# Patient Record
Sex: MALE | Race: BLACK OR AFRICAN AMERICAN | Employment: OTHER | URBAN - METROPOLITAN AREA
[De-identification: names, ages, dates, MRNs, and addresses within clinical notes are randomized per-mention and may not be internally consistent; named-entity substitution may affect disease eponyms.]

---

## 2017-05-09 ENCOUNTER — TELEPHONE (OUTPATIENT)
Dept: ADMINISTRATIVE | Age: 57
End: 2017-05-09

## 2017-09-05 ENCOUNTER — HOSPITAL ENCOUNTER (INPATIENT)
Age: 57
LOS: 2 days | Discharge: HOME | DRG: 602 | End: 2017-09-07
Attending: EMERGENCY MEDICINE | Admitting: INTERNAL MEDICINE
Payer: MEDICARE

## 2017-09-05 DIAGNOSIS — L03.113 CELLULITIS OF RIGHT UPPER LIMB: Primary | ICD-10-CM

## 2017-09-05 LAB
ABSOLUTE EOS #: 0.2 K/UL (ref 0–0.4)
ABSOLUTE LYMPH #: 1.5 K/UL (ref 1–4.8)
ABSOLUTE MONO #: 1.3 K/UL (ref 0.2–0.8)
ANION GAP SERPL CALCULATED.3IONS-SCNC: 19 MMOL/L (ref 9–17)
BASOPHILS # BLD: 1 %
BASOPHILS ABSOLUTE: 0.1 K/UL (ref 0–0.2)
BUN BLDV-MCNC: 49 MG/DL (ref 6–20)
BUN/CREAT BLD: 4 (ref 9–20)
CALCIUM SERPL-MCNC: 9.3 MG/DL (ref 8.6–10.4)
CHLORIDE BLD-SCNC: 87 MMOL/L (ref 98–107)
CO2: 25 MMOL/L (ref 20–31)
CREAT SERPL-MCNC: 11.16 MG/DL (ref 0.7–1.2)
DIFFERENTIAL TYPE: ABNORMAL
EOSINOPHILS RELATIVE PERCENT: 2 %
GFR AFRICAN AMERICAN: 6 ML/MIN
GFR NON-AFRICAN AMERICAN: 5 ML/MIN
GFR SERPL CREATININE-BSD FRML MDRD: ABNORMAL ML/MIN/{1.73_M2}
GFR SERPL CREATININE-BSD FRML MDRD: ABNORMAL ML/MIN/{1.73_M2}
GLUCOSE BLD-MCNC: 180 MG/DL (ref 70–99)
HCT VFR BLD CALC: 31.8 % (ref 41–53)
HEMOGLOBIN: 10.7 G/DL (ref 13.5–17.5)
LACTIC ACID: 1.1 MMOL/L (ref 0.5–2.2)
LYMPHOCYTES # BLD: 12 %
MCH RBC QN AUTO: 27.4 PG (ref 26–34)
MCHC RBC AUTO-ENTMCNC: 33.6 G/DL (ref 31–37)
MCV RBC AUTO: 81.7 FL (ref 80–100)
MONOCYTES # BLD: 10 %
PDW BLD-RTO: 15.2 % (ref 11.5–14.5)
PLATELET # BLD: 233 K/UL (ref 130–400)
PLATELET ESTIMATE: ABNORMAL
PMV BLD AUTO: ABNORMAL FL (ref 6–12)
POTASSIUM SERPL-SCNC: 4.2 MMOL/L (ref 3.7–5.3)
RBC # BLD: 3.9 M/UL (ref 4.5–5.9)
RBC # BLD: ABNORMAL 10*6/UL
SEG NEUTROPHILS: 75 %
SEGMENTED NEUTROPHILS ABSOLUTE COUNT: 9.1 K/UL (ref 1.8–7.7)
SODIUM BLD-SCNC: 131 MMOL/L (ref 135–144)
WBC # BLD: 12.2 K/UL (ref 3.5–11)
WBC # BLD: ABNORMAL 10*3/UL

## 2017-09-05 PROCEDURE — 96375 TX/PRO/DX INJ NEW DRUG ADDON: CPT

## 2017-09-05 PROCEDURE — 99284 EMERGENCY DEPT VISIT MOD MDM: CPT

## 2017-09-05 PROCEDURE — 2060000000 HC ICU INTERMEDIATE R&B

## 2017-09-05 PROCEDURE — 80048 BASIC METABOLIC PNL TOTAL CA: CPT

## 2017-09-05 PROCEDURE — 96365 THER/PROPH/DIAG IV INF INIT: CPT

## 2017-09-05 PROCEDURE — 6360000002 HC RX W HCPCS: Performed by: EMERGENCY MEDICINE

## 2017-09-05 PROCEDURE — 85025 COMPLETE CBC W/AUTO DIFF WBC: CPT

## 2017-09-05 PROCEDURE — 87040 BLOOD CULTURE FOR BACTERIA: CPT

## 2017-09-05 PROCEDURE — 96366 THER/PROPH/DIAG IV INF ADDON: CPT

## 2017-09-05 PROCEDURE — 6360000002 HC RX W HCPCS: Performed by: NURSE PRACTITIONER

## 2017-09-05 PROCEDURE — 2580000003 HC RX 258: Performed by: EMERGENCY MEDICINE

## 2017-09-05 PROCEDURE — 83605 ASSAY OF LACTIC ACID: CPT

## 2017-09-05 RX ORDER — B-COMPLEX WITH VITAMIN C
TABLET ORAL
COMMUNITY
End: 2017-09-05

## 2017-09-05 RX ORDER — AMLODIPINE BESYLATE AND ATORVASTATIN CALCIUM 10; 20 MG/1; MG/1
1 TABLET, FILM COATED ORAL DAILY
COMMUNITY
Start: 2017-04-06 | End: 2018-09-24 | Stop reason: SDUPTHER

## 2017-09-05 RX ORDER — OXYCODONE HYDROCHLORIDE AND ACETAMINOPHEN 5; 325 MG/1; MG/1
1 TABLET ORAL
COMMUNITY
Start: 2017-07-18 | End: 2021-05-18 | Stop reason: SDUPTHER

## 2017-09-05 RX ORDER — FENTANYL CITRATE 50 UG/ML
50 INJECTION, SOLUTION INTRAMUSCULAR; INTRAVENOUS ONCE
Status: COMPLETED | OUTPATIENT
Start: 2017-09-05 | End: 2017-09-05

## 2017-09-05 RX ADMIN — FENTANYL CITRATE 100 MCG: 50 INJECTION INTRAMUSCULAR; INTRAVENOUS at 20:54

## 2017-09-05 RX ADMIN — VANCOMYCIN HYDROCHLORIDE 1500 MG: 1 INJECTION, POWDER, LYOPHILIZED, FOR SOLUTION INTRAVENOUS at 20:55

## 2017-09-05 ASSESSMENT — PAIN DESCRIPTION - LOCATION: LOCATION: ARM

## 2017-09-05 ASSESSMENT — ENCOUNTER SYMPTOMS: COLOR CHANGE: 1

## 2017-09-05 ASSESSMENT — PAIN SCALES - GENERAL
PAINLEVEL_OUTOF10: 9
PAINLEVEL_OUTOF10: 9

## 2017-09-05 ASSESSMENT — PAIN DESCRIPTION - ORIENTATION: ORIENTATION: RIGHT

## 2017-09-06 LAB
GLUCOSE BLD-MCNC: 149 MG/DL (ref 75–110)
GLUCOSE BLD-MCNC: 224 MG/DL (ref 75–110)
GLUCOSE BLD-MCNC: 314 MG/DL (ref 75–110)
HBV SURFACE AB TITR SER: 39.28 MIU/ML
HEPATITIS B CORE TOTAL ANTIBODY: REACTIVE
HEPATITIS B SURFACE ANTIGEN: NONREACTIVE
LACTIC ACID: 1.6 MMOL/L (ref 0.5–2.2)

## 2017-09-06 PROCEDURE — 87340 HEPATITIS B SURFACE AG IA: CPT

## 2017-09-06 PROCEDURE — 86704 HEP B CORE ANTIBODY TOTAL: CPT

## 2017-09-06 PROCEDURE — 2060000000 HC ICU INTERMEDIATE R&B

## 2017-09-06 PROCEDURE — 6370000000 HC RX 637 (ALT 250 FOR IP): Performed by: INTERNAL MEDICINE

## 2017-09-06 PROCEDURE — 36415 COLL VENOUS BLD VENIPUNCTURE: CPT

## 2017-09-06 PROCEDURE — 2580000003 HC RX 258: Performed by: INTERNAL MEDICINE

## 2017-09-06 PROCEDURE — 82947 ASSAY GLUCOSE BLOOD QUANT: CPT

## 2017-09-06 PROCEDURE — 99223 1ST HOSP IP/OBS HIGH 75: CPT | Performed by: INTERNAL MEDICINE

## 2017-09-06 PROCEDURE — 93971 EXTREMITY STUDY: CPT

## 2017-09-06 PROCEDURE — 86140 C-REACTIVE PROTEIN: CPT

## 2017-09-06 PROCEDURE — 86317 IMMUNOASSAY INFECTIOUS AGENT: CPT

## 2017-09-06 PROCEDURE — 6360000002 HC RX W HCPCS: Performed by: INTERNAL MEDICINE

## 2017-09-06 PROCEDURE — 83605 ASSAY OF LACTIC ACID: CPT

## 2017-09-06 RX ORDER — CILOSTAZOL 100 MG/1
100 TABLET ORAL 2 TIMES DAILY
Status: DISCONTINUED | OUTPATIENT
Start: 2017-09-06 | End: 2017-09-06

## 2017-09-06 RX ORDER — ACETAMINOPHEN 325 MG/1
650 TABLET ORAL EVERY 4 HOURS PRN
Status: DISCONTINUED | OUTPATIENT
Start: 2017-09-06 | End: 2017-09-07 | Stop reason: HOSPADM

## 2017-09-06 RX ORDER — SODIUM CHLORIDE 0.9 % (FLUSH) 0.9 %
10 SYRINGE (ML) INJECTION PRN
Status: DISCONTINUED | OUTPATIENT
Start: 2017-09-06 | End: 2017-09-07 | Stop reason: HOSPADM

## 2017-09-06 RX ORDER — CLONIDINE HYDROCHLORIDE 0.3 MG/1
0.3 TABLET ORAL 2 TIMES DAILY
Status: DISCONTINUED | OUTPATIENT
Start: 2017-09-06 | End: 2017-09-06

## 2017-09-06 RX ORDER — LABETALOL 200 MG/1
200 TABLET, FILM COATED ORAL 2 TIMES DAILY
COMMUNITY
End: 2019-01-11 | Stop reason: ALTCHOICE

## 2017-09-06 RX ORDER — HEPARIN SODIUM 5000 [USP'U]/ML
5000 INJECTION, SOLUTION INTRAVENOUS; SUBCUTANEOUS 2 TIMES DAILY
Status: DISCONTINUED | OUTPATIENT
Start: 2017-09-06 | End: 2017-09-07 | Stop reason: HOSPADM

## 2017-09-06 RX ORDER — OXYCODONE HYDROCHLORIDE AND ACETAMINOPHEN 5; 325 MG/1; MG/1
1 TABLET ORAL EVERY 4 HOURS PRN
Status: DISCONTINUED | OUTPATIENT
Start: 2017-09-06 | End: 2017-09-06

## 2017-09-06 RX ORDER — NICOTINE POLACRILEX 4 MG
15 LOZENGE BUCCAL PRN
Status: DISCONTINUED | OUTPATIENT
Start: 2017-09-06 | End: 2017-09-07 | Stop reason: HOSPADM

## 2017-09-06 RX ORDER — LABETALOL 200 MG/1
200 TABLET, FILM COATED ORAL DAILY
Status: DISCONTINUED | OUTPATIENT
Start: 2017-09-06 | End: 2017-09-06

## 2017-09-06 RX ORDER — AMLODIPINE BESYLATE AND ATORVASTATIN CALCIUM 10; 20 MG/1; MG/1
1 TABLET, FILM COATED ORAL DAILY
Status: DISCONTINUED | OUTPATIENT
Start: 2017-09-06 | End: 2017-09-06 | Stop reason: CLARIF

## 2017-09-06 RX ORDER — LABETALOL 200 MG/1
200 TABLET, FILM COATED ORAL
Status: DISCONTINUED | OUTPATIENT
Start: 2017-09-06 | End: 2017-09-07 | Stop reason: HOSPADM

## 2017-09-06 RX ORDER — HYDRALAZINE HYDROCHLORIDE 25 MG/1
25 TABLET, FILM COATED ORAL 3 TIMES DAILY
Status: DISCONTINUED | OUTPATIENT
Start: 2017-09-06 | End: 2017-09-06

## 2017-09-06 RX ORDER — ATORVASTATIN CALCIUM 20 MG/1
20 TABLET, FILM COATED ORAL DAILY
Status: DISCONTINUED | OUTPATIENT
Start: 2017-09-06 | End: 2017-09-07 | Stop reason: HOSPADM

## 2017-09-06 RX ORDER — DEXTROSE MONOHYDRATE 50 MG/ML
100 INJECTION, SOLUTION INTRAVENOUS PRN
Status: DISCONTINUED | OUTPATIENT
Start: 2017-09-06 | End: 2017-09-07 | Stop reason: HOSPADM

## 2017-09-06 RX ORDER — AMLODIPINE BESYLATE 10 MG/1
10 TABLET ORAL DAILY
Status: DISCONTINUED | OUTPATIENT
Start: 2017-09-06 | End: 2017-09-07 | Stop reason: HOSPADM

## 2017-09-06 RX ORDER — GABAPENTIN 300 MG/1
300 CAPSULE ORAL 3 TIMES DAILY
Status: DISCONTINUED | OUTPATIENT
Start: 2017-09-06 | End: 2017-09-07 | Stop reason: HOSPADM

## 2017-09-06 RX ORDER — TRAMADOL HYDROCHLORIDE 50 MG/1
50 TABLET ORAL EVERY 6 HOURS PRN
Status: DISCONTINUED | OUTPATIENT
Start: 2017-09-06 | End: 2017-09-07 | Stop reason: SDUPTHER

## 2017-09-06 RX ORDER — LABETALOL 200 MG/1
200 TABLET, FILM COATED ORAL 3 TIMES DAILY
Status: ON HOLD | COMMUNITY
End: 2021-01-07 | Stop reason: HOSPADM

## 2017-09-06 RX ORDER — LOSARTAN POTASSIUM 100 MG/1
100 TABLET ORAL DAILY
Status: DISCONTINUED | OUTPATIENT
Start: 2017-09-06 | End: 2017-09-07 | Stop reason: HOSPADM

## 2017-09-06 RX ORDER — DEXTROSE MONOHYDRATE 25 G/50ML
12.5 INJECTION, SOLUTION INTRAVENOUS PRN
Status: DISCONTINUED | OUTPATIENT
Start: 2017-09-06 | End: 2017-09-07 | Stop reason: HOSPADM

## 2017-09-06 RX ORDER — HYDRALAZINE HYDROCHLORIDE 25 MG/1
25 TABLET, FILM COATED ORAL
Status: DISCONTINUED | OUTPATIENT
Start: 2017-09-07 | End: 2017-09-07 | Stop reason: HOSPADM

## 2017-09-06 RX ORDER — VANCOMYCIN HYDROCHLORIDE 1 G/200ML
1000 INJECTION, SOLUTION INTRAVENOUS ONCE
Status: COMPLETED | OUTPATIENT
Start: 2017-09-06 | End: 2017-09-06

## 2017-09-06 RX ORDER — LABETALOL 200 MG/1
200 TABLET, FILM COATED ORAL
Status: DISCONTINUED | OUTPATIENT
Start: 2017-09-07 | End: 2017-09-07 | Stop reason: HOSPADM

## 2017-09-06 RX ORDER — HYDRALAZINE HYDROCHLORIDE 25 MG/1
25 TABLET, FILM COATED ORAL 2 TIMES DAILY
COMMUNITY
End: 2020-03-04 | Stop reason: SDUPTHER

## 2017-09-06 RX ORDER — CLONIDINE HYDROCHLORIDE 0.3 MG/1
0.3 TABLET ORAL
Status: DISCONTINUED | OUTPATIENT
Start: 2017-09-06 | End: 2017-09-07 | Stop reason: HOSPADM

## 2017-09-06 RX ORDER — ZOLPIDEM TARTRATE 5 MG/1
5 TABLET ORAL NIGHTLY
Status: DISCONTINUED | OUTPATIENT
Start: 2017-09-06 | End: 2017-09-07 | Stop reason: HOSPADM

## 2017-09-06 RX ORDER — HYDRALAZINE HYDROCHLORIDE 25 MG/1
25 TABLET, FILM COATED ORAL
Status: DISCONTINUED | OUTPATIENT
Start: 2017-09-06 | End: 2017-09-07 | Stop reason: HOSPADM

## 2017-09-06 RX ORDER — CLONIDINE HYDROCHLORIDE 0.3 MG/1
0.3 TABLET ORAL
Status: DISCONTINUED | OUTPATIENT
Start: 2017-09-07 | End: 2017-09-07 | Stop reason: HOSPADM

## 2017-09-06 RX ORDER — SODIUM CHLORIDE 0.9 % (FLUSH) 0.9 %
10 SYRINGE (ML) INJECTION EVERY 12 HOURS SCHEDULED
Status: DISCONTINUED | OUTPATIENT
Start: 2017-09-06 | End: 2017-09-07 | Stop reason: HOSPADM

## 2017-09-06 RX ORDER — OXYCODONE HYDROCHLORIDE AND ACETAMINOPHEN 5; 325 MG/1; MG/1
2 TABLET ORAL EVERY 4 HOURS PRN
Status: DISCONTINUED | OUTPATIENT
Start: 2017-09-06 | End: 2017-09-07 | Stop reason: HOSPADM

## 2017-09-06 RX ADMIN — INSULIN LISPRO 2 UNITS: 100 INJECTION, SOLUTION INTRAVENOUS; SUBCUTANEOUS at 21:35

## 2017-09-06 RX ADMIN — ATORVASTATIN CALCIUM 20 MG: 20 TABLET, FILM COATED ORAL at 21:18

## 2017-09-06 RX ADMIN — OXYCODONE HYDROCHLORIDE AND ACETAMINOPHEN 2 TABLET: 5; 325 TABLET ORAL at 21:18

## 2017-09-06 RX ADMIN — VANCOMYCIN HYDROCHLORIDE 1000 MG: 1 INJECTION, SOLUTION INTRAVENOUS at 21:21

## 2017-09-06 RX ADMIN — HEPARIN SODIUM 5000 UNITS: 5000 INJECTION, SOLUTION INTRAVENOUS; SUBCUTANEOUS at 21:35

## 2017-09-06 RX ADMIN — LOSARTAN POTASSIUM 100 MG: 100 TABLET, FILM COATED ORAL at 13:38

## 2017-09-06 RX ADMIN — GABAPENTIN 300 MG: 300 CAPSULE ORAL at 21:18

## 2017-09-06 RX ADMIN — Medication 10 ML: at 21:21

## 2017-09-06 RX ADMIN — CLONIDINE HYDROCHLORIDE 0.3 MG: 0.3 TABLET ORAL at 13:37

## 2017-09-06 RX ADMIN — HYDRALAZINE HYDROCHLORIDE 25 MG: 25 TABLET, FILM COATED ORAL at 15:27

## 2017-09-06 RX ADMIN — OXYCODONE HYDROCHLORIDE AND ACETAMINOPHEN 1 TABLET: 5; 325 TABLET ORAL at 17:02

## 2017-09-06 RX ADMIN — CLONIDINE HYDROCHLORIDE 0.3 MG: 0.3 TABLET ORAL at 22:40

## 2017-09-06 RX ADMIN — Medication 10 ML: at 12:20

## 2017-09-06 RX ADMIN — HYDRALAZINE HYDROCHLORIDE 25 MG: 25 TABLET, FILM COATED ORAL at 22:40

## 2017-09-06 RX ADMIN — AMLODIPINE BESYLATE 10 MG: 10 TABLET ORAL at 13:37

## 2017-09-06 RX ADMIN — ACETAMINOPHEN 650 MG: 325 TABLET ORAL at 12:20

## 2017-09-06 RX ADMIN — LABETALOL HYDROCHLORIDE 200 MG: 200 TABLET, FILM COATED ORAL at 22:40

## 2017-09-06 RX ADMIN — LOSARTAN POTASSIUM 100 MG: 100 TABLET, FILM COATED ORAL at 21:17

## 2017-09-06 RX ADMIN — AMLODIPINE BESYLATE 10 MG: 10 TABLET ORAL at 21:17

## 2017-09-06 RX ADMIN — INSULIN LISPRO 6 UNITS: 100 INJECTION, SOLUTION INTRAVENOUS; SUBCUTANEOUS at 18:41

## 2017-09-06 RX ADMIN — ACETAMINOPHEN 650 MG: 325 TABLET ORAL at 08:33

## 2017-09-06 RX ADMIN — ACETAMINOPHEN 650 MG: 325 TABLET ORAL at 03:06

## 2017-09-06 ASSESSMENT — PAIN SCALES - GENERAL
PAINLEVEL_OUTOF10: 10
PAINLEVEL_OUTOF10: 10
PAINLEVEL_OUTOF10: 5
PAINLEVEL_OUTOF10: 7
PAINLEVEL_OUTOF10: 10
PAINLEVEL_OUTOF10: 10
PAINLEVEL_OUTOF10: 9
PAINLEVEL_OUTOF10: 0
PAINLEVEL_OUTOF10: 10
PAINLEVEL_OUTOF10: 0
PAINLEVEL_OUTOF10: 8

## 2017-09-06 ASSESSMENT — PAIN DESCRIPTION - ORIENTATION
ORIENTATION: RIGHT

## 2017-09-06 ASSESSMENT — PAIN DESCRIPTION - LOCATION
LOCATION: ARM

## 2017-09-06 ASSESSMENT — PAIN DESCRIPTION - FREQUENCY
FREQUENCY: INTERMITTENT

## 2017-09-06 ASSESSMENT — PAIN DESCRIPTION - DESCRIPTORS
DESCRIPTORS: ACHING
DESCRIPTORS: ACHING
DESCRIPTORS: SHARP;SHOOTING
DESCRIPTORS: ACHING

## 2017-09-06 ASSESSMENT — PAIN DESCRIPTION - PAIN TYPE
TYPE: ACUTE PAIN

## 2017-09-06 ASSESSMENT — PAIN DESCRIPTION - ONSET
ONSET: ON-GOING

## 2017-09-07 ENCOUNTER — APPOINTMENT (OUTPATIENT)
Dept: ULTRASOUND IMAGING | Age: 57
DRG: 602 | End: 2017-09-07
Payer: MEDICARE

## 2017-09-07 VITALS
DIASTOLIC BLOOD PRESSURE: 77 MMHG | HEIGHT: 71 IN | WEIGHT: 225.75 LBS | BODY MASS INDEX: 31.6 KG/M2 | RESPIRATION RATE: 18 BRPM | TEMPERATURE: 99.9 F | SYSTOLIC BLOOD PRESSURE: 154 MMHG | HEART RATE: 90 BPM | OXYGEN SATURATION: 100 %

## 2017-09-07 LAB
C-REACTIVE PROTEIN: 149.1 MG/L (ref 0–5)
C-REACTIVE PROTEIN: 169.4 MG/L (ref 0–5)
GLUCOSE BLD-MCNC: 110 MG/DL (ref 75–110)
GLUCOSE BLD-MCNC: 157 MG/DL (ref 75–110)
GLUCOSE BLD-MCNC: 163 MG/DL (ref 75–110)
GLUCOSE BLD-MCNC: 203 MG/DL (ref 75–110)

## 2017-09-07 PROCEDURE — 76881 US COMPL JOINT R-T W/IMG: CPT

## 2017-09-07 PROCEDURE — 6370000000 HC RX 637 (ALT 250 FOR IP): Performed by: INTERNAL MEDICINE

## 2017-09-07 PROCEDURE — 86403 PARTICLE AGGLUT ANTBDY SCRN: CPT

## 2017-09-07 PROCEDURE — 87070 CULTURE OTHR SPECIMN AEROBIC: CPT

## 2017-09-07 PROCEDURE — 87205 SMEAR GRAM STAIN: CPT

## 2017-09-07 PROCEDURE — 76882 US LMTD JT/FCL EVL NVASC XTR: CPT

## 2017-09-07 PROCEDURE — 82947 ASSAY GLUCOSE BLOOD QUANT: CPT

## 2017-09-07 PROCEDURE — 87186 SC STD MICRODIL/AGAR DIL: CPT

## 2017-09-07 PROCEDURE — 36415 COLL VENOUS BLD VENIPUNCTURE: CPT

## 2017-09-07 PROCEDURE — 6360000002 HC RX W HCPCS: Performed by: INTERNAL MEDICINE

## 2017-09-07 PROCEDURE — 86140 C-REACTIVE PROTEIN: CPT

## 2017-09-07 PROCEDURE — 2580000003 HC RX 258: Performed by: INTERNAL MEDICINE

## 2017-09-07 PROCEDURE — 99238 HOSP IP/OBS DSCHRG MGMT 30/<: CPT | Performed by: INTERNAL MEDICINE

## 2017-09-07 RX ADMIN — OXYCODONE HYDROCHLORIDE AND ACETAMINOPHEN 2 TABLET: 5; 325 TABLET ORAL at 08:33

## 2017-09-07 RX ADMIN — CLONIDINE HYDROCHLORIDE 0.3 MG: 0.3 TABLET ORAL at 08:20

## 2017-09-07 RX ADMIN — INSULIN LISPRO 3 UNITS: 100 INJECTION, SOLUTION INTRAVENOUS; SUBCUTANEOUS at 08:20

## 2017-09-07 RX ADMIN — HEPARIN SODIUM 5000 UNITS: 5000 INJECTION, SOLUTION INTRAVENOUS; SUBCUTANEOUS at 08:20

## 2017-09-07 RX ADMIN — Medication 10 ML: at 09:00

## 2017-09-07 RX ADMIN — HYDRALAZINE HYDROCHLORIDE 25 MG: 25 TABLET, FILM COATED ORAL at 08:20

## 2017-09-07 RX ADMIN — LABETALOL HYDROCHLORIDE 200 MG: 200 TABLET, FILM COATED ORAL at 06:01

## 2017-09-07 RX ADMIN — INSULIN LISPRO 6 UNITS: 100 INJECTION, SOLUTION INTRAVENOUS; SUBCUTANEOUS at 18:02

## 2017-09-07 RX ADMIN — LINAGLIPTIN 5 MG: 5 TABLET, FILM COATED ORAL at 08:20

## 2017-09-07 RX ADMIN — GABAPENTIN 300 MG: 300 CAPSULE ORAL at 08:20

## 2017-09-07 RX ADMIN — LABETALOL HYDROCHLORIDE 200 MG: 200 TABLET, FILM COATED ORAL at 14:36

## 2017-09-07 RX ADMIN — GABAPENTIN 300 MG: 300 CAPSULE ORAL at 14:36

## 2017-09-07 RX ADMIN — OXYCODONE HYDROCHLORIDE AND ACETAMINOPHEN 2 TABLET: 5; 325 TABLET ORAL at 03:30

## 2017-09-07 ASSESSMENT — PAIN DESCRIPTION - ORIENTATION: ORIENTATION: RIGHT

## 2017-09-07 ASSESSMENT — PAIN SCALES - GENERAL
PAINLEVEL_OUTOF10: 7
PAINLEVEL_OUTOF10: 7

## 2017-09-07 ASSESSMENT — PAIN DESCRIPTION - PAIN TYPE: TYPE: ACUTE PAIN

## 2017-09-07 ASSESSMENT — PAIN DESCRIPTION - LOCATION: LOCATION: ARM

## 2017-09-08 LAB
CULTURE: NORMAL
CULTURE: NORMAL
DIRECT EXAM: NORMAL
Lab: NORMAL
SPECIMEN DESCRIPTION: NORMAL
SPECIMEN DESCRIPTION: NORMAL
STATUS: NORMAL

## 2017-09-09 ENCOUNTER — TELEPHONE (OUTPATIENT)
Dept: PHARMACY | Age: 57
End: 2017-09-09

## 2017-09-09 LAB
CULTURE: ABNORMAL
CULTURE: ABNORMAL
DIRECT EXAM: ABNORMAL
DIRECT EXAM: ABNORMAL
Lab: ABNORMAL
ORGANISM: ABNORMAL
SPECIMEN DESCRIPTION: ABNORMAL
SPECIMEN DESCRIPTION: ABNORMAL
STATUS: ABNORMAL

## 2017-09-12 LAB
CULTURE: NORMAL
Lab: NORMAL
Lab: NORMAL
SPECIMEN DESCRIPTION: NORMAL
STATUS: NORMAL
STATUS: NORMAL

## 2018-02-05 LAB
AVERAGE GLUCOSE: NORMAL
HBA1C MFR BLD: 9 %

## 2018-02-11 ENCOUNTER — HOSPITAL ENCOUNTER (EMERGENCY)
Age: 58
Discharge: HOME | End: 2018-02-11
Attending: EMERGENCY MEDICINE
Payer: MEDICARE

## 2018-02-11 VITALS
DIASTOLIC BLOOD PRESSURE: 86 MMHG | SYSTOLIC BLOOD PRESSURE: 156 MMHG | OXYGEN SATURATION: 97 % | TEMPERATURE: 98.4 F | BODY MASS INDEX: 32.06 KG/M2 | HEIGHT: 71 IN | WEIGHT: 229 LBS | HEART RATE: 80 BPM | RESPIRATION RATE: 16 BRPM

## 2018-02-11 DIAGNOSIS — L03.012 PARONYCHIA OF FINGER OF LEFT HAND: Primary | ICD-10-CM

## 2018-02-11 PROCEDURE — 99282 EMERGENCY DEPT VISIT SF MDM: CPT

## 2018-02-11 RX ORDER — DOXYCYCLINE HYCLATE 100 MG
100 TABLET ORAL 2 TIMES DAILY
Qty: 20 TABLET | Refills: 0 | Status: SHIPPED | OUTPATIENT
Start: 2018-02-11 | End: 2018-02-21

## 2018-02-11 ASSESSMENT — ENCOUNTER SYMPTOMS: COLOR CHANGE: 1

## 2018-02-11 ASSESSMENT — PAIN DESCRIPTION - DESCRIPTORS: DESCRIPTORS: THROBBING;PRESSURE;CONSTANT

## 2018-02-11 ASSESSMENT — PAIN DESCRIPTION - LOCATION: LOCATION: FINGER (COMMENT WHICH ONE)

## 2018-02-11 NOTE — ED NOTES
Assessment states this has been going on since Nov 2017. He has good days and bad. Lately bad with pain pulsation and swelling of the left ring finger. Has dry sloughing of the skin on finger.       Carmen Salazar RN  02/11/18 [id number]

## 2018-03-15 ENCOUNTER — INITIAL CONSULT (OUTPATIENT)
Dept: VASCULAR SURGERY | Age: 58
End: 2018-03-15

## 2018-03-15 VITALS
DIASTOLIC BLOOD PRESSURE: 76 MMHG | SYSTOLIC BLOOD PRESSURE: 122 MMHG | BODY MASS INDEX: 32.07 KG/M2 | HEIGHT: 71 IN | WEIGHT: 229.06 LBS | HEART RATE: 84 BPM | OXYGEN SATURATION: 98 % | RESPIRATION RATE: 18 BRPM

## 2018-03-15 DIAGNOSIS — N18.6 ESRD (END STAGE RENAL DISEASE) ON DIALYSIS (HCC): Primary | ICD-10-CM

## 2018-03-15 DIAGNOSIS — Z99.2 ESRD (END STAGE RENAL DISEASE) ON DIALYSIS (HCC): Primary | ICD-10-CM

## 2018-03-15 DIAGNOSIS — S61.209A OPEN WOUND OF FINGER, INITIAL ENCOUNTER: Primary | ICD-10-CM

## 2018-03-15 PROCEDURE — 99024 POSTOP FOLLOW-UP VISIT: CPT | Performed by: SURGERY

## 2018-03-27 ENCOUNTER — OFFICE VISIT (OUTPATIENT)
Dept: ORTHOPEDIC SURGERY | Age: 58
End: 2018-03-27
Payer: MEDICARE

## 2018-03-27 VITALS — BODY MASS INDEX: 32.07 KG/M2 | HEIGHT: 71 IN | WEIGHT: 229.06 LBS

## 2018-03-27 DIAGNOSIS — G56.02 CARPAL TUNNEL SYNDROME OF LEFT WRIST: Primary | ICD-10-CM

## 2018-03-27 DIAGNOSIS — L03.012 INFECTION OF FINGERNAIL OF LEFT HAND: ICD-10-CM

## 2018-03-27 PROCEDURE — G8427 DOCREV CUR MEDS BY ELIG CLIN: HCPCS | Performed by: ORTHOPAEDIC SURGERY

## 2018-03-27 PROCEDURE — G8417 CALC BMI ABV UP PARAM F/U: HCPCS | Performed by: ORTHOPAEDIC SURGERY

## 2018-03-27 PROCEDURE — 3017F COLORECTAL CA SCREEN DOC REV: CPT | Performed by: ORTHOPAEDIC SURGERY

## 2018-03-27 PROCEDURE — G8484 FLU IMMUNIZE NO ADMIN: HCPCS | Performed by: ORTHOPAEDIC SURGERY

## 2018-03-27 PROCEDURE — 1036F TOBACCO NON-USER: CPT | Performed by: ORTHOPAEDIC SURGERY

## 2018-03-27 PROCEDURE — 99202 OFFICE O/P NEW SF 15 MIN: CPT | Performed by: ORTHOPAEDIC SURGERY

## 2018-03-27 RX ORDER — CILOSTAZOL 100 MG/1
TABLET ORAL
Status: ON HOLD | COMMUNITY
End: 2021-01-07 | Stop reason: HOSPADM

## 2018-05-14 ENCOUNTER — TELEPHONE (OUTPATIENT)
Dept: ORTHOPEDIC SURGERY | Age: 58
End: 2018-05-14

## 2018-06-12 ENCOUNTER — TELEPHONE (OUTPATIENT)
Dept: INTERNAL MEDICINE CLINIC | Age: 58
End: 2018-06-12

## 2018-07-26 ENCOUNTER — OFFICE VISIT (OUTPATIENT)
Dept: INTERNAL MEDICINE CLINIC | Age: 58
End: 2018-07-26
Payer: MEDICARE

## 2018-07-26 VITALS
WEIGHT: 227 LBS | SYSTOLIC BLOOD PRESSURE: 134 MMHG | BODY MASS INDEX: 31.78 KG/M2 | TEMPERATURE: 98.6 F | HEIGHT: 71 IN | DIASTOLIC BLOOD PRESSURE: 78 MMHG | OXYGEN SATURATION: 98 % | HEART RATE: 74 BPM

## 2018-07-26 DIAGNOSIS — E11.8 CONTROLLED DIABETES MELLITUS TYPE 2 WITH COMPLICATIONS, UNSPECIFIED LONG TERM INSULIN USE STATUS: ICD-10-CM

## 2018-07-26 DIAGNOSIS — G89.29 CHRONIC MIDLINE LOW BACK PAIN, WITH SCIATICA PRESENCE UNSPECIFIED: ICD-10-CM

## 2018-07-26 DIAGNOSIS — Z99.2 ESRD ON HEMODIALYSIS (HCC): ICD-10-CM

## 2018-07-26 DIAGNOSIS — L73.9 FOLLICULITIS: ICD-10-CM

## 2018-07-26 DIAGNOSIS — M54.5 CHRONIC MIDLINE LOW BACK PAIN, WITH SCIATICA PRESENCE UNSPECIFIED: ICD-10-CM

## 2018-07-26 DIAGNOSIS — Z76.89 ESTABLISHING CARE WITH NEW DOCTOR, ENCOUNTER FOR: Primary | ICD-10-CM

## 2018-07-26 DIAGNOSIS — E78.49 OTHER HYPERLIPIDEMIA: ICD-10-CM

## 2018-07-26 DIAGNOSIS — N18.6 ESRD ON HEMODIALYSIS (HCC): ICD-10-CM

## 2018-07-26 DIAGNOSIS — I10 ESSENTIAL HYPERTENSION: ICD-10-CM

## 2018-07-26 DIAGNOSIS — T87.9 BKA STUMP COMPLICATION (HCC): ICD-10-CM

## 2018-07-26 PROCEDURE — G8417 CALC BMI ABV UP PARAM F/U: HCPCS | Performed by: FAMILY MEDICINE

## 2018-07-26 PROCEDURE — 3045F PR MOST RECENT HEMOGLOBIN A1C LEVEL 7.0-9.0%: CPT | Performed by: FAMILY MEDICINE

## 2018-07-26 PROCEDURE — 1036F TOBACCO NON-USER: CPT | Performed by: FAMILY MEDICINE

## 2018-07-26 PROCEDURE — 2022F DILAT RTA XM EVC RTNOPTHY: CPT | Performed by: FAMILY MEDICINE

## 2018-07-26 PROCEDURE — G8427 DOCREV CUR MEDS BY ELIG CLIN: HCPCS | Performed by: FAMILY MEDICINE

## 2018-07-26 PROCEDURE — 3017F COLORECTAL CA SCREEN DOC REV: CPT | Performed by: FAMILY MEDICINE

## 2018-07-26 PROCEDURE — 99204 OFFICE O/P NEW MOD 45 MIN: CPT | Performed by: FAMILY MEDICINE

## 2018-07-26 RX ORDER — MUPIROCIN CALCIUM 20 MG/G
CREAM TOPICAL
Qty: 1 TUBE | Refills: 0 | Status: SHIPPED | OUTPATIENT
Start: 2018-07-26 | End: 2018-08-25

## 2018-07-26 ASSESSMENT — PATIENT HEALTH QUESTIONNAIRE - PHQ9
SUM OF ALL RESPONSES TO PHQ9 QUESTIONS 1 & 2: 0
SUM OF ALL RESPONSES TO PHQ QUESTIONS 1-9: 0
2. FEELING DOWN, DEPRESSED OR HOPELESS: 0
1. LITTLE INTEREST OR PLEASURE IN DOING THINGS: 0

## 2018-07-26 ASSESSMENT — ENCOUNTER SYMPTOMS
GASTROINTESTINAL NEGATIVE: 1
EYES NEGATIVE: 1
RESPIRATORY NEGATIVE: 1
ALLERGIC/IMMUNOLOGIC NEGATIVE: 1

## 2018-07-26 NOTE — PROGRESS NOTES
DIABETES and HYPERTENSION visit    BP Readings from Last 3 Encounters:   03/15/18 122/76   02/11/18 (!) 156/86   09/07/17 (!) 154/77        Hemoglobin A1C (%)   Date Value   02/05/2018 9   06/07/2016 7.4   09/22/2015 7.3     BUN (mg/dL)   Date Value   09/05/2017 49 (H)     CREATININE (mg/dL)   Date Value   09/05/2017 11.16 (HH)     Glucose (mg/dL)   Date Value   09/05/2017 180 (H)            Have you changed or started any medications since your last visit including any over-the-counter medicines, vitamins, or herbal medicines? no   Have you stopped taking any of your medications? Is so, why? -  no  Are you having any side effects from any of your medications? - no    Have you seen any other physician or provider since your last visit?  no      Have you had any other diagnostic tests since your last visit? yes - blood in May   Have you been seen in the emergency room and/or had an admission in a hospital since we last saw you?  no   Have you had your routine dental cleaning in the past 6 months?  no     Have you had your annual diabetic retinal (eye) exam? Yes   (ensure copy of exam is in the chart)    Do you have an active MyChart account? If no, what is the barrier?   No: decline     Patient Care Team:  Navi Garcia MD as PCP - General (Family Medicine)  Eulogio Garibay MD as Consulting Physician (Cardiology)  Felipe Delaney DO as Surgeon (Vascular Surgery)  Nasrin Lamas MD as Consulting Physician (Orthopedic Surgery)  Cindy Hancock MD as Consulting Physician (Neurology)    Medical History Review  Past Medical, Family, and Social History reviewed and does contribute to the patient presenting condition    Health Maintenance   Topic Date Due    Hepatitis C screen  1960    Diabetic foot exam  04/28/1969    Diabetic retinal exam  04/28/1969    Lipid screen  04/28/1969    HIV screen  04/28/1974    DTaP/Tdap/Td vaccine (1 - Tdap) 04/28/1978    Pneumococcal highest risk (1 of 3 - PCV13) 04/28/1978   
regular rhythm, normal heart sounds and intact distal pulses. Pulmonary/Chest: Effort normal and breath sounds normal.   Abdominal: Soft. Bowel sounds are normal.   Musculoskeletal: Normal range of motion. Chronic pain syndrome. Opiate dependent. Neurological: He is alert and oriented to person, place, and time. He has normal reflexes. Skin: Skin is warm and dry. Psychiatric:   Anxiety. He denies suicidality       Assessment:       Diagnosis Orders   1. Establishing care with new doctor, encounter for     2. Folliculitis     3. ESRD on hemodialysis ([facility] [address] MWF     4. BKA stump complication ([facility] [address]     5. Controlled diabetes mellitus type 2 with complications, unspecified long term insulin use status ([facility] [address]     6. Essential hypertension     7. Other hyperlipidemia     8. Chronic midline low back pain, with sciatica presence unspecified  Viktoria Navarro MD           Plan:      58-year-old -American female is presented to establish care. He is afebrile him economically stable, clinical examination is stable at baseline  History of hypertension well-controlled. He is on polypharmacy. Denies hypertension. Is advised to continue current regimen of medication as instructed by nephrology. Consume less than 2 g of salt a day  Diabetes mellitus on dual therapy that he is tolerating well. A1c 6.6. Is advised to keep daily blood sugar log for office review, adhere to current regimen of medication, ADA 1800 diet/renal diet, daily moderate exercise as tolerated  Hyperlipidemia and statin that he is tolerating well. End-stage renal disease hemodialysis dependent time 3 per week. He is also on transplant list  History of chronic back pain. Patient stated that 2-3 days a week he needs opiates. He is also on gabapentin pain. He agrees to be scheduled with pain management  Erectile dysfunction on Viagra  Folliculitis right cheek. Patient has been aggressively manipulating/squeezing.

## 2018-08-10 ENCOUNTER — TELEPHONE (OUTPATIENT)
Dept: INTERNAL MEDICINE CLINIC | Age: 58
End: 2018-08-10

## 2018-08-28 ENCOUNTER — TELEPHONE (OUTPATIENT)
Dept: INTERNAL MEDICINE CLINIC | Age: 58
End: 2018-08-28

## 2018-08-28 NOTE — TELEPHONE ENCOUNTER
PATIENT  STATES [employer] NO LONGER FAXES FORMS TO GET MEDICATION APPROVAL. HE STATES HE WAS TOLD THAT THE OFFICE HAS TO GO ON LINE TO COMPLETE APPLICATION AT [facility] FOR HIS CAUDET 10/20 AND VIAGRA 100MG.   ADVISE THE OFFICE STAFF POOL  Health Maintenance   Topic Date Due    Hepatitis C screen  1960    Diabetic foot exam  04/28/1969    Diabetic retinal exam  04/28/1969    Lipid screen  04/28/1969    HIV screen  04/28/1974    DTaP/Tdap/Td vaccine (1 - Tdap) 04/28/1978    Pneumococcal highest risk (1 of 3 - PCV13) 04/28/1978    Shingles Vaccine (1 of 2 - 2 Dose Series) 04/28/2009    Colon cancer screen colonoscopy  04/28/2009    A1C test (Diabetic or Prediabetic)  05/05/2018    Potassium monitoring  09/05/2018    Creatinine monitoring  09/05/2018    Flu vaccine (1) 09/01/2018             (applicable per patient's age: Cancer Screenings, Depression Screening, Fall Risk Screening, Immunizations)    Hemoglobin A1C (%)   Date Value   02/05/2018 9   06/07/2016 7.4   09/22/2015 7.3     AST (U/L)   Date Value   01/08/2013 40 (H)     ALT (U/L)   Date Value   01/08/2013 30     BUN (mg/dL)   Date Value   09/05/2017 49 (H)      (goal A1C is < 7)   (goal LDL is <100) need 30-50% reduction from baseline     BP Readings from Last 3 Encounters:   07/26/18 134/78   03/15/18 122/76   02/11/18 (!) 156/86    (goal /80)      All Future Testing planned in [facility]:      Next Visit Date:  Future Appointments  Date Time Provider Staci Vuong   9/6/2018 3:45 PM Gerhard Olivares MD Adult pc [id number]            Patient Active Problem List:     Hyperlipidemia     Essential hypertension     ESRD on hemodialysis ([facility] [address] MWF     BKA stump complication ([facility] [address]     Insomnia     ED (erectile dysfunction)     Low back pain     Controlled diabetes mellitus type 2 with complications ([facility] [address]

## 2018-09-06 NOTE — TELEPHONE ENCOUNTER
Tried to reach pt again no answer and rings continuously. I attached the patient assistance program form to this encounter in case pt calls back.

## 2018-09-27 ENCOUNTER — OFFICE VISIT (OUTPATIENT)
Dept: INTERNAL MEDICINE CLINIC | Age: 58
End: 2018-09-27
Payer: MEDICARE

## 2018-09-27 VITALS
BODY MASS INDEX: 32.52 KG/M2 | HEART RATE: 67 BPM | OXYGEN SATURATION: 99 % | DIASTOLIC BLOOD PRESSURE: 80 MMHG | WEIGHT: 233.2 LBS | SYSTOLIC BLOOD PRESSURE: 130 MMHG | RESPIRATION RATE: 14 BRPM

## 2018-09-27 DIAGNOSIS — N52.8 OTHER MALE ERECTILE DYSFUNCTION: ICD-10-CM

## 2018-09-27 DIAGNOSIS — Z13.220 SCREENING FOR HYPERLIPIDEMIA: ICD-10-CM

## 2018-09-27 DIAGNOSIS — G89.4 CHRONIC PAIN SYNDROME: ICD-10-CM

## 2018-09-27 DIAGNOSIS — T87.9 BKA STUMP COMPLICATION (HCC): ICD-10-CM

## 2018-09-27 DIAGNOSIS — Z99.2 ESRD (END STAGE RENAL DISEASE) ON DIALYSIS (HCC): ICD-10-CM

## 2018-09-27 DIAGNOSIS — I10 ESSENTIAL HYPERTENSION: ICD-10-CM

## 2018-09-27 DIAGNOSIS — Z12.11 SCREENING FOR COLON CANCER: ICD-10-CM

## 2018-09-27 DIAGNOSIS — N18.6 ESRD (END STAGE RENAL DISEASE) ON DIALYSIS (HCC): ICD-10-CM

## 2018-09-27 DIAGNOSIS — E11.8 CONTROLLED TYPE 2 DIABETES MELLITUS WITH COMPLICATION, WITHOUT LONG-TERM CURRENT USE OF INSULIN (HCC): Primary | ICD-10-CM

## 2018-09-27 DIAGNOSIS — E78.49 OTHER HYPERLIPIDEMIA: ICD-10-CM

## 2018-09-27 PROCEDURE — 3017F COLORECTAL CA SCREEN DOC REV: CPT | Performed by: FAMILY MEDICINE

## 2018-09-27 PROCEDURE — 1036F TOBACCO NON-USER: CPT | Performed by: FAMILY MEDICINE

## 2018-09-27 PROCEDURE — 2022F DILAT RTA XM EVC RTNOPTHY: CPT | Performed by: FAMILY MEDICINE

## 2018-09-27 PROCEDURE — 99214 OFFICE O/P EST MOD 30 MIN: CPT | Performed by: FAMILY MEDICINE

## 2018-09-27 PROCEDURE — 3045F PR MOST RECENT HEMOGLOBIN A1C LEVEL 7.0-9.0%: CPT | Performed by: FAMILY MEDICINE

## 2018-09-27 PROCEDURE — G8417 CALC BMI ABV UP PARAM F/U: HCPCS | Performed by: FAMILY MEDICINE

## 2018-09-27 PROCEDURE — G8427 DOCREV CUR MEDS BY ELIG CLIN: HCPCS | Performed by: FAMILY MEDICINE

## 2018-09-27 ASSESSMENT — ENCOUNTER SYMPTOMS
EYES NEGATIVE: 1
ALLERGIC/IMMUNOLOGIC NEGATIVE: 1
RESPIRATORY NEGATIVE: 1
GASTROINTESTINAL NEGATIVE: 1

## 2018-09-27 NOTE — PROGRESS NOTES
Subjective:      Patient ID: Oscar Gonzales is a 60 y.o. male. Diabetes   He presents for his follow-up diabetic visit. He has type 2 diabetes mellitus. His disease course has been improving. There are no hypoglycemic associated symptoms. There are no diabetic associated symptoms. There are no hypoglycemic complications. Symptoms are stable. Diabetic complications include nephropathy. Risk factors for coronary artery disease include diabetes mellitus, dyslipidemia, male sex and hypertension. Current diabetic treatment includes diet and oral agent (monotherapy). He is compliant with treatment all of the time. His weight is stable. He is following a generally healthy diet. Meal planning includes ADA exchanges, avoidance of concentrated sweets, calorie counting and carbohydrate counting. He has had a previous visit with a dietitian. He participates in exercise three times a week. His home blood glucose trend is decreasing steadily. An ACE inhibitor/angiotensin II receptor blocker is being taken. Eye exam is current. Review of Systems   Constitutional: Negative. HENT: Negative. Eyes: Negative. Respiratory: Negative. Cardiovascular: Negative. Gastrointestinal: Negative. Endocrine: Negative. Musculoskeletal: Positive for arthralgias and myalgias. Skin: Negative. Allergic/Immunologic: Negative. Neurological: Negative. Hematological: Negative. Psychiatric/Behavioral: Negative. Past family and social history unremarkable. Objective:   Physical Exam   Constitutional: He is oriented to person, place, and time. He appears well-developed and well-nourished. HENT:   Head: Normocephalic and atraumatic. Right Ear: External ear normal.   Left Ear: External ear normal.   Nose: Nose normal.   Mouth/Throat: Oropharynx is clear and moist.   Eyes: Pupils are equal, round, and reactive to light. Conjunctivae and EOM are normal.   Neck: Normal range of motion. Neck supple.

## 2018-10-10 DIAGNOSIS — F51.01 PRIMARY INSOMNIA: Primary | ICD-10-CM

## 2018-10-10 RX ORDER — ZOLPIDEM TARTRATE 10 MG/1
TABLET ORAL
Qty: 30 TABLET | Refills: 2 | Status: SHIPPED | OUTPATIENT
Start: 2018-10-10 | End: 2019-01-11 | Stop reason: SDUPTHER

## 2018-10-19 ENCOUNTER — TELEPHONE (OUTPATIENT)
Dept: INTERNAL MEDICINE CLINIC | Age: 58
End: 2018-10-19

## 2018-10-19 DIAGNOSIS — N52.9 ERECTILE DYSFUNCTION, UNSPECIFIED ERECTILE DYSFUNCTION TYPE: ICD-10-CM

## 2018-10-19 DIAGNOSIS — I10 ESSENTIAL HYPERTENSION: Primary | ICD-10-CM

## 2018-10-19 RX ORDER — AMLODIPINE BESYLATE AND ATORVASTATIN CALCIUM 10; 20 MG/1; MG/1
1 TABLET, FILM COATED ORAL DAILY
Qty: 90 TABLET | Refills: 0 | Status: SHIPPED | OUTPATIENT
Start: 2018-10-19 | End: 2019-05-16 | Stop reason: SDUPTHER

## 2018-10-19 RX ORDER — SILDENAFIL 100 MG/1
100 TABLET, FILM COATED ORAL PRN
Qty: 30 TABLET | Refills: 0 | Status: SHIPPED | OUTPATIENT
Start: 2018-10-19 | End: 2019-09-23 | Stop reason: SDUPTHER

## 2018-11-07 ENCOUNTER — TELEPHONE (OUTPATIENT)
Dept: INTERNAL MEDICINE CLINIC | Age: 58
End: 2018-11-07

## 2018-12-27 ENCOUNTER — OFFICE VISIT (OUTPATIENT)
Dept: INTERNAL MEDICINE CLINIC | Age: 58
End: 2018-12-27
Payer: MEDICARE

## 2018-12-27 ENCOUNTER — HOSPITAL ENCOUNTER (OUTPATIENT)
Age: 58
Setting detail: SPECIMEN
Discharge: HOME | End: 2018-12-27
Payer: MEDICARE

## 2018-12-27 VITALS
BODY MASS INDEX: 32.4 KG/M2 | OXYGEN SATURATION: 100 % | HEART RATE: 77 BPM | HEIGHT: 71 IN | DIASTOLIC BLOOD PRESSURE: 70 MMHG | RESPIRATION RATE: 20 BRPM | WEIGHT: 231.4 LBS | SYSTOLIC BLOOD PRESSURE: 140 MMHG

## 2018-12-27 DIAGNOSIS — Z99.2 ESRD ON HEMODIALYSIS (HCC): ICD-10-CM

## 2018-12-27 DIAGNOSIS — I10 ESSENTIAL HYPERTENSION: ICD-10-CM

## 2018-12-27 DIAGNOSIS — Z89.511 S/P BILATERAL BELOW KNEE AMPUTATION (HCC): ICD-10-CM

## 2018-12-27 DIAGNOSIS — N18.6 ESRD ON HEMODIALYSIS (HCC): ICD-10-CM

## 2018-12-27 DIAGNOSIS — N52.01 ERECTILE DYSFUNCTION DUE TO ARTERIAL INSUFFICIENCY: ICD-10-CM

## 2018-12-27 DIAGNOSIS — G89.4 CHRONIC PAIN SYNDROME: ICD-10-CM

## 2018-12-27 DIAGNOSIS — Z89.512 S/P BILATERAL BELOW KNEE AMPUTATION (HCC): ICD-10-CM

## 2018-12-27 DIAGNOSIS — E78.2 MIXED HYPERLIPIDEMIA: ICD-10-CM

## 2018-12-27 DIAGNOSIS — Z13.220 SCREENING FOR HYPERLIPIDEMIA: ICD-10-CM

## 2018-12-27 DIAGNOSIS — E11.8 CONTROLLED TYPE 2 DIABETES MELLITUS WITH COMPLICATION, WITHOUT LONG-TERM CURRENT USE OF INSULIN (HCC): Primary | ICD-10-CM

## 2018-12-27 DIAGNOSIS — E11.8 CONTROLLED TYPE 2 DIABETES MELLITUS WITH COMPLICATION, WITHOUT LONG-TERM CURRENT USE OF INSULIN (HCC): ICD-10-CM

## 2018-12-27 LAB
ALBUMIN SERPL-MCNC: 4.3 G/DL (ref 3.5–5.2)
ALBUMIN/GLOBULIN RATIO: 1.3 (ref 1–2.5)
ALP BLD-CCNC: 102 U/L (ref 40–129)
ALT SERPL-CCNC: 18 U/L (ref 5–41)
ANION GAP SERPL CALCULATED.3IONS-SCNC: 14 MMOL/L (ref 9–17)
AST SERPL-CCNC: 21 U/L
BILIRUB SERPL-MCNC: 0.36 MG/DL (ref 0.3–1.2)
BUN BLDV-MCNC: 33 MG/DL (ref 6–20)
BUN/CREAT BLD: ABNORMAL (ref 9–20)
CALCIUM SERPL-MCNC: 9.9 MG/DL (ref 8.6–10.4)
CHLORIDE BLD-SCNC: 93 MMOL/L (ref 98–107)
CHOLESTEROL, FASTING: 131 MG/DL
CHOLESTEROL/HDL RATIO: 2.8
CO2: 29 MMOL/L (ref 20–31)
CREAT SERPL-MCNC: 9.03 MG/DL (ref 0.7–1.2)
GFR AFRICAN AMERICAN: 7 ML/MIN
GFR NON-AFRICAN AMERICAN: 6 ML/MIN
GFR SERPL CREATININE-BSD FRML MDRD: ABNORMAL ML/MIN/{1.73_M2}
GFR SERPL CREATININE-BSD FRML MDRD: ABNORMAL ML/MIN/{1.73_M2}
GLUCOSE FASTING: 142 MG/DL (ref 70–99)
HCT VFR BLD CALC: 38.9 % (ref 40.7–50.3)
HDLC SERPL-MCNC: 46 MG/DL
HEMOGLOBIN: 12 G/DL (ref 13–17)
LDL CHOLESTEROL: 60 MG/DL (ref 0–130)
MCH RBC QN AUTO: 26.1 PG (ref 25.2–33.5)
MCHC RBC AUTO-ENTMCNC: 30.8 G/DL (ref 28.4–34.8)
MCV RBC AUTO: 84.7 FL (ref 82.6–102.9)
NRBC AUTOMATED: 0 PER 100 WBC
PDW BLD-RTO: 16.9 % (ref 11.8–14.4)
PLATELET # BLD: 191 K/UL (ref 138–453)
PMV BLD AUTO: 11.5 FL (ref 8.1–13.5)
POTASSIUM SERPL-SCNC: 4.9 MMOL/L (ref 3.7–5.3)
RBC # BLD: 4.59 M/UL (ref 4.21–5.77)
SODIUM BLD-SCNC: 136 MMOL/L (ref 135–144)
TOTAL PROTEIN: 7.6 G/DL (ref 6.4–8.3)
TRIGLYCERIDE, FASTING: 123 MG/DL
TSH SERPL DL<=0.05 MIU/L-ACNC: 1.56 MIU/L (ref 0.3–5)
VLDLC SERPL CALC-MCNC: NORMAL MG/DL (ref 1–30)
WBC # BLD: 7 K/UL (ref 3.5–11.3)

## 2018-12-27 PROCEDURE — 2022F DILAT RTA XM EVC RTNOPTHY: CPT | Performed by: FAMILY MEDICINE

## 2018-12-27 PROCEDURE — G8417 CALC BMI ABV UP PARAM F/U: HCPCS | Performed by: FAMILY MEDICINE

## 2018-12-27 PROCEDURE — G0009 ADMIN PNEUMOCOCCAL VACCINE: HCPCS | Performed by: FAMILY MEDICINE

## 2018-12-27 PROCEDURE — 99214 OFFICE O/P EST MOD 30 MIN: CPT | Performed by: FAMILY MEDICINE

## 2018-12-27 PROCEDURE — G8427 DOCREV CUR MEDS BY ELIG CLIN: HCPCS | Performed by: FAMILY MEDICINE

## 2018-12-27 PROCEDURE — 1036F TOBACCO NON-USER: CPT | Performed by: FAMILY MEDICINE

## 2018-12-27 PROCEDURE — 3017F COLORECTAL CA SCREEN DOC REV: CPT | Performed by: FAMILY MEDICINE

## 2018-12-27 PROCEDURE — G8484 FLU IMMUNIZE NO ADMIN: HCPCS | Performed by: FAMILY MEDICINE

## 2018-12-27 PROCEDURE — 3045F PR MOST RECENT HEMOGLOBIN A1C LEVEL 7.0-9.0%: CPT | Performed by: FAMILY MEDICINE

## 2018-12-27 PROCEDURE — 90670 PCV13 VACCINE IM: CPT | Performed by: FAMILY MEDICINE

## 2018-12-27 ASSESSMENT — ENCOUNTER SYMPTOMS
EYES NEGATIVE: 1
GASTROINTESTINAL NEGATIVE: 1
ALLERGIC/IMMUNOLOGIC NEGATIVE: 1
RESPIRATORY NEGATIVE: 1

## 2018-12-27 NOTE — PROGRESS NOTES
he is tolerating well  Hyperlipidemia on statin that he is tolerating well  Erectile dysfunction on Viagra  Med list reviewed advised to continue  Further recommendations to follow labs   be provided with FOBT kit for colon cancer screening  Is updated on influenza and pneumococcal vaccine  This note is created with a voice recognition program and while intend to generate a document that accurately reflects the content of the visit, no guarantee can be provided that every mistake has been identified and corrected by editing.                 Charles Chandler MD

## 2018-12-28 LAB
ESTIMATED AVERAGE GLUCOSE: 163 MG/DL
HBA1C MFR BLD: 7.3 % (ref 4–6)

## 2019-01-15 ENCOUNTER — TELEPHONE (OUTPATIENT)
Dept: INTERNAL MEDICINE CLINIC | Age: 59
End: 2019-01-15

## 2019-03-05 ENCOUNTER — TELEPHONE (OUTPATIENT)
Dept: INTERNAL MEDICINE CLINIC | Age: 59
End: 2019-03-05

## 2019-03-19 ENCOUNTER — TELEPHONE (OUTPATIENT)
Dept: INTERNAL MEDICINE CLINIC | Age: 59
End: 2019-03-19

## 2019-04-04 ENCOUNTER — HOSPITAL ENCOUNTER (OUTPATIENT)
Age: 59
Setting detail: SPECIMEN
Discharge: HOME | End: 2019-04-04
Payer: MEDICARE

## 2019-04-04 ENCOUNTER — OFFICE VISIT (OUTPATIENT)
Dept: INTERNAL MEDICINE CLINIC | Age: 59
End: 2019-04-04
Payer: MEDICARE

## 2019-04-04 VITALS
WEIGHT: 225.2 LBS | BODY MASS INDEX: 31.53 KG/M2 | SYSTOLIC BLOOD PRESSURE: 140 MMHG | DIASTOLIC BLOOD PRESSURE: 80 MMHG | RESPIRATION RATE: 16 BRPM | HEART RATE: 66 BPM | OXYGEN SATURATION: 100 % | HEIGHT: 71 IN

## 2019-04-04 DIAGNOSIS — E11.8 CONTROLLED TYPE 2 DIABETES MELLITUS WITH COMPLICATION, WITH LONG-TERM CURRENT USE OF INSULIN (HCC): ICD-10-CM

## 2019-04-04 DIAGNOSIS — G89.4 CHRONIC PAIN SYNDROME: ICD-10-CM

## 2019-04-04 DIAGNOSIS — M54.40 MIDLINE LOW BACK PAIN WITH SCIATICA, SCIATICA LATERALITY UNSPECIFIED, UNSPECIFIED CHRONICITY: ICD-10-CM

## 2019-04-04 DIAGNOSIS — Z99.2 ESRD ON HEMODIALYSIS (HCC): ICD-10-CM

## 2019-04-04 DIAGNOSIS — Z79.4 CONTROLLED TYPE 2 DIABETES MELLITUS WITH COMPLICATION, WITH LONG-TERM CURRENT USE OF INSULIN (HCC): ICD-10-CM

## 2019-04-04 DIAGNOSIS — Z89.511 S/P BILATERAL BELOW KNEE AMPUTATION (HCC): ICD-10-CM

## 2019-04-04 DIAGNOSIS — Z79.891 CHRONIC USE OF OPIATE DRUGS THERAPEUTIC PURPOSES: ICD-10-CM

## 2019-04-04 DIAGNOSIS — N18.6 ESRD ON HEMODIALYSIS (HCC): ICD-10-CM

## 2019-04-04 DIAGNOSIS — F51.04 PSYCHOPHYSIOLOGICAL INSOMNIA: ICD-10-CM

## 2019-04-04 DIAGNOSIS — E78.2 MIXED HYPERLIPIDEMIA: ICD-10-CM

## 2019-04-04 DIAGNOSIS — Z89.512 S/P BILATERAL BELOW KNEE AMPUTATION (HCC): ICD-10-CM

## 2019-04-04 DIAGNOSIS — I10 ESSENTIAL HYPERTENSION: Primary | ICD-10-CM

## 2019-04-04 DIAGNOSIS — Z12.11 SCREENING FOR COLON CANCER: ICD-10-CM

## 2019-04-04 DIAGNOSIS — N52.01 ERECTILE DYSFUNCTION DUE TO ARTERIAL INSUFFICIENCY: ICD-10-CM

## 2019-04-04 PROCEDURE — 1036F TOBACCO NON-USER: CPT | Performed by: FAMILY MEDICINE

## 2019-04-04 PROCEDURE — 3046F HEMOGLOBIN A1C LEVEL >9.0%: CPT | Performed by: FAMILY MEDICINE

## 2019-04-04 PROCEDURE — 99214 OFFICE O/P EST MOD 30 MIN: CPT | Performed by: FAMILY MEDICINE

## 2019-04-04 PROCEDURE — 3017F COLORECTAL CA SCREEN DOC REV: CPT | Performed by: FAMILY MEDICINE

## 2019-04-04 PROCEDURE — 2022F DILAT RTA XM EVC RTNOPTHY: CPT | Performed by: FAMILY MEDICINE

## 2019-04-04 PROCEDURE — G8417 CALC BMI ABV UP PARAM F/U: HCPCS | Performed by: FAMILY MEDICINE

## 2019-04-04 PROCEDURE — G8427 DOCREV CUR MEDS BY ELIG CLIN: HCPCS | Performed by: FAMILY MEDICINE

## 2019-04-04 RX ORDER — LISINOPRIL 10 MG/1
TABLET ORAL
COMMUNITY
Start: 2019-03-11 | End: 2019-05-16 | Stop reason: SDUPTHER

## 2019-04-04 ASSESSMENT — ENCOUNTER SYMPTOMS
GASTROINTESTINAL NEGATIVE: 1
RESPIRATORY NEGATIVE: 1
EYES NEGATIVE: 1
BACK PAIN: 1
ALLERGIC/IMMUNOLOGIC NEGATIVE: 1

## 2019-04-04 ASSESSMENT — PATIENT HEALTH QUESTIONNAIRE - PHQ9
2. FEELING DOWN, DEPRESSED OR HOPELESS: 0
SUM OF ALL RESPONSES TO PHQ QUESTIONS 1-9: 0
SUM OF ALL RESPONSES TO PHQ9 QUESTIONS 1 & 2: 0
SUM OF ALL RESPONSES TO PHQ QUESTIONS 1-9: 0
1. LITTLE INTEREST OR PLEASURE IN DOING THINGS: 0

## 2019-04-04 NOTE — PROGRESS NOTES
Subjective:      Patient ID: Linda Bender is a 60 y.o. male. Hypertension   This is a chronic problem. The current episode started more than 1 month ago. The problem has been gradually improving since onset. The problem is controlled. Risk factors for coronary artery disease include male gender, diabetes mellitus, dyslipidemia and sedentary lifestyle. Past treatments include ACE inhibitors, calcium channel blockers and central alpha agonists. The current treatment provides moderate improvement. There are no compliance problems. Hypertensive end-organ damage includes kidney disease and CAD/MI. Identifiable causes of hypertension include chronic renal disease. Review of Systems   Constitutional: Negative. HENT: Negative. Eyes: Negative. Respiratory: Negative. Cardiovascular: Negative. Gastrointestinal: Negative. Endocrine: Negative. Musculoskeletal: Positive for arthralgias and back pain. Skin: Negative. Allergic/Immunologic: Negative. Neurological: Negative. Hematological: Negative. Psychiatric/Behavioral: Negative. Past family and social history unremarkable. Objective:   Physical Exam   Constitutional: He is oriented to person, place, and time. He appears well-developed and well-nourished. HENT:   Head: Normocephalic and atraumatic. Right Ear: External ear normal.   Left Ear: External ear normal.   Nose: Nose normal.   Mouth/Throat: Oropharynx is clear and moist.   Eyes: Pupils are equal, round, and reactive to light. Conjunctivae and EOM are normal.   Neck: Normal range of motion. Neck supple. Cardiovascular: Normal rate, regular rhythm, normal heart sounds and intact distal pulses. Pulmonary/Chest: Effort normal and breath sounds normal.   Abdominal: Soft. Bowel sounds are normal.   Musculoskeletal: Normal range of motion. Degenerative polyarthralgia  Chronic pain syndrome.   Opiate dependent as provided by his vascular services  Bilateral amputee Neurological: He is alert and oriented to person, place, and time. He has normal reflexes. Skin: Skin is warm and dry. Psychiatric: He has a normal mood and affect. His behavior is normal. Thought content normal.   Nursing note and vitals reviewed. Assessment:       Diagnosis Orders   1. Essential hypertension     2. Mixed hyperlipidemia     3. ESRD on hemodialysis (HCC) MWF     4. Psychophysiological insomnia     5. Erectile dysfunction due to arterial insufficiency     6. Midline low back pain with sciatica, sciatica laterality unspecified, unspecified chronicity     7. Controlled type 2 diabetes mellitus with complication, with long-term current use of insulin (formerly [facility])  Hemoglobin A1C   8. Screening for colon cancer  POCT FECAL IMMUNOCHEMICAL TEST (FIT)   9. Chronic pain syndrome     10. S/P bilateral below knee amputation ([facility] [address]     11. Chronic use of opiate drugs therapeutic purposes             Plan:      21-year-old -American male returns for follow-up. He is afebrile hemodynamically stable, clinical examination is stable at baseline  Instigated disease hemodialysis dependent that he is tolerating well. He says that was also recently seen and evaluated by nephrology department at [facility]. He is on transplant list.  Hypertension well controlled. He is compliant with hydralazine, ACE inhibitor and calcium channel blocker. Consume less than salt a day, renal diet, ADA 1800 diet  Diabetes mellitus 1 dual therapy. A1c 7.5. Hyperlipidemia on statin that he is tolerating well  Erectile dysfunction  Chronic pain syndrome. 4.  Dependent as provided by his vascular surgeon  Bilateral lower extremity amputee as complication of diabetes. He appears with a single cane  He looks anxious however denies being depressed  He denies tobacco, excessive alcohol or illicit drug use  Available lab reviewed, discussed with patient, question answered.   He is up-to-date on influenza and pneumococcal vaccine  This note is created with a voice recognition program and while intend to generate a document that accurately reflects the content of the visit, no guarantee can be provided that every mistake has been identified and corrected by editing.                 William Gary MD

## 2019-04-04 NOTE — PROGRESS NOTES
Recombivax 3-dose series) 04/28/1978    Colon cancer screen colonoscopy  04/28/2009    Pneumococcal 0-64 years at Risk Vaccine (2 of 3 - PPSV23) 02/21/2019    DTaP/Tdap/Td vaccine (1 - Tdap) 12/20/2019 (Originally 4/28/1978)    Flu vaccine (Season Ended) 12/20/2019 (Originally 9/1/2019)    Shingles Vaccine (1 of 2) 12/27/2019 (Originally 4/28/2009)    Diabetic foot exam  09/27/2019    A1C test (Diabetic or Prediabetic)  12/27/2019    Lipid screen  12/27/2019    Potassium monitoring  12/27/2019    Creatinine monitoring  12/27/2019    HPV vaccine  Aged Out

## 2019-04-05 LAB
ESTIMATED AVERAGE GLUCOSE: 143 MG/DL
HBA1C MFR BLD: 6.6 % (ref 4–6)

## 2019-04-20 DIAGNOSIS — F51.01 PRIMARY INSOMNIA: ICD-10-CM

## 2019-04-22 DIAGNOSIS — F51.01 PRIMARY INSOMNIA: ICD-10-CM

## 2019-04-22 RX ORDER — ZOLPIDEM TARTRATE 10 MG/1
TABLET ORAL
Qty: 30 TABLET | Refills: 1 | OUTPATIENT
Start: 2019-04-22 | End: 2019-06-18 | Stop reason: SDUPTHER

## 2019-04-22 RX ORDER — ZOLPIDEM TARTRATE 10 MG/1
TABLET ORAL
Qty: 30 TABLET | Refills: 1 | Status: SHIPPED | OUTPATIENT
Start: 2019-04-22 | End: 2019-04-22 | Stop reason: SDUPTHER

## 2019-04-22 NOTE — TELEPHONE ENCOUNTER
Filled 1/11/19 #30 with 2 RF  Seen 4/4/19    Next Visit Date:  Future Appointments   Date Time Provider Staci Vuong   7/11/2019  3:00 PM Henrry Fay  [address] Maintenance   Topic Date Due    Colon cancer screen colonoscopy  04/28/2009    DTaP/Tdap/Td vaccine (1 - Tdap) 12/20/2019 (Originally 4/28/1978)    Flu vaccine (Season Ended) 12/20/2019 (Originally 9/1/2019)    Shingles Vaccine (1 of 2) 12/27/2019 (Originally 4/28/2009)    Pneumococcal 0-64 years Vaccine (2 of 3 - PPSV23) 12/27/2019 (Originally 2/21/2019)    Hepatitis B Vaccine (1 of 3 - Risk Recombivax 3-dose series) 04/04/2020 (Originally 4/28/1978)    Hepatitis C screen  04/04/2020 (Originally 1960)    HIV screen  04/04/2020 (Originally 4/28/1974)    Diabetic retinal exam  04/13/2020 (Originally 4/28/1969)    Diabetic foot exam  09/27/2019    Lipid screen  12/27/2019    Potassium monitoring  12/27/2019    Creatinine monitoring  12/27/2019    A1C test (Diabetic or Prediabetic)  04/04/2020    HPV vaccine  Aged Out       Hemoglobin A1C (%)   Date Value   04/04/2019 6.6 (H)   12/27/2018 7.3 (H)   02/05/2018 9             ( goal A1C is < 7)   No results found for: LABMICR  LDL Cholesterol (mg/dL)   Date Value   12/27/2018 60       (goal LDL is <100)   AST (U/L)   Date Value   12/27/2018 21     ALT (U/L)   Date Value   12/27/2018 18     BUN (mg/dL)   Date Value   12/27/2018 33 (H)     BP Readings from Last 3 Encounters:   04/04/19 (!) 140/80   12/27/18 (!) 140/70   09/27/18 130/80          (goal 120/80)    All Future Testing planned in [facility]  Lab Frequency Next Occurrence   POCT FECAL IMMUNOCHEMICAL TEST (FIT) Once 10/27/2018   POCT FECAL IMMUNOCHEMICAL TEST (FIT) Once 05/04/2019               Patient Active Problem List:     Hyperlipidemia     Essential hypertension     ESRD on hemodialysis ([address] MWF     Insomnia     ED (erectile dysfunction)     Low back pain     Controlled diabetes mellitus type 2 with complications (HCC)     Chronic pain syndrome     S/P bilateral below knee amputation ([facility] [address]     Screening for colon cancer     Chronic use of opiate drugs therapeutic purposes

## 2019-04-24 NOTE — TELEPHONE ENCOUNTER
Pt is ready for refill on tradjeta. pt states  was going to increase to tradjenta 10 mg once daily.   Please refill 10 mg if appropriate  Seen    Next Visit Date:  Future Appointments   Date Time Provider Staci Vuong   7/11/2019  3:00 PM José Manuel Cortes  [facility] Maintenance   Topic Date Due    Colon cancer screen colonoscopy  04/28/2009    DTaP/Tdap/Td vaccine (1 - Tdap) 12/20/2019 (Originally 4/28/1978)    Flu vaccine (Season Ended) 12/20/2019 (Originally 9/1/2019)    Shingles Vaccine (1 of 2) 12/27/2019 (Originally 4/28/2009)    Pneumococcal 0-64 years Vaccine (2 of 3 - PPSV23) 12/27/2019 (Originally 2/21/2019)    Hepatitis B Vaccine (1 of 3 - Risk Recombivax 3-dose series) 04/04/2020 (Originally 4/28/1978)    Hepatitis C screen  04/04/2020 (Originally 1960)    HIV screen  04/04/2020 (Originally 4/28/1974)    Diabetic retinal exam  04/13/2020 (Originally 4/28/1969)    Diabetic foot exam  09/27/2019    Lipid screen  12/27/2019    Potassium monitoring  12/27/2019    Creatinine monitoring  12/27/2019    A1C test (Diabetic or Prediabetic)  04/04/2020    HPV vaccine  Aged Out       Hemoglobin A1C (%)   Date Value   04/04/2019 6.6 (H)   12/27/2018 7.3 (H)   02/05/2018 9             ( goal A1C is < 7)   No results found for: LABMICR  LDL Cholesterol (mg/dL)   Date Value   12/27/2018 60       (goal LDL is <100)   AST (U/L)   Date Value   12/27/2018 21     ALT (U/L)   Date Value   12/27/2018 18     BUN (mg/dL)   Date Value   12/27/2018 33 (H)     BP Readings from Last 3 Encounters:   04/04/19 (!) 140/80   12/27/18 (!) 140/70   09/27/18 130/80          (goal 120/80)    All Future Testing planned in [facility]  Lab Frequency Next Occurrence   POCT FECAL IMMUNOCHEMICAL TEST (FIT) Once 10/27/2018   POCT FECAL IMMUNOCHEMICAL TEST (FIT) Once 05/04/2019               Patient Active Problem List:     Hyperlipidemia     Essential hypertension     ESRD on hemodialysis ([facility] [address] MWF     Insomnia     ED (erectile dysfunction)     Low back pain     Controlled diabetes mellitus type 2 with complications (HCC)     Chronic pain syndrome     S/P bilateral below knee amputation ([facility] [address]     Screening for colon cancer     Chronic use of opiate drugs therapeutic purposes

## 2019-04-26 ENCOUNTER — TELEPHONE (OUTPATIENT)
Dept: INTERNAL MEDICINE CLINIC | Age: 59
End: 2019-04-26

## 2019-05-04 PROBLEM — Z12.11 SCREENING FOR COLON CANCER: Status: RESOLVED | Noted: 2019-04-04 | Resolved: 2019-05-04

## 2019-05-16 DIAGNOSIS — I10 ESSENTIAL HYPERTENSION: ICD-10-CM

## 2019-05-16 NOTE — TELEPHONE ENCOUNTER
Pt called to request refills on meds. Anne Oliver was denied for 10 mg, pt taking 5 mg once daily.     Lisinopril was to replace losartan per pt, new script needed  caduet last filled 10/19/18 #90 with 0 RF  Last seen 4/4/19  Next appt 7/11/19  4% no show history    Next Visit Date:  Future Appointments   Date Time Provider Staci Vuong   7/11/2019  3:00 PM Jona Geiger  [address] Maintenance   Topic Date Due    Colon cancer screen colonoscopy  04/28/2009    DTaP/Tdap/Td vaccine (1 - Tdap) 12/20/2019 (Originally 4/28/1978)    Flu vaccine (Season Ended) 12/20/2019 (Originally 9/1/2019)    Shingles Vaccine (1 of 2) 12/27/2019 (Originally 4/28/2009)    Pneumococcal 0-64 years Vaccine (2 of 3 - PPSV23) 12/27/2019 (Originally 2/21/2019)    Hepatitis B Vaccine (1 of 3 - Risk Recombivax 3-dose series) 04/04/2020 (Originally 4/28/1978)    Hepatitis C screen  04/04/2020 (Originally 1960)    HIV screen  04/04/2020 (Originally 4/28/1974)    Diabetic retinal exam  04/13/2020 (Originally 4/28/1969)    Diabetic foot exam  09/27/2019    Lipid screen  12/27/2019    Potassium monitoring  12/27/2019    Creatinine monitoring  12/27/2019    A1C test (Diabetic or Prediabetic)  04/04/2020    HPV vaccine  Aged Out       Hemoglobin A1C (%)   Date Value   04/04/2019 6.6 (H)   12/27/2018 7.3 (H)   02/05/2018 9             ( goal A1C is < 7)   No results found for: LABMICR  LDL Cholesterol (mg/dL)   Date Value   12/27/2018 60       (goal LDL is <100)   AST (U/L)   Date Value   12/27/2018 21     ALT (U/L)   Date Value   12/27/2018 18     BUN (mg/dL)   Date Value   12/27/2018 33 (H)     BP Readings from Last 3 Encounters:   04/04/19 (!) 140/80   12/27/18 (!) 140/70   09/27/18 130/80          (goal 120/80)    All Future Testing planned in [facility]  Lab Frequency Next Occurrence   POCT FECAL IMMUNOCHEMICAL TEST (FIT) Once 10/27/2018   POCT FECAL IMMUNOCHEMICAL TEST (FIT) Once 05/04/2019 Patient Active Problem List:     Hyperlipidemia     Essential hypertension     ESRD on hemodialysis ([facility] [address] MWF     Insomnia     ED (erectile dysfunction)     Low back pain     Controlled diabetes mellitus type 2 with complications (HCC)     Chronic pain syndrome     S/P bilateral below knee amputation (HCC)     Chronic use of opiate drugs therapeutic purposes

## 2019-05-17 RX ORDER — LISINOPRIL 10 MG/1
10 TABLET ORAL 2 TIMES DAILY
Qty: 180 TABLET | Refills: 0 | Status: SHIPPED | OUTPATIENT
Start: 2019-05-17 | End: 2019-10-21 | Stop reason: SDUPTHER

## 2019-05-17 RX ORDER — AMLODIPINE BESYLATE AND ATORVASTATIN CALCIUM 10; 20 MG/1; MG/1
1 TABLET, FILM COATED ORAL DAILY
Qty: 90 TABLET | Refills: 0 | Status: SHIPPED | OUTPATIENT
Start: 2019-05-17 | End: 2019-09-23 | Stop reason: SDUPTHER

## 2019-06-18 ENCOUNTER — TELEPHONE (OUTPATIENT)
Dept: INTERNAL MEDICINE CLINIC | Age: 59
End: 2019-06-18

## 2019-06-18 DIAGNOSIS — F51.01 PRIMARY INSOMNIA: ICD-10-CM

## 2019-06-19 NOTE — TELEPHONE ENCOUNTER
Last visit: 4/4/19  Last Med refill: 4/22/19  Does patient have enough medication for 72 hours: NA    Next Visit Date:  Future Appointments   Date Time Provider Staci Vuong   7/11/2019  3:00 PM Maribel Vasquez  [facility] Maintenance   Topic Date Due    Colon cancer screen colonoscopy  04/28/2009    DTaP/Tdap/Td vaccine (1 - Tdap) 12/20/2019 (Originally 4/28/1978)    Flu vaccine (Season Ended) 12/20/2019 (Originally 9/1/2019)    Shingles Vaccine (1 of 2) 12/27/2019 (Originally 4/28/2009)    Pneumococcal 0-64 years Vaccine (2 of 3 - PPSV23) 12/27/2019 (Originally 2/21/2019)    Hepatitis B Vaccine (1 of 3 - Risk Recombivax 3-dose series) 04/04/2020 (Originally 4/28/1978)    Hepatitis C screen  04/04/2020 (Originally 1960)    HIV screen  04/04/2020 (Originally 4/28/1974)    Diabetic retinal exam  04/13/2020 (Originally 4/28/1969)    Diabetic foot exam  09/27/2019    Lipid screen  12/27/2019    Potassium monitoring  12/27/2019    Creatinine monitoring  12/27/2019    A1C test (Diabetic or Prediabetic)  04/04/2020    HPV vaccine  Aged Out       Hemoglobin A1C (%)   Date Value   04/04/2019 6.6 (H)   12/27/2018 7.3 (H)   02/05/2018 9             ( goal A1C is < 7)   No results found for: LABMICR  LDL Cholesterol (mg/dL)   Date Value   12/27/2018 60       (goal LDL is <100)   AST (U/L)   Date Value   12/27/2018 21     ALT (U/L)   Date Value   12/27/2018 18     BUN (mg/dL)   Date Value   12/27/2018 33 (H)     BP Readings from Last 3 Encounters:   04/04/19 (!) 140/80   12/27/18 (!) 140/70   09/27/18 130/80          (goal 120/80)    All Future Testing planned in [facility]  Lab Frequency Next Occurrence   POCT FECAL IMMUNOCHEMICAL TEST (FIT) Once 10/27/2018   POCT FECAL IMMUNOCHEMICAL TEST (FIT) Once 05/04/2019               Patient Active Problem List:     Hyperlipidemia     Essential hypertension     ESRD on hemodialysis ([address] MWF     Insomnia     ED (erectile dysfunction) Low back pain     Controlled diabetes mellitus type 2 with complications (HCC)     Chronic pain syndrome     S/P bilateral below knee amputation (HCC)     Chronic use of opiate drugs therapeutic purposes

## 2019-06-20 RX ORDER — ZOLPIDEM TARTRATE 10 MG/1
TABLET ORAL
Qty: 30 TABLET | Refills: 0 | Status: SHIPPED | OUTPATIENT
Start: 2019-06-20 | End: 2019-07-22 | Stop reason: SDUPTHER

## 2019-07-09 RX ORDER — LINAGLIPTIN 5 MG/1
TABLET, FILM COATED ORAL
Qty: 90 TABLET | Refills: 0 | Status: SHIPPED | OUTPATIENT
Start: 2019-07-09 | End: 2020-01-17 | Stop reason: ALTCHOICE

## 2019-07-11 ENCOUNTER — OFFICE VISIT (OUTPATIENT)
Dept: INTERNAL MEDICINE CLINIC | Age: 59
End: 2019-07-11
Payer: MEDICARE

## 2019-07-11 VITALS
WEIGHT: 221 LBS | SYSTOLIC BLOOD PRESSURE: 128 MMHG | HEIGHT: 71 IN | RESPIRATION RATE: 18 BRPM | DIASTOLIC BLOOD PRESSURE: 84 MMHG | OXYGEN SATURATION: 100 % | HEART RATE: 72 BPM | BODY MASS INDEX: 30.94 KG/M2

## 2019-07-11 DIAGNOSIS — G89.4 CHRONIC PAIN SYNDROME: ICD-10-CM

## 2019-07-11 DIAGNOSIS — Z89.619 HISTORY OF ABOVE KNEE AMPUTATION, UNSPECIFIED LATERALITY (HCC): ICD-10-CM

## 2019-07-11 DIAGNOSIS — Z12.11 SCREEN FOR COLON CANCER: ICD-10-CM

## 2019-07-11 DIAGNOSIS — F51.04 PSYCHOPHYSIOLOGICAL INSOMNIA: ICD-10-CM

## 2019-07-11 DIAGNOSIS — Z89.512 S/P BILATERAL BELOW KNEE AMPUTATION (HCC): ICD-10-CM

## 2019-07-11 DIAGNOSIS — N52.01 ERECTILE DYSFUNCTION DUE TO ARTERIAL INSUFFICIENCY: ICD-10-CM

## 2019-07-11 DIAGNOSIS — E78.2 MIXED HYPERLIPIDEMIA: ICD-10-CM

## 2019-07-11 DIAGNOSIS — E11.8 CONTROLLED TYPE 2 DIABETES MELLITUS WITH COMPLICATION, WITHOUT LONG-TERM CURRENT USE OF INSULIN (HCC): Primary | ICD-10-CM

## 2019-07-11 DIAGNOSIS — I10 ESSENTIAL HYPERTENSION: ICD-10-CM

## 2019-07-11 DIAGNOSIS — N18.6 ESRD ON HEMODIALYSIS (HCC): ICD-10-CM

## 2019-07-11 DIAGNOSIS — Z79.891 CHRONIC USE OF OPIATE DRUG FOR THERAPEUTIC PURPOSE: ICD-10-CM

## 2019-07-11 DIAGNOSIS — Z99.2 ESRD ON HEMODIALYSIS (HCC): ICD-10-CM

## 2019-07-11 DIAGNOSIS — G89.29 CHRONIC LOW BACK PAIN WITHOUT SCIATICA, UNSPECIFIED BACK PAIN LATERALITY: ICD-10-CM

## 2019-07-11 DIAGNOSIS — M54.50 CHRONIC LOW BACK PAIN WITHOUT SCIATICA, UNSPECIFIED BACK PAIN LATERALITY: ICD-10-CM

## 2019-07-11 DIAGNOSIS — Z89.511 S/P BILATERAL BELOW KNEE AMPUTATION (HCC): ICD-10-CM

## 2019-07-11 PROCEDURE — 1036F TOBACCO NON-USER: CPT | Performed by: FAMILY MEDICINE

## 2019-07-11 PROCEDURE — 2022F DILAT RTA XM EVC RTNOPTHY: CPT | Performed by: FAMILY MEDICINE

## 2019-07-11 PROCEDURE — 99214 OFFICE O/P EST MOD 30 MIN: CPT | Performed by: FAMILY MEDICINE

## 2019-07-11 PROCEDURE — G8427 DOCREV CUR MEDS BY ELIG CLIN: HCPCS | Performed by: FAMILY MEDICINE

## 2019-07-11 PROCEDURE — 3044F HG A1C LEVEL LT 7.0%: CPT | Performed by: FAMILY MEDICINE

## 2019-07-11 PROCEDURE — G8417 CALC BMI ABV UP PARAM F/U: HCPCS | Performed by: FAMILY MEDICINE

## 2019-07-11 PROCEDURE — 3017F COLORECTAL CA SCREEN DOC REV: CPT | Performed by: FAMILY MEDICINE

## 2019-07-11 ASSESSMENT — ENCOUNTER SYMPTOMS
ALLERGIC/IMMUNOLOGIC NEGATIVE: 1
GASTROINTESTINAL NEGATIVE: 1
RESPIRATORY NEGATIVE: 1
BACK PAIN: 1
EYES NEGATIVE: 1

## 2019-07-22 DIAGNOSIS — F51.01 PRIMARY INSOMNIA: ICD-10-CM

## 2019-07-22 RX ORDER — ZOLPIDEM TARTRATE 10 MG/1
TABLET ORAL
Qty: 30 TABLET | Refills: 0 | Status: SHIPPED | OUTPATIENT
Start: 2019-07-22 | End: 2019-08-21 | Stop reason: SDUPTHER

## 2019-08-21 DIAGNOSIS — F51.01 PRIMARY INSOMNIA: ICD-10-CM

## 2019-08-21 RX ORDER — ZOLPIDEM TARTRATE 10 MG/1
TABLET ORAL
Qty: 30 TABLET | Refills: 0 | OUTPATIENT
Start: 2019-08-21 | End: 2019-09-21

## 2019-08-21 RX ORDER — ZOLPIDEM TARTRATE 10 MG/1
TABLET ORAL
Qty: 30 TABLET | Refills: 3 | Status: SHIPPED | OUTPATIENT
Start: 2019-08-21 | End: 2019-08-21 | Stop reason: SDUPTHER

## 2019-08-21 NOTE — TELEPHONE ENCOUNTER
Last filled 7/22/19 #30 with 0 RF  Last seen 7/11/19    Next Visit Date:  Future Appointments   Date Time Provider Staci Vuong   11/14/2019  2:15 PM Sina Sharma  [address] [facility]   Topic Date Due    Colon cancer screen colonoscopy  04/28/2009    DTaP/Tdap/Td vaccine (1 - Tdap) 12/20/2019 (Originally 4/28/1978)    Flu vaccine (1) 12/20/2019 (Originally 9/1/2019)    Shingles Vaccine (1 of 2) 12/27/2019 (Originally 4/28/2009)    Pneumococcal 0-64 years Vaccine (2 of 3 - PPSV23) 12/27/2019 (Originally 2/21/2019)    Hepatitis B Vaccine (1 of 3 - Risk Recombivax 3-dose series) 04/04/2020 (Originally 4/28/1978)    Hepatitis C screen  04/04/2020 (Originally 1960)    HIV screen  04/04/2020 (Originally 4/28/1974)    Diabetic retinal exam  04/13/2020 (Originally 4/28/1969)    Diabetic foot exam  09/27/2019    Lipid screen  12/27/2019    Potassium monitoring  12/27/2019    Creatinine monitoring  12/27/2019    A1C test (Diabetic or Prediabetic)  04/04/2020    HPV vaccine  Aged Out       Hemoglobin A1C (%)   Date Value   04/04/2019 6.6 (H)   12/27/2018 7.3 (H)   02/05/2018 9             ( goal A1C is < 7)   No results found for: LABMICR  LDL Cholesterol (mg/dL)   Date Value   12/27/2018 60       (goal LDL is <100)   AST (U/L)   Date Value   12/27/2018 21     ALT (U/L)   Date Value   12/27/2018 18     BUN (mg/dL)   Date Value   12/27/2018 33 (H)     BP Readings from Last 3 Encounters:   07/11/19 128/84   04/04/19 (!) 140/80   12/27/18 (!) 140/70          (goal 120/80)    All Future Testing planned in [facility]  Lab Frequency Next Occurrence   POCT FECAL IMMUNOCHEMICAL TEST (FIT) Once 10/27/2018   POCT FECAL IMMUNOCHEMICAL TEST (FIT) Once 05/04/2019               Patient Active Problem List:     Hyperlipidemia     Essential hypertension     ESRD on hemodialysis ([address] MWF     Insomnia     ED (erectile dysfunction)     Low back pain     Controlled diabetes mellitus type 2 with complications (HCC)     Chronic pain syndrome     S/P bilateral below knee amputation (HCC)     Chronic use of opiate drugs therapeutic purposes     Amputee, above knee ([email or web address]

## 2019-08-22 RX ORDER — ZOLPIDEM TARTRATE 10 MG/1
TABLET ORAL
Qty: 30 TABLET | Refills: 3 | Status: SHIPPED | OUTPATIENT
Start: 2019-08-22 | End: 2019-12-18 | Stop reason: SDUPTHER

## 2019-09-12 ENCOUNTER — OFFICE VISIT (OUTPATIENT)
Dept: INTERNAL MEDICINE CLINIC | Age: 59
End: 2019-09-12
Payer: MEDICARE

## 2019-09-12 VITALS
WEIGHT: 221 LBS | RESPIRATION RATE: 18 BRPM | OXYGEN SATURATION: 100 % | HEART RATE: 74 BPM | DIASTOLIC BLOOD PRESSURE: 70 MMHG | SYSTOLIC BLOOD PRESSURE: 120 MMHG | HEIGHT: 71 IN | BODY MASS INDEX: 30.94 KG/M2

## 2019-09-12 DIAGNOSIS — E78.2 MIXED HYPERLIPIDEMIA: Primary | ICD-10-CM

## 2019-09-12 DIAGNOSIS — Z79.4 CONTROLLED TYPE 2 DIABETES MELLITUS WITH COMPLICATION, WITH LONG-TERM CURRENT USE OF INSULIN (HCC): ICD-10-CM

## 2019-09-12 DIAGNOSIS — N18.6 ESRD ON HEMODIALYSIS (HCC): ICD-10-CM

## 2019-09-12 DIAGNOSIS — G89.29 CHRONIC BILATERAL LOW BACK PAIN WITH BILATERAL SCIATICA: ICD-10-CM

## 2019-09-12 DIAGNOSIS — E11.8 CONTROLLED TYPE 2 DIABETES MELLITUS WITH COMPLICATION, WITH LONG-TERM CURRENT USE OF INSULIN (HCC): ICD-10-CM

## 2019-09-12 DIAGNOSIS — Z89.511 S/P BILATERAL BELOW KNEE AMPUTATION (HCC): ICD-10-CM

## 2019-09-12 DIAGNOSIS — G89.4 CHRONIC PAIN SYNDROME: ICD-10-CM

## 2019-09-12 DIAGNOSIS — M54.41 CHRONIC BILATERAL LOW BACK PAIN WITH BILATERAL SCIATICA: ICD-10-CM

## 2019-09-12 DIAGNOSIS — Z89.512 S/P BILATERAL BELOW KNEE AMPUTATION (HCC): ICD-10-CM

## 2019-09-12 DIAGNOSIS — N52.01 ERECTILE DYSFUNCTION DUE TO ARTERIAL INSUFFICIENCY: ICD-10-CM

## 2019-09-12 DIAGNOSIS — Z99.2 ESRD ON HEMODIALYSIS (HCC): ICD-10-CM

## 2019-09-12 DIAGNOSIS — I10 ESSENTIAL HYPERTENSION: ICD-10-CM

## 2019-09-12 DIAGNOSIS — M54.42 CHRONIC BILATERAL LOW BACK PAIN WITH BILATERAL SCIATICA: ICD-10-CM

## 2019-09-12 DIAGNOSIS — F51.04 PSYCHOPHYSIOLOGICAL INSOMNIA: ICD-10-CM

## 2019-09-12 PROBLEM — Z89.619 AMPUTEE, ABOVE KNEE (HCC): Status: RESOLVED | Noted: 2019-07-11 | Resolved: 2019-09-12

## 2019-09-12 PROCEDURE — 1036F TOBACCO NON-USER: CPT | Performed by: FAMILY MEDICINE

## 2019-09-12 PROCEDURE — G8417 CALC BMI ABV UP PARAM F/U: HCPCS | Performed by: FAMILY MEDICINE

## 2019-09-12 PROCEDURE — 2022F DILAT RTA XM EVC RTNOPTHY: CPT | Performed by: FAMILY MEDICINE

## 2019-09-12 PROCEDURE — 3044F HG A1C LEVEL LT 7.0%: CPT | Performed by: FAMILY MEDICINE

## 2019-09-12 PROCEDURE — 99214 OFFICE O/P EST MOD 30 MIN: CPT | Performed by: FAMILY MEDICINE

## 2019-09-12 PROCEDURE — G8427 DOCREV CUR MEDS BY ELIG CLIN: HCPCS | Performed by: FAMILY MEDICINE

## 2019-09-12 PROCEDURE — 3017F COLORECTAL CA SCREEN DOC REV: CPT | Performed by: FAMILY MEDICINE

## 2019-09-12 ASSESSMENT — ENCOUNTER SYMPTOMS
GASTROINTESTINAL NEGATIVE: 1
RESPIRATORY NEGATIVE: 1
EYES NEGATIVE: 1
ALLERGIC/IMMUNOLOGIC NEGATIVE: 1

## 2019-09-12 NOTE — PROGRESS NOTES
05/29/2019    DTaP/Tdap/Td vaccine (1 - Tdap) 12/20/2019 (Originally 4/28/1978)    Flu vaccine (1) 12/20/2019 (Originally 9/1/2019)    Shingles Vaccine (1 of 2) 12/27/2019 (Originally 4/28/2009)    Pneumococcal 0-64 years Vaccine (2 of 3 - PPSV23) 12/27/2019 (Originally 2/21/2019)    Hepatitis B Vaccine (1 of 3 - Risk Recombivax 3-dose series) 04/04/2020 (Originally 4/28/1978)    Hepatitis C screen  04/04/2020 (Originally 1960)    HIV screen  04/04/2020 (Originally 4/28/1974)    Diabetic retinal exam  04/13/2020 (Originally 4/28/1969)    Diabetic foot exam  09/27/2019    Lipid screen  12/27/2019    Potassium monitoring  12/27/2019    Creatinine monitoring  12/27/2019    A1C test (Diabetic or Prediabetic)  04/04/2020    HPV vaccine  Aged Out

## 2019-09-17 ENCOUNTER — HOSPITAL ENCOUNTER (OUTPATIENT)
Age: 59
Discharge: HOME | End: 2019-09-17
Payer: MEDICARE

## 2019-09-17 LAB — POTASSIUM SERPL-SCNC: 4.7 MMOL/L (ref 3.7–5.3)

## 2019-09-17 PROCEDURE — 36415 COLL VENOUS BLD VENIPUNCTURE: CPT

## 2019-09-17 PROCEDURE — 84132 ASSAY OF SERUM POTASSIUM: CPT

## 2019-09-18 ENCOUNTER — HOSPITAL ENCOUNTER (OUTPATIENT)
Dept: INTERVENTIONAL RADIOLOGY/VASCULAR | Age: 59
Discharge: HOME | End: 2019-09-20
Payer: MEDICARE

## 2019-09-18 VITALS
OXYGEN SATURATION: 97 % | TEMPERATURE: 98.2 F | RESPIRATION RATE: 18 BRPM | SYSTOLIC BLOOD PRESSURE: 149 MMHG | DIASTOLIC BLOOD PRESSURE: 78 MMHG | HEIGHT: 71 IN | WEIGHT: 221 LBS | BODY MASS INDEX: 30.94 KG/M2 | HEART RATE: 61 BPM

## 2019-09-18 DIAGNOSIS — N18.6 ESRD (END STAGE RENAL DISEASE) (HCC): ICD-10-CM

## 2019-09-18 LAB
INR BLD: 0.9
PARTIAL THROMBOPLASTIN TIME: 26.9 SEC (ref 20.5–30.5)
PLATELET # BLD: 146 K/UL (ref 138–453)
POTASSIUM SERPL-SCNC: 4.5 MMOL/L (ref 3.7–5.3)
PROTHROMBIN TIME: 10.1 SEC (ref 9–12)

## 2019-09-18 PROCEDURE — 2580000003 HC RX 258: Performed by: RADIOLOGY

## 2019-09-18 PROCEDURE — 85610 PROTHROMBIN TIME: CPT

## 2019-09-18 PROCEDURE — 85730 THROMBOPLASTIN TIME PARTIAL: CPT

## 2019-09-18 PROCEDURE — C1894 INTRO/SHEATH, NON-LASER: HCPCS

## 2019-09-18 PROCEDURE — 36905 THRMBC/NFS DIALYSIS CIRCUIT: CPT | Performed by: RADIOLOGY

## 2019-09-18 PROCEDURE — 7100000010 HC PHASE II RECOVERY - FIRST 15 MIN

## 2019-09-18 PROCEDURE — C1725 CATH, TRANSLUMIN NON-LASER: HCPCS

## 2019-09-18 PROCEDURE — 84132 ASSAY OF SERUM POTASSIUM: CPT

## 2019-09-18 PROCEDURE — C1769 GUIDE WIRE: HCPCS

## 2019-09-18 PROCEDURE — 7100000011 HC PHASE II RECOVERY - ADDTL 15 MIN

## 2019-09-18 PROCEDURE — 6360000002 HC RX W HCPCS: Performed by: RADIOLOGY

## 2019-09-18 PROCEDURE — 36907 BALO ANGIOP CTR DIALYSIS SEG: CPT | Performed by: RADIOLOGY

## 2019-09-18 PROCEDURE — 76937 US GUIDE VASCULAR ACCESS: CPT

## 2019-09-18 PROCEDURE — 2709999900 HC NON-CHARGEABLE SUPPLY

## 2019-09-18 PROCEDURE — 85049 AUTOMATED PLATELET COUNT: CPT

## 2019-09-18 PROCEDURE — 6360000004 HC RX CONTRAST MEDICATION: Performed by: INTERNAL MEDICINE

## 2019-09-18 RX ORDER — HEPARIN SODIUM (PORCINE) LOCK FLUSH IV SOLN 100 UNIT/ML 100 UNIT/ML
SOLUTION INTRAVENOUS
Status: COMPLETED | OUTPATIENT
Start: 2019-09-18 | End: 2019-09-18

## 2019-09-18 RX ORDER — CEFAZOLIN SODIUM 1 G/50ML
1 INJECTION, SOLUTION INTRAVENOUS
Status: COMPLETED | OUTPATIENT
Start: 2019-09-18 | End: 2019-09-18

## 2019-09-18 RX ORDER — SODIUM CHLORIDE 9 MG/ML
INJECTION, SOLUTION INTRAVENOUS CONTINUOUS
Status: DISCONTINUED | OUTPATIENT
Start: 2019-09-18 | End: 2019-09-21 | Stop reason: HOSPADM

## 2019-09-18 RX ORDER — FENTANYL CITRATE 50 UG/ML
INJECTION, SOLUTION INTRAMUSCULAR; INTRAVENOUS
Status: COMPLETED | OUTPATIENT
Start: 2019-09-18 | End: 2019-09-18

## 2019-09-18 RX ORDER — ACETAMINOPHEN 325 MG/1
650 TABLET ORAL EVERY 4 HOURS PRN
Status: DISCONTINUED | OUTPATIENT
Start: 2019-09-18 | End: 2019-09-21 | Stop reason: HOSPADM

## 2019-09-18 RX ADMIN — FENTANYL CITRATE 25 MCG: 50 INJECTION INTRAMUSCULAR; INTRAVENOUS at 10:00

## 2019-09-18 RX ADMIN — ALTEPLASE 2 MG: 2.2 INJECTION, POWDER, LYOPHILIZED, FOR SOLUTION INTRAVENOUS at 09:02

## 2019-09-18 RX ADMIN — FENTANYL CITRATE 25 MCG: 50 INJECTION INTRAMUSCULAR; INTRAVENOUS at 09:31

## 2019-09-18 RX ADMIN — SODIUM CHLORIDE: 9 INJECTION, SOLUTION INTRAVENOUS at 07:15

## 2019-09-18 RX ADMIN — IOVERSOL 100 ML: 509 INJECTION INTRA-ARTERIAL; INTRAVENOUS at 10:28

## 2019-09-18 RX ADMIN — CEFAZOLIN SODIUM 1 G: 1 INJECTION, SOLUTION INTRAVENOUS at 07:26

## 2019-09-18 RX ADMIN — SODIUM CHLORIDE, PRESERVATIVE FREE 3 ML: 5 INJECTION INTRAVENOUS at 09:32

## 2019-09-18 RX ADMIN — SODIUM CHLORIDE, PRESERVATIVE FREE 3 ML: 5 INJECTION INTRAVENOUS at 08:59

## 2019-09-18 RX ADMIN — FENTANYL CITRATE 25 MCG: 50 INJECTION INTRAMUSCULAR; INTRAVENOUS at 08:59

## 2019-09-18 ASSESSMENT — PAIN DESCRIPTION - LOCATION
LOCATION: SHOULDER
LOCATION: ARM

## 2019-09-18 ASSESSMENT — PAIN SCALES - GENERAL
PAINLEVEL_OUTOF10: 0
PAINLEVEL_OUTOF10: 3
PAINLEVEL_OUTOF10: 5
PAINLEVEL_OUTOF10: 3
PAINLEVEL_OUTOF10: 5
PAINLEVEL_OUTOF10: 0
PAINLEVEL_OUTOF10: 0
PAINLEVEL_OUTOF10: 4
PAINLEVEL_OUTOF10: 4
PAINLEVEL_OUTOF10: 0

## 2019-09-18 ASSESSMENT — PAIN DESCRIPTION - PAIN TYPE
TYPE: ACUTE PAIN

## 2019-09-18 ASSESSMENT — PAIN - FUNCTIONAL ASSESSMENT: PAIN_FUNCTIONAL_ASSESSMENT: 0-10

## 2019-09-18 NOTE — BRIEF OP NOTE
Brief Postoperative Note    Bia Srinivasan  YOB: 1960  [id number]    Pre-operative Diagnosis: ESRD; clotted fistula    Post-operative Diagnosis: Same    Procedure: Declot of fistula    Anesthesia: Moderate Sedation    Surgeons/Assistants: Jameel    Estimated Blood Loss: less than 50     Complications: None    Specimens: Was Not Obtained    Electronically signed by Nasreen Ivory MD on 9/18/2019 at 10:31 AM

## 2019-09-18 NOTE — H&P
History and Physical    Pt Name: Rachel Pagan  MRN: [id number]  YOB: 1960  Date of evaluation: 9/18/2019    SUBJECTIVE:   History of Chief Complaint:    Patient complains of CRF, on dialysis for \"ten years\" according to the patient. He has been using a RUE AVF for access currently, has a nonfunctioning LUE AVF. Patient says that the fistula has \"clotted up. \"  He has had a declot procedure recently in July. Patient has been scheduled for declot today. Past Medical History    has a past medical history of CRF (chronic renal failure), Diabetes mellitus ([facility] Dialysis patient [facility]), ED (erectile dysfunction), History of echocardiogram, HTN (hypertension), Hyperlipidemia, LVH (left ventricular hypertrophy), PVD (peripheral vascular disease) ([facility] [address] [facility] S/P bilateral BKA (below knee amputation) ([facility] and Wears glasses. Past Surgical History   has a past surgical history that includes Glaucoma surgery; Cataract removal with implant; Leg amputation below knee (Bilateral); Tunneled venous catheter placement; Dialysis fistula creation (Bilateral); and Finger surgery (Left). Medications  Prior to Admission medications    Medication Sig Start Date End Date Taking?  Authorizing Provider   zolpidem (AMBIEN) 10 MG tablet TAKE ONE TABLET BY MOUTH AT BEDTIME 8/22/19 8/22/20 Yes Anabella Harmon MD   TRADJENTA 5 MG tablet TAKE 1 TABLET BY MOUTH  DAILY 7/9/19  Yes Anabella Harmon MD   lisinopril (PRINIVIL;ZESTRIL) 10 MG tablet Take 1 tablet by mouth 2 times daily 5/17/19  Yes Anabella Harmon MD   amLODIPine-atorvastatatin (CADUET) 10-20 MG per tablet Take 1 tablet by mouth daily 5/17/19 9/18/19 Yes Anabella Harmon MD   sildenafil (VIAGRA) 100 MG tablet Take 1 tablet by mouth as needed for Erectile Dysfunction 10/19/18 10/19/19 Yes Anabella Harmon MD   NONFORMULARY    Yes Historical Provider, MD   hydrALAZINE (APRESOLINE) 25 MG tablet Take 25 mg by mouth 2 times daily    Yes Historical Provider, MD   labetalol (NORMODYNE) 200 MG distended. EXTREMITIES: s/p BKA;  RUE AVF with absent thrill/bruit, LUE with nonfunctioning/old AVF    IMPRESSIONS:   1. CRF  2.  has a past medical history of CRF (chronic renal failure), Diabetes mellitus ([facility] [address] Dialysis patient [facility]), ED (erectile dysfunction), History of echocardiogram (2014), HTN (hypertension), Hyperlipidemia, LVH (left ventricular hypertrophy), PVD (peripheral vascular disease) ([facility] [address] S/P bilateral BKA (below knee amputation) ([facility] [address] and Wears glasses.    PLANS:   1. malvin    [address]  Electronically signed 9/18/2019 at 7:26 AM

## 2019-09-18 NOTE — PRE SEDATION
100 MG tablet Take 1 tablet by mouth as needed for Erectile Dysfunction 10/19/18 10/19/19 Yes Billie Tabor MD   NONFORMULARY    Yes Historical Provider, MD   hydrALAZINE (APRESOLINE) 25 MG tablet Take 25 mg by mouth 2 times daily    Yes Historical Provider, MD   labetalol (NORMODYNE) 200 MG tablet Take 200 mg by mouth 3 times daily Indications: On Tues/Thurs/Sat/Sun    Yes Historical Provider, MD   oxyCODONE-acetaminophen (PERCOCET) 5-325 MG per tablet Take 1 tablet by mouth three times a week  Indications: after dialysis . 7/18/17  Yes Historical Provider, MD   gabapentin (NEURONTIN) 300 MG capsule TAKE ONE CAPSULE BY MOUTH THREE TIMES A DAY 2/13/17  Yes Celeste Enriquez,    cloNIDine (CATAPRES) 0.3 MG tablet Take 1 tablet by mouth 2 times daily 6/29/16  Yes Joseph Garrett MD   cilostazol (PLETAL) 100 MG tablet Take 100 mg by mouth 2 times daily. Pt states NOT Taking med    Historical Provider, MD   vancomycin (VANCOCIN) infusion Infuse 750 mg intravenously three times a week With dialysis. Last infusion 9/20/17 9/8/17   Isaura Lowe MD     Coumadin Use Last 7 Days:  no  Antiplatelet drug therapy use last 7 days: no  Other anticoagulant use last 7 days: no  Additional Medication Information:  See [facility] rec      Pre-Sedation Documentation and Exam:   I have reviewed the patient's history and review of systems.     Mallampati Airway Assessment:  Mallampati Class II - (soft palate, fauces & uvula are visible)    Prior History of Anesthesia Complications:   none    ASA Classification:  Class 3 - A patient with severe systemic disease that limits activity but is not incapacitating    Sedation/ Anesthesia Plan:   intravenous sedation    Medications Planned:   midazolam (Versed) intravenously and fentanyl intravenously    Patient is an appropriate candidate for plan of sedation: yes    Electronically signed by Kimberly Llamas MD on 9/18/2019 at 8:22 AM

## 2019-09-18 NOTE — POST SEDATION
Sedation Post Procedure Note    Patient Name: Betty Esparza   YOB: 1960  Room/Bed: Room/bed info not found  Medical Record Number: [id number]  Date: 9/18/2019   Time: 10:31 AM         Physicians/Assistants: Katiana Napier MD    Procedure Performed:  Declot of fistula    Post-Sedation Vital Signs:  Vitals:    09/18/19 1015   BP: (!) 155/83   Pulse: 61   Resp: 13   Temp:    SpO2: 96%      Vital signs were reviewed and were stable after the procedure (see flow sheet for vitals)            Complications: none    Electronically signed by Katiana Napier MD on 9/18/2019 at 10:31 AM

## 2019-09-23 DIAGNOSIS — N52.9 ERECTILE DYSFUNCTION, UNSPECIFIED ERECTILE DYSFUNCTION TYPE: ICD-10-CM

## 2019-09-23 DIAGNOSIS — I10 ESSENTIAL HYPERTENSION: ICD-10-CM

## 2019-09-23 RX ORDER — SILDENAFIL 100 MG/1
100 TABLET, FILM COATED ORAL PRN
Qty: 30 TABLET | Refills: 0 | Status: SHIPPED | OUTPATIENT
Start: 2019-09-23 | End: 2020-05-22 | Stop reason: SDUPTHER

## 2019-09-23 RX ORDER — AMLODIPINE BESYLATE AND ATORVASTATIN CALCIUM 10; 20 MG/1; MG/1
1 TABLET, FILM COATED ORAL DAILY
Qty: 90 TABLET | Refills: 1 | Status: SHIPPED | OUTPATIENT
Start: 2019-09-23 | End: 2020-08-05 | Stop reason: SDUPTHER

## 2019-09-23 RX ORDER — SILDENAFIL 100 MG/1
100 TABLET, FILM COATED ORAL PRN
Qty: 30 TABLET | Refills: 1 | Status: SHIPPED | OUTPATIENT
Start: 2019-09-23 | End: 2019-09-23 | Stop reason: SDUPTHER

## 2019-10-08 ENCOUNTER — TELEPHONE (OUTPATIENT)
Dept: INTERNAL MEDICINE CLINIC | Age: 59
End: 2019-10-08

## 2019-10-21 ENCOUNTER — TELEPHONE (OUTPATIENT)
Dept: INTERNAL MEDICINE CLINIC | Age: 59
End: 2019-10-21

## 2019-10-22 RX ORDER — LISINOPRIL 10 MG/1
TABLET ORAL
Qty: 180 TABLET | Refills: 0 | Status: SHIPPED | OUTPATIENT
Start: 2019-10-22 | End: 2020-03-04 | Stop reason: SDUPTHER

## 2019-10-23 RX ORDER — LINAGLIPTIN 5 MG/1
TABLET, FILM COATED ORAL
Qty: 90 TABLET | Refills: 0 | Status: SHIPPED | OUTPATIENT
Start: 2019-10-23 | End: 2019-12-12 | Stop reason: SDUPTHER

## 2019-11-19 ENCOUNTER — TELEPHONE (OUTPATIENT)
Dept: INTERNAL MEDICINE CLINIC | Age: 59
End: 2019-11-19

## 2019-12-18 DIAGNOSIS — F51.01 PRIMARY INSOMNIA: ICD-10-CM

## 2019-12-18 RX ORDER — ZOLPIDEM TARTRATE 10 MG/1
10 TABLET ORAL NIGHTLY PRN
Qty: 30 TABLET | Refills: 0 | Status: SHIPPED | OUTPATIENT
Start: 2019-12-18 | End: 2020-01-16 | Stop reason: ALTCHOICE

## 2020-01-16 ENCOUNTER — OFFICE VISIT (OUTPATIENT)
Dept: INTERNAL MEDICINE CLINIC | Age: 60
End: 2020-01-16
Payer: MEDICARE

## 2020-01-16 ENCOUNTER — HOSPITAL ENCOUNTER (OUTPATIENT)
Age: 60
Setting detail: SPECIMEN
Discharge: HOME | End: 2020-01-16
Payer: MEDICARE

## 2020-01-16 VITALS
BODY MASS INDEX: 30.07 KG/M2 | TEMPERATURE: 97.1 F | RESPIRATION RATE: 13 BRPM | WEIGHT: 214.8 LBS | SYSTOLIC BLOOD PRESSURE: 130 MMHG | OXYGEN SATURATION: 100 % | HEIGHT: 71 IN | HEART RATE: 45 BPM | DIASTOLIC BLOOD PRESSURE: 80 MMHG

## 2020-01-16 LAB
ALBUMIN SERPL-MCNC: 4.1 G/DL (ref 3.5–5.2)
ALBUMIN/GLOBULIN RATIO: 1.3 (ref 1–2.5)
ALP BLD-CCNC: 105 U/L (ref 40–129)
ALT SERPL-CCNC: 18 U/L (ref 5–41)
ANION GAP SERPL CALCULATED.3IONS-SCNC: 15 MMOL/L (ref 9–17)
AST SERPL-CCNC: 19 U/L
BILIRUB SERPL-MCNC: 0.36 MG/DL (ref 0.3–1.2)
BUN BLDV-MCNC: 21 MG/DL (ref 8–23)
BUN/CREAT BLD: ABNORMAL (ref 9–20)
CALCIUM SERPL-MCNC: 9.5 MG/DL (ref 8.6–10.4)
CHLORIDE BLD-SCNC: 95 MMOL/L (ref 98–107)
CHOLESTEROL/HDL RATIO: 2.1
CHOLESTEROL: 126 MG/DL
CO2: 26 MMOL/L (ref 20–31)
CREAT SERPL-MCNC: 8.42 MG/DL (ref 0.7–1.2)
GFR AFRICAN AMERICAN: 8 ML/MIN
GFR NON-AFRICAN AMERICAN: 7 ML/MIN
GFR SERPL CREATININE-BSD FRML MDRD: ABNORMAL ML/MIN/{1.73_M2}
GFR SERPL CREATININE-BSD FRML MDRD: ABNORMAL ML/MIN/{1.73_M2}
GLUCOSE BLD-MCNC: 84 MG/DL (ref 70–99)
HCT VFR BLD CALC: 40.8 % (ref 40.7–50.3)
HDLC SERPL-MCNC: 61 MG/DL
HEMOGLOBIN: 12.3 G/DL (ref 13–17)
LDL CHOLESTEROL: 51 MG/DL (ref 0–130)
MCH RBC QN AUTO: 25 PG (ref 25.2–33.5)
MCHC RBC AUTO-ENTMCNC: 30.1 G/DL (ref 28.4–34.8)
MCV RBC AUTO: 82.9 FL (ref 82.6–102.9)
NRBC AUTOMATED: 0 PER 100 WBC
PDW BLD-RTO: 17.2 % (ref 11.8–14.4)
PLATELET # BLD: ABNORMAL K/UL (ref 138–453)
PLATELET, FLUORESCENCE: 127 K/UL (ref 138–453)
PLATELET, IMMATURE FRACTION: 4.3 % (ref 1.1–10.3)
PMV BLD AUTO: ABNORMAL FL (ref 8.1–13.5)
POTASSIUM SERPL-SCNC: 4.8 MMOL/L (ref 3.7–5.3)
PROSTATE SPECIFIC ANTIGEN: 0.53 UG/L
RBC # BLD: 4.92 M/UL (ref 4.21–5.77)
SODIUM BLD-SCNC: 136 MMOL/L (ref 135–144)
TOTAL PROTEIN: 7.2 G/DL (ref 6.4–8.3)
TRIGL SERPL-MCNC: 72 MG/DL
TSH SERPL DL<=0.05 MIU/L-ACNC: 1.21 MIU/L (ref 0.3–5)
VLDLC SERPL CALC-MCNC: NORMAL MG/DL (ref 1–30)
WBC # BLD: 5.2 K/UL (ref 3.5–11.3)

## 2020-01-16 PROCEDURE — G8484 FLU IMMUNIZE NO ADMIN: HCPCS | Performed by: FAMILY MEDICINE

## 2020-01-16 PROCEDURE — G8417 CALC BMI ABV UP PARAM F/U: HCPCS | Performed by: FAMILY MEDICINE

## 2020-01-16 PROCEDURE — 99214 OFFICE O/P EST MOD 30 MIN: CPT | Performed by: FAMILY MEDICINE

## 2020-01-16 PROCEDURE — 90715 TDAP VACCINE 7 YRS/> IM: CPT | Performed by: FAMILY MEDICINE

## 2020-01-16 PROCEDURE — 3017F COLORECTAL CA SCREEN DOC REV: CPT | Performed by: FAMILY MEDICINE

## 2020-01-16 PROCEDURE — 90471 IMMUNIZATION ADMIN: CPT | Performed by: FAMILY MEDICINE

## 2020-01-16 PROCEDURE — 1036F TOBACCO NON-USER: CPT | Performed by: FAMILY MEDICINE

## 2020-01-16 PROCEDURE — 3046F HEMOGLOBIN A1C LEVEL >9.0%: CPT | Performed by: FAMILY MEDICINE

## 2020-01-16 PROCEDURE — G8427 DOCREV CUR MEDS BY ELIG CLIN: HCPCS | Performed by: FAMILY MEDICINE

## 2020-01-16 PROCEDURE — 2022F DILAT RTA XM EVC RTNOPTHY: CPT | Performed by: FAMILY MEDICINE

## 2020-01-16 RX ORDER — ZOLPIDEM TARTRATE 5 MG/1
5 TABLET ORAL NIGHTLY PRN
Qty: 30 TABLET | Refills: 2 | Status: SHIPPED | OUTPATIENT
Start: 2020-01-16 | End: 2020-02-15

## 2020-01-16 RX ORDER — ZOLPIDEM TARTRATE 10 MG/1
10 TABLET ORAL NIGHTLY PRN
Qty: 30 TABLET | Refills: 0 | Status: CANCELLED | OUTPATIENT
Start: 2020-01-16 | End: 2020-02-15

## 2020-01-16 ASSESSMENT — ENCOUNTER SYMPTOMS
GASTROINTESTINAL NEGATIVE: 1
EYES NEGATIVE: 1
BACK PAIN: 1
RESPIRATORY NEGATIVE: 1
ALLERGIC/IMMUNOLOGIC NEGATIVE: 1

## 2020-01-16 ASSESSMENT — PATIENT HEALTH QUESTIONNAIRE - PHQ9
SUM OF ALL RESPONSES TO PHQ9 QUESTIONS 1 & 2: 0
SUM OF ALL RESPONSES TO PHQ QUESTIONS 1-9: 0
SUM OF ALL RESPONSES TO PHQ QUESTIONS 1-9: 0
2. FEELING DOWN, DEPRESSED OR HOPELESS: 0
1. LITTLE INTEREST OR PLEASURE IN DOING THINGS: 0

## 2020-01-17 LAB
ESTIMATED AVERAGE GLUCOSE: 160 MG/DL
HBA1C MFR BLD: 7.2 % (ref 4–6)

## 2020-01-17 NOTE — RESULT ENCOUNTER NOTE
Worsening of diabetes. He is advised to adhere to low carbohydrate diet.   Continue Tradjenta 5 mg p.o. daily  Please order Amaryl 2 mg p.o. daily 90 with 1 refill

## 2020-02-04 ENCOUNTER — TELEPHONE (OUTPATIENT)
Dept: INTERNAL MEDICINE CLINIC | Age: 60
End: 2020-02-04

## 2020-02-04 NOTE — TELEPHONE ENCOUNTER
[facility] calling because they state that they have been asking for the corrected document or patient assistance program.    They aren't getting no response. They are asking if you can get the correct information to them so that they can send out his medication?     Please advise

## 2020-02-05 NOTE — TELEPHONE ENCOUNTER
Spoke with Abhijit DU) states pt is approved on assistance program from 1/2020-1/2021. Per Abhijit Zuñiga pts medication was delivered to office on 1/24/2020. Nothing further is needed.

## 2020-02-20 ENCOUNTER — TELEPHONE (OUTPATIENT)
Dept: INTERNAL MEDICINE CLINIC | Age: 60
End: 2020-02-20

## 2020-02-26 ENCOUNTER — TELEPHONE (OUTPATIENT)
Dept: INTERNAL MEDICINE CLINIC | Age: 60
End: 2020-02-26

## 2020-03-04 RX ORDER — LISINOPRIL 10 MG/1
TABLET ORAL
Qty: 180 TABLET | Refills: 0 | Status: SHIPPED | OUTPATIENT
Start: 2020-03-04 | End: 2020-05-06

## 2020-03-04 RX ORDER — HYDRALAZINE HYDROCHLORIDE 25 MG/1
25 TABLET, FILM COATED ORAL 2 TIMES DAILY
Qty: 180 TABLET | Refills: 0 | Status: SHIPPED | OUTPATIENT
Start: 2020-03-04 | End: 2020-05-06

## 2020-04-24 RX ORDER — ZOLPIDEM TARTRATE 5 MG/1
TABLET ORAL
Qty: 30 TABLET | Refills: 1 | OUTPATIENT
Start: 2020-04-24

## 2020-04-27 RX ORDER — ZOLPIDEM TARTRATE 5 MG/1
5 TABLET ORAL NIGHTLY PRN
Qty: 30 TABLET | Refills: 2 | OUTPATIENT
Start: 2020-04-27 | End: 2020-05-27

## 2020-05-05 ENCOUNTER — VIRTUAL VISIT (OUTPATIENT)
Dept: INTERNAL MEDICINE CLINIC | Age: 60
End: 2020-05-05
Payer: MEDICARE

## 2020-05-05 PROCEDURE — 99443 PR PHYS/QHP TELEPHONE EVALUATION 21-30 MIN: CPT | Performed by: FAMILY MEDICINE

## 2020-05-05 NOTE — PROGRESS NOTES
Subjective:    Anyi Bess is a 63 y.o. male evaluated via telephone on 5/5/2020. Consent:  He and/or health care decision maker is aware that that he may receive a bill for this telephone service, depending on his insurance coverage, and has provided verbal consent to proceed: Yes      Documentation:  I communicated with the patient and/or health care decision maker about non-insulin-dependent diabetes mellitus, hypertension, dyslipidemia, end-stage renal disease hemodialysis dependent  Chronic anemia  Chronic pain syndrome opiate dependent  Insomnia, erectile dysfunction. Details of this discussion including any medical advice provided: Recent labs done in January 2020 reviewed, discussed with patient. History of non-insulin-dependent diabetes mellitus on Tradjenta that he is tolerating well. A1c 7.2. He denies hypoglycemia  End-stage renal disease hemodialysis dependent on 3/week that he is tolerating well  Anemia of chronic disease with underlying end-stage renal disease. Hemoglobin is 10+  Hypertension. We aim to keep his blood pressure equal less than 130/80. His lisinopril, labetalol and Caduet is managed by nephrology  Chronic pain syndrome on gabapentin and Percocet that he is tolerating well  Subjective complaint of insomnia. He denies being depressed. He may take over-the-counter Tylenol PM  Erectile dysfunction on Viagra that he is tolerating well  Med list reviewed advised to continue  Peripheral vascular disease. Continue Pletal      I affirm this is a Patient Initiated Episode with a Patient who has not had a related appointment within my department in the past 7 days or scheduled within the next 24 hours.     Patient identification was verified at the start of the visit: Yes    Total Time: minutes: 21-30 minutes    Note: not billable if this call serves to triage the patient into an appointment for the relevant concern  This note is created with a voice recognition program and while

## 2020-05-06 RX ORDER — LISINOPRIL 10 MG/1
TABLET ORAL
Qty: 180 TABLET | Refills: 0 | Status: SHIPPED | OUTPATIENT
Start: 2020-05-06 | End: 2020-07-10

## 2020-05-06 RX ORDER — HYDRALAZINE HYDROCHLORIDE 25 MG/1
TABLET, FILM COATED ORAL
Qty: 180 TABLET | Refills: 0 | Status: ON HOLD | OUTPATIENT
Start: 2020-05-06 | End: 2021-01-07 | Stop reason: HOSPADM

## 2020-05-22 ENCOUNTER — TELEPHONE (OUTPATIENT)
Dept: INTERNAL MEDICINE CLINIC | Age: 60
End: 2020-05-22

## 2020-05-22 RX ORDER — SILDENAFIL 100 MG/1
100 TABLET, FILM COATED ORAL PRN
Qty: 10 TABLET | Refills: 2 | Status: SHIPPED | OUTPATIENT
Start: 2020-05-22 | End: 2020-07-02 | Stop reason: ALTCHOICE

## 2020-05-22 NOTE — TELEPHONE ENCOUNTER
Patient is requesting a refill to be printed to be sent to [facility] [phone]

## 2020-05-26 ENCOUNTER — TELEPHONE (OUTPATIENT)
Dept: INTERNAL MEDICINE CLINIC | Age: 60
End: 2020-05-26

## 2020-05-26 RX ORDER — TADALAFIL 5 MG/1
5 TABLET ORAL DAILY
Qty: 30 TABLET | Refills: 2 | Status: SHIPPED | OUTPATIENT
Start: 2020-05-26 | End: 2020-06-09 | Stop reason: CLARIF

## 2020-06-09 ENCOUNTER — TELEPHONE (OUTPATIENT)
Dept: INTERNAL MEDICINE CLINIC | Age: 60
End: 2020-06-09

## 2020-06-09 RX ORDER — TADALAFIL 5 MG/1
5 TABLET ORAL DAILY
Qty: 30 TABLET | Refills: 2 | OUTPATIENT
Start: 2020-06-09 | End: 2020-07-02 | Stop reason: DRUGHIGH

## 2020-07-02 ENCOUNTER — HOSPITAL ENCOUNTER (OUTPATIENT)
Age: 60
Setting detail: SPECIMEN
Discharge: HOME | End: 2020-07-02
Payer: MEDICARE

## 2020-07-02 ENCOUNTER — OFFICE VISIT (OUTPATIENT)
Dept: INTERNAL MEDICINE CLINIC | Age: 60
End: 2020-07-02
Payer: MEDICARE

## 2020-07-02 VITALS
TEMPERATURE: 98 F | HEIGHT: 71 IN | HEART RATE: 78 BPM | WEIGHT: 203.8 LBS | SYSTOLIC BLOOD PRESSURE: 110 MMHG | RESPIRATION RATE: 20 BRPM | DIASTOLIC BLOOD PRESSURE: 70 MMHG | OXYGEN SATURATION: 98 % | BODY MASS INDEX: 28.53 KG/M2

## 2020-07-02 PROBLEM — G89.4 CHRONIC PAIN SYNDROME: Status: RESOLVED | Noted: 2018-09-27 | Resolved: 2020-07-02

## 2020-07-02 LAB
ESTIMATED AVERAGE GLUCOSE: 166 MG/DL
HBA1C MFR BLD: 7.4 % (ref 4–6)

## 2020-07-02 PROCEDURE — 3017F COLORECTAL CA SCREEN DOC REV: CPT | Performed by: FAMILY MEDICINE

## 2020-07-02 PROCEDURE — G8427 DOCREV CUR MEDS BY ELIG CLIN: HCPCS | Performed by: FAMILY MEDICINE

## 2020-07-02 PROCEDURE — 2022F DILAT RTA XM EVC RTNOPTHY: CPT | Performed by: FAMILY MEDICINE

## 2020-07-02 PROCEDURE — 3051F HG A1C>EQUAL 7.0%<8.0%: CPT | Performed by: FAMILY MEDICINE

## 2020-07-02 PROCEDURE — 99214 OFFICE O/P EST MOD 30 MIN: CPT | Performed by: FAMILY MEDICINE

## 2020-07-02 PROCEDURE — 1036F TOBACCO NON-USER: CPT | Performed by: FAMILY MEDICINE

## 2020-07-02 PROCEDURE — G8417 CALC BMI ABV UP PARAM F/U: HCPCS | Performed by: FAMILY MEDICINE

## 2020-07-02 RX ORDER — TADALAFIL 5 MG/1
5 TABLET ORAL
Qty: 30 TABLET | Refills: 0 | Status: SHIPPED | COMMUNITY
Start: 2020-07-02 | End: 2020-09-17 | Stop reason: SDUPTHER

## 2020-07-02 ASSESSMENT — ENCOUNTER SYMPTOMS
RESPIRATORY NEGATIVE: 1
ALLERGIC/IMMUNOLOGIC NEGATIVE: 1
EYES NEGATIVE: 1
GASTROINTESTINAL NEGATIVE: 1

## 2020-07-02 NOTE — PROGRESS NOTES
Subjective:      Patient ID: Emiliano Dudley is a 63 y.o. male. Diabetes   He presents for his follow-up diabetic visit. He has type 2 diabetes mellitus. His disease course has been stable. There are no hypoglycemic associated symptoms. There are no diabetic associated symptoms. There are no hypoglycemic complications. Symptoms are stable. Diabetic complications include nephropathy. Risk factors for coronary artery disease include diabetes mellitus, dyslipidemia, hypertension and male sex. Current diabetic treatment includes oral agent (monotherapy). He is compliant with treatment most of the time. His weight is stable. He is following a generally healthy diet. Meal planning includes ADA exchanges, avoidance of concentrated sweets, calorie counting and carbohydrate counting. He has had a previous visit with a dietitian. He participates in exercise three times a week. Home blood sugar record trend: He did not bring Accu-Chek log. An ACE inhibitor/angiotensin II receptor blocker is being taken. Review of Systems   Constitutional: Negative. HENT: Negative. Eyes: Negative. Respiratory: Negative. Cardiovascular: Negative. Gastrointestinal: Negative. Endocrine: Negative. Musculoskeletal: Positive for arthralgias. Skin: Negative. Allergic/Immunologic: Negative. Neurological: Negative. Hematological: Negative. Psychiatric/Behavioral: Negative. Past family and social history unremarkable. Diagnosis Orders   1. Mixed hyperlipidemia  Hemoglobin A1C   2. Essential hypertension     3. ESRD on hemodialysis (HCC) MWF     4. Psychophysiological insomnia     5. Erectile dysfunction due to arterial insufficiency     6. Chronic midline low back pain, unspecified whether sciatica present     7. Controlled type 2 diabetes mellitus with complication, without long-term current use of insulin ([facility])  Hemoglobin A1C   8. S/P bilateral below knee amputation ([address]     9.  Chronic use of opiate stable  Non-insulin-dependent diabetes mellitus on Tradjenta that he is tolerating well. A1c 7.2. He is advised to continue current regimen, adhere to ADA 1800 diet, daily moderate exercise. We will recheck his A1c on the way out  Hypertension well controlled. His antihypertensives are controlled by nephrology. He denies hypotension. He is tolerating ACE inhibitor well  Erectile dysfunction on Cialis as needed that he is tolerating well  Peripheral artery disease. Continue Pletal.  Chronic pain syndrome. Continue Percocet as provided by pain management  History of bilateral below-knee amputee with orthotics. Hyperlipidemia on statin that he is tolerating well  He is advised to update his annual dilated eye exam  He appears anxious, however, denies being depressed  End-stage renal disease hemodialysis dependent on 3/week that he is tolerating well. Right arm AV fistula is clean and patent with positive bruit. H&H is stable. Hemoglobin 12.3  Med list and available labs reviewed, discussed with patient, questions answered  He is encouraged to call for any concern  This note is created with a voice recognition program and while intend to generate a document that accurately reflects the content of the visit, no guarantee can be provided that every mistake has been identified and corrected by editing.           Rosie Madrid MD

## 2020-07-10 RX ORDER — LISINOPRIL 10 MG/1
TABLET ORAL
Qty: 180 TABLET | Refills: 0 | Status: ON HOLD | OUTPATIENT
Start: 2020-07-10 | End: 2022-09-19

## 2020-07-10 NOTE — TELEPHONE ENCOUNTER
Last visit: 7/2/2020  Last Med refill: 5/6/2020  Does patient have enough medication for 72 hours: NA    Next Visit Date:  Future Appointments   Date Time Provider Staci Vuong   11/3/2020 11:00 AM Omar Clark  [address] Maintenance   Topic Date Due    Hepatitis C screen  1960    Diabetic retinal exam  04/28/1969    HIV screen  04/28/1974    Colon cancer screen colonoscopy  04/28/2009    Pneumococcal 0-64 years Vaccine (2 of 3 - PPSV23) 02/21/2019    Annual Wellness Visit (AWV)  09/12/2020 (Originally 5/29/2019)    Shingles Vaccine (1 of 2) 01/16/2021 (Originally 4/28/2009)    Flu vaccine (1) 09/01/2020    Lipid screen  01/16/2021    Potassium monitoring  01/16/2021    Creatinine monitoring  01/16/2021    A1C test (Diabetic or Prediabetic)  07/02/2021    DTaP/Tdap/Td vaccine (2 - Td) 01/16/2030    Hepatitis A vaccine  Aged Out    Hib vaccine  Aged Out    Meningococcal (ACWY) vaccine  Aged Out       Hemoglobin A1C (%)   Date Value   07/02/2020 7.4 (H)   01/16/2020 7.2 (H)   04/04/2019 6.6 (H)             ( goal A1C is < 7)   No results found for: LABMICR  LDL Cholesterol (mg/dL)   Date Value   01/16/2020 51   12/27/2018 60       (goal LDL is <100)   AST (U/L)   Date Value   01/16/2020 19     ALT (U/L)   Date Value   01/16/2020 18     BUN (mg/dL)   Date Value   01/16/2020 21     BP Readings from Last 3 Encounters:   07/02/20 110/70   01/16/20 130/80   09/18/19 (!) 149/78          (goal 120/80)    All Future Testing planned in [facility]  Lab Frequency Next Occurrence               Patient Active Problem List:     Hyperlipidemia     Essential hypertension     ESRD on hemodialysis ([facility] [address] MWF     Insomnia     ED (erectile dysfunction)     Low back pain     Controlled diabetes mellitus type 2 with complications (HCC)     S/P bilateral below knee amputation (HCC)     Chronic use of opiate drugs therapeutic purposes

## 2020-07-21 ENCOUNTER — APPOINTMENT (OUTPATIENT)
Dept: CT IMAGING | Age: 60
End: 2020-07-21
Payer: MEDICARE

## 2020-07-21 ENCOUNTER — HOSPITAL ENCOUNTER (EMERGENCY)
Age: 60
Discharge: HOME | End: 2020-07-21
Attending: EMERGENCY MEDICINE
Payer: MEDICARE

## 2020-07-21 VITALS
HEIGHT: 71 IN | DIASTOLIC BLOOD PRESSURE: 88 MMHG | WEIGHT: 214 LBS | RESPIRATION RATE: 15 BRPM | BODY MASS INDEX: 29.96 KG/M2 | TEMPERATURE: 98.5 F | SYSTOLIC BLOOD PRESSURE: 152 MMHG | HEART RATE: 70 BPM | OXYGEN SATURATION: 98 %

## 2020-07-21 LAB
ABSOLUTE EOS #: 0.44 K/UL (ref 0–0.44)
ABSOLUTE IMMATURE GRANULOCYTE: 0.02 K/UL (ref 0–0.3)
ABSOLUTE LYMPH #: 1.8 K/UL (ref 1.1–3.7)
ABSOLUTE MONO #: 0.62 K/UL (ref 0.1–1.2)
ANION GAP SERPL CALCULATED.3IONS-SCNC: 18 MMOL/L (ref 9–17)
BASOPHILS # BLD: 1 % (ref 0–2)
BASOPHILS ABSOLUTE: 0.03 K/UL (ref 0–0.2)
BUN BLDV-MCNC: 59 MG/DL (ref 8–23)
BUN/CREAT BLD: 6 (ref 9–20)
CALCIUM SERPL-MCNC: 9.4 MG/DL (ref 8.6–10.4)
CHLORIDE BLD-SCNC: 93 MMOL/L (ref 98–107)
CO2: 26 MMOL/L (ref 20–31)
CREAT SERPL-MCNC: 10.5 MG/DL (ref 0.7–1.2)
D-DIMER QUANTITATIVE: 1.77 MG/L FEU (ref 0–0.59)
DIFFERENTIAL TYPE: ABNORMAL
EOSINOPHILS RELATIVE PERCENT: 8 % (ref 1–4)
GFR AFRICAN AMERICAN: 6 ML/MIN
GFR NON-AFRICAN AMERICAN: 5 ML/MIN
GFR SERPL CREATININE-BSD FRML MDRD: ABNORMAL ML/MIN/{1.73_M2}
GFR SERPL CREATININE-BSD FRML MDRD: ABNORMAL ML/MIN/{1.73_M2}
GLUCOSE BLD-MCNC: 145 MG/DL (ref 70–99)
HCT VFR BLD CALC: 36.8 % (ref 40.7–50.3)
HEMOGLOBIN: 11.3 G/DL (ref 13–17)
IMMATURE GRANULOCYTES: 0 %
LYMPHOCYTES # BLD: 33 % (ref 24–43)
MCH RBC QN AUTO: 25.5 PG (ref 25.2–33.5)
MCHC RBC AUTO-ENTMCNC: 30.7 G/DL (ref 28.4–34.8)
MCV RBC AUTO: 83.1 FL (ref 82.6–102.9)
MONOCYTES # BLD: 11 % (ref 3–12)
NRBC AUTOMATED: 0 PER 100 WBC
PDW BLD-RTO: 17.9 % (ref 11.8–14.4)
PLATELET # BLD: 139 K/UL (ref 138–453)
PLATELET ESTIMATE: ABNORMAL
PMV BLD AUTO: 10.1 FL (ref 8.1–13.5)
POTASSIUM SERPL-SCNC: 4.9 MMOL/L (ref 3.7–5.3)
RBC # BLD: 4.43 M/UL (ref 4.21–5.77)
RBC # BLD: ABNORMAL 10*6/UL
SEG NEUTROPHILS: 47 % (ref 36–65)
SEGMENTED NEUTROPHILS ABSOLUTE COUNT: 2.54 K/UL (ref 1.5–8.1)
SODIUM BLD-SCNC: 137 MMOL/L (ref 135–144)
TROPONIN INTERP: ABNORMAL
TROPONIN INTERP: ABNORMAL
TROPONIN T: ABNORMAL NG/ML
TROPONIN T: ABNORMAL NG/ML
TROPONIN, HIGH SENSITIVITY: 231 NG/L (ref 0–22)
TROPONIN, HIGH SENSITIVITY: 243 NG/L (ref 0–22)
WBC # BLD: 5.5 K/UL (ref 3.5–11.3)
WBC # BLD: ABNORMAL 10*3/UL

## 2020-07-21 PROCEDURE — 99284 EMERGENCY DEPT VISIT MOD MDM: CPT

## 2020-07-21 PROCEDURE — 6360000004 HC RX CONTRAST MEDICATION: Performed by: EMERGENCY MEDICINE

## 2020-07-21 PROCEDURE — 80048 BASIC METABOLIC PNL TOTAL CA: CPT

## 2020-07-21 PROCEDURE — 85379 FIBRIN DEGRADATION QUANT: CPT

## 2020-07-21 PROCEDURE — 93005 ELECTROCARDIOGRAM TRACING: CPT | Performed by: EMERGENCY MEDICINE

## 2020-07-21 PROCEDURE — 2580000003 HC RX 258: Performed by: EMERGENCY MEDICINE

## 2020-07-21 PROCEDURE — 85025 COMPLETE CBC W/AUTO DIFF WBC: CPT

## 2020-07-21 PROCEDURE — 84484 ASSAY OF TROPONIN QUANT: CPT

## 2020-07-21 PROCEDURE — 71260 CT THORAX DX C+: CPT

## 2020-07-21 RX ORDER — SODIUM CHLORIDE 0.9 % (FLUSH) 0.9 %
10 SYRINGE (ML) INJECTION PRN
Status: DISCONTINUED | OUTPATIENT
Start: 2020-07-21 | End: 2020-07-21 | Stop reason: HOSPADM

## 2020-07-21 RX ADMIN — IOPAMIDOL 100 ML: 755 INJECTION, SOLUTION INTRAVENOUS at 18:39

## 2020-07-21 RX ADMIN — Medication 10 ML: at 18:39

## 2020-07-21 NOTE — ED PROVIDER NOTES
 DIALYSIS FISTULA CREATION Bilateral     As of 2019, RUE AVF functioning, LUE AVF nonfunctioning.  FINGER SURGERY Left     I & D    GLAUCOMA SURGERY      LEG AMPUTATION BELOW KNEE Bilateral     TUNNELED VENOUS CATHETER PLACEMENT      PC placed and removed     CURRENT MEDICATIONS       Current Discharge Medication List      CONTINUE these medications which have NOT CHANGED    Details   lisinopril (PRINIVIL;ZESTRIL) 10 MG tablet TAKE 1 TABLET BY MOUTH  TWICE DAILY  Qty: 180 tablet, Refills: 0      tadalafil (CIALIS) 5 MG tablet Take 1 tablet by mouth once as needed  Qty: 30 tablet, Refills: 0      linagliptin (TRADJENTA) 5 MG tablet TAKE ONE TABLET BY MOUTH DAILY  Qty: 90 tablet, Refills: 0      hydrALAZINE (APRESOLINE) 25 MG tablet TAKE 1 TABLET BY MOUTH TWO  TIMES DAILY  Qty: 180 tablet, Refills: 0      amLODIPine-atorvastatatin (CADUET) 10-20 MG per tablet Take 1 tablet by mouth daily  Qty: 90 tablet, Refills: 1    Associated Diagnoses: Essential hypertension      cilostazol (PLETAL) 100 MG tablet Take 100 mg by mouth 2 times daily. Pt states NOT Taking med      NONFORMULARY       labetalol (NORMODYNE) 200 MG tablet Take 200 mg by mouth 3 times daily Indications: On Tues/Thurs/Sat/Sun       oxyCODONE-acetaminophen (PERCOCET) 5-325 MG per tablet Take 1 tablet by mouth three times a week  Indications: after dialysis . gabapentin (NEURONTIN) 300 MG capsule TAKE ONE CAPSULE BY MOUTH THREE TIMES A DAY  Qty: 90 capsule, Refills: 2      cloNIDine (CATAPRES) 0.3 MG tablet Take 1 tablet by mouth 2 times daily  Qty: 180 tablet, Refills: 3           ALLERGIES     has No Known Allergies. FAMILY HISTORY     He indicated that the status of his mother is unknown. He indicated that the status of his father is unknown. He indicated that the status of his sister is unknown. He indicated that the status of his brother is unknown.      SOCIAL HISTORY       Social History     Tobacco Use    Smoking status: Never Smoker  Smokeless tobacco: Never Used   Substance Use Topics    Alcohol use: Yes     Comment: rare    Drug use: No     PHYSICAL EXAM     INITIAL VITALS: BP (!) 152/88   Pulse 70   Temp 98.5 °F (36.9 °C) (Oral)   Resp 15   Ht 5' 11\" (1.803 m)   Wt 214 lb (97.1 kg)   SpO2 98%   BMI 29.85 kg/m²    Physical Exam  Constitutional:       General: He is not in acute distress. Appearance: He is well-developed. HENT:      Head: Normocephalic. Eyes:      Pupils: Pupils are equal, round, and reactive to light. Cardiovascular:      Rate and Rhythm: Normal rate and regular rhythm. Heart sounds: Normal heart sounds. Pulmonary:      Effort: Pulmonary effort is normal. No respiratory distress. Breath sounds: Normal breath sounds. Abdominal:      General: Bowel sounds are normal.      Palpations: Abdomen is soft. Tenderness: There is no abdominal tenderness. Musculoskeletal: Normal range of motion. Skin:     General: Skin is warm and dry. Neurological:      Mental Status: He is alert and oriented to person, place, and time. MEDICAL DECISION MAKIN-year-old male presents to the emergency room with recurrent episodes of syncope over the last couple of weeks. 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 I discussed with the patient the need for admission and evaluation for these episodes. Patient declined admission stating that his house is unlocked and he does not want to stay in the hospital.  I did discuss with the patient that undiagnosed cardiac disease and arrhythmia can lead to multiple medical conditions including but not limited to respiratory failure cardiac arrest and death. Patient is alert and oriented and capable of making his decisions. He is aware of this information but still would like to leave.        CRITICAL CARE:       PROCEDURES:    Procedures    DIAGNOSTIC RESULTS   EKG:All EKG's are interpreted by the Emergency Department Physician who either signs or Co-signs this chart in the absence of a cardiologist.    EKG-   Re-entrant junctional rhythm with rate 92  Nonspecific T wave changes  QTc 499    RADIOLOGY:All plain film, CT, MRI, and formal ultrasound images (except ED bedside ultrasound) are read by the radiologist, see reports below, unless otherwisenoted in MDM or here. CT CHEST PULMONARY EMBOLISM W CONTRAST   Final Result   1. No evidence of pulmonary embolus   2. Anasarca   3. Cardiomegaly, with extensive coronary arterial calcifications           LABS: All lab results were reviewed by myself, and all abnormals are listed below.   Labs Reviewed   CBC WITH AUTO DIFFERENTIAL - Abnormal; Notable for the following components:       Result Value    Hemoglobin 11.3 (*)     Hematocrit 36.8 (*)     RDW 17.9 (*)     Eosinophils % 8 (*)     All other components within normal limits   BASIC METABOLIC PANEL W/ REFLEX TO MG FOR LOW K - Abnormal; Notable for the following components:    Glucose 145 (*)     BUN 59 (*)     CREATININE 10.50 (*)     Bun/Cre Ratio 6 (*)     Chloride 93 (*)     Anion Gap 18 (*)     GFR Non- 5 (*)     GFR  6 (*)     All other components within normal limits   TROPONIN - Abnormal; Notable for the following components:    Troponin, High Sensitivity 243 (*)     All other components within normal limits   D-DIMER, QUANTITATIVE - Abnormal; Notable for the following components:    D-Dimer, Quant 1.77 (*)     All other components within normal limits   TROPONIN - Abnormal; Notable for the following components:    Troponin, High Sensitivity 231 (*)     All other components within normal limits       EMERGENCY DEPARTMENTCOURSE:         Vitals:    Vitals:    07/21/20 1933 07/21/20 1948 07/21/20 2003 07/21/20 2018   BP: (!) 160/80 130/83 (!) 171/86 (!) 152/88   Pulse: 69 89 70 70   Resp: (!) 0 (!) 0 19 15   Temp:       TempSrc:       SpO2: 96% 95% 98% 98%   Weight:       Height:           The patient was given the following medications while in the emergency department:  Orders Placed This Encounter   Medications    sodium chloride flush 0.9 % injection 10 mL    iopamidol (ISOVUE-370) 76 % injection 100 mL     CONSULTS:  None    FINAL IMPRESSION      1. Syncope and collapse          DISPOSITION/PLAN   DISPOSITION [place] 07/21/2020 08:35:55 PM      PATIENT REFERRED TO:  No follow-up provider specified.   DISCHARGE MEDICATIONS:  Current Discharge Medication List        Kwesi Antonio MD  Attending Emergency Physician            ;     Chacho Pierce MD  07/21/20 6973

## 2020-07-22 ENCOUNTER — HOSPITAL ENCOUNTER (EMERGENCY)
Age: 60
Discharge: LEFT BEFORE DISCHARGE | End: 2020-07-22
Attending: EMERGENCY MEDICINE
Payer: MEDICARE

## 2020-07-22 VITALS
RESPIRATION RATE: 14 BRPM | OXYGEN SATURATION: 98 % | BODY MASS INDEX: 29.96 KG/M2 | DIASTOLIC BLOOD PRESSURE: 68 MMHG | TEMPERATURE: 97.6 F | WEIGHT: 214 LBS | HEART RATE: 70 BPM | HEIGHT: 71 IN | SYSTOLIC BLOOD PRESSURE: 122 MMHG

## 2020-07-22 LAB
ABSOLUTE EOS #: 0.38 K/UL (ref 0–0.44)
ABSOLUTE IMMATURE GRANULOCYTE: 0.03 K/UL (ref 0–0.3)
ABSOLUTE LYMPH #: 1.29 K/UL (ref 1.1–3.7)
ABSOLUTE MONO #: 0.69 K/UL (ref 0.1–1.2)
ANION GAP SERPL CALCULATED.3IONS-SCNC: 16 MMOL/L (ref 9–17)
BASOPHILS # BLD: 1 % (ref 0–2)
BASOPHILS ABSOLUTE: 0.03 K/UL (ref 0–0.2)
BNP INTERPRETATION: ABNORMAL
BUN BLDV-MCNC: 29 MG/DL (ref 8–23)
BUN/CREAT BLD: 4 (ref 9–20)
CALCIUM SERPL-MCNC: 8.8 MG/DL (ref 8.6–10.4)
CHLORIDE BLD-SCNC: 92 MMOL/L (ref 98–107)
CO2: 28 MMOL/L (ref 20–31)
CREAT SERPL-MCNC: 6.91 MG/DL (ref 0.7–1.2)
DIFFERENTIAL TYPE: ABNORMAL
EKG ATRIAL RATE: 220 BPM
EKG Q-T INTERVAL: 402 MS
EKG QRS DURATION: 86 MS
EKG QTC CALCULATION (BAZETT): 499 MS
EKG R AXIS: 28 DEGREES
EKG T AXIS: 32 DEGREES
EKG VENTRICULAR RATE: 93 BPM
EOSINOPHILS RELATIVE PERCENT: 6 % (ref 1–4)
GFR AFRICAN AMERICAN: 10 ML/MIN
GFR NON-AFRICAN AMERICAN: 8 ML/MIN
GFR SERPL CREATININE-BSD FRML MDRD: ABNORMAL ML/MIN/{1.73_M2}
GFR SERPL CREATININE-BSD FRML MDRD: ABNORMAL ML/MIN/{1.73_M2}
GLUCOSE BLD-MCNC: 141 MG/DL (ref 70–99)
HCT VFR BLD CALC: 38.8 % (ref 40.7–50.3)
HEMOGLOBIN: 12.1 G/DL (ref 13–17)
IMMATURE GRANULOCYTES: 1 %
LYMPHOCYTES # BLD: 21 % (ref 24–43)
MAGNESIUM: 2.2 MG/DL (ref 1.6–2.6)
MCH RBC QN AUTO: 25.5 PG (ref 25.2–33.5)
MCHC RBC AUTO-ENTMCNC: 31.2 G/DL (ref 28.4–34.8)
MCV RBC AUTO: 81.9 FL (ref 82.6–102.9)
MONOCYTES # BLD: 11 % (ref 3–12)
MYOGLOBIN: 591 NG/ML (ref 28–72)
NRBC AUTOMATED: 0.3 PER 100 WBC
PDW BLD-RTO: 18.1 % (ref 11.8–14.4)
PLATELET # BLD: 157 K/UL (ref 138–453)
PLATELET ESTIMATE: ABNORMAL
PMV BLD AUTO: 10.4 FL (ref 8.1–13.5)
POTASSIUM SERPL-SCNC: 4.3 MMOL/L (ref 3.7–5.3)
PRO-BNP: ABNORMAL PG/ML
RBC # BLD: 4.74 M/UL (ref 4.21–5.77)
RBC # BLD: ABNORMAL 10*6/UL
SEG NEUTROPHILS: 60 % (ref 36–65)
SEGMENTED NEUTROPHILS ABSOLUTE COUNT: 3.74 K/UL (ref 1.5–8.1)
SODIUM BLD-SCNC: 136 MMOL/L (ref 135–144)
TROPONIN INTERP: ABNORMAL
TROPONIN T: ABNORMAL NG/ML
TROPONIN, HIGH SENSITIVITY: 250 NG/L (ref 0–22)
WBC # BLD: 6.2 K/UL (ref 3.5–11.3)
WBC # BLD: ABNORMAL 10*3/UL

## 2020-07-22 PROCEDURE — 80048 BASIC METABOLIC PNL TOTAL CA: CPT

## 2020-07-22 PROCEDURE — 83874 ASSAY OF MYOGLOBIN: CPT

## 2020-07-22 PROCEDURE — 84484 ASSAY OF TROPONIN QUANT: CPT

## 2020-07-22 PROCEDURE — 99284 EMERGENCY DEPT VISIT MOD MDM: CPT

## 2020-07-22 PROCEDURE — 93005 ELECTROCARDIOGRAM TRACING: CPT | Performed by: NURSE PRACTITIONER

## 2020-07-22 PROCEDURE — 93010 ELECTROCARDIOGRAM REPORT: CPT | Performed by: INTERNAL MEDICINE

## 2020-07-22 PROCEDURE — 83880 ASSAY OF NATRIURETIC PEPTIDE: CPT

## 2020-07-22 PROCEDURE — 83735 ASSAY OF MAGNESIUM: CPT

## 2020-07-22 PROCEDURE — 85025 COMPLETE CBC W/AUTO DIFF WBC: CPT

## 2020-07-22 ASSESSMENT — ENCOUNTER SYMPTOMS
COUGH: 0
SHORTNESS OF BREATH: 0

## 2020-07-22 NOTE — ED NOTES
Pt states he has to go home to lock up his house. Pt said he will come back to ED tomorrow after dialysis.        Kenn Taylor RN  07/21/20 2100

## 2020-07-22 NOTE — ED PROVIDER NOTES
[facility] ED  EMERGENCY DEPARTMENT ENCOUNTER      Pt Name: Keely Mccormack  MRN: [id number]  Angelagfjosephine 1960  Date of evaluation: 7/22/2020  Provider: AURORA Lombardo CNP    CHIEF COMPLAINT       Chief Complaint   Patient presents with    Check-Up         HISTORY OFPRESENT ILLNESS  (Location/Symptom, Timing/Onset, Context/Setting, Quality, Duration, Modifying Factors, Severity.)   Keely Mccormack is a 63 y.o. male who presents to the emergency department for further evaluation of his EKG findings that was done yesterday when he was evaluated in his emergency department. He was evaluated here yesterday for syncopal episodes. He had an EKG which showed a junctional rhythm which was different from her previous EKG years ago which showed a sinus rhythm. Patient was going to be admitted yesterday for further work-up but signed out [address] Patient states he came back today to be further evaluated. The patient has no complaints today. He has history of diabetes, hypertension, chronic renal failure. Patient states he completed a full dialysis treatment today. Nursing Notes were reviewed. PASTMEDICAL HISTORY     Past Medical History:   Diagnosis Date    CRF (chronic renal failure)     [facility]    Diabetes mellitus ([facility] [address]     Dialysis patient [facility])     ED (erectile dysfunction)     [facility] of echocardiogram 2014    [facility]    HTN (hypertension)     Hyperlipidemia     LVH (left ventricular hypertrophy)     PVD [facility]peripheral [facility] disease) [facility] [address]     S/P bilateral BKA (below knee amputation) ([facility] [address]     Wears glasses          SURGICAL HISTORY       Past Surgical History:   Procedure Laterality Date    CATARACT REMOVAL WITH IMPLANT      DIALYSIS FISTULA CREATION Bilateral     As of 2019, RUE AVF functioning, LUE AVF nonfunctioning.     FINGER SURGERY Left     I & D    GLAUCOMA SURGERY      LEG AMPUTATION BELOW KNEE Bilateral     TUNNELED VENOUS CATHETER PLACEMENT      PC placed and removed         CURRENT MEDICATIONS     Current Discharge Medication List      CONTINUE these medications which have NOT CHANGED    Details   lisinopril (PRINIVIL;ZESTRIL) 10 MG tablet TAKE 1 TABLET BY MOUTH  TWICE DAILY  Qty: 180 tablet, Refills: 0      tadalafil (CIALIS) 5 MG tablet Take 1 tablet by mouth once as needed  Qty: 30 tablet, Refills: 0      linagliptin (TRADJENTA) 5 MG tablet TAKE ONE TABLET BY MOUTH DAILY  Qty: 90 tablet, Refills: 0      hydrALAZINE (APRESOLINE) 25 MG tablet TAKE 1 TABLET BY MOUTH TWO  TIMES DAILY  Qty: 180 tablet, Refills: 0      amLODIPine-atorvastatatin (CADUET) 10-20 MG per tablet Take 1 tablet by mouth daily  Qty: 90 tablet, Refills: 1    Associated Diagnoses: Essential hypertension      cilostazol (PLETAL) 100 MG tablet Take 100 mg by mouth 2 times daily. Pt states NOT Taking med      NONFORMULARY       labetalol (NORMODYNE) 200 MG tablet Take 200 mg by mouth 3 times daily Indications: On Tues/Thurs/Sat/Sun       oxyCODONE-acetaminophen (PERCOCET) 5-325 MG per tablet Take 1 tablet by mouth three times a week  Indications: after dialysis . gabapentin (NEURONTIN) 300 MG capsule TAKE ONE CAPSULE BY MOUTH THREE TIMES A DAY  Qty: 90 capsule, Refills: 2      cloNIDine (CATAPRES) 0.3 MG tablet Take 1 tablet by mouth 2 times daily  Qty: 180 tablet, Refills: 3             ALLERGIES     Patient has no known allergies.     FAMILY HISTORY       Family History   Problem Relation Age of Onset    Diabetes Mother     Coronary Art Dis Mother     Hypertension Mother     Diabetes Father     Hypertension Father     Stroke Father     Cancer Sister     Hypertension Brother           SOCIAL HISTORY       Social History     Socioeconomic History    Marital status:      Spouse name: None    Number of children: None    Years of education: None    Highest education level: None   Occupational History    None   Social Needs    Financial resource strain: None    Food insecurity     Worry: None     Inability: None    Transportation needs     Medical: None     Non-medical: None   Tobacco Use    Smoking status: Never Smoker    Smokeless tobacco: Never Used   Substance and Sexual Activity    Alcohol use: Yes     Comment: rare    Drug use: No    Sexual activity: None   Lifestyle    Physical activity     Days per week: None     Minutes per session: None    Stress: None   Relationships    Social connections     Talks on phone: None     Gets together: None     Attends Anabaptism service: None     Active member of club or organization: None     Attends meetings of clubs or organizations: None     Relationship status: None    Intimate partner violence     Fear of current or ex partner: None     Emotionally abused: None     Physically abused: None     Forced sexual activity: None   Other Topics Concern    None   Social History Narrative    None         REVIEW OF SYSTEMS    (2-9 systems for level 4, 10 or more for level 5)     Review of Systems   Respiratory: Negative for cough and shortness of breath. Cardiovascular: Negative for chest pain. Neurological: Negative for dizziness and headaches. All other systems reviewed and are negative. Except as noted above the remainder of the review of systems was reviewed and negative. PHYSICAL EXAM    (up to 7 for level 4, 8 or more for level 5)     ED Triage Vitals   BP Temp Temp src Pulse Resp SpO2 Height Weight   -- -- -- -- -- -- -- --       Physical Exam  Constitutional:       Appearance: Normal appearance. He is well-developed, well-groomed and normal weight. HENT:      Head: Normocephalic. Right Ear: External ear normal.      Left Ear: External ear normal.      Nose: Nose normal.      Mouth/Throat:      Mouth: Mucous membranes are moist.   Eyes:      Extraocular Movements: Extraocular movements intact. Conjunctiva/sclera: Conjunctivae normal.      Pupils: Pupils are equal, round, and reactive to light. the mA/kV was utilized to reduce the radiation dose to as low as reasonably achievable. COMPARISON: None. HISTORY: ORDERING SYSTEM PROVIDED HISTORY: RO PE TECHNOLOGIST PROVIDED HISTORY: RO PE Reason for Exam: HYPOTENSION, DIZZINESS, LIGHTHEADED Acuity: Acute Type of Exam: Initial FINDINGS: Pulmonary Arteries: Pulmonary arteries are adequately opacified for evaluation. No evidence of intraluminal filling defect to suggest pulmonary embolism. Main pulmonary artery is normal in caliber. Mediastinum: No evidence of mediastinal lymphadenopathy. The heart and pericardium demonstrate no acute abnormality. Cardiomegaly is present. There is no acute abnormality of the thoracic aorta. Lungs/pleura: The lungs are without acute process. No focal consolidation or pulmonary edema. No evidence of pleural effusion or pneumothorax. Calcified nodule seen within the left lower lobe, consistent with a granuloma or hamartoma. Upper Abdomen: Limited images of the upper abdomen are unremarkable. Soft Tissues/Bones: Anasarca is present. A vascular stent is seen within the right subclavian vein. 1. No evidence of pulmonary embolus 2. Anasarca 3.  Cardiomegaly, with extensive coronary arterial calcifications     Interpretation per the Radiologist below, if available at the time of this note:    No orders to display         EDBEDSIDE ULTRASOUND:   Performed by [employer] - none    LABS:  Labs Reviewed   CBC WITH AUTO DIFFERENTIAL - Abnormal; Notable for the following components:       Result Value    Hemoglobin 12.1 (*)     Hematocrit 38.8 (*)     MCV 81.9 (*)     RDW 18.1 (*)     NRBC Automated 0.3 (*)     Lymphocytes 21 (*)     Eosinophils % 6 (*)     Immature Granulocytes 1 (*)     All other components within normal limits   BASIC METABOLIC PANEL - Abnormal; Notable for the following components:    Glucose 141 (*)     BUN 29 (*)     CREATININE 6.91 (*)     Bun/Cre Ratio 4 (*)     Chloride 92 (*)     GFR Non-African American 8 (*)     GFR  10 (*)     All other components within normal limits   BRAIN NATRIURETIC PEPTIDE - Abnormal; Notable for the following components:    Pro-BNP 24,808 (*)     All other components within normal limits   TROP/MYOGLOBIN - Abnormal; Notable for the following components:    Troponin, High Sensitivity 250 (*)     Myoglobin 591 (*)     All other components within normal limits   MAGNESIUM       All other labs were within normal range or not returned as of this dictation. EMERGENCY DEPARTMENT COURSE andDIFFERENTIAL DIAGNOSIS/MDM:   The patient was evaluated in conjunction with attending physician. Labs reviewed. Patient is asymptomatic upon arrival.  EKG reviewed by attending physician. EKG does show accelerated junctional rhythm. Initially the patient presented as he would be willing to be admitted to the hospital for further evaluation. However, after I discussed furthermore the patient he needs to be admitted and the reason for this he states he wants to go home. The patient then stated he thought that the cardiologist would just see him in the emergency department and then he could go home. I discussed with the patient it does not work that way and he would need to be admitted and cardiology will see him in the hospital.  Patient states he still wants to go home. Patient is competent to make his own medical decisions. I discussed risks and benefits with the patient. He was provided with and signed out [address] paperwork. Patient was given referral to a cardiologist.  I informed the patient that he should return to emerge department anytime if he changes his mind.       Vitals:    Vitals:    07/22/20 1659 07/22/20 1714 07/22/20 1729 07/22/20 1744   BP: 108/85 126/76 116/75 122/68   Pulse: 92 71 69 70   Resp: 19 10 9 14   Temp:       TempSrc:       SpO2: 99% 99% 97% 98%   Weight:       Height:             CONSULTS:  IP CONSULT TO HOSPITALIST    RES:  Procedures    FINAL IMPRESSION      1. Left against medical advice    2.  History of syncope          DISPOSITION/PLAN   DISPOSITION [place] 07/22/2020 05:58:51 PM      PATIENT REFERRED TO:   Dilia Contreras MD  [address] [facility]  [phone]    Schedule an appointment as soon as possible for a visit       Jama Painter MD  [address] [place] [phone]    Schedule an appointment as soon as possible for a visit       [facility] ED  [address] [facility]  [phone]    If symptoms worsen      DISCHARGE MEDICATIONS:     Current Discharge Medication List        Electronically signed by AURORA Velazquez 7/22/2020 at 6:03 PM            AURORA Velazquez CNP  07/22/20 6300

## 2020-07-23 LAB
EKG ATRIAL RATE: 120 BPM
EKG Q-T INTERVAL: 418 MS
EKG QRS DURATION: 90 MS
EKG QTC CALCULATION (BAZETT): 522 MS
EKG R AXIS: 24 DEGREES
EKG T AXIS: 180 DEGREES
EKG VENTRICULAR RATE: 94 BPM

## 2020-08-05 RX ORDER — AMLODIPINE BESYLATE AND ATORVASTATIN CALCIUM 10; 20 MG/1; MG/1
1 TABLET, FILM COATED ORAL DAILY
Qty: 90 TABLET | Refills: 1 | Status: SHIPPED | OUTPATIENT
Start: 2020-08-05 | End: 2020-11-02 | Stop reason: SDUPTHER

## 2020-08-05 NOTE — TELEPHONE ENCOUNTER
Last seen 7/2/2020  Last filled 9/23/19 90 days with 1 RF  Pt is requesting printed rx to be sent to [facility]    Next Visit Date:  Future Appointments   Date Time Provider Staci Vuong   11/3/2020 11:00 AM Jama Painter  [address] Maintenance   Topic Date Due    Hepatitis C screen  1960    Diabetic retinal exam  04/28/1969    HIV screen  04/28/1974    Colon cancer screen colonoscopy  04/28/2009    Pneumococcal 0-64 years Vaccine (2 of 3 - PPSV23) 02/21/2019    Annual Wellness Visit (AWV)  09/12/2020 (Originally 5/29/2019)    Shingles Vaccine (1 of 2) 01/16/2021 (Originally 4/28/2009)    Flu vaccine (1) 09/01/2020    Lipid screen  01/16/2021    A1C test (Diabetic or Prediabetic)  07/02/2021    Potassium monitoring  07/22/2021    Creatinine monitoring  07/22/2021    DTaP/Tdap/Td vaccine (2 - Td) 01/16/2030    Hepatitis A vaccine  Aged Out    Hib vaccine  Aged Out    Meningococcal (ACWY) vaccine  Aged Out       Hemoglobin A1C (%)   Date Value   07/02/2020 7.4 (H)   01/16/2020 7.2 (H)   04/04/2019 6.6 (H)             ( goal A1C is < 7)   No results found for: LABMICR  LDL Cholesterol (mg/dL)   Date Value   01/16/2020 51   12/27/2018 60       (goal LDL is <100)   AST (U/L)   Date Value   01/16/2020 19     ALT (U/L)   Date Value   01/16/2020 18     BUN (mg/dL)   Date Value   07/22/2020 29 (H)     BP Readings from Last 3 Encounters:   07/22/20 122/68   07/21/20 (!) 152/88   07/02/20 110/70          (goal 120/80)    All Future Testing planned in [facility]  Lab Frequency Next Occurrence               Patient Active Problem List:     Hyperlipidemia     Essential hypertension     ESRD on hemodialysis ([facility] [address] MWF     Insomnia     ED (erectile dysfunction)     Low back pain     Controlled diabetes mellitus type 2 with complications (HCC)     S/P bilateral below knee amputation (HCC)     Chronic use of opiate drugs therapeutic purposes

## 2020-08-17 ENCOUNTER — TELEPHONE (OUTPATIENT)
Dept: INTERNAL MEDICINE CLINIC | Age: 60
End: 2020-08-17

## 2020-08-25 ENCOUNTER — TELEPHONE (OUTPATIENT)
Dept: INTERNAL MEDICINE CLINIC | Age: 60
End: 2020-08-25

## 2020-09-08 RX ORDER — LISINOPRIL 10 MG/1
TABLET ORAL
Qty: 180 TABLET | Refills: 3 | OUTPATIENT
Start: 2020-09-08

## 2020-09-17 ENCOUNTER — TELEPHONE (OUTPATIENT)
Dept: INTERNAL MEDICINE CLINIC | Age: 60
End: 2020-09-17

## 2020-09-17 RX ORDER — TADALAFIL 20 MG/1
20 TABLET ORAL
Qty: 30 TABLET | Refills: 0 | Status: SHIPPED | OUTPATIENT
Start: 2020-09-17 | End: 2021-03-02 | Stop reason: SDUPTHER

## 2020-09-17 NOTE — TELEPHONE ENCOUNTER
Patient stating that he needs his cialis changed to 20mg. So that he only has to take it QD.     Also please fax new RX to [facility] # [phone]

## 2020-09-23 NOTE — TELEPHONE ENCOUNTER
Renée Tang is calling to request a refill on the following medication(s):    Medication Request:    Last filled 6/29/2020 #90 with 0 RF    Requested Prescriptions     Pending Prescriptions Disp Refills    linagliptin (TRADJENTA) 5 MG tablet [Pharmacy Med Name: TRADJENTA 5 MG TABLET] 90 tablet 0     Sig: TAKE ONE TABLET BY MOUTH DAILY       Last Visit Date (If Applicable):  6/2/2227    Next Visit Date:    11/3/2020

## 2020-10-08 ENCOUNTER — APPOINTMENT (OUTPATIENT)
Dept: GENERAL RADIOLOGY | Age: 60
End: 2020-10-08
Payer: MEDICARE

## 2020-10-08 ENCOUNTER — HOSPITAL ENCOUNTER (EMERGENCY)
Age: 60
Discharge: HOME | End: 2020-10-08
Attending: EMERGENCY MEDICINE
Payer: MEDICARE

## 2020-10-08 VITALS
HEIGHT: 71 IN | HEART RATE: 95 BPM | RESPIRATION RATE: 16 BRPM | OXYGEN SATURATION: 99 % | SYSTOLIC BLOOD PRESSURE: 117 MMHG | BODY MASS INDEX: 29.68 KG/M2 | TEMPERATURE: 98.4 F | WEIGHT: 212 LBS | DIASTOLIC BLOOD PRESSURE: 79 MMHG

## 2020-10-08 LAB
ABSOLUTE EOS #: 0.3 K/UL (ref 0–0.44)
ABSOLUTE IMMATURE GRANULOCYTE: 0.03 K/UL (ref 0–0.3)
ABSOLUTE LYMPH #: 1.77 K/UL (ref 1.1–3.7)
ABSOLUTE MONO #: 0.73 K/UL (ref 0.1–1.2)
ANION GAP SERPL CALCULATED.3IONS-SCNC: 12 MMOL/L (ref 9–17)
BASOPHILS # BLD: 0 % (ref 0–2)
BASOPHILS ABSOLUTE: <0.03 K/UL (ref 0–0.2)
BUN BLDV-MCNC: 33 MG/DL (ref 8–23)
BUN/CREAT BLD: 4 (ref 9–20)
CALCIUM SERPL-MCNC: 9.5 MG/DL (ref 8.6–10.4)
CHLORIDE BLD-SCNC: 95 MMOL/L (ref 98–107)
CO2: 32 MMOL/L (ref 20–31)
CREAT SERPL-MCNC: 9.38 MG/DL (ref 0.7–1.2)
DIFFERENTIAL TYPE: ABNORMAL
EOSINOPHILS RELATIVE PERCENT: 4 % (ref 1–4)
GFR AFRICAN AMERICAN: 7 ML/MIN
GFR NON-AFRICAN AMERICAN: 6 ML/MIN
GFR SERPL CREATININE-BSD FRML MDRD: ABNORMAL ML/MIN/{1.73_M2}
GFR SERPL CREATININE-BSD FRML MDRD: ABNORMAL ML/MIN/{1.73_M2}
GLUCOSE BLD-MCNC: 149 MG/DL (ref 70–99)
HCT VFR BLD CALC: 37.4 % (ref 40.7–50.3)
HEMOGLOBIN: 11.2 G/DL (ref 13–17)
IMMATURE GRANULOCYTES: 0 %
LYMPHOCYTES # BLD: 26 % (ref 24–43)
MAGNESIUM: 2.3 MG/DL (ref 1.6–2.6)
MCH RBC QN AUTO: 26.1 PG (ref 25.2–33.5)
MCHC RBC AUTO-ENTMCNC: 29.9 G/DL (ref 28.4–34.8)
MCV RBC AUTO: 87.2 FL (ref 82.6–102.9)
MONOCYTES # BLD: 11 % (ref 3–12)
NRBC AUTOMATED: 0 PER 100 WBC
PDW BLD-RTO: 15.7 % (ref 11.8–14.4)
PLATELET # BLD: 175 K/UL (ref 138–453)
PLATELET ESTIMATE: ABNORMAL
PMV BLD AUTO: 11.1 FL (ref 8.1–13.5)
POTASSIUM SERPL-SCNC: 4.2 MMOL/L (ref 3.7–5.3)
RBC # BLD: 4.29 M/UL (ref 4.21–5.77)
RBC # BLD: ABNORMAL 10*6/UL
SEG NEUTROPHILS: 59 % (ref 36–65)
SEGMENTED NEUTROPHILS ABSOLUTE COUNT: 3.96 K/UL (ref 1.5–8.1)
SODIUM BLD-SCNC: 139 MMOL/L (ref 135–144)
WBC # BLD: 6.8 K/UL (ref 3.5–11.3)
WBC # BLD: ABNORMAL 10*3/UL

## 2020-10-08 PROCEDURE — 85025 COMPLETE CBC W/AUTO DIFF WBC: CPT

## 2020-10-08 PROCEDURE — 83735 ASSAY OF MAGNESIUM: CPT

## 2020-10-08 PROCEDURE — 72100 X-RAY EXAM L-S SPINE 2/3 VWS: CPT

## 2020-10-08 PROCEDURE — 99281 EMR DPT VST MAYX REQ PHY/QHP: CPT

## 2020-10-08 PROCEDURE — 6370000000 HC RX 637 (ALT 250 FOR IP): Performed by: NURSE PRACTITIONER

## 2020-10-08 PROCEDURE — 80048 BASIC METABOLIC PNL TOTAL CA: CPT

## 2020-10-08 RX ORDER — HYDROCODONE BITARTRATE AND ACETAMINOPHEN 5; 325 MG/1; MG/1
1 TABLET ORAL EVERY 6 HOURS PRN
Qty: 8 TABLET | Refills: 0 | Status: SHIPPED | OUTPATIENT
Start: 2020-10-08 | End: 2020-10-10

## 2020-10-08 RX ORDER — METHOCARBAMOL 500 MG/1
500 TABLET, FILM COATED ORAL 4 TIMES DAILY
Qty: 40 TABLET | Refills: 0 | Status: SHIPPED | OUTPATIENT
Start: 2020-10-08 | End: 2020-10-18

## 2020-10-08 RX ORDER — LIDOCAINE 50 MG/G
1 PATCH TOPICAL DAILY
Qty: 10 PATCH | Refills: 0 | Status: SHIPPED | OUTPATIENT
Start: 2020-10-08 | End: 2020-10-18

## 2020-10-08 RX ORDER — LIDOCAINE 4 G/G
1 PATCH TOPICAL DAILY
Status: DISCONTINUED | OUTPATIENT
Start: 2020-10-08 | End: 2020-10-08 | Stop reason: HOSPADM

## 2020-10-08 ASSESSMENT — ENCOUNTER SYMPTOMS
RHINORRHEA: 0
COUGH: 0
SORE THROAT: 0
ABDOMINAL PAIN: 0
COLOR CHANGE: 0
VOMITING: 0
SINUS PRESSURE: 0
DIARRHEA: 0
SHORTNESS OF BREATH: 0
NAUSEA: 0
WHEEZING: 0
CONSTIPATION: 0

## 2020-10-08 NOTE — ED PROVIDER NOTES
[facility] ED  eMERGENCY dEPARTMENT eNCOUnter      Pt Name: Eriberto Haas  MRN: [id number]  [place] 1960  Date of evaluation: 10/8/2020  Provider: Saint Dearth NP, AURORA - [address] [id number]       Chief Complaint   Patient presents with    Back Pain         HISTORY OF PRESENT ILLNESS  (Location/Symptom, Timing/Onset, Context/Setting, Quality, Duration, Modifying Factors, Severity.)   Eriberto Haas is a 63 y.o. male who presents to the emergency department private vehicle for evaluation of right low back pain. Patient states that when he is walking to dialysis which is roughly half a mile he gets he is cramping spasms in his right low back. He states pain is worse with movement. He denies any bowel or bladder incontinence, retention or saddle anesthesia. Denies fevers or chills      Nursing Notes were reviewed. ALLERGIES     Patient has no known allergies. CURRENT MEDICATIONS       Previous Medications    AMLODIPINE-ATORVASTATATIN (CADUET) 10-20 MG PER TABLET    Take 1 tablet by mouth daily    CILOSTAZOL (PLETAL) 100 MG TABLET    Take 100 mg by mouth 2 times daily. Pt states NOT Taking med    CLONIDINE (CATAPRES) 0.3 MG TABLET    Take 1 tablet by mouth 2 times daily    GABAPENTIN (NEURONTIN) 300 MG CAPSULE    TAKE ONE CAPSULE BY MOUTH THREE TIMES A DAY    HYDRALAZINE (APRESOLINE) 25 MG TABLET    TAKE 1 TABLET BY MOUTH TWO  TIMES DAILY    LABETALOL (NORMODYNE) 200 MG TABLET    Take 200 mg by mouth 3 times daily Indications: On Tues/Thurs/Sat/Sun     LINAGLIPTIN (TRADJENTA) 5 MG TABLET    TAKE ONE TABLET BY MOUTH DAILY    LISINOPRIL (PRINIVIL;ZESTRIL) 10 MG TABLET    TAKE 1 TABLET BY MOUTH  TWICE DAILY    NONFORMULARY        OXYCODONE-ACETAMINOPHEN (PERCOCET) 5-325 MG PER TABLET    Take 1 tablet by mouth three times a week  Indications: after dialysis .     TADALAFIL (CIALIS) 20 MG TABLET    Take 1 tablet by mouth once as needed for Erectile Dysfunction       PAST MEDICAL HISTORY and rash. Neurological: Negative for dizziness, weakness and headaches. Hematological: Negative for adenopathy. All other systems reviewed and are negative. Except as noted above the remainder of the review of systems was reviewed and negative. PHYSICAL EXAM    (up to 7 for level 4, 8 or more for level 5)     ED Triage Vitals   BP Temp Temp Source Pulse Resp SpO2 Height Weight   10/08/20 1716 10/08/20 1716 10/08/20 1716 10/08/20 1716 10/08/20 1716 10/08/20 1716 10/08/20 1715 10/08/20 1715   117/79 98.4 °F (36.9 °C) Oral 95 16 99 % 5' 11\" (1.803 m) 212 lb (96.2 kg)       Physical Exam  Vitals signs reviewed. Constitutional:       Appearance: He is well-developed. HENT:      Head: Normocephalic and atraumatic. Eyes:      Conjunctiva/sclera: Conjunctivae normal.      Pupils: Pupils are equal, round, and reactive to light. Neck:      Musculoskeletal: Normal range of motion and neck supple. Cardiovascular:      Rate and Rhythm: Normal rate and regular rhythm. Pulmonary:      Effort: Pulmonary effort is normal. No respiratory distress. Breath sounds: Normal breath sounds. No stridor. Abdominal:      General: Bowel sounds are normal.      Palpations: Abdomen is soft. Musculoskeletal: Normal range of motion. Lymphadenopathy:      Cervical: No cervical adenopathy. Skin:     General: Skin is warm and dry. Findings: No rash. Neurological:      Mental Status: He is alert and oriented to person, place, and time. RADIOLOGY:   Non-plain film images such as CT, Ultrasound and MRI are read by the radiologist. Plain radiographic images are visualized and preliminarily interpreted by the emergency physician with the below findings:    Xr Lumbar Spine (2-3 Views)    Result Date: 10/8/2020  EXAMINATION: THREE XRAY VIEWS OF THE LUMBAR SPINE 10/8/2020 6:37 pm COMPARISON: None.  HISTORY: ORDERING SYSTEM PROVIDED HISTORY: back pain TECHNOLOGIST PROVIDED HISTORY: back pain Reason for Exam: back pain, NKI Acuity: Chronic Type of Exam: Unknown FINDINGS: Lumbar vertebral body height and alignment is maintained. No significant disc space narrowing. Diffuse facet degenerative changes. Large anterior osteophyte at L3 with degenerative changes along the superior endplate. Multilevel lumbar spine degenerative changes. Interpretation per the Radiologist below, if available at the time of this note:    XR LUMBAR SPINE (2-3 VIEWS)   Final Result   Multilevel lumbar spine degenerative changes. LABS:  Labs Reviewed   CBC WITH AUTO DIFFERENTIAL - Abnormal; Notable for the following components:       Result Value    Hemoglobin 11.2 (*)     Hematocrit 37.4 (*)     RDW 15.7 (*)     All other components within normal limits   BASIC METABOLIC PANEL - Abnormal; Notable for the following components:    Glucose 149 (*)     BUN 33 (*)     CREATININE 9.38 (*)     Bun/Cre Ratio 4 (*)     Chloride 95 (*)     CO2 32 (*)     GFR Non- 6 (*)     GFR  7 (*)     All other components within normal limits   MAGNESIUM       All other labs were within normal range or not returned as of this dictation. EMERGENCY DEPARTMENT COURSE and DIFFERENTIAL DIAGNOSIS/MDM:   Vitals:    Vitals:    10/08/20 1715 10/08/20 1716   BP:  117/79   Pulse:  95   Resp:  16   Temp:  98.4 °F (36.9 °C)   TempSrc:  Oral   SpO2:  99%   Weight: 212 lb (96.2 kg)    Height: 5' 11\" (1.803 m)        Medical Decision Making:  Pts labs are unremarkable. Patient has degenerative disc disease in the low back. Patient is able to be discharged home on medication and muscle accidents. Follow-up with primary care physician. Medications   lidocaine 4 % external patch 1 patch (1 patch Transdermal Patch Applied 10/8/20 7726)       FINAL IMPRESSION      1.  Acute right-sided low back pain without sciatica          DISPOSITION/PLAN   DISPOSITION Decision To Discharge 10/08/2020 07:05:00 PM      PATIENT REFERRED

## 2020-10-08 NOTE — ED PROVIDER NOTES
(erectile dysfunction)     History of echocardiogram 2014    [facility]    HTN (hypertension)     Hyperlipidemia     LVH (left ventricular hypertrophy)     PVD [facility] [address]     [facility]     Wears glasses      SURGICAL HISTORY       Past Surgical History:   Procedure Laterality Date    CATARACT REMOVAL WITH IMPLANT      DIALYSIS FISTULA CREATION Bilateral     As of 2019, RUE AVF functioning, LUE AVF nonfunctioning.  FINGER SURGERY Left     I & D    GLAUCOMA SURGERY      LEG AMPUTATION BELOW KNEE Bilateral     TUNNELED VENOUS CATHETER PLACEMENT      PC placed and removed     CURRENT MEDICATIONS       Previous Medications    AMLODIPINE-ATORVASTATATIN (CADUET) 10-20 MG PER TABLET    Take 1 tablet by mouth daily    CILOSTAZOL (PLETAL) 100 MG TABLET    Take 100 mg by mouth 2 times daily. Pt states NOT Taking med    CLONIDINE (CATAPRES) 0.3 MG TABLET    Take 1 tablet by mouth 2 times daily    GABAPENTIN (NEURONTIN) 300 MG CAPSULE    TAKE ONE CAPSULE BY MOUTH THREE TIMES A DAY    HYDRALAZINE (APRESOLINE) 25 MG TABLET    TAKE 1 TABLET BY MOUTH TWO  TIMES DAILY    LABETALOL (NORMODYNE) 200 MG TABLET    Take 200 mg by mouth 3 times daily Indications: On Tues/Thurs/Sat/Sun     LINAGLIPTIN (TRADJENTA) 5 MG TABLET    TAKE ONE TABLET BY MOUTH DAILY    LISINOPRIL (PRINIVIL;ZESTRIL) 10 MG TABLET    TAKE 1 TABLET BY MOUTH  TWICE DAILY    NONFORMULARY        OXYCODONE-ACETAMINOPHEN (PERCOCET) 5-325 MG PER TABLET    Take 1 tablet by mouth three times a week  Indications: after dialysis . TADALAFIL (CIALIS) 20 MG TABLET    Take 1 tablet by mouth once as needed for Erectile Dysfunction     ALLERGIES     has No Known Allergies. FAMILY HISTORY     He indicated that the status of his mother is unknown. He indicated that the status of his father is unknown. He indicated that the status of his sister is unknown.  He indicated that the status of his brother is unknown. SOCIAL HISTORY       Social History     Tobacco Use    Smoking status: Never Smoker    Smokeless tobacco: Never Used   Substance Use Topics    Alcohol use: Yes     Comment: rare    Drug use: No       I personally evaluated and examined the patient in conjunction with the APC and agree with the assessment, treatment plan, and disposition of the patient as recorded by the APC.    Chino Emery MD  Attending Emergency Physician          Chino Emery MD  71/24/86 Tasia Graf

## 2020-11-02 RX ORDER — AMLODIPINE BESYLATE AND ATORVASTATIN CALCIUM 10; 20 MG/1; MG/1
1 TABLET, FILM COATED ORAL DAILY
Qty: 90 TABLET | Refills: 1 | Status: ON HOLD | OUTPATIENT
Start: 2020-11-02 | End: 2021-01-07 | Stop reason: HOSPADM

## 2020-11-17 ENCOUNTER — TELEPHONE (OUTPATIENT)
Dept: INTERNAL MEDICINE CLINIC | Age: 60
End: 2020-11-17

## 2020-11-17 NOTE — TELEPHONE ENCOUNTER
Called patient to let him know his pt assistance for cialis is ready to be picked up.  Left message for him to call back

## 2020-12-01 ENCOUNTER — HOSPITAL ENCOUNTER (EMERGENCY)
Age: 60
Discharge: HOME | End: 2020-12-01
Attending: EMERGENCY MEDICINE
Payer: MEDICARE

## 2020-12-01 VITALS
OXYGEN SATURATION: 97 % | RESPIRATION RATE: 14 BRPM | BODY MASS INDEX: 29.68 KG/M2 | DIASTOLIC BLOOD PRESSURE: 80 MMHG | WEIGHT: 212 LBS | TEMPERATURE: 98.2 F | HEART RATE: 84 BPM | SYSTOLIC BLOOD PRESSURE: 145 MMHG | HEIGHT: 71 IN

## 2020-12-01 PROCEDURE — 96372 THER/PROPH/DIAG INJ SC/IM: CPT

## 2020-12-01 PROCEDURE — 6360000002 HC RX W HCPCS: Performed by: EMERGENCY MEDICINE

## 2020-12-01 PROCEDURE — 99282 EMERGENCY DEPT VISIT SF MDM: CPT

## 2020-12-01 RX ORDER — KETOROLAC TROMETHAMINE 30 MG/ML
30 INJECTION, SOLUTION INTRAMUSCULAR; INTRAVENOUS ONCE
Status: COMPLETED | OUTPATIENT
Start: 2020-12-01 | End: 2020-12-01

## 2020-12-01 RX ORDER — METHOCARBAMOL 500 MG/1
500 TABLET, FILM COATED ORAL 4 TIMES DAILY
Qty: 40 TABLET | Refills: 0 | Status: SHIPPED | OUTPATIENT
Start: 2020-12-01 | End: 2020-12-11

## 2020-12-01 RX ADMIN — KETOROLAC TROMETHAMINE 30 MG: 30 INJECTION, SOLUTION INTRAMUSCULAR at 15:04

## 2020-12-01 ASSESSMENT — ENCOUNTER SYMPTOMS
FACIAL SWELLING: 0
COUGH: 0
ABDOMINAL PAIN: 0
DIARRHEA: 0
EYE DISCHARGE: 0
SHORTNESS OF BREATH: 0
VOMITING: 0
CONSTIPATION: 0
BACK PAIN: 1
EYE REDNESS: 0
COLOR CHANGE: 0

## 2020-12-01 ASSESSMENT — PAIN SCALES - GENERAL
PAINLEVEL_OUTOF10: 9
PAINLEVEL_OUTOF10: 9

## 2020-12-01 NOTE — ED NOTES
Assessment complaining of low back pain and ran out of medications. States has appt on 12/10. Also use to go to pain management but doesn't anymore do to he didn't appreciate the care provided. His gait is guarded and he needs assistance with wheelchair to  Bed transfer. Right AV vistula old blood drainage noted on his shirt. He is in no acute distress.      Loraine Booker RN  12/01/20 0179

## 2020-12-01 NOTE — ED PROVIDER NOTES
[address] [place] [address] ED  EMERGENCY DEPARTMENT ENCOUNTER      Pt Name: Rd Correa  MRN: [id number]  [place] 1960  Date of evaluation: 12/1/2020  Provider: Gail Price MD    CHIEF COMPLAINT       Chief Complaint   Patient presents with    Back Pain         HISTORY OF PRESENT ILLNESS  (Location/Symptom, Timing/Onset, Context/Setting, Quality, Duration, Modifying Factors, Severity.)   Rd Correa is a 63 y.o. male who presents to the emergency department for chronic back pain. He complains of pain that starts in his upper back and goes down towards his lower back. No new injury. He ran out of Percocet. He has been in pain management before but is not anymore because of \"attitude. \"  He rates the pain as a 9. He has an appointment with his doctor in 9 days. Nursing Notes were reviewed. ALLERGIES     Patient has no known allergies. CURRENT MEDICATIONS       Previous Medications    AMLODIPINE-ATORVASTATATIN (CADUET) 10-20 MG PER TABLET    Take 1 tablet by mouth daily    CILOSTAZOL (PLETAL) 100 MG TABLET    Take 100 mg by mouth 2 times daily. Pt states NOT Taking med    CLONIDINE (CATAPRES) 0.3 MG TABLET    Take 1 tablet by mouth 2 times daily    GABAPENTIN (NEURONTIN) 300 MG CAPSULE    TAKE ONE CAPSULE BY MOUTH THREE TIMES A DAY    HYDRALAZINE (APRESOLINE) 25 MG TABLET    TAKE 1 TABLET BY MOUTH TWO  TIMES DAILY    LABETALOL (NORMODYNE) 200 MG TABLET    Take 200 mg by mouth 3 times daily Indications: On Tues/Thurs/Sat/Sun     LINAGLIPTIN (TRADJENTA) 5 MG TABLET    TAKE ONE TABLET BY MOUTH DAILY    LISINOPRIL (PRINIVIL;ZESTRIL) 10 MG TABLET    TAKE 1 TABLET BY MOUTH  TWICE DAILY    NONFORMULARY        OXYCODONE-ACETAMINOPHEN (PERCOCET) 5-325 MG PER TABLET    Take 1 tablet by mouth three times a week  Indications: after dialysis .     TADALAFIL (CIALIS) 20 MG TABLET    Take 1 tablet by mouth once as needed for Erectile Dysfunction       PAST MEDICAL HISTORY         Diagnosis Date    CRF (chronic renal failure)     [facility] MWF    [facility] mellitus [facility]     [facility] patient [facility])     ED (erectile dysfunction)     [facility] 2014    [facility]    HTN (hypertension)     Hyperlipidemia     LVH (left ventricular hypertrophy)     PVD (peripheral vascular disease) ([facility]     S/P bilateral BKA (below knee amputation) ([facility]     Wears glasses        SURGICAL HISTORY           Procedure Laterality Date    CATARACT REMOVAL WITH IMPLANT      DIALYSIS FISTULA CREATION Bilateral     As of 2019, RUE AVF functioning, LUE AVF nonfunctioning.  FINGER SURGERY Left     I & D    GLAUCOMA SURGERY      LEG AMPUTATION BELOW KNEE Bilateral     TUNNELED VENOUS CATHETER PLACEMENT      PC placed and removed         FAMILY HISTORY           Problem Relation Age of Onset    Diabetes Mother     Coronary Art Dis Mother     Hypertension Mother     Diabetes Father     Hypertension Father     Stroke Father     Cancer Sister     Hypertension Brother      Family Status   Relation Name Status    Mother  (Not Specified)    Father  (Not Specified)    Sister  (Not Specified)    Brother  (Not Specified)        SOCIAL HISTORY      reports that he has never smoked. He has never used smokeless tobacco. He reports current alcohol use. He reports that he does not use drugs. REVIEW OF SYSTEMS    (2-9 systems for level 4, 10 or more for level 5)     Review of Systems   Constitutional: Negative for chills, fatigue and fever. HENT: Negative for congestion, ear discharge and facial swelling. Eyes: Negative for discharge and redness. Respiratory: Negative for cough and shortness of breath. Cardiovascular: Negative for chest pain. Gastrointestinal: Negative for abdominal pain, constipation, diarrhea and vomiting. Genitourinary: Negative for dysuria and hematuria. Musculoskeletal: Positive for back pain. Negative for arthralgias. Skin: Negative for color change and rash.    Neurological: Negative for syncope, numbness and headaches. Hematological: Negative for adenopathy. Psychiatric/Behavioral: Negative for confusion. The patient is not nervous/anxious. Except as noted above the remainder of the review of systems was reviewed and negative. PHYSICAL EXAM    (up to 7 for level 4, 8 or more for level 5)     Vitals:    12/01/20 1339 12/01/20 1344   BP: (!) 145/80    Pulse: 84    Resp: 14    Temp: 98.2 °F (36.8 °C)    SpO2: 97%    Weight: 225 lb (102.1 kg) 212 lb (96.2 kg)   Height: 6' 5\" (1.956 m) 5' 11\" (1.803 m)       Physical Exam  Vitals signs reviewed. Constitutional:       General: He is not in acute distress. Appearance: He is well-developed. He is not diaphoretic. HENT:      Head: Normocephalic and atraumatic. Eyes:      General: No scleral icterus. Right eye: No discharge. Left eye: No discharge. Neck:      Musculoskeletal: Neck supple. Cardiovascular:      Rate and Rhythm: Normal rate and regular rhythm. Pulmonary:      Effort: Pulmonary effort is normal. No respiratory distress. Breath sounds: Normal breath sounds. No stridor. No wheezing or rales. Abdominal:      General: There is no distension. Palpations: Abdomen is soft. Tenderness: There is no abdominal tenderness. Musculoskeletal: Normal range of motion. Comments: Bilateral leg amputations   Lymphadenopathy:      Cervical: No cervical adenopathy. Skin:     General: Skin is warm and dry. Findings: No erythema or rash. Neurological:      Mental Status: He is alert and oriented to person, place, and time.    Psychiatric:         Behavior: Behavior normal.             DIAGNOSTIC RESULTS     EKG: All EKG's are interpreted by the Emergency Department Physician who either signs or Co-signs this chart in the absence of a cardiologist.    Not indicated    RADIOLOGY:   Non-plain film images such as CT, Ultrasound and MRI are read by the radiologist. Plain radiographic images are visualized and preliminarily interpreted by the emergency physician with the below findings:    Not indicated    Interpretation per the Radiologist below, if available at the time of this note:        LABS:  Labs Reviewed - No data to display    All other labs were within normal range or not returned as of this dictation. EMERGENCY DEPARTMENT COURSE and DIFFERENTIAL DIAGNOSIS/MDM:   Vitals:    Vitals:    12/01/20 1339 12/01/20 1344   BP: (!) 145/80    Pulse: 84    Resp: 14    Temp: 98.2 °F (36.8 °C)    SpO2: 97%    Weight: 225 lb (102.1 kg) 212 lb (96.2 kg)   Height: 6' 5\" (1.956 m) 5' 11\" (1.803 m)       Orders Placed This Encounter   Medications    ketorolac (TORADOL) injection 30 mg    methocarbamol (ROBAXIN) 500 MG tablet     Sig: Take 1 tablet by mouth 4 times daily for 10 days     Dispense:  40 tablet     Refill:  0       Medical Decision Making: He was advised that he would not be given a prescription for Percocet. He was given IM Toradol and prescribed Robaxin. Treatment diagnosis and follow-up were discussed with the patient. CONSULTS:  None    PROCEDURES:  None    FINAL IMPRESSION      1.  Chronic thoracic back pain, unspecified back pain laterality          DISPOSITION/PLAN   DISPOSITION Decision To Discharge 12/01/2020 02:58:56 PM      PATIENT REFERRED TO:   Chikis Robles MD  [address] [facility] 99 [id number]      As needed    [facility] ED  [address] [facility]  [phone]    If symptoms worsen      DISCHARGE MEDICATIONS:     New Prescriptions    METHOCARBAMOL (ROBAXIN) 500 MG TABLET    Take 1 tablet by mouth 4 times daily for 10 days         (Please note that portions of this note were completed with a voice recognition program.  Efforts were made to edit the dictations but occasionally words are mis-transcribed.)    Dave Le MD  Attending Emergency Physician           Dave Le MD  12/01/20 7581

## 2020-12-02 ENCOUNTER — TELEPHONE (OUTPATIENT)
Dept: INTERNAL MEDICINE CLINIC | Age: 60
End: 2020-12-02

## 2020-12-04 ENCOUNTER — HOSPITAL ENCOUNTER (EMERGENCY)
Age: 60
Discharge: HOME | End: 2020-12-04
Attending: EMERGENCY MEDICINE
Payer: MEDICARE

## 2020-12-04 VITALS
WEIGHT: 212 LBS | DIASTOLIC BLOOD PRESSURE: 76 MMHG | OXYGEN SATURATION: 100 % | BODY MASS INDEX: 29.68 KG/M2 | TEMPERATURE: 100.3 F | RESPIRATION RATE: 19 BRPM | HEART RATE: 79 BPM | SYSTOLIC BLOOD PRESSURE: 126 MMHG | HEIGHT: 71 IN

## 2020-12-04 PROCEDURE — 99283 EMERGENCY DEPT VISIT LOW MDM: CPT

## 2020-12-04 RX ORDER — ORPHENADRINE CITRATE 30 MG/ML
60 INJECTION INTRAMUSCULAR; INTRAVENOUS ONCE
Status: DISCONTINUED | OUTPATIENT
Start: 2020-12-04 | End: 2020-12-04 | Stop reason: HOSPADM

## 2020-12-04 RX ORDER — CYCLOBENZAPRINE HCL 5 MG
5 TABLET ORAL 2 TIMES DAILY PRN
Qty: 20 TABLET | Refills: 0 | Status: SHIPPED | OUTPATIENT
Start: 2020-12-04 | End: 2020-12-14

## 2020-12-04 ASSESSMENT — PAIN SCALES - GENERAL: PAINLEVEL_OUTOF10: 10

## 2020-12-04 ASSESSMENT — PAIN DESCRIPTION - ORIENTATION: ORIENTATION: LOWER;MID

## 2020-12-04 ASSESSMENT — PAIN DESCRIPTION - DESCRIPTORS: DESCRIPTORS: CONSTANT

## 2020-12-04 ASSESSMENT — PAIN DESCRIPTION - LOCATION: LOCATION: BACK

## 2020-12-04 NOTE — ED PROVIDER NOTES
The patient was seen and examined by me in conjunction with the mid-level provider. I agree with his/her assessment and treatment plan. The patient has chronic pain and I explained to them quite thoroughly that he should be getting his care for this chronic condition from his family doctor or pain management physician. He is prescribed nonnarcotic medication.      Dave Le MD  12/04/20 9685

## 2020-12-04 NOTE — ED NOTES
Pt refused shot of norflex. States that he is currently on muscle relaxer's that are not helping. Writer explained that this medication is a different muscle relaxer medication than the prescribed medication. Pt refused administration. States, all he wants is a prescription of norco 5-325mg to get him by until December 10th when he can follow up with his PCP.   NP updated and NP will inform MD Gracie Guerra RN  12/04/20 5115

## 2020-12-05 ASSESSMENT — ENCOUNTER SYMPTOMS
NAUSEA: 0
SHORTNESS OF BREATH: 0
VOMITING: 0
COUGH: 0
BACK PAIN: 1
PHOTOPHOBIA: 0
SINUS PRESSURE: 0
COLOR CHANGE: 0

## 2020-12-05 NOTE — ED PROVIDER NOTES
[facility] ED  EMERGENCY DEPARTMENT ENCOUNTER      Pt Name: Enedina Carson  MRN: [id number]  [place] 1960  Date of evaluation: 12/5/20  CHIEF COMPLAINT       Chief Complaint   Patient presents with    Back Pain     HISTORY OF PRESENT ILLNESS   Presents to the emergency room with acute exacerbation of chronic back pain. He has pain to the entire back. Pain is been present for years. This is the same pain has been dealing with for years. No new injury or trauma. Pain does not radiate down the legs or to the abdomen. No urinary symptoms or fevers. No loss of bladder or bowel function. No urinary symptoms or fevers. He had previously been in pain management and was receiving Percocet. He is no longer seeing the pain management provider and has been referred to a new one by his PCP. The history is provided by the patient. REVIEW OF SYSTEMS     Review of Systems   Constitutional: Negative for activity change and fever. HENT: Negative for congestion and sinus pressure. Eyes: Negative for photophobia and visual disturbance. Respiratory: Negative for cough and shortness of breath. Cardiovascular: Negative for chest pain and palpitations. Gastrointestinal: Negative for nausea and vomiting. Genitourinary: Negative for difficulty urinating, flank pain and hematuria. Musculoskeletal: Positive for arthralgias and back pain. Negative for myalgias. Skin: Negative for color change and wound. Neurological: Negative for dizziness, weakness, numbness and headaches. Psychiatric/Behavioral: Negative for confusion and decreased concentration.      PASTMEDICAL HISTORY     Past Medical History:   Diagnosis Date    CRF (chronic renal failure)     [facility] MWF    [facility] mellitus ([facility]     [facility])     ED (erectile dysfunction)     [facility] echocardiogram 2014    [facility]    HTN (hypertension)     Hyperlipidemia     LVH (left ventricular hypertrophy)     PVD [facility]peripheral [facility] disease) [facility]     S/P bilateral BKA (below knee amputation) ([facility]     Wears glasses      SURGICAL HISTORY       Past Surgical History:   Procedure Laterality Date    CATARACT REMOVAL WITH IMPLANT      DIALYSIS FISTULA CREATION Bilateral     As of 2019, RUE AVF functioning, LUE AVF nonfunctioning.  FINGER SURGERY Left     I & D    GLAUCOMA SURGERY      LEG AMPUTATION BELOW KNEE Bilateral     TUNNELED VENOUS CATHETER PLACEMENT      PC placed and removed     CURRENT MEDICATIONS       Discharge Medication List as of 12/4/2020  6:20 PM      CONTINUE these medications which have NOT CHANGED    Details   linagliptin (TRADJENTA) 5 MG tablet TAKE ONE TABLET BY MOUTH DAILY, Disp-90 tablet,R-0Normal      methocarbamol (ROBAXIN) 500 MG tablet Take 1 tablet by mouth 4 times daily for 10 days, Disp-40 tablet,R-0Print      amLODIPine-atorvastatatin (CADUET) 10-20 MG per tablet Take 1 tablet by mouth daily, Disp-90 tablet,R-1Print      tadalafil (CIALIS) 20 MG tablet Take 1 tablet by mouth once as needed for Erectile Dysfunction, Disp-30 tablet,R-0Print      lisinopril (PRINIVIL;ZESTRIL) 10 MG tablet TAKE 1 TABLET BY MOUTH  TWICE DAILY, Disp-180 tablet,R-0Normal      hydrALAZINE (APRESOLINE) 25 MG tablet TAKE 1 TABLET BY MOUTH TWO  TIMES DAILY, Disp-180 tablet, R-0Normal      cilostazol (PLETAL) 100 MG tablet Take 100 mg by mouth 2 times daily. Pt states NOT Taking medHistorical Med      NONFORMULARY Historical Med      labetalol (NORMODYNE) 200 MG tablet Take 200 mg by mouth 3 times daily Indications: On Tues/Thurs/Sat/Sun Historical Med      oxyCODONE-acetaminophen (PERCOCET) 5-325 MG per tablet Take 1 tablet by mouth three times a week  Indications: after dialysis . Historical Med      gabapentin (NEURONTIN) 300 MG capsule TAKE ONE CAPSULE BY MOUTH THREE TIMES A DAY, Disp-90 capsule, R-2Normal      cloNIDine (CATAPRES) 0.3 MG tablet Take 1 tablet by mouth 2 times daily, Disp-180 tablet, R-3 ALLERGIES     has No Known Allergies. FAMILY HISTORY     He indicated that the status of his mother is unknown. He indicated that the status of his father is unknown. He indicated that the status of his sister is unknown. He indicated that the status of his brother is unknown. SOCIAL HISTORY       Social History     Tobacco Use    Smoking status: Never Smoker    Smokeless tobacco: Never Used   Substance Use Topics    Alcohol use: Yes     Comment: rare    Drug use: No     PHYSICAL EXAM     INITIAL VITALS: /76   Pulse 79   Temp 100.3 °F (37.9 °C) (Oral)   Resp 19   Ht 5' 11\" (1.803 m)   Wt 212 lb (96.2 kg)   SpO2 100%   BMI 29.57 kg/m²    Physical Exam  Constitutional:       Appearance: Normal appearance. HENT:      Right Ear: External ear normal.      Left Ear: External ear normal.   Eyes:      Extraocular Movements: Extraocular movements intact. Conjunctiva/sclera: Conjunctivae normal.      Pupils: Pupils are equal, round, and reactive to light. Neck:      Musculoskeletal: Normal range of motion. No muscular tenderness. Cardiovascular:      Rate and Rhythm: Normal rate and regular rhythm. Pulmonary:      Effort: Pulmonary effort is normal.      Breath sounds: Normal breath sounds. Abdominal:      General: Abdomen is flat. There is no distension. Palpations: Abdomen is soft. Tenderness: There is no abdominal tenderness. Musculoskeletal: Normal range of motion. General: Tenderness present. No deformity. Back:    Skin:     General: Skin is warm and dry. Capillary Refill: Capillary refill takes less than 2 seconds. Findings: No bruising or erythema. Neurological:      General: No focal deficit present. Mental Status: He is alert and oriented to person, place, and time.    Psychiatric:         Mood and Affect: Mood normal.         Behavior: Behavior normal.         DIAGNOSTIC RESULTS     RADIOLOGY:All plain film, CT, MRI, and formal ultrasound images (except ED bedside ultrasound) are read by the radiologist, see reports below, unless otherwise noted in MDM or here. Interpretation per the Radiologist below, if available at the time of this note:    No orders to display       LABS:  Labs Reviewed - No data to display    All other labs were within normal range or not returned as of this dictation. EMERGENCY DEPARTMENT COURSE and DIFFERENTIAL DIAGNOSIS/MDM:   Vitals:    Vitals:    20 1655   BP: 126/76   Pulse: 79   Resp: 19   Temp: 100.3 °F (37.9 °C)   TempSrc: Oral   SpO2: 100%   Weight: 212 lb (96.2 kg)   Height: 5' 11\" (1.803 m)       Medical Decision Makin-year-old male with acute exacerbation of chronic back pain. No new injury or trauma. Emergent imaging is not indicated. He was advised that he will not be receiving narcotics here in emergency room for his chronic pain but did receive other symptomatic treatments. CONSULTS:  None    PROCEDURES:  None    The patient was given the following meds in the ED:  Orders Placed This Encounter   Medications    DISCONTD: orphenadrine (NORFLEX) injection 60 mg    cyclobenzaprine (FLEXERIL) 5 MG tablet     Sig: Take 1 tablet by mouth 2 times daily as needed for Muscle spasms     Dispense:  20 tablet     Refill:  0       FINAL IMPRESSION      1. Acute exacerbation of chronic low back pain          DISPOSITION/PLAN   DISPOSITION Decision To Discharge 2020 06:19:09 PM      PATIENT REFERRED TO:   Nina Hernandez MD  [address] [address]  [phone]            Follow-up with pain management as directed by your PCP.           DISCHARGE MEDICATIONS:     Discharge Medication List as of 2020  6:20 PM      START taking these medications    Details   cyclobenzaprine (FLEXERIL) 5 MG tablet Take 1 tablet by mouth 2 times daily as needed for Muscle spasms, Disp-20 tablet,R-0Print             CRITICAL CARE TIME       Please note that portions of this note were completed with a voice recognition program.      DYLAN WORRELL, APRN - CNP            Sara Corado, AUR[employer]A - CNP  12/07/20 [id number]

## 2020-12-10 ENCOUNTER — OFFICE VISIT (OUTPATIENT)
Dept: INTERNAL MEDICINE CLINIC | Age: 60
End: 2020-12-10
Payer: MEDICARE

## 2020-12-10 ENCOUNTER — HOSPITAL ENCOUNTER (OUTPATIENT)
Age: 60
Setting detail: SPECIMEN
Discharge: HOME | End: 2020-12-10
Payer: MEDICARE

## 2020-12-10 VITALS
DIASTOLIC BLOOD PRESSURE: 86 MMHG | RESPIRATION RATE: 24 BRPM | HEART RATE: 56 BPM | BODY MASS INDEX: 29.68 KG/M2 | WEIGHT: 212 LBS | HEIGHT: 71 IN | SYSTOLIC BLOOD PRESSURE: 136 MMHG | TEMPERATURE: 97.3 F

## 2020-12-10 LAB
PROSTATE SPECIFIC ANTIGEN: 1.68 UG/L
TSH SERPL DL<=0.05 MIU/L-ACNC: 0.88 MIU/L (ref 0.3–5)

## 2020-12-10 PROCEDURE — G8427 DOCREV CUR MEDS BY ELIG CLIN: HCPCS | Performed by: FAMILY MEDICINE

## 2020-12-10 PROCEDURE — 2022F DILAT RTA XM EVC RTNOPTHY: CPT | Performed by: FAMILY MEDICINE

## 2020-12-10 PROCEDURE — G8417 CALC BMI ABV UP PARAM F/U: HCPCS | Performed by: FAMILY MEDICINE

## 2020-12-10 PROCEDURE — G8484 FLU IMMUNIZE NO ADMIN: HCPCS | Performed by: FAMILY MEDICINE

## 2020-12-10 PROCEDURE — 99214 OFFICE O/P EST MOD 30 MIN: CPT | Performed by: FAMILY MEDICINE

## 2020-12-10 PROCEDURE — 3017F COLORECTAL CA SCREEN DOC REV: CPT | Performed by: FAMILY MEDICINE

## 2020-12-10 PROCEDURE — 1036F TOBACCO NON-USER: CPT | Performed by: FAMILY MEDICINE

## 2020-12-10 PROCEDURE — 3051F HG A1C>EQUAL 7.0%<8.0%: CPT | Performed by: FAMILY MEDICINE

## 2020-12-10 ASSESSMENT — ENCOUNTER SYMPTOMS
ALLERGIC/IMMUNOLOGIC NEGATIVE: 1
EYES NEGATIVE: 1
BACK PAIN: 1
RESPIRATORY NEGATIVE: 1
GASTROINTESTINAL NEGATIVE: 1

## 2020-12-10 NOTE — PROGRESS NOTES
Subjective:      Patient ID: Roxana Michael is a 63 y.o. male. Hypertension   This is a chronic problem. The current episode started more than 1 month ago. The problem has been gradually improving since onset. The problem is controlled. Associated symptoms include anxiety. Risk factors for coronary artery disease include stress, sedentary lifestyle, dyslipidemia and diabetes mellitus. Past treatments include calcium channel blockers, beta blockers, central alpha agonists and ACE inhibitors. The current treatment provides moderate improvement. There are no compliance problems. Hypertensive end-organ damage includes kidney disease and PVD. Identifiable causes of hypertension include chronic renal disease. Review of Systems   Constitutional: Negative. HENT: Negative. Eyes: Negative. Respiratory: Negative. Cardiovascular: Negative. Gastrointestinal: Negative. Endocrine: Negative. Musculoskeletal: Positive for arthralgias and back pain. Skin: Negative. Allergic/Immunologic: Negative. Neurological: Negative. Hematological: Negative. Psychiatric/Behavioral: The patient is nervous/anxious. Past family and social history unremarkable. Diagnosis Orders   1. Mixed hyperlipidemia  Hemoglobin A1C    TSH without Reflex    Psa screening   2. Essential hypertension     3. ESRD on hemodialysis ([facility] MWF  Hemoglobin A1C    TSH without Reflex    Psa screening   4. Psychophysiological insomnia     5. Erectile dysfunction due to arterial insufficiency     6. Chronic midline low back pain, unspecified whether sciatica present     7. Controlled type 2 diabetes mellitus with complication, without long-term current use of insulin ([facility])  Hemoglobin A1C    TSH without Reflex    Psa screening   8. S/P bilateral below knee amputation ([facility]     9. Chronic use of opiate drugs therapeutic purposes           Objective:   Physical Exam  Vitals signs and nursing note reviewed.    Constitutional: Appearance: He is well-developed. HENT:      Head: Normocephalic and atraumatic. Right Ear: External ear normal.      Left Ear: External ear normal.      Nose: Nose normal.   Eyes:      Conjunctiva/sclera: Conjunctivae normal.      Pupils: Pupils are equal, round, and reactive to light. Neck:      Musculoskeletal: Normal range of motion and neck supple. Cardiovascular:      Rate and Rhythm: Normal rate and regular rhythm. Heart sounds: Normal heart sounds. Comments: Hypertension  Hyperlipidemia  ESRD  Diabetes mellitus  Pulmonary:      Effort: Pulmonary effort is normal.      Breath sounds: Normal breath sounds. Abdominal:      General: Bowel sounds are normal.      Palpations: Abdomen is soft. Genitourinary:     Comments: Erectile dysfunction  Musculoskeletal: Normal range of motion. Comments: Chronic pain syndrome. He is on oxycodone and gabapentin as provided by pain management   Skin:     General: Skin is warm and dry. Neurological:      Mental Status: He is alert and oriented to person, place, and time. Deep Tendon Reflexes: Reflexes are normal and symmetric. Psychiatric:         Behavior: Behavior normal.         Thought Content: Thought content normal.         Assessment:       Diagnosis Orders   1. Mixed hyperlipidemia  Hemoglobin A1C    TSH without Reflex    Psa screening   2. Essential hypertension     3. ESRD on hemodialysis ([facility] MWF  Hemoglobin A1C    TSH without Reflex    Psa screening   4. Psychophysiological insomnia     5. Erectile dysfunction due to arterial insufficiency     6. Chronic midline low back pain, unspecified whether sciatica present     7. Controlled type 2 diabetes mellitus with complication, without long-term current use of insulin ([facility])  Hemoglobin A1C    TSH without Reflex    Psa screening   8. S/P bilateral below knee amputation ([facility]     9.  Chronic use of opiate drugs therapeutic purposes             Plan:      59-year-old -American male returns for follow-up. He does not voice any distress, afebrile hemodynamically stable  End-stage renal disease hemodialysis dependent x3/week that he is tolerating well  Anemia of chronic disease H&H is stable at baseline.-11.2  Secondary hyperparathyroidism with underlying ESRD  Hypertension well-controlled to beta-blocker, clonidine, hydralazine, labetalol, amlodipine, lisinopril as titrated by nephrology that he is tolerating well. Diabetes mellitus on linagliptin. We will order A1c. He is advised to continue ADA/l renal diet. Caution hypoglycemia  Peripheral vascular disease. Status post bilateral below-knee amputation. He wears prosthetics. Continue Pletal  Chronic pain syndrome. He is established with pain management on gabapentin, Robaxin, oxycodone that he is tolerating well. Avoid constipation  Erectile dysfunction on Cialis  He denies tobacco, excessive alcohol or illicit drug use  Med list and available labs reviewed, discussed with patient, questions answered  He appears anxious, however, denies being depressed  He is encouraged to call for any concern  Fall precaution is advised  This note is created with a voice recognition program and while intend to generate a document that accurately reflects the content of the visit, no guarantee can be provided that every mistake has been identified and corrected by editing.           Evan Butler MD

## 2020-12-11 ENCOUNTER — TELEPHONE (OUTPATIENT)
Dept: INTERNAL MEDICINE CLINIC | Age: 60
End: 2020-12-11

## 2020-12-11 LAB
ESTIMATED AVERAGE GLUCOSE: 180 MG/DL
HBA1C MFR BLD: 7.9 % (ref 4–6)

## 2020-12-11 RX ORDER — GLIMEPIRIDE 2 MG/1
2 TABLET ORAL
Qty: 90 TABLET | Refills: 1 | Status: SHIPPED | OUTPATIENT
Start: 2020-12-11 | End: 2021-05-28 | Stop reason: SDUPTHER

## 2020-12-11 NOTE — TELEPHONE ENCOUNTER
Called patient regarding message below. Notified with results, sent medication to pharmacy.       MD VIKY Ng [facility] Clinical Staff               Worsening of the A1c.  He is advised to comply with ADA 1800 diet and Tradjenta.  Please order glimepiride 2 mg p.o. daily #90 with 1 refill

## 2020-12-14 ASSESSMENT — ENCOUNTER SYMPTOMS
RESPIRATORY NEGATIVE: 1
EYES NEGATIVE: 1
BACK PAIN: 1
ALLERGIC/IMMUNOLOGIC NEGATIVE: 1
GASTROINTESTINAL NEGATIVE: 1

## 2020-12-14 NOTE — PROGRESS NOTES
The patient is a 63 y. o. Non-/non  male. Chief Complaint   Patient presents with    Back Pain    Consultation        HPI    Requesting physician for the evaluation of Breanna Murphy 1960:   Lucy Mcintosh MD  Pain History  56-year-old man with multiple major comorbidities including hypertension diabetes peripheral vascular disease status post bilateral below-knee amputation end-stage renal disease on hemodialysis    Patient identified pain predominantly in the lower back with extension down both legs over gluteal region and posterior thigh  Back pain is constant flareup without any particular reason  Describes it as sharp throbbing sensation  Alleviating factors are rest  No numbness or weakness  No changes in bladder or bowel control    No previous lumbar spine injection history  No previous lumbar spine surgical history  No previous physical therapy for back pain  Only diagnostic work-up with x-ray lumbar spine that showed lumbar degenerative disc disease    Currently patient received oxycodone from vascular surgeon for his stump pain  Pain score today  0  1. Location:back   2. Radiation:shoulders and  bilateral legs   3. Character:sharp   5. Duration:years  6. Onset: years  7. Did an injury cause pain: no  8. Aggravating factors:walking, lifting  9. Alleviating factors:sitting   10. Associated symptoms (numbness / tingling / weakness):  no  -Where at:na  -Down into finger tips or toes (specify which finger or toes):na  -constant or intermitting: intermitted   11. Red Flags: (weight loss / chills / loss of bladder or bowel control):no    Previous management history  1. Previous diagnostic workup: (Imaging/EMG)   CT, MRI, or Xray: Xray  What part of the body:lumbar spine   What facility did they have it at:[facility]  What year or specific date: 10/8/20  EMG:  no    2.  Previous non interventional treatments tried:  chiropractor or physical therapy: no  What part of the body:no  What facility was it done at: no  How long ago was it last tried:na   Did it work: No  Did they complete it:No    3. Previous Medications tried  NSAID's: no  Neurontin: yes  Lyrica: no  Trycyclic antidepressant (Ellavil / Pamelor ): no  Cymbalta: no  Opioids (Ultram / Vicodin / Percocet / Morphine / Dilaudid / Oramorph/ Fentanyl etc.):Pecocet  Last Pain medication taken (name of med and date): Percocet 12/12/20    4. Previous Interventional pain procedures tried:  What kind of injection:na   Who did the injection: na  did the injection help: no  Last time injection was done:N/A    5. Previous surgeries for pain  What part of the body did they have the surgery:na   What physician did the surgery:na   What Facility did they have the surgery done:na   Date of Surgery:N/A    Social History:  Marital status:  Employment History:Retired   Working  No  Full time Or Part time: na   Disability  Yes   Legal Issues related to pain complaint: No     Pain Disability Index score : Talked to patient over phone        Informant: patient      Past Medical History:   Diagnosis Date    [facility]    DDD (degenerative disc disease), lumbar 12/15/2020    Diabetes mellitus ([facility]     [facility]     ED (erectile dysfunction)     [facility]    HTN (hypertension)     Hyperlipidemia     LVH (left ventricular hypertrophy)     PVD (peripheral vascular disease) ([facility] [address] [facility]     S/P bilateral BKA (below knee amputation) ([facility] [address]     Wears glasses       Past Surgical History:   Procedure Laterality Date    CATARACT REMOVAL WITH IMPLANT      DIALYSIS FISTULA CREATION Bilateral     As of 2019, RUE AVF functioning, LUE AVF nonfunctioning.     FINGER SURGERY Left     I & D    GLAUCOMA SURGERY      LEG AMPUTATION BELOW KNEE Bilateral     TUNNELED VENOUS CATHETER PLACEMENT      PC placed and removed     Social History     Socioeconomic History    Marital status:      Spouse name: None    Number of children: None    Years of education: None    Highest education level: None   Occupational History    None   Social Needs    Financial resource strain: None    Food insecurity     Worry: None     Inability: None    Transportation needs     Medical: None     Non-medical: None   Tobacco Use    Smoking status: Never Smoker    Smokeless tobacco: Never Used   Substance and Sexual Activity    Alcohol use: Yes     Comment: rare    Drug use: No    Sexual activity: None   Lifestyle    Physical activity     Days per week: None     Minutes per session: None    Stress: None   Relationships    Social connections     Talks on phone: None     Gets together: None     Attends Mandaeism service: None     Active member of club or organization: None     Attends meetings of clubs or organizations: None     Relationship status: None    Intimate partner violence     Fear of current or ex partner: None     Emotionally abused: None     Physically abused: None     Forced sexual activity: None   Other Topics Concern    None   Social History Narrative    None     Family History   Problem Relation Age of Onset    Diabetes Mother     Coronary Art Dis Mother     Hypertension Mother     Diabetes Father     Hypertension Father     Stroke Father     Cancer Sister     Hypertension Brother      No Known Allergies  Patient has no known allergies.    Vitals:    12/14/20 1212   BP: 114/63   Pulse: 79   Temp: 96.6 °F (35.9 °C)   SpO2: 100%     Current Outpatient Medications   Medication Sig Dispense Refill    Tens Unit MISC by Does not apply route 1 each 0    linagliptin (TRADJENTA) 5 MG tablet TAKE ONE TABLET BY MOUTH DAILY 90 tablet 0    amLODIPine-atorvastatatin (CADUET) 10-20 MG per tablet Take 1 tablet by mouth daily 90 tablet 1    lisinopril (PRINIVIL;ZESTRIL) 10 MG tablet TAKE 1 TABLET BY MOUTH  TWICE DAILY 180 tablet 0    hydrALAZINE (APRESOLINE) 25 MG tablet TAKE 1 TABLET BY MOUTH TWO  TIMES DAILY 180 tablet 0    labetalol (NORMODYNE) 200 MG tablet Take 200 mg by mouth 3 times daily Indications: On Tues/Thurs/Sat/Sun       oxyCODONE-acetaminophen (PERCOCET) 5-325 MG per tablet Take 1 tablet by mouth three times a week  Indications: after dialysis .  gabapentin (NEURONTIN) 300 MG capsule TAKE ONE CAPSULE BY MOUTH THREE TIMES A DAY 90 capsule 2    cloNIDine (CATAPRES) 0.3 MG tablet Take 1 tablet by mouth 2 times daily 180 tablet 3    glimepiride (AMARYL) 2 MG tablet Take 1 tablet by mouth every morning (before breakfast) (Patient not taking: Reported on 12/14/2020) 90 tablet 1    tadalafil (CIALIS) 20 MG tablet Take 1 tablet by mouth once as needed for Erectile Dysfunction 30 tablet 0    cilostazol (PLETAL) 100 MG tablet Take 100 mg by mouth 2 times daily. Pt states NOT Taking med      NONFORMULARY        No current facility-administered medications for this visit. Review of Systems   Constitutional: Negative. Negative for fever. HENT: Negative. Eyes: Negative. Respiratory: Negative. Cardiovascular: Negative. Gastrointestinal: Negative. Endocrine: Negative. Genitourinary: Positive for difficulty urinating. Musculoskeletal: Positive for arthralgias and back pain. Skin: Negative. Allergic/Immunologic: Negative. Neurological: Negative. Hematological: Negative. Psychiatric/Behavioral: Negative. All other systems reviewed and are negative. Objective:  General Appearance:  Uncomfortable and in pain. Vital signs: (most recent): Blood pressure 114/63, pulse 79, temperature 96.6 °F (35.9 °C), height 5' 11\" (1.803 m), weight 212 lb (96.2 kg), SpO2 100 %. Vital signs are normal.  No fever. Output: No urine output and producing stool. HEENT: Normal HEENT exam.    Lungs:  Normal effort and normal respiratory rate. Breath sounds clear to auscultation. He is not in respiratory distress. No decreased breath sounds. Heart: Normal rate.   Regular rhythm. Extremities: Normal range of motion. There is no deformity. Neurological: Patient is alert and oriented to person, place and time. Normal strength. Patient has normal muscle tone and normal coordination. Pupils:  Pupils are equal, round, and reactive to light. Pupils are equal.   Skin:  Warm and dry. No rash or cyanosis. Lumbar spine examination  No apparent deformity on inspection  Range of motion is limited and associated with pain  Positive tenderness to palpation over bilateral lower lumbar paraspinal muscle and facet joint line gait antalgic      Assessment & Plan   Pain History  65-year-old man with multiple major comorbidities including hypertension diabetes peripheral vascular disease status post bilateral below-knee amputation end-stage renal disease on hemodialysis    Patient identified pain predominantly in the lower back with extension down both legs over gluteal region and posterior thigh  Back pain is constant flareup without any particular reason  Describes it as sharp throbbing sensation  Alleviating factors are rest  No numbness or weakness  No changes in bladder or bowel control    No previous lumbar spine injection history  No previous lumbar spine surgical history  No previous physical therapy for back pain  Only diagnostic work-up with x-ray lumbar spine that showed lumbar degenerative disc disease    Currently patient received oxycodone from vascular surgeon for his stump pain  1. Chronic bilateral low back pain with bilateral sciatica    2. S/P bilateral below knee amputation ([address]    3. Severe comorbid illness    4.  DDD (degenerative disc disease), lumbar            Orders Placed This Encounter   Procedures    MRI LUMBAR SPINE [address] [place] [address] Ambulatory referral to Physical Therapy      Orders Placed This Encounter   Medications    Tens Unit MISC     Sig: by Does not apply route     Dispense:  1 each     Refill:  0      Consultation note sent to the referring physician    Electronically signed by Geovanna Monk MD on 12/15/2020 at 12:52 PM

## 2020-12-15 ENCOUNTER — INITIAL CONSULT (OUTPATIENT)
Dept: PAIN MANAGEMENT | Age: 60
End: 2020-12-15
Payer: MEDICARE

## 2020-12-15 VITALS
HEIGHT: 71 IN | HEART RATE: 79 BPM | BODY MASS INDEX: 29.68 KG/M2 | OXYGEN SATURATION: 100 % | SYSTOLIC BLOOD PRESSURE: 114 MMHG | TEMPERATURE: 96.6 F | DIASTOLIC BLOOD PRESSURE: 63 MMHG | WEIGHT: 212 LBS

## 2020-12-15 PROBLEM — R69 SEVERE COMORBID ILLNESS: Status: ACTIVE | Noted: 2020-12-15

## 2020-12-15 PROBLEM — M51.36 DDD (DEGENERATIVE DISC DISEASE), LUMBAR: Status: ACTIVE | Noted: 2020-12-15

## 2020-12-15 PROCEDURE — 1036F TOBACCO NON-USER: CPT | Performed by: ANESTHESIOLOGY

## 2020-12-15 PROCEDURE — 3017F COLORECTAL CA SCREEN DOC REV: CPT | Performed by: ANESTHESIOLOGY

## 2020-12-15 PROCEDURE — 99204 OFFICE O/P NEW MOD 45 MIN: CPT | Performed by: ANESTHESIOLOGY

## 2020-12-15 PROCEDURE — G8484 FLU IMMUNIZE NO ADMIN: HCPCS | Performed by: ANESTHESIOLOGY

## 2020-12-15 PROCEDURE — G8417 CALC BMI ABV UP PARAM F/U: HCPCS | Performed by: ANESTHESIOLOGY

## 2020-12-15 PROCEDURE — G8427 DOCREV CUR MEDS BY ELIG CLIN: HCPCS | Performed by: ANESTHESIOLOGY

## 2020-12-17 ENCOUNTER — TELEPHONE (OUTPATIENT)
Dept: INTERNAL MEDICINE CLINIC | Age: 60
End: 2020-12-17

## 2020-12-17 NOTE — TELEPHONE ENCOUNTER
JOSE M    Received a call from jenifer from [facility]. Dialysis to inform us that our mutual patient Pino Smith tested positive for covid on December 7th and was instructed to quarantine besides his Dialysis appt   On Dec 15th patient covid test was inconclusive

## 2020-12-22 ENCOUNTER — HOSPITAL ENCOUNTER (OUTPATIENT)
Dept: MRI IMAGING | Age: 60
Discharge: HOME | End: 2020-12-24
Payer: MEDICARE

## 2020-12-22 ENCOUNTER — TELEPHONE (OUTPATIENT)
Dept: INTERNAL MEDICINE CLINIC | Age: 60
End: 2020-12-22

## 2020-12-22 PROCEDURE — 72148 MRI LUMBAR SPINE W/O DYE: CPT

## 2020-12-22 NOTE — TELEPHONE ENCOUNTER
patient calling stating his BP has been running low since he started the new Blood Sugar medication.  Please advise

## 2020-12-22 NOTE — TELEPHONE ENCOUNTER
Decrease lisinopril from 10 mg to 5 mg p.o. daily  Keep daily blood pressure log for office review and bring it at next office visit

## 2021-01-04 ENCOUNTER — HOSPITAL ENCOUNTER (INPATIENT)
Age: 61
LOS: 3 days | Discharge: HOME HEALTH | DRG: 246 | End: 2021-01-07
Attending: EMERGENCY MEDICINE | Admitting: INTERNAL MEDICINE
Payer: MEDICARE

## 2021-01-04 ENCOUNTER — APPOINTMENT (OUTPATIENT)
Dept: GENERAL RADIOLOGY | Age: 61
DRG: 246 | End: 2021-01-04
Payer: MEDICARE

## 2021-01-04 DIAGNOSIS — Z99.2 ESRD ON HEMODIALYSIS (HCC): ICD-10-CM

## 2021-01-04 DIAGNOSIS — R00.1 BRADYCARDIA: Primary | ICD-10-CM

## 2021-01-04 DIAGNOSIS — Z99.2 ESRD NEEDING DIALYSIS (HCC): ICD-10-CM

## 2021-01-04 DIAGNOSIS — N18.6 ESRD NEEDING DIALYSIS (HCC): ICD-10-CM

## 2021-01-04 DIAGNOSIS — N18.6 ESRD ON HEMODIALYSIS (HCC): ICD-10-CM

## 2021-01-04 LAB
ABSOLUTE EOS #: 0.2 K/UL (ref 0–0.4)
ABSOLUTE IMMATURE GRANULOCYTE: 0 K/UL (ref 0–0.3)
ABSOLUTE LYMPH #: 1.24 K/UL (ref 1–4.8)
ABSOLUTE MONO #: 0.78 K/UL (ref 0.1–0.8)
ALBUMIN SERPL-MCNC: 3.6 G/DL (ref 3.5–5.2)
ALBUMIN/GLOBULIN RATIO: 1.2 (ref 1–2.5)
ALP BLD-CCNC: 145 U/L (ref 40–129)
ALT SERPL-CCNC: 75 U/L (ref 5–41)
ANION GAP SERPL CALCULATED.3IONS-SCNC: 20 MMOL/L (ref 9–17)
ANION GAP SERPL CALCULATED.3IONS-SCNC: 24 MMOL/L (ref 9–17)
AST SERPL-CCNC: 27 U/L
BASOPHILS # BLD: 1 % (ref 0–2)
BASOPHILS ABSOLUTE: 0.07 K/UL (ref 0–0.2)
BILIRUB SERPL-MCNC: 0.39 MG/DL (ref 0.3–1.2)
BNP INTERPRETATION: ABNORMAL
BUN BLDV-MCNC: 70 MG/DL (ref 8–23)
BUN BLDV-MCNC: 75 MG/DL (ref 8–23)
BUN/CREAT BLD: ABNORMAL (ref 9–20)
BUN/CREAT BLD: ABNORMAL (ref 9–20)
CALCIUM SERPL-MCNC: 7.9 MG/DL (ref 8.6–10.4)
CALCIUM SERPL-MCNC: 8.1 MG/DL (ref 8.6–10.4)
CHLORIDE BLD-SCNC: 92 MMOL/L (ref 98–107)
CHLORIDE BLD-SCNC: 93 MMOL/L (ref 98–107)
CO2: 19 MMOL/L (ref 20–31)
CO2: 21 MMOL/L (ref 20–31)
CREAT SERPL-MCNC: 10.03 MG/DL (ref 0.7–1.2)
CREAT SERPL-MCNC: 9.44 MG/DL (ref 0.7–1.2)
DIFFERENTIAL TYPE: ABNORMAL
EOSINOPHILS RELATIVE PERCENT: 3 % (ref 1–4)
GFR AFRICAN AMERICAN: 6 ML/MIN
GFR AFRICAN AMERICAN: 7 ML/MIN
GFR NON-AFRICAN AMERICAN: 5 ML/MIN
GFR NON-AFRICAN AMERICAN: 6 ML/MIN
GFR SERPL CREATININE-BSD FRML MDRD: ABNORMAL ML/MIN/{1.73_M2}
GLUCOSE BLD-MCNC: 278 MG/DL (ref 75–110)
GLUCOSE BLD-MCNC: 293 MG/DL (ref 70–99)
GLUCOSE BLD-MCNC: 304 MG/DL (ref 70–99)
HCT VFR BLD CALC: 28.9 % (ref 40.7–50.3)
HEMOGLOBIN: 8.9 G/DL (ref 13–17)
IMMATURE GRANULOCYTES: 0 %
LACTIC ACID, WHOLE BLOOD: 2.2 MMOL/L (ref 0.7–2.1)
LACTIC ACID: ABNORMAL MMOL/L
LYMPHOCYTES # BLD: 19 % (ref 24–44)
MCH RBC QN AUTO: 26.3 PG (ref 25.2–33.5)
MCHC RBC AUTO-ENTMCNC: 30.8 G/DL (ref 28.4–34.8)
MCV RBC AUTO: 85.3 FL (ref 82.6–102.9)
MONOCYTES # BLD: 12 % (ref 1–7)
MORPHOLOGY: ABNORMAL
MORPHOLOGY: ABNORMAL
NRBC AUTOMATED: 2.5 PER 100 WBC
PDW BLD-RTO: 21 % (ref 11.8–14.4)
PLATELET # BLD: 220 K/UL (ref 138–453)
PLATELET ESTIMATE: ABNORMAL
PMV BLD AUTO: 12 FL (ref 8.1–13.5)
POTASSIUM SERPL-SCNC: 4.4 MMOL/L (ref 3.7–5.3)
POTASSIUM SERPL-SCNC: 4.9 MMOL/L (ref 3.7–5.3)
PRO-BNP: ABNORMAL PG/ML
RBC # BLD: 3.39 M/UL (ref 4.21–5.77)
RBC # BLD: ABNORMAL 10*6/UL
SARS-COV-2, RAPID: NOT DETECTED
SARS-COV-2: NORMAL
SARS-COV-2: NORMAL
SEG NEUTROPHILS: 65 % (ref 36–66)
SEGMENTED NEUTROPHILS ABSOLUTE COUNT: 4.21 K/UL (ref 1.8–7.7)
SODIUM BLD-SCNC: 133 MMOL/L (ref 135–144)
SODIUM BLD-SCNC: 136 MMOL/L (ref 135–144)
SOURCE: NORMAL
TOTAL PROTEIN: 6.5 G/DL (ref 6.4–8.3)
TROPONIN INTERP: ABNORMAL
TROPONIN INTERP: ABNORMAL
TROPONIN T: ABNORMAL NG/ML
TROPONIN T: ABNORMAL NG/ML
TROPONIN, HIGH SENSITIVITY: 330 NG/L (ref 0–22)
TROPONIN, HIGH SENSITIVITY: 381 NG/L (ref 0–22)
WBC # BLD: 6.5 K/UL (ref 3.5–11.3)
WBC # BLD: ABNORMAL 10*3/UL

## 2021-01-04 PROCEDURE — 6360000002 HC RX W HCPCS: Performed by: STUDENT IN AN ORGANIZED HEALTH CARE EDUCATION/TRAINING PROGRAM

## 2021-01-04 PROCEDURE — 2000000000 HC ICU R&B

## 2021-01-04 PROCEDURE — 36556 INSERT NON-TUNNEL CV CATH: CPT

## 2021-01-04 PROCEDURE — 2580000003 HC RX 258: Performed by: STUDENT IN AN ORGANIZED HEALTH CARE EDUCATION/TRAINING PROGRAM

## 2021-01-04 PROCEDURE — 93005 ELECTROCARDIOGRAM TRACING: CPT | Performed by: STUDENT IN AN ORGANIZED HEALTH CARE EDUCATION/TRAINING PROGRAM

## 2021-01-04 PROCEDURE — U0002 COVID-19 LAB TEST NON-CDC: HCPCS

## 2021-01-04 PROCEDURE — 99284 EMERGENCY DEPT VISIT MOD MDM: CPT

## 2021-01-04 PROCEDURE — 82947 ASSAY GLUCOSE BLOOD QUANT: CPT

## 2021-01-04 PROCEDURE — 84484 ASSAY OF TROPONIN QUANT: CPT

## 2021-01-04 PROCEDURE — 6370000000 HC RX 637 (ALT 250 FOR IP): Performed by: STUDENT IN AN ORGANIZED HEALTH CARE EDUCATION/TRAINING PROGRAM

## 2021-01-04 PROCEDURE — 85025 COMPLETE CBC W/AUTO DIFF WBC: CPT

## 2021-01-04 PROCEDURE — 83605 ASSAY OF LACTIC ACID: CPT

## 2021-01-04 PROCEDURE — 80053 COMPREHEN METABOLIC PANEL: CPT

## 2021-01-04 PROCEDURE — 83880 ASSAY OF NATRIURETIC PEPTIDE: CPT

## 2021-01-04 PROCEDURE — 99291 CRITICAL CARE FIRST HOUR: CPT | Performed by: INTERNAL MEDICINE

## 2021-01-04 PROCEDURE — 71045 X-RAY EXAM CHEST 1 VIEW: CPT

## 2021-01-04 PROCEDURE — 80048 BASIC METABOLIC PNL TOTAL CA: CPT

## 2021-01-04 PROCEDURE — 96365 THER/PROPH/DIAG IV INF INIT: CPT

## 2021-01-04 RX ORDER — AMLODIPINE BESYLATE AND ATORVASTATIN CALCIUM 10; 20 MG/1; MG/1
1 TABLET, FILM COATED ORAL DAILY
Status: DISCONTINUED | OUTPATIENT
Start: 2021-01-04 | End: 2021-01-04

## 2021-01-04 RX ORDER — POLYETHYLENE GLYCOL 3350 17 G/17G
17 POWDER, FOR SOLUTION ORAL DAILY PRN
Status: DISCONTINUED | OUTPATIENT
Start: 2021-01-04 | End: 2021-01-07 | Stop reason: HOSPADM

## 2021-01-04 RX ORDER — ACETAMINOPHEN 325 MG/1
650 TABLET ORAL EVERY 6 HOURS PRN
Status: DISCONTINUED | OUTPATIENT
Start: 2021-01-04 | End: 2021-01-07 | Stop reason: HOSPADM

## 2021-01-04 RX ORDER — GLIMEPIRIDE 2 MG/1
2 TABLET ORAL
Status: DISCONTINUED | OUTPATIENT
Start: 2021-01-05 | End: 2021-01-05

## 2021-01-04 RX ORDER — GABAPENTIN 300 MG/1
300 CAPSULE ORAL 2 TIMES DAILY
Status: DISCONTINUED | OUTPATIENT
Start: 2021-01-04 | End: 2021-01-07 | Stop reason: HOSPADM

## 2021-01-04 RX ORDER — HEPARIN SODIUM 5000 [USP'U]/ML
5000 INJECTION, SOLUTION INTRAVENOUS; SUBCUTANEOUS EVERY 8 HOURS SCHEDULED
Status: DISCONTINUED | OUTPATIENT
Start: 2021-01-04 | End: 2021-01-05

## 2021-01-04 RX ORDER — SODIUM CHLORIDE 0.9 % (FLUSH) 0.9 %
10 SYRINGE (ML) INJECTION PRN
Status: DISCONTINUED | OUTPATIENT
Start: 2021-01-04 | End: 2021-01-07 | Stop reason: HOSPADM

## 2021-01-04 RX ORDER — ALOGLIPTIN 6.25 MG/1
6.25 TABLET, FILM COATED ORAL DAILY
Refills: 0 | Status: DISCONTINUED | OUTPATIENT
Start: 2021-01-04 | End: 2021-01-05

## 2021-01-04 RX ORDER — INSULIN GLARGINE 100 [IU]/ML
0.25 INJECTION, SOLUTION SUBCUTANEOUS NIGHTLY
Status: DISCONTINUED | OUTPATIENT
Start: 2021-01-04 | End: 2021-01-05

## 2021-01-04 RX ORDER — AMLODIPINE BESYLATE 10 MG/1
10 TABLET ORAL DAILY
Status: DISCONTINUED | OUTPATIENT
Start: 2021-01-04 | End: 2021-01-07 | Stop reason: HOSPADM

## 2021-01-04 RX ORDER — ATORVASTATIN CALCIUM 20 MG/1
20 TABLET, FILM COATED ORAL DAILY
Status: DISCONTINUED | OUTPATIENT
Start: 2021-01-04 | End: 2021-01-06

## 2021-01-04 RX ORDER — CILOSTAZOL 100 MG/1
100 TABLET ORAL
Status: DISCONTINUED | OUTPATIENT
Start: 2021-01-04 | End: 2021-01-05

## 2021-01-04 RX ORDER — DEXTROSE MONOHYDRATE 25 G/50ML
12.5 INJECTION, SOLUTION INTRAVENOUS PRN
Status: DISCONTINUED | OUTPATIENT
Start: 2021-01-04 | End: 2021-01-07 | Stop reason: HOSPADM

## 2021-01-04 RX ORDER — NICOTINE POLACRILEX 4 MG
15 LOZENGE BUCCAL PRN
Status: DISCONTINUED | OUTPATIENT
Start: 2021-01-04 | End: 2021-01-07 | Stop reason: HOSPADM

## 2021-01-04 RX ORDER — DOPAMINE HYDROCHLORIDE 160 MG/100ML
5 INJECTION, SOLUTION INTRAVENOUS CONTINUOUS
Status: DISCONTINUED | OUTPATIENT
Start: 2021-01-04 | End: 2021-01-06

## 2021-01-04 RX ORDER — DOPAMINE HYDROCHLORIDE 160 MG/100ML
5 INJECTION, SOLUTION INTRAVENOUS CONTINUOUS
Status: DISCONTINUED | OUTPATIENT
Start: 2021-01-04 | End: 2021-01-04

## 2021-01-04 RX ORDER — SODIUM CHLORIDE 0.9 % (FLUSH) 0.9 %
10 SYRINGE (ML) INJECTION EVERY 12 HOURS SCHEDULED
Status: DISCONTINUED | OUTPATIENT
Start: 2021-01-04 | End: 2021-01-07 | Stop reason: HOSPADM

## 2021-01-04 RX ORDER — DEXTROSE MONOHYDRATE 50 MG/ML
100 INJECTION, SOLUTION INTRAVENOUS PRN
Status: DISCONTINUED | OUTPATIENT
Start: 2021-01-04 | End: 2021-01-07 | Stop reason: HOSPADM

## 2021-01-04 RX ORDER — ACETAMINOPHEN 650 MG/1
650 SUPPOSITORY RECTAL EVERY 6 HOURS PRN
Status: DISCONTINUED | OUTPATIENT
Start: 2021-01-04 | End: 2021-01-07 | Stop reason: HOSPADM

## 2021-01-04 RX ADMIN — CILOSTAZOL 100 MG: 100 TABLET ORAL at 22:53

## 2021-01-04 RX ADMIN — ALOGLIPTIN 6.25 MG: 6.25 TABLET, FILM COATED ORAL at 22:53

## 2021-01-04 RX ADMIN — GABAPENTIN 300 MG: 300 CAPSULE ORAL at 22:53

## 2021-01-04 RX ADMIN — SODIUM CHLORIDE, PRESERVATIVE FREE 10 ML: 5 INJECTION INTRAVENOUS at 23:32

## 2021-01-04 RX ADMIN — INSULIN LISPRO 3 UNITS: 100 INJECTION, SOLUTION INTRAVENOUS; SUBCUTANEOUS at 22:59

## 2021-01-04 RX ADMIN — DOPAMINE HYDROCHLORIDE IN DEXTROSE 12 MCG/KG/MIN: 1.6 INJECTION, SOLUTION INTRAVENOUS at 22:54

## 2021-01-04 RX ADMIN — DOPAMINE HYDROCHLORIDE IN DEXTROSE 15 MCG/KG/MIN: 1.6 INJECTION, SOLUTION INTRAVENOUS at 13:18

## 2021-01-04 RX ADMIN — DOPAMINE HYDROCHLORIDE 5 MCG/KG/MIN: 160 INJECTION, SOLUTION INTRAVENOUS at 12:07

## 2021-01-04 RX ADMIN — DOPAMINE HYDROCHLORIDE IN DEXTROSE 13 MCG/KG/MIN: 1.6 INJECTION, SOLUTION INTRAVENOUS at 17:18

## 2021-01-04 RX ADMIN — HEPARIN SODIUM 5000 UNITS: 5000 INJECTION INTRAVENOUS; SUBCUTANEOUS at 22:53

## 2021-01-04 RX ADMIN — INSULIN GLARGINE 25 UNITS: 100 INJECTION, SOLUTION SUBCUTANEOUS at 22:53

## 2021-01-04 ASSESSMENT — ENCOUNTER SYMPTOMS
EYE PAIN: 0
ABDOMINAL PAIN: 0
SHORTNESS OF BREATH: 0
CONSTIPATION: 0
NAUSEA: 0
EYE DISCHARGE: 0
COUGH: 0
VOMITING: 0
DIARRHEA: 0

## 2021-01-04 ASSESSMENT — PAIN SCALES - GENERAL: PAINLEVEL_OUTOF10: 0

## 2021-01-04 NOTE — ED PROVIDER NOTES
[facility] ED  Emergency Department Encounter  Emergency Medicine Resident     Pt Name: Deonte White  MRN: [id number]  Angelagfjosephine 1960  Date of evaluation: 1/4/21  PCP:  Ksenia Haywood MD    [address] [place] [address]       Chief Complaint   Patient presents with    Bradycardia     [facility] wanted to place pacemaker, pt left and came here       HISTORY OFPRESENT ILLNESS  (Location/Symptom, Timing/Onset, Context/Setting, Quality, Duration, Modifying Factors,Severity.)      Deonte Client is a 63 y. o.yo male with past medical history significant for end-stage renal disease on dialysis, diabetes mellitus, hypertension who presents with generalized weakness for the past few days. Patient states that he had dialysis on Saturday and has been feeling unwell since. Patient states that he \"has a blockage in his heart\" that he was told he had at [facility].  Patient denying chest pain and shortness of breath at this time. Patient stating that he just generally feels weak, fatigued. Denying pain at this time, no radiation of pain. Has not tried anything at home management and feel better. States that he was at [facility] recently and was \"released\"    Of note patient was seen at [facility] in the ICU, on a dopamine drip, patient did leave AMA, upon chart review. [facility] cardiology team did want to place a permanent pacemaker. PAST MEDICAL / SURGICAL / SOCIAL / FAMILY HISTORY      has a past medical history of CRF (chronic renal failure), DDD (degenerative disc disease), lumbar, Diabetes mellitus ([address] Dialysis patient [facility]), ED (erectile dysfunction), History of echocardiogram, HTN (hypertension), Hyperlipidemia, LVH (left ventricular hypertrophy), PVD (peripheral vascular disease) ([address] S/P bilateral BKA (below knee amputation) ([address] and Wears glasses. has a past surgical history that includes Glaucoma surgery;  Cataract removal with implant; Leg amputation below knee (Bilateral); Tunneled venous catheter placement; Dialysis fistula creation (Bilateral); and Finger surgery (Left). Social:  reports that he has never smoked. He has never used smokeless tobacco. He reports current alcohol use. He reports that he does not use drugs. Family Hx:   Family History   Problem Relation Age of Onset    Diabetes Mother     Coronary Art Dis Mother     Hypertension Mother     Diabetes Father     Hypertension Father     Stroke Father     Cancer Sister     Hypertension Brother         Allergies:  Patient has no known allergies. Home Medications:  Prior to Admission medications    Medication Sig Start Date End Date Taking? Authorizing Provider   Tens Unit MISC by Does not apply route 12/15/20   Antoine Fleming MD   glimepiride (AMARYL) 2 MG tablet Take 1 tablet by mouth every morning (before breakfast)  Patient not taking: Reported on 12/14/2020 12/11/20   Radha Sotomayor MD   linagliptin (TRADJENTA) 5 MG tablet TAKE ONE TABLET BY MOUTH DAILY 12/3/20   Radha Sotomayor MD   amLODIPine-atorvastatatin (CADUET) 10-20 MG per tablet Take 1 tablet by mouth daily 11/2/20 5/1/21  Radha Sotomayor MD   tadalafil (CIALIS) 20 MG tablet Take 1 tablet by mouth once as needed for Erectile Dysfunction 9/17/20 9/17/20  Radha Sotomayor MD   lisinopril (PRINIVIL;ZESTRIL) 10 MG tablet TAKE 1 TABLET BY MOUTH  TWICE DAILY  Patient taking differently: Take 5 mg by mouth daily TAKE 1 TABLET BY MOUTH  TWICE DAILY 7/10/20   Radha Sotomayor MD   hydrALAZINE (APRESOLINE) 25 MG tablet TAKE 1 TABLET BY MOUTH TWO  TIMES DAILY 5/6/20   Radha Sotomayor MD   cilostazol (PLETAL) 100 MG tablet Take 100 mg by mouth 2 times daily.   Pt states NOT Taking med    Historical Provider, MD   NONFORMULARY     Historical Provider, MD   labetalol (NORMODYNE) 200 MG tablet Take 200 mg by mouth 3 times daily Indications: On Tues/Thurs/Sat/Sun     Historical Provider, MD   oxyCODONE-acetaminophen (PERCOCET) 5-325 MG per tablet Take 1 tablet by mouth three times a week  Indications: after dialysis . 7/18/17   Historical Provider, MD   gabapentin (NEURONTIN) 300 MG capsule TAKE ONE CAPSULE BY MOUTH THREE TIMES A DAY 2/13/17   Yony Escobar DO   cloNIDine (CATAPRES) 0.3 MG tablet Take 1 tablet by mouth 2 times daily 6/29/16   Joseph Cruz MD       REVIEW OFSYSTEMS    (2-9 systems for level 4, 10 or more for level 5)      Review of Systems   Constitutional: Positive for fatigue. Negative for chills and fever. HENT: Negative for congestion. Eyes: Negative for pain and discharge. Respiratory: Negative for cough and shortness of breath. Cardiovascular: Negative for chest pain and palpitations. Gastrointestinal: Negative for abdominal pain, constipation, diarrhea, nausea and vomiting. Genitourinary: Negative for difficulty urinating and hematuria. Musculoskeletal: Negative for arthralgias and myalgias. Skin: Negative for rash and wound. Neurological: Positive for weakness. Negative for syncope, light-headedness and headaches. PHYSICAL EXAM   (up to 7 for level 4, 8 or more forlevel 5)      INITIAL VITALS:   Vitals:    01/04/21 1336   BP: 105/64   Pulse: 63   Resp: 16   Temp:    SpO2:         Physical Exam  Vitals signs and nursing note reviewed. Constitutional:       General: He is not in acute distress. Appearance: He is not ill-appearing. HENT:      Head: Normocephalic and atraumatic. Nose: Nose normal.      Mouth/Throat:      Mouth: Mucous membranes are moist.      Pharynx: Oropharynx is clear. Eyes:      General:         Right eye: No discharge. Left eye: No discharge. Extraocular Movements: Extraocular movements intact. Neck:      Musculoskeletal: Normal range of motion. Cardiovascular:      Rate and Rhythm: Normal rate and regular rhythm. Heart sounds: No murmur. No friction rub. No gallop. Pulmonary:      Effort: Pulmonary effort is normal. No respiratory distress. Breath sounds: Normal breath sounds. No wheezing. Abdominal:      General: Abdomen is flat. There is no distension. Palpations: Abdomen is soft. Tenderness: There is no abdominal tenderness. Musculoskeletal:      Comments: Bilateral BKA with prosthetics in place. Skin:     General: Skin is warm and dry. Neurological:      General: No focal deficit present. Mental Status: He is alert and oriented to person, place, and time. Psychiatric:         Mood and Affect: Mood normal.         Behavior: Behavior normal.         DIFFERENTIAL  DIAGNOSIS       DDX: bradycardia vs hyperkalemia vs heart block vs digoxin toxicity     Initial MDM/Plan: 63 y.o. male who presents with medical history significant for stage renal disease on dialysis, hypertension, diabetes mellitus. Presents with complains of generalized weakness for the past few days. Patient did state that he had dialysis on 1/2/2021. Patient is normally a Monday Wednesday Friday dialysis patient. Had full run of dialysis on Saturday. Patient stents with bradycardia, stable blood pressure, mentating well, answering all questions appropriately alert and oriented to person place and time. Vital signs stable upon arrival, although patient is symptomatic with bradycardia between 40s and 60s on the monitor. Will get EKG, place pacer pads. Cardiac work-up including CBC, CMP, BNP, troponin, lactic acid. Early consult to cardiology. Pending labs and cardiology recommendations patient will be admitted to the hospital for further work-up and management of his bradycardia.         DIAGNOSTIC RESULTS / EMERGENCYDEPARTMENT COURSE / MDM     LABS:  Labs Reviewed   CBC WITH AUTO DIFFERENTIAL - Abnormal; Notable for the following components:       Result Value    RBC 3.39 (*)     Hemoglobin 8.9 (*)     Hematocrit 28.9 (*)     RDW 21.0 (*)     NRBC Automated 2.5 (*)     Lymphocytes 19 (*)     Monocytes 12 (*)     All other components within normal limits   COMPREHENSIVE METABOLIC PANEL W/ REFLEX TO MG FOR LOW K - Abnormal; Notable for the following components:    Glucose 304 (*)     BUN 70 (*)     CREATININE 9.44 (*)     Calcium 7.9 (*)     Sodium 133 (*)     Chloride 92 (*)     Anion Gap 20 (*)     Alkaline Phosphatase 145 (*)     ALT 75 (*)     GFR Non- 6 (*)     GFR  7 (*)     All other components within normal limits   TROPONIN - Abnormal; Notable for the following components:    Troponin, High Sensitivity 381 (*)     All other components within normal limits   BRAIN NATRIURETIC PEPTIDE - Abnormal; Notable for the following components:    Pro-BNP 34,960 (*)     All other components within normal limits   LACTIC ACID, PLASMA - Abnormal; Notable for the following components:    Lactic Acid, Whole Blood 2.2 (*)     All other components within normal limits   COVID-19         RADIOLOGY:  Xr Chest Portable    Result Date: 1/4/2021  EXAMINATION: ONE XRAY VIEW OF THE CHEST 1/4/2021 11:24 am COMPARISON: January 18, 2013, chest examination HISTORY: ORDERING SYSTEM PROVIDED HISTORY: weakness, dialysis patient TECHNOLOGIST PROVIDED HISTORY: weakness, dialysis patient Reason for Exam: weakness FINDINGS: Borderline cardiomegaly Limited exam performed using apical technique Possible small left pleural effusion and/or pleural thickening. No other definite pleural or parenchymal findings Degenerative changes of the acromioclavicular joints     Limited exam Blunted left costophrenic angle is suspected which may be related to pleural thickening and/or effusion         EKG  Atrial fibrillation noted, no obvious P waves. Bradycardic at 52  No axis deviation noted. AK interval unable to record due to Atrial fibrillation   QRS within normal limits   QT/QTc within normal limits   No st elevation, no st depression, no t wave inversion noted.    Abnormal EKG including Bradycardia and Atrail fibrillation    When compare to previous EKG done on 7/22/2020, similar findings noted. All EKG's are interpreted by the Emergency Department Physicianwho either signs or Co-signs this chart in the absence of a cardiologist.    EMERGENCY DEPARTMENT COURSE:  ED Course as of Jan 04 1438   Mon Jan 04, 2021   1147 Spoke with Cardiology, recommending atropine and if needed, dopamine. [MA]   6676 Patient bradying into the 30's will start Dopamine and plan for Central Line placement. Discussed with Pharmacist, Joselyn Leyden    [MA]   1798 Elevation noted. Brain Natriuretic Peptide(!):    Pro-BNP 34,960(!)   BNP Interpretation Pro-BNP Reference Range: [MA]   1207 Elevation noted. Troponin(!!):    Troponin, High Sensitivity 381(!!)   Troponin T NOT REPORTED   Troponin Interp NOT REPORTED [MA]   6075 CXR showing blunted left costophrenic angle, limites exam. No significant pleural or parenchymal findings. XR CHEST PORTABLE [MA]   1319 Discussed with ICU team they are accepting patient, they will assume care at this time. Full sign out given and all questions answered    [MA]   1321 Central line placed in left femoral vein. [MA]   1322 Creatinine elevated. Patient likely needs dialysis. Discussed with ICU team.    Creatinine(!!): 9.44 [MA]   1403 Hemodialysis called RN, Lisa Art suggesting dialysis. Discussed with attending Dr. Gregory Angel and Lisa Art, RN that patient can be dialyzed in the ICU as patient is on Dopamine drip and is not emergent dialysis in the ED is not required at this time. [MA]   1404 Hgb stable. No significant elevated WBC.     CBC Auto Differential(!):    WBC 6.5   RBC 3.39(!)   Hemoglobin Quant 8.9(!)   Hematocrit 28.9(!)   MCV 85.3   MCH 26.3   MCHC 30.8   RDW 21.0(!)   Platelet Count 757   MPV 12.0   NRBC Automated 2.5(!)   Differential Type NOT REPORTED   WBC Morphology NOT REPORTED   RBC Morphology NOT REPORTED   Platelet Estimate NOT REPORTED   Immature Granulocytes 0   Seg Neutrophils 65   Lymphocytes 19(!)   Monocytes 12(!) Eosinophils % 3   Basophils 1   Absolute Immature Granulocyte 0.00   Segs Absolu. .. [MA]   5296 Not significantly elevated likely secondary to bradycardia. Lactic Acid, Plasma(!):    Lactic Acid NOT REPORTED   Lactic Acid, Whole Blood 2.2(!) [MA]      ED Course User Index  [MA] Leila Najjar, DO        Patient admitted to ICU for further workup and management of somatic bradycardia on dopamine drip. Patient did have central line placed in right femoral vein for central access for pressors including dopamine. Patient does have left fistula in place that is used for dialysis. Patient is evaluated by the dialysis patient. Although creatinine is elevated troponins are elevated these are likely secondary to his end-stage renal disease. We do not believe at this time the patient needs emergent dialysis. Electrolytes are within normal limits including potassium. Potassium within normal limits in the ED no need for calcium or hyperkalemia treatment while in the ED. Patient was hemodynamically stable besides bradycardia, blood pressure was stable, oxygen saturation 90% on non-rebreather, no oxygen at home. Patient admitted to the ICU with cardiology consulted spoke with cardiology team who recommended atropine and dopamine drip, discussed with ICU team they have accepted patient. All questions answered. PROCEDURES:  PROCEDURE NOTE - CENTRAL VENOUS LINE PLACEMENT    PATIENT NAME: [address] [place] [address] RECORD NO. [id number]  DATE: 1/4/2021  ATTENDING PHYSICIAN: Dr. Adan Max. PREOPERATIVE DIAGNOSIS:  Need for IV access  POSTOPERATIVE DIAGNOSIS:  Same  PROCEDURE PERFORMED:  Right Femoral Vein Central Line Insertion  PERFORMING PHYSICIAN: Leila Najjar, DO  ANESTHESIA:  Local utilizing 1% lidocaine  ESTIMATED BLOOD LOSS:  Less than 25 ml  COMPLICATIONS:  None immediately appreciated. DISCUSSION:  Rayne Waters is a 63y.o.-year-old male who requires central IV access.  The history and physical examination were reviewed and confirmed. The diagnoses, proposed procedure, risks, possible complications, benefits and alternatives were discussed with the patient or family. He was given the opportunity to ask questions, and once answered, informed consent was obtained. The patient was then prepared for the procedure. PROCEDURE:  A timeout was initiated by the bedside nurse and was confirmed by those present. The patient was placed in a supine position. The skin overlying the Right Femoral Vein was prepped with chlorhexadine and draped in sterile fashion. The skin was infiltrated with local anesthetic. The vessel and surrounding anatomy was visualized using ultrasound. Through the anesthetized region, the introducer needle was inserted into the femoral vein returning dark red non pulsatile blood. A guidewire was placed through the center of the needle with no resistance. Ultrasound confirmed presence of wire in the vein. A small incision made in the skin with a #11 scalpel blade. The dilator was inserted into the skin and vein over guidewire using Seldinger technique. The dilator was then removed and the 7F 20cm catheter was placed in the vein over the guidewire using Seldinger technique. The guidewire was then removed and all ports aspirated and flushed appropriately. The catheter then secured using silk suture and a temporary sterile dressing was applied. No immediate complication was evident. All sponge, instrument and needle counts were correct at the completion of the procedure. The patient tolerated the procedure well with no immediate complication evident. Maile Mohs, DO  2:38 PM, 1/4/21         CONSULTS:  IP CONSULT TO CARDIOLOGY  IP CONSULT TO CRITICAL CARE      FINAL IMPRESSION      1. Bradycardia    2. ESRD needing dialysis ([facility]          DISPOSITION / PLAN     DISPOSITION Admitted 01/04/2021 01:40:02 PM      PATIENT REFERRED TO:  No follow-up provider specified.     DISCHARGE MEDICATIONS:  New Prescriptions    No medications on file       Florinda Love DO  Emergency Medicine Resident    (Please note that portions of this note were completed with a voice recognition program.Efforts were made to edit the dictations but occasionally words are mis-transcribed.)       Florinda Love DO  Resident  01/04/21 8448

## 2021-01-04 NOTE — ED NOTES
Dopamine started at 5mcg/kg/min; verbal order per Dr. Susan Lim, verified by Toribio Foss from pharmacy  Awaiting order     Esdras Mitchell RN  01/04/21 [place] [address]

## 2021-01-04 NOTE — ED NOTES
Dr. Joey Beach and Dr. Jesus Robles at bedside to place central line     Alyssia Plaza RN  01/04/21 9977

## 2021-01-04 NOTE — PLAN OF CARE
Will show no infection signs and symptoms  Description: Will show no infection signs and symptoms  Outcome: Ongoing  Goal: Absence of new skin breakdown  Description: Absence of new skin breakdown  Outcome: Ongoing     Problem: Tissue Perfusion - Cardiopulmonary, Altered:  Goal: Absence of angina  Description: Absence of angina  Outcome: Ongoing

## 2021-01-04 NOTE — ED NOTES
Pt. Placed on lifepack with fast patches. Dr. Elvira De La Cruz and Dr. Merlin Mainland at bedside  Pt.  HR 40bpm     Ludin Malhotra RN  01/04/21 7335

## 2021-01-04 NOTE — PROGRESS NOTES
[facility]  Documentation Note                Admission Dx: Bradycardia [R00.1]    Past Medical History:   has a past medical history of CRF (chronic renal failure), DDD (degenerative disc disease), lumbar, Diabetes mellitus ([facility] Dialysis patient [facility]), ED (erectile dysfunction), History of echocardiogram, HTN (hypertension), Hyperlipidemia, LVH (left ventricular hypertrophy), PVD (peripheral vascular disease) ([facility] S/P bilateral B[facility] (below [facility] amputation) [facility] and Wears glasses. Previous Testing:     ECHO 12/17/2020: EF 50%, mild AI, mild-moderate TR, PAP 39 mmHg, dilated RV with GIOVANY.     DSE 4/2014: EF 60%, no segmental WMA. Previous office/hospital visit:   Dr. Andreia Bullock 10/20/2020:   1. History of hypertension. 2. History of chronic kidney disease, on dialysis. 3. History of type 2 diabetes mellitus. 4. History of below-knee amputation bilaterally. 5. History of Dobutamine stress echocardiogram done at [facility] in April 2014 showing ejection fraction to be preserved with no segmental wall motion abnormalities seen. 6. Echocardiogram 7/ 2015 Concentric left ventricular, Estimated ejection fraction is 45%, grade 1 diastolic dysfunction and mild tricuspid regurgitation    Plan --   1. Labile hypertension with episodes of low blood pressure post dialysis, with dizziness and lightheadedness in addition to difficult to control hypertension on multiple antihypertensive medications. Being followed and treated by Nephrology. Will continue current medical regimen for now and obtain 2D echocardiogram to evaluate systolic and diastolic function and rule out any underlying valvular heart disease.   2. Follow up post echocardiogram.    Caryn Small [facility]

## 2021-01-04 NOTE — ED PROVIDER NOTES
[facility]     Emergency Department     Faculty Attestation    I performed a history and physical examination of the patient and discussed management with the resident. I have reviewed and agree with the residents findings including all diagnostic interpretations, and treatment plans as written. Any areas of disagreement are noted on the chart. I was personally present for the key portions of any procedures. I have documented in the chart those procedures where I was not present during the key portions. I have reviewed the emergency nurses triage note. I agree with the chief complaint, past medical history, past surgical history, allergies, medications, social and family history as documented unless otherwise noted below. Documentation of the HPI, Physical Exam and Medical Decision Making performed by scribkatelynn is based on my personal performance of the HPI, PE and MDM. For Physician Assistant/ Nurse Practitioner cases/documentation I have personally evaluated this patient and have completed at least one if not all key elements of the E/M (history, physical exam, and MDM). Additional findings are as noted. Patient with generalized fatigue. Presents after recent admission at [facility] where he was bradycardic on dopamine in the ICU and told he needed a pacemaker to be placed the note says he signed out [place] but patient denies this. He says that the cardiologist told him nothing was wrong with his heart. Patient continues to feel fatigued. He is end-stage renal dialysis with dialysis typically on Monday Wednesday and Friday he was dialyzed 2 days ago on Saturday when he was in the hospital.  He is a bilateral lower extremity amputee but does ambulate with prosthetics. Patient denies any cough fevers no congestion no runny nose. He is tolerating oral intake no nausea or vomiting. On exam patient is awake alert oriented to person place and time. He is bradycardic in the low 40s but does go down into the 37 at the lowest at this time. No murmurs gallops or rubs, his lungs are clear without any rales wheezes or rhonchi. He does have a fistula with a palpable thrill noted to the right upper extremity. Abdomen is soft nondistended nontender palpation all quadrants no rebound or guarding noted    At this time patient is bradycardic. Will check electrolytes. And place patient on Lifepak his blood pressure has consistently been in the 110s. His heart rate does go down into the 30s he is mentating appropriately we will continue to monitor. If he continues to become more bradycardic in the 30s will consider dopamine. Consult cardiology. ICU admission. EKG Interpretation    Interpreted by me    EKG shows low voltage bradycardic atrial fibrillation. Ventricular rate of 52 no ST elevations or depressions noted. Patient does show previous EKGs that show an accelerated junctional rhythm. No history of atrial fibrillation.         Yareli Braden D.O, M.P.H  Attending Emergency Medicine Physician         Yareli Braden,   01/04/21 1203      Central line placed to right femoral under my direct supervision     Yareli Braden,   01/04/21 1300

## 2021-01-04 NOTE — ED NOTES
Pt. To ER room 27 via wheelchair from triage  Pt. Presents with weakness and states he was recently at [facility] and was told he needed a pacemaker for bradycardia. Pt. Did not wish to stay at [facility], states, \"I don't do business with that hospital,\" left AMA and came to our facility. Pt. Arrives A/Ox4, RR even and unlabored. Pt. Placed on full cardiac monitor and found to be in sinus bradycardia at 40bpm. Dr. Jey Heart and Dr. Ren Fly aware. Pt. Denies CP and SOB but reports feeling weak. EKG obtained, IV access established, labs drawn. Pt. On full monitor. Awaiting orders.  Will continue to monitor and reassess     Javier Jaquez RN  01/04/21 4875

## 2021-01-04 NOTE — PROGRESS NOTES
Best: 0  PEF 0  % Predicted 0  Peak Flow : not applicable = 0    SKW9/EUF    FEV1 Predicted 0      FEV1 0    FEV1 % Predicted 0  FVC 0  IS volume 0  IBW 0    RR 17  Breath Sounds: Clear with faint fine crackles in bases bilaterally      Bronchodilator assessment at level  0  Hyperinflation assessment at level   Secretion Management assessment at level      [x]    Bronchodilator Assessment  BRONCHODILATOR ASSESSMENT SCORE  Score 0 1 2 3 4 5   Breath Sounds   [x]  Patient Baseline []  No Wheeze good aeration []  Faint, scattered wheezing, good aeration []  Expiratory Wheezing and or moderately diminished []  Insp/Exp wheeze and/or very diminished []  Insp/Exp and/ or marked distress   Respiratory Rate   [x]  Patient Baseline []  Less than 20 []  Less than 20 []  20-25 []  Greater than 25 []  Greater than 25   Peak flow % of Pred or PB [x]  NA   []  Greater than 90%  []  81-90% []  71-80% []  Less than or equal to 70%  or unable to perform []  Unable due to Respiratory Distress   Dyspnea re [x]  Patient Baseline []  No SOB []  No SOB []  SOB on exertion []  SOB min activity []  At rest/acute   e FEV% Predicted       [x]  NA []  Above 69%  []  Unable []  Above 60-69%  []  Unable []  Above 50-59%  []  Unable []  Above 35-49%  []  Unable []  Less than 35%  []  Unable                 []  Hyperinflation Assessment  Score 1 2 3   CXR and Breath Sounds   []  Clear []  No atelectasis  Basilar aeration []  Atelectasis or absent basilar breath sounds   Incentive Spirometry Volume  (Per IBW)   []  Greater than or equal to 15ml/Kg []  less than 15ml/Kg []  less than 15ml/Kg   Surgery within last 2 weeks []  None or general   []  Abdominal or thoracic surgery  []  Abdominal or thoracic   Chronic Pulmonary Historyre []  No []  Yes []  Yes     []  Secretion Management Assessment  Score 1 2 3   Bilateral Breath Sounds   []  Occasional Rhonchi []  Scattered Rhonchi []  Course Rhonchi and/or poor aeration   Sputum    []  Small amount of thin secretions []  Moderate amount of viscous secretions []  Copius, Viscious Yellow/ Secretions   CXR as reported by physician []  clear  []  Unavailable []  Infiltrates and/or consolidation  []  Unavailable []  Mucus Plugging and or lobar consolidation  []  Unavailable   Cough []  Strong, productive cough []  Weak productive cough []  No cough or weak non-productive cough   ASAEL MACK  3:23 PM                            FEMALE                                  MALE                            FEV1 Predicted Normal Values                        FEV1 Predicted Normal Values          Age                                     Height in Feet and Inches       Age                                     Height in Feet and Inches       4' 11\" 5' 1\" 5' 3\" 5' 5\" 5' 7\" 5' 9\" 5' 11\" 6' 1\"  4' 11\" 5' 1\" 5' 3\" 5' 5\" 5' 7\" 5' 9\" 5' 11\" 6' 1\"   42 - 45 2.49 2.66 2.84 3.03 3.22 3.42 3.62 3.83 42 - 45 2.82 3.03 3.26 3.49 3.72 3.96 4.22 4.47   46 - 49 2.40 2.57 2.76 2.94 3.14 3.33 3.54 3.75 46 - 49 2.70 2.92 3.14 3.37 3.61 3.85 4.10 4.36   50 - 53 2.31 2.48 2.66 2.85 3.04 3.24 3.45 3.66 50 - 53 2.58 2.80 3.02 3.25 3.49 3.73 3.98 4.24   54 - 57 2.21 2.38 2.57 2.75 2.95 3.14 3.35 3.56 54 - 57 2.46 2.67 2.89 3.12 3.36 3.60 3.85 4.11   58 - 61 2.10 2.28 2.46 2.65 2.84 3.04 3.24 3.45 58 - 61 2.32 2.54 2.76 2.99 3.23 3.47 3.72 3.98   62 - 65 1.99 2.17 2.35 2.54 2.73 2.93 3.13 3.34 62 - 65 2.19 2.40 2.62 2.85 3.09 3.33 3.58 3.84   66 - 69 1.88 2.05 2.23 2.42 2.61 2.81 3.02 3.23 66 - 69 2.04 2.26 2.48 2.71 2.95 3.19 3.44 3.70   70+ 1.82 1.99 2.17 2.36 2.55 2.75 2.95 3.16 70+ 1.97 2.19 2.41 2.64 2.87 3.12 3.37 3.62             Predicted Peak Expiratory Flow Rate                                       Height (in)  Female       Height (in) Male           Age 64 62 64 63 57 71 78 74 Age            51 719 [phone] 948  68 39 04 77 44 70 72 74 76   25 337 352 366 381 396 411 426 441 25 447 476 505 533 562 591 [phone]   30 [phone] 277 292 306 321 336 351 366 381 65 363 392 421 449 478 507 535 564 594   70 269 284 299 314 329 344 359 374 70 353 382 410 439 468 [phone] 583   75 261 274 289 305 319 334 348 364 75 344 372 400 478 042 310 190 320 038   45 688 154 381 402 756 431 139 888 05 362 241 627 032 747 [phone] 534   Patient has no history of smoking or lung disease. Takes no respiratory medications at home. No treatments indicated at this time.

## 2021-01-04 NOTE — H&P
Critical Care - History and Physical Examination    Patient's name:  [facility] Record Number: [id number]  Patient's account/billing number: [de-identified]  Patient's YOB: 1960  Age: 63 y.o. Date of Admission: 1/4/2021 11:03 AM  Reason of ICU admission:   Date of History and Physical Examination: 1/4/2021      Primary Care Physician: Willie Rubio MD  Attending Physician:    Code Status: Full Code    Chief complaint: weakness    Reason for ICU admission: bradycardia, on dopamine      History Of Present Illness:   History was obtained from chart review and the patient. Alla Simpson is a 63 y.o. who presents with fatigue and weakness, ongoing since around Christmas. Patient was recently [address], told he was supposed to get a pacemaker, but states he left because no one cared about his leg pain. Upon arrival to the ED, patient bradycardic with intermittent episodes down to 35. This is sinus bradycardia. Cardiology was consulted in the ED, awaiting recommendations. Patient has a history of ESRD on dialysis, type 2 diabetes, bilateral LE amputations. Patient typically goes dialysis Monday Wednesday Friday, went today because of the holiday. While in the ED, patient's troponins were elevated in addition to his BNP. Is unclear if this is secondary to his ESRD and needing dialysis. Patient denies any chest pain, does feel little bit short of breath and weak overall.           Past Medical History:        Diagnosis Date    CRF (chronic renal failure)     [facility] MWF    DDD (degenerative disc disease), lumbar 12/15/2020    [facility]     Dialysis patient [facility])     ED (erectile dysfunction)     [facility] of [facility] 2014    [facility]    HTN (hypertension)     Hyperlipidemia     LVH (left ventricular hypertrophy)     PVD [facility]peripheral vascular disease) [facility] [facility]     S/P bilateral BKA (below knee amputation) ([facility] [facility]     Wears glasses        Past Surgical History: Procedure Laterality Date    CATARACT REMOVAL WITH IMPLANT      DIALYSIS FISTULA CREATION Bilateral     As of 2019, RUE AVF functioning, LUE AVF nonfunctioning.  FINGER SURGERY Left     I & D    GLAUCOMA SURGERY      LEG AMPUTATION BELOW KNEE Bilateral     TUNNELED VENOUS CATHETER PLACEMENT      PC placed and removed       Allergies:    No Known Allergies      Home Medications:   Prior to Admission medications    Medication Sig Start Date End Date Taking? Authorizing Provider   Tens Unit MISC by Does not apply route 12/15/20   Yon Swartz MD   glimepiride (AMARYL) 2 MG tablet Take 1 tablet by mouth every morning (before breakfast)  Patient not taking: Reported on 12/14/2020 12/11/20   Valerie Wang MD   linagliptin (TRADJENTA) 5 MG tablet TAKE ONE TABLET BY MOUTH DAILY 12/3/20   Valerie Wang MD   amLODIPine-atorvastatatin (CADUET) 10-20 MG per tablet Take 1 tablet by mouth daily 11/2/20 5/1/21  Valerie Wang MD   tadalafil (CIALIS) 20 MG tablet Take 1 tablet by mouth once as needed for Erectile Dysfunction 9/17/20 9/17/20  Valerie Wang MD   lisinopril (PRINIVIL;ZESTRIL) 10 MG tablet TAKE 1 TABLET BY MOUTH  TWICE DAILY  Patient taking differently: Take 5 mg by mouth daily TAKE 1 TABLET BY MOUTH  TWICE DAILY 7/10/20   Valerie Wang MD   hydrALAZINE (APRESOLINE) 25 MG tablet TAKE 1 TABLET BY MOUTH TWO  TIMES DAILY 5/6/20   Valerie Wang MD   cilostazol (PLETAL) 100 MG tablet Take 100 mg by mouth 2 times daily. Pt states NOT Taking med    Historical Provider, MD   NONFORMULARY     Historical Provider, MD   labetalol (NORMODYNE) 200 MG tablet Take 200 mg by mouth 3 times daily Indications: On Tues/Thurs/Sat/Sun     Historical Provider, MD   oxyCODONE-acetaminophen (PERCOCET) 5-325 MG per tablet Take 1 tablet by mouth three times a week  Indications: after dialysis .  7/18/17   Historical Provider, MD   gabapentin (NEURONTIN) 300 MG capsule TAKE ONE CAPSULE BY MOUTH THREE TIMES A DAY 2/13/17   Paul Dowd DO [place]   cloNIDine (CATAPRES) 0.3 MG tablet Take 1 tablet by mouth 2 times daily 6/29/16   Joseph Chang MD       Social History:   TOBACCO:   reports that he has never smoked. He has never used smokeless tobacco.  ETOH:   reports current alcohol use. DRUGS:  reports no history of drug use. OCCUPATION:      Family History:       Problem Relation Age of Onset    Diabetes Mother     Coronary Art Dis Mother     Hypertension Mother     Diabetes Father     Hypertension Father     Stroke Father     Cancer Sister     Hypertension Brother            REVIEW OF SYSTEMS (ROS):  General ROS: positive for fatigue, negative for fevers and chills  ENT: negative  Hematological and Lymphatic ROS: negative  Endocrine ROS: negative    Respiratory ROS: no cough, or wheezing, positive for shortness of breath  Cardiovascular ROS: no chest pain or dyspnea on exertion  Gastrointestinal ROS:negative for abd pain, n/v  Musculoskeletal ROS: negative for back back or joint pain  Neurological ROS: negative for sensory changes or focal weakness  Dermatological ROS: negative for rash      Physical Exam:    Vitals: /66   Pulse 64   Temp 96.5 °F (35.8 °C) (Temporal)   Resp 17   Wt 215 lb (97.5 kg)   SpO2 96%   BMI 29.99 kg/m²     Body weight:   Wt Readings from Last 3 Encounters:   01/04/21 215 lb (97.5 kg)   12/14/20 212 lb (96.2 kg)   12/10/20 212 lb (96.2 kg)       Body Mass Index : Body mass index is 29.99 kg/m². PHYSICAL EXAMINATION :  Constitutional: Appears well, in no distress  EENT: PERRLA, EOMI, sclera clear, anicteric, oropharynx clear, no lesions, neck supple with midline trachea. Neck: Supple, symmetrical, trachea midline, no adenopathy, thyroid symmetric, no jvd skin normal  Respiratory: clear to auscultation, no wheezes or rales and unlabored breathing.  No intercostal tenderness  Cardiovascular: bradycardi but regular, pulses equal BUE  Abdomen: soft, nontender, nondistended, no masses or Endotracheal aspirate:     [x] None drawn       [] Negative             []  Positive (Details:  )         -----------------------------------------------------------------  Radiological reports:    CXR: Left costophrenic angle suspicious for effusion    Electrocardiogram: Sinus bradycardia    Last Echocardiogram findings: 12/30/2020 shows EF of 55%, moderate tricuspid regurg, mildly elevated pulmonary pressures, increased right atrial pressure         Assessment and Plan     Patient Active Problem List   Diagnosis    Hyperlipidemia    Essential hypertension    ESRD on hemodialysis ([facility] MWF    Insomnia    ED (erectile dysfunction)    Chronic bilateral low back pain with bilateral sciatica    Controlled diabetes mellitus type 2 with complications ([facility]    S/P bilateral below knee amputation ([facility]    Chronic use of opiate drugs therapeutic purposes    Severe comorbid illness    DDD (degenerative disc disease), lumbar    Bradycardia         Plan:  Acute bradycardia, unclear etiology:   Started on dopamine by emergency department per cardiology recommendations   Sinus bradycardia EKG   Elevated troponins, will repeat every 6 hours   Patient takes labetalol for hypertension, which may be contributing to his bradycardia  [facility] following    ESRD on dialysis:   Typically dialyzed Monday Wednesday Friday, was last dialyzed on Saturday given the holiday schedule   Potassium 4.9   Daily BMP   Nephrology consulted    History of hypertension:   Patient borderline hypotensive on dopamine, will hold home medications   Plan to restart as needed    Acute hypoxemia:   VBG  ordered   X-ray shows pleural effusion   High flow nasal cannula ordered      Ada Pires DO   Emergency Medicine - PGY-2  Intensive Care Unit  1/4/2021, 3:18 PM   Attending Physician Statement  I have discussed the care of Renetta Nina, including pertinent history and exam findings,  with the resident.  I have seen and examined the patient and the key elements of all parts of the encounter have been performed by me. I agree with the assessment, plan and orders as documented by the resident with additions . Sinus bradycardia   Acute hypoxic respiratory failure due to acute chf and pulmonary edema   Volume over load   esrd - hd   On BB and clonidine   dm2   Plan   Hold bb and clonidine  Dopamine gtt   Nephrology eval for HD   High flow nc   Cardiology evaluation for ? PPM    Total critical care time caring for this patient with life threatening, unstable organ failure, including direct patient contact, management of life support systems, review of data including imaging and labs, discussions with other team members and physicians at least 28   Min so far today, excluding procedures. Treatment plan Discussed with nursing staff in detail , all questions answered . Electronically signed by Austin Gaitan MD on   1/4/21 at 3:38 PM EST    Please note that this chart was generated using voice recognition Dragon dictation software. Although every effort was made to ensure the accuracy of this automated transcription, some errors in transcription may have occurred.

## 2021-01-05 ENCOUNTER — APPOINTMENT (OUTPATIENT)
Dept: DIALYSIS | Age: 61
DRG: 246 | End: 2021-01-05
Payer: MEDICARE

## 2021-01-05 LAB
ABSOLUTE EOS #: 0.35 K/UL (ref 0–0.4)
ABSOLUTE IMMATURE GRANULOCYTE: 0 K/UL (ref 0–0.3)
ABSOLUTE LYMPH #: 1.28 K/UL (ref 1–4.8)
ABSOLUTE MONO #: 1.04 K/UL (ref 0.1–0.8)
ACTIVATED CLOTTING TIME: 168 SEC (ref 79–149)
ACTIVATED CLOTTING TIME: 188 SEC (ref 79–149)
ACTIVATED CLOTTING TIME: 201 SEC (ref 79–149)
ANION GAP SERPL CALCULATED.3IONS-SCNC: 20 MMOL/L (ref 9–17)
BASOPHILS # BLD: 0 % (ref 0–2)
BASOPHILS ABSOLUTE: 0 K/UL (ref 0–0.2)
BUN BLDV-MCNC: 76 MG/DL (ref 8–23)
BUN/CREAT BLD: ABNORMAL (ref 9–20)
CALCIUM IONIZED: 1 MMOL/L (ref 1.13–1.33)
CALCIUM SERPL-MCNC: 8.1 MG/DL (ref 8.6–10.4)
CHLORIDE BLD-SCNC: 93 MMOL/L (ref 98–107)
CO2: 22 MMOL/L (ref 20–31)
CREAT SERPL-MCNC: 11.08 MG/DL (ref 0.7–1.2)
DIFFERENTIAL TYPE: ABNORMAL
EKG ATRIAL RATE: 375 BPM
EKG Q-T INTERVAL: 478 MS
EKG QRS DURATION: 96 MS
EKG QTC CALCULATION (BAZETT): 444 MS
EKG R AXIS: 70 DEGREES
EKG T AXIS: -59 DEGREES
EKG VENTRICULAR RATE: 52 BPM
EOSINOPHILS RELATIVE PERCENT: 3 % (ref 1–4)
GFR AFRICAN AMERICAN: 6 ML/MIN
GFR NON-AFRICAN AMERICAN: 5 ML/MIN
GFR SERPL CREATININE-BSD FRML MDRD: ABNORMAL ML/MIN/{1.73_M2}
GFR SERPL CREATININE-BSD FRML MDRD: ABNORMAL ML/MIN/{1.73_M2}
GLUCOSE BLD-MCNC: 127 MG/DL (ref 75–110)
GLUCOSE BLD-MCNC: 166 MG/DL (ref 75–110)
GLUCOSE BLD-MCNC: 227 MG/DL (ref 75–110)
GLUCOSE BLD-MCNC: 229 MG/DL (ref 70–99)
GLUCOSE BLD-MCNC: 240 MG/DL (ref 75–110)
GLUCOSE BLD-MCNC: 56 MG/DL (ref 75–110)
GLUCOSE BLD-MCNC: 81 MG/DL (ref 75–110)
GLUCOSE BLD-MCNC: 95 MG/DL (ref 75–110)
HCT VFR BLD CALC: 31 % (ref 40.7–50.3)
HEMOGLOBIN: 9.8 G/DL (ref 13–17)
IMMATURE GRANULOCYTES: 0 %
LYMPHOCYTES # BLD: 11 % (ref 24–44)
MAGNESIUM: 2.3 MG/DL (ref 1.6–2.6)
MCH RBC QN AUTO: 27 PG (ref 25.2–33.5)
MCHC RBC AUTO-ENTMCNC: 31.6 G/DL (ref 28.4–34.8)
MCV RBC AUTO: 85.4 FL (ref 82.6–102.9)
MONOCYTES # BLD: 9 % (ref 1–7)
MORPHOLOGY: ABNORMAL
MORPHOLOGY: ABNORMAL
NRBC AUTOMATED: 0.7 PER 100 WBC
PARTIAL THROMBOPLASTIN TIME: 115.4 SEC (ref 20.5–30.5)
PDW BLD-RTO: 21.2 % (ref 11.8–14.4)
PHOSPHORUS: 6 MG/DL (ref 2.5–4.5)
PLATELET # BLD: 241 K/UL (ref 138–453)
PLATELET ESTIMATE: ABNORMAL
PMV BLD AUTO: 10.7 FL (ref 8.1–13.5)
POTASSIUM SERPL-SCNC: 4.5 MMOL/L (ref 3.7–5.3)
RBC # BLD: 3.63 M/UL (ref 4.21–5.77)
RBC # BLD: ABNORMAL 10*6/UL
SEG NEUTROPHILS: 77 % (ref 36–66)
SEGMENTED NEUTROPHILS ABSOLUTE COUNT: 8.93 K/UL (ref 1.8–7.7)
SODIUM BLD-SCNC: 135 MMOL/L (ref 135–144)
WBC # BLD: 11.6 K/UL (ref 3.5–11.3)
WBC # BLD: ABNORMAL 10*3/UL

## 2021-01-05 PROCEDURE — C1769 GUIDE WIRE: HCPCS

## 2021-01-05 PROCEDURE — 6360000002 HC RX W HCPCS

## 2021-01-05 PROCEDURE — 82947 ASSAY GLUCOSE BLOOD QUANT: CPT

## 2021-01-05 PROCEDURE — 93005 ELECTROCARDIOGRAM TRACING: CPT | Performed by: STUDENT IN AN ORGANIZED HEALTH CARE EDUCATION/TRAINING PROGRAM

## 2021-01-05 PROCEDURE — 6370000000 HC RX 637 (ALT 250 FOR IP): Performed by: INTERNAL MEDICINE

## 2021-01-05 PROCEDURE — 027034Z DILATION OF CORONARY ARTERY, ONE ARTERY WITH DRUG-ELUTING INTRALUMINAL DEVICE, PERCUTANEOUS APPROACH: ICD-10-PCS | Performed by: INTERNAL MEDICINE

## 2021-01-05 PROCEDURE — 6360000002 HC RX W HCPCS: Performed by: INTERNAL MEDICINE

## 2021-01-05 PROCEDURE — 6370000000 HC RX 637 (ALT 250 FOR IP): Performed by: STUDENT IN AN ORGANIZED HEALTH CARE EDUCATION/TRAINING PROGRAM

## 2021-01-05 PROCEDURE — 2580000003 HC RX 258: Performed by: STUDENT IN AN ORGANIZED HEALTH CARE EDUCATION/TRAINING PROGRAM

## 2021-01-05 PROCEDURE — 2000000000 HC ICU R&B

## 2021-01-05 PROCEDURE — 6360000004 HC RX CONTRAST MEDICATION

## 2021-01-05 PROCEDURE — 7100000001 HC PACU RECOVERY - ADDTL 15 MIN

## 2021-01-05 PROCEDURE — C1894 INTRO/SHEATH, NON-LASER: HCPCS

## 2021-01-05 PROCEDURE — 36415 COLL VENOUS BLD VENIPUNCTURE: CPT

## 2021-01-05 PROCEDURE — C9600 PERC DRUG-EL COR STENT SING: HCPCS | Performed by: INTERNAL MEDICINE

## 2021-01-05 PROCEDURE — 84100 ASSAY OF PHOSPHORUS: CPT

## 2021-01-05 PROCEDURE — 93458 L HRT ARTERY/VENTRICLE ANGIO: CPT | Performed by: INTERNAL MEDICINE

## 2021-01-05 PROCEDURE — 5A1D70Z PERFORMANCE OF URINARY FILTRATION, INTERMITTENT, LESS THAN 6 HOURS PER DAY: ICD-10-PCS | Performed by: INTERNAL MEDICINE

## 2021-01-05 PROCEDURE — 80048 BASIC METABOLIC PNL TOTAL CA: CPT

## 2021-01-05 PROCEDURE — 99291 CRITICAL CARE FIRST HOUR: CPT | Performed by: INTERNAL MEDICINE

## 2021-01-05 PROCEDURE — 6360000002 HC RX W HCPCS: Performed by: STUDENT IN AN ORGANIZED HEALTH CARE EDUCATION/TRAINING PROGRAM

## 2021-01-05 PROCEDURE — 82330 ASSAY OF CALCIUM: CPT

## 2021-01-05 PROCEDURE — C1887 CATHETER, GUIDING: HCPCS

## 2021-01-05 PROCEDURE — 7100000000 HC PACU RECOVERY - FIRST 15 MIN

## 2021-01-05 PROCEDURE — 85347 COAGULATION TIME ACTIVATED: CPT

## 2021-01-05 PROCEDURE — 2709999900 HC NON-CHARGEABLE SUPPLY

## 2021-01-05 PROCEDURE — 83735 ASSAY OF MAGNESIUM: CPT

## 2021-01-05 PROCEDURE — 85025 COMPLETE CBC W/AUTO DIFF WBC: CPT

## 2021-01-05 PROCEDURE — C1874 STENT, COATED/COV W/DEL SYS: HCPCS

## 2021-01-05 PROCEDURE — B2111ZZ FLUOROSCOPY OF MULTIPLE CORONARY ARTERIES USING LOW OSMOLAR CONTRAST: ICD-10-PCS | Performed by: INTERNAL MEDICINE

## 2021-01-05 PROCEDURE — 85730 THROMBOPLASTIN TIME PARTIAL: CPT

## 2021-01-05 PROCEDURE — 99233 SBSQ HOSP IP/OBS HIGH 50: CPT | Performed by: INTERNAL MEDICINE

## 2021-01-05 PROCEDURE — B2151ZZ FLUOROSCOPY OF LEFT HEART USING LOW OSMOLAR CONTRAST: ICD-10-PCS | Performed by: INTERNAL MEDICINE

## 2021-01-05 PROCEDURE — 90935 HEMODIALYSIS ONE EVALUATION: CPT

## 2021-01-05 PROCEDURE — C1725 CATH, TRANSLUMIN NON-LASER: HCPCS

## 2021-01-05 PROCEDURE — 4A023N7 MEASUREMENT OF CARDIAC SAMPLING AND PRESSURE, LEFT HEART, PERCUTANEOUS APPROACH: ICD-10-PCS | Performed by: INTERNAL MEDICINE

## 2021-01-05 PROCEDURE — 6370000000 HC RX 637 (ALT 250 FOR IP)

## 2021-01-05 PROCEDURE — 93005 ELECTROCARDIOGRAM TRACING: CPT | Performed by: INTERNAL MEDICINE

## 2021-01-05 PROCEDURE — 2500000003 HC RX 250 WO HCPCS

## 2021-01-05 RX ORDER — INSULIN GLARGINE 100 [IU]/ML
0.3 INJECTION, SOLUTION SUBCUTANEOUS NIGHTLY
Status: DISCONTINUED | OUTPATIENT
Start: 2021-01-05 | End: 2021-01-07

## 2021-01-05 RX ORDER — HEPARIN SODIUM 1000 [USP'U]/ML
4000 INJECTION, SOLUTION INTRAVENOUS; SUBCUTANEOUS PRN
Status: DISCONTINUED | OUTPATIENT
Start: 2021-01-05 | End: 2021-01-05 | Stop reason: SDUPTHER

## 2021-01-05 RX ORDER — SUCROFERRIC OXYHYDROXIDE 500 MG/1
2 TABLET, CHEWABLE ORAL
Status: ON HOLD | COMMUNITY
End: 2022-09-19

## 2021-01-05 RX ORDER — HEPARIN SODIUM 10000 [USP'U]/100ML
12 INJECTION, SOLUTION INTRAVENOUS CONTINUOUS
Status: DISCONTINUED | OUTPATIENT
Start: 2021-01-05 | End: 2021-01-05

## 2021-01-05 RX ORDER — INSULIN GLARGINE 100 [IU]/ML
10 INJECTION, SOLUTION SUBCUTANEOUS ONCE
Status: COMPLETED | OUTPATIENT
Start: 2021-01-05 | End: 2021-01-05

## 2021-01-05 RX ORDER — HEPARIN SODIUM 1000 [USP'U]/ML
2500 INJECTION, SOLUTION INTRAVENOUS; SUBCUTANEOUS ONCE
Status: COMPLETED | OUTPATIENT
Start: 2021-01-05 | End: 2021-01-05

## 2021-01-05 RX ORDER — HEPARIN SODIUM 1000 [USP'U]/ML
60 INJECTION, SOLUTION INTRAVENOUS; SUBCUTANEOUS ONCE
Status: DISCONTINUED | OUTPATIENT
Start: 2021-01-05 | End: 2021-01-05

## 2021-01-05 RX ORDER — HEPARIN SODIUM 1000 [USP'U]/ML
2000 INJECTION, SOLUTION INTRAVENOUS; SUBCUTANEOUS PRN
Status: DISCONTINUED | OUTPATIENT
Start: 2021-01-05 | End: 2021-01-05 | Stop reason: SDUPTHER

## 2021-01-05 RX ORDER — HEPARIN SODIUM 1000 [USP'U]/ML
5000 INJECTION, SOLUTION INTRAVENOUS; SUBCUTANEOUS ONCE
Status: COMPLETED | OUTPATIENT
Start: 2021-01-05 | End: 2021-01-05

## 2021-01-05 RX ORDER — OXYCODONE HYDROCHLORIDE AND ACETAMINOPHEN 5; 325 MG/1; MG/1
1 TABLET ORAL DAILY PRN
Status: DISCONTINUED | OUTPATIENT
Start: 2021-01-05 | End: 2021-01-07 | Stop reason: HOSPADM

## 2021-01-05 RX ADMIN — Medication 10 ML: at 08:11

## 2021-01-05 RX ADMIN — DOPAMINE HYDROCHLORIDE IN DEXTROSE 14.5 MCG/KG/MIN: 1.6 INJECTION, SOLUTION INTRAVENOUS at 10:25

## 2021-01-05 RX ADMIN — GABAPENTIN 300 MG: 300 CAPSULE ORAL at 08:06

## 2021-01-05 RX ADMIN — HEPARIN SODIUM 5000 UNITS: 1000 INJECTION INTRAVENOUS; SUBCUTANEOUS at 09:08

## 2021-01-05 RX ADMIN — CILOSTAZOL 100 MG: 100 TABLET ORAL at 17:00

## 2021-01-05 RX ADMIN — HEPARIN SODIUM 5000 UNITS: 5000 INJECTION INTRAVENOUS; SUBCUTANEOUS at 07:56

## 2021-01-05 RX ADMIN — INSULIN GLARGINE 10 UNITS: 100 INJECTION, SOLUTION SUBCUTANEOUS at 10:27

## 2021-01-05 RX ADMIN — OXYCODONE HYDROCHLORIDE AND ACETAMINOPHEN 1 TABLET: 5; 325 TABLET ORAL at 13:02

## 2021-01-05 RX ADMIN — DEXTROSE MONOHYDRATE 12.5 G: 25 INJECTION, SOLUTION INTRAVENOUS at 17:06

## 2021-01-05 RX ADMIN — DOPAMINE HYDROCHLORIDE IN DEXTROSE 12 MCG/KG/MIN: 1.6 INJECTION, SOLUTION INTRAVENOUS at 04:58

## 2021-01-05 RX ADMIN — GABAPENTIN 300 MG: 300 CAPSULE ORAL at 22:17

## 2021-01-05 RX ADMIN — ALOGLIPTIN 6.25 MG: 6.25 TABLET, FILM COATED ORAL at 08:06

## 2021-01-05 RX ADMIN — INSULIN LISPRO 2 UNITS: 100 INJECTION, SOLUTION INTRAVENOUS; SUBCUTANEOUS at 12:10

## 2021-01-05 RX ADMIN — GLIMEPIRIDE 2 MG: 2 TABLET ORAL at 08:06

## 2021-01-05 RX ADMIN — ATORVASTATIN CALCIUM 20 MG: 20 TABLET, FILM COATED ORAL at 08:06

## 2021-01-05 RX ADMIN — DOPAMINE HYDROCHLORIDE IN DEXTROSE 14.5 MCG/KG/MIN: 1.6 INJECTION, SOLUTION INTRAVENOUS at 15:43

## 2021-01-05 RX ADMIN — SODIUM CHLORIDE, PRESERVATIVE FREE 10 ML: 5 INJECTION INTRAVENOUS at 22:17

## 2021-01-05 RX ADMIN — SODIUM CHLORIDE, PRESERVATIVE FREE 10 ML: 5 INJECTION INTRAVENOUS at 08:11

## 2021-01-05 RX ADMIN — CILOSTAZOL 100 MG: 100 TABLET ORAL at 08:06

## 2021-01-05 RX ADMIN — INSULIN LISPRO 4 UNITS: 100 INJECTION, SOLUTION INTRAVENOUS; SUBCUTANEOUS at 08:16

## 2021-01-05 RX ADMIN — HEPARIN SODIUM 2500 UNITS: 1000 INJECTION INTRAVENOUS; SUBCUTANEOUS at 09:08

## 2021-01-05 ASSESSMENT — PAIN SCALES - GENERAL
PAINLEVEL_OUTOF10: 10
PAINLEVEL_OUTOF10: 0
PAINLEVEL_OUTOF10: 0

## 2021-01-05 ASSESSMENT — ENCOUNTER SYMPTOMS
BACK PAIN: 0
NAUSEA: 0
WHEEZING: 0
ABDOMINAL PAIN: 0
SHORTNESS OF BREATH: 0

## 2021-01-05 ASSESSMENT — PAIN DESCRIPTION - DESCRIPTORS: DESCRIPTORS: CONSTANT;THROBBING

## 2021-01-05 ASSESSMENT — PAIN DESCRIPTION - LOCATION: LOCATION: OTHER (COMMENT)

## 2021-01-05 NOTE — PLAN OF CARE
Problem: Skin Integrity:  Goal: Will show no infection signs and symptoms  Description: Will show no infection signs and symptoms  1/4/2021 1944 by Zackery Carlson RN  Outcome: Ongoing  1/4/2021 1729 by Junior Centeno RN  Outcome: Ongoing  Goal: Absence of new skin breakdown  Description: Absence of new skin breakdown  1/4/2021 1944 by Zackery Carlson RN  Outcome: Ongoing  1/4/2021 1729 by Junior Centeno RN  Outcome: Ongoing     Problem: Falls - Risk of:  Goal: Will remain free from falls  Description: Will remain free from falls  1/4/2021 1944 by Zackery Carlson RN  Outcome: Ongoing  1/4/2021 1729 by Junior Centeno RN  Outcome: Ongoing  Goal: Absence of physical injury  Description: Absence of physical injury  1/4/2021 1944 by Zackery Carlson RN  Outcome: Ongoing  1/4/2021 1729 by Junior Centeno RN  Outcome: Ongoing     Problem: Aspiration:  Goal: Absence of aspiration  Description: Absence of aspiration  1/4/2021 1944 by Zackery Carlson RN  Outcome: Ongoing  1/4/2021 1729 by Junior Centeno RN  Outcome: Ongoing     Problem:  Bowel Function - Altered:  Goal: Bowel elimination is within specified parameters  Description: Bowel elimination is within specified parameters  1/4/2021 1944 by Zackery Carlson RN  Outcome: Ongoing  1/4/2021 1729 by Junior Centeno RN  Outcome: Ongoing     Problem: Cardiac Output - Decreased:  Goal: Hemodynamic stability will improve  Description: Hemodynamic stability will improve  1/4/2021 1944 by Zackery Carlson RN  Outcome: Ongoing  1/4/2021 1729 by Junior Centeno RN  Outcome: Ongoing     Problem: Fluid Volume - Imbalance:  Goal: Absence of imbalanced fluid volume signs and symptoms  Description: Absence of imbalanced fluid volume signs and symptoms  1/4/2021 1944 by Zackery Carlson RN  Outcome: Ongoing  1/4/2021 1729 by Junior Centeno RN  Outcome: Ongoing     Problem: Gas Exchange - Impaired:  Goal: Levels of oxygenation will improve  Description: Levels of oxygenation will improve  1/4/2021 1944 by Misael Mcgowan RN  Outcome: Ongoing  1/4/2021 1729 by Steve Roberson RN  Outcome: Ongoing

## 2021-01-05 NOTE — PLAN OF CARE
Problem: Skin Integrity:  Goal: Will show no infection signs and symptoms  Description: Will show no infection signs and symptoms  Outcome: Ongoing  Goal: Absence of new skin breakdown  Description: Absence of new skin breakdown  Outcome: Ongoing     Problem: Falls - Risk of:  Goal: Will remain free from falls  Description: Will remain free from falls  Outcome: Met This Shift  Goal: Absence of physical injury  Description: Absence of physical injury  Outcome: Met This Shift     Problem: Aspiration:  Goal: Absence of aspiration  Description: Absence of aspiration  Outcome: Met This Shift     Problem:  Bowel Function - Altered:  Goal: Bowel elimination is within specified parameters  Description: Bowel elimination is within specified parameters  Outcome: Ongoing     Problem: Cardiac Output - Decreased:  Goal: Hemodynamic stability will improve  Description: Hemodynamic stability will improve  Outcome: Ongoing     Problem: Fluid Volume - Imbalance:  Goal: Absence of imbalanced fluid volume signs and symptoms  Description: Absence of imbalanced fluid volume signs and symptoms  Outcome: Ongoing     Problem: Gas Exchange - Impaired:  Goal: Levels of oxygenation will improve  Description: Levels of oxygenation will improve  Outcome: Ongoing     Problem: Pain:  Goal: Recognizes and communicates pain  Description: Recognizes and communicates pain  Outcome: Ongoing     Problem: Serum Glucose Level - Abnormal:  Goal: Ability to maintain appropriate glucose levels will improve to within specified parameters  Description: Ability to maintain appropriate glucose levels will improve to within specified parameters  Outcome: Ongoing     Problem: Skin Integrity - Impaired:  Goal: Will show no infection signs and symptoms  Description: Will show no infection signs and symptoms  Outcome: Ongoing  Goal: Absence of new skin breakdown  Description: Absence of new skin breakdown  Outcome: Ongoing     Problem: Tissue Perfusion - Cardiopulmonary, Altered:  Goal: Absence of angina  Description: Absence of angina  Outcome: Met This Shift

## 2021-01-05 NOTE — CARE COORDINATION
Case Management Initial Discharge Plan  Breanna Murphy,             Met with:patient to discuss discharge plans. Information verified: address, contacts, phone number, , insurance Yes    Emergency Contact/Next of Kin name & number: Maurizio Vides and ex wife José Miguel Mas as per face sheet    PCP: Linda Hatch MD  Date of last visit: 3 weeks     Insurance Provider: medicare and medicaid    Discharge Planning    Living Arrangements:    lives by self  Support Systems:   son and ex wife    Home has 1 stories  4 stairs to climb to get into front door, no stairs to climb to reach second floor  Location of bedroom/bathroom in home main    Patient able to perform ADL's:Independent    Current Services (outpatient & in home) DME   DME equipment: cane and glucometer  DME provider: na    Receiving oral anticoagulation therapy? Yes    If indicated: pletal  Physician managing anticoagulation treatment: pcp  Where does patient obtain lab work for ATC treatment? dialysis      Potential Assistance Needed:       Patient agreeable to home care: Yes  Freedom of choice provided:  yes    Prior SNF/Rehab Placement and Facility: has been in SNF long ago and does not remember  Agreeable to SNF/Rehab: No  [place] of choice provided: n/a     Evaluation: n/a    Expected Discharge date:       Patient expects to be discharged to: Follow Up Appointment: Best Day/ Time:      Transportation provider: [facility]  Transportation arrangements needed for discharge: Yes    Readmission Risk              Risk of Unplanned Readmission:        26             Does patient have a readmission risk score greater than 14?: Yes  If yes, follow-up appointment must be made within 7 days of discharge.      Goals of Care: self care      Discharge Plan: goal is home with [facility] referral to [facility], has dialysis M/W/F at [facility] [address] signed by Sujey Bird RN on 21 at 11:37 AM EST

## 2021-01-05 NOTE — FLOWSHEET NOTE
Patient's blood sugar 56 - 1/2 amp D50 given per MAR. Cath Lab Team at bedside & informed of low blood sugar, 1/2 amp D50 given.   Writer requested to recheck blood sugar in Cath Lab upon arrival.

## 2021-01-05 NOTE — PROGRESS NOTES
Jeannine Frost, PPatient Assessment complete. Bradycardia [R00.1] . Vitals:    01/04/21 2000   BP:    Pulse:    Resp:    Temp: 97.7 °F (36.5 °C)   SpO2:    . Patients home meds are   Prior to Admission medications    Medication Sig Start Date End Date Taking? Authorizing Provider   Tens Unit MISC by Does not apply route 12/15/20   Britta Bull MD   glimepiride (AMARYL) 2 MG tablet Take 1 tablet by mouth every morning (before breakfast)  Patient not taking: Reported on 12/14/2020 12/11/20   Clemente Hayden MD   linagliptin (TRADJENTA) 5 MG tablet TAKE ONE TABLET BY MOUTH DAILY 12/3/20   Clemente Hayden MD   amLODIPine-atorvastatatin (CADUET) 10-20 MG per tablet Take 1 tablet by mouth daily 11/2/20 5/1/21  Clemente Hayden MD   tadalafil (CIALIS) 20 MG tablet Take 1 tablet by mouth once as needed for Erectile Dysfunction 9/17/20 9/17/20  Clemente Hayden MD   lisinopril (PRINIVIL;ZESTRIL) 10 MG tablet TAKE 1 TABLET BY MOUTH  TWICE DAILY  Patient taking differently: Take 5 mg by mouth daily TAKE 1 TABLET BY MOUTH  TWICE DAILY 7/10/20   Clemente Hayden MD   hydrALAZINE (APRESOLINE) 25 MG tablet TAKE 1 TABLET BY MOUTH TWO  TIMES DAILY 5/6/20   Clemente Hayden MD   cilostazol (PLETAL) 100 MG tablet Take 100 mg by mouth 2 times daily. Pt states NOT Taking med    Historical Provider, MD   NONFORMULARY     Historical Provider, MD   labetalol (NORMODYNE) 200 MG tablet Take 200 mg by mouth 3 times daily Indications: On Tues/Thurs/Sat/Sun     Historical Provider, MD   oxyCODONE-acetaminophen (PERCOCET) 5-325 MG per tablet Take 1 tablet by mouth three times a week  Indications: after dialysis . 7/18/17   Historical Provider, MD   gabapentin (NEURONTIN) 300 MG capsule TAKE ONE CAPSULE BY MOUTH THREE TIMES A DAY 2/13/17   Fransisca Vasquez DO   cloNIDine (CATAPRES) 0.3 MG tablet Take 1 tablet by mouth 2 times daily 6/29/16   Joseph Castro MD   .    Assessment   Pt is resting comfortably and is in no distress at this time.  Pt has no RESP HX.    RR 16  Breath Sounds: Cl/Dim      Bronchodilator assessment at level  0  Hyperinflation assessment at level   Secretion Management assessment at level      [x]    Bronchodilator Assessment  BRONCHODILATOR ASSESSMENT SCORE  Score 0 1 2 3 4 5   Breath Sounds   [x]  Patient Baseline [x]  No Wheeze good aeration []  Faint, scattered wheezing, good aeration []  Expiratory Wheezing and or moderately diminished []  Insp/Exp wheeze and/or very diminished []  Insp/Exp and/ or marked distress   Respiratory Rate   [x]  Patient Baseline [x]  Less than 20 []  Less than 20 []  20-25 []  Greater than 25 []  Greater than 25   Peak flow % of Pred or PB []  NA   []  Greater than 90%  []  81-90% []  71-80% []  Less than or equal to 70%  or unable to perform []  Unable due to Respiratory Distress   Dyspnea re [x]  Patient Baseline [x]  No SOB [x]  No SOB []  SOB on exertion []  SOB min activity []  At rest/acute   e FEV% Predicted       [x]  NA []  Above 69%  []  Unable []  Above 60-69%  []  Unable []  Above 50-59%  []  Unable []  Above 35-49%  []  Unable []  Less than 35%  []  Unable                 []  Hyperinflation Assessment  Score 1 2 3   CXR and Breath Sounds   []  Clear []  No atelectasis  Basilar aeration []  Atelectasis or absent basilar breath sounds   Incentive Spirometry Volume  (Per IBW)   []  Greater than or equal to 15ml/Kg []  less than 15ml/Kg []  less than 15ml/Kg   Surgery within last 2 weeks []  None or general   []  Abdominal or thoracic surgery  []  Abdominal or thoracic   Chronic Pulmonary Historyre []  No []  Yes []  Yes     []  Secretion Management Assessment  Score 1 2 3   Bilateral Breath Sounds   []  Occasional Rhonchi []  Scattered Rhonchi []  Course Rhonchi and/or poor aeration   Sputum    []  Small amount of thin secretions []  Moderate amount of viscous secretions []  Copius, Viscious Yellow/ Secretions   CXR as reported by physician []  clear  []  Unavailable []  Infiltrates and/or consolidation  []  Unavailable []  Mucus Plugging and or lobar consolidation  []  Unavailable   Cough []  Strong, productive cough []  Weak productive cough []  No cough or weak non-productive cough   Jeannine RAMOS Santosh  8:08 PM                            FEMALE                                  MALE                            FEV1 Predicted Normal Values                        FEV1 Predicted Normal Values          Age                                     Height in Feet and Inches       Age                                     Height in Feet and Inches       4' 11\" 5' 1\" 5' 3\" 5' 5\" 5' 7\" 5' 9\" 5' 11\" 6' 1\"  4' 11\" 5' 1\" 5' 3\" 5' 5\" 5' 7\" 5' 9\" 5' 11\" 6' 1\"   42 - 45 2.49 2.66 2.84 3.03 3.22 3.42 3.62 3.83 42 - 45 2.82 3.03 3.26 3.49 3.72 3.96 4.22 4.47   46 - 49 2.40 2.57 2.76 2.94 3.14 3.33 3.54 3.75 46 - 49 2.70 2.92 3.14 3.37 3.61 3.85 4.10 4.36   50 - 53 2.31 2.48 2.66 2.85 3.04 3.24 3.45 3.66 50 - 53 2.58 2.80 3.02 3.25 3.49 3.73 3.98 4.24   54 - 57 2.21 2.38 2.57 2.75 2.95 3.14 3.35 3.56 54 - 57 2.46 2.67 2.89 3.12 3.36 3.60 3.85 4.11   58 - 61 2.10 2.28 2.46 2.65 2.84 3.04 3.24 3.45 58 - 61 2.32 2.54 2.76 2.99 3.23 3.47 3.72 3.98   62 - 65 1.99 2.17 2.35 2.54 2.73 2.93 3.13 3.34 62 - 65 2.19 2.40 2.62 2.85 3.09 3.33 3.58 3.84   66 - 69 1.88 2.05 2.23 2.42 2.61 2.81 3.02 3.23 66 - 69 2.04 2.26 2.48 2.71 2.95 3.19 3.44 3.70   70+ 1.82 1.99 2.17 2.36 2.55 2.75 2.95 3.16 70+ 1.97 2.19 2.41 2.64 2.87 3.12 3.37 3.62             Predicted Peak Expiratory Flow Rate                                       Height (in)  Female       Height (in) Male           Age 58 63 61 63 56 77 78 74 Age            76 538 937 634 107 859 958 351 271  09 05 95 36 70 70 72 74 76   25 337 352 366 381 396 411 426 441 25 447 476 505 533 562 591 [phone]   30 329 344 359 374 389 404 419 434 30 437 466 494 523 [phone] 667   35 322 337 351 366 381 396 411 426 35 426 455 484 512 541 570 [phone]   40 314 329 344 359 374 389 404 [phone]   65 277 292 306 321 336 351 366 381 65 363 392 421 449 478 507 535 564 594   70 269 284 299 314 329 344 359 374 70 353 382 410 439 468 496 525 554 583   75 261 274 289 305 319 334 752 634 81 779 382 350 310 [phone] 004   61 174 727 634 347 071 019 827 878 77 041 394 121 099 522 [phone]

## 2021-01-05 NOTE — PROGRESS NOTES
Received post procedure to [facility] to room 9. Assessment obtained. Restrictions reviewed with patient. Post procedure pathway initiated. Right femoral site soft , dressing dry and intact. No hematoma noted. .  Patient without complaints. Head of bed flat with right leg straight. Arterial line transduced to cobe.

## 2021-01-05 NOTE — PROGRESS NOTES
Critical Care Team - Daily Progress Note      Date and time: 1/5/2021 8:38 AM  Patient's name:  Jarett [address] Record Number: [id number]  Patient's account/billing number: [de-identified]  Patient's YOB: 1960  Age: 63 y.o. Date of Admission: 1/4/2021 11:03 AM  Length of stay during current admission: 1      Primary Care Physician: Sury Lobo MD  ICU Attending Physician: Dr. María Gill Status: Full Code    Reason for ICU admission:   Chief Complaint   Patient presents with    Bradycardia     [facility] wanted to place pacemaker, pt left and came here       Subjective:     OVERNIGHT EVENTS:   Admitted yesterday for symptomatic bradycardia, with HR in 40s. Reports that he was having b/l leg weakness for few months now and was admitted to [facility] but left AMA bcz he was not satisfied. He was bradycardic there as well and they were thinking about pacemaker placement. Heart rate stable today around 60. Denies CP, SOB, abdominal pain or dizziness. Maintaining sats on RA, blood pressure stable with systolic in 371D. On 3L NC. On dopamine drip due to bradycardia    Review of Systems   Constitutional: Positive for fatigue. Negative for fever. HENT: Negative for congestion. Respiratory: Negative for shortness of breath and wheezing. Cardiovascular: Negative for chest pain and palpitations. Gastrointestinal: Negative for abdominal pain and nausea. Genitourinary: Positive for decreased urine volume (anuria due to ESRD). Musculoskeletal: Negative for back pain. Neurological: Positive for weakness (legs weakness). Negative for dizziness, speech difficulty and numbness. Psychiatric/Behavioral: Negative for confusion. All other systems reviewed and are negative.     OBJECTIVE:     VITAL SIGNS:  /65   Pulse 65   Temp 98.4 °F (36.9 °C) (Oral)   Resp 25   Ht 5' 11\" (1.803 m)   Wt 231 lb 7.7 oz (105 kg)   SpO2 98%   BMI 32.29 kg/m²   Tmax over 24 hours:  Temp (24hrs), Av.6 °F (36.4 °C), Min:96.5 °F (35.8 °C), Max:98.4 °F (36.9 °C)      Patient Vitals for the past 8 hrs:   BP Temp Temp src Pulse Resp SpO2 Weight   21 0815 122/65 -- -- 65 25 98 % --   21 0800 114/65 98.4 °F (36.9 °C) Oral 64 15 90 % --   21 0745 118/60 -- -- 63 19 93 % --   21 0730 (!) 122/56 -- -- 66 20 94 % --   21 0715 (!) 133/57 -- -- 63 17 91 % --   21 0700 (!) 120/59 -- -- 62 16 91 % --   21 0600 121/68 -- -- 65 15 93 % 231 lb 7.7 oz (105 kg)   21 0500 123/65 -- -- 64 20 91 % --   21 0400 133/67 98.1 °F (36.7 °C) Oral 67 20 (!) 89 % --   21 0300 133/64 -- -- 67 (!) 2 92 % --   21 0200 135/74 -- -- 64 11 96 % --   21 0100 121/75 -- -- 60 17 97 % --         Intake/Output Summary (Last 24 hours) at 2021 0838  Last data filed at 2021 0811  Gross per 24 hour   Intake 921 ml   Output --   Net 921 ml     Date 21 0000 - 21 2359   Shift 4130-0675 0690-4872 5205-5614 24 Hour Total   INTAKE   I.V.(mL/kg) 337(3.2) 10(0.1)  347(3.3)   Shift Total(mL/kg) 337(3.2) 10(0.1)  347(3.3)   OUTPUT   Shift Total(mL/kg)       Weight (kg) 105 105 105 105     Wt Readings from Last 3 Encounters:   21 231 lb 7.7 oz (105 kg)   12/14/20 212 lb (96.2 kg)   12/10/20 212 lb (96.2 kg)     Body mass index is 32.29 kg/m². PHYSICAL EXAM:  Constitutional: alert and oriented x 3  HEENT: PERRLA, EOMI, sclera clear, anicteric  Respiratory: NVB, decreased breath sounds, left lung base crackles  Cardiovascular: regular rate and rhythm, normal S1, S2, no murmur noted and 2+ pulses throughout  Abdomen: soft, nontender, nondistended, no masses or organomegaly  NEUROLOGIC: Awake, alert, oriented to name, place and time. Cranial nerves II-XII are grossly intact. Motor is 5 out of 5 bilaterally. Sensory is intact. Extremities:  S/p b/l BKA. peripheral pulses normal, no pedal edema.     MEDICATIONS:  Scheduled Meds:   cilostazol  100 mg Oral BID AC  gabapentin  300 mg Oral BID    glimepiride  2 mg Oral QAM AC    alogliptin  6.25 mg Oral Daily    [Held by provider] amLODIPine  10 mg Oral Daily    And    atorvastatin  20 mg Oral Daily    sodium chloride flush  10 mL Intravenous 2 times per day    heparin (porcine)  5,000 Units Subcutaneous 3 times per day    insulin lispro  0-6 Units Subcutaneous Nightly    insulin glargine  0.25 Units/kg Subcutaneous Nightly    insulin lispro  0-12 Units Subcutaneous TID WC     Continuous Infusions:   DOPamine 14.5 mcg/kg/min (01/05/21 0744)    dextrose       PRN Meds:       sodium chloride flush, 10 mL, PRN      polyethylene glycol, 17 g, Daily PRN      acetaminophen, 650 mg, Q6H PRN    Or      acetaminophen, 650 mg, Q6H PRN      glucose, 15 g, PRN      dextrose, 12.5 g, PRN      glucagon (rDNA), 1 mg, PRN      dextrose, 100 mL/hr, PRN        SUPPORT DEVICES: [] Ventilator [] BIPAP  [] Nasal Cannula [] Room Air    VENT SETTINGS (Comprehensive) (if applicable):  Vent Information  Skin Assessment: Clean, dry, & intact  SpO2: 98 %  Additional Respiratory  Assessments  Pulse: 65  Resp: 25  SpO2: 98 %    ABGs:     No results found for: PHART, PH, CZI5CVX, PCO2, PO2ART, PO2, MML7BAN, HCO3, BEART, BE, THGBART, THB, VGB3WZH, J0GXYVNI, O2SAT, FIO2  Lactic Acid:   Lab Results   Component Value Date    LACTA NOT REPORTED 01/04/2021    LACTA 1.6 09/06/2017    LACTA 1.1 09/05/2017         DATA:  Complete Blood Count:   Recent Labs     01/04/21  1131 01/05/21  0728   WBC 6.5 11.6*   HGB 8.9* 9.8*   MCV 85.3 85.4    241   RBC 3.39* 3.63*   HCT 28.9* 31.0*   MCH 26.3 27.0   MCHC 30.8 31.6   RDW 21.0* 21.2*   MPV 12.0 10.7        PT/INR:    Lab Results   Component Value Date    PROTIME 10.1 09/18/2019    PROTIME 10.3 05/31/2012    INR 0.9 09/18/2019     PTT:    Lab Results   Component Value Date    APTT 26.9 09/18/2019       Basal Metabolic Profile:   Recent Labs     01/04/21  1131 01/04/21  2058 01/05/21  7649 * 136 135   K 4.9 4.4 4.5   BUN 70* 75* 76*   CREATININE 9.44* 10.03* 11.08*   CL 92* 93* 93*   CO2 21 19* 22      Magnesium:   Lab Results   Component Value Date    MG 2.3 01/05/2021    MG 2.3 10/08/2020    MG 2.2 07/22/2020     Phosphorus:   Lab Results   Component Value Date    PHOS 6.0 01/05/2021    PHOS 5.4 01/25/2013    PHOS 4.6 01/22/2013     S. Calcium:  Recent Labs     01/05/21  0728   CALCIUM 8.1*     S. Ionized Calcium:No results for input(s): IONCA in the last 72 hours. Urinalysis:   Lab Results   Component Value Date    NITRU NEGATIVE 06/13/2013    COLORU YELLOW 06/13/2013    PHUR 7.5 06/13/2013    WBCUA 0 TO 2 06/13/2013    RBCUA 10 TO 20 06/13/2013    MUCUS NOT REPORTED 06/13/2013    TRICHOMONAS NOT REPORTED 06/13/2013    YEAST NOT REPORTED 06/13/2013    BACTERIA FEW 06/13/2013    SPECGRAV 1.015 06/13/2013    LEUKOCYTESUR NEGATIVE 06/13/2013    UROBILINOGEN Normal 06/13/2013    BILIRUBINUR NEGATIVE 06/13/2013    GLUCOSEU 3+ 06/13/2013    KETUA NEGATIVE 06/13/2013    AMORPHOUS NOT REPORTED 06/13/2013       CARDIAC ENZYMES: No results for input(s): CKMB, CKMBINDEX, TROPONINI in the last 72 hours. Invalid input(s): CKTOTAL;3  BNP: No results for input(s): BNP in the last 72 hours. LFTS  Recent Labs     01/04/21  1131   ALKPHOS 145*   ALT 75*   AST 27   BILITOT 0.39   LABALBU 3.6       AMYLASE/LIPASE/AMMONIA  No results for input(s): AMYLASE, LIPASE, AMMONIA in the last 72 hours. Last 3 Blood Glucose:   Recent Labs     01/04/21  1131 01/04/21 2058 01/05/21  0728   GLUCOSE 304* 293* 229*      HgBA1c:    Lab Results   Component Value Date    LABA1C 7.9 12/10/2020         TSH:    Lab Results   Component Value Date    TSH 0.88 12/10/2020     ANEMIA STUDIES  No results for input(s): LABIRON, TIBC, FERRITIN, [id number], FOLATE, OCCULTBLD in the last 72 hours.       Cultures during this admission:     Blood cultures:                 [] None drawn      [] Negative             []  Positive (Details:  )  Urine Culture:                   [] None drawn      [] Negative             []  Positive (Details:  )  Sputum Culture:               [] None drawn       [] Negative             []  Positive (Details:  )   Endotracheal aspirate:     [] None drawn       [] Negative             []  Positive (Details:  )        ASSESSMENT:     Active Problems:    Hyperlipidemia    Essential hypertension    ESRD on hemodialysis ([facility] [address] MWF    Controlled diabetes mellitus type 2 with complications ([facility] [address]    S/P bilateral below knee amputation (HCC)    Bradycardia  Resolved Problems:    * No resolved hospital problems. *    PLAN:     Acute symptomatic bradycardia, unclear etiology:  -Started on dopamine by emergency department per cardiology recommendations  -Repeat EKG with bradycardia, HR looks regularly irregular but doesn't look like afib, prolonged DE interval  -Holding negative chronotropic meds. Patient takes labetalol and clonidine for hypertension, which may be contributing to his bradycardia - holding for now  -Recent CTA chest showed severe MV coronary artery calcification - likely suggestive of underlying CAD considering multiple risk factors  -Cardiology planning for left heart cath and possible PPM placement today    ?   New onset atrial fibrillation  -Bradycardia with irregular heart rate  -Bradycardia and A. fib could be secondary to underlying coronary artery disease  -Needs to be heparin drip - hold heparin drip for now due to left heart cath today  -Follow with cardiology recommendations    Acute Hypoxemia  -secondary to fluid overload from ESRD  -bilateral pleural effusion  -maintaining sats on 3L NC  -expecting improvement with HD  -follow up with repeat CXR tomorrow morning    ESRD on dialysis:  -Typically dialyzed Monday Wednesday Friday, was last dialyzed on Saturday given the holiday schedule  -Dialysis today  -Nephrology on board  -Daily BMP     History of hypertension:  -stable  -currently on dopamine  -Hold home antihypertensives for now  -restart home meds as needed    Type-II Diabetes Mellitus   -HbA1c 7.9 as per 12/10  -blood glucose uncontrolled  -Hold oral hypoglycemics  -increase lantus to 0.3 units/kg  -give lantus 10 units stat  -continue to cover with ss insulin  -hypoglycemia protocol.  -check blood glucose before meals and at bedtime    History of PVD with peripheral neuropathy   -s/p b/l BKA in the past  -stable  -continue gabapentin and cilostazol       DVT prophylaxis: heparin drip  GI prophylaxis: none  Diet: carb control    Eva Mistry M.D. Department of Internal Medicine/ Critical care  [facility])             1/5/2021, 8:38 AM  Attending Physician Statement  I have discussed the care of Renetta Nina, including pertinent history and exam findings,  with the resident. I have seen and examined the patient and the key elements of all parts of the encounter have been performed by me. I agree with the assessment, plan and orders as documented by the resident with additions . PRESENTLY GETTING HD   HYPOXIA IMPROVED   ON DOPAMINE FOR CONCERN FOR AV  BLOCK   CATH TODAY          Total critical care time caring for this patient with life threatening, unstable organ failure, including direct patient contact, management of life support systems, review of data including imaging and labs, discussions with other team members and physicians at least 27   Min so far today, excluding procedures. Treatment plan Discussed with nursing staff in detail , all questions answered . Electronically signed by Toy Carnes MD on   1/5/21 at 12:05 PM EST    Please note that this chart was generated using voice recognition Dragon dictation software. Although every effort was made to ensure the accuracy of this automated transcription, some errors in transcription may have occurred.

## 2021-01-05 NOTE — CONSULTS
Attestation signed by      Attending Physician Statement:    I have discussed the care of  Renetta Nina , including pertinent history and exam findings, with the Cardiology fellow/resident. I have seen and examined the patient and the key elements of all parts of the encounter have been performed by me. I agree with the assessment, plan and orders as documented by the fellow/resident, after I modified exam findings and plan of treatments, and the final version is my approved version of the assessment. Additional Comments: The patient was seen and examined, agree with below, presented with bradycardia and ECG concerning for complete AV block. Currently HR is 70-80 on Dopamine. Will plan for coronary angiography and EP consult. [facility] Cardiology Cardiology    Consult / H&P               Today's Date: 1/5/2021  Patient Name: Renetta Nina  Date of admission: 1/4/2021 11:03 AM  Patient's age: 63 y.o., 1960  Admission Dx: Bradycardia [R00.1]    Reason for Consult:  Cardiac evaluation    Requesting Physician: Shruthi Machado MD    CHIEF COMPLAINT: Bradycardia    History Obtained From:  patient, electronic medical record    HISTORY OF PRESENT ILLNESS:      The patient is a 70-year-old male who comes to the hospital with complaints of fatigue and weakness. On arrival, the patient was bradycardic with heart rate in 30s and 40s. EKG showed sinus bradycardia as per the documentation. Cardiology was consulted from the ED. The patient is currently on dopamine drip. Considering bradycardia, the patient takes labetalol and clonidine at home. His TSH was checked last month which was normal.  The patient has a history of end-stage renal disease. His troponins have been in the range of 250-330. The patient had an echo done recently in [facility] which showed ejection fraction of 55%. Moderate tricuspid regurgitation.     The patient has past medical history of Covid infection in the start of the December 2020. His other past medical history includes history of hypertension, hyperlipidemia, end-stage renal disease with dialysis on Monday, Wednesday and Friday, diabetes mellitus status post bilateral below-knee amputations. The patient presented to [facility] at around 23 December with generalized weakness for the last 3 days. The patient also missed his hemodialysis appointment at that time. The patient was going for hemodialysis and he felt dizziness and lightheadedness. His blood pressure was low. So he came to the emergency department. In the emergency department, his heart rate was found to be in 40s. He was also hyperkalemic with a potassium of 5.4, sodium of 136. EKG showed sinus bradycardia. Prolonged QT. Chest x-ray showed no acute process. CTA was done because of elevated D-dimer. No pulmonary embolism was seen. Severe multivessel coronary calcification involving the RCA, LCA, LAD and circumflex were seen on the CTA. Past Medical History:   has a past medical history of CRF (chronic renal failure), DDD (degenerative disc disease), lumbar, Diabetes mellitus ([facility] [address] Dialysis patient [facility]), ED (erectile dysfunction), History of echocardiogram, HTN (hypertension), Hyperlipidemia, LVH (left ventricular hypertrophy), PVD (peripheral vascular disease) ([facility] [address] S[facility] and Wears glasses. Past Surgical History:   has a past surgical history that includes Glaucoma surgery; Cataract removal with implant; Leg amputation below knee (Bilateral); Tunneled venous catheter placement; Dialysis fistula creation (Bilateral); and Finger surgery (Left). Home Medications:    Prior to Admission medications    Medication Sig Start Date End Date Taking?  Authorizing Provider   Tens Unit MISC by Does not apply route 12/15/20   Negrita Wolf MD   glimepiride (AMARYL) 2 MG tablet Take 1 tablet by mouth every morning (before breakfast)  Patient not taking: Reported on 12/14/2020 12/11/20   Izabella Melchor MD   linagliptin (TRADJENTA) 5 MG tablet TAKE ONE TABLET BY MOUTH DAILY 12/3/20   Izabella Melchor MD   amLODIPine-atorvastatatin (CADUET) 10-20 MG per tablet Take 1 tablet by mouth daily 11/2/20 5/1/21  Izabella Melchor MD   tadalafil (CIALIS) 20 MG tablet Take 1 tablet by mouth once as needed for Erectile Dysfunction 9/17/20 9/17/20  Izabella Melchor MD   lisinopril (PRINIVIL;ZESTRIL) 10 MG tablet TAKE 1 TABLET BY MOUTH  TWICE DAILY  Patient taking differently: Take 5 mg by mouth daily TAKE 1 TABLET BY MOUTH  TWICE DAILY 7/10/20   Izabella Melchor MD   hydrALAZINE (APRESOLINE) 25 MG tablet TAKE 1 TABLET BY MOUTH TWO  TIMES DAILY 5/6/20   Izabella Melchor MD   cilostazol (PLETAL) 100 MG tablet Take 100 mg by mouth 2 times daily. Pt states NOT Taking med    Historical Provider, MD   NONFORMULARY     Historical Provider, MD   labetalol (NORMODYNE) 200 MG tablet Take 200 mg by mouth 3 times daily Indications: On Tues/Thurs/Sat/Sun     Historical Provider, MD   oxyCODONE-acetaminophen (PERCOCET) 5-325 MG per tablet Take 1 tablet by mouth three times a week  Indications: after dialysis .  7/18/17   Historical Provider, MD   gabapentin (NEURONTIN) 300 MG capsule TAKE ONE CAPSULE BY MOUTH THREE TIMES A DAY 2/13/17   Morgan Lopez DO   cloNIDine (CATAPRES) 0.3 MG tablet Take 1 tablet by mouth 2 times daily 6/29/16   Joseph Navarro MD      Current Facility-Administered Medications: DOPamine (INTROPIN) 400 mg in dextrose 5 % 250 mL infusion, 5 mcg/kg/min, Intravenous, Continuous  cilostazol (PLETAL) tablet 100 mg, 100 mg, Oral, BID AC  gabapentin (NEURONTIN) capsule 300 mg, 300 mg, Oral, BID  glimepiride (AMARYL) tablet 2 mg, 2 mg, Oral, QAM AC  alogliptin (NESINA) tablet 6.25 mg, 6.25 mg, Oral, Daily  [Held by provider] amLODIPine (NORVASC) tablet 10 mg, 10 mg, Oral, Daily **AND** atorvastatin (LIPITOR) tablet 20 mg, 20 mg, Oral, Daily  sodium chloride flush 0.9 % injection 10 mL, 10 mL, Intravenous, 2 times per day  sodium chloride flush 0.9 % injection 10 mL, 10 mL, Intravenous, PRN  polyethylene glycol (GLYCOLAX) packet 17 g, 17 g, Oral, Daily PRN  acetaminophen (TYLENOL) tablet 650 mg, 650 mg, Oral, Q6H PRN **OR** acetaminophen (TYLENOL) suppository 650 mg, 650 mg, Rectal, Q6H PRN  heparin (porcine) injection 5,000 Units, 5,000 Units, Subcutaneous, 3 times per day  insulin lispro (HUMALOG) injection vial 0-6 Units, 0-6 Units, Subcutaneous, Nightly  glucose (GLUTOSE) 40 % oral gel 15 g, 15 g, Oral, PRN  dextrose 50 % IV solution, 12.5 g, Intravenous, PRN  glucagon (rDNA) injection 1 mg, 1 mg, Intramuscular, PRN  dextrose 5 % solution, 100 mL/hr, Intravenous, PRN  insulin glargine (LANTUS) injection vial 25 Units, 0.25 Units/kg, Subcutaneous, Nightly  insulin lispro (HUMALOG) injection vial 0-12 Units, 0-12 Units, Subcutaneous, TID WC    Allergies:  Patient has no known allergies. Social History:   reports that he has never smoked. He has never used smokeless tobacco. He reports current alcohol use. He reports that he does not use drugs. Family History: family history includes Cancer in his sister; Coronary Art Dis in his mother; Diabetes in his father and mother; Hypertension in his brother, father, and mother; Stroke in his father. REVIEW OF SYSTEMS:    · Constitutional: Positive for lightheadedness and dizziness. · Eyes: No visual changes or diplopia. No scleral icterus. · ENT: No Headaches  · Cardiovascular: No cardiac history  · Respiratory: No previous pulmonary problems, No cough  · Gastrointestinal: No abdominal pain. No change in bowel or bladder habits. · Genitourinary: No dysuria, trouble voiding, or hematuria. · Musculoskeletal:  No gait disturbance, No weakness or joint complaints. · Integumentary: No rash or pruritis. · Neurological: No headache, diplopia, change in muscle strength, numbness or tingling.  No change in gait, balance, coordination, mood, affect, memory, mentation, behavior. · Psychiatric: No anxiety, or depression. · Endocrine: No temperature intolerance. No excessive thirst, fluid intake, or urination. No tremor. · Hematologic/Lymphatic: No abnormal bruising or bleeding, blood clots or swollen lymph nodes. · Allergic/Immunologic: No nasal congestion or hives. PHYSICAL EXAM:      /65   Pulse 65   Temp 98.4 °F (36.9 °C) (Oral)   Resp 25   Ht 5' 11\" (1.803 m)   Wt 231 lb 7.7 oz (105 kg)   SpO2 98%   BMI 32.29 kg/m²    Constitutional and General Appearance: alert, cooperative, no distress and appears stated age  HEENT: PERRL, no cervical lymphadenopathy. No masses palpable. Normal oral mucosa  Respiratory:  · Normal excursion and expansion without use of accessory muscles  · Resp Auscultation: Good respiratory effort. No for increased work of breathing. On auscultation: clear to auscultation bilaterally  Cardiovascular:  · The apical impulse is not displaced  · Heart tones are crisp and normal. regular S1 and S2.  · Jugular venous pulsation Normal  · The carotid upstroke is normal in amplitude and contour without delay or bruit  · Peripheral pulses are symmetrical and full   Abdomen:   · No masses or tenderness  · Bowel sounds present  Extremities:  · Bilateral below-knee amputation. Neurological:  · Alert and oriented. · Moves all extremities well  · No abnormalities of mood, affect, memory, mentation, or behavior are noted    DATA:    Diagnostics:    EKG: Sinus bradycardia. ECHO: The patient had an echo done recently in [facility] which showed ejection fraction of 55%. Moderate tricuspid regurgitation.     Labs:     CBC:   Recent Labs     01/04/21  1131 01/05/21  0728   WBC 6.5 11.6*   HGB 8.9* 9.8*   HCT 28.9* 31.0*    241     BMP:   Recent Labs     01/04/21  2058 01/05/21  0728    135   K 4.4 4.5   CO2 19* 22   BUN 75* 76*   CREATININE 10.03* 11.08*   LABGLOM 5* 5*   GLUCOSE 293* 229*     BNP: No results for input(s): BNP in the last 72 hours. PT/INR: No results for input(s): PROTIME, INR in the last 72 hours. APTT:No results for input(s): APTT in the last 72 hours. CARDIAC ENZYMES:No results for input(s): CKTOTAL, CKMB, CKMBINDEX, TROPONINI in the last 72 hours. FASTING LIPID PANEL:  Lab Results   Component Value Date    HDL 61 01/16/2020    TRIG 72 01/16/2020     LIVER PROFILE:  Recent Labs     01/04/21  1131   AST 27   ALT 75*   LABALBU 3.6       IMPRESSION:      1. Atrial fibrillation with slow ventricular response. 2.  History of hypertension on clonidine and labetalol at home  3. Hyperlipidemia  4. End-stage renal disease with dialysis on Monday, Wednesday and Friday  5. Diabetes mellitus type 2  6. Poor vasculopath with history of nonfunctioning fistula in the past, erectile dysfunction and below-knee amputations bilaterally  7. Recent COVID-19 infection. 8.  Secondary hyper parathyroidism  9. Anemia of end-stage renal disease    RECOMMENDATIONS:  1. Echo reviewed, CTA results reviewed as well. 2. Although nonspecific but probably patient has underlying CAD with his significant comorbid conditions. 3. The patient will need ischemic work-up before pacemaker placement. 4. The patient will benefit from pacemaker placement. 5. Continue to hold medications that can cause bradycardia like clonidine and labetalol. 6. Cardiology will continue to follow. 7. Medical management as per primary. Discussed with patient and Nurse.     Annette Allen MD  PGY-3, Department of Internal Medicine  [place], [place]  1/5/2021 8:38 [address] [place] [address] [facility]      [phone]

## 2021-01-05 NOTE — OP NOTE
[facility]    CARDIAC CATHETERIZATION    Date:   1/5/2021  Patient name: Karlene Sparks  Date of admission:  1/4/2021 11:03 AM  MRN:   [id number]  YOB: 1960    Operators:  Primary:     CV Fellow:    Pre Procedure Conscious Sedation Data:    ASA Class:    [] I [x] II [] III [] IV    Mallampati Class:  [] I [x] II [] III [] IV     Indication:  [] STEMI      [] + Stress test  [] ACS      [x] + EKG Changes  [] Non Q MI       [x] Significant Risk Factors  [x] Recurrent Angina             [x] Diabetes Mellitus    [] New LBBB      [] Uncontrolled HTN. [] CHF / Low EF changes     [x] Abnormal CTA / Ca Score    Procedure:  Access:  [x] Femoral  [] Radial  artery       [x] Right  [] Left    Procedure: After informed consent was obtained with explanation of the risks and benefits, patient was brought to the cath lab. The access area was prepped and draped in sterile fashion. 1% lidocaine was used for local block. The artery was cannulated with 6  Fr sheath with brisk arterial blood return. The side port was frequently flushed and aspirated with normal saline. Findings:  Left main: NL  LAD: mid 20%, distal at apical area 80% 9Small territory)  LCX: NL  RCA: Proximal / ostial calcified 90% stenosis. Required PTCA -KAMILAH [id number] stenosis to 20%  The LV gram was performed in the BUI 30 position. LVEF: 40%. LV Wall Motion: global mild hypokinesia    Estimated Blood Loss: < 20 cc. Conclusions:  1. Two vessel CAD  2. Successful PTCA -KAMILAH RCA  3. Mild LV dysfunction    Recommendation:  1. Post stent protocol    Estimated Blood Loss: [] <10   [] 10-25 [] 25-50   []  [] >100      History and Risk Factors    [x] Hypertension     [] Family history of CAD  [x] Hyperlipidemia     [] Cerebrovascular Disease   [] Prior MI       [] Peripheral Vascular disease   [] Prior PCI              [x] Diabetes Mellitus    [] Left Main PCI. [x] Currently on Dialysis. [] Prior CABG.       [x] Currently smoker. [] Cardiac Arrest outside of healthcare facility. [] Yes    [x] No        Witnessed     [] Yes   [] No     Arrest after arrival of EMS  [] Yes   [] No     [] Cardiac Arrest at other Facility. [] Yes   [x] No    Pre-Procedure Information. Heart Failure       [x] Yes    [] No        Class  [] I      [x] II  [] III    [] IV. New Diagnosis    [] Yes  [] No    HF Type      [] Systolic   [] Diastolic          [] Unknown. Diagnostic Test:   EKG       [] Normal   [x] Abnormal    New antiarrhythmia medications:    [] Yes   [] No   New onset atrial fibrillation / Flutter     [] Yes   [] No   ECG Abnormalities:      [] V. Fib   [] Mitzi V. Tach           [] NS V. T   [] New LBBB           [] T. Inv  []  ST dev > 0.5 mm         [] PVC's freq  [] PVC's infrequent    Stress Test Performed:   [] Yes    [x] No     Type:     [] Stress Echo   [] Exercise Stress Test (no imaging)      [] Stress Nuclear  [] Stress Imaging     Results   [] Negative   [] Positive        [] Indeterminate  [] Unavailable     If Positive/ Risk / Extent of Ischemia:       [] Low  [] Intermediate         [] High  [] Unavailable      Cardiac CTA Performed:  [x] Yes    [] No      Results   [] CAD   [] Non obstructive CAD      [] No CAD   [x] Uncertain      [] Unknown   [] Structural Disease.            Electronically signed by Ricky Walsh MD on 1/5/2021 at 5:25 PM      [facility]  [phone]

## 2021-01-05 NOTE — FLOWSHEET NOTE
Rn notified by phlebotomist that she was unable to obtain morning labs from pt, RN contacted [facility]

## 2021-01-05 NOTE — PROGRESS NOTES
Dialysis Post Treatment Note  Vitals:    01/05/21 1215   BP: (!) 142/86   Pulse: 76   Resp: 19   Temp: 98.2 °F (36.8 °C)   SpO2: 97%     Pre-Weight = 100.7kg  Post-weight = Weight: 214 lb 11.7 oz (97.4 kg)  Total Liters Processed = Total Liters Processed (l/min): 80.4 l/min  Rinseback Volume (mL) = Rinseback Volume (ml): 370 ml  Net Removal (mL) = NET Removed (ml): 3500 ml  Patient's dry weight = 96.5kg  Type of access used = Right Upper Arm AVF  Length of treatment = 180 minutes      Patient tolerated treatment well without any complications. Vitals stable at the conclusion of the treatment. Patient states that he usually gets a Percocet at the conclusion of each dialysis treatment for pain in his stumps (bilateral BKA). Percocet has not been ordered. Dr. Dave Ramirez was paged regarding the Percocet and writer is awaiting a call back. Floor nurse Yulia Pastor RN updated about the above at the conclusion of the treatment.       Electronically signed by Benton Schultz RN on 1/5/2021 at 12:33 PM

## 2021-01-05 NOTE — CONSULTS
REASON FOR  NEPHROLOGY CONSULT     Fluid and BP management in ESRD     ACCESS   AV fistula    DRY WEIGHT   96.4    NEPHROLOGIST   Dr Karon John               This is a 63 y.o. male with end stage renal disease on hemodialysis has been hospitalized for evaluation of easy fatigability/asthenia attributed to bradycardia with heart rate of 30 and 40s. Patient had Covid infection in the start of December 2020. There were no respiratory sequelae and he recovered well. Around Jefe time presented to the hospital with generalized weakness. He also had dizziness and lightheadedness. He was found to have heart rate of 40. Was hospitalized. EKG showed sinus bradycardia with prolonged QT. CTA was done. Severe multivessel coronary calcification was noted. Plan was to do permanent pacemaker but he was discharged. He continued to be symptomatic and hence presented here. Cardiology evaluation underway. Plans to do likely heart cath and pacemaker on this admission. We have been consulted for dialysis management. Images dialysis yesterday. Dialysis compliance is good. PAST MEDICAL HISTORY         Diagnosis Date    CRF (chronic renal failure)     [facility] MWF    DDD (degenerative disc disease), lumbar 12/15/2020    Diabetes mellitus ([facility]     [facility]ialysis patient [facility])     ED (erectile dysfunction)     History of echocardiogram 2014    [facility]    HTN (hypertension)     Hyperlipidemia     LVH (left ventricular hypertrophy)     PVD (peripheral vascular disease) ([facility]     S/P bilateral BKA (below knee amputation) ([facility]     Wears glasses          PAST SURGICAL HISTORY         Procedure Laterality Date    CATARACT REMOVAL WITH IMPLANT      DIALYSIS FISTULA CREATION Bilateral     As of 2019, RUE AVF functioning, LUE AVF nonfunctioning.     FINGER SURGERY Left     I & D    GLAUCOMA SURGERY      LEG AMPUTATION BELOW KNEE Bilateral     TUNNELED VENOUS CATHETER PLACEMENT      PC placed and removed       MEDICATIONS     Home Meds:                Medications Prior to Admission: Tens Unit MISC, by Does not apply route  glimepiride (AMARYL) 2 MG tablet, Take 1 tablet by mouth every morning (before breakfast) (Patient not taking: Reported on 12/14/2020)  linagliptin (TRADJENTA) 5 MG tablet, TAKE ONE TABLET BY MOUTH DAILY  amLODIPine-atorvastatatin (CADUET) 10-20 MG per tablet, Take 1 tablet by mouth daily  tadalafil (CIALIS) 20 MG tablet, Take 1 tablet by mouth once as needed for Erectile Dysfunction  lisinopril (PRINIVIL;ZESTRIL) 10 MG tablet, TAKE 1 TABLET BY MOUTH  TWICE DAILY (Patient taking differently: Take 5 mg by mouth daily TAKE 1 TABLET BY MOUTH  TWICE DAILY)  hydrALAZINE (APRESOLINE) 25 MG tablet, TAKE 1 TABLET BY MOUTH TWO  TIMES DAILY  cilostazol (PLETAL) 100 MG tablet, Take 100 mg by mouth 2 times daily. Pt states NOT Taking med  NONFORMULARY,   labetalol (NORMODYNE) 200 MG tablet, Take 200 mg by mouth 3 times daily Indications: On Tues/Thurs/Sat/Sun   oxyCODONE-acetaminophen (PERCOCET) 5-325 MG per tablet, Take 1 tablet by mouth three times a week  Indications: after dialysis .   gabapentin (NEURONTIN) 300 MG capsule, TAKE ONE CAPSULE BY MOUTH THREE TIMES A DAY  cloNIDine (CATAPRES) 0.3 MG tablet, Take 1 tablet by mouth 2 times daily  Scheduled Meds:    insulin glargine  0.3 Units/kg Subcutaneous Nightly    heparin (porcine)  60 Units/kg Intravenous Once    cilostazol  100 mg Oral BID AC    gabapentin  300 mg Oral BID    [Held by provider] amLODIPine  10 mg Oral Daily    And    atorvastatin  20 mg Oral Daily    sodium chloride flush  10 mL Intravenous 2 times per day    heparin (porcine)  5,000 Units Subcutaneous 3 times per day    insulin lispro  0-6 Units Subcutaneous Nightly    insulin lispro  0-12 Units Subcutaneous TID WC     Continuous Infusions:    heparin (PORCINE) Infusion      DOPamine 14.5 mcg/kg/min (01/05/21 1025)  dextrose       PRN Meds:  heparin (porcine), heparin (porcine), sodium chloride flush, polyethylene glycol, acetaminophen **OR** acetaminophen, glucose, dextrose, glucagon (rDNA), dextrose    ALLERGY     Patient has no known allergies.     SOCIAL HISTORY     Social History     Socioeconomic History    Marital status:      Spouse name: Not on file    Number of children: Not on file    Years of education: Not on file    Highest education level: Not on file   Occupational History    Not on file   Social Needs    Financial resource strain: Not on file    Food insecurity     Worry: Not on file     Inability: Not on file    Transportation needs     Medical: Not on file     Non-medical: Not on file   Tobacco Use    Smoking status: Never Smoker    Smokeless tobacco: Never Used   Substance and Sexual Activity    Alcohol use: Yes     Comment: rare    Drug use: No    Sexual activity: Not on file   Lifestyle    Physical activity     Days per week: Not on file     Minutes per session: Not on file    Stress: Not on file   Relationships    Social connections     Talks on phone: Not on file     Gets together: Not on file     Attends [employer] service: Not on file     Active member of club or organization: Not on file     Attends meetings of clubs or organizations: Not on file     Relationship status: Not on file    Intimate partner violence     Fear of current or ex partner: Not on file     Emotionally abused: Not on file     Physically abused: Not on file     Forced sexual activity: Not on file   Other Topics Concern    Not on file   Social History Narrative    Not on file       FAMILY HISTORY      Family History   Problem Relation Age of Onset    Diabetes Mother     Coronary Art Dis Mother     Hypertension Mother     Diabetes Father     Hypertension Father     Stroke Father     Cancer Sister     Hypertension Brother           REVIEW OF SYSTEM      Constitutional: Present asthenia/no weight loss/anorexia    HEENT : No epistaxis/visual blurriness/rhinorrhoea/sorethroat/trauma  Cardiovascular:No chest pain/palpitation/SOB  Respiratory: No cough/fever/SOB/Wheezing    Gastrointestinal: No abdominal pain/nausea/vomiting/diarrhoea/constipation  Genitourinary: No dysuria/pyuria/hematuria/incomplete emptying of bladder  Musculoskeletal:  No gait disturbance/weakness or joint complaints  Integumentary: No rash or pruritis. Neurological: No headache/diplopia/change in muscle strength/numbness or tingling. No change in gait, balance, coordination, mood, affect, memory, mentation, behavior. Psychiatric: No anxiety/depression. Endocrine: No temperature intolerance. No excessive thirst, fluid intake, or urination. No tremor. Hematologic/Lymphatic: No abnormal bruising or bleeding, blood clots or swolle lymph nodes. Allergic/Immunologic: No nasal congestion or hives    EXAMINATION       Vitals:    01/05/21 0945 01/05/21 1000 01/05/21 1015 01/05/21 1030   BP: 122/73 113/85 127/86 (!) 140/93   Pulse: 74 71 72 74   Resp: 20 22 22 21   Temp:       TempSrc:       SpO2: 97% 92% 92% 93%   Weight:       Height:         24HR INTAKE/OUTPUT:      Intake/Output Summary (Last 24 hours) at 1/5/2021 1045  Last data filed at 1/5/2021 6640  Gross per 24 hour   Intake 1101.76 ml   Output --   Net 1101.76 ml       General appearance:Awake, alert, in no acute distress  Skin: warm and dry, no rash or erythema  Eyes: conjunctivae normal and sclera anicteric  ENT: no thrush no pharyngeal congestion  orodental hygiene   Neck: No JVD, Lymphadenopathty or thyromegaly  Respiratory: vesicular breath sounds,no wheeze/crackles  Cardiovascular: S1 S2 normal,no gallop or organic murmur. No carotid bruit  Abdomen:Non tender/non distended. Bowel sounds present  Extremities: No Cyanosis or Clubbing,Lower extremity edema  Neurological:Alert and oriented. No abnormalities of mood, affect, memory, mentation, or behavior are noted    INVESTIGATIONS PTH:  No results found for: PTH  abs:   CBC:   Recent Labs     01/04/21  1131 01/05/21  0728   WBC 6.5 11.6*   RBC 3.39* 3.63*   HGB 8.9* 9.8*   HCT 28.9* 31.0*   MCV 85.3 85.4   MCH 26.3 27.0   MCHC 30.8 31.6   RDW 21.0* 21.2*    241   MPV 12.0 10.7      BMP:   Recent Labs     01/04/21  1131 01/04/21 2058 01/05/21  0728   * 136 135   K 4.9 4.4 4.5   CL 92* 93* 93*   CO2 21 19* 22   BUN 70* 75* 76*   CREATININE 9.44* 10.03* 11.08*   GLUCOSE 304* 293* 229*   CALCIUM 7.9* 8.1* 8.1*        Phosphorus:    Recent Labs     01/05/21  0728   PHOS 6.0*     Magnesium:   Recent Labs     01/05/21  0728   MG 2.3     Albumin:   Recent Labs     01/04/21  1131   LABALBU 3.6       ASSESSMENT     1. ESRD Monday Wednesday Friday AV fistula. At [facility]. Dr Dane Serna. DW 96.4  2. Symptomatic bradycardia awaiting permanent pacemaker placement and ischemic work-up  3. Type 2 diabetes  4. Essential hypertension  5. Bilateral below-knee amputation  6. Secondary hyperparathyroidism  7. Anemia in ESRD    PLAN     1. Seen and examined on hemodialysis. Orders reviewed with the nursing staff. 2.  Resume all home medications  3. Fluid restriction 1500 cc  4. We will follow      Thank you for the consultation. Please do not hesitate to call with questions.     This note is created with the assistance of a speech-recognition program. While intending to generate a document that actually reflects the content of the visit, no guarantees can be provided that every mistake has been identified and corrected by editing      Guera Davila MD, [facility]), [address]   1/5/2021 10:45 AM  [facility]

## 2021-01-06 ENCOUNTER — APPOINTMENT (OUTPATIENT)
Dept: GENERAL RADIOLOGY | Age: 61
DRG: 246 | End: 2021-01-06
Payer: MEDICARE

## 2021-01-06 LAB
ABSOLUTE EOS #: 0.23 K/UL (ref 0–0.4)
ABSOLUTE IMMATURE GRANULOCYTE: 0.08 K/UL (ref 0–0.3)
ABSOLUTE LYMPH #: 1.4 K/UL (ref 1–4.8)
ABSOLUTE MONO #: 1.25 K/UL (ref 0.1–0.8)
ANION GAP SERPL CALCULATED.3IONS-SCNC: 14 MMOL/L (ref 9–17)
BASOPHILS # BLD: 0 % (ref 0–2)
BASOPHILS ABSOLUTE: 0 K/UL (ref 0–0.2)
BUN BLDV-MCNC: 48 MG/DL (ref 8–23)
BUN/CREAT BLD: ABNORMAL (ref 9–20)
CALCIUM IONIZED: 1.04 MMOL/L (ref 1.13–1.33)
CALCIUM SERPL-MCNC: 8.1 MG/DL (ref 8.6–10.4)
CHLORIDE BLD-SCNC: 93 MMOL/L (ref 98–107)
CO2: 24 MMOL/L (ref 20–31)
CREAT SERPL-MCNC: 8.79 MG/DL (ref 0.7–1.2)
DIFFERENTIAL TYPE: ABNORMAL
EKG ATRIAL RATE: 89 BPM
EKG ATRIAL RATE: 92 BPM
EKG Q-T INTERVAL: 386 MS
EKG Q-T INTERVAL: 450 MS
EKG QRS DURATION: 106 MS
EKG QRS DURATION: 110 MS
EKG QTC CALCULATION (BAZETT): 442 MS
EKG QTC CALCULATION (BAZETT): 450 MS
EKG R AXIS: 34 DEGREES
EKG R AXIS: 98 DEGREES
EKG T AXIS: -102 DEGREES
EKG T AXIS: -76 DEGREES
EKG VENTRICULAR RATE: 60 BPM
EKG VENTRICULAR RATE: 79 BPM
EOSINOPHILS RELATIVE PERCENT: 3 % (ref 1–4)
GFR AFRICAN AMERICAN: 7 ML/MIN
GFR NON-AFRICAN AMERICAN: 6 ML/MIN
GFR SERPL CREATININE-BSD FRML MDRD: ABNORMAL ML/MIN/{1.73_M2}
GFR SERPL CREATININE-BSD FRML MDRD: ABNORMAL ML/MIN/{1.73_M2}
GLUCOSE BLD-MCNC: 104 MG/DL (ref 75–110)
GLUCOSE BLD-MCNC: 155 MG/DL (ref 75–110)
GLUCOSE BLD-MCNC: 157 MG/DL (ref 75–110)
GLUCOSE BLD-MCNC: 179 MG/DL (ref 75–110)
GLUCOSE BLD-MCNC: 235 MG/DL (ref 70–99)
GLUCOSE BLD-MCNC: 65 MG/DL (ref 75–110)
HCT VFR BLD CALC: 28.2 % (ref 40.7–50.3)
HEMOGLOBIN: 8.6 G/DL (ref 13–17)
IMMATURE GRANULOCYTES: 1 %
LYMPHOCYTES # BLD: 18 % (ref 24–44)
MAGNESIUM: 2.1 MG/DL (ref 1.6–2.6)
MCH RBC QN AUTO: 26 PG (ref 25.2–33.5)
MCHC RBC AUTO-ENTMCNC: 30.5 G/DL (ref 28.4–34.8)
MCV RBC AUTO: 85.2 FL (ref 82.6–102.9)
MONOCYTES # BLD: 16 % (ref 1–7)
MORPHOLOGY: ABNORMAL
NRBC AUTOMATED: 0.4 PER 100 WBC
PARTIAL THROMBOPLASTIN TIME: 28.7 SEC (ref 20.5–30.5)
PDW BLD-RTO: 20.9 % (ref 11.8–14.4)
PHOSPHORUS: 5.2 MG/DL (ref 2.5–4.5)
PLATELET # BLD: 198 K/UL (ref 138–453)
PLATELET ESTIMATE: ABNORMAL
PMV BLD AUTO: 10.2 FL (ref 8.1–13.5)
POTASSIUM SERPL-SCNC: 4 MMOL/L (ref 3.7–5.3)
RBC # BLD: 3.31 M/UL (ref 4.21–5.77)
RBC # BLD: ABNORMAL 10*6/UL
SEG NEUTROPHILS: 62 % (ref 36–66)
SEGMENTED NEUTROPHILS ABSOLUTE COUNT: 4.84 K/UL (ref 1.8–7.7)
SODIUM BLD-SCNC: 131 MMOL/L (ref 135–144)
TROPONIN INTERP: ABNORMAL
TROPONIN INTERP: ABNORMAL
TROPONIN T: ABNORMAL NG/ML
TROPONIN T: ABNORMAL NG/ML
TROPONIN, HIGH SENSITIVITY: 396 NG/L (ref 0–22)
TROPONIN, HIGH SENSITIVITY: 489 NG/L (ref 0–22)
WBC # BLD: 7.8 K/UL (ref 3.5–11.3)
WBC # BLD: ABNORMAL 10*3/UL

## 2021-01-06 PROCEDURE — 84484 ASSAY OF TROPONIN QUANT: CPT

## 2021-01-06 PROCEDURE — 2060000000 HC ICU INTERMEDIATE R&B

## 2021-01-06 PROCEDURE — 6370000000 HC RX 637 (ALT 250 FOR IP): Performed by: STUDENT IN AN ORGANIZED HEALTH CARE EDUCATION/TRAINING PROGRAM

## 2021-01-06 PROCEDURE — 2580000003 HC RX 258: Performed by: INTERNAL MEDICINE

## 2021-01-06 PROCEDURE — 85730 THROMBOPLASTIN TIME PARTIAL: CPT

## 2021-01-06 PROCEDURE — 90935 HEMODIALYSIS ONE EVALUATION: CPT

## 2021-01-06 PROCEDURE — 80048 BASIC METABOLIC PNL TOTAL CA: CPT

## 2021-01-06 PROCEDURE — 2580000003 HC RX 258: Performed by: STUDENT IN AN ORGANIZED HEALTH CARE EDUCATION/TRAINING PROGRAM

## 2021-01-06 PROCEDURE — 6360000002 HC RX W HCPCS: Performed by: STUDENT IN AN ORGANIZED HEALTH CARE EDUCATION/TRAINING PROGRAM

## 2021-01-06 PROCEDURE — 93010 ELECTROCARDIOGRAM REPORT: CPT | Performed by: INTERNAL MEDICINE

## 2021-01-06 PROCEDURE — 6370000000 HC RX 637 (ALT 250 FOR IP): Performed by: INTERNAL MEDICINE

## 2021-01-06 PROCEDURE — 6360000002 HC RX W HCPCS: Performed by: INTERNAL MEDICINE

## 2021-01-06 PROCEDURE — 85025 COMPLETE CBC W/AUTO DIFF WBC: CPT

## 2021-01-06 PROCEDURE — 99232 SBSQ HOSP IP/OBS MODERATE 35: CPT | Performed by: INTERNAL MEDICINE

## 2021-01-06 PROCEDURE — 99291 CRITICAL CARE FIRST HOUR: CPT | Performed by: INTERNAL MEDICINE

## 2021-01-06 PROCEDURE — 99223 1ST HOSP IP/OBS HIGH 75: CPT | Performed by: INTERNAL MEDICINE

## 2021-01-06 PROCEDURE — 82947 ASSAY GLUCOSE BLOOD QUANT: CPT

## 2021-01-06 PROCEDURE — 84100 ASSAY OF PHOSPHORUS: CPT

## 2021-01-06 PROCEDURE — 83735 ASSAY OF MAGNESIUM: CPT

## 2021-01-06 PROCEDURE — 82330 ASSAY OF CALCIUM: CPT

## 2021-01-06 PROCEDURE — 71045 X-RAY EXAM CHEST 1 VIEW: CPT

## 2021-01-06 RX ORDER — HEPARIN SODIUM 5000 [USP'U]/ML
5000 INJECTION, SOLUTION INTRAVENOUS; SUBCUTANEOUS EVERY 8 HOURS SCHEDULED
Status: DISCONTINUED | OUTPATIENT
Start: 2021-01-06 | End: 2021-01-06

## 2021-01-06 RX ORDER — SODIUM CHLORIDE 0.9 % (FLUSH) 0.9 %
10 SYRINGE (ML) INJECTION PRN
Status: DISCONTINUED | OUTPATIENT
Start: 2021-01-06 | End: 2021-01-07 | Stop reason: HOSPADM

## 2021-01-06 RX ORDER — ASPIRIN 81 MG/1
81 TABLET ORAL DAILY
Status: DISCONTINUED | OUTPATIENT
Start: 2021-01-07 | End: 2021-01-06 | Stop reason: SDUPTHER

## 2021-01-06 RX ORDER — ATORVASTATIN CALCIUM 80 MG/1
80 TABLET, FILM COATED ORAL DAILY
Status: DISCONTINUED | OUTPATIENT
Start: 2021-01-06 | End: 2021-01-07 | Stop reason: HOSPADM

## 2021-01-06 RX ORDER — CLOPIDOGREL BISULFATE 75 MG/1
75 TABLET ORAL DAILY
Status: DISCONTINUED | OUTPATIENT
Start: 2021-01-06 | End: 2021-01-07 | Stop reason: HOSPADM

## 2021-01-06 RX ORDER — HEPARIN SODIUM 1000 [USP'U]/ML
4000 INJECTION, SOLUTION INTRAVENOUS; SUBCUTANEOUS PRN
Status: DISCONTINUED | OUTPATIENT
Start: 2021-01-06 | End: 2021-01-06

## 2021-01-06 RX ORDER — ACETAMINOPHEN 325 MG/1
650 TABLET ORAL EVERY 4 HOURS PRN
Status: DISCONTINUED | OUTPATIENT
Start: 2021-01-06 | End: 2021-01-07 | Stop reason: HOSPADM

## 2021-01-06 RX ORDER — HEPARIN SODIUM 1000 [USP'U]/ML
2000 INJECTION, SOLUTION INTRAVENOUS; SUBCUTANEOUS PRN
Status: DISCONTINUED | OUTPATIENT
Start: 2021-01-06 | End: 2021-01-06

## 2021-01-06 RX ORDER — ASPIRIN 81 MG/1
81 TABLET ORAL DAILY
Status: DISCONTINUED | OUTPATIENT
Start: 2021-01-06 | End: 2021-01-07 | Stop reason: HOSPADM

## 2021-01-06 RX ORDER — SODIUM CHLORIDE 0.9 % (FLUSH) 0.9 %
10 SYRINGE (ML) INJECTION EVERY 12 HOURS SCHEDULED
Status: DISCONTINUED | OUTPATIENT
Start: 2021-01-06 | End: 2021-01-07 | Stop reason: HOSPADM

## 2021-01-06 RX ORDER — CLOPIDOGREL BISULFATE 75 MG/1
75 TABLET ORAL DAILY
Status: DISCONTINUED | OUTPATIENT
Start: 2021-01-07 | End: 2021-01-06

## 2021-01-06 RX ORDER — INSULIN GLARGINE 100 [IU]/ML
10 INJECTION, SOLUTION SUBCUTANEOUS ONCE
Status: COMPLETED | OUTPATIENT
Start: 2021-01-06 | End: 2021-01-06

## 2021-01-06 RX ORDER — SODIUM CHLORIDE 9 MG/ML
INJECTION, SOLUTION INTRAVENOUS CONTINUOUS
Status: DISCONTINUED | OUTPATIENT
Start: 2021-01-06 | End: 2021-01-06

## 2021-01-06 RX ORDER — HEPARIN SODIUM 10000 [USP'U]/100ML
9.9 INJECTION, SOLUTION INTRAVENOUS CONTINUOUS
Status: DISCONTINUED | OUTPATIENT
Start: 2021-01-06 | End: 2021-01-06

## 2021-01-06 RX ORDER — ASPIRIN 81 MG/1
81 TABLET, CHEWABLE ORAL EVERY MORNING
Status: ON HOLD | COMMUNITY
End: 2022-09-20 | Stop reason: HOSPADM

## 2021-01-06 RX ORDER — ONDANSETRON 2 MG/ML
4 INJECTION INTRAMUSCULAR; INTRAVENOUS EVERY 6 HOURS PRN
Status: DISCONTINUED | OUTPATIENT
Start: 2021-01-06 | End: 2021-01-07 | Stop reason: HOSPADM

## 2021-01-06 RX ADMIN — INSULIN LISPRO 2 UNITS: 100 INJECTION, SOLUTION INTRAVENOUS; SUBCUTANEOUS at 13:06

## 2021-01-06 RX ADMIN — INSULIN LISPRO 2 UNITS: 100 INJECTION, SOLUTION INTRAVENOUS; SUBCUTANEOUS at 09:06

## 2021-01-06 RX ADMIN — APIXABAN 2.5 MG: 2.5 TABLET, FILM COATED ORAL at 20:27

## 2021-01-06 RX ADMIN — GABAPENTIN 300 MG: 300 CAPSULE ORAL at 20:28

## 2021-01-06 RX ADMIN — Medication 10 ML: at 09:18

## 2021-01-06 RX ADMIN — DOPAMINE HYDROCHLORIDE IN DEXTROSE 2.5 MCG/KG/MIN: 1.6 INJECTION, SOLUTION INTRAVENOUS at 05:08

## 2021-01-06 RX ADMIN — INSULIN GLARGINE 30 UNITS: 100 INJECTION, SOLUTION SUBCUTANEOUS at 20:28

## 2021-01-06 RX ADMIN — INSULIN GLARGINE 10 UNITS: 100 INJECTION, SOLUTION SUBCUTANEOUS at 09:09

## 2021-01-06 RX ADMIN — Medication 81 MG: at 09:10

## 2021-01-06 RX ADMIN — Medication 10 ML: at 20:31

## 2021-01-06 RX ADMIN — ATORVASTATIN CALCIUM 80 MG: 80 TABLET, FILM COATED ORAL at 09:10

## 2021-01-06 RX ADMIN — SODIUM CHLORIDE, PRESERVATIVE FREE 10 ML: 5 INJECTION INTRAVENOUS at 20:31

## 2021-01-06 RX ADMIN — CLOPIDOGREL 75 MG: 75 TABLET, FILM COATED ORAL at 13:09

## 2021-01-06 RX ADMIN — SODIUM CHLORIDE, PRESERVATIVE FREE 10 ML: 5 INJECTION INTRAVENOUS at 09:18

## 2021-01-06 RX ADMIN — DARBEPOETIN ALFA 40 MCG: 40 INJECTION, SOLUTION INTRAVENOUS; SUBCUTANEOUS at 15:12

## 2021-01-06 RX ADMIN — Medication 10 ML: at 09:19

## 2021-01-06 RX ADMIN — HEPARIN SODIUM 5000 UNITS: 5000 INJECTION INTRAVENOUS; SUBCUTANEOUS at 09:09

## 2021-01-06 RX ADMIN — OXYCODONE HYDROCHLORIDE AND ACETAMINOPHEN 1 TABLET: 5; 325 TABLET ORAL at 19:49

## 2021-01-06 RX ADMIN — GABAPENTIN 300 MG: 300 CAPSULE ORAL at 08:53

## 2021-01-06 ASSESSMENT — ENCOUNTER SYMPTOMS
BACK PAIN: 0
ABDOMINAL PAIN: 0
NAUSEA: 0
SHORTNESS OF BREATH: 0
WHEEZING: 0

## 2021-01-06 ASSESSMENT — PAIN SCALES - GENERAL
PAINLEVEL_OUTOF10: 0
PAINLEVEL_OUTOF10: 9

## 2021-01-06 NOTE — PROGRESS NOTES
[facility]   Progress Note                   Date:   1/6/2021  Patient name: Kelly Cota  Date of admission:  1/4/2021 11:03 AM  MRN:   [id number]  YOB: 1960  PCP: Linda Hatch MD    Reason for Admission: Weakness and fatigue    Subjective:       Patient seen and examined at bedside. Alert and oriented. No acute issues overnight. Dopamine drip has been titrating down. Plan to stop dopamine drip today. Discussed with the nurse. The patient underwent cardiac cath yesterday and got 1 stent, drug-eluting stent in the RCA. The patient's rhythm strip this morning looks like A. fib. Definitely no P waves. Yesterday it was concerning for AV block versus bradycardia. Medications:   Scheduled Meds:   insulin glargine  10 Units Subcutaneous Once    heparin (porcine)  5,000 Units Subcutaneous 3 times per day    [Held by provider] amLODIPine  10 mg Oral Daily    And    atorvastatin  80 mg Oral Daily    aspirin  81 mg Oral Daily    insulin glargine  0.3 Units/kg Subcutaneous Nightly    Sucroferric Oxyhydroxide  1,000 mg Oral TID WC    gabapentin  300 mg Oral BID    sodium chloride flush  10 mL Intravenous 2 times per day    insulin lispro  0-6 Units Subcutaneous Nightly    insulin lispro  0-12 Units Subcutaneous TID WC       Continuous Infusions:   DOPamine 1.5 mcg/kg/min (01/06/21 0803)    dextrose         CBC:   Recent Labs     01/04/21  1131 01/05/21  0728 01/06/21  0605   WBC 6.5 11.6* 7.8   HGB 8.9* 9.8* 8.6*    241 198     BMP:    Recent Labs     01/04/21  2058 01/05/21  0728 01/06/21  0605    135 131*   K 4.4 4.5 4.0   CL 93* 93* 93*   CO2 19* 22 24   BUN 75* 76* 48*   CREATININE 10.03* 11.08* 8.79*   GLUCOSE 293* 229* 235*     Hepatic:   Recent Labs     01/04/21  1131   AST 27   ALT 75*   BILITOT 0.39   ALKPHOS 145*     Troponin: No results for input(s): TROPONINI in the last 72 hours.   BNP: No results for input(s): BNP in the last 72 hours. Lipids: No results for input(s): CHOL, HDL in the last 72 hours. Invalid input(s): LDLCALCU  INR: No results for input(s): INR in the last 72 hours. Objective:   Vitals: BP (!) 112/59   Pulse 69   Temp 98.4 °F (36.9 °C) (Oral)   Resp 12   Ht 5' 11\" (1.803 m)   Wt 223 lb 1.7 oz (101.2 kg)   SpO2 97%   BMI 31.12 kg/m²     General appearance: awake, alert, in no apparent respiratory distress   HEENT: Head: Normocephalic, no lesions, without obvious abnormality  Neck: no JVD  Lungs: clear to auscultation bilaterally, no basilar rales, no wheezing   Heart: regular rate and rhythm, S1, S2 normal, no murmur, click, rub or gallop  Abdomen: soft, non-tender; bowel sounds normal  Extremities: Below-knee amputation bilaterally. Echocardiogram:  The patient had an echo done recently in [facility] which showed ejection fraction of 55%. Moderate tricuspid regurgitation. Coronary Angiography:   1/6/2021  Two-vessel coronary artery disease. Successful drug-eluting stent in the RCA. Mild left ventricular dysfunction. Assessment:   1. Atrial fibrillation with slow ventricular response. Rhythm strip 1/6/2021 shows atrial fibrillation. 2.  History of hypertension on clonidine and labetalol at home  3. Hyperlipidemia  4. End-stage renal disease with dialysis on Monday, Wednesday and Friday  5. Diabetes mellitus type 2  6. Poor vasculopath with history of nonfunctioning fistula in the past, erectile dysfunction and below-knee amputations bilaterally  7. Recent COVID-19 infection. 8.  Secondary hyper parathyroidism  9. Anemia of end-stage renal disease  10. S/p cardiac cath 1/5/2021 with two-vessel coronary artery disease and successful drug-eluting stent placed on the RCA.     Patient Active Problem List:     Hyperlipidemia     Essential hypertension     ESRD on hemodialysis ([facility] [address] MWF     Insomnia     ED (erectile dysfunction)     Chronic bilateral low back pain with bilateral sciatica Controlled diabetes mellitus type 2 with complications (HCC)     S/P bilateral below knee amputation (HCC)     Chronic use of opiate drugs therapeutic purposes     Severe comorbid illness     DDD (degenerative disc disease), lumbar     Bradycardia    Treatment Plan:   1. Continue aspirin and increase the dose of statin. 2. The patient needs a pacemaker. We will hold off on Plavix until further discussion. 3. Continue to hold medications like clonidine and labetalol that can cause bradycardia. 4. Cardiology will continue to follow. 5. Medical management as per primary. 6. Once the patient is off dopamine drip and stable, no issues in transferring out of ICU. 7. The patient will need electrophysiology consult. Discussed with patient and nursing. Sara Contreras MD  PGY-3, Department of Internal Medicine  [address] [place] [address]  1/6/2021 8:07 AM    Attending Physician Statement  I have discussed the care of the patient, including pertinent history and exam findings, with the resident. I have seen and examined the patient and the key elements of all parts of the encounter have been performed by me. I agree with the assessment, plan and orders as documented by the resident. Will await Dr. Clover Dennis recommendations.   Rossy Cox MD

## 2021-01-06 NOTE — PROGRESS NOTES
Dialysis Post Treatment Note  Vitals:    01/06/21 1600   BP: (!) 151/73   Pulse: 87   Resp: 24   Temp: 98.2 °F (36.8 °C)   SpO2: 100%     Pre-Weight = 99.2kg  Post-weight = Weight: 212 lb 11.9 oz (96.5 kg)  Total Liters Processed = Total Liters Processed (l/min): 79.1 l/min  Rinseback Volume (mL) = Rinseback Volume (ml): 270 ml  Net Removal (mL) = NET Removed (ml): 3540 ml  Patient's dry weight=96.5kg  Type of access used= Right are AVF  Length of treatment=180 mins    Pt tolerated tx well; no acute distress noted pre, during or post tx; floor nurse Ish Garnica aware of pt's status.

## 2021-01-06 NOTE — PLAN OF CARE
Problem: Skin Integrity:  Goal: Will show no infection signs and symptoms  Description: Will show no infection signs and symptoms  Outcome: Met This Shift  Goal: Absence of new skin breakdown  Description: Absence of new skin breakdown  Outcome: Met This Shift     Problem: Falls - Risk of:  Goal: Will remain free from falls  Description: Will remain free from falls  Outcome: Met This Shift  Goal: Absence of physical injury  Description: Absence of physical injury  Outcome: Met This Shift     Problem: Aspiration:  Goal: Absence of aspiration  Description: Absence of aspiration  Outcome: Ongoing     Problem:  Bowel Function - Altered:  Goal: Bowel elimination is within specified parameters  Description: Bowel elimination is within specified parameters  Outcome: Ongoing     Problem: Cardiac Output - Decreased:  Goal: Hemodynamic stability will improve  Description: Hemodynamic stability will improve  Outcome: Ongoing     Problem: Fluid Volume - Imbalance:  Goal: Absence of imbalanced fluid volume signs and symptoms  Description: Absence of imbalanced fluid volume signs and symptoms  Outcome: Ongoing     Problem: Gas Exchange - Impaired:  Goal: Levels of oxygenation will improve  Description: Levels of oxygenation will improve  Outcome: Ongoing     Problem: Pain:  Goal: Recognizes and communicates pain  Description: Recognizes and communicates pain  Outcome: Ongoing     Problem: Serum Glucose Level - Abnormal:  Goal: Ability to maintain appropriate glucose levels will improve to within specified parameters  Description: Ability to maintain appropriate glucose levels will improve to within specified parameters  Outcome: Ongoing     Problem: Skin Integrity - Impaired:  Goal: Will show no infection signs and symptoms  Description: Will show no infection signs and symptoms  Outcome: Ongoing  Goal: Absence of new skin breakdown  Description: Absence of new skin breakdown  Outcome: Ongoing     Problem: Tissue Perfusion - Cardiopulmonary, Altered:  Goal: Absence of angina  Description: Absence of angina  Outcome: Ongoing

## 2021-01-06 NOTE — PROGRESS NOTES
[address] [place] [address]     Department of Internal Medicine - Staff Internal Medicine Service   ICU PATIENT TRANSFER NOTE        Patient:  Monique Veloz  YOB: 1960  MRN: [id number]     Acct: [de-identified]     Admit date: 1/4/2021    Code Status:-  Full code     Reason for ICU Admission:-       SUPPORT DEVICES: [] Ventilator [] BIPAP  [] Nasal Cannula [x] Room Air    Consultations:- [x] Cardiology [x] Nephrology  [] Hemo onco  [] GI                               [] ID [] ENT  [] Rheum [] Endo   []Physiotherapy                                 Others:-     NUTRITION:  [] NPO [] Tube Feeding (Specify: ) [] TPN  [x] PO    Central Lines:- [] No   [x] Yes           If yes - Days/Date of Insertion. Will discontinue after IV line placement    Pt seen,examined and Chart reviewed. ICU COURSE:    Kyle Heard a 63 y.o. who presents with fatigue and weakness, ongoing since around Christmas.  Patient was recently managed [address] [facility], told he was supposed to get a pacemaker, but states he left because no one cared about his leg pain.  Upon arrival to the ED, patient bradycardic with intermittent episodes down to 35.  That was sinus bradycardia. Cardiology was consulted in the ED, awaiting recommendations. Gilmar Louis has a history of ESRD on dialysis, type 2 diabetes, bilateral LE amputations.  Patient typically goes dialysis Monday Wednesday Friday, went today because of the holiday. While in the ED, patient's troponins were elevated in addition to his BNP.  Is unclear if this is secondary to his ESRD and needing dialysis.  Patient denies any chest pain, does feel little bit short of breath and weak overall. Patient was started on dopamine drip for bradycardia and his heart rate remained stable.     Due to bradycardia and elevated troponins, along with severe coronary artery calcification on CT there was concern of underlying coronary artery disease.   Patient had left heart cath on 1/5 revealing two-vessel by provider] amLODIPine  10 mg Oral Daily    And    atorvastatin  80 mg Oral Daily    aspirin  81 mg Oral Daily    [START ON 1/13/2021] darbepoetin ricky-polysorbate  40 mcg Intravenous Weekly    darbepoetin ricky-polysorbate        clopidogrel  75 mg Oral Daily    apixaban  2.5 mg Oral BID    insulin glargine  0.3 Units/kg Subcutaneous Nightly    Sucroferric Oxyhydroxide  1,000 mg Oral TID WC    gabapentin  300 mg Oral BID    sodium chloride flush  10 mL Intravenous 2 times per day    insulin lispro  0-6 Units Subcutaneous Nightly    insulin lispro  0-12 Units Subcutaneous TID WC     Continuous Infusions:   dextrose       PRN Meds:sodium chloride flush, acetaminophen, ondansetron, oxyCODONE-acetaminophen, sodium chloride flush, polyethylene glycol, acetaminophen **OR** acetaminophen, glucose, dextrose, glucagon (rDNA), dextrose    Objective:    CBC:   Recent Labs     01/04/21  1131 01/05/21  0728 01/06/21  0605   WBC 6.5 11.6* 7.8   HGB 8.9* 9.8* 8.6*    241 198     BMP:    Recent Labs     01/04/21  2058 01/05/21  0728 01/06/21  0605    135 131*   K 4.4 4.5 4.0   CL 93* 93* 93*   CO2 19* 22 24   BUN 75* 76* 48*   CREATININE 10.03* 11.08* 8.79*   GLUCOSE 293* 229* 235*     Calcium:  Recent Labs     01/06/21  0605   CALCIUM 8.1*     Ionized Calcium:No results for input(s): IONCA in the last 72 hours. Magnesium:  Recent Labs     01/06/21  0605   MG 2.1     Phosphorus:  Recent Labs     01/06/21  0605   PHOS 5.2*     BNP:No results for input(s): BNP in the last 72 hours. Glucose:  Recent Labs     01/05/21  2336 01/06/21  0810 01/06/21  1236   POCGLU 127* 179* 155*     HgbA1C: No results for input(s): LABA1C in the last 72 hours. INR: No results for input(s): INR in the last 72 hours.   Hepatic:   Recent Labs     01/04/21  1131   ALKPHOS 145*   ALT 75*   AST 27   PROT 6.5   BILITOT 0.39   LABALBU 3.6     Amylase and Lipase:  Recent Labs     01/04/21  1131   LACTA NOT REPORTED     Lactic Acid: Recent Labs     01/04/21  1131   LACTA NOT REPORTED     CARDIAC ENZYMES:No results for input(s): CKTOTAL, CKMB, CKMBINDEX, TROPONINI in the last 72 hours. BNP: No results for input(s): BNP in the last 72 hours. Lipids: No results for input(s): CHOL, TRIG, HDL, LDLCALC in the last 72 hours. Invalid input(s): LDL  ABGs: No results found for: PH, PCO2, PO2, HCO3, O2SAT  Thyroid:   Lab Results   Component Value Date    TSH 0.88 12/10/2020        Urinalysis: No results for input(s): BACTERIA, BLOODU, CLARITYU, COLORU, PHUR, PROTEINU, RBCUA, SPECGRAV, BILIRUBINUR, NITRU, WBCUA, LEUKOCYTESUR, GLUCOSEU in the last 72 hours. Assessment:  Active Problems:    Hyperlipidemia    Essential hypertension    ESRD on hemodialysis ([facility]) MWF    Controlled diabetes mellitus type 2 with complications ([facility])    S/P bilateral below knee amputation ([facility])    Bradycardia  Resolved Problems:    * No resolved hospital problems. *          Plan:    Acute symptomatic bradycardia likely due to RCA stenosis:  -Weaned off of dopamine drip, heart rate stable  -No indication of pacemaker as per EP     Newly diagnosed Coronary artery disease  -s/p PTCA-KAMILAH RCA  -continue DAPT, statin.   -BB on hold due to bradycardia for now     -New onset atrial fibrillation  -Started on Eliquis 2.5 mg twice daily, adjusted for renal disease  -Rate controlled for now     ESRD on dialysis:  -Typically dialyzed Monday Wednesday Friday  -Nephrology on board  -started on Sucroferric oxyhydroxide for hyperphosphatemia     History of hypertension:  -stable  -Hold home antihypertensives for now  -restart home meds as needed     Type-II Diabetes Mellitus   -HbA1c 7.9 as per 12/10  -blood glucose uncontrolled  -Continue patient on 30 units Lantus nightly  -hypoglycemia protocol.     History of PVD with peripheral neuropathy   -s/p b/l BKA in the past  -stable  -continue gabapentin and cilostazol     DVT prophylaxis: Eliquis  GI prophylaxis: none  Diet: renal diet with carb control      Travis Ford MD         PGY-1, Department of Internal Medicine  [place], [place]           1/6/2021, 2:27 PM    Attestation and add on       I have discussed the care of Renetta Nina , including pertinent history and exam findings,      1/6/21    with the resident. I have seen and examined the patient and the key elements of all parts of the encounter have been performed by me . I agree with the assessment, plan and orders as documented by the resident. Active Problems:    Hyperlipidemia    Essential hypertension    ESRD on hemodialysis ([facility] [address] MWF    Controlled diabetes mellitus type 2 with complications ([facility])    S/P bilateral below knee amputation (H[facility])    Bradycardia  Resolved Problems:    * No resolved hospital problems. *            ---- ;        Medications: Allergies:  No Known Allergies    Current Meds:   Scheduled Meds:    insulin glargine  25 Units Subcutaneous Nightly    apixaban  5 mg Oral BID    sodium chloride flush  10 mL Intravenous 2 times per day    amLODIPine  10 mg Oral Daily    And    atorvastatin  80 mg Oral Daily    aspirin  81 mg Oral Daily    clopidogrel  75 mg Oral Daily    darbepoetin ricky-polysorbate  40 mcg Intravenous Weekly    Sucroferric Oxyhydroxide  1,000 mg Oral TID WC    gabapentin  300 mg Oral BID    sodium chloride flush  10 mL Intravenous 2 times per day    insulin lispro  0-6 Units Subcutaneous Nightly    insulin lispro  0-12 Units Subcutaneous TID WC     Continuous Infusions:    dextrose       PRN Meds: sodium chloride flush, acetaminophen, ondansetron, oxyCODONE-acetaminophen, sodium chloride flush, polyethylene glycol, acetaminophen **OR** acetaminophen, glucose, dextrose, glucagon (rDNA), dextrose        [facility] [address], [address] [place] [address].    Phone [phone]   Fax: [phone]  Answering Service: [phone]

## 2021-01-06 NOTE — PLAN OF CARE
Problem: Skin Integrity:  Goal: Will show no infection signs and symptoms  Description: Will show no infection signs and symptoms  1/5/2021 2021 by Orly Lane RN  Outcome: Ongoing  1/5/2021 1742 by Dev Zamora RN  Outcome: Ongoing  Goal: Absence of new skin breakdown  Description: Absence of new skin breakdown  1/5/2021 2021 by Orly Lane RN  Outcome: Ongoing  1/5/2021 1742 by Dev Zamora RN  Outcome: Ongoing     Problem: Falls - Risk of:  Goal: Will remain free from falls  Description: Will remain free from falls  1/5/2021 2021 by Orly Lane RN  Outcome: Ongoing  1/5/2021 1742 by Dev Zamora RN  Outcome: Met This Shift  Goal: Absence of physical injury  Description: Absence of physical injury  1/5/2021 2021 by Orly Lane RN  Outcome: Ongoing  1/5/2021 1742 by Dev Zamora RN  Outcome: Met This Shift     Problem: Aspiration:  Goal: Absence of aspiration  Description: Absence of aspiration  1/5/2021 2021 by Orly Lane RN  Outcome: Ongoing  1/5/2021 1742 by Dev Zamora RN  Outcome: Met This Shift     Problem:  Bowel Function - Altered:  Goal: Bowel elimination is within specified parameters  Description: Bowel elimination is within specified parameters  1/5/2021 2021 by Orly Lane RN  Outcome: Ongoing  1/5/2021 1742 by Dev Zamora RN  Outcome: Ongoing     Problem: Fluid Volume - Imbalance:  Goal: Absence of imbalanced fluid volume signs and symptoms  Description: Absence of imbalanced fluid volume signs and symptoms  1/5/2021 2021 by Orly Lane RN  Outcome: Ongoing  1/5/2021 1742 by Dev Zamora RN  Outcome: Ongoing     Problem: Gas Exchange - Impaired:  Goal: Levels of oxygenation will improve  Description: Levels of oxygenation will improve  1/5/2021 2021 by Orly Lane RN  Outcome: Ongoing  1/5/2021 1742 by Dev Zamora RN  Outcome: Ongoing     Problem: Pain:  Goal: Recognizes and communicates pain  Description: Recognizes and communicates pain  1/5/2021 2021 by Leslie Borden Jose Maria Cervantes RN  Outcome: Ongoing  1/5/2021 1742 by Junior Hicks RN  Outcome: Ongoing     Problem: Serum Glucose Level - Abnormal:  Goal: Ability to maintain appropriate glucose levels will improve to within specified parameters  Description: Ability to maintain appropriate glucose levels will improve to within specified parameters  1/5/2021 2021 by Loy Morrison RN  Outcome: Ongoing  1/5/2021 1742 by Junior Hicks RN  Outcome: Ongoing     Problem: Skin Integrity - Impaired:  Goal: Will show no infection signs and symptoms  Description: Will show no infection signs and symptoms  1/5/2021 2021 by Loy Morrison RN  Outcome: Ongoing  1/5/2021 1742 by Junior Hicks RN  Outcome: Ongoing  Goal: Absence of new skin breakdown  Description: Absence of new skin breakdown  1/5/2021 2021 by Loy Morrison RN  Outcome: Ongoing  1/5/2021 1742 by Junior Hicks RN  Outcome: Ongoing     Problem: Tissue Perfusion - Cardiopulmonary, Altered:  Goal: Absence of angina  Description: Absence of angina  1/5/2021 2021 by Loy Morrison RN  Outcome: Ongoing  1/5/2021 1742 by Junior Hicks RN  Outcome: Met This Shift

## 2021-01-06 NOTE — PROGRESS NOTES
Critical care team - Resident sign-out to medicine service      Date and time: 1/6/2021 1:58 PM  Patient's name:  [address] Record Number: [id number]  Patient's account/billing number: [de-identified]  Patient's YOB: 1960  Age: 63 y.o. Date of Admission: 1/4/2021 11:03 AM  Length of stay during current admission: 2    Primary Care Physician: Valerie Wang MD    Code Status: Full Code    Mode of physician to physician communication:        [x] Via telephone   [] In person     Date and time of sign-out: 1/6/2021 1:58 PM    Accepting Internal Medicine resident: Dr. Manoj Rioajs team: IM Team - Med 3    Accepting team's attending: Dr. Chaitanya Montgomery    Patient's current ICU Bed:  104    Patient's assigned bed on floor:  405        [] Med-Surg Monitored [x] Step-down       [] Psychiatry ICU       [] Psych floor     Reason for ICU admission:     Bradycardia requiring dopamine infusion    ICU course summary:     History was obtained from chart review and the patient.       Marilee Joel is a 63 y.o. who presents with fatigue and weakness, ongoing since around Jefe. Patient was recently managed [facility], told he was supposed to get a pacemaker, but states he left because no one cared about his leg pain. Upon arrival to the ED, patient bradycardic with intermittent episodes down to 35. That was sinus bradycardia. Cardiology was consulted in the ED, awaiting recommendations. Patient has a history of ESRD on dialysis, type 2 diabetes, bilateral LE amputations. Patient typically goes dialysis Monday Wednesday Friday, went today because of the holiday.     While in the ED, patient's troponins were elevated in addition to his BNP. Is unclear if this is secondary to his ESRD and needing dialysis. Patient denies any chest pain, does feel little bit short of breath and weak overall. Patient was started on dopamine drip for bradycardia and his heart rate remained stable.     Due to bradycardia and elevated troponins, along with severe coronary artery calcification on CT there was concern of underlying coronary artery disease. Patient had left heart cath on 1/5 revealing two-vessel disease with PCI to RCA. Patient was weaned off of dopamine drip and his heart rate is stable around 70s. Bradycardia could be direct related to RCA stenosis, no expecting heart rate to be stabilized after angioplasty. Patient is currently on 2 L NC oxygen due to hypoxemia from fluid overload likely secondary to ESRD. He is on hemodialysis Monday Wednesday Friday for ESRD, but his routine got disrupted due to holidays. Procedures during patient's ICU stay:     Left heart cath    Current Vitals:     /65   Pulse 81   Temp 98.6 °F (37 °C)   Resp 21   Ht 5' 11\" (1.803 m)   Wt 218 lb 11.1 oz (99.2 kg)   SpO2 96%   BMI 30.50 kg/m²       Cultures:     Blood cultures:                 [] None drawn      [] Negative             []  Positive (Details:  )  Urine Culture:                   [] None drawn      [] Negative             []  Positive (Details:  )  Sputum Culture:               [] None drawn       [] Negative             []  Positive (Details:  )   Endotracheal aspirate:     [] None drawn       [] Negative             []  Positive (Details:  )       Consults:     1. Cardiology  2. Nephrology  3.  EP    Assessment:     Patient Active Problem List    Diagnosis Date Noted    Bradycardia 01/04/2021    Severe comorbid illness 12/15/2020    DDD (degenerative disc disease), lumbar 12/15/2020    Chronic use of opiate drugs therapeutic purposes 04/04/2019    S/P bilateral below knee amputation ([facility] [address] 12/27/2018    Controlled diabetes mellitus type 2 with complications ([facility] [address] 41/56/0849    ED (erectile dysfunction) 06/09/2015    Chronic bilateral low back pain with bilateral sciatica 06/09/2015    Insomnia 10/07/2014    Hyperlipidemia     Essential hypertension     ESRD on hemodialysis ([address] MWF Additional assessment:    Acute symptomatic bradycardia likely due to RCA stenosis:  -Started on dopamine by emergency department per cardiology recommendations  -Repeat EKG with bradycardia, HR looks regularly irregular but doesn't look like afib, prolonged DE interval - concern of AV block  -Holding negative chronotropic meds. Patient takes labetalol and clonidine for hypertension, which may be contributing to his bradycardia - holding for now  -Weaned off of dopamine drip, heart rate stable  -No indication of pacemaker as per EP     Newly diagnosed Coronary artery disease  -left heart cath 1/5 two vessel disease prox RCA and distal small LAD  -s/p PTCA-KAMILAH RCA  -continue DAPT, statin.   -BB on hold due to bradycardia for now  -Check with cardiology when they want to resume beta-blocker     -New onset atrial fibrillation  -Started on Eliquis 2.5 mg twice daily, adjusted for renal disease  -Rate controlled for now     Acute Hypoxemia  -secondary to fluid overload from ESRD  -bilateral pleural effusion  -maintaining sats on 2L NC  -expecting improvement with HD     ESRD on dialysis:  -Typically dialyzed Monday Wednesday Friday  -Dialysis session yesterday with 3.5 L out  -Nephrology on board - likely another session today  -started on Sucroferric oxyhydroxide for hyperphosphatemia  -Daily BMP     History of hypertension:  -stable  -currently on dopamine  -Hold home antihypertensives for now  -restart home meds as needed     Type-II Diabetes Mellitus   -HbA1c 7.9 as per 12/10  -blood glucose uncontrolled  -Hold oral hypoglycemics  -continue lantus to 0.3 units/kg nightly  -give lantus 10 units stat  -continue to cover with ss insulin  -hypoglycemia protocol.  -check blood glucose before meals and at bedtime     History of PVD with peripheral neuropathy   -s/p b/l BKA in the past  -stable  -continue gabapentin and cilostazol        DVT prophylaxis: heparin  GI prophylaxis: none  Diet: renal diet with carb control    Recommended Follow-up:     1. Follow up with further cardiology recommendations to start beta-blocker  2. Hemodialysis as per nephrology - another session today  3. Continue dual antiplatelet therapy. 4. Continue anticoagulation with eliquis for A. Fib  5. Resume antihypertensives when able    Above mentioned assessment and plan was discussed by me with the admitting medicine resident. The medicine team assigned to the patient by medicine admitting resident will be following up the patient from now onwards on the floor. Tyson Cristina MD  critical care Resident, PGY-1  [facility];  [facility]  1/6/2021,1:58 PM

## 2021-01-06 NOTE — CONSULTS
[facility]  In PatientCardiology Consult             Date:   1/6/2021  Patient name: Amador Gabriel  Date of admission:  1/4/2021 11:03 AM  MRN:   [id number]  YOB: 1960      Reason for Admission:  Bradycardia; ESRD    CHIEF COMPLAINT:  Weakness    History Obtained From:  Medical record    HISTORY OF PRESENT ILLNESS:      The patient is a 63 y.o gentleman with h/o HTN, DM and ESRD, now one month s/p COVID-19, who was admitted with weakness and bradycardia after a missed HD. His BUN was 76 mg/dl, with serum K+ 4.9 mEq/L, with EKG on admission showing sinus bradycardia, 52 bpm.  He was also found to have transient PAF. After HD, his HR improved and he has been weaned off dopamine, currently with sinus rates 70-80 bpm with no further PAF. Cardiac catheterization yesterday showed calcified ostial 90% stenosis, reduced t 20% with PTCA/ KAMILAH. LVEF 40%. Past Medical History:   has a past medical history of Chronic bilateral low back pain with bilateral sciatica, CRF (chronic renal failure), DDD (degenerative disc disease), lumbar, Diabetes mellitus ([facility] [address] Dialysis patient [facility]), ED (erectile dysfunction), History of echocardiogram, HTN (hypertension), Hyperlipidemia, LVH (left ventricular hypertrophy), PVD (peripheral vascular disease) ([facility] [address] S/P bilateral BKA (below knee amputation) ([facility] [address] and Wears glasses. Past Surgical History:   has a past surgical history that includes Glaucoma surgery; Cataract removal with implant; Leg amputation below knee (Bilateral); Tunneled venous catheter placement; Dialysis fistula creation (Bilateral); and Finger surgery (Left). Home Medications:    Prior to Admission medications    Medication Sig Start Date End Date Taking?  Authorizing Provider   aspirin 81 MG chewable tablet Take 81 mg by mouth every morning   Yes Historical Provider, MD   Sucroferric Oxyhydroxide (VELPHORO) 500 MG CHEW Take 2 tablets by mouth 3 times daily (with meals) Crush or chew and shallow 2 tablets three times a day with meals   Yes Historical Provider, MD   hydrALAZINE (APRESOLINE) 25 MG tablet TAKE 1 TABLET BY MOUTH TWO  TIMES DAILY 5/6/20  Yes Fran Bagley MD   [address] [facility] by Does not apply route 12/15/20   Millie Montez MD   glimepiride (AMARYL) 2 MG tablet Take 1 tablet by mouth every morning (before breakfast)  Patient not taking: Reported on 12/14/2020 12/11/20   Fran Bagley MD   linagliptin (TRADJENTA) 5 MG tablet TAKE ONE TABLET BY MOUTH DAILY 12/3/20   Fran Bagley MD   amLODIPine-atorvastatatin (CADUET) 10-20 MG per tablet Take 1 tablet by mouth daily 11/2/20 5/1/21  Fran Bagley MD   tadalafil (CIALIS) 20 MG tablet Take 1 tablet by mouth once as needed for Erectile Dysfunction 9/17/20 9/17/20  Fran Bagley MD   lisinopril (PRINIVIL;ZESTRIL) 10 MG tablet TAKE 1 TABLET BY MOUTH  TWICE DAILY  Patient taking differently: Take 5 mg by mouth daily TAKE 1 TABLET BY MOUTH  TWICE DAILY 7/10/20   Fran Bagley MD   cilostazol (PLETAL) 100 MG tablet Take 100 mg by mouth 2 times daily. Pt states NOT Taking med    Historical Provider, MD   NONFORMULARY     Historical Provider, MD   labetalol (NORMODYNE) 200 MG tablet Take 200 mg by mouth 3 times daily Indications: On Tues/Thurs/Sat/Sun     Historical Provider, MD   oxyCODONE-acetaminophen (PERCOCET) 5-325 MG per tablet Take 1 tablet by mouth three times a week  Indications: after dialysis . 7/18/17   Historical Provider, MD   gabapentin (NEURONTIN) 300 MG capsule TAKE ONE CAPSULE BY MOUTH THREE TIMES A DAY 2/13/17   Lei Dee DO   cloNIDine (CATAPRES) 0.3 MG tablet Take 1 tablet by mouth 2 times daily 6/29/16   Joseph Bowers MD       Allergies:  Patient has no known allergies. Social History:   reports that he has never smoked. He has never used smokeless tobacco. He reports current alcohol use. He reports that he does not use drugs.      Family History:   Positive for early CAD    REVIEW OF SYSTEMS: · Constitutional: there has been no unanticipated weight loss. There's been No change in energy level, No change in activity level. · Eyes: No visual changes or diplopia. No scleral icterus. · ENT: No Headaches, hearing loss or vertigo. No mouth sores or sore throat. · Cardiovascular: No problem  · Respiratory: No previous reported problems  · Gastrointestinal: No abdominal pain, appetite loss, blood in stools. No change in bowel or bladder habits. · Genitourinary: No dysuria, trouble voiding, or hematuria. · Musculoskeletal:  No gait disturbance, No weakness or joint complaints. · Integumentary: No rash or pruritis. · Neurological: No headache, diplopia, change in muscle strength, numbness or tingling. No change in gait, balance, coordination, mood, affect, memory, mentation, behavior. · Psychiatric: No anxiety, or depression. · Endocrine: No temperature intolerance. No excessive thirst, fluid intake, or urination. No tremor. · Hematologic/Lymphatic: No abnormal bruising or bleeding, blood clots or swollen lymph nodes. · Allergic/Immunologic: No nasal congestion or hives. PHYSICAL EXAM:    Physical Examination:    /62   Pulse 66   Temp 98.4 °F (36.9 °C) (Oral)   Resp 20   Ht 5' 11\" (1.803 m)   Wt 223 lb 1.7 oz (101.2 kg)   SpO2 100%   BMI 31.12 kg/m²    Constitutional and General Appearance: alert, cooperative, no distress and appears stated age  HEENT: PERRL, no cervical lymphadenopathy. No masses palpable. Normal oral mucosa  Respiratory:  · Normal excursion and expansion without use of accessory muscles  · Resp Auscultation: Good respiratory effort. No for increased work of breathing.  On auscultation: clear to auscultation bilaterally  Cardiovascular:  · The apical impulse is not displaced  · Heart tones are crisp and normal. regular S1 and S2. Murmurs: 1/6 systolic murmur at LLSB  · Jugular venous pulsation Normal  · The carotid upstroke is normal in amplitude and contour without delay or bruit  · Peripheral pulses are symmetrical and full   Abdomen:  · No masses or tenderness  · Bowel sounds present  Extremities:  ·  No Cyanosis or Clubbing  ·  Lower extremity edema: Trace  ·  Skin: Warm and dry  Neurological:  · Alert and oriented. · Moves all extremities well  · No abnormalities of mood, affect, memory, mentation, or behavior are noted    DATA:    Diagnostics:      EKG: Sinus bradycardia, 52 bpm, with PAC's and non-specific ST and T wave abnormality    CARDIAC CATHETERIZATION ( 1/5/20)  Left main: NL  LAD: mid 20%, distal at apical area 80% 9Small territory)  LCX: NL  RCA: Proximal / ostial calcified 90% stenosis. Required PTCA -KAMILAH reducing stenosis to 20%  The LV gram was performed in the BUI 30 position. LVEF: 40%. LV Wall Motion: global mild hypokinesia     Conclusions:  1. Two vessel CAD  2. Successful PTCA -KAMILAH RCA  3. Mild LV dysfunction    Labs:     CBC:   Recent Labs     01/05/21  0728 01/06/21  0605   WBC 11.6* 7.8   HGB 9.8* 8.6*   HCT 31.0* 28.2*    198     BMP:   Recent Labs     01/05/21  0728 01/06/21  0605    131*   K 4.5 4.0   CO2 22 24   BUN 76* 48*   CREATININE 11.08* 8.79*   LABGLOM 5* 6*   GLUCOSE 229* 235*     BNP: No results for input(s): BNP in the last 72 hours. PT/INR: No results for input(s): PROTIME, INR in the last 72 hours. APTT:  Recent Labs     01/05/21  1049   APTT 115.4*     CARDIAC ENZYMES:No results for input(s): CKTOTAL, CKMB, CKMBINDEX, TROPONINI in the last 72 hours. FASTING LIPID PANEL:  Lab Results   Component Value Date    HDL 61 01/16/2020    TRIG 72 01/16/2020     LIVER PROFILE:  Recent Labs     01/04/21  1131   AST 27   ALT 75*   LABALBU 3.6         IMPRESSION:    1. Sinus bradycardia, improved, likely due to SA mike ischemia and vagal effects due to severe ostial RCA stenosis. 2. CAD, s/p KAMILAH to a 90% ostial RCA stenosis 1/5/20  3. Essential HTN, LVEF 40%  4. ESRD, on chronic HD  5. Type II DM  6.  S/p bilateral BKA    Patient Active Problem List   Diagnosis    Hyperlipidemia    Essential hypertension    ESRD on hemodialysis ([facility] [address] MWF    Insomnia    ED (erectile dysfunction)    Chronic bilateral low back pain with bilateral sciatica    Controlled diabetes mellitus type 2 with complications ([facility] [address]    S/P bilateral below knee amputation (HCC)    Chronic use of opiate drugs therapeutic purposes    Severe comorbid illness    DDD (degenerative disc disease), lumbar    Bradycardia       RECOMMENDATIONS:  1. No indication for PPM at the present time; would pursue outpatient Holter monitor and close f/u within the next 4 weeks. 2. Agree with anticoagulation for PAF. Discussed with patient and nursing.     Electronically signed by Ines Beauchamp MD on 1/6/2021 at 12:04 PM.  [facility]

## 2021-01-06 NOTE — PROGRESS NOTES
Critical Care Team - Daily Progress Note      Date and time: 2021 7:08 AM  [id number]  Patient's account/billing number: [de-identified]  Patient's YOB: 1960  Age: 63 y.o. Date of Admission: 2021 11:03 AM  Length of stay during current admission: 2      Primary Care Physician: Valerie Wang MD  ICU Attending Physician: Dr. Mayra Saleh Status: Full Code    Reason for ICU admission:   Chief Complaint   Patient presents with    Bradycardia     [facility] wanted to place pacemaker, pt left and came here       Subjective:     OVERNIGHT EVENTS:   No acute events overnight. Had HD session yesterday with removal of 3.5 L fluid. Left heart cath done yesterday showed distal small LAD and proximal RCA stenosis and required a KAMILAH to RCA. Currently on 2.5 mcg/kg/min of dopamine, heart rate stable in 70s. BP stable. Reports feeling better, no active complaints. Denies CP, SOB, abdominal pain or dizziness. On 2L NC. Blood glucose elevated, last night dose of lantus not given    Review of Systems   Constitutional: Positive for fatigue. Negative for fever. HENT: Negative for congestion. Respiratory: Negative for shortness of breath and wheezing. Cardiovascular: Negative for chest pain and palpitations. Gastrointestinal: Negative for abdominal pain and nausea. Genitourinary: Positive for decreased urine volume (anuria due to ESRD). Musculoskeletal: Negative for back pain. Neurological: Negative for dizziness, speech difficulty, weakness (legs weakness) and numbness. Psychiatric/Behavioral: Negative for confusion. All other systems reviewed and are negative.     OBJECTIVE:     VITAL SIGNS:  /61   Pulse 70   Temp 97.9 °F (36.6 °C) (Oral)   Resp 18   Ht 5' 11\" (1.803 m)   Wt 223 lb 1.7 oz (101.2 kg)   SpO2 98%   BMI 31.12 kg/m²   Tmax over 24 hours:  Temp (24hrs), Av.4 °F (36.9 °C), Min:97.9 °F (36.6 °C), Max:98.8 °F (37.1 °C)      Patient Vitals for the past 8 hrs:   BP Temp Temp src Pulse Resp SpO2 Weight   01/06/21 0630 120/61 -- -- 70 18 98 % --   01/06/21 0615 120/61 -- -- 70 15 100 % --   01/06/21 0600 124/66 -- -- 76 8 100 % --   01/06/21 0545 (!) 119/59 -- -- 71 14 100 % --   01/06/21 0530 121/65 -- -- 75 9 100 % --   01/06/21 0515 (!) 120/57 -- -- 75 15 100 % --   01/06/21 0500 115/60 -- -- 72 17 99 % --   01/06/21 0445 (!) 112/55 -- -- 75 17 99 % --   01/06/21 0430 116/61 -- -- 74 17 99 % --   01/06/21 0415 (!) 110/58 -- -- 74 16 99 % --   01/06/21 0400 110/60 97.9 °F (36.6 °C) Oral 76 15 99 % 223 lb 1.7 oz (101.2 kg)   01/06/21 0345 -- -- -- 78 16 98 % --   01/06/21 0330 128/65 -- -- 81 16 98 % --   01/06/21 0315 128/68 -- -- 80 17 99 % --   01/06/21 0300 (!) 140/64 -- -- 83 17 99 % --   01/06/21 0245 138/71 -- -- 82 15 99 % --   01/06/21 0145 137/66 -- -- 84 16 99 % --   01/06/21 0130 133/70 -- -- 80 19 98 % --   01/06/21 0115 123/71 -- -- 83 19 97 % --   01/06/21 0100 124/68 -- -- 81 15 98 % --   01/06/21 0045 133/71 -- -- 82 15 98 % --   01/06/21 0030 132/72 -- -- 82 16 98 % --   01/06/21 0015 139/73 -- -- 80 15 98 % --   01/06/21 0000 133/70 98.4 °F (36.9 °C) Oral 83 16 97 % --   01/05/21 2345 (!) 142/80 -- -- 81 15 97 % --   01/05/21 2330 131/73 -- -- 86 20 98 % --   01/05/21 2315 -- -- -- 83 16 96 % --         Intake/Output Summary (Last 24 hours) at 1/6/2021 0708  Last data filed at 1/6/2021 0400  Gross per 24 hour   Intake 2113 ml   Output 3900 ml   Net -1787 ml     Date 01/06/21 0000 - 01/06/21 2359   Shift 0655-9615 3768-1057 [id number] 24 Hour Total   INTAKE   P.O.(mL/kg/hr) 118   118   I. V.(mL/kg) 306(3)   306(3)   Shift Total(mL/kg) 424(4.2)   424(4.2)   OUTPUT   Shift Total(mL/kg)       Weight (kg) 101.2 101.2 101.2 101.2     Wt Readings from Last 3 Encounters:   01/06/21 223 lb 1.7 oz (101.2 kg)   12/14/20 212 lb (96.2 kg)   12/10/20 212 lb (96.2 kg)     Body mass index is 31.12 kg/m².         PHYSICAL EXAM:  Constitutional: alert and oriented x 3  HEENT: PERRLA, EOMI, sclera clear, anicteric  Respiratory: NVB, decreased breath sounds, left lung base crackles  Cardiovascular: regular rate and rhythm, normal S1, S2, no murmur noted and 2+ pulses throughout  Abdomen: soft, nontender, nondistended, no masses or organomegaly  NEUROLOGIC: Awake, alert, oriented to name, place and time. Cranial nerves II-XII are grossly intact. Motor is 5 out of 5 bilaterally. Sensory is intact. Extremities:  S/p b/l BKA. peripheral pulses normal, no pedal edema.     MEDICATIONS:  Scheduled Meds:   insulin glargine  0.3 Units/kg Subcutaneous Nightly    Sucroferric Oxyhydroxide  1,000 mg Oral TID WC    gabapentin  300 mg Oral BID    [Held by provider] amLODIPine  10 mg Oral Daily    And    atorvastatin  20 mg Oral Daily    sodium chloride flush  10 mL Intravenous 2 times per day    insulin lispro  0-6 Units Subcutaneous Nightly    insulin lispro  0-12 Units Subcutaneous TID WC     Continuous Infusions:   DOPamine 2.5 mcg/kg/min (01/06/21 0508)    dextrose       PRN Meds:       oxyCODONE-acetaminophen, 1 tablet, Daily PRN      sodium chloride flush, 10 mL, PRN      polyethylene glycol, 17 g, Daily PRN      acetaminophen, 650 mg, Q6H PRN    Or      acetaminophen, 650 mg, Q6H PRN      glucose, 15 g, PRN      dextrose, 12.5 g, PRN      glucagon (rDNA), 1 mg, PRN      dextrose, 100 mL/hr, PRN        SUPPORT DEVICES: [] Ventilator [] BIPAP  [] Nasal Cannula [] Room Air    VENT SETTINGS (Comprehensive) (if applicable):  Vent Information  Skin Assessment: Clean, dry, & intact  SpO2: 98 %  Additional Respiratory  Assessments  Pulse: 70  Resp: 18  SpO2: 98 %    ABGs:     No results found for: PHART, PH, CQZ5OME, PCO2, PO2ART, PO2, ZTK6AAQ, HCO3, BEART, BE, THGBART, THB, EGM1CQM, C8PUWQNF, O2SAT, FIO2  Lactic Acid:   Lab Results   Component Value Date    LACTA NOT REPORTED 01/04/2021    LACTA 1.6 09/06/2017    LACTA 1.1 09/05/2017         DATA:  Complete Blood Count:   Recent Labs     01/04/21  1131 01/05/21  0728 01/06/21  0605   WBC 6.5 11.6* 7.8   HGB 8.9* 9.8* 8.6*   MCV 85.3 85.4 85.2    241 198   RBC 3.39* 3.63* 3.31*   HCT 28.9* 31.0* 28.2*   MCH 26.3 27.0 26.0   MCHC 30.8 31.6 30.5   RDW 21.0* 21.2* 20.9*   MPV 12.0 10.7 10.2        PT/INR:    Lab Results   Component Value Date    PROTIME 10.1 09/18/2019    PROTIME 10.3 05/31/2012    INR 0.9 09/18/2019     PTT:    Lab Results   Component Value Date    APTT 115.4 01/05/2021       Basal Metabolic Profile:   Recent Labs     01/04/21 2058 01/05/21  0728 01/06/21  0605    135 131*   K 4.4 4.5 4.0   BUN 75* 76* 48*   CREATININE 10.03* 11.08* 8.79*   CL 93* 93* 93*   CO2 19* 22 24      Magnesium:   Lab Results   Component Value Date    MG 2.1 01/06/2021    MG 2.3 01/05/2021    MG 2.3 10/08/2020     Phosphorus:   Lab Results   Component Value Date    PHOS 5.2 01/06/2021    PHOS 6.0 01/05/2021    PHOS 5.4 01/25/2013     S. Calcium:  Recent Labs     01/06/21  0605   CALCIUM 8.1*     S. Ionized Calcium:No results for input(s): IONCA in the last 72 hours. Urinalysis:   Lab Results   Component Value Date    NITRU NEGATIVE 06/13/2013    COLORU YELLOW 06/13/2013    PHUR 7.5 06/13/2013    WBCUA 0 TO 2 06/13/2013    RBCUA 10 TO 20 06/13/2013    MUCUS NOT REPORTED 06/13/2013    TRICHOMONAS NOT REPORTED 06/13/2013    YEAST NOT REPORTED 06/13/2013    BACTERIA FEW 06/13/2013    SPECGRAV 1.015 06/13/2013    LEUKOCYTESUR NEGATIVE 06/13/2013    UROBILINOGEN Normal 06/13/2013    BILIRUBINUR NEGATIVE 06/13/2013    GLUCOSEU 3+ 06/13/2013    KETUA NEGATIVE 06/13/2013    AMORPHOUS NOT REPORTED 06/13/2013       CARDIAC ENZYMES: No results for input(s): CKMB, CKMBINDEX, TROPONINI in the last 72 hours. Invalid input(s): CKTOTAL;3  BNP: No results for input(s): BNP in the last 72 hours.     LFTS  Recent Labs     01/04/21  1131   ALKPHOS 145*   ALT 75*   AST 27   BILITOT 0.39   LABALBU bradycardia requiring dopamine    -? New onset atrial fibrillation vs AV block  -Bradycardia with irregular heart rate  -Bradycardia and A. Fib/AV block could be secondary to underlying coronary artery disease  -Holding negative chronotropics  -EP eval    Acute Hypoxemia resolved   -secondary to fluid overload from ESRD  -bilateral pleural effusion  -maintaining sats on 2L NC  -expecting improvement with HD  -follow up with repeat CXR today    ESRD on dialysis:  -Typically dialyzed Monday Wednesday Friday  -Dialysis session yesterday with 3.5 L out  -Nephrology on board - likely another session today  -started on Sucroferric oxyhydroxide for hyperphosphatemia  -Daily BMP     History of hypertension:  -stable  -currently on dopamine  -Hold home antihypertensives for now  -restart home meds as needed    Type-II Diabetes Mellitus   -HbA1c 7.9 as per 12/10  -blood glucose uncontrolled  -Hold oral hypoglycemics  -continue lantus to 0.3 units/kg nightly  -give lantus 10 units stat  -continue to cover with ss insulin  -hypoglycemia protocol.  -check blood glucose before meals and at bedtime    History of PVD with peripheral neuropathy   -s/p b/l BKA in the past  -stable  -continue gabapentin and cilostazol       DVT prophylaxis: heparin  GI prophylaxis: none  Diet: renal diet with carb control    Rashad George M.D. Department of Internal Medicine/ Critical care  [facility] ([place]             1/6/2021, 7:08 AM  Attending Physician Statement  I have discussed the care of Renetta Nina, including pertinent history and exam findings,  with the resident. I have seen and examined the patient and the key elements of all parts of the encounter have been performed by me. I agree with the assessment, plan and orders as documented by the resident with additions .   Cad post PTCA - keven to RCA   Bradycardia resolved   Afib   ESRD - hd   Plan   Off dopamine   Cont asa , Plavix , bb   Start eliquis for afib   Per EP no plan for pacemaker   Ok to step down   Dc TLC        Total critical care time caring for this patient with life threatening, unstable organ failure, including direct patient contact, management of life support systems, review of data including imaging and labs, discussions with other team members and physicians at least 27   Min so far today, excluding procedures. Treatment plan Discussed with nursing staff in detail , all questions answered . Electronically signed by Gaurav Crocker MD on   1/6/21 at 4:17 PM EST    Please note that this chart was generated using voice recognition Dragon dictation software. Although every effort was made to ensure the accuracy of this automated transcription, some errors in transcription may have occurred.

## 2021-01-06 NOTE — PROGRESS NOTES
Report called to 1A. Arterial line discontinued without difficulty Pressure held x 11 minutes, DSD applied.

## 2021-01-06 NOTE — PROGRESS NOTES
NEPHROLOGY PROGRESS NOTE      SUBJECTIVE     Had hemodialysis yesterday as he had missed on Monday. 3.5 kg taken off. Today is regular scheduled session of dialysis. Dopamine turned off. Heart rate around 70. EKG showing evidence of slow A. fib/questionable heart block. Permanent pacemaker placement today or tomorrow as per cardiology recommendations. Had cardiac cath with stent placement to RCA yesterday. No chest pain shortness of breath. OBJECTIVE     Vitals:    01/06/21 0830 01/06/21 0845 01/06/21 0900 01/06/21 0915   BP: 130/62 134/68 125/64 110/63   Pulse: 69 71 73 70   Resp: 17 16 23 17   Temp:       TempSrc:       SpO2: 97% 98% 99% 98%   Weight:       Height:         24HR INTAKE/OUTPUT:      Intake/Output Summary (Last 24 hours) at 1/6/2021 0665  Last data filed at 1/6/2021 0919  Gross per 24 hour   Intake 1965.58 ml   Output 3900 ml   Net -1934.42 ml       General appearance:Awake, alert, in no acute distress  HEENT: PERRLA  Respiratory::vesicular breath sounds,no wheeze/crackles  Cardiovascular:S1 S2 normal,no gallop or organic murmur. Abdomen:Non tender/non distended. Bowel sounds present  Extremities: No Cyanosis or Clubbing,Lower extremity edema bilateral below-knee amputation  Neurological:Alert and oriented. No abnormalities of mood, affect, memory, mentation, or behavior are noted      MEDICATIONS     Scheduled Meds:    sodium chloride flush  10 mL Intravenous 2 times per day    [START ON 1/7/2021] clopidogrel  75 mg Oral Daily    heparin (porcine)  5,000 Units Subcutaneous 3 times per day    [Held by provider] amLODIPine  10 mg Oral Daily    And    atorvastatin  80 mg Oral Daily    aspirin  81 mg Oral Daily    insulin glargine  0.3 Units/kg Subcutaneous Nightly    Sucroferric Oxyhydroxide  1,000 mg Oral TID WC    gabapentin  300 mg Oral BID    sodium chloride flush  10 mL Intravenous 2 times per day    insulin lispro  0-6 Units Subcutaneous Nightly    insulin lispro  0-12 Units Subcutaneous TID WC     Continuous Infusions:    DOPamine Stopped (01/06/21 0916)    dextrose       PRN Meds:  sodium chloride flush, acetaminophen, ondansetron, oxyCODONE-acetaminophen, sodium chloride flush, polyethylene glycol, acetaminophen **OR** acetaminophen, glucose, dextrose, glucagon (rDNA), dextrose  Home Meds:                Medications Prior to Admission: aspirin 81 MG chewable tablet, Take 81 mg by mouth every morning  Sucroferric Oxyhydroxide (VELPHORO) 500 MG CHEW, Take 2 tablets by mouth 3 times daily (with meals) Crush or chew and shallow 2 tablets three times a day with meals  hydrALAZINE (APRESOLINE) 25 MG tablet, TAKE 1 TABLET BY MOUTH TWO  TIMES DAILY  Tens Unit MISC, by Does not apply route  glimepiride (AMARYL) 2 MG tablet, Take 1 tablet by mouth every morning (before breakfast) (Patient not taking: Reported on 12/14/2020)  linagliptin (TRADJENTA) 5 MG tablet, TAKE ONE TABLET BY MOUTH DAILY  amLODIPine-atorvastatatin (CADUET) 10-20 MG per tablet, Take 1 tablet by mouth daily  tadalafil (CIALIS) 20 MG tablet, Take 1 tablet by mouth once as needed for Erectile Dysfunction  lisinopril (PRINIVIL;ZESTRIL) 10 MG tablet, TAKE 1 TABLET BY MOUTH  TWICE DAILY (Patient taking differently: Take 5 mg by mouth daily TAKE 1 TABLET BY MOUTH  TWICE DAILY)  cilostazol (PLETAL) 100 MG tablet, Take 100 mg by mouth 2 times daily. Pt states NOT Taking med  NONFORMULARY,   labetalol (NORMODYNE) 200 MG tablet, Take 200 mg by mouth 3 times daily Indications: On Tues/Thurs/Sat/Sun   oxyCODONE-acetaminophen (PERCOCET) 5-325 MG per tablet, Take 1 tablet by mouth three times a week  Indications: after dialysis .   gabapentin (NEURONTIN) 300 MG capsule, TAKE ONE CAPSULE BY MOUTH THREE TIMES A DAY  cloNIDine (CATAPRES) 0.3 MG tablet, Take 1 tablet by mouth 2 times daily    INVESTIGATIONS     Last 3 CMP:    Recent Labs     01/04/21  1131 01/04/21  2058 01/05/21  0728 01/06/21  0605   * 136 135 131*   K 4.9 4.4 4.5 4.0   CL 92* 93* 93* 93*   CO2 21 19* 22 24   BUN 70* 75* 76* 48*   CREATININE 9.44* 10.03* 11.08* 8.79*   CALCIUM 7.9* 8.1* 8.1* 8.1*   PROT 6.5  --   --   --    LABALBU 3.6  --   --   --    BILITOT 0.39  --   --   --    ALKPHOS 145*  --   --   --    AST 27  --   --   --    ALT 75*  --   --   --        Last 3 CBC:  Recent Labs     01/04/21  1131 01/05/21  0728 01/06/21  0605   WBC 6.5 11.6* 7.8   RBC 3.39* 3.63* 3.31*   HGB 8.9* 9.8* 8.6*   HCT 28.9* 31.0* 28.2*   MCV 85.3 85.4 85.2   MCH 26.3 27.0 26.0   MCHC 30.8 31.6 30.5   RDW 21.0* 21.2* 20.9*    241 198   MPV 12.0 10.7 10.2       ASSESSMENT     1. ESRD Monday Wednesday Friday AV fistula. At [facility]. Dr Ilan Eric. DW 96.4  2. Symptomatic bradycardia awaiting permanent pacemaker placement   3. S/p cath with stent placement to RCA 5 January 2021  4. Type 2 diabetes  5. Essential hypertension  6. Bilateral below-knee amputation  8. Anemia in ESRD    PLAN     Hemodialysis today. Orders reviewed with the nursing staff. Target dry weight.   Okay for permanent pacemaker placement from nephrology standpoint    Please do not hesitate to call with questions    This note is created with the assistance of a speech-recognition program. While intending to generate a document that actually reflects the content of the visit, no guarantees can be provided that every mistake has been identified and corrected by editing    Geovany Arechiga MD, [facility] Parks, [address]   1/6/2021 9:27 AM  [facility]

## 2021-01-07 VITALS
WEIGHT: 212.74 LBS | HEIGHT: 71 IN | HEART RATE: 98 BPM | SYSTOLIC BLOOD PRESSURE: 139 MMHG | OXYGEN SATURATION: 99 % | RESPIRATION RATE: 16 BRPM | DIASTOLIC BLOOD PRESSURE: 91 MMHG | TEMPERATURE: 98.4 F | BODY MASS INDEX: 29.78 KG/M2

## 2021-01-07 LAB
ABSOLUTE EOS #: 0.08 K/UL (ref 0–0.4)
ABSOLUTE IMMATURE GRANULOCYTE: 0.08 K/UL (ref 0–0.3)
ABSOLUTE LYMPH #: 1.16 K/UL (ref 1–4.8)
ABSOLUTE MONO #: 1.41 K/UL (ref 0.1–0.8)
ANION GAP SERPL CALCULATED.3IONS-SCNC: 13 MMOL/L (ref 9–17)
BASOPHILS # BLD: 1 % (ref 0–2)
BASOPHILS ABSOLUTE: 0.08 K/UL (ref 0–0.2)
BUN BLDV-MCNC: 32 MG/DL (ref 8–23)
BUN/CREAT BLD: ABNORMAL (ref 9–20)
CALCIUM IONIZED: 1.03 MMOL/L (ref 1.13–1.33)
CALCIUM SERPL-MCNC: 8.1 MG/DL (ref 8.6–10.4)
CHLORIDE BLD-SCNC: 95 MMOL/L (ref 98–107)
CO2: 24 MMOL/L (ref 20–31)
CREAT SERPL-MCNC: 7.15 MG/DL (ref 0.7–1.2)
DIFFERENTIAL TYPE: ABNORMAL
EOSINOPHILS RELATIVE PERCENT: 1 % (ref 1–4)
GFR AFRICAN AMERICAN: 10 ML/MIN
GFR NON-AFRICAN AMERICAN: 8 ML/MIN
GFR SERPL CREATININE-BSD FRML MDRD: ABNORMAL ML/MIN/{1.73_M2}
GFR SERPL CREATININE-BSD FRML MDRD: ABNORMAL ML/MIN/{1.73_M2}
GLUCOSE BLD-MCNC: 171 MG/DL (ref 75–110)
GLUCOSE BLD-MCNC: 56 MG/DL (ref 75–110)
GLUCOSE BLD-MCNC: 88 MG/DL (ref 70–99)
HCT VFR BLD CALC: 30.8 % (ref 40.7–50.3)
HEMOGLOBIN: 9.7 G/DL (ref 13–17)
IMMATURE GRANULOCYTES: 1 %
LYMPHOCYTES # BLD: 14 % (ref 24–44)
MAGNESIUM: 1.8 MG/DL (ref 1.6–2.6)
MCH RBC QN AUTO: 26.4 PG (ref 25.2–33.5)
MCHC RBC AUTO-ENTMCNC: 31.5 G/DL (ref 28.4–34.8)
MCV RBC AUTO: 83.9 FL (ref 82.6–102.9)
MONOCYTES # BLD: 17 % (ref 1–7)
MORPHOLOGY: ABNORMAL
NRBC AUTOMATED: 0 PER 100 WBC
PDW BLD-RTO: 20.9 % (ref 11.8–14.4)
PHOSPHORUS: 3.5 MG/DL (ref 2.5–4.5)
PLATELET # BLD: 200 K/UL (ref 138–453)
PLATELET ESTIMATE: ABNORMAL
PMV BLD AUTO: 10.6 FL (ref 8.1–13.5)
POTASSIUM SERPL-SCNC: 4.1 MMOL/L (ref 3.7–5.3)
RBC # BLD: 3.67 M/UL (ref 4.21–5.77)
RBC # BLD: ABNORMAL 10*6/UL
SEG NEUTROPHILS: 66 % (ref 36–66)
SEGMENTED NEUTROPHILS ABSOLUTE COUNT: 5.49 K/UL (ref 1.8–7.7)
SODIUM BLD-SCNC: 132 MMOL/L (ref 135–144)
WBC # BLD: 8.3 K/UL (ref 3.5–11.3)
WBC # BLD: ABNORMAL 10*3/UL

## 2021-01-07 PROCEDURE — 82330 ASSAY OF CALCIUM: CPT

## 2021-01-07 PROCEDURE — 6370000000 HC RX 637 (ALT 250 FOR IP): Performed by: STUDENT IN AN ORGANIZED HEALTH CARE EDUCATION/TRAINING PROGRAM

## 2021-01-07 PROCEDURE — 2580000003 HC RX 258: Performed by: INTERNAL MEDICINE

## 2021-01-07 PROCEDURE — 85025 COMPLETE CBC W/AUTO DIFF WBC: CPT

## 2021-01-07 PROCEDURE — 36415 COLL VENOUS BLD VENIPUNCTURE: CPT

## 2021-01-07 PROCEDURE — 80048 BASIC METABOLIC PNL TOTAL CA: CPT

## 2021-01-07 PROCEDURE — 6370000000 HC RX 637 (ALT 250 FOR IP): Performed by: INTERNAL MEDICINE

## 2021-01-07 PROCEDURE — 99232 SBSQ HOSP IP/OBS MODERATE 35: CPT | Performed by: INTERNAL MEDICINE

## 2021-01-07 PROCEDURE — 99233 SBSQ HOSP IP/OBS HIGH 50: CPT | Performed by: INTERNAL MEDICINE

## 2021-01-07 PROCEDURE — 83735 ASSAY OF MAGNESIUM: CPT

## 2021-01-07 PROCEDURE — 84100 ASSAY OF PHOSPHORUS: CPT

## 2021-01-07 PROCEDURE — 93226 XTRNL ECG REC<48 HR SCAN A/R: CPT

## 2021-01-07 PROCEDURE — 93225 XTRNL ECG REC<48 HRS REC: CPT

## 2021-01-07 PROCEDURE — 82947 ASSAY GLUCOSE BLOOD QUANT: CPT

## 2021-01-07 PROCEDURE — 6370000000 HC RX 637 (ALT 250 FOR IP): Performed by: NURSE PRACTITIONER

## 2021-01-07 RX ORDER — AMLODIPINE BESYLATE 10 MG/1
10 TABLET ORAL DAILY
Qty: 30 TABLET | Refills: 3 | Status: SHIPPED | OUTPATIENT
Start: 2021-01-08 | End: 2021-06-15 | Stop reason: ALTCHOICE

## 2021-01-07 RX ORDER — CLOPIDOGREL BISULFATE 75 MG/1
75 TABLET ORAL DAILY
Qty: 30 TABLET | Refills: 3 | Status: ON HOLD | OUTPATIENT
Start: 2021-01-08 | End: 2022-05-28 | Stop reason: HOSPADM

## 2021-01-07 RX ORDER — CLONIDINE HYDROCHLORIDE 0.3 MG/1
0.3 TABLET ORAL 2 TIMES DAILY
Qty: 180 TABLET | Refills: 3 | Status: ON HOLD | OUTPATIENT
Start: 2021-01-07 | End: 2022-09-19

## 2021-01-07 RX ORDER — INSULIN GLARGINE 100 [IU]/ML
25 INJECTION, SOLUTION SUBCUTANEOUS NIGHTLY
Status: DISCONTINUED | OUTPATIENT
Start: 2021-01-07 | End: 2021-01-07 | Stop reason: HOSPADM

## 2021-01-07 RX ORDER — ATORVASTATIN CALCIUM 80 MG/1
80 TABLET, FILM COATED ORAL DAILY
Qty: 30 TABLET | Refills: 3 | Status: SHIPPED | OUTPATIENT
Start: 2021-01-08 | End: 2021-09-14 | Stop reason: ALTCHOICE

## 2021-01-07 RX ORDER — LISINOPRIL 5 MG/1
5 TABLET ORAL DAILY
Status: DISCONTINUED | OUTPATIENT
Start: 2021-01-07 | End: 2021-01-07

## 2021-01-07 RX ADMIN — AMLODIPINE BESYLATE 10 MG: 10 TABLET ORAL at 09:14

## 2021-01-07 RX ADMIN — INSULIN LISPRO 2 UNITS: 100 INJECTION, SOLUTION INTRAVENOUS; SUBCUTANEOUS at 16:42

## 2021-01-07 RX ADMIN — Medication 81 MG: at 08:44

## 2021-01-07 RX ADMIN — LISINOPRIL 5 MG: 5 TABLET ORAL at 11:39

## 2021-01-07 RX ADMIN — CLOPIDOGREL 75 MG: 75 TABLET, FILM COATED ORAL at 08:44

## 2021-01-07 RX ADMIN — APIXABAN 2.5 MG: 2.5 TABLET, FILM COATED ORAL at 09:30

## 2021-01-07 RX ADMIN — Medication 10 ML: at 08:44

## 2021-01-07 RX ADMIN — ATORVASTATIN CALCIUM 80 MG: 80 TABLET, FILM COATED ORAL at 08:44

## 2021-01-07 RX ADMIN — GABAPENTIN 300 MG: 300 CAPSULE ORAL at 08:44

## 2021-01-07 ASSESSMENT — PAIN SCALES - GENERAL: PAINLEVEL_OUTOF10: 0

## 2021-01-07 NOTE — PROGRESS NOTES
CLINICAL PHARMACY NOTE: MEDS TO [address] [facility] Patient?: Yes  Total # of Prescriptions Filled: 5   The following medications were delivered to the patient:  · eliquis  · plavix  · Amlodipine  · lipitor  · clonidine  Total # of Interventions Completed: 0  Time Spent (min): 0    Additional Documentation:

## 2021-01-07 NOTE — FLOWSHEET NOTE
Holter Monitor was applied as ordered. Monitor is to be worn  for 48 hrs. Written and verbal instructions were given. Monitor #[id number].

## 2021-01-07 NOTE — PROGRESS NOTES
NEPHROLOGY PROGRESS NOTE      SUBJECTIVE     Had hemodialysis yesterday per schedule. Wt down to 96.5. Next HD in am.  Dopamine turned off. Heart rate around 80-90. EKG showing evidence of slow A. fib/questionable heart block. Permanent pacemaker placement deferred pending holter evaluation. Had cardiac cath with stent placement to RCA yesterday. No chest pain shortness of breath. OBJECTIVE     Vitals:    01/07/21 0800 01/07/21 0900 01/07/21 1030 01/07/21 1144   BP: (!) 177/104  (!) 139/91 (!) 139/91   Pulse: 96 98  98   Resp: 16   16   Temp: 98.2 °F (36.8 °C)   98.4 °F (36.9 °C)   TempSrc: Oral   Oral   SpO2: 100%   99%   Weight:       Height:         24HR INTAKE/OUTPUT:      Intake/Output Summary (Last 24 hours) at 1/7/2021 1533  Last data filed at 1/6/2021 1600  Gross per 24 hour   Intake --   Output 3810 ml   Net -3810 ml       General appearance:Awake, alert, in no acute distress  HEENT: PERRLA  Respiratory::vesicular breath sounds,no wheeze/crackles  Cardiovascular:S1 S2 normal,no gallop or organic murmur. Abdomen:Non tender/non distended. Bowel sounds present  Extremities: No Cyanosis or Clubbing,Lower extremity edema bilateral below-knee amputation  Neurological:Alert and oriented. No abnormalities of mood, affect, memory, mentation, or behavior are noted      MEDICATIONS     Scheduled Meds:    insulin glargine  25 Units Subcutaneous Nightly    apixaban  5 mg Oral BID    sodium chloride flush  10 mL Intravenous 2 times per day    amLODIPine  10 mg Oral Daily    And    atorvastatin  80 mg Oral Daily    aspirin  81 mg Oral Daily    clopidogrel  75 mg Oral Daily    darbepoetin ricky-polysorbate  40 mcg Intravenous Weekly    Sucroferric Oxyhydroxide  1,000 mg Oral TID WC    gabapentin  300 mg Oral BID    sodium chloride flush  10 mL Intravenous 2 times per day    insulin lispro  0-6 Units Subcutaneous Nightly    insulin lispro  0-12 Units Subcutaneous TID WC     Continuous Infusions:    dextrose       PRN Meds:  sodium chloride flush, acetaminophen, ondansetron, oxyCODONE-acetaminophen, sodium chloride flush, polyethylene glycol, acetaminophen **OR** acetaminophen, glucose, dextrose, glucagon (rDNA), dextrose  Home Meds:                Medications Prior to Admission: aspirin 81 MG chewable tablet, Take 81 mg by mouth every morning  Sucroferric Oxyhydroxide (VELPHORO) 500 MG CHEW, Take 2 tablets by mouth 3 times daily (with meals) Crush or chew and shallow 2 tablets three times a day with meals  hydrALAZINE (APRESOLINE) 25 MG tablet, TAKE 1 TABLET BY MOUTH TWO  TIMES DAILY  Tens Unit MISC, by Does not apply route  glimepiride (AMARYL) 2 MG tablet, Take 1 tablet by mouth every morning (before breakfast) (Patient not taking: Reported on 12/14/2020)  linagliptin (TRADJENTA) 5 MG tablet, TAKE ONE TABLET BY MOUTH DAILY  amLODIPine-atorvastatatin (CADUET) 10-20 MG per tablet, Take 1 tablet by mouth daily  tadalafil (CIALIS) 20 MG tablet, Take 1 tablet by mouth once as needed for Erectile Dysfunction  lisinopril (PRINIVIL;ZESTRIL) 10 MG tablet, TAKE 1 TABLET BY MOUTH  TWICE DAILY (Patient taking differently: Take 5 mg by mouth daily TAKE 1 TABLET BY MOUTH  TWICE DAILY)  cilostazol (PLETAL) 100 MG tablet, Take 100 mg by mouth 2 times daily. Pt states NOT Taking med  NONFORMULARY,   labetalol (NORMODYNE) 200 MG tablet, Take 200 mg by mouth 3 times daily Indications: On Tues/Thurs/Sat/Sun   oxyCODONE-acetaminophen (PERCOCET) 5-325 MG per tablet, Take 1 tablet by mouth three times a week  Indications: after dialysis .   gabapentin (NEURONTIN) 300 MG capsule, TAKE ONE CAPSULE BY MOUTH THREE TIMES A DAY  cloNIDine (CATAPRES) 0.3 MG tablet, Take 1 tablet by mouth 2 times daily    INVESTIGATIONS     Last 3 CMP:    Recent Labs     01/05/21  0728 01/06/21  0605 01/07/21  0534    131* 132*   K 4.5 4.0 4.1   CL 93* 93* 95*   CO2 22 24 24   BUN 76* 48* 32*   CREATININE 11.08* 8.79* 7.15*   CALCIUM 8.1* 8.1* 8.1* Last 3 CBC:  Recent Labs     01/05/21  0728 01/06/21  0605 01/07/21  0534   WBC 11.6* 7.8 8.3   RBC 3.63* 3.31* 3.67*   HGB 9.8* 8.6* 9.7*   HCT 31.0* 28.2* 30.8*   MCV 85.4 85.2 83.9   MCH 27.0 26.0 26.4   MCHC 31.6 30.5 31.5   RDW 21.2* 20.9* 20.9*    198 200   MPV 10.7 10.2 10.6       ASSESSMENT     1. ESRD Monday Wednesday Friday AV fistula. At [facility]. Dr Shani Garza. DW 96.4  2. Symptomatic bradycardia pacer deferred, pending holter evaluation, may resolve with RCA stent  3. S/p cath with stent placement to RCA 5 January 2021,   4. Type 2 diabetes  5. Essential hypertension  6. Bilateral below-knee amputation  8. Anemia in ESRD    PLAN     Hemodialysis in am   Target dry weight 96.   Clearance [facility] for d/c  No med changes    Please do not hesitate to call with questions

## 2021-01-07 NOTE — CARE COORDINATION
Transition [facility] to check on referral made yesterday. Spoke with Ladean Litten she states they are able to accept patient. Informed them of pending discharge today. Ladean Litten states she will look for AVS later in day.

## 2021-01-07 NOTE — PROGRESS NOTES
57-year-old male presented with chief complaints of fatigue and weakness, in ER he was found to be bradycardic with heart rate in 30s, cardiology was consulted, patient had elevated troponins along with coronary artery calcification on CT. Patient underwent cardiac cath, had two-vessel disease, KAMILAH to RCA. Bradycardia resolved. Patient had ESRD Monday Wednesday Friday schedule. Plan:  Acute symptomatic bradycardia likely due to RCA stenosis- s/p KAMILAH to RCA  New onset A. fib-on Eliquis 2.5 mg daily  ESRD on dialysis-Monday Wednesday Friday  Type 2 diabetes mellitus-on Lantus  History of peripheral vascular disease, s/p bilateral BKA.       Anastasiya Hooks MD  Internal Medicine, PGY3  [facility]

## 2021-01-07 NOTE — CARE COORDINATION
Discharge [facility] Case Management Department  Written by: Zeus Carolina RN    Patient Name: Rosy Mcrae  Attending Provider: No att. providers found  Admit Date: 2021 11:03 AM  MRN: [id number]  Account: [de-identified]                     : 1960  Discharge Date: 2021      Disposition: home with [address] home care    Zeus Carolina RN

## 2021-01-07 NOTE — DISCHARGE INSTR - COC
Continuity of Care Form    Patient Name: Deonte White   :  1960  MRN:  [id number]    Admit date:  2021  Discharge date:  ***    Code Status Order: Full Code   Advance Directives:   Advance Care Flowsheet Documentation       Date/Time Healthcare Directive Type of Healthcare Directive Copy in [address] Agent's Name Healthcare Agent's Phone Number    21 1600  No, patient does not have an advance directive for healthcare treatment -- -- -- -- --            Admitting Physician:  Haroldo Macedo MD  PCP: Ksenia Haywood MD    Discharging Nurse: [facility] Unit/Room#: 1473/0554-47  Discharging Unit Phone Number: ***    Emergency Contact:   Extended Emergency Contact Information  Primary Emergency Contact: JeffreyMelvin  Address: Becky Conroy [address] Phone: [phone]  Relation: Child  Secondary Emergency Contact: [address] Phone: [phone]  Relation: Other    Past Surgical History:  Past Surgical History:   Procedure Laterality Date    CATARACT REMOVAL WITH IMPLANT      DIALYSIS FISTULA CREATION Bilateral     As of 2019, RUE AVF functioning, LUE AVF nonfunctioning.     FINGER SURGERY Left     I & D    GLAUCOMA SURGERY      LEG AMPUTATION BELOW KNEE Bilateral     TUNNELED VENOUS CATHETER PLACEMENT      PC placed and removed       Immunization History:   Immunization History   Administered Date(s) Administered    Influenza Vaccine, unspecified formulation 10/08/2014, 10/02/2015    Pneumococcal Conjugate 13-valent (Rk Hair) 2018    Tdap (Boostrix, Adacel) 2020       Active Problems:  Patient Active Problem List   Diagnosis Code    Hyperlipidemia E78.5    Essential hypertension I10    ESRD on hemodialysis ([facility] [address] MWF N18.6, Z99.2    Insomnia G47.00    ED (erectile dysfunction) N52.9    Chronic bilateral low back pain with bilateral sciatica M54.42, M54.41, G89.29    Controlled diabetes mellitus type 2 with complications ([facility] [address] U85.1    S/P bilateral below knee amputation ([facility] Z89.512, Z89.511    Chronic use of opiate drugs therapeutic purposes Z79.891    Severe comorbid illness R69    DDD (degenerative disc disease), lumbar M51.36    Bradycardia R00.1       Isolation/Infection:   Isolation            Contact          Patient Infection Status       Infection Onset Added Last Indicated Last Indicated By Review Planned Expiration Resolved Resolved By    MRSA  09/06/17 09/06/17 Juliana Bowling RN        Arm - 9/2017            Nurse Assessment:  Last Vital Signs: BP (!) 139/91   Pulse 98   Temp 98.4 °F (36.9 °C) (Oral)   Resp 16   Ht 5' 11\" (1.803 m)   Wt 212 lb 11.9 oz (96.5 kg)   SpO2 99%   BMI 29.67 kg/m²     Last documented pain score (0-10 scale): Pain Level: 0  Last Weight:   Wt Readings from Last 1 Encounters:   01/06/21 212 lb 11.9 oz (96.5 kg)     Mental Status:  oriented    IV Access:  - None    Nursing Mobility/ADLs:  Walking   Assisted  Transfer  Assisted  Bathing  Assisted  Dressing  Assisted  Toileting  Assisted  Feeding  Independent  Med Admin  Independent  Med Delivery   [facility] MED Delivery:[id number]:::0}    Wound Care Documentation and Therapy:  Incision 09/05/13 Arm Left (Active)   Number of days: 2681       Incision 10/29/13 Arm Left (Active)   Number of days: 2627       Incision 02/18/14 Leg Lower;Right (Active)   Number of days: 2514        Elimination:  Continence:   · Bowel: No  · Bladder: No  Urinary Catheter: None   Colostomy/Ileostomy/Ileal Conduit: No       Date of Last BM: 1/7            Intake/Output Summary (Last 24 hours) at 1/7/2021 1310  Last data filed at 1/6/2021 1600  Gross per 24 hour   Intake --   Output 3810 ml   Net -3810 ml     I/O last 3 completed shifts: In: 235 [P.O.:180; I.V.:55]  Out: 3810     Safety Concerns:      At Risk for Falls    Impairments/Disabilities:      None    Nutrition Therapy:  Current Nutrition Therapy:   - Oral Diet: he requires Home Care for less 30 days.      Update Admission H&P: No change in H&P    PHYSICIAN SIGNATURE:  Electronically signed by Rose Go MD on 1/7/21 at 3:24 PM EST

## 2021-01-07 NOTE — PROGRESS NOTES
[facility]  Progress Note                   Date:   1/7/2021  Patient name: Susanne Hernandez  Date of admission:  1/4/2021 11:03 AM  MRN:   [id number]  YOB: 1960  PCP: Stacie Hoskins MD    Reason for Admission: Bradycardia [R00.1]    Subjective:       Clinical Changes /Abnormalities:  Patient seen and examined in room after discussion with RN. Denies chest pain or SOB. S/p CATH with PCI and EP consult. SR on tele HR 91     Review of Systems    Medications:   Scheduled Meds:   insulin glargine  25 Units Subcutaneous Nightly    sodium chloride flush  10 mL Intravenous 2 times per day    amLODIPine  10 mg Oral Daily    And    atorvastatin  80 mg Oral Daily    aspirin  81 mg Oral Daily    clopidogrel  75 mg Oral Daily    apixaban  2.5 mg Oral BID    darbepoetin ricky-polysorbate  40 mcg Intravenous Weekly    Sucroferric Oxyhydroxide  1,000 mg Oral TID WC    gabapentin  300 mg Oral BID    sodium chloride flush  10 mL Intravenous 2 times per day    insulin lispro  0-6 Units Subcutaneous Nightly    insulin lispro  0-12 Units Subcutaneous TID WC     Continuous Infusions:   dextrose       CBC:   Recent Labs     01/05/21  0728 01/06/21  0605 01/07/21  0534   WBC 11.6* 7.8 8.3   HGB 9.8* 8.6* 9.7*    198 200     BMP:    Recent Labs     01/05/21  0728 01/06/21  0605 01/07/21  0534    131* 132*   K 4.5 4.0 4.1   CL 93* 93* 95*   CO2 22 24 24   BUN 76* 48* 32*   CREATININE 11.08* 8.79* 7.15*   GLUCOSE 229* 235* 88     Hepatic:  Recent Labs     01/04/21  1131   AST 27   ALT 75*   BILITOT 0.39   ALKPHOS 145*     Troponin:   Recent Labs     01/04/21 2058 01/06/21  0605 01/06/21  1642   TROPHS 330* 396* 489*     BNP: No results for input(s): BNP in the last 72 hours. Lipids: No results for input(s): CHOL, HDL in the last 72 hours. Invalid input(s): LDLCALCU  INR: No results for input(s): INR in the last 72 hours.     DIAGNOSTIC DATA    CATH 1/5/2021  Findings:  Left main: NL  LAD: mid 20%, distal at apical area 80% 9Small territory)  LCX: NL  RCA: Proximal / ostial calcified 90% stenosis. Required PTCA -KAMILAH [id number] stenosis to 20%  The LV gram was performed in the BUI 30 position. LVEF: 40%. LV Wall Motion: global mild hypokinesia     Conclusions:  1. Two vessel CAD  2. Successful PTCA -KAMILAH RCA  3. Mild LV dysfunction     Recommendation:  1. Post stent protocol    EKG: Sinus bradycardia. ECHO: The patient had an echo done recently in [facility] which showed ejection fraction of 55%. Moderate tricuspid regurgitation. Objective:   Vitals: BP (!) 177/104   Pulse 98   Temp 98.2 °F (36.8 °C) (Oral)   Resp 16   Ht 5' 11\" (1.803 m)   Wt 212 lb 11.9 oz (96.5 kg)   SpO2 100%   BMI 29.67 kg/m²   General appearance: alert and cooperative with exam  HEENT: Head: Normocephalic, no lesions, without obvious abnormality. Neck:no JVD, trachea midline, no adenopathy  Lungs: Clear to auscultation  Heart: Regular rate and rhythm, s1/s2 auscultated, no murmurs  Abdomen: soft, non-tender, bowel sounds active  Extremities: bilateral BKA  Neurologic: not done        Assessment / Acute Cardiac Problems:   1. H/O Atrial fibrillation with slow ventricular response. 2.  Hypertension  3. Hyperlipidemia  4. End-stage renal disease with dialysis on Monday, Wednesday and Friday  5. Diabetes mellitus type 2  6. Poor vasculopath with history of nonfunctioning fistula in the past, erectile dysfunction and below-knee amputations bilaterally  7. Recent COVID-19 infection. 8.  Secondary hyper parathyroidism  9.   Anemia of end-stage renal disease    Patient Active Problem List:     Hyperlipidemia     Essential hypertension     ESRD on hemodialysis ([address] MWF     Insomnia     ED (erectile dysfunction)     Chronic bilateral low back pain with bilateral sciatica     Controlled diabetes mellitus type 2 with complications ([address]     S/P bilateral below knee amputation (HCC)     Chronic use of opiate drugs therapeutic purposes     Severe comorbid illness     DDD (degenerative disc disease), lumbar     Bradycardia      Plan of Treatment:   1. CAD s/p CATH with PCI  Continue ASA, statin, Plavix, Norvasc  2. PAF  SR on tele Continue Eliquis  3. Bradycardia EP Consult 1/6/2021 improved likely due to SA note ischemia and vagal effects per Dr. Suhas Bryant consult. No plans for PPM.  Will order 48 hour holter on discharge. 4.  HTN elevated. Continue Norvasc. Will defer BP management to nephrology recs. 5.  ESRD on HD   6. Rest of care per Primary Service. 7.  Okay for discharge from cardiology stand point. Holter monitor on discharge and will f/u in OP clinic in 2 weeks.     Electronically signed by AURORA John NP on 1/7/2021 at 9:29 [address] [facility].  [phone]

## 2021-01-07 NOTE — FLOWSHEET NOTE
Patient transferred via bed to room 405 accompanied by Nicolle Torres without incident. Patient belongings sent with patient to 4A upon transfer as documented.

## 2021-01-07 NOTE — PROGRESS NOTES
[facility]  Internal Medicine Teaching Residency Program  Inpatient Daily Progress Note  ______________________________________________________________________________    Patient: Donovan Veloz  YOB: 1960   NJM:[id number]    Acct: [de-identified]     Room: [address] [place] [address]  Admit date: 2021  Today's date: 21  Number of days in the hospital: 3    SUBJECTIVE   Admitting Diagnosis: Symptomatic bradycardia  CC: Fatigue and weakness  Pt examined at bedside. Chart & results reviewed. Patient is hemodynamically stable. Patient's heart rate stable at 90 bpm      ROS:  Constitutional:  negative for chills, fevers, sweats  Respiratory:  negative for cough, dyspnea on exertion, hemoptysis, shortness of breath, wheezing  Cardiovascular:  negative for chest pain, chest pressure/discomfort, lower extremity edema, palpitations  Gastrointestinal:  negative for abdominal pain, constipation, diarrhea, nausea, vomiting  Neurological:  negative for dizziness, headache  BRIEF HISTORY     58-year-old male presented with chief complaints of fatigue and weakness. On arrival to the ED patient found to be bradycardic with heart rate 235. Cardiology was consulted. Patient had elevated troponins along with coronary artery calcification on CT. Patient underwent cardiac cath revealing double vessel disease leading to KAMILAH to RCA. Patient was on pressors in the ICU and was weaned off pressors. Bradycardia currently resolved. Patient has end-stage renal disease receiving hemodialysis on  schedule. No plans of pacemaker from EP standpoint at this point of time. We will continue to monitor.     OBJECTIVE     Vital Signs:  BP (!) 143/76   Pulse 95   Temp 98.1 °F (36.7 °C) (Axillary)   Resp 16   Ht 5' 11\" (1.803 m)   Wt 212 lb 11.9 oz (96.5 kg)   SpO2 100%   BMI 29.67 kg/m²     Temp (24hrs), Av.3 °F (36.8 °C), Min:98.1 °F (36.7 °C), Max:98.6 °F (37 85.4 85.2 83.9   RDW 21.2* 20.9* 20.9*    198 200     BMP:   Recent Labs     01/05/21  0728 01/06/21  0605 01/07/21  0534    131* 132*   K 4.5 4.0 4.1   CL 93* 93* 95*   CO2 22 24 24   PHOS 6.0* 5.2* 3.5   BUN 76* 48* 32*   CREATININE 11.08* 8.79* 7.15*     BNP: No results for input(s): BNP in the last 72 hours. PT/INR: No results for input(s): PROTIME, INR in the last 72 hours. APTT:   Recent Labs     01/05/21  1049 01/06/21  1255   APTT 115.4* 28.7     CARDIAC ENZYMES: No results for input(s): CKMB, CKMBINDEX, TROPONINI in the last 72 hours. Invalid input(s): CKTOTAL;3  FASTING LIPID PANEL:  Lab Results   Component Value Date    CHOL 126 01/16/2020    HDL 61 01/16/2020    TRIG 72 01/16/2020     LIVER PROFILE:   Recent Labs     01/04/21  1131   AST 27   ALT 75*   BILITOT 0.39   ALKPHOS 145*      MICROBIOLOGY:   Lab Results   Component Value Date/Time    CULTURE DUPLICATE ORDER 10/71/6196 12:30 PM    CULTURE  09/07/2017 12:30 PM     Performed at [address], [address] [place] [address] [phone]       Imaging:    Xr Chest Portable    Result Date: 1/6/2021  Cardiomegaly. Subtle left pleural effusion versus pleural/parenchymal scarring, overall unchanged from previous study. Xr Chest Portable    Result Date: 1/4/2021  Limited exam Blunted left costophrenic angle is suspected which may be related to pleural thickening and/or effusion       ASSESSMENT & PLAN     ASSESSMENT / PLAN:     Active Problems:   Acute symptomatic bradycardia likely due to RCA stenosis:  -Weaned off of dopamine drip, heart rate stable  -No indication of pacemaker as per EP     Newly diagnosed Coronary artery disease  -s/p PTCA-KAMILAH RCA  -continue DAPT, statin.   -BB on hold due to bradycardia for now     -New onset atrial fibrillation  -Started on Eliquis 2.5 mg twice daily, adjusted for renal disease  -Rate controlled for now     ESRD on dialysis:  -Typically dialyzed Monday Wednesday Friday  -Nephrology on board  -started on Sucroferric oxyhydroxide for hyperphosphatemia     History of hypertension:  -stable  -Hold home antihypertensives for now  -restart home meds as needed     Type-II Diabetes Mellitus   -HbA1c 7.9 as per 12/10  -blood glucose uncontrolled  -Changed to 25 units Lantus nightly  -hypoglycemia protocol.     History of PVD with peripheral neuropathy   -s/p b/l BKA in the past  -stable  -continue gabapentin and cilostazol     DVT prophylaxis: Eliquis  GI prophylaxis: none  Diet: renal diet with carb control    Eriberto Elizondo MD  Internal Medicine Resident, PGY-1  [address] [place], [place]  1/7/2021, 7:53 AM    Attestation and add on       I have discussed the care of Renetta Nina , including pertinent history and exam findings,      1/7/21    with the resident. I have seen and examined the patient and the key elements of all parts of the encounter have been performed by me . I agree with the assessment, plan and orders as documented by the resident. Active Problems:    Hyperlipidemia    Essential hypertension    ESRD on hemodialysis ([address] MWF    Controlled diabetes mellitus type 2 with complications (HCC)    S/P bilateral below knee amputation (HCC)    Bradycardia  Resolved Problems:    * No resolved hospital problems. *            ---- ;        Medications:      Allergies:  No Known Allergies    Current Meds:   Scheduled Meds:    insulin glargine  25 Units Subcutaneous Nightly    apixaban  5 mg Oral BID    sodium chloride flush  10 mL Intravenous 2 times per day    amLODIPine  10 mg Oral Daily    And    atorvastatin  80 mg Oral Daily    aspirin  81 mg Oral Daily    clopidogrel  75 mg Oral Daily    darbepoetin ricky-polysorbate  40 mcg Intravenous Weekly    Sucroferric Oxyhydroxide  1,000 mg Oral TID WC    gabapentin  300 mg Oral BID    sodium chloride flush  10 mL Intravenous 2 times per day    insulin lispro  0-6 Units Subcutaneous Nightly    insulin lispro  0-12 Units Subcutaneous TID WC     Continuous Infusions:    dextrose       PRN Meds: sodium chloride flush, acetaminophen, ondansetron, oxyCODONE-acetaminophen, sodium chloride flush, polyethylene glycol, acetaminophen **OR** acetaminophen, glucose, dextrose, glucagon (rDNA), dextrose        [facility] [address], [address] [place] [address].    Phone [phone]   Fax: [phone]  Answering Service: [phone]

## 2021-01-08 ENCOUNTER — CARE COORDINATION (OUTPATIENT)
Dept: CASE MANAGEMENT | Age: 61
End: 2021-01-08

## 2021-01-08 DIAGNOSIS — R00.1 BRADYCARDIA: Primary | ICD-10-CM

## 2021-01-08 PROCEDURE — 1111F DSCHRG MED/CURRENT MED MERGE: CPT | Performed by: FAMILY MEDICINE

## 2021-01-08 NOTE — CARE COORDINATION
[facility] Transitions Initial Follow Up Call    Call within 2 business days of discharge: Yes    Patient: Anni Boone Patient : 1960   MRN: [id number]  Reason for Admission: Bradycardia  Discharge Date: 21 RARS: Readmission Risk Score: 28      Last Discharge [facility]       Complaint Diagnosis Description Type Department Provider    21 Bradycardia Bradycardia . .. ED to Hosp-Admission (Discharged) (ADMITTED) Juan Carlos Melendez MD; [place] ... Spoke with: Patient    Facility: [facility]    Non-face-to-face services provided:  Obtained and reviewed discharge summary and/or continuity of care documents     Spoke with patient who states he is feeling good today. He denies any chest pains, shortness of breath, cough or dizziness. He had a cardiac cath with KAMILAH placement. States right groin is soft, no drainage and no redness. He is a dialysis patient, goes MWF to [facility]. He lives alone and is a bilateral amputee. He has [address] that follows. He has all his medications although did question why the Caduet was stopped and he was placed on Norasc and Lipitor separately. I explained that the Lipitor dose is now 80 mg and this may be the reason for prescribing in 2 different pills. I encourage him to discuss with his PCP or cardiologist. I did call [facility] pharmacy and verify that they delivered all medications. Patient states cab is there to take him to dialysis and requests call back after 5 today. Challenges to be reviewed by the provider   Additional needs identified to be addressed with provider No  none    Discussed COVID-19 related testing which was available at this time. Test results were negative. Patient informed of results, if available? Yes         Method of communication with provider : none    Advance Care Planning:   Does patient have an Advance Directive:  patient had to leave, unable to ask. Was this a readmission?  No  Patient stated reason for admission: fatigued  Patients top risk factors for readmission: medical condition    Care Transition Nurse (CTN) contacted the patient by telephone to perform post hospital discharge assessment. Verified name and  with patient as identifiers. Provided introduction to self, and explanation of the CTN role. CTN reviewed discharge instructions, medical action plan and red flags with patient who verbalized understanding. Patient given an opportunity to ask questions and does not have any further questions or concerns at this time. Were discharge instructions available to patient? Yes. Reviewed appropriate site of care based on symptoms and resources available to patient including: PCP and Specialist. The patient agrees to contact the PCP office for questions related to their healthcare. Medication reconciliation was performed with patient, who verbalizes understanding of administration of home medications. Advised obtaining a 90-day supply of all daily and as-needed medications. Covid Risk Education    Patient has following risk factors of: chronic kidney disease. Education provided regarding infection prevention, and signs and symptoms of COVID-19 and when to seek medical attention with patient who verbalized understanding. Discussed exposure protocols and quarantine From CDC: Are you at higher risk for severe illness?   and given an opportunity for questions and concerns. The patient agrees to contact the COVID-19 hotline [phone] or PCP office for questions related to COVID-19. For more information on steps you can take to protect yourself, see CDC's How to Protect Yourself     Patient/family/caregiver given information for [facility] and agrees to enroll no  Patient's preferred e-mail: declines  Patient's preferred phone number: declines    Discussed follow-up appointments. If no appointment was previously scheduled, appointment scheduling offered: Yes.  Is follow up appointment scheduled within 7 days of discharge? No  Non-[facility] follow up appointment(s): Patient will schedule own follow up      Plan for follow-up call in 7-10 days based on severity of symptoms and risk factors. Plan for next call: Routine follow up. CTN provided contact information for future needs. Care Transitions 24 Hour Call    Do you have any ongoing symptoms?: No  Do you have a copy of your discharge instructions?: Yes  Do you have all of your prescriptions and are they filled?: Yes (Comment: verified with pharmacy that new medications were delivered)  Have you been contacted by a [facility] Pharmacist?: No  Have you scheduled your follow up appointment?: No  Were you discharged with any Home Care or Post Acute Services: Yes  Post Acute Services: Home Health (Comment:  [address])  Do you feel like you have everything you need to keep you well at home?: Yes  Care Transitions Interventions         Follow Up  Future Appointments   Date Time Provider Staci Vuong   4/13/2021  2:45 PM MD Abbie Bernstein RN

## 2021-01-11 NOTE — DISCHARGE SUMMARY
[facility]     Department of Internal Medicine - Staff Internal Medicine Teaching Service    INPATIENT DISCHARGE SUMMARY      Patient Identification:  Marilee Joel is a 63 y.o. male. :  1960  MRN: [id number]     Acct: [de-identified]   PCP: Valerie Wang MD  Admit Date:  2021  Discharge date and time: 2021  6:05 PM   Attending Provider: No att. providers found                                     [address] Problem Lists:  Active Problems:    Hyperlipidemia    Essential hypertension    ESRD on hemodialysis ([facility] [address] MWF    Controlled diabetes mellitus type 2 with complications ([facility] [address]    S/P bilateral below knee amputation ([facility] [address]    Bradycardia  Resolved Problems:    * No resolved hospital problems. *      HOSPITAL STAY     Brief Inpatient course:   65-year-old male presented with chief complaints of fatigue and weakness. On arrival to the ED patient found to be bradycardic with heart rate 235. Cardiology was consulted. Patient had elevated troponins along with coronary artery calcification on CT. Patient underwent cardiac cath revealing double vessel disease leading to KAMILAH to RCA. Patient was on pressors in the ICU and was weaned off pressors. Bradycardia currently resolved. Patient has end-stage renal disease receiving hemodialysis on  schedule. No plans of pacemaker from EP standpoint at this point of time. Patients condition improved and is being discharged under stable conditions. Procedures/ Significant Interventions:    Cardiac cath - KAMILAH to RCA    Consults:     Consults:     Final Specialist Recommendations/Findings:   IP CONSULT TO CARDIOLOGY  IP CONSULT TO CRITICAL CARE  IP CONSULT TO NEPHROLOGY  IP CONSULT TO CASE MANAGEMENT  IP CONSULT TO HOME CARE NEEDS      Any Hospital Acquired Infections: none    Discharge Functional Status:  stable    DISCHARGE PLAN     Disposition:  [place] [facility]    Patient Instructions:   Discharge Medication List as of 1/7/2021  5:35 PM      START taking these medications    Details   apixaban (ELIQUIS) 5 MG TABS tablet Take 1 tablet by mouth 2 times daily, Disp-180 tablet, R-1Normal      atorvastatin (LIPITOR) 80 MG tablet Take 1 tablet by mouth daily, Disp-30 tablet, R-3Normal      amLODIPine (NORVASC) 10 MG tablet Take 1 tablet by mouth daily, Disp-30 tablet, R-3Normal      clopidogrel (PLAVIX) 75 MG tablet Take 1 tablet by mouth daily, Disp-30 tablet, R-3Normal         CONTINUE these medications which have CHANGED    Details   cloNIDine (CATAPRES) 0.3 MG tablet Take 1 tablet by mouth 2 times daily Do not take if HR < 60, Disp-180 tablet, R-3Normal         CONTINUE these medications which have NOT CHANGED    Details   aspirin 81 MG chewable tablet Take 81 mg by mouth every morningHistorical Med      Sucroferric Oxyhydroxide (VELPHORO) 500 MG CHEW Take 2 tablets by mouth 3 times daily (with meals) Crush or chew and shallow 2 tablets three times a day with mealsHistorical Med      Tens Unit List of [facility] in the [place] Starting Tue 12/15/2020, Disp-1 each, R-0, Print      glimepiride (AMARYL) 2 MG tablet Take 1 tablet by mouth every morning (before breakfast), Disp-90 tablet, R-1Normal      linagliptin (TRADJENTA) 5 MG tablet TAKE ONE TABLET BY MOUTH DAILY, Disp-90 tablet,R-0Normal      tadalafil (CIALIS) 20 MG tablet Take 1 tablet by mouth once as needed for Erectile Dysfunction, Disp-30 tablet,R-0Print      lisinopril (PRINIVIL;ZESTRIL) 10 MG tablet TAKE 1 TABLET BY MOUTH  TWICE DAILY, Disp-180 tablet,R-0Normal      NONFORMULARY Historical Med      oxyCODONE-acetaminophen (PERCOCET) 5-325 MG per tablet Take 1 tablet by mouth three times a week  Indications: after dialysis . Historical Med      gabapentin (NEURONTIN) 300 MG capsule TAKE ONE CAPSULE BY MOUTH THREE TIMES A DAY, Disp-90 capsule, R-2Normal         STOP taking these medications       amLODIPine-atorvastatatin (CADUET) 10-20 MG per tablet Comments:   Reason for Stopping:         hydrALAZINE (APRESOLINE) 25 MG tablet Comments:   Reason for Stopping:         cilostazol (PLETAL) 100 MG tablet Comments:   Reason for Stopping:         labetalol (NORMODYNE) 200 MG tablet Comments:   Reason for Stopping:               Activity: activity as tolerated    Diet: cardiac diet, diabetic diet, low fat, low cholesterol diet and renal diet    Follow-up:    Catalino Alba MD  [facility] [address] on 2/25/2021  at 9 am     [address]  [phone]    Go on 1/22/2021  at 2:45  F/u Roly Vazquez with holter on discharge    Eric Gold MD  [address] [address] [facility]  [phone]    Schedule an appointment as soon as possible for a visit in 1 week  [facility] follow-up    Nik Merrill, [address] [address]  [phone]    Schedule an appointment as soon as possible for a visit in 1 week        Patient Instructions:   Holter monitor on discharge and will f/u in OP clinic in 2 weeks.     ESRD Monday Wednesday Friday AV fistula.  At [facility] [place]. Dr Shani Garza. DW 96.4    Please follow-up with PCP cardiology and nephrology outpatient. Please have Holter monitor placed on discharge for 48 hours. Please come to ED if symptoms worsen  Follow up labs: cbc, bmp  Follow up imaging: none    Note that over 30 minutes was spent in preparing discharge papers, discussing discharge with patient, medication review, etc.      Ira Lundy MD,   Internal Medicine Resident, PGY-1  [address] [place], [place]  1/11/2021, 12:55 PM

## 2021-01-12 ENCOUNTER — TELEPHONE (OUTPATIENT)
Dept: INTERNAL MEDICINE CLINIC | Age: 61
End: 2021-01-12

## 2021-01-12 ENCOUNTER — CARE COORDINATION (OUTPATIENT)
Dept: CASE MANAGEMENT | Age: 61
End: 2021-01-12

## 2021-01-12 NOTE — TELEPHONE ENCOUNTER
Patient is waiting for patient assistance from [facility]. So until we get that to put in new prescription he will take the amlodipine 10 mg and lipitor 80 mg until then.

## 2021-01-12 NOTE — CARE COORDINATION
[facility] Trans[facility] Follow Up Call    2021    Patient: Daniel Martinez  Patient : 1960   MRN: [id number]  Reason for Admission: Bradycardia  Discharge Date: 21 RARS: Readmission Risk Score: 29      Spoke with: Patient    Patient reports he is feeling good. He states he feels he has a little more energy but is pacing himself. He goes to dialysis [facility]. He had a holter monitor that he returned to [facility]. He has a follow up scheduled with cardiology on  with CNP and with cardiologist on . He states groin site is healed and has no issues. Denies any chest pains or shortness of breath. He states he called PCP office for appointment but they didn't call him back, declines for me to schedule appointment. He is seen by nephrology at dialysis. He has [address] home care, denies any needs or concerns. Needs to be reviewed by the provider   Additional needs identified to be addressed with provider No  none  Discussed COVID-19 related testing which was available at this time. Test results were negative. Patient informed of results, if available? Yes         Method of communication with provider : none    Care Transition Nurse (CTN) contacted the patient by telephone to follow up after admission on . Verified name and  with patient as identifiers. Addressed changes since last contact: No new changes, turned in holter monitor  Discharged needs reviewed: none  Follow up appointment completed? No    CTN reviewed discharge instructions, medical action plan and red flags with patient and discussed any barriers to care and/or understanding of plan of care after discharge. Discussed appropriate site of care based on symptoms and resources available to patient including: PCP and Specialist. The patient agrees to contact the PCP office for questions related to their healthcare.      Patients top risk factors for readmission: medical condition  Interventions to address risk factors: Education of patient/family/caregiver/guardian to support self-management-Educated on monitoring heart rate and making follow up appointments. Discussed follow-up appointments. Is follow up appointment scheduled within 7 days of discharge? No  Non-[facility] follow up appointment(s): 1/22/21 with cardiology    Plan for follow-up call in 7-10 days based on severity of symptoms and risk factors. Plan for next call: routine follow up  CTN provided contact information for future needs. Care Transitions Subsequent and Final Call    Schedule Follow Up Appointment with PCP: Completed  Subsequent and Final Calls  Do you have any ongoing symptoms?: No  Have your medications changed?: No  Do you have any questions related to your medications?: No  Do you currently have any active services?: Yes  Are you currently active with any services?: Home Health  Do you have any needs or concerns that I can assist you with?: No  Identified Barriers: Lack of Education  Care Transitions Interventions  Other Interventions:            Follow Up  Future Appointments   Date Time Provider Staci Vuong   4/13/2021  2:45 PM Eben Wakler MD [facility] Geraldine Curry RN

## 2021-01-12 NOTE — TELEPHONE ENCOUNTER
Patient was on caduet and in hospital they increased his lipitor to 80 mg and amlodipine 10 mg. He wants to know if he should continue taking separately or can he go back to caduet? [employer] did tell him they have a caduet 10/80.   Please advise

## 2021-01-13 NOTE — PROCEDURES
[address]                  [facility]                                 HOLTER MONITOR    PATIENT NAME: Sean Byrne                     :        1960  MED REC NO:   [id number]                             ROOM:       0405  ACCOUNT NO:   [de-identified]                           ADMIT DATE: 2021  PROVIDER:     Lorenzo Moss    HOLTER MONITOR 48-HOURS    DATE OF STUDY:  2021  ORDERING PROVIDER:  JOSI Fischer  INDICATION:  Bradycardia    COMMENTS:  The patient appeared to remain in sinus rhythm with first  degree AV block noted throughout the recording. Sinus bradycardia and  sinus tachycardia were noted. Rare PVC's were noted. The patient   appeared to be asymptomatic throughout the recording. IMPRESSION:  1. Sinus rhythm with first degree AV block. 2.  Sinus bradycardia and sinus tachycardia. 3.  Rare PVC's.       Kimble Claude    D: 2021 13:35:29       T: 2021 13:37:13     MT/JAMILA  Job#: [id number]     Doc#: Unknown

## 2021-01-14 ENCOUNTER — TELEPHONE (OUTPATIENT)
Dept: PAIN MANAGEMENT | Age: 61
End: 2021-01-14

## 2021-01-18 RX ORDER — HYDRALAZINE HYDROCHLORIDE 25 MG/1
25 TABLET, FILM COATED ORAL 2 TIMES DAILY
Status: ON HOLD | COMMUNITY
End: 2022-09-19

## 2021-01-18 RX ORDER — CILOSTAZOL 100 MG/1
100 TABLET ORAL 2 TIMES DAILY
COMMUNITY
End: 2021-09-14 | Stop reason: ALTCHOICE

## 2021-01-18 RX ORDER — LABETALOL 200 MG/1
200 TABLET, FILM COATED ORAL 2 TIMES DAILY
COMMUNITY
End: 2021-06-15 | Stop reason: ALTCHOICE

## 2021-01-18 ASSESSMENT — ENCOUNTER SYMPTOMS
GASTROINTESTINAL NEGATIVE: 1
BACK PAIN: 1
RESPIRATORY NEGATIVE: 1

## 2021-01-18 NOTE — PROGRESS NOTES
The patient is a 63 y. o. Non-/non  male. Chief Complaint   Patient presents with    Follow Up After Procedure     12/22/20 - MRI Lumbar Spine WO Contrast    Shoulder Injury     Bilat    Back Pain     Lumbar    Leg Pain     Bilat        HPI      -Multiple major comorbidities including hypertension, diabetes, peripheral vascular disease status post bilateral below-knee amputation, end-stage renal disease on hemodialysis, long-term anticoagulation therapy with Eliquis  -Recent ER visit with the chest pain identified with bradycardia with elevated troponin underwent cardiac catheterization and have a drug-eluting stent placed in January 2021, now on long-term antiplatelet therapy with Plavix    Chronic multiple side body pain  Majority of the pain is in the lower back area runs up along the spine  Describes it as aching nagging stiffness  Recently completed MRI lumbar spine  Pain aggravated with any activity  Report intermittent radiation of pain down both legs  No previous lumbar spine injection or surgical history  No recent physical therapy    Patient has been on intermittent opioid therapy mostly provided by his vascular surgeon  He wants to go on chronic tramadol treatment as he found it helpful in past  He denies any illicit substance use    Pt is being seen for a follow-up to discuss MRI results. MRI done on 12/22/20 - MRI Lumbar Spine WO Contrast. Pt stated he is still having significant pain starting at the top of both shoulders, running down the spine (prominent in the lumbar region), & down into his legs stopping at his feet.           Past Medical History:   Diagnosis Date    Chronic bilateral low back pain with bilateral sciatica     CRF (chronic renal failure)     [facility]    DDD (degenerative disc disease), lumbar 12/15/2020    Diabetes mellitus ([facility] [address]     Dialysis patient [facility])     ED (erectile dysfunction)     [facility] of echocardiogram 2014    [facility]    HTN (hypertension)  Hyperlipidemia     LVH (left ventricular hypertrophy)     [facility]     S/P bilateral BKA (below knee amputation) [facility] [address]     Wears glasses       Past Surgical History:   Procedure Laterality Date    CATARACT REMOVAL WITH IMPLANT      DIALYSIS FISTULA CREATION Bilateral     As of 2019, RUE AVF functioning, LUE AVF nonfunctioning.     FINGER SURGERY Left     I & D    GLAUCOMA SURGERY      LEG AMPUTATION BELOW KNEE Bilateral     TUNNELED VENOUS CATHETER PLACEMENT      PC placed and removed     Social History     Socioeconomic History    Marital status:      Spouse name: None    Number of children: None    Years of education: None    Highest education level: None   Occupational History    None   Social Needs    Financial resource strain: None    Food insecurity     Worry: None     Inability: None    Transportation needs     Medical: None     Non-medical: None   Tobacco Use    Smoking status: Never Smoker    Smokeless tobacco: Never Used   Substance and Sexual Activity    Alcohol use: Yes     Comment: rare    Drug use: No    Sexual activity: None   Lifestyle    Physical activity     Days per week: None     Minutes per session: None    Stress: None   Relationships    Social connections     Talks on phone: None     Gets together: None     Attends Orthodox service: None     Active member of club or organization: None     Attends meetings of clubs or organizations: None     Relationship status: None    Intimate partner violence     Fear of current or ex partner: None     Emotionally abused: None     Physically abused: None     Forced sexual activity: None   Other Topics Concern    None   Social History Narrative    None     Family History   Problem Relation Age of Onset    Diabetes Mother     Coronary Art Dis Mother     Hypertension Mother     Diabetes Father     Hypertension Father     Stroke Father     Cancer Sister     Hypertension Brother No Known Allergies  Patient has no known allergies. Vitals:    01/19/21 1417   BP: (!) 152/81   Pulse: 108   SpO2: 100%     Current Outpatient Medications   Medication Sig Dispense Refill    traMADol (ULTRAM) 50 MG tablet Take 1 tablet by mouth every 6 hours as needed for Pain for up to 30 days. 60 tablet 0    Multiple Vitamins-Minerals (RENAPLEX-D PO) Take 1 tablet by mouth daily      cilostazol (PLETAL) 100 MG tablet Take 100 mg by mouth 2 times daily      hydrALAZINE (APRESOLINE) 25 MG tablet Take 25 mg by mouth 2 times daily      labetalol (NORMODYNE) 200 MG tablet Take 200 mg by mouth 2 times daily      apixaban (ELIQUIS) 5 MG TABS tablet Take 1 tablet by mouth 2 times daily 180 tablet 1    atorvastatin (LIPITOR) 80 MG tablet Take 1 tablet by mouth daily 30 tablet 3    amLODIPine (NORVASC) 10 MG tablet Take 1 tablet by mouth daily 30 tablet 3    clopidogrel (PLAVIX) 75 MG tablet Take 1 tablet by mouth daily 30 tablet 3    cloNIDine (CATAPRES) 0.3 MG tablet Take 1 tablet by mouth 2 times daily Do not take if HR < 60 180 tablet 3    aspirin 81 MG chewable tablet Take 81 mg by mouth every morning      Sucroferric Oxyhydroxide (VELPHORO) 500 MG CHEW Take 2 tablets by mouth 3 times daily (with meals) Crush or chew and shallow 2 tablets three times a day with meals      Tens Unit MISC by Does not apply route 1 each 0    glimepiride (AMARYL) 2 MG tablet Take 1 tablet by mouth every morning (before breakfast) 90 tablet 1    linagliptin (TRADJENTA) 5 MG tablet TAKE ONE TABLET BY MOUTH DAILY 90 tablet 0    lisinopril (PRINIVIL;ZESTRIL) 10 MG tablet TAKE 1 TABLET BY MOUTH  TWICE DAILY (Patient taking differently: Take 5 mg by mouth daily TAKE 1 TABLET BY MOUTH  TWICE DAILY) 180 tablet 0    NONFORMULARY       oxyCODONE-acetaminophen (PERCOCET) 5-325 MG per tablet Take 1 tablet by mouth three times a week  Indications: after dialysis .       gabapentin (NEURONTIN) 300 MG capsule TAKE ONE CAPSULE BY MOUTH THREE TIMES A DAY 90 capsule 2    tadalafil (CIALIS) 20 MG tablet Take 1 tablet by mouth once as needed for Erectile Dysfunction 30 tablet 0     No current facility-administered medications for this visit. Review of Systems   Constitutional: Negative. Negative for fever. HENT: Negative. Respiratory: Negative. Cardiovascular: Positive for leg swelling. Gastrointestinal: Negative. Endocrine: Negative. Negative for cold intolerance. Genitourinary: Negative. Musculoskeletal: Positive for arthralgias, back pain and joint swelling. Skin: Negative. Neurological: Negative. Negative for numbness. Hematological: Negative. Psychiatric/Behavioral: Negative. Objective:  General Appearance:  Well-appearing and in no acute distress. Vital signs: (most recent): Blood pressure (!) 152/81, pulse 108, height 5' 11\" (1.803 m), weight 212 lb (96.2 kg), SpO2 100 %. Vital signs are normal.  No fever. Output: Producing urine and producing stool. HEENT: Normal HEENT exam.    Lungs:  Normal effort and normal respiratory rate. Breath sounds clear to auscultation. He is not in respiratory distress. Heart: Normal rate. Extremities: Normal range of motion. There is no deformity. Neurological: Patient is alert and oriented to person, place and time. Patient has normal coordination. Pupils:  Pupils are equal, round, and reactive to light. Pupils are equal.   Skin:  Warm and dry. No rash or cyanosis.         Assessment & Plan   -Multiple major comorbidities including hypertension, diabetes, peripheral vascular disease status post bilateral below-knee amputation, end-stage renal disease on hemodialysis, long-term anticoagulation therapy with Eliquis  -Recent ER visit with the chest pain identified with bradycardia with elevated troponin underwent cardiac catheterization and have a drug-eluting stent placed in January 2021, now on long-term antiplatelet therapy with Plavix    Chronic multiple side body pain  Majority of the pain is in the lower back area runs up along the spine  Describes it as aching nagging stiffness  Recently completed MRI lumbar spine  Pain aggravated with any activity  Report intermittent radiation of pain down both legs  No previous lumbar spine injection or surgical history  No recent physical therapy    Patient has been on intermittent opioid therapy mostly provided by his vascular surgeon  He wants to go on chronic tramadol treatment as he found it helpful in past  He denies any illicit substance use    MRI OF THE LUMBAR SPINE WITHOUT CONTRAST, 12/22/2020 6:12 pm    L3-L4: Disc bulge, ligamentum flavum thickening, facet hypertrophy contributing to moderate to severe narrowing of the thecal sac and mild narrowing of the neural foramen. L4-L5: Disc bulge and facet hypertrophy contributing to moderate narrowing of the thecal sac. There is mild right and moderate to severe left neural foraminal stenosis with mild impingement of the exiting left L4 nerve root. L5-S1: Disc bulge and facet hypertrophy contributing to mild narrowing of the thecal sac. There is moderate to severe narrowing of both neural foramen with mild impingement of the exiting left L5 nerve root. 1. Chronic bilateral low back pain with bilateral sciatica    2. Long term (current) use of antithrombotics/antiplatelets    3. Abnormal MRI, lumbar spine    4. S/P bilateral below knee amputation ([address]    5.  Severe comorbid illness        MRI lumbar spine  Report reviewed  Finding discussed with patient    Recent coronary event with drug-eluting stent placement January 2022 now on long-term antiplatelet therapy with Plavix and on long-term anticoagulation with Eliquis  Is not a candidate for any interventional spine procedure    Considering his chronic pain issues I we will start him on low-dose opioid therapy with tramadol twice a day  Consider dose titration in future if needed    We will sign opioid contract  Collect urine for toxicology on next visit  Automated prescription report reviewed  Safe use of medication discussed with patient    No orders of the defined types were placed in this encounter. Orders Placed This Encounter   Medications    traMADol (ULTRAM) 50 MG tablet     Sig: Take 1 tablet by mouth every 6 hours as needed for Pain for up to 30 days. Dispense:  60 tablet     Refill:  0     Reduce doses taken as pain becomes manageable      Controlled Substance Monitoring:    Acute and Chronic Pain Monitoring:   RX Monitoring 6/10/2016   Attestation The Prescription Monitoring Report for this patient was reviewed today. Periodic Controlled Substance Monitoring No signs of potential drug abuse or diversion identified.            Electronically signed by Moi Roche MD on 1/19/2021 at 2:44 PM

## 2021-01-19 ENCOUNTER — OFFICE VISIT (OUTPATIENT)
Dept: PAIN MANAGEMENT | Age: 61
End: 2021-01-19
Payer: MEDICARE

## 2021-01-19 VITALS
HEART RATE: 108 BPM | BODY MASS INDEX: 29.68 KG/M2 | HEIGHT: 71 IN | WEIGHT: 212 LBS | OXYGEN SATURATION: 100 % | DIASTOLIC BLOOD PRESSURE: 81 MMHG | SYSTOLIC BLOOD PRESSURE: 152 MMHG

## 2021-01-19 DIAGNOSIS — M54.41 CHRONIC BILATERAL LOW BACK PAIN WITH BILATERAL SCIATICA: Primary | ICD-10-CM

## 2021-01-19 DIAGNOSIS — R93.7 ABNORMAL MRI, LUMBAR SPINE: ICD-10-CM

## 2021-01-19 DIAGNOSIS — G89.29 CHRONIC BILATERAL LOW BACK PAIN WITH BILATERAL SCIATICA: Primary | ICD-10-CM

## 2021-01-19 DIAGNOSIS — M54.42 CHRONIC BILATERAL LOW BACK PAIN WITH BILATERAL SCIATICA: Primary | ICD-10-CM

## 2021-01-19 DIAGNOSIS — Z89.511 S/P BILATERAL BELOW KNEE AMPUTATION (HCC): ICD-10-CM

## 2021-01-19 DIAGNOSIS — Z79.02 LONG TERM (CURRENT) USE OF ANTITHROMBOTICS/ANTIPLATELETS: ICD-10-CM

## 2021-01-19 DIAGNOSIS — R69 SEVERE COMORBID ILLNESS: ICD-10-CM

## 2021-01-19 DIAGNOSIS — Z89.512 S/P BILATERAL BELOW KNEE AMPUTATION (HCC): ICD-10-CM

## 2021-01-19 PROCEDURE — 1036F TOBACCO NON-USER: CPT | Performed by: ANESTHESIOLOGY

## 2021-01-19 PROCEDURE — G8484 FLU IMMUNIZE NO ADMIN: HCPCS | Performed by: ANESTHESIOLOGY

## 2021-01-19 PROCEDURE — G8427 DOCREV CUR MEDS BY ELIG CLIN: HCPCS | Performed by: ANESTHESIOLOGY

## 2021-01-19 PROCEDURE — 1111F DSCHRG MED/CURRENT MED MERGE: CPT | Performed by: ANESTHESIOLOGY

## 2021-01-19 PROCEDURE — 99214 OFFICE O/P EST MOD 30 MIN: CPT | Performed by: ANESTHESIOLOGY

## 2021-01-19 PROCEDURE — 3017F COLORECTAL CA SCREEN DOC REV: CPT | Performed by: ANESTHESIOLOGY

## 2021-01-19 PROCEDURE — G8417 CALC BMI ABV UP PARAM F/U: HCPCS | Performed by: ANESTHESIOLOGY

## 2021-01-19 RX ORDER — TRAMADOL HYDROCHLORIDE 50 MG/1
50 TABLET ORAL EVERY 6 HOURS PRN
Qty: 60 TABLET | Refills: 0 | Status: SHIPPED | OUTPATIENT
Start: 2021-01-19 | End: 2021-02-18

## 2021-01-20 ENCOUNTER — CARE COORDINATION (OUTPATIENT)
Dept: CASE MANAGEMENT | Age: 61
End: 2021-01-20

## 2021-01-20 NOTE — CARE COORDINATION
[facility] Transitions Follow Up Call    2021    Patient: Susanne Hernandez  Patient : 1960   MRN: [id number]  Reason for Admission: Bradycardia  Discharge Date: 21 RARS: Readmission Risk Score: 28         Attempted to reach patient for subsequent transitional call. VM left to return call to [phone]. 1st attempt.     Irish Delgado RN

## 2021-01-21 ENCOUNTER — CARE COORDINATION (OUTPATIENT)
Dept: CASE MANAGEMENT | Age: 61
End: 2021-01-21

## 2021-01-21 NOTE — CARE COORDINATION
[facility] Transitions Follow Up Call    2021    Patient: Elder Lawler  Patient : 1960   MRN: [id number]  Reason for Admission: Bradycardia  Discharge Date: 21 RARS: Readmission Risk Score: 28         Attempted to reach patient for subsequent transitional call. VM left to return call to [phone]. 2nd attempt to reach.      Matty Rios RN

## 2021-01-22 ENCOUNTER — CARE COORDINATION (OUTPATIENT)
Dept: CASE MANAGEMENT | Age: 61
End: 2021-01-22

## 2021-01-22 ENCOUNTER — TELEPHONE (OUTPATIENT)
Dept: PAIN MANAGEMENT | Age: 61
End: 2021-01-22

## 2021-01-22 NOTE — TELEPHONE ENCOUNTER
Pt  Jazmine Rodríguez calling on behalf of patient. Pt was receiving both Tramadol and Percocet via his Vascular surgeon however, pt was referred to pain management for further medication management. Pt concerned that the tramadol alone will not help his pain.  Scheduled OV  appt for patient to further discuss medication questions/concerns

## 2021-01-22 NOTE — CARE COORDINATION
[facility]  Transitions Follow Up Call    2021    Patient: Dru Delaney  Patient : 1960   MRN: [id number]  Reason for Admission: Bradycardia  Discharge Date: 21 RARS: Readmission Risk Score: 29         Spoke with: Patient    Patient returned call, states he is doing ok, has no chest pain, all healed from cardiac cath and having no swelling or shortness of breath. He was fitted for a prosthesis this week, patient is a bilateral LE amputee. He states starting next week he is going to try to start walking to dialysis which is about 1/2 a mile. He was seen by pain management this week, states he was prescribed Tramadol BID. He thought he would get Percocet as well as he has been on this he states for the past 11 years. He was taking both Percocet and Tramadol for pain control and was being prescribed by his vascular surgeon. He was referred to pain management for further scripts for his pain control. He said he has 6 Percocet left and is taking the Tramadol and the Tramadol alone does not manage his pain. He said he left message with pain management but has not heard back. I called this am to the office and spoke with staff and explained patient's situation. I did get him an appointment for this coming Tuesday to discuss with the doctor. Patient aware of appointment date and time. Needs to be reviewed by the provider   Additional needs identified to be addressed with provider Yes  medications-Pain not controlled with Tramadol  Discussed COVID-19 related testing which was available at this time. Test results were negative. Patient informed of results, if available? Yes         Method of communication with provider : phone    Care Transition Nurse (CTN) contacted the patient by telephone to follow up after admission on . Verified name and  with patient as identifiers.     Addressed changes since last contact: follow up appointment-Patient had follow up with pain management, Tramadol only ordered  Discharged needs reviewed: none  Follow up appointment completed? Yes      CTN reviewed discharge instructions, medical action plan and red flags with patient and discussed any barriers to care and/or understanding of plan of care after discharge. Discussed appropriate site of care based on symptoms and resources available to patient including: PCP and Specialist. The patient agrees to contact the PCP office for questions related to their healthcare. Patients top risk factors for readmission: medical condition  Interventions to address risk factors: Obtained and reviewed discharge summary and/or continuity of care documents    Discussed follow-up appointments. Plan for follow-up call in 7-10 days based on severity of symptoms and risk factors. Plan for next call: routine follow up  CTN provided contact information for future needs. Care Transitions Subsequent and Final Call    Schedule Follow Up Appointment with PCP: Completed  Subsequent and Final Calls  Do you have any ongoing symptoms?: Yes  Onset of Patient-reported symptoms: In the past 7 days  Patient-reported symptoms: Pain  Interventions for patient-reported symptoms: Notified PCP/Physician  Have your medications changed?: Yes  Do you have any questions related to your medications?: Yes  Patient Reports: wants Percocet instead of Tramadol for pain control  Do you currently have any active services?: No  Are you currently active with any services?: Home Health  Do you have any needs or concerns that I can assist you with?: No  Identified Barriers: Lack of Education  Care Transitions Interventions  Other Interventions:            Follow Up  Future Appointments   Date Time Provider Staci Vuong   1/26/2021  9:40 AM MD Alexei Martinez [facility]   2/16/2021  1:40 PM MD Alexei Martinez [facility]   4/13/2021  2:45 PM Bryn Red MD [facility] Keyana Egan RN

## 2021-01-26 ENCOUNTER — OFFICE VISIT (OUTPATIENT)
Dept: PAIN MANAGEMENT | Age: 61
End: 2021-01-26
Payer: MEDICARE

## 2021-01-26 VITALS
OXYGEN SATURATION: 100 % | TEMPERATURE: 97.1 F | HEIGHT: 71 IN | SYSTOLIC BLOOD PRESSURE: 171 MMHG | BODY MASS INDEX: 29.96 KG/M2 | WEIGHT: 214 LBS | DIASTOLIC BLOOD PRESSURE: 100 MMHG | HEART RATE: 117 BPM

## 2021-01-26 DIAGNOSIS — M54.42 CHRONIC BILATERAL LOW BACK PAIN WITH BILATERAL SCIATICA: Primary | ICD-10-CM

## 2021-01-26 DIAGNOSIS — M54.41 CHRONIC BILATERAL LOW BACK PAIN WITH BILATERAL SCIATICA: Primary | ICD-10-CM

## 2021-01-26 DIAGNOSIS — G89.29 CHRONIC BILATERAL LOW BACK PAIN WITH BILATERAL SCIATICA: Primary | ICD-10-CM

## 2021-01-26 DIAGNOSIS — Z89.511 S/P BILATERAL BELOW KNEE AMPUTATION (HCC): ICD-10-CM

## 2021-01-26 DIAGNOSIS — R69 SEVERE COMORBID ILLNESS: ICD-10-CM

## 2021-01-26 DIAGNOSIS — Z79.891 CHRONIC USE OF OPIATE DRUG FOR THERAPEUTIC PURPOSE: ICD-10-CM

## 2021-01-26 DIAGNOSIS — Z89.512 S/P BILATERAL BELOW KNEE AMPUTATION (HCC): ICD-10-CM

## 2021-01-26 PROCEDURE — G8417 CALC BMI ABV UP PARAM F/U: HCPCS | Performed by: ANESTHESIOLOGY

## 2021-01-26 PROCEDURE — G8427 DOCREV CUR MEDS BY ELIG CLIN: HCPCS | Performed by: ANESTHESIOLOGY

## 2021-01-26 PROCEDURE — G8484 FLU IMMUNIZE NO ADMIN: HCPCS | Performed by: ANESTHESIOLOGY

## 2021-01-26 PROCEDURE — 1036F TOBACCO NON-USER: CPT | Performed by: ANESTHESIOLOGY

## 2021-01-26 PROCEDURE — 3017F COLORECTAL CA SCREEN DOC REV: CPT | Performed by: ANESTHESIOLOGY

## 2021-01-26 PROCEDURE — 99212 OFFICE O/P EST SF 10 MIN: CPT | Performed by: ANESTHESIOLOGY

## 2021-01-26 PROCEDURE — 1111F DSCHRG MED/CURRENT MED MERGE: CPT | Performed by: ANESTHESIOLOGY

## 2021-01-26 ASSESSMENT — ENCOUNTER SYMPTOMS
BACK PAIN: 1
RESPIRATORY NEGATIVE: 1

## 2021-01-26 NOTE — PROGRESS NOTES
The patient is a 63 y. o. Non-/non  male. Chief Complaint   Patient presents with    Back Pain    Discuss Medications        HPI  With multiple major comorbidities including hypertension diabetes peripheral vascular disease end-stage kidney disease on hemodialysis long-term anticoagulation therapy status post bilateral knee amputation  Recent coronary event with the stent placement in January 2021    Patient get dialyzed 3 times a week  He receives Percocet that he takes on his dialysis day from vascular surgeon  He states that the vascular surgeon continues to write Percocet for him  For his back pain which is related to lumbar disc disease he states that he takes tramadol which was previously prescribed by a vascular surgeon but then they said they can only write one medication so he came to our clinic    Considering his severe comorbidities and minimal use of medication we have started him on tramadol as needed basis  Patient states he takes tramadol only alternate days so for tablet a day in  He do not need any refill today  No other changes in health history  No side effect from the medications    Patient is here to discuss medications. Pain is in the lower back and is 8/10. Aggravating factors walking.  Alleviating factors are Tramadol and Percocet    Past Medical History:   Diagnosis Date    Chronic bilateral low back pain with bilateral sciatica     CRF (chronic renal failure)     [facility]    DDD (degenerative disc disease), lumbar 12/15/2020    [facility] mellitus ([facility] [address]     Dialysis patient [facility])     ED (erectile dysfunction)     [facility]    HTN (hypertension)     Hyperlipidemia     LVH (left ventricular hypertrophy)     PVD (peripheral vascular disease) ([facility] [address]     S/P bilateral BKA (below knee amputation) ([facility] [address]     Wears glasses       Past Surgical History:   Procedure Laterality Date    CATARACT REMOVAL WITH IMPLANT      DIALYSIS FISTULA CREATION Bilateral     As of 2019, RUE AVF functioning, LUE AVF nonfunctioning.  FINGER SURGERY Left     I & D    GLAUCOMA SURGERY      LEG AMPUTATION BELOW KNEE Bilateral     TUNNELED VENOUS CATHETER PLACEMENT      PC placed and removed     Social History     Socioeconomic History    Marital status:      Spouse name: None    Number of children: None    Years of education: None    Highest education level: None   Occupational History    None   Social Needs    Financial resource strain: None    Food insecurity     Worry: None     Inability: None    Transportation needs     Medical: None     Non-medical: None   Tobacco Use    Smoking status: Never Smoker    Smokeless tobacco: Never Used   Substance and Sexual Activity    Alcohol use: Yes     Comment: rare    Drug use: No    Sexual activity: None   Lifestyle    Physical activity     Days per week: None     Minutes per session: None    Stress: None   Relationships    Social connections     Talks on phone: None     Gets together: None     Attends Orthodoxy service: None     Active member of club or organization: None     Attends meetings of clubs or organizations: None     Relationship status: None    Intimate partner violence     Fear of current or ex partner: None     Emotionally abused: None     Physically abused: None     Forced sexual activity: None   Other Topics Concern    None   Social History Narrative    None     Family History   Problem Relation Age of Onset    Diabetes Mother     Coronary Art Dis Mother     Hypertension Mother     Diabetes Father     Hypertension Father     Stroke Father     Cancer Sister     Hypertension Brother      No Known Allergies  Patient has no known allergies.    Vitals:    01/26/21 0942   BP: (!) 171/100   Pulse: 117   Temp:    SpO2: 100%     Current Outpatient Medications   Medication Sig Dispense Refill    traMADol (ULTRAM) 50 MG tablet Take 1 tablet by mouth every 6 hours as needed for Pain for up to 30 days. 60 tablet 0    Multiple Vitamins-Minerals (RENAPLEX-D PO) Take 1 tablet by mouth daily      cilostazol (PLETAL) 100 MG tablet Take 100 mg by mouth 2 times daily      hydrALAZINE (APRESOLINE) 25 MG tablet Take 25 mg by mouth 2 times daily      labetalol (NORMODYNE) 200 MG tablet Take 200 mg by mouth 2 times daily      apixaban (ELIQUIS) 5 MG TABS tablet Take 1 tablet by mouth 2 times daily 180 tablet 1    atorvastatin (LIPITOR) 80 MG tablet Take 1 tablet by mouth daily 30 tablet 3    amLODIPine (NORVASC) 10 MG tablet Take 1 tablet by mouth daily 30 tablet 3    clopidogrel (PLAVIX) 75 MG tablet Take 1 tablet by mouth daily 30 tablet 3    cloNIDine (CATAPRES) 0.3 MG tablet Take 1 tablet by mouth 2 times daily Do not take if HR < 60 180 tablet 3    aspirin 81 MG chewable tablet Take 81 mg by mouth every morning      Sucroferric Oxyhydroxide (VELPHORO) 500 MG CHEW Take 2 tablets by mouth 3 times daily (with meals) Crush or chew and shallow 2 tablets three times a day with meals      Tens Unit MISC by Does not apply route 1 each 0    glimepiride (AMARYL) 2 MG tablet Take 1 tablet by mouth every morning (before breakfast) 90 tablet 1    linagliptin (TRADJENTA) 5 MG tablet TAKE ONE TABLET BY MOUTH DAILY 90 tablet 0    lisinopril (PRINIVIL;ZESTRIL) 10 MG tablet TAKE 1 TABLET BY MOUTH  TWICE DAILY (Patient taking differently: Take 5 mg by mouth daily TAKE 1 TABLET BY MOUTH  TWICE DAILY) 180 tablet 0    NONFORMULARY       oxyCODONE-acetaminophen (PERCOCET) 5-325 MG per tablet Take 1 tablet by mouth three times a week  Indications: after dialysis .  gabapentin (NEURONTIN) 300 MG capsule TAKE ONE CAPSULE BY MOUTH THREE TIMES A DAY 90 capsule 2    tadalafil (CIALIS) 20 MG tablet Take 1 tablet by mouth once as needed for Erectile Dysfunction 30 tablet 0     No current facility-administered medications for this visit. Review of Systems   Constitutional: Positive for fatigue. Respiratory: Negative. Musculoskeletal: Positive for arthralgias and back pain. Neurological: Negative. Objective   Assessment & Plan   With multiple major comorbidities including hypertension diabetes peripheral vascular disease end-stage kidney disease on hemodialysis long-term anticoagulation therapy status post bilateral knee amputation  Recent coronary event with the stent placement in January 2021    Patient get dialyzed 3 times a week  He receives Percocet that he takes on his dialysis day from vascular surgeon  He states that the vascular surgeon continues to write Percocet for him  For his back pain which is related to lumbar disc disease he states that he takes tramadol which was previously prescribed by a vascular surgeon but then they said they can only write one medication so he came to our clinic    Considering his severe comorbidities and minimal use of medication we have started him on tramadol as needed basis  Patient states he takes tramadol only alternate days so for tablet a day in  He do not need any refill today  No other changes in health history  No side effect from the medications    1. Chronic bilateral low back pain with bilateral sciatica    2. Chronic use of opiate drugs therapeutic purposes    3. S/P bilateral below knee amputation ([address]    4. Severe comorbid illness        NO Refill needed today  Patient to call for medication refill next month  Follow-up in 2 months with nurse practitioner    Controlled Substance Monitoring:    Acute and Chronic Pain Monitoring:   RX Monitoring 1/26/2021   Attestation -   Periodic Controlled Substance Monitoring No signs of potential drug abuse or diversion identified. ;Possible medication side effects, risk of tolerance/dependence & alternative treatments discussed. ;Assessed functional status. Chronic Pain > 50 MEDD Obtained or confirmed \"Consent for Opioid Use\" on file.        Electronically signed by Diana Garrett MD on 1/26/2021 at 10:02 AM

## 2021-01-28 ENCOUNTER — HOSPITAL ENCOUNTER (EMERGENCY)
Age: 61
Discharge: HOME | End: 2021-01-28
Attending: EMERGENCY MEDICINE
Payer: MEDICARE

## 2021-01-28 ENCOUNTER — CARE COORDINATION (OUTPATIENT)
Dept: CASE MANAGEMENT | Age: 61
End: 2021-01-28

## 2021-01-28 VITALS
WEIGHT: 214 LBS | TEMPERATURE: 98.2 F | SYSTOLIC BLOOD PRESSURE: 144 MMHG | HEART RATE: 90 BPM | HEIGHT: 71 IN | BODY MASS INDEX: 29.96 KG/M2 | OXYGEN SATURATION: 100 % | RESPIRATION RATE: 16 BRPM | DIASTOLIC BLOOD PRESSURE: 82 MMHG

## 2021-01-28 DIAGNOSIS — T14.8XXA SUPERFICIAL LACERATION: Primary | ICD-10-CM

## 2021-01-28 PROCEDURE — 99282 EMERGENCY DEPT VISIT SF MDM: CPT

## 2021-01-28 RX ORDER — CEPHALEXIN 250 MG/1
250 CAPSULE ORAL 2 TIMES DAILY
Qty: 10 CAPSULE | Refills: 0 | Status: SHIPPED | OUTPATIENT
Start: 2021-01-28 | End: 2021-02-02

## 2021-01-28 ASSESSMENT — ENCOUNTER SYMPTOMS
VOMITING: 0
SHORTNESS OF BREATH: 0
NAUSEA: 0
COUGH: 0
SINUS PRESSURE: 0
COLOR CHANGE: 0

## 2021-01-28 ASSESSMENT — PAIN SCALES - GENERAL: PAINLEVEL_OUTOF10: 8

## 2021-01-28 NOTE — ED PROVIDER NOTES
[facility] ED  EMERGENCY DEPARTMENT ENCOUNTER      Pt Name: Monique Veloz  MRN: [id number]  [place] 1960  Date of evaluation: 1/28/21  CHIEF COMPLAINT       Chief Complaint   Patient presents with    Wound Infection     L buttock     HISTORY OF PRESENT ILLNESS   Presents to the emergency room via private auto requesting wound evaluation. 2 weeks ago he put his pants on backwards and accidentally cut his left buttock with the zipper. He states that the wound is only minimally painful but he is concerned that it is not yet fully healed. He is diabetic. There is been no drainage. No fevers or body aches. The history is provided by the patient. REVIEW OF SYSTEMS     Review of Systems   Constitutional: Negative for activity change and fever. HENT: Negative for congestion and sinus pressure. Respiratory: Negative for cough and shortness of breath. Cardiovascular: Negative for chest pain and palpitations. Gastrointestinal: Negative for nausea and vomiting. Musculoskeletal: Negative for myalgias. Pain to left buttock at laceration site   Skin: Positive for wound. Negative for color change. Neurological: Negative for dizziness and headaches. Psychiatric/Behavioral: Negative for confusion and decreased concentration.      PASTMEDICAL HISTORY     Past Medical History:   Diagnosis Date    Chronic bilateral low back pain with bilateral sciatica     CRF (chronic renal failure)     [facility] MWF    DDD (degenerative disc disease), lumbar 12/15/2020    [facility] [address]     Dialysis patient [facility])     ED (erectile dysfunction)     [facility] of echocardiogram 2014    [facility]    HTN (hypertension)     Hyperlipidemia     LVH (left ventricular hypertrophy)     PVD (peripheral vascular disease) ([address]     S/P bilateral BKA (below knee amputation) ([facility] [address]     Wears glasses      SURGICAL HISTORY       Past Surgical History:   Procedure Laterality Date    CATARACT REMOVAL WITH IMPLANT mouth every 6 hours as needed for Pain for up to 30 days. ALLERGIES     has No Known Allergies. FAMILY HISTORY     He indicated that the status of his mother is unknown. He indicated that the status of his father is unknown. He indicated that the status of his sister is unknown. He indicated that the status of his brother is unknown. SOCIAL HISTORY       Social History     Tobacco Use    Smoking status: Never Smoker    Smokeless tobacco: Never Used   Substance Use Topics    Alcohol use: Yes     Comment: rare    Drug use: No     PHYSICAL EXAM     INITIAL VITALS: BP (!) 144/82   Pulse 90   Temp 98.2 °F (36.8 °C) (Oral)   Resp 16   Ht 5' 11\" (1.803 m)   Wt 214 lb (97.1 kg)   SpO2 100%   BMI 29.85 kg/m²    Physical Exam  Constitutional:       Appearance: Normal appearance. HENT:      Right Ear: External ear normal.      Left Ear: External ear normal.   Eyes:      General:         Right eye: No discharge. Left eye: No discharge. Conjunctiva/sclera: Conjunctivae normal.   Cardiovascular:      Rate and Rhythm: Normal rate and regular rhythm. Pulmonary:      Effort: Pulmonary effort is normal.      Breath sounds: Normal breath sounds. Musculoskeletal: Normal range of motion. Right lower leg: No edema. Left lower leg: No edema. Skin:     General: Skin is warm and dry. Comments: Superficial linear laceration measuring 7 to 8 cm. No surrounding erythema. No drainage. Wound edges are well approximated but not fully healed   Neurological:      General: No focal deficit present. Mental Status: He is alert and oriented to person, place, and time. Psychiatric:         Mood and Affect: Mood normal.         Thought Content:  Thought content normal.         DIAGNOSTIC RESULTS     RADIOLOGY:All plain film, CT, MRI, and formal ultrasound images (except ED bedside ultrasound) are read by the radiologist, see reports below, unless otherwise noted in MDM or here.    Interpretation per the Radiologist below, if available at the time of this note:    No orders to display       LABS:  Labs Reviewed - No data to display    All other labs were within normal range or not returned as of this dictation. EMERGENCY DEPARTMENT COURSE and DIFFERENTIAL DIAGNOSIS/MDM:   Vitals:    Vitals:    21 1329   BP: (!) 144/82   Pulse: 90   Resp: 16   Temp: 98.2 °F (36.8 °C)   TempSrc: Oral   SpO2: 100%   Weight: 214 lb (97.1 kg)   Height: 5' 11\" (1.803 m)       Medical Decision Makin-year-old male who is afebrile does not appear ill. No distress. He has superficial laceration that is mostly healed. Wound edges are well approximated. Will be started on a short course of antibiotic and he will follow-up with his primary care provider. The patient was given the following meds in the ED:  Orders Placed This Encounter   Medications    cephALEXin (KEFLEX) 250 MG capsule     Sig: Take 1 capsule by mouth 2 times daily for 5 days     Dispense:  10 capsule     Refill:  0       FINAL IMPRESSION      1.  Superficial laceration          DISPOSITION/PLAN   DISPOSITION Decision To Discharge 2021 01:38:35 PM      PATIENT REFERRED TO:   Goldie Bell MD  [address] [address] [address]  [phone]    Schedule an appointment as soon as possible for a visit         DISCHARGE MEDICATIONS:     New Prescriptions    CEPHALEXIN (KEFLEX) 250 MG CAPSULE    Take 1 capsule by mouth 2 times daily for 5 days       CRITICAL CARE TIME       Please note that portions of this note were completed with a voice recognition program.      AURORA IVY CNP, APRN - CNP  [id number]

## 2021-01-28 NOTE — CARE COORDINATION
[facility] Transitions Follow Up Call    2021    Patient: Vertell Living  Patient : 1960   MRN: [id number]  Reason for Admission: Bradycardia  Discharge Date: 21 RARS: Readmission Risk Score: 28         Attempted to reach patient for subsequent transitional call. VM left to return call to [phone]. 1st attempt.        Follow Up  Future Appointments   Date Time Provider Staci Vuong   3/15/2021  9:40 AM AURORA Chamberlain - CNP [facility]   2021  2:45 PM Saskia Johnson MD [facility] Tommie Greenwood RN

## 2021-01-28 NOTE — ED PROVIDER NOTES
eMERGENCY dEPARTMENT eNCOUnter   Independent Attestation     Pt Name: Jacqui Nieto  MRN: [id number]  [place] 1960  Date of evaluation: 1/28/21     Jacqui Nieto is a 63 y.o. male with CC: Wound Infection (L buttock)      Based on the medical record the care appears appropriate. I was personally available for consultation in the Emergency Department.     Giancarlo Bell MD  Attending Emergency Physician                    Giancarlo Bell MD  [phone]

## 2021-01-29 ENCOUNTER — CARE COORDINATION (OUTPATIENT)
Dept: CASE MANAGEMENT | Age: 61
End: 2021-01-29

## 2021-01-29 NOTE — CARE COORDINATION
[address] [facility] ED Follow Up Call    2021    Patient: Margareth Perez Patient : 1960   MRN: [id number]  Reason for Admission: Wound  Discharge Date: 21     Attempted to reach patient for ED follow up. I had tried to call patient earlier yesterday and he did return my call after hours and left voicemail that he had been feeling good and walking to and from dialysis. He did not mention in his voicemail that he went to the ED. He has dialysis MWF, left message to return my call at his earliest convenience.

## 2021-03-02 RX ORDER — TADALAFIL 20 MG/1
20 TABLET ORAL
Qty: 30 TABLET | Refills: 0 | Status: SHIPPED | OUTPATIENT
Start: 2021-03-02 | End: 2021-03-04 | Stop reason: SDUPTHER

## 2021-03-02 NOTE — TELEPHONE ENCOUNTER
Patient calling to ask for script for Cialis to be sent to [facility] F# [phone]

## 2021-03-04 RX ORDER — TADALAFIL 20 MG/1
20 TABLET ORAL
Qty: 30 TABLET | Refills: 1 | Status: SHIPPED | OUTPATIENT
Start: 2021-03-04 | End: 2021-06-16 | Stop reason: SDUPTHER

## 2021-03-04 NOTE — TELEPHONE ENCOUNTER
cialis went to [facility], new order pending to print so it can be sent to [facility].

## 2021-03-05 ENCOUNTER — TELEPHONE (OUTPATIENT)
Dept: PAIN MANAGEMENT | Age: 61
End: 2021-03-05

## 2021-03-16 ENCOUNTER — OFFICE VISIT (OUTPATIENT)
Dept: PAIN MANAGEMENT | Age: 61
End: 2021-03-16
Payer: MEDICARE

## 2021-03-16 VITALS
DIASTOLIC BLOOD PRESSURE: 101 MMHG | SYSTOLIC BLOOD PRESSURE: 184 MMHG | RESPIRATION RATE: 16 BRPM | TEMPERATURE: 96.5 F | BODY MASS INDEX: 29.96 KG/M2 | WEIGHT: 214 LBS | HEART RATE: 109 BPM | HEIGHT: 71 IN

## 2021-03-16 DIAGNOSIS — G89.29 CHRONIC BILATERAL LOW BACK PAIN WITH BILATERAL SCIATICA: Primary | ICD-10-CM

## 2021-03-16 DIAGNOSIS — N18.6 ESRD ON HEMODIALYSIS (HCC): ICD-10-CM

## 2021-03-16 DIAGNOSIS — Z79.891 CHRONIC USE OF OPIATE DRUG FOR THERAPEUTIC PURPOSE: ICD-10-CM

## 2021-03-16 DIAGNOSIS — R69 SEVERE COMORBID ILLNESS: ICD-10-CM

## 2021-03-16 DIAGNOSIS — Z99.2 ESRD ON HEMODIALYSIS (HCC): ICD-10-CM

## 2021-03-16 DIAGNOSIS — Z89.512 S/P BILATERAL BELOW KNEE AMPUTATION (HCC): ICD-10-CM

## 2021-03-16 DIAGNOSIS — M54.41 CHRONIC BILATERAL LOW BACK PAIN WITH BILATERAL SCIATICA: Primary | ICD-10-CM

## 2021-03-16 DIAGNOSIS — M54.42 CHRONIC BILATERAL LOW BACK PAIN WITH BILATERAL SCIATICA: Primary | ICD-10-CM

## 2021-03-16 DIAGNOSIS — Z89.511 S/P BILATERAL BELOW KNEE AMPUTATION (HCC): ICD-10-CM

## 2021-03-16 PROCEDURE — 3017F COLORECTAL CA SCREEN DOC REV: CPT | Performed by: ANESTHESIOLOGY

## 2021-03-16 PROCEDURE — G8427 DOCREV CUR MEDS BY ELIG CLIN: HCPCS | Performed by: ANESTHESIOLOGY

## 2021-03-16 PROCEDURE — 1036F TOBACCO NON-USER: CPT | Performed by: ANESTHESIOLOGY

## 2021-03-16 PROCEDURE — 99214 OFFICE O/P EST MOD 30 MIN: CPT | Performed by: ANESTHESIOLOGY

## 2021-03-16 PROCEDURE — G8484 FLU IMMUNIZE NO ADMIN: HCPCS | Performed by: ANESTHESIOLOGY

## 2021-03-16 PROCEDURE — G8417 CALC BMI ABV UP PARAM F/U: HCPCS | Performed by: ANESTHESIOLOGY

## 2021-03-16 RX ORDER — TRAMADOL HYDROCHLORIDE 50 MG/1
50 TABLET ORAL EVERY 6 HOURS PRN
Qty: 120 TABLET | Refills: 0 | Status: SHIPPED | OUTPATIENT
Start: 2021-03-16 | End: 2021-04-13 | Stop reason: SDUPTHER

## 2021-03-16 RX ORDER — OXYCODONE AND ACETAMINOPHEN 7.5; 325 MG/1; MG/1
1 TABLET ORAL 2 TIMES DAILY
Qty: 15 TABLET | Refills: 0 | Status: SHIPPED | OUTPATIENT
Start: 2021-03-22 | End: 2021-04-13 | Stop reason: SDUPTHER

## 2021-03-16 RX ORDER — HEPARIN SODIUM 1000 [USP'U]/ML
INJECTION, SOLUTION INTRAVENOUS; SUBCUTANEOUS
Status: ON HOLD | COMMUNITY
Start: 2021-03-01 | End: 2022-09-19

## 2021-03-16 ASSESSMENT — ENCOUNTER SYMPTOMS
CONSTIPATION: 0
ABDOMINAL PAIN: 0
BACK PAIN: 1

## 2021-03-16 NOTE — PROGRESS NOTES
The patient is a 63 y. o. Non-/non  male. Chief Complaint   Patient presents with    Follow-up    Back Pain        HPI     76-year-old man with history of chronic multiple major medical illnesses  On long-term antiplatelet therapy for recent coronary stenting in January 2021    He is seen in our clinic for history of chronic stump pain and chronic lower back pain  Back pain is constant onset many years ago located in the lumbar area describes it as constant aching nagging stiffness in the lower back  Pain aggravated with any activity  Nothing seems to alleviate the pain  He tried physical therapy in past  Had an MRI lumbar spine that showed multilevel severe lumbar facet arthropathy  Is not a candidate for any intervention considering recent coronary event    He is been on dialysis for chronic kidney disease  He has been on 1 tablet Percocet after dialysis for many years that was prescribed by his vascular surgeon  Patient want me to take over that medication    We have been writing tramadol for nondialysis day for back pain    Patient has been compliant with the treatment to find the medication helpful but pain is still poorly controlled      Patient states that he is still in pain and has been experiencing an increase of pain in left \"stump\".          Past Medical History:   Diagnosis Date    Chronic bilateral low back pain with bilateral sciatica     [facility]    DDD (degenerative disc disease), lumbar 12/15/2020    Diabetes mellitus ([facility] [address]     Dialysis patient [facility])     ED (erectile dysfunction)     [facility]    [facility]    HTN (hypertension)     Hyperlipidemia     LVH (left ventricular hypertrophy)     PVD (peripheral vascular disease) ([facility] [address]     S/P bilateral BKA (below knee amputation) ([facility] [address]     Wears glasses       Past Surgical History:   Procedure Laterality Date    CATARACT REMOVAL WITH IMPLANT      DIALYSIS FISTULA CREATION Bilateral     As of 2019, RUE AVF functioning, LUE AVF nonfunctioning.  FINGER SURGERY Left     I & D    GLAUCOMA SURGERY      LEG AMPUTATION BELOW KNEE Bilateral     TUNNELED VENOUS CATHETER PLACEMENT      PC placed and removed     Social History     Socioeconomic History    Marital status:      Spouse name: None    Number of children: None    Years of education: None    Highest education level: None   Occupational History    None   Social Needs    Financial resource strain: None    Food insecurity     Worry: None     Inability: None    Transportation needs     Medical: None     Non-medical: None   Tobacco Use    Smoking status: Never Smoker    Smokeless tobacco: Never Used   Substance and Sexual Activity    Alcohol use: Yes     Comment: rare    Drug use: No    Sexual activity: None   Lifestyle    Physical activity     Days per week: None     Minutes per session: None    Stress: None   Relationships    Social connections     Talks on phone: None     Gets together: None     Attends Restorationism service: None     Active member of club or organization: None     Attends meetings of clubs or organizations: None     Relationship status: None    Intimate partner violence     Fear of current or ex partner: None     Emotionally abused: None     Physically abused: None     Forced sexual activity: None   Other Topics Concern    None   Social History Narrative    None     Family History   Problem Relation Age of Onset    Diabetes Mother     Coronary Art Dis Mother     Hypertension Mother     Diabetes Father     Hypertension Father     Stroke Father     Cancer Sister     Hypertension Brother      No Known Allergies  Patient has no known allergies.    Vitals:    03/16/21 1322   BP: (!) 184/101   Pulse: 109   Resp:    Temp:      Current Outpatient Medications   Medication Sig Dispense Refill    Heparin Sodium, Porcine, (HEPARIN, PORCINE,) 1000 UNIT/ML injection Heparin Sodium (Porcine) 1,000 Units/mL Systemic      [START ON 3/22/2021] oxyCODONE-acetaminophen (PERCOCET) 7.5-325 MG per tablet Take 1 tablet by mouth 2 times daily for 30 days. On Monday, Wednesday and Friday after dialysis 15 tablet 0    traMADol (ULTRAM) 50 MG tablet Take 1 tablet by mouth every 6 hours as needed for Pain for up to 30 days. on tuesday, thursday and saturday and sunday 120 tablet 0    Multiple Vitamins-Minerals (RENAPLEX-D PO) Take 1 tablet by mouth daily      cilostazol (PLETAL) 100 MG tablet Take 100 mg by mouth 2 times daily      hydrALAZINE (APRESOLINE) 25 MG tablet Take 25 mg by mouth 2 times daily      labetalol (NORMODYNE) 200 MG tablet Take 200 mg by mouth 2 times daily      apixaban (ELIQUIS) 5 MG TABS tablet Take 1 tablet by mouth 2 times daily 180 tablet 1    atorvastatin (LIPITOR) 80 MG tablet Take 1 tablet by mouth daily 30 tablet 3    amLODIPine (NORVASC) 10 MG tablet Take 1 tablet by mouth daily 30 tablet 3    clopidogrel (PLAVIX) 75 MG tablet Take 1 tablet by mouth daily 30 tablet 3    cloNIDine (CATAPRES) 0.3 MG tablet Take 1 tablet by mouth 2 times daily Do not take if HR < 60 180 tablet 3    aspirin 81 MG chewable tablet Take 81 mg by mouth every morning      Sucroferric Oxyhydroxide (VELPHORO) 500 MG CHEW Take 2 tablets by mouth 3 times daily (with meals) Crush or chew and shallow 2 tablets three times a day with meals      Tens Unit MISC by Does not apply route 1 each 0    glimepiride (AMARYL) 2 MG tablet Take 1 tablet by mouth every morning (before breakfast) 90 tablet 1    linagliptin (TRADJENTA) 5 MG tablet TAKE ONE TABLET BY MOUTH DAILY 90 tablet 0    lisinopril (PRINIVIL;ZESTRIL) 10 MG tablet TAKE 1 TABLET BY MOUTH  TWICE DAILY (Patient taking differently: Take 5 mg by mouth daily TAKE 1 TABLET BY MOUTH  TWICE DAILY) 180 tablet 0    NONFORMULARY       oxyCODONE-acetaminophen (PERCOCET) 5-325 MG per tablet Take 1 tablet by mouth three times a week  Indications: after dialysis .  gabapentin (NEURONTIN) 300 MG capsule TAKE ONE CAPSULE BY MOUTH THREE TIMES A DAY 90 capsule 2    tadalafil (CIALIS) 20 MG tablet Take 1 tablet by mouth once as needed for Erectile Dysfunction 30 tablet 1     No current facility-administered medications for this visit. Review of Systems   Constitutional: Negative for fever. HENT: Negative for ear pain. Eyes: Positive for visual disturbance. Cardiovascular: Negative for chest pain. Gastrointestinal: Negative for abdominal pain and constipation. Musculoskeletal: Positive for back pain. Neurological: Positive for numbness. Objective:  General Appearance: In pain and uncomfortable. Vital signs: (most recent): Blood pressure (!) 184/101, pulse 109, temperature 96.5 °F (35.8 °C), resp. rate 16, height 5' 11\" (1.803 m), weight 214 lb (97.1 kg). Vital signs are normal.  No fever. Output: Producing urine and producing stool. HEENT: Normal HEENT exam.    Lungs:  Normal effort and normal respiratory rate. Breath sounds clear to auscultation. He is not in respiratory distress. Heart: Normal rate. Extremities: Normal range of motion. There is no deformity. Neurological: Patient is alert and oriented to person, place and time. Patient has normal coordination. Pupils:  Pupils are equal, round, and reactive to light. Pupils are equal.   Skin:  Warm and dry. No rash or cyanosis.       Assessment & Plan     With multiple major comorbidities including hypertension, diabetes, peripheral vascular disease end-stage kidney disease on hemodialysis long-term anticoagulation therapy status post bilateral knee amputation  Recent coronary event with the stent placement in January 2021     Patient get dialyzed 3 times a week  He receives Percocet that he takes on his dialysis day from vascular surgeon  He states that the vascular surgeon continues to write Percocet for him  For his back pain which is related to lumbar disc disease he states that he takes tramadol which was previously prescribed by a vascular surgeon but then they said they can only write one medication so he came to our clinic     Considering his severe comorbidities and minimal use of medication we have started him on tramadol as needed basis  Patient states he takes tramadol only alternate days so for tablet a day in  He do not need any refill today  No other changes in health history  No side effect from the medications      MRI OF THE LUMBAR SPINE WITHOUT CONTRAST, 12/22/2020 6:12 pm    L3-L4: Disc bulge, ligamentum flavum thickening, facet hypertrophy contributing to moderate to severe narrowing of the thecal sac and mild narrowing of the neural foramen. L4-L5: Disc bulge and facet hypertrophy contributing to moderate narrowing of the thecal sac. There is mild right and moderate to severe left neural foraminal stenosis with mild impingement of the exiting left L4 nerve root. L5-S1: Disc bulge and facet hypertrophy contributing to mild narrowing of the thecal sac. There is moderate to severe narrowing of both neural foramen with mild impingement of the exiting left L5 nerve root. 1. Chronic bilateral low back pain with bilateral sciatica    2. Chronic use of opiate drugs therapeutic purposes    3. S/P bilateral below knee amputation ([facility] [address]    4. Severe comorbid illness    5. ESRD on hemodialysis [facility]) MWF         chronic axial lumbar spinal pain with severe multilevel lumbar facet arthropathy  I think patient can benefit from lumbar facet injection  I will consider him for injection after June 2021 once he is 6 months out of his recent coronary event    I agree to take over his Percocet  Automated prescription report reviewed  Will increase Percocet 5 mg to 7/mg do not find 5 mg much helpful and has been taking 2 tablets after dialysis    Will sign opioid contract    No orders of the defined types were placed in this encounter.      Orders Placed This Encounter Medications    oxyCODONE-acetaminophen (PERCOCET) 7.5-325 MG per tablet     Sig: Take 1 tablet by mouth 2 times daily for 30 days. On Monday, Wednesday and Friday after dialysis     Dispense:  15 tablet     Refill:  0     Reduce doses taken as pain becomes manageable    traMADol (ULTRAM) 50 MG tablet     Sig: Take 1 tablet by mouth every 6 hours as needed for Pain for up to 30 days. on tuesday, thursday and saturday and sunday     Dispense:  120 tablet     Refill:  0     Reduce doses taken as pain becomes manageable        Controlled Substance Monitoring:    Acute and Chronic Pain Monitoring:   RX Monitoring 3/16/2021   Attestation -   Periodic Controlled Substance Monitoring Possible medication side effects, risk of tolerance/dependence & alternative treatments discussed. ;No signs of potential drug abuse or diversion identified. ;Assessed functional status. Chronic Pain > 50 MEDD Obtained or confirmed \"Consent for Opioid Use\" on file.          Electronically signed by Neymar Pabon MD on 3/16/2021 at 2:15 PM note

## 2021-03-29 ENCOUNTER — APPOINTMENT (OUTPATIENT)
Dept: GENERAL RADIOLOGY | Age: 61
End: 2021-03-29
Payer: MEDICARE

## 2021-03-29 ENCOUNTER — HOSPITAL ENCOUNTER (EMERGENCY)
Age: 61
Discharge: HOME | End: 2021-03-29
Attending: EMERGENCY MEDICINE
Payer: MEDICARE

## 2021-03-29 VITALS
SYSTOLIC BLOOD PRESSURE: 140 MMHG | HEART RATE: 63 BPM | TEMPERATURE: 98.6 F | WEIGHT: 212 LBS | BODY MASS INDEX: 29.68 KG/M2 | HEIGHT: 71 IN | RESPIRATION RATE: 18 BRPM | DIASTOLIC BLOOD PRESSURE: 80 MMHG

## 2021-03-29 DIAGNOSIS — M25.552 LEFT HIP PAIN: Primary | ICD-10-CM

## 2021-03-29 LAB
ABSOLUTE EOS #: 0.28 K/UL (ref 0–0.44)
ABSOLUTE IMMATURE GRANULOCYTE: 0 K/UL (ref 0–0.3)
ABSOLUTE LYMPH #: 1.23 K/UL (ref 1.1–3.7)
ABSOLUTE MONO #: 0.84 K/UL (ref 0.1–1.2)
ANION GAP SERPL CALCULATED.3IONS-SCNC: 13 MMOL/L (ref 9–17)
BASOPHILS # BLD: 1 % (ref 0–2)
BASOPHILS ABSOLUTE: 0.06 K/UL (ref 0–0.2)
BUN BLDV-MCNC: 18 MG/DL (ref 8–23)
BUN/CREAT BLD: 3 (ref 9–20)
CALCIUM SERPL-MCNC: 9.1 MG/DL (ref 8.6–10.4)
CHLORIDE BLD-SCNC: 93 MMOL/L (ref 98–107)
CO2: 28 MMOL/L (ref 20–31)
CREAT SERPL-MCNC: 5.81 MG/DL (ref 0.7–1.2)
DIFFERENTIAL TYPE: ABNORMAL
EOSINOPHILS RELATIVE PERCENT: 5 % (ref 1–4)
GFR AFRICAN AMERICAN: 12 ML/MIN
GFR NON-AFRICAN AMERICAN: 10 ML/MIN
GFR SERPL CREATININE-BSD FRML MDRD: ABNORMAL ML/MIN/{1.73_M2}
GFR SERPL CREATININE-BSD FRML MDRD: ABNORMAL ML/MIN/{1.73_M2}
GLUCOSE BLD-MCNC: 176 MG/DL (ref 70–99)
HCT VFR BLD CALC: 33.3 % (ref 40.7–50.3)
HEMOGLOBIN: 9.7 G/DL (ref 13–17)
IMMATURE GRANULOCYTES: 0 %
LYMPHOCYTES # BLD: 22 % (ref 24–43)
MCH RBC QN AUTO: 23.8 PG (ref 25.2–33.5)
MCHC RBC AUTO-ENTMCNC: 29.1 G/DL (ref 28.4–34.8)
MCV RBC AUTO: 81.8 FL (ref 82.6–102.9)
MONOCYTES # BLD: 15 % (ref 3–12)
MORPHOLOGY: ABNORMAL
NRBC AUTOMATED: 0 PER 100 WBC
PDW BLD-RTO: 20.2 % (ref 11.8–14.4)
PLATELET # BLD: 183 K/UL (ref 138–453)
PLATELET ESTIMATE: ABNORMAL
PMV BLD AUTO: 10.6 FL (ref 8.1–13.5)
POTASSIUM SERPL-SCNC: 4 MMOL/L (ref 3.7–5.3)
RBC # BLD: 4.07 M/UL (ref 4.21–5.77)
RBC # BLD: ABNORMAL 10*6/UL
SEG NEUTROPHILS: 57 % (ref 36–65)
SEGMENTED NEUTROPHILS ABSOLUTE COUNT: 3.19 K/UL (ref 1.5–8.1)
SODIUM BLD-SCNC: 134 MMOL/L (ref 135–144)
WBC # BLD: 5.6 K/UL (ref 3.5–11.3)
WBC # BLD: ABNORMAL 10*3/UL

## 2021-03-29 PROCEDURE — 85025 COMPLETE CBC W/AUTO DIFF WBC: CPT

## 2021-03-29 PROCEDURE — 99282 EMERGENCY DEPT VISIT SF MDM: CPT

## 2021-03-29 PROCEDURE — 73502 X-RAY EXAM HIP UNI 2-3 VIEWS: CPT

## 2021-03-29 PROCEDURE — 6370000000 HC RX 637 (ALT 250 FOR IP): Performed by: NURSE PRACTITIONER

## 2021-03-29 PROCEDURE — 80048 BASIC METABOLIC PNL TOTAL CA: CPT

## 2021-03-29 RX ORDER — TIZANIDINE 2 MG/1
2 TABLET ORAL EVERY 8 HOURS PRN
Qty: 15 TABLET | Refills: 0 | Status: SHIPPED | OUTPATIENT
Start: 2021-03-29 | End: 2021-06-15 | Stop reason: ALTCHOICE

## 2021-03-29 RX ORDER — LIDOCAINE 50 MG/G
1 PATCH TOPICAL DAILY
Qty: 10 PATCH | Refills: 0 | Status: SHIPPED | OUTPATIENT
Start: 2021-03-29 | End: 2021-06-15 | Stop reason: ALTCHOICE

## 2021-03-29 RX ORDER — LIDOCAINE 4 G/G
1 PATCH TOPICAL ONCE
Status: DISCONTINUED | OUTPATIENT
Start: 2021-03-29 | End: 2021-03-29 | Stop reason: HOSPADM

## 2021-03-29 ASSESSMENT — ENCOUNTER SYMPTOMS
VOMITING: 0
BACK PAIN: 1
DIARRHEA: 0
NAUSEA: 0
COLOR CHANGE: 0
ABDOMINAL PAIN: 0

## 2021-03-29 ASSESSMENT — PAIN DESCRIPTION - PAIN TYPE: TYPE: ACUTE PAIN

## 2021-03-29 ASSESSMENT — PAIN DESCRIPTION - ORIENTATION: ORIENTATION: LEFT

## 2021-03-29 ASSESSMENT — PAIN DESCRIPTION - LOCATION: LOCATION: FLANK

## 2021-03-29 NOTE — ED TRIAGE NOTES
Having left side pain that started last week. Treating with Tylenol at home. \"I just can't shake it. \" Next hemodialysis treatment on Wed. States has infrequent scant urinations when straining to have BM.

## 2021-03-29 NOTE — ED PROVIDER NOTES
[address] ED  eMERGENCY dEPARTMENT eNCOUnter      Pt Name: Zara Hernandez  MRN: [id number]  [place]oseph[place] 1960  Date of evaluation: 3/29/2021  Provider: AURORA Greenberg [id number]       Chief Complaint   Patient presents with    Flank Pain     left         HISTORY OF PRESENT ILLNESS  (Location/Symptom, Timing/Onset, Context/Setting, Quality, Duration, Modifying Factors, Severity.)   Zara Hernandez is a 63 y.o. male who presents to the emergency department via private auto for pain to his left lower back. Onset was today. Denies injury, fever, chills, weakness. The pain is worse with palpation, ambulation. He is a dialysis patient and does not make urine. He came here from dialysis. Rates his pain 7/10 at this time. He is in pain management. Nursing Notes were reviewed. ALLERGIES     Patient has no known allergies.     CURRENT MEDICATIONS       Previous Medications    AMLODIPINE (NORVASC) 10 MG TABLET    Take 1 tablet by mouth daily    APIXABAN (ELIQUIS) 5 MG TABS TABLET    Take 1 tablet by mouth 2 times daily    ASPIRIN 81 MG CHEWABLE TABLET    Take 81 mg by mouth every morning    ATORVASTATIN (LIPITOR) 80 MG TABLET    Take 1 tablet by mouth daily    CILOSTAZOL (PLETAL) 100 MG TABLET    Take 100 mg by mouth 2 times daily    CLONIDINE (CATAPRES) 0.3 MG TABLET    Take 1 tablet by mouth 2 times daily Do not take if HR < 60    CLOPIDOGREL (PLAVIX) 75 MG TABLET    Take 1 tablet by mouth daily    GABAPENTIN (NEURONTIN) 300 MG CAPSULE    TAKE ONE CAPSULE BY MOUTH THREE TIMES A DAY    GLIMEPIRIDE (AMARYL) 2 MG TABLET    Take 1 tablet by mouth every morning (before breakfast)    HEPARIN SODIUM, PORCINE, (HEPARIN, PORCINE,) 1000 UNIT/ML INJECTION    Heparin Sodium (Porcine) 1,000 Units/mL Systemic    HYDRALAZINE (APRESOLINE) 25 MG TABLET    Take 25 mg by mouth 2 times daily    LABETALOL (NORMODYNE) 200 MG TABLET    Take 200 mg by mouth 2 times daily    LINAGLIPTIN (TRADJENTA) 5 MG TABLET    TAKE ONE TABLET BY MOUTH DAILY    LISINOPRIL (PRINIVIL;ZESTRIL) 10 MG TABLET    TAKE 1 TABLET BY MOUTH  TWICE DAILY    MULTIPLE VITAMINS-MINERALS (RENAPLEX-D PO)    Take 1 tablet by mouth daily    NONFORMULARY        OXYCODONE-ACETAMINOPHEN (PERCOCET) 5-325 MG PER TABLET    Take 1 tablet by mouth three times a week  Indications: after dialysis . OXYCODONE-ACETAMINOPHEN (PERCOCET) 7.5-325 MG PER TABLET    Take 1 tablet by mouth 2 times daily for 30 days. On Monday, Wednesday and Friday after dialysis    SUCROFERRIC OXYHYDROXIDE (VELPHORO) 500 MG CHEW    Take 2 tablets by mouth 3 times daily (with meals) Crush or chew and shallow 2 tablets three times a day with meals    TADALAFIL (CIALIS) 20 MG TABLET    Take 1 tablet by mouth once as needed for Erectile Dysfunction    TENS UNIT MISC    by Does not apply route    TRAMADOL (ULTRAM) 50 MG TABLET    Take 1 tablet by mouth every 6 hours as needed for Pain for up to 30 days. on tuesday, thursday and saturday and sunday       PAST MEDICAL HISTORY         Diagnosis Date    Chronic bilateral low back pain with bilateral sciatica     CRF (chronic renal failure)     [facility] MWF    DDD (degenerative disc disease), lumbar 12/15/2020    [facility] mellitus [facility]     Dialysis patient [facility])     ED (erectile dysfunction)     [facility] echocardi[facility] 2014    [facility]    HTN (hypertension)     Hyperlipidemia     LVH (left ventricular hypertrophy)     PVD (peripheral vascular disease) ([facility]     S/P bilateral BKA (below knee amputation) ([facility]     Wears glasses        SURGICAL HISTORY           Procedure Laterality Date    CATARACT REMOVAL WITH IMPLANT      DIALYSIS FISTULA CREATION Bilateral     As of 2019, RUE AVF functioning, LUE AVF nonfunctioning.     FINGER SURGERY Left     I & D    GLAUCOMA SURGERY      LEG AMPUTATION BELOW KNEE Bilateral     TUNNELED VENOUS CATHETER PLACEMENT      PC placed and removed         FAMILY HISTORY           Problem exhibits no swelling and no deformity. Back:    Skin:     General: Skin is warm and dry. Findings: No erythema or rash. Neurological:      Mental Status: He is alert and oriented to person, place, and time. Psychiatric:         Behavior: Behavior normal.           DIAGNOSTIC RESULTS     RADIOLOGY:   Non-plain film images such as CT, Ultrasound and MRI are read by the radiologist. Tyrell Grit radiographic images are visualized and preliminarily interpreted by the emergency physician with the below findings:    Interpretation per the Radiologist below, if available at the time of this note:    Xr Hip 2-3 Vw W Pelvis Left    Result Date: 3/29/2021  EXAMINATION: ONE XRAY VIEW OF [facility] 3/29/2021 6:44 pm COMPARISON: None. HISTORY: ORDERING SYSTEM PROVIDED HISTORY: pain TECHNOLOGIST PROVIDED HISTORY: pain Reason for Exam: Lt hip/flank pain; NKI Acuity: Acute Type of Exam: Initial FINDINGS: The left hip demonstrates normal alignment. No evidence of acute fracture. No focal osseus lesion. Pelvis is intact. No acute abnormality of the left hip. LABS:  Labs Reviewed   CBC WITH AUTO DIFFERENTIAL - Abnormal; Notable for the following components:       Result Value    RBC 4.07 (*)     Hemoglobin 9.7 (*)     Hematocrit 33.3 (*)     MCV 81.8 (*)     MCH 23.8 (*)     RDW 20.2 (*)     All other components within normal limits   BASIC METABOLIC PANEL - Abnormal; Notable for the following components:    Glucose 176 (*)     CREATININE 5.81 (*)     Bun/Cre Ratio 3 (*)     Sodium 134 (*)     Chloride 93 (*)     GFR Non- 10 (*)     GFR  12 (*)     All other components within normal limits       All other labs were within normal range or not returned as of this dictation.     EMERGENCY DEPARTMENT COURSE and DIFFERENTIAL DIAGNOSIS/MDM:   Vitals:    Vitals:    03/29/21 1751   BP: (!) 140/80   Pulse: 63   Resp: 18   Temp: 98.6 °F (37 °C)   TempSrc: Oral Weight: 212 lb (96.2 kg)   Height: 5' 11\" (1.803 m)       MEDICATIONS GIVEN IN THE ED:  Medications   lidocaine 4 % external patch 1 patch (1 patch Transdermal Patch Applied 3/29/21 1845)       CLINICAL DECISION MAKING:  The patient presented alert with a nontoxic appearance and was seen in conjunction with Dr. Jamin Zhou. The patient is in pain management. Prescriptions were provided for Lidoderm and Zanaflex. The patient was advised to not drink alcohol, drive, or operate heavy machinery while taking the Zanaflex. Follow up with pcp for further evaluation and treatment, return to ED if condition worsens. FINAL IMPRESSION      1.  Left hip pain            Problem List  Patient Active Problem List   Diagnosis Code    Hyperlipidemia E78.5    Essential hypertension I10    [email or web address] MWF N18.6, Z99.2    Insomnia G47.00    ED (erectile dysfunction) N52.9    Chronic bilateral low back pain with bilateral sciatica M54.42, M54.41, G89.29    Controlled diabetes mellitus type 2 with complications ([email or web address] N85.6    S/P bilateral below knee amputation (HCC) Z89.512, Z89.511    Chronic use of opiate drugs therapeutic purposes Z79.891    Severe comorbid illness R69    DDD (degenerative disc disease), lumbar M51.36    Bradycardia R00.1         DISPOSITION/PLAN   DISPOSITION Decision To Discharge 03/29/2021 09:21:14 PM      PATIENT REFERRED TO:   Ksenia Haywood MD  [address] [address] [place] [phone]    Schedule an appointment as soon as possible for a visit       [facility] ED  [address] [facility]  [phone]          DISCHARGE MEDICATIONS:     New Prescriptions    LIDOCAINE (LIDODERM) 5 %    Place 1 patch onto the skin daily 12 hours on, 12 hours off.    TIZANIDINE (ZANAFLEX) 2 MG TABLET    Take 1 tablet by mouth every 8 hours as needed (back pain)           (Please note that portions of this note were completed with a voice recognition program. Efforts were made to edit the dictations but occasionally words are mis-transcribed.)    AURORA Goldstein - AURORA Daly CNP  03/29/21 2454

## 2021-03-30 NOTE — ED PROVIDER NOTES
eMERGENCY dEPARTMENT eNCOUnter   Independent Attestation     Pt Name: Jorge Broderick  MRN: [id number]  [place] 1960  Date of evaluation: 3/29/21     Jorge Borderick is a 63 y.o. male with CC: Flank Pain (left)      Based on the medical record the care appears appropriate. I was personally available for consultation in the Emergency Department.     Desiree May MD  Attending Emergency Physician                    Veto Giraldo MD  03/29/21 6082

## 2021-04-13 ENCOUNTER — OFFICE VISIT (OUTPATIENT)
Dept: PAIN MANAGEMENT | Age: 61
End: 2021-04-13
Payer: MEDICARE

## 2021-04-13 VITALS
HEART RATE: 93 BPM | DIASTOLIC BLOOD PRESSURE: 92 MMHG | OXYGEN SATURATION: 100 % | BODY MASS INDEX: 29.4 KG/M2 | HEIGHT: 71 IN | WEIGHT: 210 LBS | SYSTOLIC BLOOD PRESSURE: 176 MMHG

## 2021-04-13 DIAGNOSIS — Z89.512 S/P BILATERAL BELOW KNEE AMPUTATION (HCC): ICD-10-CM

## 2021-04-13 DIAGNOSIS — R69 SEVERE COMORBID ILLNESS: ICD-10-CM

## 2021-04-13 DIAGNOSIS — M54.41 CHRONIC BILATERAL LOW BACK PAIN WITH BILATERAL SCIATICA: ICD-10-CM

## 2021-04-13 DIAGNOSIS — M54.42 CHRONIC BILATERAL LOW BACK PAIN WITH BILATERAL SCIATICA: ICD-10-CM

## 2021-04-13 DIAGNOSIS — Z79.02 LONG TERM (CURRENT) USE OF ANTITHROMBOTICS/ANTIPLATELETS: ICD-10-CM

## 2021-04-13 DIAGNOSIS — Z89.511 S/P BILATERAL BELOW KNEE AMPUTATION (HCC): ICD-10-CM

## 2021-04-13 DIAGNOSIS — Z79.891 CHRONIC USE OF OPIATE DRUG FOR THERAPEUTIC PURPOSE: Primary | ICD-10-CM

## 2021-04-13 DIAGNOSIS — G89.29 CHRONIC BILATERAL LOW BACK PAIN WITH BILATERAL SCIATICA: ICD-10-CM

## 2021-04-13 DIAGNOSIS — Z99.2 ESRD ON HEMODIALYSIS (HCC): ICD-10-CM

## 2021-04-13 DIAGNOSIS — M48.062 SPINAL STENOSIS OF LUMBAR REGION WITH NEUROGENIC CLAUDICATION: ICD-10-CM

## 2021-04-13 DIAGNOSIS — N18.6 ESRD ON HEMODIALYSIS (HCC): ICD-10-CM

## 2021-04-13 PROCEDURE — 99213 OFFICE O/P EST LOW 20 MIN: CPT | Performed by: ANESTHESIOLOGY

## 2021-04-13 PROCEDURE — 3017F COLORECTAL CA SCREEN DOC REV: CPT | Performed by: ANESTHESIOLOGY

## 2021-04-13 PROCEDURE — 1036F TOBACCO NON-USER: CPT | Performed by: ANESTHESIOLOGY

## 2021-04-13 PROCEDURE — G8427 DOCREV CUR MEDS BY ELIG CLIN: HCPCS | Performed by: ANESTHESIOLOGY

## 2021-04-13 PROCEDURE — G8417 CALC BMI ABV UP PARAM F/U: HCPCS | Performed by: ANESTHESIOLOGY

## 2021-04-13 RX ORDER — OXYCODONE AND ACETAMINOPHEN 7.5; 325 MG/1; MG/1
1 TABLET ORAL 2 TIMES DAILY
Qty: 15 TABLET | Refills: 0 | Status: SHIPPED | OUTPATIENT
Start: 2021-04-13 | End: 2021-05-13

## 2021-04-13 RX ORDER — TRAMADOL HYDROCHLORIDE 50 MG/1
50 TABLET ORAL DAILY PRN
Qty: 30 TABLET | Refills: 0 | Status: SHIPPED | OUTPATIENT
Start: 2021-04-13 | End: 2021-05-13

## 2021-04-13 ASSESSMENT — ENCOUNTER SYMPTOMS
BACK PAIN: 1
CONSTIPATION: 1
CHEST TIGHTNESS: 0
ABDOMINAL PAIN: 1

## 2021-04-13 NOTE — PROGRESS NOTES
The patient is a 63 y. o. Non-/non  male. Chief Complaint   Patient presents with    Medication Refill    Back Pain        HPI   Chief Complaint: medication refill   Medication Refill: percocet   69-year-old man with multiple major comorbidities including hypertension diabetes peripheral vascular disease end-stage kidney disease on hemodialysis long-term anticoagulation therapy, status post bilateral knee amputation  Recent coronary event with coronary stent placement in January 2021    Patient is seen for history of chronic generalized pain particularly involving lower back  He is on low-dose opioid therapy with Percocet and tramadol on alternate days 1 tablet  Reports no side effects finds medication helpful  No history of illicit substance use    Pain score Today:  7  Adverse effects (Constipation / Nausea / Sedation / sexual Dysfunction / others) : none at this time   Mood: good  Sleep pattern and quality: poor  Activity level: excellent    Pill count Today: did not bring   Last dose taken  4/12/2021  OAR[place] report reviewed today: yes, 3/29/2021   ER/Hospitalizations/PCP visit related to pain since last visit:yes   Any legal problems e.g. DUI etc.:No  Satisfied with current management: Yes    Opioid Contract: 3/16/2021  Last Urine Dug screen dated: none on file     Lab Results   Component Value Date    LABA1C 7.9 (H) 12/10/2020     Lab Results   Component Value Date     12/10/2020       Past Medical History, Past Surgical History, Social History, Allergies and Medications reviewed and updated in EPIC as indicated    Family History reviewed and is noncontributory.           Past Medical History:   Diagnosis Date    Chronic bilateral low back pain with bilateral sciatica     CRF (chronic renal failure)     [facility] MWF    DDD (degenerative disc disease), lumbar 12/15/2020    Diabetes mellitus ([facility]     Dialysis patient [facility])     ED (erectile dysfunction)     [facility] 2014    [facility]    HTN (hypertension)     Hyperlipidemia     LVH (left ventricular hypertrophy)     [facility]     S/P bilateral BKA (below knee amputation) ([address]     Wears glasses       Past Surgical History:   Procedure Laterality Date    CATARACT REMOVAL WITH IMPLANT      DIALYSIS FISTULA CREATION Bilateral     As of 2019, RUE AVF functioning, LUE AVF nonfunctioning.     FINGER SURGERY Left     I & D    GLAUCOMA SURGERY      LEG AMPUTATION BELOW KNEE Bilateral     TUNNELED VENOUS CATHETER PLACEMENT      PC placed and removed     Social History     Socioeconomic History    Marital status:      Spouse name: None    Number of children: None    Years of education: None    Highest education level: None   Occupational History    None   Social Needs    Financial resource strain: None    Food insecurity     Worry: None     Inability: None    Transportation needs     Medical: None     Non-medical: None   Tobacco Use    Smoking status: Never Smoker    Smokeless tobacco: Never Used   Substance and Sexual Activity    Alcohol use: Yes     Comment: rare    Drug use: No    Sexual activity: None   Lifestyle    Physical activity     Days per week: None     Minutes per session: None    Stress: None   Relationships    Social connections     Talks on phone: None     Gets together: None     Attends Mandaeism service: None     Active member of club or organization: None     Attends meetings of clubs or organizations: None     Relationship status: None    Intimate partner violence     Fear of current or ex partner: None     Emotionally abused: None     Physically abused: None     Forced sexual activity: None   Other Topics Concern    None   Social History Narrative    None     Family History   Problem Relation Age of Onset    Diabetes Mother     Coronary Art Dis Mother     Hypertension Mother     Diabetes Father     Hypertension Father     Stroke Father     Cancer Sister     Hypertension Brother      No Known Allergies  Patient has no known allergies. Vitals:    04/13/21 1623   BP: (!) 176/92   Pulse: 93   SpO2: 100%     Current Outpatient Medications   Medication Sig Dispense Refill    oxyCODONE-acetaminophen (PERCOCET) 7.5-325 MG per tablet Take 1 tablet by mouth 2 times daily for 30 days. On Monday, Wednesday and Friday after dialysis 15 tablet 0    traMADol (ULTRAM) 50 MG tablet Take 1 tablet by mouth daily as needed for Pain for up to 30 days. on tuesday, thursday and saturday and sunday 30 tablet 0    tiZANidine (ZANAFLEX) 2 MG tablet Take 1 tablet by mouth every 8 hours as needed (back pain) 15 tablet 0    lidocaine (LIDODERM) 5 % Place 1 patch onto the skin daily 12 hours on, 12 hours off.  10 patch 0    Heparin Sodium, Porcine, (HEPARIN, PORCINE,) 1000 UNIT/ML injection Heparin Sodium (Porcine) 1,000 Units/mL Systemic      tadalafil (CIALIS) 20 MG tablet Take 1 tablet by mouth once as needed for Erectile Dysfunction 30 tablet 1    Multiple Vitamins-Minerals (RENAPLEX-D PO) Take 1 tablet by mouth daily      cilostazol (PLETAL) 100 MG tablet Take 100 mg by mouth 2 times daily      hydrALAZINE (APRESOLINE) 25 MG tablet Take 25 mg by mouth 2 times daily      labetalol (NORMODYNE) 200 MG tablet Take 200 mg by mouth 2 times daily      apixaban (ELIQUIS) 5 MG TABS tablet Take 1 tablet by mouth 2 times daily 180 tablet 1    atorvastatin (LIPITOR) 80 MG tablet Take 1 tablet by mouth daily 30 tablet 3    amLODIPine (NORVASC) 10 MG tablet Take 1 tablet by mouth daily 30 tablet 3    clopidogrel (PLAVIX) 75 MG tablet Take 1 tablet by mouth daily 30 tablet 3    cloNIDine (CATAPRES) 0.3 MG tablet Take 1 tablet by mouth 2 times daily Do not take if HR < 60 180 tablet 3    aspirin 81 MG chewable tablet Take 81 mg by mouth every morning      Sucroferric Oxyhydroxide (VELPHORO) 500 MG CHEW Take 2 tablets by mouth 3 times daily (with meals) Crush or chew and shallow 2 tablets three times a day with meals      Tens Unit MISC by Does not apply route 1 each 0    glimepiride (AMARYL) 2 MG tablet Take 1 tablet by mouth every morning (before breakfast) 90 tablet 1    linagliptin (TRADJENTA) 5 MG tablet TAKE ONE TABLET BY MOUTH DAILY 90 tablet 0    lisinopril (PRINIVIL;ZESTRIL) 10 MG tablet TAKE 1 TABLET BY MOUTH  TWICE DAILY (Patient taking differently: Take 5 mg by mouth daily TAKE 1 TABLET BY MOUTH  TWICE DAILY) 180 tablet 0    NONFORMULARY       oxyCODONE-acetaminophen (PERCOCET) 5-325 MG per tablet Take 1 tablet by mouth three times a week  Indications: after dialysis .  gabapentin (NEURONTIN) 300 MG capsule TAKE ONE CAPSULE BY MOUTH THREE TIMES A DAY 90 capsule 2     No current facility-administered medications for this visit. Review of Systems   Constitutional: Negative for fever. Respiratory: Negative for chest tightness. Cardiovascular: Negative for chest pain. Gastrointestinal: Positive for abdominal pain and constipation. Genitourinary: Positive for dysuria. Musculoskeletal: Positive for back pain and gait problem. Neurological: Positive for weakness. Objective:  General Appearance:  Well-appearing and in no acute distress. Vital signs: (most recent): Height 5' 11\" (1.803 m), weight 210 lb (95.3 kg). No fever. Output: Producing urine and producing stool. HEENT: (No visible masses in neck  Range of motion appears normal on cervical spine  External ears appears normal)    Lungs:  Normal effort. He is not in respiratory distress. Neurological: Patient is alert and oriented to person, place and time.  (Able to follow command    Psych  Mood is good  Affect is normal). Skin:  No rash or cyanosis.       Assessment & Plan   Multiple major comorbidities  Recent coronary event  On long-term antiplatelet therapy  Chronic lower back pain with radiation down both legs  Not a candidate for any interventional procedure  Stable on low-dose opioid therapy  Automated prescription report reviewed  Safe use of medication discussed  Refill ordered    1. Chronic use of opiate drugs therapeutic purposes    2. Chronic bilateral low back pain with bilateral sciatica    3. S/P bilateral below knee amputation ([facility]    4. ESRD on hemodialysis ([facility] [address] [facility] MWF    5. Severe comorbid illness    6. Long term (current) use of antithrombotics/antiplatelets    7. Spinal stenosis of lumbar region with neurogenic claudication        No orders of the defined types were placed in this encounter. Orders Placed This Encounter   Medications    oxyCODONE-acetaminophen (PERCOCET) 7.5-325 MG per tablet     Sig: Take 1 tablet by mouth 2 times daily for 30 days. On Monday, Wednesday and Friday after dialysis     Dispense:  15 tablet     Refill:  0     Reduce doses taken as pain becomes manageable    traMADol (ULTRAM) 50 MG tablet     Sig: Take 1 tablet by mouth daily as needed for Pain for up to 30 days. on tuesday, thursday and saturday and sunday     Dispense:  30 tablet     Refill:  0     Reduce doses taken as pain becomes manageable      Controlled Substance Monitoring:    Acute and Chronic Pain Monitoring:   RX Monitoring 4/13/2021   Attestation -   Periodic Controlled Substance Monitoring No signs of potential drug abuse or diversion identified. ;Possible medication side effects, risk of tolerance/dependence & alternative treatments discussed. ;Assessed functional status. ;Obtaining appropriate analgesic effect of treatment. Chronic Pain > 50 MEDD Obtained or confirmed \"Consent for Opioid Use\" on file.              Electronically signed by Jeremy Baxter MD on 4/13/2021 at 4:57 PM

## 2021-04-29 ENCOUNTER — HOSPITAL ENCOUNTER (OUTPATIENT)
Age: 61
Setting detail: SPECIMEN
Discharge: HOME | End: 2021-04-29
Payer: MEDICARE

## 2021-04-29 ENCOUNTER — OFFICE VISIT (OUTPATIENT)
Dept: INTERNAL MEDICINE CLINIC | Age: 61
End: 2021-04-29
Payer: MEDICARE

## 2021-04-29 VITALS
OXYGEN SATURATION: 100 % | SYSTOLIC BLOOD PRESSURE: 136 MMHG | WEIGHT: 215 LBS | DIASTOLIC BLOOD PRESSURE: 82 MMHG | HEIGHT: 71 IN | BODY MASS INDEX: 30.1 KG/M2 | HEART RATE: 69 BPM | TEMPERATURE: 97.3 F | RESPIRATION RATE: 16 BRPM

## 2021-04-29 DIAGNOSIS — I10 ESSENTIAL HYPERTENSION: ICD-10-CM

## 2021-04-29 DIAGNOSIS — Z89.511 S/P BILATERAL BELOW KNEE AMPUTATION (HCC): ICD-10-CM

## 2021-04-29 DIAGNOSIS — N18.6 ESRD ON HEMODIALYSIS (HCC): ICD-10-CM

## 2021-04-29 DIAGNOSIS — R00.1 BRADYCARDIA: ICD-10-CM

## 2021-04-29 DIAGNOSIS — R69 SEVERE COMORBID ILLNESS: ICD-10-CM

## 2021-04-29 DIAGNOSIS — Z12.11 COLON CANCER SCREENING: ICD-10-CM

## 2021-04-29 DIAGNOSIS — E78.2 MIXED HYPERLIPIDEMIA: ICD-10-CM

## 2021-04-29 DIAGNOSIS — M54.41 CHRONIC BILATERAL LOW BACK PAIN WITH BILATERAL SCIATICA: ICD-10-CM

## 2021-04-29 DIAGNOSIS — E78.2 MIXED HYPERLIPIDEMIA: Primary | ICD-10-CM

## 2021-04-29 DIAGNOSIS — Z99.2 ESRD ON HEMODIALYSIS (HCC): ICD-10-CM

## 2021-04-29 DIAGNOSIS — E11.8 CONTROLLED TYPE 2 DIABETES MELLITUS WITH COMPLICATION, WITHOUT LONG-TERM CURRENT USE OF INSULIN (HCC): ICD-10-CM

## 2021-04-29 DIAGNOSIS — M54.42 CHRONIC BILATERAL LOW BACK PAIN WITH BILATERAL SCIATICA: ICD-10-CM

## 2021-04-29 DIAGNOSIS — M51.36 DDD (DEGENERATIVE DISC DISEASE), LUMBAR: ICD-10-CM

## 2021-04-29 DIAGNOSIS — Z89.512 S/P BILATERAL BELOW KNEE AMPUTATION (HCC): ICD-10-CM

## 2021-04-29 DIAGNOSIS — M48.062 SPINAL STENOSIS OF LUMBAR REGION WITH NEUROGENIC CLAUDICATION: ICD-10-CM

## 2021-04-29 DIAGNOSIS — Z79.891 CHRONIC USE OF OPIATE DRUG FOR THERAPEUTIC PURPOSE: ICD-10-CM

## 2021-04-29 DIAGNOSIS — F51.02 ADJUSTMENT INSOMNIA: ICD-10-CM

## 2021-04-29 DIAGNOSIS — Z79.02 LONG TERM (CURRENT) USE OF ANTITHROMBOTICS/ANTIPLATELETS: ICD-10-CM

## 2021-04-29 DIAGNOSIS — G89.29 CHRONIC BILATERAL LOW BACK PAIN WITH BILATERAL SCIATICA: ICD-10-CM

## 2021-04-29 DIAGNOSIS — N52.01 ERECTILE DYSFUNCTION DUE TO ARTERIAL INSUFFICIENCY: ICD-10-CM

## 2021-04-29 LAB
CHOLESTEROL/HDL RATIO: 2
CHOLESTEROL: 106 MG/DL
ESTIMATED AVERAGE GLUCOSE: 120 MG/DL
HBA1C MFR BLD: 5.8 % (ref 4–6)
HDLC SERPL-MCNC: 52 MG/DL
LDL CHOLESTEROL: 42 MG/DL (ref 0–130)
MAGNESIUM: 2.5 MG/DL (ref 1.6–2.6)
TRIGL SERPL-MCNC: 62 MG/DL
TSH SERPL DL<=0.05 MIU/L-ACNC: 1.25 MIU/L (ref 0.3–5)
VLDLC SERPL CALC-MCNC: NORMAL MG/DL (ref 1–30)

## 2021-04-29 PROCEDURE — 99214 OFFICE O/P EST MOD 30 MIN: CPT | Performed by: FAMILY MEDICINE

## 2021-04-29 PROCEDURE — 1036F TOBACCO NON-USER: CPT | Performed by: FAMILY MEDICINE

## 2021-04-29 PROCEDURE — 3046F HEMOGLOBIN A1C LEVEL >9.0%: CPT | Performed by: FAMILY MEDICINE

## 2021-04-29 PROCEDURE — 2022F DILAT RTA XM EVC RTNOPTHY: CPT | Performed by: FAMILY MEDICINE

## 2021-04-29 PROCEDURE — G8427 DOCREV CUR MEDS BY ELIG CLIN: HCPCS | Performed by: FAMILY MEDICINE

## 2021-04-29 PROCEDURE — 3017F COLORECTAL CA SCREEN DOC REV: CPT | Performed by: FAMILY MEDICINE

## 2021-04-29 PROCEDURE — G8417 CALC BMI ABV UP PARAM F/U: HCPCS | Performed by: FAMILY MEDICINE

## 2021-04-29 ASSESSMENT — ENCOUNTER SYMPTOMS
RESPIRATORY NEGATIVE: 1
GASTROINTESTINAL NEGATIVE: 1
ALLERGIC/IMMUNOLOGIC NEGATIVE: 1
EYES NEGATIVE: 1
BACK PAIN: 1

## 2021-04-29 ASSESSMENT — PATIENT HEALTH QUESTIONNAIRE - PHQ9
2. FEELING DOWN, DEPRESSED OR HOPELESS: 0
1. LITTLE INTEREST OR PLEASURE IN DOING THINGS: 0
SUM OF ALL RESPONSES TO PHQ QUESTIONS 1-9: 0

## 2021-04-29 NOTE — PROGRESS NOTES
Subjective:      Patient ID: Herbie Sandhu is a 57 y.o. male. Diabetes  He presents for his follow-up diabetic visit. He has type 2 diabetes mellitus. His disease course has been stable. Hypoglycemia symptoms include nervousness/anxiousness. There are no diabetic associated symptoms. There are no hypoglycemic complications. Symptoms are stable. Diabetic complications include heart disease, nephropathy, peripheral neuropathy and PVD. Risk factors for coronary artery disease include diabetes mellitus, dyslipidemia, male sex and hypertension. Current diabetic treatment includes oral agent (dual therapy). He is compliant with treatment all of the time. His weight is stable. Meal planning includes ADA exchanges, avoidance of concentrated sweets, calorie counting and carbohydrate counting. He has had a previous visit with a dietitian. He participates in exercise three times a week. Home blood sugar record trend: Did not bring Accu-Chek log. An ACE inhibitor/angiotensin II receptor blocker is being taken. Review of Systems   Constitutional: Negative. HENT: Negative. Eyes: Negative. Respiratory: Negative. Cardiovascular: Negative. Gastrointestinal: Negative. Endocrine: Negative. Musculoskeletal: Positive for arthralgias and back pain. Skin: Negative. Allergic/Immunologic: Negative. Neurological: Negative. Hematological: Negative. Psychiatric/Behavioral: The patient is nervous/anxious. Past family and social history unremarkable. Diagnosis Orders   1. Mixed hyperlipidemia  Lipid Panel    TSH without Reflex    Magnesium   2. Essential hypertension     3. ESRD on hemodialysis ([facility] [address] MWF  Lipid Panel    TSH without Reflex    Magnesium   4. Adjustment insomnia     5. Erectile dysfunction due to arterial insufficiency     6. Chronic bilateral low back pain with bilateral sciatica     7. Colon cancer screening  POCT Fecal Immunochemical Test (FIT)   8.  Controlled type 2 diabetes mellitus with complication, without long-term current use of insulin (HCC)  Hemoglobin A1C    Lipid Panel    TSH without Reflex    Magnesium   9. S/P bilateral below knee amputation ([address]     10. Long term (current) use of antithrombotics/antiplatelets     11. Chronic use of opiate drugs therapeutic purposes     12. Severe comorbid illness     13. DDD (degenerative disc disease), lumbar     14. Bradycardia     15. Spinal stenosis of lumbar region with neurogenic claudication           Objective:   Physical Exam  Vitals signs and nursing note reviewed. Constitutional:       Appearance: He is well-developed. HENT:      Head: Normocephalic and atraumatic. Right Ear: External ear normal.      Left Ear: External ear normal.      Nose: Nose normal.   Eyes:      Conjunctiva/sclera: Conjunctivae normal.      Pupils: Pupils are equal, round, and reactive to light. Neck:      Musculoskeletal: Normal range of motion and neck supple. Cardiovascular:      Rate and Rhythm: Normal rate and regular rhythm. Heart sounds: Normal heart sounds. Comments: Coronary artery disease  Hypertension  Hyperlipidemia  Diabetes mellitus  Pulmonary:      Effort: Pulmonary effort is normal.      Breath sounds: Normal breath sounds. Abdominal:      General: Bowel sounds are normal.      Palpations: Abdomen is soft. Genitourinary:     Comments: ESRD-hemodialysis dependent  Musculoskeletal: Normal range of motion. Comments: Degenerative polyarthralgia  Chronic pain syndrome. Opiate dependent. Continue Zanaflex and tramadol   Skin:     General: Skin is warm and dry. Neurological:      Mental Status: He is alert and oriented to person, place, and time. Deep Tendon Reflexes: Reflexes are normal and symmetric. Psychiatric:      Comments: Anxiety         Assessment:       Diagnosis Orders   1. Mixed hyperlipidemia  Lipid Panel    TSH without Reflex    Magnesium   2. Essential hypertension     3.  ESRD on hemodialysis ([facility] [address] MWF  Lipid Panel    TSH without Reflex    Magnesium   4. Adjustment insomnia     5. Erectile dysfunction due to arterial insufficiency     6. Chronic bilateral low back pain with bilateral sciatica     7. Colon cancer screening  POCT Fecal Immunochemical Test (FIT)   8. Controlled type 2 diabetes mellitus with complication, without long-term current use of insulin (HCC)  Hemoglobin A1C    Lipid Panel    TSH without Reflex    Magnesium   9. S/P bilateral below knee amputation ([facility] [address]     10. Long term (current) use of antithrombotics/antiplatelets     11. Chronic use of opiate drugs therapeutic purposes     12. Severe comorbid illness     13. DDD (degenerative disc disease), lumbar     14. Bradycardia     15. Spinal stenosis of lumbar region with neurogenic claudication             Plan: This is a 79-year-old -American  History significant for end-stage renal disease-hemodialysis dependent on 3/week that he is tolerating well. He denies any complication  AV fistula is patent with positive bruit. History of peripheral arterial disease. He is established with vascular services. Continue Pletal,  Rate controlled. He is on Eliquis hypertension well-controlled. Continue amlodipine, lisinopril, labetalol, hydralazine titrated by nephrology  Hyperlipidemia on statin that he is tolerating well  Diabetes mellitus Tradjenta he denies hypoglycemia  Testing is unremarkable. He is counseled diabetic foot care  Anemia of chronic disease H&H is stable at baseline. He is on valphosro  Coronary artery disease. Consider risk factors. He is established with cardiology. No recent decompensation  Knee pain syndrome. Opiate dependent as provided by pain management. Avoid constipation  He appears anxious however denies being depressed  History of bradycardia.   Currently normal sinus rhythm  He is updated on COVID-19 vaccination  Further recommendations to follow labs  For any concern  Med list and available labs reviewed, discussed with patient, questions answered, reassurance provided  This note is created with a voice recognition program and while intend to generate a document that accurately reflects the content of the visit, no guarantee can be provided that every mistake has been identified and corrected by editing.           Karyle Feil, MD

## 2021-05-05 DIAGNOSIS — Z12.11 COLON CANCER SCREENING: ICD-10-CM

## 2021-05-05 LAB
CONTROL: NORMAL
HEMOCCULT STL QL: NORMAL

## 2021-05-05 PROCEDURE — 82274 ASSAY TEST FOR BLOOD FECAL: CPT | Performed by: FAMILY MEDICINE

## 2021-05-18 ENCOUNTER — OFFICE VISIT (OUTPATIENT)
Dept: PAIN MANAGEMENT | Age: 61
End: 2021-05-18
Payer: COMMERCIAL

## 2021-05-18 VITALS
BODY MASS INDEX: 29.73 KG/M2 | HEART RATE: 66 BPM | RESPIRATION RATE: 16 BRPM | SYSTOLIC BLOOD PRESSURE: 180 MMHG | HEIGHT: 71 IN | DIASTOLIC BLOOD PRESSURE: 88 MMHG | OXYGEN SATURATION: 100 % | WEIGHT: 212.4 LBS

## 2021-05-18 DIAGNOSIS — M48.062 SPINAL STENOSIS OF LUMBAR REGION WITH NEUROGENIC CLAUDICATION: ICD-10-CM

## 2021-05-18 DIAGNOSIS — G89.29 CHRONIC BILATERAL LOW BACK PAIN WITH BILATERAL SCIATICA: ICD-10-CM

## 2021-05-18 DIAGNOSIS — Z79.02 LONG TERM (CURRENT) USE OF ANTITHROMBOTICS/ANTIPLATELETS: ICD-10-CM

## 2021-05-18 DIAGNOSIS — M54.42 CHRONIC BILATERAL LOW BACK PAIN WITH BILATERAL SCIATICA: ICD-10-CM

## 2021-05-18 DIAGNOSIS — Z79.891 CHRONIC USE OF OPIATE DRUG FOR THERAPEUTIC PURPOSE: Primary | ICD-10-CM

## 2021-05-18 DIAGNOSIS — M54.41 CHRONIC BILATERAL LOW BACK PAIN WITH BILATERAL SCIATICA: ICD-10-CM

## 2021-05-18 DIAGNOSIS — R69 SEVERE COMORBID ILLNESS: ICD-10-CM

## 2021-05-18 PROCEDURE — 3017F COLORECTAL CA SCREEN DOC REV: CPT | Performed by: ANESTHESIOLOGY

## 2021-05-18 PROCEDURE — G8417 CALC BMI ABV UP PARAM F/U: HCPCS | Performed by: ANESTHESIOLOGY

## 2021-05-18 PROCEDURE — G8427 DOCREV CUR MEDS BY ELIG CLIN: HCPCS | Performed by: ANESTHESIOLOGY

## 2021-05-18 PROCEDURE — 99213 OFFICE O/P EST LOW 20 MIN: CPT | Performed by: ANESTHESIOLOGY

## 2021-05-18 PROCEDURE — 1036F TOBACCO NON-USER: CPT | Performed by: ANESTHESIOLOGY

## 2021-05-18 RX ORDER — TRAMADOL HYDROCHLORIDE 50 MG/1
50 TABLET ORAL DAILY PRN
Qty: 20 TABLET | Refills: 0 | Status: SHIPPED | OUTPATIENT
Start: 2021-05-18 | End: 2021-06-15 | Stop reason: SDUPTHER

## 2021-05-18 RX ORDER — OXYCODONE HYDROCHLORIDE AND ACETAMINOPHEN 5; 325 MG/1; MG/1
1 TABLET ORAL
Qty: 15 TABLET | Refills: 0 | Status: SHIPPED | OUTPATIENT
Start: 2021-05-19 | End: 2021-07-20 | Stop reason: SDUPTHER

## 2021-05-18 ASSESSMENT — ENCOUNTER SYMPTOMS: BACK PAIN: 1

## 2021-05-18 NOTE — PROGRESS NOTES
failure)     [facility] MWF    DDD (degenerative disc disease), lumbar 12/15/2020    Diabetes mellitus ([facility] [address]     Dialysis patient [facility])     ED (erectile dysfunction)     [facility] of echocardiogram 2014    [facility]    HTN (hypertension)     Hyperlipidemia     LVH (left ventricular hypertrophy)     PVD (peripheral vascular disease) ([facility] [address]     S[facility] bilateral [facility]below [facility]     Wears glasses       Past Surgical History:   Procedure Laterality Date    CATARACT REMOVAL WITH IMPLANT      DIALYSIS FISTULA CREATION Bilateral     As of 2019, RUE AVF functioning, LUE AVF nonfunctioning.  FINGER SURGERY Left     I & D    GLAUCOMA SURGERY      LEG AMPUTATION BELOW KNEE Bilateral     TUNNELED VENOUS CATHETER PLACEMENT      PC placed and removed     Social History     Socioeconomic History    Marital status:      Spouse name: None    Number of children: None    Years of education: None    Highest education level: None   Occupational History    None   Tobacco Use    Smoking status: Never Smoker    Smokeless tobacco: Never Used   Substance and Sexual Activity    Alcohol use: Yes     Comment: rare    Drug use: No    Sexual activity: None   Other Topics Concern    None   Social History Narrative    None     Social Determinants of Health     Financial Resource Strain:     Difficulty of Paying Living Expenses:    Food Insecurity:     Worried About Running Out of Food in the Last Year:     Ran Out of Food in the Last Year:    Transportation Needs:     Lack of Transportation (Medical):      Lack of Transportation (Non-Medical):    Physical Activity:     Days of Exercise per Week:     Minutes of Exercise per Session:    Stress:     Feeling of Stress :    Social Connections:     Frequency of Communication with Friends and Family:     Frequency of Social Gatherings with Friends and Family:     Attends Buddhism Services:     Active Member of Clubs or Organizations:     Attends Club or Organization Meetings:     Marital Status:    Intimate Partner Violence:     Fear of Current or Ex-Partner:     Emotionally Abused:     Physically Abused:     Sexually Abused:      Family History   Problem Relation Age of Onset    Diabetes Mother     Coronary Art Dis Mother     Hypertension Mother     Diabetes Father     Hypertension Father     Stroke Father     Cancer Sister     Hypertension Brother      No Known Allergies  Patient has no known allergies. Vitals:    05/18/21 1548   BP: (!) 180/88   Pulse: 66   Resp: 16   SpO2: 100%     Current Outpatient Medications   Medication Sig Dispense Refill    [START ON 5/19/2021] oxyCODONE-acetaminophen (PERCOCET) 5-325 MG per tablet Take 1 tablet by mouth three times a week for 30 days. Indications: after dialysis 15 tablet 0    traMADol (ULTRAM) 50 MG tablet Take 1 tablet by mouth daily as needed for Pain (4 days a week one tab a day) for up to 20 days.  20 tablet 0    Multiple Vitamins-Minerals (RENAPLEX-D PO) Take 1 tablet by mouth daily      apixaban (ELIQUIS) 5 MG TABS tablet Take 1 tablet by mouth 2 times daily 180 tablet 1    clopidogrel (PLAVIX) 75 MG tablet Take 1 tablet by mouth daily 30 tablet 3    cloNIDine (CATAPRES) 0.3 MG tablet Take 1 tablet by mouth 2 times daily Do not take if HR < 60 180 tablet 3    aspirin 81 MG chewable tablet Take 81 mg by mouth every morning      Sucroferric Oxyhydroxide (VELPHORO) 500 MG CHEW Take 2 tablets by mouth 3 times daily (with meals) Crush or chew and shallow 2 tablets three times a day with meals      Tens Unit MISC by Does not apply route 1 each 0    linagliptin (TRADJENTA) 5 MG tablet TAKE ONE TABLET BY MOUTH DAILY 90 tablet 0    lisinopril (PRINIVIL;ZESTRIL) 10 MG tablet TAKE 1 TABLET BY MOUTH  TWICE DAILY (Patient taking differently: Take 5 mg by mouth daily TAKE 1 TABLET BY MOUTH  TWICE DAILY) 180 tablet 0    NONFORMULARY       gabapentin (NEURONTIN) 300 MG capsule TAKE ONE CAPSULE BY MOUTH THREE TIMES A DAY 90 capsule 2    tiZANidine (ZANAFLEX) 2 MG tablet Take 1 tablet by mouth every 8 hours as needed (back pain) (Patient not taking: Reported on 5/18/2021) 15 tablet 0    lidocaine (LIDODERM) 5 % Place 1 patch onto the skin daily 12 hours on, 12 hours off. (Patient not taking: Reported on 5/18/2021) 10 patch 0    Heparin Sodium, Porcine, (HEPARIN, PORCINE,) 1000 UNIT/ML injection Heparin Sodium (Porcine) 1,000 Units/mL Systemic (Patient not taking: Reported on 5/18/2021)      tadalafil (CIALIS) 20 MG tablet Take 1 tablet by mouth once as needed for Erectile Dysfunction 30 tablet 1    cilostazol (PLETAL) 100 MG tablet Take 100 mg by mouth 2 times daily (Patient not taking: Reported on 5/18/2021)      hydrALAZINE (APRESOLINE) 25 MG tablet Take 25 mg by mouth 2 times daily (Patient not taking: Reported on 5/18/2021)      labetalol (NORMODYNE) 200 MG tablet Take 200 mg by mouth 2 times daily (Patient not taking: Reported on 5/18/2021)      atorvastatin (LIPITOR) 80 MG tablet Take 1 tablet by mouth daily (Patient not taking: Reported on 5/18/2021) 30 tablet 3    amLODIPine (NORVASC) 10 MG tablet Take 1 tablet by mouth daily (Patient not taking: Reported on 5/18/2021) 30 tablet 3    glimepiride (AMARYL) 2 MG tablet Take 1 tablet by mouth every morning (before breakfast) (Patient not taking: Reported on 5/18/2021) 90 tablet 1     No current facility-administered medications for this visit. Review of Systems   Constitutional: Positive for appetite change. Negative for fever. Less of appitite   Musculoskeletal: Positive for back pain. Neurological: Negative. Psychiatric/Behavioral: Negative. Objective:  General Appearance:  Uncomfortable, in pain, well-appearing and in no acute distress. Vital signs: (most recent): Blood pressure (!) 180/88, pulse 66, resp. rate 16, height 5' 11\" (1.803 m), weight 212 lb 6.4 oz (96.3 kg), SpO2 100 %.   Vital signs are normal.  No fever.    Output: Producing urine and producing stool. HEENT: Normal HEENT exam.    Lungs:  Normal effort and normal respiratory rate. Breath sounds clear to auscultation. He is not in respiratory distress. Heart: Normal rate. Extremities: Normal range of motion. There is no deformity. Neurological: Patient is alert and oriented to person, place and time. Patient has normal coordination. Pupils:  Pupils are equal, round, and reactive to light. Pupils are equal.   Skin:  Warm and dry. No rash or cyanosis. Assessment & Plan   Pleasant 58-year-old man with multiple major comorbidities including hypertension, diabetes, peripheral vascular disease, end-stage kidney disease on hemodialysis  Long-term antiplatelet anticoagulation therapy  History of bilateral knee amputation    Recent coronary event with a stent placement in January 2021    Seen in my clinic for history of chronic generalized body pain and lower back pain in the midline extending over the tailbone with radiation down both legs extending and walking aggravates the pain    He is diagnosed with lumbar spondylosis and spinal stenosis    Currently on medication management with tramadol and oxycodone on alternate days  Finds the medication helpful  No significant side effect  No history of illicit substance use  Stable and satisfied with the current regimen  1. Chronic use of opiate drugs therapeutic purposes    2. Chronic bilateral low back pain with bilateral sciatica    3. Long term (current) use of antithrombotics/antiplatelets    4. Severe comorbid illness    5. Spinal stenosis of lumbar region with neurogenic claudication        No orders of the defined types were placed in this encounter. Orders Placed This Encounter   Medications    oxyCODONE-acetaminophen (PERCOCET) 5-325 MG per tablet     Sig: Take 1 tablet by mouth three times a week for 30 days.  Indications: after dialysis     Dispense:  15 tablet     Refill:  0 Reduce doses taken as pain becomes manageable    traMADol (ULTRAM) 50 MG tablet     Sig: Take 1 tablet by mouth daily as needed for Pain (4 days a week one tab a day) for up to 20 days. Dispense:  20 tablet     Refill:  0     Reduce doses taken as pain becomes manageable      Controlled Substance Monitoring:    Acute and Chronic Pain Monitoring:   RX Monitoring 5/18/2021   Attestation -   Periodic Controlled Substance Monitoring No signs of potential drug abuse or diversion identified. ;Possible medication side effects, risk of tolerance/dependence & alternative treatments discussed. ;Assessed functional status. ;Obtaining appropriate analgesic effect of treatment. ;Random urine drug screen sent today. Chronic Pain > 50 MEDD Obtained or confirmed \"Consent for Opioid Use\" on file.      Automated prescription report reviewed  Safe use of medication discussed  Refill ordered    High risk for any interventional procedure  Stable on current regimen  Continue the same        Electronically signed by Hilary Parnell MD on 5/18/2021 at 4:19 PM

## 2021-05-28 RX ORDER — GLIMEPIRIDE 2 MG/1
TABLET ORAL
Qty: 90 TABLET | Refills: 0 | OUTPATIENT
Start: 2021-05-28

## 2021-05-28 RX ORDER — GLIMEPIRIDE 2 MG/1
2 TABLET ORAL
Qty: 90 TABLET | Refills: 1 | Status: SHIPPED | OUTPATIENT
Start: 2021-05-28 | End: 2021-11-08

## 2021-05-28 NOTE — TELEPHONE ENCOUNTER
Eder Augustin is calling to request a refill on the following medication(s):    Last Visit Date (If Applicable):  1/98/3115    Next Visit Date:    8/31/2021    Medication Request:  Requested Prescriptions     Pending Prescriptions Disp Refills    glimepiride (AMARYL) 2 MG tablet 90 tablet 1     Sig: Take 1 tablet by mouth every morning (before breakfast)    linagliptin (TRADJENTA) 5 MG tablet 90 tablet 0     Sig: TAKE ONE TABLET BY MOUTH DAILY

## 2021-05-28 NOTE — TELEPHONE ENCOUNTER
Kassandra Quiles is calling to request a refill on the following medication(s):    Medication Request:  Requested Prescriptions     Pending Prescriptions Disp Refills    linagliptin (TRADJENTA) 5 MG tablet [Pharmacy Med Name: TRADJENTA 5 MG TABLET] 90 tablet 0     Sig: TAKE ONE TABLET BY MOUTH DAILY     Last filled today    Last Visit Date (If Applicable):  5/44/9134    Next Visit Date:    5/28/2021

## 2021-05-28 NOTE — TELEPHONE ENCOUNTER
Martinez Tobar is calling to request a refill on the following medication(s):    Medication Request:  Requested Prescriptions     Pending Prescriptions Disp Refills    glimepiride (AMARYL) 2 MG tablet [Pharmacy Med Name: GLIMEPIRIDE 2 MG TABLET] 90 tablet 0     Sig: TAKE ONE TABLET BY MOUTH EVERY MORNING BEFORE BREAKFAST     Last filled 5/28/21 90 day 1 refill  Not due    Last Visit Date (If Applicable):  0/15/5685    Next Visit Date:    5/28/2021

## 2021-06-09 ENCOUNTER — TELEPHONE (OUTPATIENT)
Dept: PAIN MANAGEMENT | Age: 61
End: 2021-06-09

## 2021-06-09 DIAGNOSIS — Z79.891 CHRONIC USE OF OPIATE DRUG FOR THERAPEUTIC PURPOSE: ICD-10-CM

## 2021-06-09 DIAGNOSIS — G89.29 CHRONIC BILATERAL LOW BACK PAIN WITH BILATERAL SCIATICA: ICD-10-CM

## 2021-06-09 DIAGNOSIS — R69 SEVERE COMORBID ILLNESS: ICD-10-CM

## 2021-06-09 DIAGNOSIS — M54.41 CHRONIC BILATERAL LOW BACK PAIN WITH BILATERAL SCIATICA: ICD-10-CM

## 2021-06-09 DIAGNOSIS — M54.42 CHRONIC BILATERAL LOW BACK PAIN WITH BILATERAL SCIATICA: ICD-10-CM

## 2021-06-09 RX ORDER — OXYCODONE HYDROCHLORIDE AND ACETAMINOPHEN 5; 325 MG/1; MG/1
1 TABLET ORAL
Qty: 15 TABLET | Refills: 0 | Status: CANCELLED | OUTPATIENT
Start: 2021-06-09 | End: 2021-07-09

## 2021-06-10 ENCOUNTER — TELEPHONE (OUTPATIENT)
Dept: PAIN MANAGEMENT | Age: 61
End: 2021-06-10

## 2021-06-10 NOTE — TELEPHONE ENCOUNTER
Pt had called and stated that he was shorted his pills and will not have enough Percocet until his appointment on Tuesday and was wondering if he can get some to last him til his appointment please advise

## 2021-06-11 NOTE — TELEPHONE ENCOUNTER
Fill Date ID   Written Drug Qty Days Prescriber Rx # Pharmacy Refill   Daily Dose* Pymt Type      05/19/2021  1   05/18/2021  Oxycodone-Acetaminophen 5-325  15.00  35 [facility]   [id number]   [facility] ([id number])   0  3.21 MME  Medicare   [place]   05/19/2021  1   05/18/2021  Tramadol Hcl 50 MG Tablet  4.00  7 [facility]   [id number]   [facility] ([id number])   0  2.86 MME  Medicare   [place]   04/13/2021  3   04/13/2021  Oxycodone-Acetaminophn 7.5-325  15.00  30 [facility]   [id number]   [facility] ([id number])   0  5.62 MME  [place]are   [place]   04/13/2021  3   04/13/2021  Tramadol Hcl 50 MG Tablet  30.00  30 [facility]   [id number]   [facility] ([id number])   0  5.00 MME  [place]are   [place]   03/22/2021  1   03/16/2021  Oxycodone-Acetaminophn 7.5-325  15.00  7 [facility]   [id number]   [facility] ([id number])   0  24.11 MME  [place]   [place]

## 2021-06-15 ENCOUNTER — OFFICE VISIT (OUTPATIENT)
Dept: PAIN MANAGEMENT | Age: 61
End: 2021-06-15
Payer: COMMERCIAL

## 2021-06-15 VITALS
DIASTOLIC BLOOD PRESSURE: 86 MMHG | BODY MASS INDEX: 29.62 KG/M2 | HEIGHT: 71 IN | OXYGEN SATURATION: 100 % | HEART RATE: 77 BPM | SYSTOLIC BLOOD PRESSURE: 150 MMHG

## 2021-06-15 DIAGNOSIS — M54.42 CHRONIC BILATERAL LOW BACK PAIN WITH BILATERAL SCIATICA: Primary | ICD-10-CM

## 2021-06-15 DIAGNOSIS — R69 SEVERE COMORBID ILLNESS: ICD-10-CM

## 2021-06-15 DIAGNOSIS — Z79.891 CHRONIC USE OF OPIATE DRUG FOR THERAPEUTIC PURPOSE: ICD-10-CM

## 2021-06-15 DIAGNOSIS — G89.29 CHRONIC BILATERAL LOW BACK PAIN WITH BILATERAL SCIATICA: Primary | ICD-10-CM

## 2021-06-15 DIAGNOSIS — M54.41 CHRONIC BILATERAL LOW BACK PAIN WITH BILATERAL SCIATICA: Primary | ICD-10-CM

## 2021-06-15 PROCEDURE — G8417 CALC BMI ABV UP PARAM F/U: HCPCS | Performed by: ANESTHESIOLOGY

## 2021-06-15 PROCEDURE — 1036F TOBACCO NON-USER: CPT | Performed by: ANESTHESIOLOGY

## 2021-06-15 PROCEDURE — 99213 OFFICE O/P EST LOW 20 MIN: CPT | Performed by: ANESTHESIOLOGY

## 2021-06-15 PROCEDURE — G8427 DOCREV CUR MEDS BY ELIG CLIN: HCPCS | Performed by: ANESTHESIOLOGY

## 2021-06-15 PROCEDURE — 3017F COLORECTAL CA SCREEN DOC REV: CPT | Performed by: ANESTHESIOLOGY

## 2021-06-15 RX ORDER — OXYCODONE HYDROCHLORIDE AND ACETAMINOPHEN 5; 325 MG/1; MG/1
1 TABLET ORAL
Qty: 15 TABLET | Refills: 0 | Status: CANCELLED | OUTPATIENT
Start: 2021-06-16 | End: 2021-07-16

## 2021-06-15 RX ORDER — OXYCODONE AND ACETAMINOPHEN 7.5; 325 MG/1; MG/1
1 TABLET ORAL DAILY PRN
Qty: 15 TABLET | Refills: 0 | Status: SHIPPED | OUTPATIENT
Start: 2021-06-15 | End: 2021-07-02

## 2021-06-15 RX ORDER — TRAMADOL HYDROCHLORIDE 50 MG/1
50 TABLET ORAL DAILY PRN
Qty: 20 TABLET | Refills: 0 | Status: SHIPPED | OUTPATIENT
Start: 2021-06-15 | End: 2021-07-19 | Stop reason: SDUPTHER

## 2021-06-15 ASSESSMENT — ENCOUNTER SYMPTOMS: BACK PAIN: 1

## 2021-06-15 NOTE — PROGRESS NOTES
The patient is a 57 y. o. Non-/non  male. Chief Complaint   Patient presents with    Back Pain    Medication Refill        HPI    58-year-old man with a history of chronic lower back pain  Past medical history significant for hypertension, diabetes, peripheral vascular disease, end-stage kidney disease on hemodialysis, on long-term anticoagulation therapy, status post bilateral total knee amputations    Her recent coronary event with coronary stent placement in January 2021    He complains of generalized pain predominantly involving axial lower back  Pain aggravates after dialysis and routine activities  He is on low-dose opioid therapy with Percocet and tramadol on alternate days  Today for medication refill  Finds the medication helpful  Denies any illicit substance use  No other changes in health history      Medication Refill: Tramadol, Percocet    Pain score Today:  6  Adverse effects (Constipation / Nausea / Sedation / sexual Dysfunction / others) : nne  Mood: good  Sleep pattern and quality: poor  Activity level: excellent    Pill count Today: Pt did not bring Tramadol,  Pt states he doesn't have any percocet left  Last dose taken  Tramadol last taken 6/15/21, Percocet 6/14/21  OARRS report reviewed today: yes  ER/Hospitalizations/PCP visit related to pain since last visit:no   Any legal problems e.g. DUI etc.:No  Satisfied with current management: Yes    Opioid Contract:3/12/21  Last Urine Dug screen dated:none on dialysis    Lab Results   Component Value Date    LABA1C 5.8 04/29/2021     Lab Results   Component Value Date     04/29/2021       Past Medical History, Past Surgical History, Social History, Allergies and Medications reviewed and updated in EPIC as indicated    Family History reviewed and is noncontributory.         Past Medical History:   Diagnosis Date    Chronic bilateral low back pain with bilateral sciatica     CRF (chronic renal failure)     [facility] MWF    DDD (degenerative disc disease), lumbar 12/15/2020    [facility]     Dialysis patient [facility])     ED (erectile dysfunction)     [facility]    [facility]    HTN (hypertension)     Hyperlipidemia     LVH (left ventricular hypertrophy)     PVD [facility]peripheral vascular disease) [facility]     [facility]     Wears glasses       Past Surgical History:   Procedure Laterality Date    CATARACT REMOVAL WITH IMPLANT      DIALYSIS FISTULA CREATION Bilateral     As of 2019, RUE AVF functioning, LUE AVF nonfunctioning.  FINGER SURGERY Left     I & D    GLAUCOMA SURGERY      LEG AMPUTATION BELOW KNEE Bilateral     TUNNELED VENOUS CATHETER PLACEMENT      PC placed and removed     Social History     Socioeconomic History    Marital status:      Spouse name: None    Number of children: None    Years of education: None    Highest education level: None   Occupational History    None   Tobacco Use    Smoking status: Never Smoker    Smokeless tobacco: Never Used   Substance and Sexual Activity    Alcohol use: Yes     Comment: rare    Drug use: No    Sexual activity: None   Other Topics Concern    None   Social History Narrative    None     Social Determinants of Health     Financial Resource Strain:     Difficulty of Paying Living Expenses:    Food Insecurity:     Worried About Running Out of Food in the Last Year:     Ran Out of Food in the Last Year:    Transportation Needs:     Lack of Transportation (Medical):      Lack of Transportation (Non-Medical):    Physical Activity:     Days of Exercise per Week:     Minutes of Exercise per Session:    Stress:     Feeling of Stress :    Social Connections:     Frequency of Communication with Friends and Family:     Frequency of Social Gatherings with Friends and Family:     Attends [employer] Services:     Active Member of Clubs or Organizations:     Attends Club or Organization Meetings:    Bhavani Rodriguez Marital Status:    Intimate Partner Violence:     Fear of Current or Ex-Partner:     Emotionally Abused:     Physically Abused:     Sexually Abused:      Family History   Problem Relation Age of Onset    Diabetes Mother     Coronary Art Dis Mother     Hypertension Mother     Diabetes Father     Hypertension Father     Stroke Father     Cancer Sister     Hypertension Brother      No Known Allergies  Patient has no known allergies. Vitals:    06/15/21 1231   BP: (!) 150/86   Pulse:    SpO2:      Current Outpatient Medications   Medication Sig Dispense Refill    traMADol (ULTRAM) 50 MG tablet Take 1 tablet by mouth daily as needed for Pain (4 days a week one tab a day) for up to 20 days. 20 tablet 0    oxyCODONE-acetaminophen (PERCOCET) 7.5-325 MG per tablet Take 1 tablet by mouth daily as needed for Pain (take 1 tablete three times a week) for up to 30 days. Intended supply: 30 days 15 tablet 0    glimepiride (AMARYL) 2 MG tablet Take 1 tablet by mouth every morning (before breakfast) 90 tablet 1    linagliptin (TRADJENTA) 5 MG tablet TAKE ONE TABLET BY MOUTH DAILY 90 tablet 0    oxyCODONE-acetaminophen (PERCOCET) 5-325 MG per tablet Take 1 tablet by mouth three times a week for 30 days.  Indications: after dialysis 15 tablet 0    Multiple Vitamins-Minerals (RENAPLEX-D PO) Take 1 tablet by mouth daily      apixaban (ELIQUIS) 5 MG TABS tablet Take 1 tablet by mouth 2 times daily 180 tablet 1    clopidogrel (PLAVIX) 75 MG tablet Take 1 tablet by mouth daily 30 tablet 3    cloNIDine (CATAPRES) 0.3 MG tablet Take 1 tablet by mouth 2 times daily Do not take if HR < 60 180 tablet 3    aspirin 81 MG chewable tablet Take 81 mg by mouth every morning      Sucroferric Oxyhydroxide (VELPHORO) 500 MG CHEW Take 2 tablets by mouth 3 times daily (with meals) Crush or chew and shallow 2 tablets three times a day with meals      lisinopril (PRINIVIL;ZESTRIL) 10 MG tablet TAKE 1 TABLET BY MOUTH  TWICE DAILY (Patient taking differently: Take 5 mg by mouth daily TAKE 1 TABLET BY MOUTH  TWICE DAILY) 180 tablet 0    NONFORMULARY       gabapentin (NEURONTIN) 300 MG capsule TAKE ONE CAPSULE BY MOUTH THREE TIMES A DAY 90 capsule 2    Heparin Sodium, Porcine, (HEPARIN, PORCINE,) 1000 UNIT/ML injection Heparin Sodium (Porcine) 1,000 Units/mL Systemic (Patient not taking: Reported on 5/18/2021)      tadalafil (CIALIS) 20 MG tablet Take 1 tablet by mouth once as needed for Erectile Dysfunction 30 tablet 1    cilostazol (PLETAL) 100 MG tablet Take 100 mg by mouth 2 times daily (Patient not taking: Reported on 5/18/2021)      hydrALAZINE (APRESOLINE) 25 MG tablet Take 25 mg by mouth 2 times daily (Patient not taking: Reported on 5/18/2021)      atorvastatin (LIPITOR) 80 MG tablet Take 1 tablet by mouth daily (Patient not taking: Reported on 5/18/2021) 30 tablet 3    Tens Unit MISC by Does not apply route 1 each 0     No current facility-administered medications for this visit. Review of Systems   Constitutional: Positive for fatigue. Negative for fever. Musculoskeletal: Positive for back pain. Neurological: Negative. Objective:  General Appearance:  Well-appearing and in no acute distress. Vital signs: (most recent): Blood pressure (!) 150/86, pulse 77, height 5' 11\" (1.803 m), SpO2 100 %. Vital signs are normal.  No fever. Output: Producing urine and producing stool. HEENT: Normal HEENT exam.    Lungs:  Normal effort and normal respiratory rate. Breath sounds clear to auscultation. He is not in respiratory distress. Heart: Normal rate. Extremities: Normal range of motion. There is no deformity. Neurological: Patient is alert and oriented to person, place and time. Patient has normal coordination. Pupils:  Pupils are equal, round, and reactive to light. Pupils are equal.   Skin:  Warm and dry. No rash or cyanosis.       Assessment & Plan   58-year-old man with a history of chronic lower back pain  Past medical history significant for hypertension, diabetes, peripheral vascular disease, end-stage kidney disease on hemodialysis, on long-term anticoagulation therapy, status post bilateral total knee amputations    Her recent coronary event with coronary stent placement in January 2021    He complains of generalized pain predominantly involving axial lower back  Pain aggravates after dialysis and routine activities  He is on low-dose opioid therapy with Percocet and tramadol on alternate days  Today for medication refill  Finds the medication helpful  Denies any illicit substance use  No other changes in health history    1. Chronic bilateral low back pain with bilateral sciatica    2. Chronic use of opiate drugs therapeutic purposes    3. Severe comorbid illness        No orders of the defined types were placed in this encounter. Orders Placed This Encounter   Medications    traMADol (ULTRAM) 50 MG tablet     Sig: Take 1 tablet by mouth daily as needed for Pain (4 days a week one tab a day) for up to 20 days. Dispense:  20 tablet     Refill:  0     Reduce doses taken as pain becomes manageable    oxyCODONE-acetaminophen (PERCOCET) 7.5-325 MG per tablet     Sig: Take 1 tablet by mouth daily as needed for Pain (take 1 tablete three times a week) for up to 30 days. Intended supply: 30 days     Dispense:  15 tablet     Refill:  0     Reduce doses taken as pain becomes manageable        Controlled Substance Monitoring:    Acute and Chronic Pain Monitoring:   RX Monitoring 6/15/2021   Attestation -   Periodic Controlled Substance Monitoring Possible medication side effects, risk of tolerance/dependence & alternative treatments discussed. ;No signs of potential drug abuse or diversion identified. ;Assessed functional status. ;Obtaining appropriate analgesic effect of treatment. Chronic Pain > 50 MEDD Obtained or confirmed \"Consent for Opioid Use\" on file.      Last month refill was changed by

## 2021-06-16 ENCOUNTER — TELEPHONE (OUTPATIENT)
Dept: INTERNAL MEDICINE CLINIC | Age: 61
End: 2021-06-16

## 2021-06-16 RX ORDER — TADALAFIL 20 MG/1
20 TABLET ORAL
Qty: 30 TABLET | Refills: 1 | Status: SHIPPED | OUTPATIENT
Start: 2021-06-16 | End: 2022-04-04 | Stop reason: ALTCHOICE

## 2021-06-16 RX ORDER — TADALAFIL 20 MG/1
20 TABLET ORAL
Qty: 30 TABLET | Refills: 1 | Status: SHIPPED
Start: 2021-06-16 | End: 2021-06-16 | Stop reason: CLARIF

## 2021-06-16 NOTE — TELEPHONE ENCOUNTER
Patient states he needs his application to [facility] for his cialis updated and new prescription.      Forms and prescription on Dr Carmen Callejas desk to sign

## 2021-06-22 ENCOUNTER — TELEPHONE (OUTPATIENT)
Dept: INTERNAL MEDICINE CLINIC | Age: 61
End: 2021-06-22

## 2021-07-02 ENCOUNTER — HOSPITAL ENCOUNTER (EMERGENCY)
Age: 61
Discharge: HOME | End: 2021-07-02
Attending: EMERGENCY MEDICINE
Payer: COMMERCIAL

## 2021-07-02 VITALS
BODY MASS INDEX: 29.12 KG/M2 | OXYGEN SATURATION: 100 % | RESPIRATION RATE: 16 BRPM | DIASTOLIC BLOOD PRESSURE: 80 MMHG | HEIGHT: 71 IN | WEIGHT: 208 LBS | HEART RATE: 71 BPM | SYSTOLIC BLOOD PRESSURE: 128 MMHG | TEMPERATURE: 97.9 F

## 2021-07-02 DIAGNOSIS — L03.011 CELLULITIS OF FINGER OF RIGHT HAND: Primary | ICD-10-CM

## 2021-07-02 PROCEDURE — 99283 EMERGENCY DEPT VISIT LOW MDM: CPT

## 2021-07-02 RX ORDER — AMLODIPINE BESYLATE AND ATORVASTATIN CALCIUM 10; 80 MG/1; MG/1
1 TABLET, FILM COATED ORAL DAILY
Status: ON HOLD | COMMUNITY
End: 2022-05-28 | Stop reason: HOSPADM

## 2021-07-02 RX ORDER — ACETAMINOPHEN 500 MG
500 TABLET ORAL EVERY 6 HOURS PRN
Qty: 15 TABLET | Refills: 0 | Status: SHIPPED | OUTPATIENT
Start: 2021-07-02 | End: 2021-09-01

## 2021-07-02 RX ORDER — DOXYCYCLINE HYCLATE 100 MG
100 TABLET ORAL 2 TIMES DAILY
Qty: 20 TABLET | Refills: 0 | Status: SHIPPED | OUTPATIENT
Start: 2021-07-02 | End: 2021-07-12

## 2021-07-02 RX ORDER — OXYCODONE HYDROCHLORIDE AND ACETAMINOPHEN 5; 325 MG/1; MG/1
1 TABLET ORAL EVERY 8 HOURS PRN
Qty: 9 TABLET | Refills: 0 | Status: SHIPPED | OUTPATIENT
Start: 2021-07-02 | End: 2021-07-02

## 2021-07-02 ASSESSMENT — PAIN SCALES - GENERAL: PAINLEVEL_OUTOF10: 10

## 2021-07-02 ASSESSMENT — PAIN DESCRIPTION - FREQUENCY: FREQUENCY: CONTINUOUS

## 2021-07-02 ASSESSMENT — PAIN DESCRIPTION - ORIENTATION: ORIENTATION: RIGHT

## 2021-07-02 ASSESSMENT — PAIN DESCRIPTION - LOCATION: LOCATION: FINGER (COMMENT WHICH ONE)

## 2021-07-02 ASSESSMENT — PAIN DESCRIPTION - DESCRIPTORS: DESCRIPTORS: CONSTANT;THROBBING

## 2021-07-02 ASSESSMENT — ENCOUNTER SYMPTOMS: COLOR CHANGE: 1

## 2021-07-02 NOTE — ED NOTES
Pt requesting prescription for percocet.  NP to bedside to discuss pain management with pt     Paul Aguialr RN  88/82/16 [id number]

## 2021-07-02 NOTE — ED PROVIDER NOTES
[address] ED  eMERGENCY dEPARTMENT eNCOUnter      Pt Name: Yvonne Garcia  MRN: [id number]  [place] 1960  Date of evaluation: 7/2/2021  Provider: AURORA Foley CNP    CHIEF COMPLAINT       Chief Complaint   Patient presents with    Finger Pain     right index finger         HISTORY OF PRESENT ILLNESS  (Location/Symptom, Timing/Onset, Context/Setting, Quality, Duration, Modifying Factors, Severity.)   Yvonne Garcia is a 57 y.o. male who presents to the emergency department via private auto for pain, swelling to his right index finger. Onset was 3 weeks ago after accidentally cutting his nail too short and cutting part of the finger. He was placed on a 7-day course of keflex which he completed last week. He has continued pain. Denies fever, drainage. Rates his pain 10/10 at this time. Nursing Notes were reviewed. ALLERGIES     Patient has no known allergies.     CURRENT MEDICATIONS       Previous Medications    AMLODIPINE-ATORVASTATATIN (CADUET) 10-80 MG PER TABLET    Take 1 tablet by mouth daily    APIXABAN (ELIQUIS) 5 MG TABS TABLET    Take 1 tablet by mouth 2 times daily    ASPIRIN 81 MG CHEWABLE TABLET    Take 81 mg by mouth every morning    ATORVASTATIN (LIPITOR) 80 MG TABLET    Take 1 tablet by mouth daily    CILOSTAZOL (PLETAL) 100 MG TABLET    Take 100 mg by mouth 2 times daily    CLONIDINE (CATAPRES) 0.3 MG TABLET    Take 1 tablet by mouth 2 times daily Do not take if HR < 60    CLOPIDOGREL (PLAVIX) 75 MG TABLET    Take 1 tablet by mouth daily    GABAPENTIN (NEURONTIN) 300 MG CAPSULE    TAKE ONE CAPSULE BY MOUTH THREE TIMES A DAY    GLIMEPIRIDE (AMARYL) 2 MG TABLET    Take 1 tablet by mouth every morning (before breakfast)    HEPARIN SODIUM, PORCINE, (HEPARIN, PORCINE,) 1000 UNIT/ML INJECTION    Heparin Sodium (Porcine) 1,000 Units/mL Systemic    HYDRALAZINE (APRESOLINE) 25 MG TABLET    Take 25 mg by mouth 2 times daily    LINAGLIPTIN (TRADJENTA) 5 MG TABLET TAKE ONE TABLET BY MOUTH DAILY    LISINOPRIL (PRINIVIL;ZESTRIL) 10 MG TABLET    TAKE 1 TABLET BY MOUTH  TWICE DAILY    MULTIPLE VITAMINS-MINERALS (RENAPLEX-D PO)    Take 1 tablet by mouth daily    NONFORMULARY        SUCROFERRIC OXYHYDROXIDE (VELPHORO) 500 MG CHEW    Take 2 tablets by mouth 3 times daily (with meals) Crush or chew and shallow 2 tablets three times a day with meals    TADALAFIL (CIALIS) 20 MG TABLET    Take 1 tablet by mouth once as needed for Erectile Dysfunction    TENS UNIT MISC    by Does not apply route    TRAMADOL (ULTRAM) 50 MG TABLET    Take 1 tablet by mouth daily as needed for Pain (4 days a week one tab a day) for up to 20 days. PAST MEDICAL HISTORY         Diagnosis Date    Chronic bilateral low back pain with bilateral sciatica     CRF (chronic renal failure)     [facility] MWF    DDD (degenerative disc disease), lumbar 12/15/2020    [facility]     [facility]     ED (erectile dysfunction)     [facility]    [facility]    HTN (hypertension)     Hyperlipidemia     LVH (left ventricular hypertrophy)     PVD [facility]peripheral vascular disease) [facility]     S/P bilateral BKA (below knee amputation) ([facility]     Wears glasses        SURGICAL HISTORY           Procedure Laterality Date    CATARACT REMOVAL WITH IMPLANT      DIALYSIS FISTULA CREATION Bilateral     As of 2019, RUE AVF functioning, LUE AVF nonfunctioning.     FINGER SURGERY Left     I & D    GLAUCOMA SURGERY      LEG AMPUTATION BELOW KNEE Bilateral     TUNNELED VENOUS CATHETER PLACEMENT      PC placed and removed         FAMILY HISTORY           Problem Relation Age of Onset    Diabetes Mother     Coronary Art Dis Mother     Hypertension Mother     Diabetes Father     Hypertension Father     Stroke Father     Cancer Sister     Hypertension Brother      Family Status   Relation Name Status    Mother  (Not Specified)    Father  (Not Specified)    Sister  (Not Specified)   [address] [place] [address] Brother  (Not Specified)        SOCIAL HISTORY      reports that he has never smoked. He has never used smokeless tobacco. He reports current alcohol use. He reports that he does not use drugs. REVIEW OF SYSTEMS    (2-9 systems for level 4, 10 or more for level 5)     Review of Systems   Constitutional: Negative for chills, diaphoresis, fatigue and fever. Musculoskeletal: Positive for arthralgias and myalgias. Skin: Positive for color change and wound. Negative for rash. Neurological: Negative for weakness and numbness. Except as noted above the remainder of the review of systems was reviewed and negative. PHYSICAL EXAM    (up to 7 for level 4, 8 or more for level 5)     ED Triage Vitals [07/02/21 1636]   BP Temp Temp Source Pulse Resp SpO2 Height Weight   128/80 97.9 °F (36.6 °C) Oral 71 16 100 % 5' 11\" (1.803 m) 208 lb (94.3 kg)     Physical Exam  Vitals reviewed. Constitutional:       General: He is not in acute distress. Appearance: He is well-developed. He is not diaphoretic. Eyes:      General: No scleral icterus. Conjunctiva/sclera: Conjunctivae normal.   Cardiovascular:      Rate and Rhythm: Normal rate. Pulses: Normal pulses. Pulmonary:      Effort: Pulmonary effort is normal. No respiratory distress. Breath sounds: No stridor. Musculoskeletal:      Comments: Mild swelling right index finger. No deformity noted. Patient has full ROM to all joints of the digit. The digit is tender. Skin:     General: Skin is warm and dry. Capillary Refill: Capillary refill takes less than 2 seconds. Findings: No rash. Comments: Healing wound to tip of right index finger. Digit is soft. No felon noted. No drainage. Neurological:      Mental Status: He is alert and oriented to person, place, and time.    Psychiatric:         Behavior: Behavior normal.         EMERGENCY DEPARTMENT COURSE and DIFFERENTIAL DIAGNOSIS/MDM:   Vitals:    Vitals:    07/02/21 1636   BP: 128/80   Pulse: 71   Resp: 16   Temp: 97.9 °F (36.6 °C)   TempSrc: Oral   SpO2: 100%   Weight: 208 lb (94.3 kg)   Height: 5' 11\" (1.803 m)       CLINICAL DECISION MAKING:  The patient presented alert with a nontoxic appearance and was seen in conjunction with Dr. Marily Shafer. The patient is in pain management. A prescription was written for doxycycline. Follow up with pcp, return to ED if condition worsens. The patient is in pain management. He is requesting percocet. He is aware that if a prescription is provided he may be removed from his pain management program. He stated he wanted the prescription. He stated he would not leave here unless he was given a prescription for something for his pain. He stated he is not due for a refill from pain management until the 19th. A prescription was written for percocet. The patient was advised to not drink alcohol, drive, or operate heavy machinery while taking the medication. He is will to accept the consequences of receiving the medication while in pain management. FINAL IMPRESSION      1.  Cellulitis of finger of right hand            Problem List  Patient Active Problem List   Diagnosis Code    Hyperlipidemia E78.5    Essential hypertension I10    ESRD on hemodialysis ([facility] MWF N18.6, Z99.2    Insomnia G47.00    ED (erectile dysfunction) N52.9    Chronic bilateral low back pain with bilateral sciatica M54.42, M54.41, G89.29    Controlled diabetes mellitus type 2 with complications ([facility] U17.8    S/P bilateral below knee amputation ([facility] Z89.512, Z89.511    Long term (current) use of antithrombotics/antiplatelets X13.92    Chronic use of opiate drugs therapeutic purposes Z79.891    Severe comorbid illness R69    DDD (degenerative disc disease), lumbar M51.36    Bradycardia R00.1    Spinal stenosis of lumbar region with neurogenic claudication M48.062         DISPOSITION/PLAN   DISPOSITION  DISCHARGE      PATIENT REFERRED TO:   Serge Chisholm MD  [address]  [phone]    Schedule an appointment as soon as possible for a visit       [facility] ED  [address] [facility]  [phone]          DISCHARGE MEDICATIONS:     New Prescriptions    ACETAMINOPHEN (TYLENOL) 500 MG TABLET    Take 1 tablet by mouth every 6 hours as needed for Pain    DOXYCYCLINE HYCLATE (VIBRA-TABS) 100 MG TABLET    Take 1 tablet by mouth 2 times daily for 10 days           (Please note that portions of this note were completed with a voice recognition program.  Efforts were made to edit the dictations but occasionally words are mis-transcribed.)    AURORA Foley CNP, APRN - CNP  07/02/21 [id number] [facility] [place], APRN - CNP  07/04/21 0295

## 2021-07-02 NOTE — ED PROVIDER NOTES
EMERGENCY DEPARTMENT ENCOUNTER   ATTENDING ATTESTATION     Pt Name: Enedina Carson  MRN: [id number]  [place] 1960  Date of evaluation: 7/2/21   Enedina Carson is a 57 y.o. male with CC: Finger Pain (right index finger)    MDM:   Patient is a 60-year-old male with history of hypertension ESRD on hemodialysis diabetes here with pain and swelling to the right index finger. He states it occurred 3 weeks ago when he was cutting his nail too short and cut part of his finger. States tetanus is up-to-date. Was seen recently and placed on Keflex but not much improvement. States he is able to make a fist and has normal sensation but it is tender. Denies open wound. Denies fevers chills nausea vomiting. On exam no distress vital stable he had a full dialysis session today fistula right upper extremity with palpable thrill. His right index finger is slightly swollen near the tip, there is mild warmth he has full intact range of motion flexion extension at the MCP joint PIP and DIP joints, no tenderness along the flexor tendon sheath. Impression is cellulitis, does not appear to have signs of flexor tenosynovitis at this time, no focal abscess or fluctuance on the nailbed to suggest paronychia and there is no swelling of the volar fat pad to suggest felon. Plan is for doxycycline, pain medications, close follow-up with PCP. Strict return precautions given if his symptoms do not improve or worsen to return for IV antibiotics. Patient verbalized understanding         CRITICAL CARE:       EKG: All EKG's are interpreted by the Emergency Department Physician who either signs or Co-signs this chart in the absence of a cardiologist.      RADIOLOGY:All plain film, CT, MRI, and formal ultrasound images (except ED bedside ultrasound) are read by the radiologist, see reports below, unless otherwise noted in MDM or here.   No orders to display     LABS: All lab results were reviewed by myself, and all abnormals are listed below. Labs Reviewed - No data to display  CONSULTS:  None  FINAL IMPRESSION      1. Cellulitis of finger of right hand            PASTMEDICAL HISTORY     Past Medical History:   Diagnosis Date    Chronic bilateral low back pain with bilateral sciatica     CRF (chronic renal failure)     [facility] M[facility]    DDD (degenerative disc disease), lumbar 12/15/2020    [facility]     [facility])     ED (erectile dysfunction)     [facility]    HTN (hypertension)     Hyperlipidemia     LVH (left ventricular hypertrophy)     PVD (peripheral vascular disease) ([facility]     [facility]     Wears glasses      SURGICAL HISTORY       Past Surgical History:   Procedure Laterality Date    CATARACT REMOVAL WITH IMPLANT      DIALYSIS FISTULA CREATION Bilateral     As of 2019, RUE AVF functioning, LUE AVF nonfunctioning.     FINGER SURGERY Left     I & D    GLAUCOMA SURGERY      LEG AMPUTATION BELOW KNEE Bilateral     TUNNELED VENOUS CATHETER PLACEMENT      PC placed and removed     CURRENT MEDICATIONS       Previous Medications    AMLODIPINE-ATORVASTATATIN (CADUET) 10-80 MG PER TABLET    Take 1 tablet by mouth daily    APIXABAN (ELIQUIS) 5 MG TABS TABLET    Take 1 tablet by mouth 2 times daily    ASPIRIN 81 MG CHEWABLE TABLET    Take 81 mg by mouth every morning    ATORVASTATIN (LIPITOR) 80 MG TABLET    Take 1 tablet by mouth daily    CILOSTAZOL (PLETAL) 100 MG TABLET    Take 100 mg by mouth 2 times daily    CLONIDINE (CATAPRES) 0.3 MG TABLET    Take 1 tablet by mouth 2 times daily Do not take if HR < 60    CLOPIDOGREL (PLAVIX) 75 MG TABLET    Take 1 tablet by mouth daily    GABAPENTIN (NEURONTIN) 300 MG CAPSULE    TAKE ONE CAPSULE BY MOUTH THREE TIMES A DAY    GLIMEPIRIDE (AMARYL) 2 MG TABLET    Take 1 tablet by mouth every morning (before breakfast)    HEPARIN SODIUM, PORCINE, (HEPARIN, PORCINE,) 1000 UNIT/ML INJECTION    Heparin Sodium (Porcine) 1,000 Units/mL Systemic    HYDRALAZINE (APRESOLINE) 25 MG TABLET    Take 25 mg by mouth 2 times daily    LINAGLIPTIN (TRADJENTA) 5 MG TABLET    TAKE ONE TABLET BY MOUTH DAILY    LISINOPRIL (PRINIVIL;ZESTRIL) 10 MG TABLET    TAKE 1 TABLET BY MOUTH  TWICE DAILY    MULTIPLE VITAMINS-MINERALS (RENAPLEX-D PO)    Take 1 tablet by mouth daily    NONFORMULARY        OXYCODONE-ACETAMINOPHEN (PERCOCET) 7.5-325 MG PER TABLET    Take 1 tablet by mouth daily as needed for Pain (take 1 tablete three times a week) for up to 30 days. Intended supply: 30 days    SUCROFERRIC OXYHYDROXIDE (VELPHORO) 500 MG CHEW    Take 2 tablets by mouth 3 times daily (with meals) Crush or chew and shallow 2 tablets three times a day with meals    TADALAFIL (CIALIS) 20 MG TABLET    Take 1 tablet by mouth once as needed for Erectile Dysfunction    TENS UNIT MISC    by Does not apply route    TRAMADOL (ULTRAM) 50 MG TABLET    Take 1 tablet by mouth daily as needed for Pain (4 days a week one tab a day) for up to 20 days. ALLERGIES     has No Known Allergies. FAMILY HISTORY     He indicated that the status of his mother is unknown. He indicated that the status of his father is unknown. He indicated that the status of his sister is unknown. He indicated that the status of his brother is unknown. SOCIAL HISTORY       Social History     Tobacco Use    Smoking status: Never Smoker    Smokeless tobacco: Never Used   Substance Use Topics    Alcohol use: Yes     Comment: rare    Drug use: No       I personally evaluated and examined the patient in conjunction with the APC and agree with the assessment, treatment plan, and disposition of the patient as recorded by the APC.    Henrique Contreras MD  Attending Emergency Physician         Henrique oCntreras MD  07/02/21 9860

## 2021-07-04 ENCOUNTER — APPOINTMENT (OUTPATIENT)
Dept: GENERAL RADIOLOGY | Age: 61
End: 2021-07-04
Payer: COMMERCIAL

## 2021-07-04 ENCOUNTER — HOSPITAL ENCOUNTER (EMERGENCY)
Age: 61
Discharge: HOME | End: 2021-07-05
Attending: EMERGENCY MEDICINE
Payer: COMMERCIAL

## 2021-07-04 VITALS
SYSTOLIC BLOOD PRESSURE: 148 MMHG | HEIGHT: 71 IN | OXYGEN SATURATION: 97 % | DIASTOLIC BLOOD PRESSURE: 87 MMHG | RESPIRATION RATE: 18 BRPM | TEMPERATURE: 98.4 F | BODY MASS INDEX: 29.54 KG/M2 | HEART RATE: 86 BPM | WEIGHT: 211 LBS

## 2021-07-04 DIAGNOSIS — M79.89 SWELLING OF RIGHT HAND: Primary | ICD-10-CM

## 2021-07-04 LAB
ABSOLUTE EOS #: 0.16 K/UL (ref 0–0.44)
ABSOLUTE IMMATURE GRANULOCYTE: 0 K/UL (ref 0–0.3)
ABSOLUTE LYMPH #: 1.42 K/UL (ref 1.1–3.7)
ABSOLUTE MONO #: 1.03 K/UL (ref 0.1–1.2)
ANION GAP SERPL CALCULATED.3IONS-SCNC: 18 MMOL/L (ref 9–17)
BASOPHILS # BLD: 0 % (ref 0–2)
BASOPHILS ABSOLUTE: 0 K/UL (ref 0–0.2)
BUN BLDV-MCNC: 53 MG/DL (ref 8–23)
BUN/CREAT BLD: 5 (ref 9–20)
CALCIUM SERPL-MCNC: 9.9 MG/DL (ref 8.6–10.4)
CHLORIDE BLD-SCNC: 92 MMOL/L (ref 98–107)
CO2: 23 MMOL/L (ref 20–31)
CREAT SERPL-MCNC: 11.31 MG/DL (ref 0.7–1.2)
DIFFERENTIAL TYPE: ABNORMAL
EOSINOPHILS RELATIVE PERCENT: 2 % (ref 1–4)
GFR AFRICAN AMERICAN: 6 ML/MIN
GFR NON-AFRICAN AMERICAN: 5 ML/MIN
GFR SERPL CREATININE-BSD FRML MDRD: ABNORMAL ML/MIN/{1.73_M2}
GFR SERPL CREATININE-BSD FRML MDRD: ABNORMAL ML/MIN/{1.73_M2}
GLUCOSE BLD-MCNC: 115 MG/DL (ref 70–99)
HCT VFR BLD CALC: 37.4 % (ref 40.7–50.3)
HEMOGLOBIN: 10.9 G/DL (ref 13–17)
IMMATURE GRANULOCYTES: 0 %
LYMPHOCYTES # BLD: 18 % (ref 24–43)
MCH RBC QN AUTO: 23.5 PG (ref 25.2–33.5)
MCHC RBC AUTO-ENTMCNC: 29.1 G/DL (ref 28–38)
MCV RBC AUTO: 80.6 FL (ref 82.6–102.9)
MONOCYTES # BLD: 13 % (ref 3–12)
MORPHOLOGY: ABNORMAL
NRBC AUTOMATED: ABNORMAL PER 100 WBC
PDW BLD-RTO: 22.3 % (ref 11.8–14.4)
PLATELET # BLD: 162 K/UL (ref 138–453)
PLATELET ESTIMATE: ABNORMAL
PMV BLD AUTO: 9.7 FL (ref 8.1–13.5)
POTASSIUM SERPL-SCNC: 5.8 MMOL/L (ref 3.7–5.3)
RBC # BLD: 4.64 M/UL (ref 4.21–5.77)
RBC # BLD: ABNORMAL 10*6/UL
SEG NEUTROPHILS: 67 % (ref 36–65)
SEGMENTED NEUTROPHILS ABSOLUTE COUNT: 5.29 K/UL (ref 1.5–8.1)
SODIUM BLD-SCNC: 133 MMOL/L (ref 135–144)
WBC # BLD: 7.9 K/UL (ref 3.5–11.3)
WBC # BLD: ABNORMAL 10*3/UL

## 2021-07-04 PROCEDURE — 2500000003 HC RX 250 WO HCPCS: Performed by: NURSE PRACTITIONER

## 2021-07-04 PROCEDURE — 85025 COMPLETE CBC W/AUTO DIFF WBC: CPT

## 2021-07-04 PROCEDURE — 6370000000 HC RX 637 (ALT 250 FOR IP): Performed by: NURSE PRACTITIONER

## 2021-07-04 PROCEDURE — 99283 EMERGENCY DEPT VISIT LOW MDM: CPT

## 2021-07-04 PROCEDURE — 80048 BASIC METABOLIC PNL TOTAL CA: CPT

## 2021-07-04 PROCEDURE — 73130 X-RAY EXAM OF HAND: CPT

## 2021-07-04 PROCEDURE — 6360000002 HC RX W HCPCS: Performed by: NURSE PRACTITIONER

## 2021-07-04 PROCEDURE — 96374 THER/PROPH/DIAG INJ IV PUSH: CPT

## 2021-07-04 PROCEDURE — 96375 TX/PRO/DX INJ NEW DRUG ADDON: CPT

## 2021-07-04 PROCEDURE — 93005 ELECTROCARDIOGRAM TRACING: CPT | Performed by: NURSE PRACTITIONER

## 2021-07-04 RX ORDER — DEXTROSE MONOHYDRATE 25 G/50ML
25 INJECTION, SOLUTION INTRAVENOUS ONCE
Status: COMPLETED | OUTPATIENT
Start: 2021-07-04 | End: 2021-07-04

## 2021-07-04 RX ORDER — CALCIUM GLUCONATE 94 MG/ML
1000 INJECTION, SOLUTION INTRAVENOUS ONCE
Status: COMPLETED | OUTPATIENT
Start: 2021-07-04 | End: 2021-07-04

## 2021-07-04 RX ADMIN — DEXTROSE MONOHYDRATE 25 G: 25 INJECTION, SOLUTION INTRAVENOUS at 22:38

## 2021-07-04 RX ADMIN — INSULIN HUMAN 10 UNITS: 100 INJECTION, SOLUTION PARENTERAL at 22:43

## 2021-07-04 RX ADMIN — CALCIUM GLUCONATE 1000 MG: 98 INJECTION, SOLUTION INTRAVENOUS at 22:43

## 2021-07-04 RX ADMIN — SODIUM BICARBONATE 50 MEQ: 84 INJECTION INTRAVENOUS at 22:39

## 2021-07-04 ASSESSMENT — PAIN DESCRIPTION - DESCRIPTORS: DESCRIPTORS: SHARP;THROBBING

## 2021-07-04 ASSESSMENT — PAIN DESCRIPTION - ORIENTATION: ORIENTATION: RIGHT

## 2021-07-04 ASSESSMENT — PAIN DESCRIPTION - FREQUENCY: FREQUENCY: INTERMITTENT

## 2021-07-04 ASSESSMENT — PAIN DESCRIPTION - LOCATION: LOCATION: ARM;HAND

## 2021-07-04 ASSESSMENT — PAIN SCALES - GENERAL: PAINLEVEL_OUTOF10: 10

## 2021-07-05 LAB
ANION GAP SERPL CALCULATED.3IONS-SCNC: 18 MMOL/L (ref 9–17)
BUN BLDV-MCNC: 55 MG/DL (ref 8–23)
BUN/CREAT BLD: 5 (ref 9–20)
CALCIUM SERPL-MCNC: 10.2 MG/DL (ref 8.6–10.4)
CHLORIDE BLD-SCNC: 94 MMOL/L (ref 98–107)
CO2: 24 MMOL/L (ref 20–31)
CREAT SERPL-MCNC: 11.33 MG/DL (ref 0.7–1.2)
GFR AFRICAN AMERICAN: 6 ML/MIN
GFR NON-AFRICAN AMERICAN: 5 ML/MIN
GFR SERPL CREATININE-BSD FRML MDRD: ABNORMAL ML/MIN/{1.73_M2}
GFR SERPL CREATININE-BSD FRML MDRD: ABNORMAL ML/MIN/{1.73_M2}
GLUCOSE BLD-MCNC: 95 MG/DL (ref 70–99)
POTASSIUM SERPL-SCNC: 5.5 MMOL/L (ref 3.7–5.3)
SODIUM BLD-SCNC: 136 MMOL/L (ref 135–144)

## 2021-07-05 PROCEDURE — 36415 COLL VENOUS BLD VENIPUNCTURE: CPT

## 2021-07-05 PROCEDURE — 80048 BASIC METABOLIC PNL TOTAL CA: CPT

## 2021-07-05 ASSESSMENT — ENCOUNTER SYMPTOMS
SHORTNESS OF BREATH: 0
COLOR CHANGE: 0
ABDOMINAL PAIN: 0
COUGH: 0

## 2021-07-05 NOTE — ED PROVIDER NOTES
[address] ED  eMERGENCY dEPARTMENT eNCOUnter      Pt Name: Helyn Dakins  MRN: [id number]  [place] 1960  Date of evaluation: 7/4/2021  Provider: Shira Mirza MD    [address]       Chief Complaint   Patient presents with    Swelling     right arm/hand, seen 7/2 for same no improvement         HISTORY OF PRESENT ILLNESS  (Location/Symptom, Timing/Onset, Context/Setting, Quality, Duration, Modifying Factors, Severity.)   Helyn Dakins is a 57 y.o. male who presents to the emergency department via private auto for pain, swelling to his right index finger. He was evaluated here for the same on 7/2/21. Onset was 3 weeks ago after accidentally cutting his nail too short and cutting part of the finger. He was placed on a 7-day course of keflex which he completed last week. States he is in severe pain which he rates 10/10. He was provided with doxycycline at the previous visit. States he does dialysis MWF; reports going this past Friday. Nursing Notes were reviewed. ALLERGIES     Patient has no known allergies. CURRENT MEDICATIONS       Discharge Medication List as of 7/5/2021  1:21 AM      CONTINUE these medications which have NOT CHANGED    Details   amLODIPine-atorvastatatin (CADUET) 10-80 MG per tablet Take 1 tablet by mouth dailyHistorical Med      acetaminophen (TYLENOL) 500 MG tablet Take 1 tablet by mouth every 6 hours as needed for Pain, Disp-15 tablet, R-0Normal      traMADol (ULTRAM) 50 MG tablet Take 1 tablet by mouth daily as needed for Pain (4 days a week one tab a day) for up to 20 days. , Disp-20 tablet, R-0Print      linagliptin (TRADJENTA) 5 MG tablet TAKE ONE TABLET BY MOUTH DAILY, Disp-90 tablet, R-0Normal      Multiple Vitamins-Minerals (RENAPLEX-D PO) Take 1 tablet by mouth dailyHistorical Med      apixaban (ELIQUIS) 5 MG TABS tablet Take 1 tablet by mouth 2 times daily, Disp-180 tablet, R-1Normal      clopidogrel (PLAVIX) 75 MG tablet Take 1 tablet by mouth daily, Disp-30 tablet, R-3Normal      cloNIDine (CATAPRES) 0.3 MG tablet Take 1 tablet by mouth 2 times daily Do not take if HR < 60, Disp-180 tablet, R-3Normal      aspirin 81 MG chewable tablet Take 81 mg by mouth every morningHistorical Med      Sucroferric Oxyhydroxide (VELPHORO) 500 MG CHEW Take 2 tablets by mouth 3 times daily (with meals) Crush or chew and shallow 2 tablets three times a day with mealsHistorical Med      gabapentin (NEURONTIN) 300 MG capsule TAKE ONE CAPSULE BY MOUTH THREE TIMES A DAY, Disp-90 capsule, R-2Normal      doxycycline hyclate (VIBRA-TABS) 100 MG tablet Take 1 tablet by mouth 2 times daily for 10 days, Disp-20 tablet, R-0Normal      tadalafil (CIALIS) 20 MG tablet Take 1 tablet by mouth once as needed for Erectile Dysfunction, Disp-30 tablet, R-1Print      glimepiride (AMARYL) 2 MG tablet Take 1 tablet by mouth every morning (before breakfast), Disp-90 tablet, R-1Normal      Heparin Sodium, Porcine, (HEPARIN, PORCINE,) 1000 UNIT/ML injection Heparin Sodium (Porcine) 1,000 Units/mL SystemicHistorical Med      cilostazol (PLETAL) 100 MG tablet Take 100 mg by mouth 2 times dailyHistorical Med      hydrALAZINE (APRESOLINE) 25 MG tablet Take 25 mg by mouth 2 times dailyHistorical Med      atorvastatin (LIPITOR) 80 MG tablet Take 1 tablet by mouth daily, Disp-30 tablet, R-3Normal      Tens Unit [facility] 12/15/2020, Disp-1 each, R-0, Print      lisinopril (PRINIVIL;ZESTRIL) 10 MG tablet TAKE 1 TABLET BY MOUTH  TWICE DAILY, Disp-180 tablet,R-0Normal      NONFORMULARY Historical Med             PAST MEDICAL HISTORY         Diagnosis Date    Chronic bilateral low back pain with bilateral sciatica     CRF (chronic renal failure)     [facility] MWF    DDD (degenerative disc disease), lumbar 12/15/2020    [facility] ([facility]     D[facility] patient [facility])     ED (erectile dysfunction)     [facility]    HTN (hypertension)     Hyperlipidemia     LVH (left ventricular hypertrophy)     [facility] disease) [facility]     S/P bilateral BKA (below knee amputation) ([facility]     Wears glasses        SURGICAL HISTORY           Procedure Laterality Date    CATARACT REMOVAL WITH IMPLANT      DIALYSIS FISTULA CREATION Bilateral     As of 2019, RUE AVF functioning, LUE AVF nonfunctioning.  FINGER SURGERY Left     I & D    GLAUCOMA SURGERY      LEG AMPUTATION BELOW KNEE Bilateral     TUNNELED VENOUS CATHETER PLACEMENT      PC placed and removed         FAMILY HISTORY           Problem Relation Age of Onset    Diabetes Mother     Coronary Art Dis Mother     Hypertension Mother     Diabetes Father     Hypertension Father     Stroke Father     Cancer Sister     Hypertension Brother      Family Status   Relation Name Status    Mother  (Not Specified)    Father  (Not Specified)    Sister  (Not Specified)    Brother  (Not Specified)        SOCIAL HISTORY      reports that he has never smoked. He has never used smokeless tobacco. He reports current alcohol use. He reports that he does not use drugs. REVIEW OF SYSTEMS    (2-9 systems for level 4, 10 or more for level 5)     Review of Systems   Constitutional: Negative for chills, diaphoresis, fatigue and fever. Respiratory: Negative for cough and shortness of breath. Cardiovascular: Negative for chest pain. Gastrointestinal: Negative for abdominal pain. Musculoskeletal: Positive for arthralgias and myalgias. Skin: Positive for wound. Negative for color change and rash. Neurological: Negative for weakness and numbness. Except as noted above the remainder of the review of systems was reviewed and negative.      PHYSICAL EXAM    (up to 7 for level 4, 8 or more for level 5)     ED Triage Vitals   BP Temp Temp Source Pulse Resp SpO2 Height Weight   07/04/21 2030 07/04/21 2030 07/04/21 2030 07/04/21 2028 07/04/21 2028 07/04/21 2028 07/04/21 2028 07/04/21 2028   (!) 148/87 98.4 °F (36.9 °C) Oral 86 18 97 % 5' 11\" (1.803 m) 211 lb (95.7 kg)     Physical Exam  Vitals reviewed. Constitutional:       General: He is not in acute distress. Appearance: He is well-developed. He is not diaphoretic. Eyes:      General: No scleral icterus. Conjunctiva/sclera: Conjunctivae normal.   Neck:      Vascular: No JVD. Cardiovascular:      Rate and Rhythm: Normal rate. Pulses: Normal pulses. Pulmonary:      Effort: Pulmonary effort is normal. No respiratory distress. Breath sounds: No stridor. Musculoskeletal:      Right wrist: No swelling, deformity or tenderness. Normal range of motion. Normal pulse. Right hand: Swelling and tenderness present. No deformity or lacerations. Decreased range of motion. Normal capillary refill. Normal pulse. Cervical back: Neck supple. Comments: Mild swelling to right hand. No erythema or open wound noted. Pt reports decreased ROM the digits due to the pain. Skin:     General: Skin is warm and dry. Capillary Refill: Capillary refill takes less than 2 seconds. Findings: No rash. Neurological:      Mental Status: He is alert and oriented to person, place, and time. Psychiatric:         Behavior: Behavior normal.         DIAGNOSTIC RESULTS     EKG: All EKG's are interpreted by the Emergency Department Physician who either signs or Co-signs this chart in the absence of a cardiologist.  EKG was interpreted by Dr. Archana Blair after completion. RADIOLOGY:   Non-plain film images such as CT, Ultrasound and MRI are read by the radiologist. Plain radiographic images are visualized and preliminarily interpreted by the emergency physician with the below findings:    Interpretation per the Radiologist below, if available at the time of this note:    XR HAND RIGHT (MIN 3 VIEWS)    Result Date: 7/4/2021  EXAMINATION: THREE XRAY VIEWS OF THE RIGHT HAND 7/4/2021 6:39 pm COMPARISON: None.  HISTORY: ORDERING SYSTEM PROVIDED HISTORY: infection

## 2021-07-05 NOTE — ED NOTES
Pt calls out asking if he will be admitted or not, writer explains that there are awaiting lab results in regard to his elevated CRE level. Pt was informed that depending on his results he may be admitted or discharged. Pt was unhappy to hear that he will be getting discharged; \"I am here to get something done [his arm].       Jesika Rodriguez RN  07/05/21 0970

## 2021-07-05 NOTE — ED PROVIDER NOTES
eMERGENCY dEPARTMENT eNCOUnter   Independent Attestation     Pt Name: Helyn Dakins  MRN: [id number]  [place] 1960  Date of evaluation: 7/4/21     Helyn Dakins is a 57 y.o. male with CC: Swelling (right arm/hand, seen 7/2 for same no improvement)      Based on the medical record the care appears appropriate. I was personally available for consultation in the Emergency Department.     Shira Mirza MD  Attending Emergency Physician                   Sherrie Saunders MD  07/04/21 7065

## 2021-07-07 LAB
EKG ATRIAL RATE: 202 BPM
EKG Q-T INTERVAL: 404 MS
EKG QRS DURATION: 90 MS
EKG QTC CALCULATION (BAZETT): 436 MS
EKG R AXIS: 52 DEGREES
EKG T AXIS: 79 DEGREES
EKG VENTRICULAR RATE: 70 BPM

## 2021-07-08 ENCOUNTER — TELEPHONE (OUTPATIENT)
Dept: PAIN MANAGEMENT | Age: 61
End: 2021-07-08

## 2021-07-15 ENCOUNTER — TELEPHONE (OUTPATIENT)
Dept: INTERNAL MEDICINE CLINIC | Age: 61
End: 2021-07-15

## 2021-07-19 DIAGNOSIS — G89.29 CHRONIC BILATERAL LOW BACK PAIN WITH BILATERAL SCIATICA: ICD-10-CM

## 2021-07-19 DIAGNOSIS — R69 SEVERE COMORBID ILLNESS: ICD-10-CM

## 2021-07-19 DIAGNOSIS — Z79.891 CHRONIC USE OF OPIATE DRUG FOR THERAPEUTIC PURPOSE: ICD-10-CM

## 2021-07-19 DIAGNOSIS — M54.42 CHRONIC BILATERAL LOW BACK PAIN WITH BILATERAL SCIATICA: ICD-10-CM

## 2021-07-19 DIAGNOSIS — M54.41 CHRONIC BILATERAL LOW BACK PAIN WITH BILATERAL SCIATICA: ICD-10-CM

## 2021-07-19 RX ORDER — TRAMADOL HYDROCHLORIDE 50 MG/1
50 TABLET ORAL DAILY PRN
Qty: 20 TABLET | Refills: 0 | Status: SHIPPED | OUTPATIENT
Start: 2021-07-19 | End: 2021-08-08

## 2021-07-19 RX ORDER — OXYCODONE AND ACETAMINOPHEN 7.5; 325 MG/1; MG/1
1 TABLET ORAL DAILY PRN
Qty: 15 TABLET | Refills: 0 | Status: SHIPPED | OUTPATIENT
Start: 2021-07-19 | End: 2021-07-22 | Stop reason: ALTCHOICE

## 2021-07-20 DIAGNOSIS — R69 SEVERE COMORBID ILLNESS: ICD-10-CM

## 2021-07-20 DIAGNOSIS — G89.29 CHRONIC BILATERAL LOW BACK PAIN WITH BILATERAL SCIATICA: ICD-10-CM

## 2021-07-20 DIAGNOSIS — M54.41 CHRONIC BILATERAL LOW BACK PAIN WITH BILATERAL SCIATICA: ICD-10-CM

## 2021-07-20 DIAGNOSIS — M54.42 CHRONIC BILATERAL LOW BACK PAIN WITH BILATERAL SCIATICA: ICD-10-CM

## 2021-07-20 DIAGNOSIS — Z79.891 CHRONIC USE OF OPIATE DRUG FOR THERAPEUTIC PURPOSE: ICD-10-CM

## 2021-07-20 RX ORDER — OXYCODONE HYDROCHLORIDE AND ACETAMINOPHEN 5; 325 MG/1; MG/1
1 TABLET ORAL
Qty: 15 TABLET | Refills: 0 | Status: SHIPPED | OUTPATIENT
Start: 2021-07-21 | End: 2021-07-22 | Stop reason: ALTCHOICE

## 2021-07-20 NOTE — TELEPHONE ENCOUNTER
Per Patito Rouse from [address] [place] [address] on [facility] they are out of stock on Percocet until this Friday. Medication can be [facility] to [facility] if new prescription is sent.

## 2021-07-20 NOTE — TELEPHONE ENCOUNTER
Pt states pharmacy needed clarification on the directions written for his Percocet. Writer called [facility] [address] and spoke to Erum and informed him that the correct sig instructions are to take 1 tablet by mouth three times a week for 30 days, Indications: after dialysis, dispense # 15.  Erum verbalized understanding and then stated they are out of stock on Percocet and will not receive more until Friday 7/23/21

## 2021-07-22 ENCOUNTER — TELEPHONE (OUTPATIENT)
Dept: PAIN MANAGEMENT | Age: 61
End: 2021-07-22

## 2021-07-22 DIAGNOSIS — M54.42 CHRONIC BILATERAL LOW BACK PAIN WITH BILATERAL SCIATICA: ICD-10-CM

## 2021-07-22 DIAGNOSIS — M54.41 CHRONIC BILATERAL LOW BACK PAIN WITH BILATERAL SCIATICA: ICD-10-CM

## 2021-07-22 DIAGNOSIS — G89.29 CHRONIC BILATERAL LOW BACK PAIN WITH BILATERAL SCIATICA: ICD-10-CM

## 2021-07-22 DIAGNOSIS — Z79.891 CHRONIC USE OF OPIATE DRUG FOR THERAPEUTIC PURPOSE: ICD-10-CM

## 2021-07-22 RX ORDER — OXYCODONE AND ACETAMINOPHEN 7.5; 325 MG/1; MG/1
1 TABLET ORAL EVERY OTHER DAY
Qty: 15 TABLET | Refills: 0 | Status: SHIPPED | OUTPATIENT
Start: 2021-07-22 | End: 2021-08-17 | Stop reason: SDUPTHER

## 2021-07-22 NOTE — TELEPHONE ENCOUNTER
Patient called in stating that the script that was called in was wrong. He stated that it should be the 7.5 325 sent to [facility] on [facility]. Please advise.

## 2021-08-03 ENCOUNTER — OFFICE VISIT (OUTPATIENT)
Dept: PAIN MANAGEMENT | Age: 61
End: 2021-08-03
Payer: COMMERCIAL

## 2021-08-03 VITALS
OXYGEN SATURATION: 100 % | HEIGHT: 71 IN | BODY MASS INDEX: 28.42 KG/M2 | SYSTOLIC BLOOD PRESSURE: 199 MMHG | RESPIRATION RATE: 16 BRPM | WEIGHT: 203 LBS | DIASTOLIC BLOOD PRESSURE: 109 MMHG | HEART RATE: 74 BPM

## 2021-08-03 DIAGNOSIS — G89.4 CHRONIC PAIN SYNDROME: Primary | ICD-10-CM

## 2021-08-03 DIAGNOSIS — R69 SEVERE COMORBID ILLNESS: ICD-10-CM

## 2021-08-03 DIAGNOSIS — Z79.891 CHRONIC USE OF OPIATE DRUG FOR THERAPEUTIC PURPOSE: ICD-10-CM

## 2021-08-03 PROCEDURE — G8427 DOCREV CUR MEDS BY ELIG CLIN: HCPCS | Performed by: ANESTHESIOLOGY

## 2021-08-03 PROCEDURE — 99212 OFFICE O/P EST SF 10 MIN: CPT | Performed by: ANESTHESIOLOGY

## 2021-08-03 PROCEDURE — 3017F COLORECTAL CA SCREEN DOC REV: CPT | Performed by: ANESTHESIOLOGY

## 2021-08-03 PROCEDURE — 1036F TOBACCO NON-USER: CPT | Performed by: ANESTHESIOLOGY

## 2021-08-03 PROCEDURE — G8417 CALC BMI ABV UP PARAM F/U: HCPCS | Performed by: ANESTHESIOLOGY

## 2021-08-03 ASSESSMENT — ENCOUNTER SYMPTOMS: BACK PAIN: 1

## 2021-08-03 NOTE — PROGRESS NOTES
The patient is a 57 y. o. Non- / non  male. Chief Complaint   Patient presents with    Follow-up    Back Pain        HPI     76-year-old man with multiple major comorbidities  On low-dose opioid therapy for chronic multiple pain issues involving back leg and neuropathy  Stable on current regimen  No side effects  No history of illicit substance use  Pain is well controlled with the regimen  Pt is here today for a follow up for back pain. Pt states only pain today is on right index finger which is a  7/10 due to a previous fistula infefection. Back pain pt states back pain is controlled at this time today. Radiates:no  Aggrevating factors: age  Alleviating factors:tramadol  Characters:aching    Past Medical History:   Diagnosis Date    Chronic bilateral low back pain with bilateral sciatica     CRF (chronic renal failure)     [facility] MWF    DDD (degenerative disc disease), lumbar 12/15/2020    Diabetes mellitus ([facility]     [facility])     ED (erectile dysfunction)     [facility]    HTN (hypertension)     Hyperlipidemia     LVH (left ventricular hypertrophy)     PVD (peripheral vascular disease) ([facility]     S/P bilateral BKA (below knee amputation) ([facility]     Wears glasses       Past Surgical History:   Procedure Laterality Date    CATARACT REMOVAL WITH IMPLANT      DIALYSIS FISTULA CREATION Bilateral     As of 2019, RUE AVF functioning, LUE AVF nonfunctioning.     FINGER SURGERY Left     I & D    GLAUCOMA SURGERY      LEG AMPUTATION BELOW KNEE Bilateral     TUNNELED VENOUS CATHETER PLACEMENT      PC placed and removed     Social History     Socioeconomic History    Marital status:      Spouse name: None    Number of children: None    Years of education: None    Highest education level: None   Occupational History    None   Tobacco Use    Smoking status: Never Smoker    Smokeless tobacco: Never Used   Substance and Sexual Activity  Alcohol use: Yes     Comment: rare    Drug use: No    Sexual activity: None   Other Topics Concern    None   Social History Narrative    None     Social Determinants of Health     Financial Resource Strain:     Difficulty of Paying Living Expenses:    Food Insecurity:     Worried About Running Out of Food in the Last Year:     [address] in the Last Year:    Transportation Needs:     Lack of Transportation (Medical):  Lack of Transportation (Non-Medical):    Physical Activity:     Days of Exercise per Week:     Minutes of Exercise per Session:    Stress:     Feeling of Stress :    Social Connections:     Frequency of Communication with Friends and Family:     Frequency of Social Gatherings with Friends and Family:     Attends [employer] Services:     Active Member of Clubs or Organizations:     Attends Club or Organization Meetings:     Marital Status:    Intimate Partner Violence:     Fear of Current or Ex-Partner:     Emotionally Abused:     Physically Abused:     Sexually Abused:      Family History   Problem Relation Age of Onset    Diabetes Mother     Coronary Art Dis Mother     Hypertension Mother     Diabetes Father     Hypertension Father     Stroke Father     Cancer Sister     Hypertension Brother      No Known Allergies  Patient has no known allergies. Vitals:    08/03/21 1215   BP: (!) 199/109   Pulse: 74   Resp: 16   SpO2: 100%     Current Outpatient Medications   Medication Sig Dispense Refill    oxyCODONE-acetaminophen (PERCOCET) 7.5-325 MG per tablet Take 1 tablet by mouth every other day for 30 days. Intended supply: 30 days take 1 tablet after HD 3 times a week 15 tablet 0    traMADol (ULTRAM) 50 MG tablet Take 1 tablet by mouth daily as needed for Pain (4 days a week one tab a day) for up to 20 days.  20 tablet 0    amLODIPine-atorvastatatin (CADUET) 10-80 MG per tablet Take 1 tablet by mouth daily      acetaminophen (TYLENOL) 500 MG tablet Take 1 tablet by mouth every 6 hours as needed for Pain 15 tablet 0    glimepiride (AMARYL) 2 MG tablet Take 1 tablet by mouth every morning (before breakfast) 90 tablet 1    linagliptin (TRADJENTA) 5 MG tablet TAKE ONE TABLET BY MOUTH DAILY 90 tablet 0    Heparin Sodium, Porcine, (HEPARIN, PORCINE,) 1000 UNIT/ML injection Heparin Sodium (Porcine) 1,000 Units/mL Systemic      Multiple Vitamins-Minerals (RENAPLEX-D PO) Take 1 tablet by mouth daily      hydrALAZINE (APRESOLINE) 25 MG tablet Take 25 mg by mouth 2 times daily       apixaban (ELIQUIS) 5 MG TABS tablet Take 1 tablet by mouth 2 times daily 180 tablet 1    atorvastatin (LIPITOR) 80 MG tablet Take 1 tablet by mouth daily 30 tablet 3    clopidogrel (PLAVIX) 75 MG tablet Take 1 tablet by mouth daily 30 tablet 3    cloNIDine (CATAPRES) 0.3 MG tablet Take 1 tablet by mouth 2 times daily Do not take if HR < 60 180 tablet 3    aspirin 81 MG chewable tablet Take 81 mg by mouth every morning      Sucroferric Oxyhydroxide (VELPHORO) 500 MG CHEW Take 2 tablets by mouth 3 times daily (with meals) Crush or chew and shallow 2 tablets three times a day with meals      Tens Unit MISC by Does not apply route 1 each 0    lisinopril (PRINIVIL;ZESTRIL) 10 MG tablet TAKE 1 TABLET BY MOUTH  TWICE DAILY (Patient taking differently: Take 5 mg by mouth daily TAKE 1 TABLET BY MOUTH  TWICE DAILY) 180 tablet 0    NONFORMULARY       gabapentin (NEURONTIN) 300 MG capsule TAKE ONE CAPSULE BY MOUTH THREE TIMES A DAY 90 capsule 2    tadalafil (CIALIS) 20 MG tablet Take 1 tablet by mouth once as needed for Erectile Dysfunction 30 tablet 1    cilostazol (PLETAL) 100 MG tablet Take 100 mg by mouth 2 times daily  (Patient not taking: Reported on 8/3/2021)       No current facility-administered medications for this visit. Review of Systems   Constitutional: Positive for unexpected weight change. Pt states he notices weight loss      Musculoskeletal: Positive for back pain. Neurological: Negative. Psychiatric/Behavioral: Positive for sleep disturbance. Objective:  General Appearance:  Well-appearing and in no acute distress. Vital signs: (most recent): Blood pressure (!) 199/109, pulse 74, resp. rate 16, height 5' 11\" (1.803 m), weight 203 lb (92.1 kg), SpO2 100 %. Output: Producing urine and producing stool. HEENT: (No visible masses in neck  Range of motion appears normal on cervical spine  External ears appears normal)    Lungs:  Normal effort. He is not in respiratory distress. Neurological: Patient is alert and oriented to person, place and time.  (Able to follow command    Psych  Mood is good  Affect is normal). Skin:  No rash or cyanosis. Assessment & Plan  1. Chronic pain syndrome    2. Severe comorbid illness    3. Chronic use of opiate drugs therapeutic purposes            Stable on current regimen  No refill needed today  Will call for next month refill  Follow-up in 2 months    Controlled Substance Monitoring:    Acute and Chronic Pain Monitoring:   RX Monitoring 8/3/2021   Attestation -   Periodic Controlled Substance Monitoring Possible medication side effects, risk of tolerance/dependence & alternative treatments discussed. ;No signs of potential drug abuse or diversion identified. ;Assessed functional status.    Chronic Pain > 50 MEDD -           Electronically signed by Leonard Armstrong MD on 8/3/2021 at 2:15 PM

## 2021-08-10 ENCOUNTER — APPOINTMENT (OUTPATIENT)
Dept: GENERAL RADIOLOGY | Age: 61
End: 2021-08-10
Payer: COMMERCIAL

## 2021-08-10 ENCOUNTER — HOSPITAL ENCOUNTER (EMERGENCY)
Age: 61
Discharge: HOME | End: 2021-08-10
Attending: EMERGENCY MEDICINE
Payer: COMMERCIAL

## 2021-08-10 VITALS
BODY MASS INDEX: 29.4 KG/M2 | HEART RATE: 79 BPM | WEIGHT: 210 LBS | SYSTOLIC BLOOD PRESSURE: 175 MMHG | TEMPERATURE: 98.3 F | DIASTOLIC BLOOD PRESSURE: 85 MMHG | HEIGHT: 71 IN

## 2021-08-10 DIAGNOSIS — Z87.19 HISTORY OF CONSTIPATION: Primary | ICD-10-CM

## 2021-08-10 PROCEDURE — 99283 EMERGENCY DEPT VISIT LOW MDM: CPT

## 2021-08-10 PROCEDURE — 74018 RADEX ABDOMEN 1 VIEW: CPT

## 2021-08-10 RX ORDER — DOCUSATE SODIUM 100 MG/1
100 CAPSULE, LIQUID FILLED ORAL 3 TIMES DAILY PRN
Qty: 30 CAPSULE | Refills: 0 | Status: ON HOLD | OUTPATIENT
Start: 2021-08-10 | End: 2022-09-20 | Stop reason: HOSPADM

## 2021-08-10 NOTE — ED PROVIDER NOTES
TAKE ONE TABLET BY MOUTH DAILY    LISINOPRIL (PRINIVIL;ZESTRIL) 10 MG TABLET    TAKE 1 TABLET BY MOUTH  TWICE DAILY    MULTIPLE VITAMINS-MINERALS (RENAPLEX-D PO)    Take 1 tablet by mouth daily    NONFORMULARY        OXYCODONE-ACETAMINOPHEN (PERCOCET) 7.5-325 MG PER TABLET    Take 1 tablet by mouth every other day for 30 days. Intended supply: 30 days take 1 tablet after HD 3 times a week    SUCROFERRIC OXYHYDROXIDE (VELPHORO) 500 MG CHEW    Take 2 tablets by mouth 3 times daily (with meals) Crush or chew and shallow 2 tablets three times a day with meals    TADALAFIL (CIALIS) 20 MG TABLET    Take 1 tablet by mouth once as needed for Erectile Dysfunction    TENS UNIT MISC    by Does not apply route       PAST MEDICAL HISTORY         Diagnosis Date    Chronic bilateral low back pain with bilateral sciatica     CRF (chronic renal failure)     [facility] MWF    DDD (degenerative disc disease), lumbar 12/15/2020    Diabetes mellitus ([facility] [address]     Dialysis patient [facility])     ED (erectile dysfunction)     History of echocardiogram 2014    [facility]    HTN (hypertension)     Hyperlipidemia     LVH (left ventricular hypertrophy)     PVD (peripheral vascular disease) ([facility] [address]     S/P bilateral BKA (below knee amputation) ([facility] [address]     Wears glasses        SURGICAL HISTORY           Procedure Laterality Date    CATARACT REMOVAL WITH IMPLANT      DIALYSIS FISTULA CREATION Bilateral     As of 2019, RUE AVF functioning, LUE AVF nonfunctioning.     FINGER SURGERY Left     I & D    GLAUCOMA SURGERY      LEG AMPUTATION BELOW KNEE Bilateral     TUNNELED VENOUS CATHETER PLACEMENT      PC placed and removed         FAMILY HISTORY           Problem Relation Age of Onset    Diabetes Mother     Coronary Art Dis Mother     Hypertension Mother     Diabetes Father     Hypertension Father     Stroke Father     Cancer Sister     Hypertension Brother      Family Status   Relation Name Status    Mother  (Not Specified)    Father  (Not Specified)    Sister  (Not Specified)    Brother  (Not Specified)        SOCIAL HISTORY      reports that he has never smoked. He has never used smokeless tobacco. He reports current alcohol use. He reports that he does not use drugs. REVIEW OFSYSTEMS    (2-9 systems for level 4, 10 or more for level 5)   Review of Systems    Except as noted above the remainder of the review of systems was reviewed and negative. PHYSICAL EXAM    (up to 7 for level 4, 8 or more for level 5)     ED Triage Vitals [08/10/21 1453]   BP Temp Temp Source Pulse Resp SpO2 Height Weight   (!) 175/85 98.3 °F (36.8 °C) Oral 79 -- -- 5' 11\" (1.803 m) 210 lb (95.3 kg)      Physical Exam  Constitutional:       Appearance: He is well-developed. HENT:      Head: Normocephalic and atraumatic. Cardiovascular:      Rate and Rhythm: Normal rate and regular rhythm. Pulmonary:      Effort: Pulmonary effort is normal.      Breath sounds: Normal breath sounds. Abdominal:      Palpations: Abdomen is soft. Genitourinary:      Musculoskeletal:         General: Normal range of motion. Cervical back: Normal range of motion and neck supple. Skin:     General: Skin is warm. Neurological:      Mental Status: He is alert and oriented to person, place, and time.    Psychiatric:         Behavior: Behavior normal.                 DIAGNOSTIC RESULTS     EKG: All EKG's are interpreted by the Emergency Department Physician who either signs or Co-signs this chart in the absence of a cardiologist.        RADIOLOGY:   Non-plain film images such as CT, Ultrasound and MRI are read by the radiologist. Plain radiographic images arevisualized and preliminarily interpreted by the emergency physician with the below findings:        Interpretation per the Radiologist below, if available at thetime of this note:          ED BEDSIDE ULTRASOUND:   Performed by ED Physician - none    LABS:  Labs Reviewed - No data to display    All other labs were within normal range or not returned as of this dictation. EMERGENCY DEPARTMENT COURSE and DIFFERENTIAL DIAGNOSIS/MDM:   Vitals:    Vitals:    08/10/21 1453   BP: (!) 175/85   Pulse: 79   Temp: 98.3 °F (36.8 °C)   TempSrc: Oral   Weight: 210 lb (95.3 kg)   Height: 5' 11\" (1.803 m)     Will discharge home. Patient given Colace. CONSULTS:  None    PROCEDURES:  Procedures        FINAL IMPRESSION      1.  History of constipation          DISPOSITION/PLAN   DISPOSITION Decision To Discharge 08/10/2021 04:41:27 PM      PATIENTREFERRED TO:   Christine Montague MD  [address] [id number] [place] [phone]    In 3 days        DISCHARGE MEDICATIONS:     New Prescriptions    DOCUSATE SODIUM (COLACE) 100 MG CAPSULE    Take 1 capsule by mouth 3 times daily as needed for Constipation           (Please note that portions of this note were completed with a voice recognition program.  Efforts were made to edit thedictations but occasionally words are mis-transcribed.)    CONSTANCE Sifuentes PA-C  08/10/21 8717

## 2021-08-10 NOTE — ED PROVIDER NOTES
The patient was seen and examined by me in conjunction with the mid-level provider. I agree with his/her assessment and treatment plan. No significant amount of stool present on the x-ray and findings are discussed thoroughly with the patient.      Snow Domínguez MD  08/10/21 4425

## 2021-09-01 ENCOUNTER — HOSPITAL ENCOUNTER (EMERGENCY)
Age: 61
Discharge: HOME | End: 2021-09-01
Attending: EMERGENCY MEDICINE
Payer: COMMERCIAL

## 2021-09-01 VITALS
HEIGHT: 71 IN | RESPIRATION RATE: 22 BRPM | WEIGHT: 208 LBS | DIASTOLIC BLOOD PRESSURE: 85 MMHG | TEMPERATURE: 99.5 F | SYSTOLIC BLOOD PRESSURE: 157 MMHG | HEART RATE: 102 BPM | OXYGEN SATURATION: 97 % | BODY MASS INDEX: 29.12 KG/M2

## 2021-09-01 DIAGNOSIS — Z48.89 ENCOUNTER FOR POST SURGICAL WOUND CHECK: Primary | ICD-10-CM

## 2021-09-01 PROCEDURE — 6370000000 HC RX 637 (ALT 250 FOR IP): Performed by: STUDENT IN AN ORGANIZED HEALTH CARE EDUCATION/TRAINING PROGRAM

## 2021-09-01 PROCEDURE — 99285 EMERGENCY DEPT VISIT HI MDM: CPT

## 2021-09-01 RX ORDER — DOXYCYCLINE HYCLATE 100 MG
100 TABLET ORAL ONCE
Status: COMPLETED | OUTPATIENT
Start: 2021-09-01 | End: 2021-09-01

## 2021-09-01 RX ORDER — ACETAMINOPHEN 500 MG
1000 TABLET ORAL ONCE
Status: COMPLETED | OUTPATIENT
Start: 2021-09-01 | End: 2021-09-01

## 2021-09-01 RX ORDER — OXYCODONE HYDROCHLORIDE 5 MG/1
5 TABLET ORAL EVERY 6 HOURS PRN
Qty: 5 TABLET | Refills: 0 | Status: SHIPPED | OUTPATIENT
Start: 2021-09-01 | End: 2021-09-04

## 2021-09-01 RX ORDER — ACETAMINOPHEN 500 MG
1000 TABLET ORAL EVERY 6 HOURS PRN
Qty: 60 TABLET | Refills: 0 | Status: ON HOLD | OUTPATIENT
Start: 2021-09-01 | End: 2022-09-19

## 2021-09-01 RX ORDER — DOXYCYCLINE HYCLATE 100 MG
100 TABLET ORAL 2 TIMES DAILY
Qty: 14 TABLET | Refills: 0 | Status: SHIPPED | OUTPATIENT
Start: 2021-09-01 | End: 2021-09-08

## 2021-09-01 RX ORDER — OXYCODONE HYDROCHLORIDE 5 MG/1
5 TABLET ORAL ONCE
Status: COMPLETED | OUTPATIENT
Start: 2021-09-01 | End: 2021-09-01

## 2021-09-01 RX ADMIN — DOXYCYCLINE HYCLATE 100 MG: 100 TABLET, COATED ORAL at 18:51

## 2021-09-01 RX ADMIN — ACETAMINOPHEN 1000 MG: 500 TABLET ORAL at 18:51

## 2021-09-01 RX ADMIN — OXYCODONE HYDROCHLORIDE 5 MG: 5 TABLET ORAL at 18:51

## 2021-09-01 ASSESSMENT — PAIN DESCRIPTION - LOCATION
LOCATION: FINGER (COMMENT WHICH ONE)
LOCATION: FINGER (COMMENT WHICH ONE)

## 2021-09-01 ASSESSMENT — PAIN - FUNCTIONAL ASSESSMENT: PAIN_FUNCTIONAL_ASSESSMENT: 0-10

## 2021-09-01 ASSESSMENT — PAIN DESCRIPTION - ORIENTATION
ORIENTATION: RIGHT
ORIENTATION: RIGHT

## 2021-09-01 ASSESSMENT — PAIN SCALES - GENERAL
PAINLEVEL_OUTOF10: 8
PAINLEVEL_OUTOF10: 7
PAINLEVEL_OUTOF10: 8
PAINLEVEL_OUTOF10: 2

## 2021-09-01 ASSESSMENT — ENCOUNTER SYMPTOMS
SORE THROAT: 0
DIARRHEA: 0
SHORTNESS OF BREATH: 0
CONSTIPATION: 0
ABDOMINAL PAIN: 0
NAUSEA: 0
VOMITING: 0

## 2021-09-01 ASSESSMENT — PAIN DESCRIPTION - DESCRIPTORS: DESCRIPTORS: CONSTANT;SHARP;ACHING

## 2021-09-01 ASSESSMENT — PAIN DESCRIPTION - PAIN TYPE
TYPE: SURGICAL PAIN
TYPE: CHRONIC PAIN;SURGICAL PAIN

## 2021-09-01 ASSESSMENT — PAIN DESCRIPTION - FREQUENCY: FREQUENCY: INTERMITTENT

## 2021-09-01 ASSESSMENT — PAIN DESCRIPTION - DIRECTION: RADIATING_TOWARDS: INDEX

## 2021-09-01 NOTE — ED PROVIDER NOTES
removal with implant; Leg amputation below knee (Bilateral); Tunneled venous catheter placement; Dialysis fistula creation (Bilateral); and Finger surgery (Left). Social History     Socioeconomic History    Marital status:      Spouse name: Not on file    Number of children: Not on file    Years of education: Not on file    Highest education level: Not on file   Occupational History    Not on file   Tobacco Use    Smoking status: Never Smoker    Smokeless tobacco: Never Used   Substance and Sexual Activity    Alcohol use: Yes     Comment: rare    Drug use: No    Sexual activity: Not on file   Other Topics Concern    Not on file   Social History Narrative    Not on file     Social Determinants of Health     Financial Resource Strain:     Difficulty of Paying Living Expenses:    Food Insecurity:     Worried About Running Out of Food in the Last Year:     [address] in the Last Year:    Transportation Needs:     Lack of Transportation (Medical):  Lack of Transportation (Non-Medical):    Physical Activity:     Days of Exercise per Week:     Minutes of Exercise per Session:    Stress:     Feeling of Stress :    Social Connections:     Frequency of Communication with Friends and Family:     Frequency of Social Gatherings with Friends and Family:     Attends [employer] Services:     Active Member of Clubs or Organizations:     Attends Club or Organization Meetings:     Marital Status:    Intimate Partner Violence:     Fear of Current or Ex-Partner:     Emotionally Abused:     Physically Abused:     Sexually Abused:        Family History   Problem Relation Age of Onset    Diabetes Mother     Coronary Art Dis Mother     Hypertension Mother     Diabetes Father     Hypertension Father     Stroke Father     Cancer Sister     Hypertension Brother        Allergies:  Patient has no known allergies.     Home Medications:  Prior to Admission medications    Medication Sig Start Date End Date Taking? Authorizing Provider   doxycycline hyclate (VIBRA-TABS) 100 MG tablet Take 1 tablet by mouth 2 times daily for 7 days 9/1/21 9/8/21 Yes Susi Delaney MD   acetaminophen (TYLENOL) 500 MG tablet Take 2 tablets by mouth every 6 hours as needed for Pain 9/1/21  Yes Susi Delaney MD   oxyCODONE (ROXICODONE) 5 MG immediate release tablet Take 1 tablet by mouth every 6 hours as needed for Pain for up to 3 days. 9/1/21 9/4/21 Yes Susi Delaney MD   oxyCODONE-acetaminophen (PERCOCET) 7.5-325 MG per tablet Take 1 tablet by mouth daily as needed for Pain for up to 30 days.  Take after dialysis 8/17/21 9/16/21 Yes Jennifer Shannon MD   docusate sodium (COLACE) 100 MG capsule Take 1 capsule by mouth 3 times daily as needed for Constipation 8/10/21  Yes Jasmeet Rios PA-C   glimepiride (AMARYL) 2 MG tablet Take 1 tablet by mouth every morning (before breakfast) 5/28/21  Yes Yamil Iyer MD   linagliptin (TRADJENTA) 5 MG tablet TAKE ONE TABLET BY MOUTH DAILY 5/28/21  Yes Yamil Iyer MD   Multiple Vitamins-Minerals (RENAPLEX-D PO) Take 1 tablet by mouth daily   Yes Historical Provider, MD   hydrALAZINE (APRESOLINE) 25 MG tablet Take 25 mg by mouth 2 times daily    Yes Historical Provider, MD   apixaban (ELIQUIS) 5 MG TABS tablet Take 1 tablet by mouth 2 times daily 1/7/21  Yes Nolan Garnica MD   atorvastatin (LIPITOR) 80 MG tablet Take 1 tablet by mouth daily 1/8/21  Yes Nolan Garnica MD   clopidogrel (PLAVIX) 75 MG tablet Take 1 tablet by mouth daily 1/8/21  Yes Nolan Garnica MD   cloNIDine (CATAPRES) 0.3 MG tablet Take 1 tablet by mouth 2 times daily Do not take if HR < 60 1/7/21  Yes Nolan Garnica MD   aspirin 81 MG chewable tablet Take 81 mg by mouth every morning   Yes Historical Provider, MD   lisinopril (PRINIVIL;ZESTRIL) 10 MG tablet TAKE 1 TABLET BY MOUTH  TWICE DAILY  Patient taking differently: Take 5 mg by mouth daily TAKE 1 TABLET BY MOUTH  TWICE DAILY 7/10/20  Yes Jessica Cushing, MD   gabapentin (NEURONTIN) 300 MG capsule TAKE ONE CAPSULE BY MOUTH THREE TIMES A DAY 2/13/17  Yes Sylvia DO Jaxson   amLODIPine-atorvastatatin (CADUET) 10-80 MG per tablet Take 1 tablet by mouth daily    Historical Provider, MD   tadalafil (CIALIS) 20 MG tablet Take 1 tablet by mouth once as needed for Erectile Dysfunction 6/16/21 6/16/21  Jessica Cushing, MD   Heparin Sodium, Porcine, (HEPARIN, PORCINE,) 1000 UNIT/ML injection Heparin Sodium (Porcine) 1,000 Units/mL Systemic 3/1/21   Historical Provider, MD   cilostazol (PLETAL) 100 MG tablet Take 100 mg by mouth 2 times daily   Patient not taking: Reported on 8/3/2021    Historical Provider, MD   Sucroferric Oxyhydroxide (VELPHORO) 500 MG CHEW Take 2 tablets by mouth 3 times daily (with meals) Crush or chew and shallow 2 tablets three times a day with meals    Historical Provider, MD   Tens Unit MISC by Does not apply route 12/15/20   Shawnie Canavan, MD   NONFORMULARY     Historical Provider, MD       REVIEW OF SYSTEMS    (2-9 systems for level 4, 10 or more for level 5)      Review of Systems   Constitutional: Negative for fatigue and fever. HENT: Negative for sore throat. Eyes: Negative for visual disturbance. Respiratory: Negative for shortness of breath. Cardiovascular: Negative for chest pain. Gastrointestinal: Negative for abdominal pain, constipation, diarrhea, nausea and vomiting. Genitourinary: Negative for decreased urine volume. Musculoskeletal: Positive for arthralgias and myalgias. Skin: Positive for wound (Surgical). Neurological: Negative for weakness, light-headedness, numbness and headaches. Psychiatric/Behavioral: Negative for confusion.        PHYSICAL EXAM   (up to 7 for level 4, 8 or more for level 5)      INITIAL VITALS:   BP (!) 157/85   Pulse 102   Temp 99.5 °F (37.5 °C) (Oral)   Resp 22   Ht 5' 11\" (1.803 m)   Wt 208 lb (94.3 kg)   SpO2 97%   BMI 29.01 kg/m²     Physical Exam  Vitals and nursing Sig: Take 1 tablet by mouth 2 times daily for 7 days     Dispense:  14 tablet     Refill:  0    acetaminophen (TYLENOL) 500 MG tablet     Sig: Take 2 tablets by mouth every 6 hours as needed for Pain     Dispense:  60 tablet     Refill:  0    oxyCODONE (ROXICODONE) 5 MG immediate release tablet     Sig: Take 1 tablet by mouth every 6 hours as needed for Pain for up to 3 days. Dispense:  5 tablet     Refill:  0    oxyCODONE (ROXICODONE) immediate release tablet 5 mg    acetaminophen (TYLENOL) tablet 1,000 mg       DDX: Postop pain versus infection versus electrolyte abnormality versus other    DIAGNOSTIC RESULTS / EMERGENCY DEPARTMENT COURSE / MDM     LABS:  No results found for this visit on 09/01/21. RADIOLOGY:  None    EKG  None    All EKG's are interpreted by the Emergency Department Physician who either signs or Co-signs this chart in the absence of a cardiologist.    EMERGENCY DEPARTMENT COURSE:  Patient found seated upright in bed, no acute distress, not ill or toxic appearing. Poor historian but is cooperative in exam.  Physical exam notable for relatively stable vitals with mild tachycardia that appears to be secondary to patient's agitation and effort required for exertion with the bilateral below the knee amputation. There is no erythema or warmth streaking up from the hand patient is afebrile, blood pressure at baseline per patient. Postop dressing left in place as instructions available in care everywhere instructed the patient to change it after 24 hours, which would be tomorrow. Spoke with patient's on-call surgeon from [address] [place] [address] who notes it does not appear the patient left with any prescriptions. Recommended doxycycline 100 mg twice daily until follow-up. Recommended patient follow-up within the week. Treated patient symptomatically with acetaminophen and oxycodone.   Provided short course to go home with for postop pain and instruct the patient to follow-up with PCP and surgeon if mis-transcribed.)        Javier Lira MD  Resident  09/01/21 1273

## 2021-09-01 NOTE — ED TRIAGE NOTES
Pt had finger surgery, tip amputation at [facility] on Monday 8/30/31    Pt states he was discharged for there. But came here because they didn't change his wound dressing to his right finger     Pt c/o of finger pain 7/10.

## 2021-09-01 NOTE — ED PROVIDER NOTES
[facility] [address] [facility]                                      Emergency Department                                      Faculty Attestation                                         I performed a history and physical examination of the patient and discussed management with the resident. I reviewed the residents note and agree with the documented findings and plan of care. Any areas of disagreement are noted on the chart. I was personally present for the key portions of any procedures. I have documented in the chart those procedures where I was not present during the key portions. I agree with the chief complaint, past medical history, past surgical history, allergies, medications, social and family history as documented unless otherwise noted below. For mid-level providers such as nurse practitioners as well as physicians assistants:    I have personally seen and evaluated the patient. I find the patient's history and physical exam are consistent with NP/PA documentation. I agree with the care provided, treatment rendered, disposition, & follow-up plan. Additional findings are as noted  Patient's only complaint is right hand pain. Patient eloped from [facility] this morning, after having a finger amputated from his right hand for wet gangrene, 2 days ago. Patient had a full course of dialysis this morning and then eloped. Patient did not get the pain medicine he likes, and would like some Percocet. Tachycardic after walking outside, will recheck vitals. Afebrile. Right hand is wrapped. Will wait and unwrap after getting permission from the surgeon, who we will call.       Juan Davidpauline Cardenas DO  09/01/21 [id number]

## 2021-09-02 ENCOUNTER — TELEPHONE (OUTPATIENT)
Dept: PAIN MANAGEMENT | Age: 61
End: 2021-09-02

## 2021-09-02 NOTE — TELEPHONE ENCOUNTER
Pt is prescribed percocet 7.5/325 mg tablets by pain management to take 4 times a week after dialysis. Pt had finger tip amputation at [facility] on Monday 8/30/21 and was just discharged home with a Rx for Oxycodone 5 mg take 1 tablet by mouth every 6 hrs prn pain # 5  Tablets for 3 days.  Patient wants to know if it is ok to still take medication from pain management after dialysis

## 2021-09-14 ENCOUNTER — OFFICE VISIT (OUTPATIENT)
Dept: PAIN MANAGEMENT | Age: 61
End: 2021-09-14
Payer: COMMERCIAL

## 2021-09-14 VITALS
WEIGHT: 217.2 LBS | BODY MASS INDEX: 30.41 KG/M2 | SYSTOLIC BLOOD PRESSURE: 155 MMHG | OXYGEN SATURATION: 100 % | DIASTOLIC BLOOD PRESSURE: 83 MMHG | HEIGHT: 71 IN | HEART RATE: 81 BPM

## 2021-09-14 DIAGNOSIS — M54.41 CHRONIC BILATERAL LOW BACK PAIN WITH BILATERAL SCIATICA: ICD-10-CM

## 2021-09-14 DIAGNOSIS — M54.42 CHRONIC BILATERAL LOW BACK PAIN WITH BILATERAL SCIATICA: ICD-10-CM

## 2021-09-14 DIAGNOSIS — Z79.891 CHRONIC USE OF OPIATE DRUG FOR THERAPEUTIC PURPOSE: ICD-10-CM

## 2021-09-14 DIAGNOSIS — G89.29 CHRONIC BILATERAL LOW BACK PAIN WITH BILATERAL SCIATICA: ICD-10-CM

## 2021-09-14 PROCEDURE — 1036F TOBACCO NON-USER: CPT | Performed by: ANESTHESIOLOGY

## 2021-09-14 PROCEDURE — 99213 OFFICE O/P EST LOW 20 MIN: CPT | Performed by: ANESTHESIOLOGY

## 2021-09-14 PROCEDURE — 3017F COLORECTAL CA SCREEN DOC REV: CPT | Performed by: ANESTHESIOLOGY

## 2021-09-14 PROCEDURE — G8427 DOCREV CUR MEDS BY ELIG CLIN: HCPCS | Performed by: ANESTHESIOLOGY

## 2021-09-14 PROCEDURE — G8417 CALC BMI ABV UP PARAM F/U: HCPCS | Performed by: ANESTHESIOLOGY

## 2021-09-14 RX ORDER — OXYCODONE AND ACETAMINOPHEN 7.5; 325 MG/1; MG/1
1 TABLET ORAL DAILY PRN
Qty: 20 TABLET | Refills: 0 | Status: SHIPPED | OUTPATIENT
Start: 2021-09-17 | End: 2021-10-17

## 2021-09-14 RX ORDER — TRAMADOL HYDROCHLORIDE 50 MG/1
50 TABLET ORAL DAILY PRN
Qty: 30 TABLET | Refills: 0 | Status: SHIPPED | OUTPATIENT
Start: 2021-09-14 | End: 2021-10-14

## 2021-09-14 ASSESSMENT — ENCOUNTER SYMPTOMS
BACK PAIN: 1
RESPIRATORY NEGATIVE: 1

## 2021-09-14 NOTE — PROGRESS NOTES
The patient is a 57 y. o. Non- / non  male. Chief Complaint   Patient presents with    Back Pain    Medication Refill    Leg Pain     Pain in bilateral stumps        HPI    60-year-old man with multiple major comorbidities  chronic back pain and generalized pain  On low-dose opioid therapy taking Percocet and tramadol on alternate days depending upon dialysis day and nondialysis day  Finds the medication helpful  No hx of illicit substance use  Medication Refill: oxycodone, pt would like to discuss tramadol    Pain score Today:  7  Adverse effects (Constipation / Nausea / Sedation / sexual Dysfunction / others) : no  Mood: good  Sleep pattern and quality: poor  Activity level: fair    Pill count Today:8 oxycodone counted  Last dose taken  09/13/21  OARRS report reviewed today: yes  ER/Hospitalizations/PCP visit related to pain since last visit:yes, rt pointer finger amputation 08/31/21  Any legal problems e.g. DUI etc.:No  Satisfied with current management: Yes    Opioid Contract:03/16/21  Last Urine Dug screen dated:n/a    Lab Results   Component Value Date    LABA1C 5.8 04/29/2021     Lab Results   Component Value Date     04/29/2021       Past Medical History, Past Surgical History, Social History, Allergies and Medications reviewed and updated in EPIC as indicated    Family History reviewed and is noncontributory.       Past Medical History:   Diagnosis Date    Chronic bilateral low back pain with bilateral sciatica     CRF (chronic renal failure)     [facility] M[facility]    DDD (degenerative disc disease), lumbar 12/15/2020    Diabetes mellitus ([facility]     Dialysis patient [facility])     ED (erectile dysfunction)     [facility] of [facility] 2014    [facility]    HTN (hypertension)     Hyperlipidemia     LVH (left ventricular hypertrophy)     PVD (peripheral vascular disease) ([facility]     S/P bilateral BKA (below knee amputation) ([facility] [address] [facility]     Wears glasses         Past Surgical History:   Procedure Laterality Date    CATARACT REMOVAL WITH IMPLANT      DIALYSIS FISTULA CREATION Bilateral     As of 2019, RUE AVF functioning, LUE AVF nonfunctioning.  FINGER AMPUTATION      right pointer finger    FINGER SURGERY Left     I & D    GLAUCOMA SURGERY      LEG AMPUTATION BELOW KNEE Bilateral     TUNNELED VENOUS CATHETER PLACEMENT      PC placed and removed       Social History     Socioeconomic History    Marital status:      Spouse name: None    Number of children: None    Years of education: None    Highest education level: None   Occupational History    None   Tobacco Use    Smoking status: Never Smoker    Smokeless tobacco: Never Used   Substance and Sexual Activity    Alcohol use: Yes     Comment: rare    Drug use: No    Sexual activity: None   Other Topics Concern    None   Social History Narrative    None     Social Determinants of Health     Financial Resource Strain:     Difficulty of Paying Living Expenses:    Food Insecurity:     Worried About Running Out of Food in the Last Year:     Ran Out of Food in the Last Year:    Transportation Needs:     Lack of Transportation (Medical):      Lack of Transportation (Non-Medical):    Physical Activity:     Days of Exercise per Week:     Minutes of Exercise per Session:    Stress:     Feeling of Stress :    Social Connections:     Frequency of Communication with Friends and Family:     Frequency of Social Gatherings with Friends and Family:     Attends Pent[employer] Services:     Active Member of Clubs or Organizations:     Attends Club or Organization Meetings:     Marital Status:    Intimate Partner Violence:     Fear of Current or Ex-Partner:     Emotionally Abused:     Physically Abused:     Sexually Abused:        Family History   Problem Relation Age of Onset    Diabetes Mother     Coronary Art Dis Mother     Hypertension Mother     Diabetes Father     Hypertension Father     Stroke Father     Cancer Sister    [employer] Hypertension Brother        No Known Allergies    Vitals:    09/14/21 1326   BP: (!) 155/83   Pulse: 81   SpO2: 100%       Current Outpatient Medications   Medication Sig Dispense Refill    [START ON 9/17/2021] oxyCODONE-acetaminophen (PERCOCET) 7.5-325 MG per tablet Take 1 tablet by mouth daily as needed for Pain for up to 30 days. Take after dialysis 20 tablet 0    traMADol (ULTRAM) 50 MG tablet Take 1 tablet by mouth daily as needed for Pain for up to 30 days.  30 tablet 0    acetaminophen (TYLENOL) 500 MG tablet Take 2 tablets by mouth every 6 hours as needed for Pain 60 tablet 0    docusate sodium (COLACE) 100 MG capsule Take 1 capsule by mouth 3 times daily as needed for Constipation 30 capsule 0    amLODIPine-atorvastatatin (CADUET) 10-80 MG per tablet Take 1 tablet by mouth daily      tadalafil (CIALIS) 20 MG tablet Take 1 tablet by mouth once as needed for Erectile Dysfunction 30 tablet 1    glimepiride (AMARYL) 2 MG tablet Take 1 tablet by mouth every morning (before breakfast) 90 tablet 1    linagliptin (TRADJENTA) 5 MG tablet TAKE ONE TABLET BY MOUTH DAILY 90 tablet 0    Heparin Sodium, Porcine, (HEPARIN, PORCINE,) 1000 UNIT/ML injection Heparin Sodium (Porcine) 1,000 Units/mL Systemic      apixaban (ELIQUIS) 5 MG TABS tablet Take 1 tablet by mouth 2 times daily 180 tablet 1    clopidogrel (PLAVIX) 75 MG tablet Take 1 tablet by mouth daily 30 tablet 3    cloNIDine (CATAPRES) 0.3 MG tablet Take 1 tablet by mouth 2 times daily Do not take if HR < 60 180 tablet 3    aspirin 81 MG chewable tablet Take 81 mg by mouth every morning      Sucroferric Oxyhydroxide (VELPHORO) 500 MG CHEW Take 2 tablets by mouth 3 times daily (with meals) Crush or chew and shallow 2 tablets three times a day with meals      lisinopril (PRINIVIL;ZESTRIL) 10 MG tablet TAKE 1 TABLET BY MOUTH  TWICE DAILY (Patient taking differently: Take 5 mg by mouth daily TAKE 1 TABLET BY MOUTH  TWICE DAILY) 180 tablet 0    gabapentin (NEURONTIN) 300 MG capsule TAKE ONE CAPSULE BY MOUTH THREE TIMES A DAY 90 capsule 2    Multiple Vitamins-Minerals (RENAPLEX-D PO) Take 1 tablet by mouth daily (Patient not taking: Reported on 9/14/2021)      hydrALAZINE (APRESOLINE) 25 MG tablet Take 25 mg by mouth 2 times daily  (Patient not taking: Reported on 9/14/2021)      Tens Unit MISC by Does not apply route 1 each 0    NONFORMULARY        No current facility-administered medications for this visit. Review of Systems   Constitutional: Negative. Respiratory: Negative. Musculoskeletal: Positive for arthralgias, back pain and gait problem. Neurological: Positive for weakness. Objective:  General Appearance:  Well-appearing and in no acute distress. Vital signs: (most recent): Blood pressure (!) 155/83, pulse 81, height 5' 11\" (1.803 m), weight 217 lb 3.2 oz (98.5 kg), SpO2 100 %. Output: Producing urine and producing stool. HEENT: (No visible masses in neck  Range of motion appears normal on cervical spine  External ears appears normal)    Lungs:  Normal effort. He is not in respiratory distress. Neurological: Patient is alert and oriented to person, place and time.  (Able to follow command    Psych  Mood is good  Affect is normal). Skin:  No rash or cyanosis. Assessment & Plan   34-year-old man with multiple major comorbidities  chronic back pain and generalized pain  On low-dose opioid therapy taking Percocet and tramadol on alternate days depending upon dialysis day and nondialysis day  Finds the medication helpful  No hx of illicit substance use    1. Chronic bilateral low back pain with bilateral sciatica    2. Chronic use of opiate drugs therapeutic purposes        No orders of the defined types were placed in this encounter. Orders Placed This Encounter   Medications    oxyCODONE-acetaminophen (PERCOCET) 7.5-325 MG per tablet     Sig: Take 1 tablet by mouth daily as needed for Pain for up to 30 days. Take after dialysis     Dispense:  20 tablet     Refill:  0     Reduce doses taken as pain becomes manageable    traMADol (ULTRAM) 50 MG tablet     Sig: Take 1 tablet by mouth daily as needed for Pain for up to 30 days. Dispense:  30 tablet     Refill:  0     Reduce doses taken as pain becomes manageable      Controlled Substance Monitoring:    Acute and Chronic Pain Monitoring:   RX Monitoring 9/14/2021   Attestation -   Periodic Controlled Substance Monitoring Possible medication side effects, risk of tolerance/dependence & alternative treatments discussed. ;No signs of potential drug abuse or diversion identified. ;Assessed functional status.    Chronic Pain > 50 MEDD -               Electronically signed by Deana Pereira MD on 9/14/2021 at 1:40 PM

## 2021-09-16 ENCOUNTER — TELEPHONE (OUTPATIENT)
Dept: INTERNAL MEDICINE CLINIC | Age: 61
End: 2021-09-16

## 2021-09-16 NOTE — TELEPHONE ENCOUNTER
----- Message from Juliocesar Walker sent at 9/16/2021 10:11 AM EDT -----  Subject: Message to Provider    QUESTIONS  Information for Provider? pt would like to have test done to check ,   hepatis, diabetics, HIV string Please call when orders are in so the pt   can go and have them done. the company that he was getting his tadalafil   (CIALIS) 20 MG tablet, kicked him off because he is on Medicaid and they   do not accept Medicaid. Needs help getting the medication covered or to   change to lavitra if he can Please call with information  ---------------------------------------------------------------------------  --------------  CALL BACK INFO  What is the best way for the office to contact you? OK to leave message on   voicemail  Preferred Call Back Phone Number? [phone]  ---------------------------------------------------------------------------  --------------  SCRIPT ANSWERS  Relationship to Patient?  Self

## 2021-09-16 NOTE — TELEPHONE ENCOUNTER
[id number] Saige Patterson for lab orders or wait for sherman on 9/28/21 to discuss? Ok to change cialis to levetria?

## 2021-09-28 ENCOUNTER — OFFICE VISIT (OUTPATIENT)
Dept: INTERNAL MEDICINE CLINIC | Age: 61
End: 2021-09-28
Payer: COMMERCIAL

## 2021-09-28 ENCOUNTER — HOSPITAL ENCOUNTER (OUTPATIENT)
Age: 61
Setting detail: SPECIMEN
Discharge: HOME | End: 2021-09-28
Payer: COMMERCIAL

## 2021-09-28 VITALS
HEART RATE: 64 BPM | RESPIRATION RATE: 16 BRPM | BODY MASS INDEX: 29.12 KG/M2 | DIASTOLIC BLOOD PRESSURE: 70 MMHG | SYSTOLIC BLOOD PRESSURE: 128 MMHG | WEIGHT: 208 LBS | TEMPERATURE: 98.4 F | OXYGEN SATURATION: 99 % | HEIGHT: 71 IN

## 2021-09-28 DIAGNOSIS — E78.2 MIXED HYPERLIPIDEMIA: ICD-10-CM

## 2021-09-28 DIAGNOSIS — Z89.512 S/P BILATERAL BELOW KNEE AMPUTATION (HCC): ICD-10-CM

## 2021-09-28 DIAGNOSIS — E78.2 MIXED HYPERLIPIDEMIA: Primary | ICD-10-CM

## 2021-09-28 DIAGNOSIS — M54.41 CHRONIC BILATERAL LOW BACK PAIN WITH BILATERAL SCIATICA: ICD-10-CM

## 2021-09-28 DIAGNOSIS — N52.01 ERECTILE DYSFUNCTION DUE TO ARTERIAL INSUFFICIENCY: ICD-10-CM

## 2021-09-28 DIAGNOSIS — M51.36 DDD (DEGENERATIVE DISC DISEASE), LUMBAR: ICD-10-CM

## 2021-09-28 DIAGNOSIS — Z99.2 ESRD ON HEMODIALYSIS (HCC): ICD-10-CM

## 2021-09-28 DIAGNOSIS — M48.062 SPINAL STENOSIS OF LUMBAR REGION WITH NEUROGENIC CLAUDICATION: ICD-10-CM

## 2021-09-28 DIAGNOSIS — G89.29 CHRONIC BILATERAL LOW BACK PAIN WITH BILATERAL SCIATICA: ICD-10-CM

## 2021-09-28 DIAGNOSIS — Z79.02 LONG TERM (CURRENT) USE OF ANTITHROMBOTICS/ANTIPLATELETS: ICD-10-CM

## 2021-09-28 DIAGNOSIS — M54.42 CHRONIC BILATERAL LOW BACK PAIN WITH BILATERAL SCIATICA: ICD-10-CM

## 2021-09-28 DIAGNOSIS — Z79.891 CHRONIC USE OF OPIATE DRUG FOR THERAPEUTIC PURPOSE: ICD-10-CM

## 2021-09-28 DIAGNOSIS — Z89.511 S/P BILATERAL BELOW KNEE AMPUTATION (HCC): ICD-10-CM

## 2021-09-28 DIAGNOSIS — N18.6 ESRD ON HEMODIALYSIS (HCC): ICD-10-CM

## 2021-09-28 DIAGNOSIS — E11.8 CONTROLLED TYPE 2 DIABETES MELLITUS WITH COMPLICATION, WITHOUT LONG-TERM CURRENT USE OF INSULIN (HCC): ICD-10-CM

## 2021-09-28 DIAGNOSIS — I10 ESSENTIAL HYPERTENSION: ICD-10-CM

## 2021-09-28 DIAGNOSIS — G47.09 OTHER INSOMNIA: ICD-10-CM

## 2021-09-28 DIAGNOSIS — R69 SEVERE COMORBID ILLNESS: ICD-10-CM

## 2021-09-28 PROBLEM — R00.1 BRADYCARDIA: Status: RESOLVED | Noted: 2021-01-04 | Resolved: 2021-09-28

## 2021-09-28 LAB
CHOLESTEROL/HDL RATIO: 2.2
CHOLESTEROL: 96 MG/DL
HAV IGM SER IA-ACNC: NONREACTIVE
HDLC SERPL-MCNC: 44 MG/DL
HEPATITIS B CORE IGM ANTIBODY: NONREACTIVE
HEPATITIS B SURFACE ANTIGEN: NONREACTIVE
HEPATITIS C ANTIBODY: NONREACTIVE
LDL CHOLESTEROL: 42 MG/DL (ref 0–130)
PROSTATE SPECIFIC ANTIGEN: 0.57 UG/L
T. PALLIDUM, IGG: NONREACTIVE
TRIGL SERPL-MCNC: 52 MG/DL
TSH SERPL DL<=0.05 MIU/L-ACNC: 1.63 MIU/L (ref 0.3–5)
VLDLC SERPL CALC-MCNC: NORMAL MG/DL (ref 1–30)

## 2021-09-28 PROCEDURE — 3017F COLORECTAL CA SCREEN DOC REV: CPT | Performed by: FAMILY MEDICINE

## 2021-09-28 PROCEDURE — G8427 DOCREV CUR MEDS BY ELIG CLIN: HCPCS | Performed by: FAMILY MEDICINE

## 2021-09-28 PROCEDURE — 99214 OFFICE O/P EST MOD 30 MIN: CPT | Performed by: FAMILY MEDICINE

## 2021-09-28 PROCEDURE — 2022F DILAT RTA XM EVC RTNOPTHY: CPT | Performed by: FAMILY MEDICINE

## 2021-09-28 PROCEDURE — 3044F HG A1C LEVEL LT 7.0%: CPT | Performed by: FAMILY MEDICINE

## 2021-09-28 PROCEDURE — G8417 CALC BMI ABV UP PARAM F/U: HCPCS | Performed by: FAMILY MEDICINE

## 2021-09-28 PROCEDURE — 1036F TOBACCO NON-USER: CPT | Performed by: FAMILY MEDICINE

## 2021-09-28 SDOH — ECONOMIC STABILITY: FOOD INSECURITY: WITHIN THE PAST 12 MONTHS, THE FOOD YOU BOUGHT JUST DIDN'T LAST AND YOU DIDN'T HAVE MONEY TO GET MORE.: NEVER TRUE

## 2021-09-28 SDOH — ECONOMIC STABILITY: FOOD INSECURITY: WITHIN THE PAST 12 MONTHS, YOU WORRIED THAT YOUR FOOD WOULD RUN OUT BEFORE YOU GOT MONEY TO BUY MORE.: NEVER TRUE

## 2021-09-28 ASSESSMENT — ENCOUNTER SYMPTOMS
ALLERGIC/IMMUNOLOGIC NEGATIVE: 1
RESPIRATORY NEGATIVE: 1
EYES NEGATIVE: 1
GASTROINTESTINAL NEGATIVE: 1
BACK PAIN: 1

## 2021-09-28 ASSESSMENT — SOCIAL DETERMINANTS OF HEALTH (SDOH): HOW HARD IS IT FOR YOU TO PAY FOR THE VERY BASICS LIKE FOOD, HOUSING, MEDICAL CARE, AND HEATING?: NOT HARD AT ALL

## 2021-09-28 NOTE — PROGRESS NOTES
antithrombotics/antiplatelets     10. Chronic use of opiate drugs therapeutic purposes     11. Severe comorbid illness     12. DDD (degenerative disc disease), lumbar     13. Spinal stenosis of lumbar region with neurogenic claudication         Objective:   Physical Exam  Vitals and nursing note reviewed. Constitutional:       Appearance: He is well-developed. HENT:      Head: Normocephalic and atraumatic. Right Ear: External ear normal.      Left Ear: External ear normal.      Nose: Nose normal.   Eyes:      Conjunctiva/sclera: Conjunctivae normal.      Pupils: Pupils are equal, round, and reactive to light. Cardiovascular:      Rate and Rhythm: Normal rate and regular rhythm. Heart sounds: Normal heart sounds. Comments: Coronary artery disease  Peripheral vascular disease  Diabetes mellitus  ESRD  Hyperlipidemia  Pulmonary:      Effort: Pulmonary effort is normal.      Breath sounds: Normal breath sounds. Abdominal:      General: Bowel sounds are normal.      Palpations: Abdomen is soft. Genitourinary:     Comments: ESRD hemodialysis x3/week  Musculoskeletal:         General: Normal range of motion. Cervical back: Normal range of motion and neck supple. Comments: Degenerative polyarthralgia  Chronic pain syndrome opiate dependent   Skin:     General: Skin is warm and dry. Neurological:      Mental Status: He is alert and oriented to person, place, and time. Deep Tendon Reflexes: Reflexes are normal and symmetric. Psychiatric:      Comments: Anxiety/depression         Assessment:       Diagnosis Orders   1. Mixed hyperlipidemia  Hemoglobin A1C    Lipid Panel    TSH without Reflex    PSA screening   2. Essential hypertension     3. ESRD on hemodialysis (HCC) MWF  Hemoglobin A1C    Lipid Panel    TSH without Reflex    PSA screening    HIV Rapid 1&2    T. pallidum Ab    Hepatitis Panel, Acute   4. Other insomnia     5.  Erectile dysfunction due to arterial insufficiency 6. Chronic bilateral low back pain with bilateral sciatica     7. Controlled type 2 diabetes mellitus with complication, without long-term current use of insulin (HCC)  Hemoglobin A1C    Lipid Panel    TSH without Reflex    PSA screening   8. S/P bilateral below knee amputation ([address]     9. Long term (current) use of antithrombotics/antiplatelets     10. Chronic use of opiate drugs therapeutic purposes     11. Severe comorbid illness     12. DDD (degenerative disc disease), lumbar     13. Spinal stenosis of lumbar region with neurogenic claudication             Plan:      56-year-old -American male with underlying history significant for diabetes with cardiovascular complication is presented for follow-up. He is afebrile hemodynamically stable. Peripheral vascular disease with bilateral BKA, amputation of right distal phalanx index finger  ESRD hemodialysis dependent on 3/week. Dysfunctional AV fistula he is being dialyzed through catheter  Anemia of chronic disease H&H is stable at baseline  Secondary hyperparathyroidism  Chronic pain syndrome. He is on Percocet, tramadol, gabapentin  Constipation improved to Colace. Hypertension. He is on Caduet, hydralazine, clonidine, ACE inhibitor as titrated by nephrology. He denies hypotension  Chronic anticoagulation on Plavix and Eliquis  Anxiety/depression stable  He ambulates with a walker. Fall precaution is advised  He denies tobacco, excessive alcohol or illicit drug use  He is updated on influenza and COVID-19 vaccination that he tolerated well  Medically stable advised to continue  Further recommendations to follow labs  He is advised to keep blood sugar log for office review. He is advised to continue Tradjenta, Amaryl. Patient states that his random blood sugar logs stays between 100- 120. Denies hypoglycemia  Diabetic peripheral neuropathy. Continue gabapentin.   Emphasized importance of euglycemia  He is advised to update annual dilated eye exam  Call for any concern  This note is created with a voice recognition program and while intend to generate a document that accurately reflects the content of the visit, no guarantee can be provided that every mistake has been identified and corrected by editing.           Karyle Gill, MD

## 2021-09-29 LAB
ESTIMATED AVERAGE GLUCOSE: 128 MG/DL
HBA1C MFR BLD: 6.1 % (ref 4–6)

## 2021-10-01 ENCOUNTER — TELEPHONE (OUTPATIENT)
Dept: FAMILY MEDICINE CLINIC | Age: 61
End: 2021-10-01

## 2021-10-01 NOTE — TELEPHONE ENCOUNTER
Chano Colby was contacted as a part of [facility] Visit outreach. A voicemail message was left reminding the patient to schedule an AWV with their PCP.

## 2021-10-26 ENCOUNTER — TELEPHONE (OUTPATIENT)
Dept: INTERNAL MEDICINE CLINIC | Age: 61
End: 2021-10-26

## 2021-11-08 RX ORDER — GLIMEPIRIDE 2 MG/1
TABLET ORAL
Qty: 90 TABLET | Refills: 1 | Status: SHIPPED | OUTPATIENT
Start: 2021-11-08 | End: 2022-03-30

## 2021-11-08 NOTE — TELEPHONE ENCOUNTER
Sheryl Hoffmann is calling to request a refill on the following medication(s):    Medication Request:  Requested Prescriptions     Pending Prescriptions Disp Refills    linagliptin (TRADJENTA) 5 MG tablet [Pharmacy Med Name: TRADJENTA 5 MG TABLET] 90 tablet 0     Sig: TAKE ONE TABLET BY MOUTH DAILY    glimepiride (AMARYL) 2 MG tablet [Pharmacy Med Name: GLIMEPIRIDE 2 MG TABLET] 90 tablet 1     Sig: TAKE ONE TABLET BY MOUTH EVERY MORNING BEFORE BREAKFAST       Last Visit Date (If Applicable):  6/90/3705    Next Visit Date:    2/1/2022

## 2021-11-09 ENCOUNTER — OFFICE VISIT (OUTPATIENT)
Dept: PAIN MANAGEMENT | Age: 61
End: 2021-11-09
Payer: COMMERCIAL

## 2021-11-09 VITALS
HEART RATE: 98 BPM | DIASTOLIC BLOOD PRESSURE: 84 MMHG | HEIGHT: 71 IN | SYSTOLIC BLOOD PRESSURE: 164 MMHG | WEIGHT: 217 LBS | BODY MASS INDEX: 30.38 KG/M2 | OXYGEN SATURATION: 99 %

## 2021-11-09 DIAGNOSIS — R69 SEVERE COMORBID ILLNESS: ICD-10-CM

## 2021-11-09 DIAGNOSIS — M48.062 SPINAL STENOSIS OF LUMBAR REGION WITH NEUROGENIC CLAUDICATION: Primary | ICD-10-CM

## 2021-11-09 DIAGNOSIS — Z79.891 CHRONIC USE OF OPIATE DRUG FOR THERAPEUTIC PURPOSE: ICD-10-CM

## 2021-11-09 PROCEDURE — 99213 OFFICE O/P EST LOW 20 MIN: CPT | Performed by: ANESTHESIOLOGY

## 2021-11-09 PROCEDURE — 1036F TOBACCO NON-USER: CPT | Performed by: ANESTHESIOLOGY

## 2021-11-09 PROCEDURE — 3017F COLORECTAL CA SCREEN DOC REV: CPT | Performed by: ANESTHESIOLOGY

## 2021-11-09 PROCEDURE — G8428 CUR MEDS NOT DOCUMENT: HCPCS | Performed by: ANESTHESIOLOGY

## 2021-11-09 PROCEDURE — G8484 FLU IMMUNIZE NO ADMIN: HCPCS | Performed by: ANESTHESIOLOGY

## 2021-11-09 PROCEDURE — G8417 CALC BMI ABV UP PARAM F/U: HCPCS | Performed by: ANESTHESIOLOGY

## 2021-11-09 RX ORDER — TRAMADOL HYDROCHLORIDE 50 MG/1
50 TABLET ORAL DAILY PRN
Qty: 30 TABLET | Refills: 0 | Status: SHIPPED | OUTPATIENT
Start: 2021-11-09 | End: 2021-12-09

## 2021-11-09 RX ORDER — OXYCODONE HYDROCHLORIDE AND ACETAMINOPHEN 5; 325 MG/1; MG/1
1 TABLET ORAL DAILY PRN
Qty: 20 TABLET | Refills: 0 | Status: SHIPPED | OUTPATIENT
Start: 2021-11-09 | End: 2021-12-09

## 2021-11-09 ASSESSMENT — ENCOUNTER SYMPTOMS
RESPIRATORY NEGATIVE: 1
BACK PAIN: 1

## 2021-11-09 NOTE — PROGRESS NOTES
The patient is a 57 y. o. Non- / non  male. Chief Complaint   Patient presents with    Follow-up    Medication Refill    Back Pain        Back Pain      69-year-old man with multiple major comorbidities including hypertension diabetes peripheral vascular disease end-stage kidney disease on hemodialysis long-term anticoagulation therapy, status post bilateral knee amputation  Recent coronary event with coronary stent placement in January 2021     Patient is seen for history of chronic generalized pain particularly involving lower back  He is on low-dose opioid therapy with Percocet and tramadol on alternate days 1 tablet  Reports no side effects finds medication helpful  No history of illicit substance use      Pain score Today:  2  Adverse effects (Constipation / Nausea / Sedation / sexual Dysfunction / others) : no  Mood: good  Sleep pattern and quality: poor  Activity level: excellent    Pill count Today: 3  Last dose taken  11/8/2021  OARRS report reviewed today: yes  ER/Hospitalizations/PCP visit related to pain since last visit:no   Any legal problems e.g. DUI etc.:No  Satisfied with current management: Yes    Opioid Contract 3/16/2021  Last Urine Dug screen dated n/a    Lab Results   Component Value Date    LABA1C 6.1 (H) 09/28/2021     Lab Results   Component Value Date     09/28/2021       Past Medical History, Past Surgical History, Social History, Allergies and Medications reviewed and updated in EPIC as indicated    Family History reviewed and is noncontributory.           Past Medical History:   Diagnosis Date    Chronic bilateral low back pain with bilateral sciatica     CRF (chronic renal failure)     [facility]    DDD (degenerative disc disease), lumbar 12/15/2020    Diabetes mellitus ([facility]     [facility]ialysis patient [facility])     ED (erectile dysfunction)     [facility]    [facility]    HTN (hypertension)     Hyperlipidemia     LVH (left ventricular hypertrophy)     [facility]per[facility]) [facility] [address]     S/P bilateral BKA (below knee amputation) ([facility] [address]     Wears glasses         Past Surgical History:   Procedure Laterality Date    CATARACT REMOVAL WITH IMPLANT      DIALYSIS FISTULA CREATION Bilateral     As of 2019, RUE AVF functioning, LUE AVF nonfunctioning.  FINGER AMPUTATION      right pointer finger    FINGER SURGERY Left     I & D    GLAUCOMA SURGERY      LEG AMPUTATION BELOW KNEE Bilateral     TUNNELED VENOUS CATHETER PLACEMENT      PC placed and removed       Social History     Socioeconomic History    Marital status:      Spouse name: None    Number of children: None    Years of education: None    Highest education level: None   Occupational History    None   Tobacco Use    Smoking status: Never Smoker    Smokeless tobacco: Never Used   Substance and Sexual Activity    Alcohol use: Yes     Comment: rare    Drug use: No    Sexual activity: None   Other Topics Concern    None   Social History Narrative    None     Social Determinants of Health     Financial Resource Strain: Low Risk     Difficulty of Paying Living Expenses: Not hard at all   Food Insecurity: No Food Insecurity    Worried About Running Out of Food in the Last Year: Never true    Joao of Food in the Last Year: Never true   Transportation Needs:     Lack of Transportation (Medical): Not on file    Lack of Transportation (Non-Medical):  Not on file   Physical Activity:     Days of Exercise per Week: Not on file    Minutes of Exercise per Session: Not on file   Stress:     Feeling of Stress : Not on file   Social Connections:     Frequency of Communication with Friends and Family: Not on file    Frequency of Social Gatherings with Friends and Family: Not on file    Attends Caodaism Services: Not on file    Active Member of Clubs or Organizations: Not on file    Attends Club or Organization Meetings: Not on file    Marital Status: Not on file   Intimate Partner Violence:     Fear of Current or Ex-Partner: Not on file    Emotionally Abused: Not on file    Physically Abused: Not on file    Sexually Abused: Not on file   Housing Stability:     Unable to Pay for Housing in the Last Year: Not on file    Number of [place] in the Last Year: Not on file    Unstable Housing in the Last Year: Not on file       Family History   Problem Relation Age of Onset    Diabetes Mother     Coronary Art Dis Mother     Hypertension Mother     Diabetes Father     Hypertension Father     Stroke Father     Cancer Sister     Hypertension Brother        No Known Allergies    Vitals:    11/09/21 1058   BP: (!) 164/84   Pulse: 98   SpO2: 99%       Current Outpatient Medications   Medication Sig Dispense Refill    traMADol (ULTRAM) 50 MG tablet Take 1 tablet by mouth daily as needed for Pain for up to 30 days. 30 tablet 0    oxyCODONE-acetaminophen (PERCOCET) 5-325 MG per tablet Take 1 tablet by mouth daily as needed for Pain for up to 30 days.  20 tablet 0    linagliptin (TRADJENTA) 5 MG tablet TAKE ONE TABLET BY MOUTH DAILY 90 tablet 0    glimepiride (AMARYL) 2 MG tablet TAKE ONE TABLET BY MOUTH EVERY MORNING BEFORE BREAKFAST 90 tablet 1    acetaminophen (TYLENOL) 500 MG tablet Take 2 tablets by mouth every 6 hours as needed for Pain 60 tablet 0    docusate sodium (COLACE) 100 MG capsule Take 1 capsule by mouth 3 times daily as needed for Constipation 30 capsule 0    amLODIPine-atorvastatatin (CADUET) 10-80 MG per tablet Take 1 tablet by mouth daily      Heparin Sodium, Porcine, (HEPARIN, PORCINE,) 1000 UNIT/ML injection Heparin Sodium (Porcine) 1,000 Units/mL Systemic      Multiple Vitamins-Minerals (RENAPLEX-D PO) Take 1 tablet by mouth daily       apixaban (ELIQUIS) 5 MG TABS tablet Take 1 tablet by mouth 2 times daily 180 tablet 1    clopidogrel (PLAVIX) 75 MG tablet Take 1 tablet by mouth daily 30 tablet 3    cloNIDine (CATAPRES) 0.3 MG tablet Take 1 tablet by mouth 2 times daily Do not take if HR < 60 180 tablet 3    aspirin 81 MG chewable tablet Take 81 mg by mouth every morning      Sucroferric Oxyhydroxide (VELPHORO) 500 MG CHEW Take 2 tablets by mouth 3 times daily (with meals) Crush or chew and shallow 2 tablets three times a day with meals      Tens Unit MISC by Does not apply route 1 each 0    lisinopril (PRINIVIL;ZESTRIL) 10 MG tablet TAKE 1 TABLET BY MOUTH  TWICE DAILY (Patient taking differently: Take 5 mg by mouth daily TAKE 1 TABLET BY MOUTH  TWICE DAILY) 180 tablet 0    NONFORMULARY       gabapentin (NEURONTIN) 300 MG capsule TAKE ONE CAPSULE BY MOUTH THREE TIMES A DAY 90 capsule 2    tadalafil (CIALIS) 20 MG tablet Take 1 tablet by mouth once as needed for Erectile Dysfunction 30 tablet 1    hydrALAZINE (APRESOLINE) 25 MG tablet Take 25 mg by mouth 2 times daily  (Patient not taking: Reported on 11/9/2021)       No current facility-administered medications for this visit. Review of Systems   Constitutional: Negative. Respiratory: Negative. Genitourinary: Negative. Musculoskeletal: Positive for back pain. Objective:  General Appearance:  Well-appearing and in no acute distress. Vital signs: (most recent): Blood pressure (!) 164/84, pulse 98, height 5' 11\" (1.803 m), weight 217 lb (98.4 kg), SpO2 99 %. Output: Producing urine and producing stool. HEENT: (No visible masses in neck  Range of motion appears normal on cervical spine  External ears appears normal)    Lungs:  Normal effort. He is not in respiratory distress. Neurological: Patient is alert and oriented to person, place and time.  (Able to follow command    Psych  Mood is good  Affect is normal). Skin:  No rash or cyanosis. Assessment & Plan  1. Spinal stenosis of lumbar region with neurogenic claudication    2. Chronic use of opiate drugs therapeutic purposes    3.  Severe comorbid illness        No orders of the defined types were placed in this encounter. Orders Placed This Encounter   Medications    traMADol (ULTRAM) 50 MG tablet     Sig: Take 1 tablet by mouth daily as needed for Pain for up to 30 days. Dispense:  30 tablet     Refill:  0     Reduce doses taken as pain becomes manageable    oxyCODONE-acetaminophen (PERCOCET) 5-325 MG per tablet     Sig: Take 1 tablet by mouth daily as needed for Pain for up to 30 days. Dispense:  20 tablet     Refill:  0     Reduce doses taken as pain becomes manageable      Controlled Substance Monitoring:    Acute and Chronic Pain Monitoring:   RX Monitoring 11/9/2021   Attestation -   Periodic Controlled Substance Monitoring Possible medication side effects, risk of tolerance/dependence & alternative treatments discussed. ;Obtaining appropriate analgesic effect of treatment.    Chronic Pain > 50 MEDD -               Electronically signed by Becky Louis MD on 11/9/2021 at 11:13 AM

## 2021-11-10 ENCOUNTER — TELEPHONE (OUTPATIENT)
Dept: PAIN MANAGEMENT | Age: 61
End: 2021-11-10

## 2021-11-10 NOTE — TELEPHONE ENCOUNTER
Pt states he was seen in the office yesterday for medication refill and was written Rx for Percocet 5/325 mg # 20 tablets take 1 po daily prn, however, patient reports he is normally prescribed percocet 7.5/325 mg. Pt states he will need to take two tablets of the 5/325 mg because taking 1 tablet daily of the 5/325 mg will not help his pain.  Please advise

## 2021-11-11 NOTE — TELEPHONE ENCOUNTER
LVm informing pt to only take 1.5 tablets daily of the Percocet 5/325 mg or to bring the medication to the office to be wasted and a new Rx could be prescribed

## 2021-12-13 ENCOUNTER — TELEPHONE (OUTPATIENT)
Dept: PAIN MANAGEMENT | Age: 61
End: 2021-12-13

## 2021-12-13 DIAGNOSIS — M48.062 SPINAL STENOSIS OF LUMBAR REGION WITH NEUROGENIC CLAUDICATION: ICD-10-CM

## 2021-12-13 RX ORDER — OXYCODONE HYDROCHLORIDE AND ACETAMINOPHEN 5; 325 MG/1; MG/1
1 TABLET ORAL DAILY PRN
Qty: 20 TABLET | Refills: 0 | Status: CANCELLED | OUTPATIENT
Start: 2021-12-13 | End: 2022-01-12

## 2021-12-13 RX ORDER — TRAMADOL HYDROCHLORIDE 50 MG/1
50 TABLET ORAL DAILY PRN
Qty: 30 TABLET | Refills: 0 | Status: CANCELLED | OUTPATIENT
Start: 2021-12-13 | End: 2022-01-12

## 2021-12-13 NOTE — TELEPHONE ENCOUNTER
PT called in requesting med refill. Will need to be seen in office first. Left voicemail to set up OV.

## 2021-12-14 ENCOUNTER — OFFICE VISIT (OUTPATIENT)
Dept: PAIN MANAGEMENT | Age: 61
End: 2021-12-14
Payer: COMMERCIAL

## 2021-12-14 VITALS
SYSTOLIC BLOOD PRESSURE: 152 MMHG | HEART RATE: 85 BPM | DIASTOLIC BLOOD PRESSURE: 94 MMHG | HEIGHT: 71 IN | WEIGHT: 217 LBS | BODY MASS INDEX: 30.38 KG/M2 | OXYGEN SATURATION: 100 %

## 2021-12-14 DIAGNOSIS — Z99.2 ESRD ON HEMODIALYSIS (HCC): ICD-10-CM

## 2021-12-14 DIAGNOSIS — M48.062 SPINAL STENOSIS OF LUMBAR REGION WITH NEUROGENIC CLAUDICATION: ICD-10-CM

## 2021-12-14 DIAGNOSIS — N18.6 ESRD ON HEMODIALYSIS (HCC): ICD-10-CM

## 2021-12-14 DIAGNOSIS — Z79.891 CHRONIC USE OF OPIATE DRUG FOR THERAPEUTIC PURPOSE: Primary | ICD-10-CM

## 2021-12-14 PROCEDURE — G8427 DOCREV CUR MEDS BY ELIG CLIN: HCPCS | Performed by: ANESTHESIOLOGY

## 2021-12-14 PROCEDURE — G8484 FLU IMMUNIZE NO ADMIN: HCPCS | Performed by: ANESTHESIOLOGY

## 2021-12-14 PROCEDURE — 1036F TOBACCO NON-USER: CPT | Performed by: ANESTHESIOLOGY

## 2021-12-14 PROCEDURE — 99213 OFFICE O/P EST LOW 20 MIN: CPT | Performed by: ANESTHESIOLOGY

## 2021-12-14 PROCEDURE — G8417 CALC BMI ABV UP PARAM F/U: HCPCS | Performed by: ANESTHESIOLOGY

## 2021-12-14 PROCEDURE — 3017F COLORECTAL CA SCREEN DOC REV: CPT | Performed by: ANESTHESIOLOGY

## 2021-12-14 RX ORDER — LISINOPRIL 20 MG/1
TABLET ORAL
COMMUNITY
Start: 2021-10-27 | End: 2022-05-17 | Stop reason: ALTCHOICE

## 2021-12-14 RX ORDER — LABETALOL 200 MG/1
200 TABLET, FILM COATED ORAL 2 TIMES DAILY
Status: ON HOLD | COMMUNITY
End: 2022-05-28 | Stop reason: SDUPTHER

## 2021-12-14 RX ORDER — TRAMADOL HYDROCHLORIDE 50 MG/1
50 TABLET ORAL DAILY PRN
Qty: 30 TABLET | Refills: 1 | Status: SHIPPED | OUTPATIENT
Start: 2021-12-14 | End: 2022-01-13

## 2021-12-14 RX ORDER — OXYCODONE AND ACETAMINOPHEN 7.5; 325 MG/1; MG/1
1 TABLET ORAL DAILY PRN
Qty: 20 TABLET | Refills: 0 | Status: SHIPPED | OUTPATIENT
Start: 2021-12-14 | End: 2022-01-27 | Stop reason: SDUPTHER

## 2021-12-14 RX ORDER — CILOSTAZOL 100 MG/1
100 TABLET ORAL 2 TIMES DAILY
Status: ON HOLD | COMMUNITY
End: 2022-05-28 | Stop reason: HOSPADM

## 2021-12-14 ASSESSMENT — ENCOUNTER SYMPTOMS
EYES NEGATIVE: 1
BACK PAIN: 1

## 2021-12-14 NOTE — PROGRESS NOTES
The patient is a 57 y. o. Non- / non  male. Chief Complaint   Patient presents with    Back Pain    Leg Pain     BILATERAL        HPI  Generalized all over the body pain  Related to a diagnosis of end-stage renal disease  Pain aggravates after hemodialysis  Index pain is back pain  Not a candidate for any interventional procedure  Has been stable on low-dose opioid with tramadol and oxycodone alternate days  No history of illicit substance use  Has been compliant with the treatment  Here for medication refill  Pt here today for follow up back pain and bilateral leg pain    Past Medical History:   Diagnosis Date    Chronic bilateral low back pain with bilateral sciatica     CRF (chronic renal failure)     [facility] MWF    DDD (degenerative disc disease), lumbar 12/15/2020    Diabetes mellitus ([facility]     Dialysis patient [facility])     ED (erectile dysfunction)     [facility] of echocardiogram 2014    [facility]    HTN (hypertension)     Hyperlipidemia     LVH (left ventricular hypertrophy)     PVD (peripheral vascular disease) ([facility] [facility]     S/P bilateral BKA (below knee amputation) ([facility] [address]     Wears glasses         Past Surgical History:   Procedure Laterality Date    CATARACT REMOVAL WITH IMPLANT      DIALYSIS FISTULA CREATION Bilateral     As of 2019, RUE AVF functioning, LUE AVF nonfunctioning.     FINGER AMPUTATION      right pointer finger    FINGER SURGERY Left     I & D    GLAUCOMA SURGERY      LEG AMPUTATION BELOW KNEE Bilateral     TUNNELED VENOUS CATHETER PLACEMENT      PC placed and removed       Social History     Socioeconomic History    Marital status:      Spouse name: None    Number of children: None    Years of education: None    Highest education level: None   Occupational History    None   Tobacco Use    Smoking status: Never Smoker    Smokeless tobacco: Never Used   Substance and Sexual Activity    Alcohol use: Yes     Comment: rare    Drug use: No    Sexual activity: None   Other Topics Concern    None   Social History Narrative    None     Social Determinants of Health     Financial Resource Strain: Low Risk     Difficulty of Paying Living Expenses: Not hard at all   Food Insecurity: No Food Insecurity    Worried About Running Out of Food in the Last Year: Never true    Joao of Food in the Last Year: Never true   Transportation Needs:     Lack of Transportation (Medical): Not on file    Lack of Transportation (Non-Medical):  Not on file   Physical Activity:     Days of Exercise per Week: Not on file    Minutes of Exercise per Session: Not on file   Stress:     Feeling of Stress : Not on file   Social Connections:     Frequency of Communication with Friends and Family: Not on file    Frequency of Social Gatherings with Friends and Family: Not on file    Attends Anabaptist Services: Not on file    Active Member of [address] [place] [address] [employer] or Organizations: Not on file    Attends Club or Organization Meetings: Not on file    Marital Status: Not on file   Intimate Partner Violence:     Fear of Current or Ex-Partner: Not on file    Emotionally Abused: Not on file    Physically Abused: Not on file    Sexually Abused: Not on file   Housing Stability:     Unable to Pay for Housing in the Last Year: Not on file    Number of [place] in the Last Year: Not on file    Unstable Housing in the Last Year: Not on file       Family History   Problem Relation Age of Onset    Diabetes Mother     Coronary Art Dis Mother     Hypertension Mother     Diabetes Father     Hypertension Father     Stroke Father     Cancer Sister     Hypertension Brother        No Known Allergies    Vitals:    12/14/21 1313   BP: (!) 152/94   Pulse:    SpO2:        Current Outpatient Medications   Medication Sig Dispense Refill    lisinopril (PRINIVIL;ZESTRIL) 20 MG tablet       labetalol (NORMODYNE) 200 MG tablet Take 200 mg by mouth 2 times daily      cilostazol (PLETAL) 100 MG tablet Take 100 mg by mouth 2 times daily      oxyCODONE-acetaminophen (PERCOCET) 7.5-325 MG per tablet Take 1 tablet by mouth daily as needed for Pain for up to 30 days. Intended supply: 30 days 20 tablet 0    traMADol (ULTRAM) 50 MG tablet Take 1 tablet by mouth daily as needed for Pain for up to 30 days.  30 tablet 1    linagliptin (TRADJENTA) 5 MG tablet TAKE ONE TABLET BY MOUTH DAILY 90 tablet 0    glimepiride (AMARYL) 2 MG tablet TAKE ONE TABLET BY MOUTH EVERY MORNING BEFORE BREAKFAST 90 tablet 1    acetaminophen (TYLENOL) 500 MG tablet Take 2 tablets by mouth every 6 hours as needed for Pain 60 tablet 0    docusate sodium (COLACE) 100 MG capsule Take 1 capsule by mouth 3 times daily as needed for Constipation 30 capsule 0    amLODIPine-atorvastatatin (CADUET) 10-80 MG per tablet Take 1 tablet by mouth daily      tadalafil (CIALIS) 20 MG tablet Take 1 tablet by mouth once as needed for Erectile Dysfunction 30 tablet 1    Heparin Sodium, Porcine, (HEPARIN, PORCINE,) 1000 UNIT/ML injection Heparin Sodium (Porcine) 1,000 Units/mL Systemic      Multiple Vitamins-Minerals (RENAPLEX-D PO) Take 1 tablet by mouth daily       hydrALAZINE (APRESOLINE) 25 MG tablet Take 25 mg by mouth 2 times daily       apixaban (ELIQUIS) 5 MG TABS tablet Take 1 tablet by mouth 2 times daily 180 tablet 1    clopidogrel (PLAVIX) 75 MG tablet Take 1 tablet by mouth daily 30 tablet 3    cloNIDine (CATAPRES) 0.3 MG tablet Take 1 tablet by mouth 2 times daily Do not take if HR < 60 180 tablet 3    aspirin 81 MG chewable tablet Take 81 mg by mouth every morning      Sucroferric Oxyhydroxide (VELPHORO) 500 MG CHEW Take 2 tablets by mouth 3 times daily (with meals) Crush or chew and shallow 2 tablets three times a day with meals      Tens Unit MISC by Does not apply route 1 each 0    lisinopril (PRINIVIL;ZESTRIL) 10 MG tablet TAKE 1 TABLET BY MOUTH  TWICE DAILY (Patient taking differently: Take 5 mg by mouth daily TAKE 1 TABLET BY MOUTH TWICE DAILY) 180 tablet 0    NONFORMULARY       gabapentin (NEURONTIN) 300 MG capsule TAKE ONE CAPSULE BY MOUTH THREE TIMES A DAY 90 capsule 2     No current facility-administered medications for this visit. Review of Systems   Constitutional: Negative. Negative for fever. Eyes: Negative. Endocrine: Negative. Musculoskeletal: Positive for back pain and gait problem. Objective:  General Appearance:  Uncomfortable and in pain. Vital signs: (most recent): Blood pressure (!) 152/94, pulse 85, height 5' 11\" (1.803 m), weight 217 lb (98.4 kg), SpO2 100 %. Vital signs are normal.  No fever. Output: Producing urine and producing stool. HEENT: Normal HEENT exam.    Lungs:  Normal effort and normal respiratory rate. Breath sounds clear to auscultation. He is not in respiratory distress. Heart: Normal rate. Extremities: Normal range of motion. There is no deformity. Neurological: Patient is alert and oriented to person, place and time. Patient has normal coordination. Pupils:  Pupils are equal, round, and reactive to light. Pupils are equal.   Skin:  Warm and dry. No rash or cyanosis. Assessment & Plan   68-year-old man with multiple major comorbidities including hypertension diabetes peripheral vascular disease end-stage kidney disease on hemodialysis long-term anticoagulation therapy, status post bilateral knee amputation  Recent coronary event with coronary stent placement in January 2021     Patient is seen for history of chronic generalized pain particularly involving lower back  He is on low-dose opioid therapy with Percocet and tramadol on alternate days 1 tablet  Reports no side effects finds medication helpful  No history of illicit substance use    1. Chronic use of opiate drugs therapeutic purposes    2.  ESRD on hemodialysis ([facility] [address] MWF    3. Spinal stenosis of lumbar region with neurogenic claudication        Will call for Percocet refill    We will follow up in clinic with the nurse practitioner Monet Quezada in 2 months  No orders of the defined types were placed in this encounter. Orders Placed This Encounter   Medications    oxyCODONE-acetaminophen (PERCOCET) 7.5-325 MG per tablet     Sig: Take 1 tablet by mouth daily as needed for Pain for up to 30 days. Intended supply: 30 days     Dispense:  20 tablet     Refill:  0     Reduce doses taken as pain becomes manageable    traMADol (ULTRAM) 50 MG tablet     Sig: Take 1 tablet by mouth daily as needed for Pain for up to 30 days. Dispense:  30 tablet     Refill:  1     Reduce doses taken as pain becomes manageable      Controlled Substance Monitoring:    Acute and Chronic Pain Monitoring:   RX Monitoring 11/9/2021   Attestation -   Periodic Controlled Substance Monitoring Possible medication side effects, risk of tolerance/dependence & alternative treatments discussed. ;Obtaining appropriate analgesic effect of treatment.    Chronic Pain > 50 MEDD -               Electronically signed by Margie Lopez MD on 12/14/2021 at 1:57 PM

## 2022-01-01 ENCOUNTER — CLINICAL DOCUMENTATION ONLY (OUTPATIENT)
Facility: CLINIC | Age: 62
End: 2022-01-01

## 2022-01-19 NOTE — TELEPHONE ENCOUNTER
Alton Lofton is calling to request a refill on the following medication(s):    Medication Request:    Last filled 11/8/21 #90 with 0 RF    Requested Prescriptions     Pending Prescriptions Disp Refills    linagliptin (TRADJENTA) 5 MG tablet [Pharmacy Med Name: TRADJENTA 5 MG TABLET] 90 tablet 0     Sig: TAKE ONE TABLET BY MOUTH DAILY       Last Visit Date (If Applicable):  3/40/1360    Next Visit Date:    2/1/2022

## 2022-01-20 ENCOUNTER — TELEPHONE (OUTPATIENT)
Dept: INTERNAL MEDICINE CLINIC | Age: 62
End: 2022-01-20

## 2022-01-24 NOTE — TELEPHONE ENCOUNTER
Called [employer] [phone]  Patient # [id number]    Per Jymob patient needs to fill out new form. Form can be printed from [employer]. [employer]  Form printed, left vm for patient to call back he will need to sign

## 2022-01-26 DIAGNOSIS — Z79.891 CHRONIC USE OF OPIATE DRUG FOR THERAPEUTIC PURPOSE: ICD-10-CM

## 2022-01-26 DIAGNOSIS — Z99.2 ESRD ON HEMODIALYSIS (HCC): ICD-10-CM

## 2022-01-26 DIAGNOSIS — M48.062 SPINAL STENOSIS OF LUMBAR REGION WITH NEUROGENIC CLAUDICATION: ICD-10-CM

## 2022-01-26 DIAGNOSIS — N18.6 ESRD ON HEMODIALYSIS (HCC): ICD-10-CM

## 2022-01-26 NOTE — TELEPHONE ENCOUNTER
Arnie Webb is requesting a refill on the following medications:   Requested Prescriptions     Pending Prescriptions Disp Refills    oxyCODONE-acetaminophen (PERCOCET) 7.5-325 MG per tablet 20 tablet 0     Sig: Take 1 tablet by mouth daily as needed for Pain for up to 30 days. Intended supply: 30 days       Last OV 12/14/2021    Future Appointments   Date Time Provider Staci Vuong   2/1/2022  2:45 PM Earlene Smith MD [facility]   2/8/2022  3:00 PM Oracio Llamas MD [facility]       OARRS report sent to Dr. Joanie Holm through alternative route for review.

## 2022-01-27 RX ORDER — OXYCODONE AND ACETAMINOPHEN 7.5; 325 MG/1; MG/1
1 TABLET ORAL DAILY PRN
Qty: 20 TABLET | Refills: 0 | Status: SHIPPED | OUTPATIENT
Start: 2022-01-27 | End: 2022-03-11 | Stop reason: SDUPTHER

## 2022-02-01 ENCOUNTER — OFFICE VISIT (OUTPATIENT)
Dept: INTERNAL MEDICINE CLINIC | Age: 62
End: 2022-02-01
Payer: COMMERCIAL

## 2022-02-01 ENCOUNTER — HOSPITAL ENCOUNTER (OUTPATIENT)
Age: 62
Setting detail: SPECIMEN
Discharge: HOME | End: 2022-02-01

## 2022-02-01 VITALS
DIASTOLIC BLOOD PRESSURE: 80 MMHG | BODY MASS INDEX: 29.54 KG/M2 | WEIGHT: 211 LBS | SYSTOLIC BLOOD PRESSURE: 168 MMHG | OXYGEN SATURATION: 99 % | HEART RATE: 99 BPM | HEIGHT: 71 IN | TEMPERATURE: 98.4 F | RESPIRATION RATE: 16 BRPM

## 2022-02-01 DIAGNOSIS — E11.8 CONTROLLED TYPE 2 DIABETES MELLITUS WITH COMPLICATION, WITHOUT LONG-TERM CURRENT USE OF INSULIN (HCC): Primary | ICD-10-CM

## 2022-02-01 DIAGNOSIS — I10 ESSENTIAL HYPERTENSION: ICD-10-CM

## 2022-02-01 DIAGNOSIS — Z89.512 S/P BILATERAL BELOW KNEE AMPUTATION (HCC): ICD-10-CM

## 2022-02-01 DIAGNOSIS — F51.04 PSYCHOPHYSIOLOGICAL INSOMNIA: ICD-10-CM

## 2022-02-01 DIAGNOSIS — N18.6 ESRD ON HEMODIALYSIS (HCC): ICD-10-CM

## 2022-02-01 DIAGNOSIS — E11.8 CONTROLLED TYPE 2 DIABETES MELLITUS WITH COMPLICATION, WITHOUT LONG-TERM CURRENT USE OF INSULIN (HCC): ICD-10-CM

## 2022-02-01 DIAGNOSIS — Z99.2 ESRD ON HEMODIALYSIS (HCC): ICD-10-CM

## 2022-02-01 DIAGNOSIS — Z79.891 CHRONIC USE OF OPIATE DRUG FOR THERAPEUTIC PURPOSE: ICD-10-CM

## 2022-02-01 DIAGNOSIS — M48.062 SPINAL STENOSIS OF LUMBAR REGION WITH NEUROGENIC CLAUDICATION: ICD-10-CM

## 2022-02-01 DIAGNOSIS — R69 SEVERE COMORBID ILLNESS: ICD-10-CM

## 2022-02-01 DIAGNOSIS — E78.2 MIXED HYPERLIPIDEMIA: ICD-10-CM

## 2022-02-01 DIAGNOSIS — Z79.02 LONG TERM (CURRENT) USE OF ANTITHROMBOTICS/ANTIPLATELETS: ICD-10-CM

## 2022-02-01 DIAGNOSIS — G89.29 CHRONIC BILATERAL LOW BACK PAIN WITH BILATERAL SCIATICA: ICD-10-CM

## 2022-02-01 DIAGNOSIS — Z89.511 S/P BILATERAL BELOW KNEE AMPUTATION (HCC): ICD-10-CM

## 2022-02-01 DIAGNOSIS — M54.41 CHRONIC BILATERAL LOW BACK PAIN WITH BILATERAL SCIATICA: ICD-10-CM

## 2022-02-01 DIAGNOSIS — M51.36 DDD (DEGENERATIVE DISC DISEASE), LUMBAR: ICD-10-CM

## 2022-02-01 DIAGNOSIS — N52.01 ERECTILE DYSFUNCTION DUE TO ARTERIAL INSUFFICIENCY: ICD-10-CM

## 2022-02-01 DIAGNOSIS — M54.42 CHRONIC BILATERAL LOW BACK PAIN WITH BILATERAL SCIATICA: ICD-10-CM

## 2022-02-01 LAB
ALBUMIN SERPL-MCNC: 4.5 G/DL (ref 3.5–5.2)
ALBUMIN/GLOBULIN RATIO: 1.5 (ref 1–2.5)
ALP BLD-CCNC: 171 U/L (ref 40–129)
ALT SERPL-CCNC: 24 U/L (ref 5–41)
ANION GAP SERPL CALCULATED.3IONS-SCNC: 18 MMOL/L (ref 9–17)
AST SERPL-CCNC: 33 U/L
BILIRUB SERPL-MCNC: 0.46 MG/DL (ref 0.3–1.2)
BUN BLDV-MCNC: 31 MG/DL (ref 8–23)
BUN/CREAT BLD: ABNORMAL (ref 9–20)
CALCIUM SERPL-MCNC: 9.9 MG/DL (ref 8.6–10.4)
CHLORIDE BLD-SCNC: 99 MMOL/L (ref 98–107)
CHOLESTEROL, FASTING: 127 MG/DL
CHOLESTEROL/HDL RATIO: 2.3
CO2: 25 MMOL/L (ref 20–31)
CREAT SERPL-MCNC: 7.31 MG/DL (ref 0.7–1.2)
GFR AFRICAN AMERICAN: 9 ML/MIN
GFR NON-AFRICAN AMERICAN: 8 ML/MIN
GFR SERPL CREATININE-BSD FRML MDRD: ABNORMAL ML/MIN/{1.73_M2}
GFR SERPL CREATININE-BSD FRML MDRD: ABNORMAL ML/MIN/{1.73_M2}
GLUCOSE FASTING: 119 MG/DL (ref 70–99)
HCT VFR BLD CALC: 38.4 % (ref 40.7–50.3)
HDLC SERPL-MCNC: 55 MG/DL
HEMOGLOBIN: 11.9 G/DL (ref 13–17)
LDL CHOLESTEROL: 56 MG/DL (ref 0–130)
MCH RBC QN AUTO: 24.8 PG (ref 25.2–33.5)
MCHC RBC AUTO-ENTMCNC: 31 G/DL (ref 28.4–34.8)
MCV RBC AUTO: 80.2 FL (ref 82.6–102.9)
NRBC AUTOMATED: 0 PER 100 WBC
PDW BLD-RTO: 20.9 % (ref 11.8–14.4)
PLATELET # BLD: ABNORMAL K/UL (ref 138–453)
PLATELET, FLUORESCENCE: 176 K/UL (ref 138–453)
PLATELET, IMMATURE FRACTION: 4.8 % (ref 1.1–10.3)
PMV BLD AUTO: ABNORMAL FL (ref 8.1–13.5)
POTASSIUM SERPL-SCNC: 5.1 MMOL/L (ref 3.7–5.3)
RBC # BLD: 4.79 M/UL (ref 4.21–5.77)
SODIUM BLD-SCNC: 142 MMOL/L (ref 135–144)
TOTAL PROTEIN: 7.5 G/DL (ref 6.4–8.3)
TRIGLYCERIDE, FASTING: 81 MG/DL
TSH SERPL DL<=0.05 MIU/L-ACNC: 2.19 MIU/L (ref 0.3–5)
VLDLC SERPL CALC-MCNC: NORMAL MG/DL (ref 1–30)
WBC # BLD: 7 K/UL (ref 3.5–11.3)

## 2022-02-01 PROCEDURE — 2022F DILAT RTA XM EVC RTNOPTHY: CPT | Performed by: FAMILY MEDICINE

## 2022-02-01 PROCEDURE — 1036F TOBACCO NON-USER: CPT | Performed by: FAMILY MEDICINE

## 2022-02-01 PROCEDURE — G8427 DOCREV CUR MEDS BY ELIG CLIN: HCPCS | Performed by: FAMILY MEDICINE

## 2022-02-01 PROCEDURE — 3017F COLORECTAL CA SCREEN DOC REV: CPT | Performed by: FAMILY MEDICINE

## 2022-02-01 PROCEDURE — G8484 FLU IMMUNIZE NO ADMIN: HCPCS | Performed by: FAMILY MEDICINE

## 2022-02-01 PROCEDURE — 3046F HEMOGLOBIN A1C LEVEL >9.0%: CPT | Performed by: FAMILY MEDICINE

## 2022-02-01 PROCEDURE — 99214 OFFICE O/P EST MOD 30 MIN: CPT | Performed by: FAMILY MEDICINE

## 2022-02-01 PROCEDURE — G8417 CALC BMI ABV UP PARAM F/U: HCPCS | Performed by: FAMILY MEDICINE

## 2022-02-01 RX ORDER — DIVALPROEX SODIUM 250 MG/1
250 TABLET, DELAYED RELEASE ORAL NIGHTLY
Qty: 90 TABLET | Refills: 0 | Status: SHIPPED | OUTPATIENT
Start: 2022-02-01 | End: 2022-02-15

## 2022-02-01 RX ORDER — MINOXIDIL 2.5 MG/1
5 TABLET ORAL 2 TIMES DAILY
Status: ON HOLD | COMMUNITY
Start: 2022-01-15 | End: 2022-09-20 | Stop reason: HOSPADM

## 2022-02-01 ASSESSMENT — ENCOUNTER SYMPTOMS
ALLERGIC/IMMUNOLOGIC NEGATIVE: 1
GASTROINTESTINAL NEGATIVE: 1
EYES NEGATIVE: 1
RESPIRATORY NEGATIVE: 1
BACK PAIN: 1

## 2022-02-01 NOTE — PROGRESS NOTES
Subjective:      Patient ID: Mehnaz Llamas is a 57 y.o. male. Diabetes  He presents for his follow-up diabetic visit. He has type 2 diabetes mellitus. His disease course has been stable. Hypoglycemia symptoms include nervousness/anxiousness. There are no diabetic associated symptoms. There are no hypoglycemic complications. Symptoms are stable. Diabetic complications include nephropathy, peripheral neuropathy and PVD. Risk factors for coronary artery disease include diabetes mellitus, dyslipidemia, male sex, stress and hypertension. Current diabetic treatment includes oral agent (monotherapy). He is compliant with treatment all of the time. His weight is stable. He is following a generally healthy diet. Meal planning includes ADA exchanges, avoidance of concentrated sweets, calorie counting and carbohydrate counting. He participates in exercise three times a week. An ACE inhibitor/angiotensin II receptor blocker is contraindicated. Review of Systems   Constitutional: Negative. HENT: Negative. Eyes: Negative. Respiratory: Negative. Cardiovascular: Negative. Gastrointestinal: Negative. Endocrine: Negative. Musculoskeletal: Positive for arthralgias, back pain and gait problem. Skin: Negative. Allergic/Immunologic: Negative. Hematological: Negative. Psychiatric/Behavioral: The patient is nervous/anxious. Past family and social history unremarkable. Diagnosis Orders   1. Controlled type 2 diabetes mellitus with complication, without long-term current use of insulin ([facility])  CBC    Comprehensive Metabolic Panel    Hemoglobin A1C    Lipid Panel    TSH without Reflex   2. Mixed hyperlipidemia     3. Essential hypertension  CBC    Comprehensive Metabolic Panel    Hemoglobin A1C    Lipid Panel    TSH without Reflex   4. ESRD on hemodialysis ([address] MWF     5. Psychophysiological insomnia     6. Erectile dysfunction due to arterial insufficiency     7.  Chronic bilateral low back pain with bilateral sciatica     8. S/P bilateral below knee amputation ([address]     9. Long term (current) use of antithrombotics/antiplatelets     10. Chronic use of opiate drugs therapeutic purposes     11. Severe comorbid illness     12. DDD (degenerative disc disease), lumbar     13. Spinal stenosis of lumbar region with neurogenic claudication           Objective:   Physical Exam  Vitals and nursing note reviewed. Constitutional:       Appearance: He is well-developed. HENT:      Head: Normocephalic and atraumatic. Right Ear: External ear normal.      Left Ear: External ear normal.      Nose: Nose normal.   Eyes:      Conjunctiva/sclera: Conjunctivae normal.      Pupils: Pupils are equal, round, and reactive to light. Cardiovascular:      Rate and Rhythm: Normal rate and regular rhythm. Heart sounds: Normal heart sounds. Comments: Hypertension  Hyperlipidemia  Diabetes med  Pulmonary:      Effort: Pulmonary effort is normal.      Breath sounds: Normal breath sounds. Abdominal:      General: Bowel sounds are normal.      Palpations: Abdomen is soft. Genitourinary:     Comments: ESRD-hemodialysis x3/week  Musculoskeletal:         General: Normal range of motion. Cervical back: Normal range of motion and neck supple. Comments: Degenerative polyarthralgia   Skin:     General: Skin is warm and dry. Neurological:      Mental Status: He is alert and oriented to person, place, and time. Deep Tendon Reflexes: Reflexes are normal and symmetric. Psychiatric:      Comments: Anxiety  Insomnia         Assessment:       Diagnosis Orders   1. Controlled type 2 diabetes mellitus with complication, without long-term current use of insulin (HCC)  CBC    Comprehensive Metabolic Panel    Hemoglobin A1C    Lipid Panel    TSH without Reflex   2. Mixed hyperlipidemia     3. Essential hypertension  CBC    Comprehensive Metabolic Panel    Hemoglobin A1C    Lipid Panel    TSH without Reflex   4.  ESRD on hemodialysis ([facility] [address] MWF     5. Psychophysiological insomnia     6. Erectile dysfunction due to arterial insufficiency     7. Chronic bilateral low back pain with bilateral sciatica     8. S/P bilateral below knee amputation ([facility] [address]     9. Long term (current) use of antithrombotics/antiplatelets     10. Chronic use of opiate drugs therapeutic purposes     11. Severe comorbid illness     12. DDD (degenerative disc disease), lumbar     13. Spinal stenosis of lumbar region with neurogenic claudication             Plan:      76-year-old male returns for follow-up. He is afebrile hemodynamically stable, clinical examination is benign  Diabetes mellitus well controlled with A1c of 6.1. Advised to continue current regimen, ADA 1800 diet/renal diet  Hypertension. Advised to continue lisinopril, Normodyne, amlodipine, hydralazine titrated by nephrology  ESRD hemodialysis dependent on 3/week  Chronic pain syndrome opiate dependent. Avoid constipation  Chronic anticoagulation on Eliquis, Plavix  Anxiety/depression with subjective complaint of insomnia. Reassurance provided. He is placed on Depakote  Anemia of chronic disease H&H stable at baseline  Degenerative poly neuropathy on gabapentin  Erectile dysfunction on Cialis that he is tolerating well  Please update influenza and Covid vaccination  Medically stable advised to continue  Further recommendations to follow labs  Further recommendations to follow labs  Call for any concern  Fall precaution is advised  In view of significant degenerative polyarthralgia with difficulty ambulation with underlying history of generalized weakness instability secondary to ESRD, I have ordered a walker  This note is created with a voice recognition program and while intend to generate a document that accurately reflects the content of the visit, no guarantee can be provided that every mistake has been identified and corrected by editing.             Lexa Contreras MD

## 2022-02-02 LAB
ESTIMATED AVERAGE GLUCOSE: 157 MG/DL
HBA1C MFR BLD: 7.1 % (ref 4–6)

## 2022-02-08 ENCOUNTER — OFFICE VISIT (OUTPATIENT)
Dept: PAIN MANAGEMENT | Age: 62
End: 2022-02-08
Payer: COMMERCIAL

## 2022-02-08 VITALS
DIASTOLIC BLOOD PRESSURE: 84 MMHG | HEART RATE: 66 BPM | OXYGEN SATURATION: 100 % | BODY MASS INDEX: 30.49 KG/M2 | HEIGHT: 71 IN | SYSTOLIC BLOOD PRESSURE: 171 MMHG | WEIGHT: 217.8 LBS

## 2022-02-08 DIAGNOSIS — M54.42 CHRONIC BILATERAL LOW BACK PAIN WITH BILATERAL SCIATICA: ICD-10-CM

## 2022-02-08 DIAGNOSIS — G89.29 CHRONIC BILATERAL LOW BACK PAIN WITH BILATERAL SCIATICA: ICD-10-CM

## 2022-02-08 DIAGNOSIS — M54.9 UPPER BACK PAIN: Primary | ICD-10-CM

## 2022-02-08 DIAGNOSIS — M54.41 CHRONIC BILATERAL LOW BACK PAIN WITH BILATERAL SCIATICA: ICD-10-CM

## 2022-02-08 DIAGNOSIS — M48.062 SPINAL STENOSIS OF LUMBAR REGION WITH NEUROGENIC CLAUDICATION: ICD-10-CM

## 2022-02-08 PROCEDURE — 99213 OFFICE O/P EST LOW 20 MIN: CPT | Performed by: ANESTHESIOLOGY

## 2022-02-08 PROCEDURE — 1036F TOBACCO NON-USER: CPT | Performed by: ANESTHESIOLOGY

## 2022-02-08 PROCEDURE — G8484 FLU IMMUNIZE NO ADMIN: HCPCS | Performed by: ANESTHESIOLOGY

## 2022-02-08 PROCEDURE — G8417 CALC BMI ABV UP PARAM F/U: HCPCS | Performed by: ANESTHESIOLOGY

## 2022-02-08 PROCEDURE — G8427 DOCREV CUR MEDS BY ELIG CLIN: HCPCS | Performed by: ANESTHESIOLOGY

## 2022-02-08 PROCEDURE — 3017F COLORECTAL CA SCREEN DOC REV: CPT | Performed by: ANESTHESIOLOGY

## 2022-02-08 RX ORDER — TRAMADOL HYDROCHLORIDE 50 MG/1
TABLET ORAL
COMMUNITY
Start: 2022-01-28 | End: 2022-03-11 | Stop reason: SDUPTHER

## 2022-02-08 RX ORDER — SODIUM ZIRCONIUM CYCLOSILICATE 5 G/5G
POWDER, FOR SUSPENSION ORAL
Status: ON HOLD | COMMUNITY
Start: 2022-02-07 | End: 2022-05-28 | Stop reason: HOSPADM

## 2022-02-08 ASSESSMENT — ENCOUNTER SYMPTOMS
BACK PAIN: 1
RESPIRATORY NEGATIVE: 1

## 2022-02-08 NOTE — PROGRESS NOTES
The patient is a 57 y. o. Non- / non  male. Chief Complaint   Patient presents with    Back Pain    Leg Pain        HPI     59-year-old man who is established patient of this clinic  Is seen for chronic generalized all over the body pain  Is diagnosed with end-stage renal disease  His pain aggravates after hemodialysis  He has been on tramadol and oxycodone alternate days for this chronic pain issue  Is been stable on the regimen without any side effect    Main issue today is upper back pain  This pain started last week after cleaning up the snow  Pain is sharp located in the midline in the thoracic area  No dermatomal radiation  No associated numbness or paresthesia  No changes in bladder or bowel control  No imaging for this pain    I am concerned for compression fracture considering his risk of compression fracture from chronic disease  Will obtain x-ray  If negative then consider for physical therapy or trigger point injection      Patient presents for increased pain. Patient rates his back and leg pain as 8/10 and intermittent. Patient describes his back and leg pain as stabbing. Aggravating factors are dialysis. Alleviating factors are Gabapentin and Percocet. Past Medical History:   Diagnosis Date    Chronic bilateral low back pain with bilateral sciatica     CRF (chronic renal failure)     [facility] MWF    DDD (degenerative disc disease), lumbar 12/15/2020    Diabetes mellitus ([facility]     Dialysis patient [facility])     ED (erectile dysfunction)     [facility] echocardiogram 2014    [facility]    HTN (hypertension)     Hyperlipidemia     LVH (left ventricular hypertrophy)     PVD (peripheral vascular disease) ([facility]     S/P bilateral BKA (below knee amputation) ([facility]     Wears glasses         Past Surgical History:   Procedure Laterality Date    CATARACT REMOVAL WITH IMPLANT      DIALYSIS FISTULA CREATION Bilateral     As of 2019, RUE AVF functioning, LUE AVF nonfunctioning.     FINGER AMPUTATION      right pointer finger    FINGER SURGERY Left     [facility]    GLAUCOMA SURGERY      LEG AMPUTATION BELOW KNEE Bilateral     TUNNELED VENOUS CATHETER PLACEMENT      PC placed and removed       Social History     Socioeconomic History    Marital status:      Spouse name: None    Number of children: None    Years of education: None    Highest education level: None   Occupational History    None   Tobacco Use    Smoking status: Never Smoker    Smokeless tobacco: Never Used   Substance and Sexual Activity    Alcohol use: Yes     Comment: rare    Drug use: No    Sexual activity: None   Other Topics Concern    None   Social History Narrative    None     Social Determinants of Health     Financial Resource Strain: Low Risk     Difficulty of Paying Living Expenses: Not hard at all   Food Insecurity: No Food Insecurity    Worried About Running Out of Food in the Last Year: Never true    Joao of Food in the Last Year: Never true   Transportation Needs:     Lack of Transportation (Medical): Not on file    Lack of Transportation (Non-Medical):  Not on file   Physical Activity:     Days of Exercise per Week: Not on file    Minutes of Exercise per Session: Not on file   Stress:     Feeling of Stress : Not on file   Social Connections:     Frequency of Communication with Friends and Family: Not on file    Frequency of Social Gatherings with Friends and Family: Not on file    Attends Advent Services: Not on file    Active Member of Clubs or Organizations: Not on file    Attends Club or Organization Meetings: Not on file    Marital Status: Not on file   Intimate Partner Violence:     Fear of Current or Ex-Partner: Not on file    Emotionally Abused: Not on file    Physically Abused: Not on file    Sexually Abused: Not on file   Housing Stability:     Unable to Pay for Housing in the Last Year: Not on file    Number of [place] in the Last Year: Not on file    Unstable Housing in the Last Year: Not on file       Family History   Problem Relation Age of Onset    Diabetes Mother     Coronary Art Dis Mother     Hypertension Mother     Diabetes Father     Hypertension Father     Stroke Father     Cancer Sister     Hypertension Brother        No Known Allergies    Vitals:    02/08/22 1446   BP: (!) 171/84   Pulse: 66   SpO2:        Current Outpatient Medications   Medication Sig Dispense Refill    traMADol (ULTRAM) 50 MG tablet       LOKELMA 5 g PACK oral suspension       minoxidil (LONITEN) 2.5 MG tablet       divalproex (DEPAKOTE) 250 MG DR tablet Take 1 tablet by mouth at bedtime 90 tablet 0    oxyCODONE-acetaminophen (PERCOCET) 7.5-325 MG per tablet Take 1 tablet by mouth daily as needed for Pain for up to 30 days.  Intended supply: 30 days 20 tablet 0    linagliptin (TRADJENTA) 5 MG tablet TAKE ONE TABLET BY MOUTH DAILY 90 tablet 0    lisinopril (PRINIVIL;ZESTRIL) 20 MG tablet       labetalol (NORMODYNE) 200 MG tablet Take 200 mg by mouth 2 times daily      cilostazol (PLETAL) 100 MG tablet Take 100 mg by mouth 2 times daily      glimepiride (AMARYL) 2 MG tablet TAKE ONE TABLET BY MOUTH EVERY MORNING BEFORE BREAKFAST 90 tablet 1    acetaminophen (TYLENOL) 500 MG tablet Take 2 tablets by mouth every 6 hours as needed for Pain 60 tablet 0    docusate sodium (COLACE) 100 MG capsule Take 1 capsule by mouth 3 times daily as needed for Constipation 30 capsule 0    amLODIPine-atorvastatatin (CADUET) 10-80 MG per tablet Take 1 tablet by mouth daily      Heparin Sodium, Porcine, (HEPARIN, PORCINE,) 1000 UNIT/ML injection Heparin Sodium (Porcine) 1,000 Units/mL Systemic      Multiple Vitamins-Minerals (RENAPLEX-D PO) Take 1 tablet by mouth daily       hydrALAZINE (APRESOLINE) 25 MG tablet Take 25 mg by mouth 2 times daily       apixaban (ELIQUIS) 5 MG TABS tablet Take 1 tablet by mouth 2 times daily 180 tablet 1    clopidogrel (PLAVIX) 75 MG tablet Take 1 tablet by mouth daily 30 tablet 3    cloNIDine (CATAPRES) 0.3 MG tablet Take 1 tablet by mouth 2 times daily Do not take if HR < 60 180 tablet 3    aspirin 81 MG chewable tablet Take 81 mg by mouth every morning      Sucroferric Oxyhydroxide (VELPHORO) 500 MG CHEW Take 2 tablets by mouth 3 times daily (with meals) Crush or chew and shallow 2 tablets three times a day with meals      Tens Unit MISC by Does not apply route 1 each 0    lisinopril (PRINIVIL;ZESTRIL) 10 MG tablet TAKE 1 TABLET BY MOUTH  TWICE DAILY (Patient taking differently: Take 5 mg by mouth daily TAKE 1 TABLET BY MOUTH  TWICE DAILY) 180 tablet 0    NONFORMULARY       gabapentin (NEURONTIN) 300 MG capsule TAKE ONE CAPSULE BY MOUTH THREE TIMES A DAY 90 capsule 2    tadalafil (CIALIS) 20 MG tablet Take 1 tablet by mouth once as needed for Erectile Dysfunction 30 tablet 1     No current facility-administered medications for this visit. Review of Systems   Constitutional: Negative. Negative for fever. Respiratory: Negative. Musculoskeletal: Positive for back pain and myalgias. Neurological: Positive for weakness. Negative for tremors and numbness. Objective:  General Appearance:  Uncomfortable, in pain and in no acute distress. Vital signs: (most recent): Blood pressure (!) 171/84, pulse 66, height 5' 11\" (1.803 m), weight 217 lb 12.8 oz (98.8 kg), SpO2 100 %. Vital signs are normal.  No fever. Output: No urine output. HEENT: Normal HEENT exam.    Lungs:  Normal effort. He is not in respiratory distress. Heart: Normal rate. Neurological: Patient is alert and oriented to person, place and time. Normal strength.     Thoracic spine examination  No apparent deformity  Tenderness to palpation in the midline in the thoracic area  Gait is stable  Assessment & Plan     65-year-old man who is established patient of this clinic  Is seen for chronic generalized all over the body pain  Is diagnosed with end-stage renal disease  His pain aggravates after hemodialysis  He has been on tramadol and oxycodone alternate days for this chronic pain issue  Is been stable on the regimen without any side effect    Main issue today is upper back pain  This pain started last week after cleaning up the snow  Pain is sharp located in the midline in the thoracic area  No dermatomal radiation  No associated numbness or paresthesia  No changes in bladder or bowel control  No imaging for this pain    I am concerned for compression fracture considering his risk of compression fracture from chronic disease  Will obtain x-ray  If negative then consider for physical therapy or trigger point injection      1. Upper back pain        Orders Placed This Encounter   Procedures    XR THORACIC SPINE (MIN 4 VIEWS)      No orders of the defined types were placed in this encounter.            Electronically signed by Casey Murray MD on 2/8/2022 at 3:10 PM

## 2022-02-10 ENCOUNTER — TELEPHONE (OUTPATIENT)
Dept: INTERNAL MEDICINE CLINIC | Age: 62
End: 2022-02-10

## 2022-02-10 NOTE — TELEPHONE ENCOUNTER
----- Message from Primitivo Ghotra sent at 2/10/2022  1:32 PM EST -----  Subject: Medication Problem    QUESTIONS  Name of Medication? divalproex (DEPAKOTE) 250 MG DR tablet  Patient-reported dosage and instructions? 250MG one tablet at night   What question or problem do you have with the medication? Patient says   this is prescription is not working and wants to know if he can increase   it to two tablets at night. Please follow up with patient. Preferred Pharmacy? [facility] [address] [place] [address], [facility] [address] [phone] [facility] [phone]  Pharmacy phone number (if available)? [phone]  Additional Information for Provider?   ---------------------------------------------------------------------------  --------------  [address]  What is the best way for the office to contact you? OK to leave message on   voicemail  Preferred Call Back Phone Number? [phone]  ---------------------------------------------------------------------------  --------------  SCRIPT ANSWERS  Relationship to Patient?  Self

## 2022-02-15 RX ORDER — DIVALPROEX SODIUM 250 MG/1
500 TABLET, DELAYED RELEASE ORAL 2 TIMES DAILY
Qty: 90 TABLET | Refills: 0 | Status: ON HOLD | COMMUNITY
Start: 2022-02-15 | End: 2022-05-28 | Stop reason: HOSPADM

## 2022-02-15 NOTE — TELEPHONE ENCOUNTER
Patient called back, states he checked with pain management Dr Lewis and they said you would have to f/u regarding this.      Patient states depakote is not helping with sleep, asking if it can be increased or changed

## 2022-02-15 NOTE — TELEPHONE ENCOUNTER
Increase Depakote from 250 mg once daily, to 500 mg twice daily  He is also advised to discuss with his neurologist

## 2022-02-17 ENCOUNTER — HOSPITAL ENCOUNTER (EMERGENCY)
Age: 62
Discharge: HOME | End: 2022-02-17
Attending: EMERGENCY MEDICINE
Payer: COMMERCIAL

## 2022-02-17 ENCOUNTER — APPOINTMENT (OUTPATIENT)
Dept: GENERAL RADIOLOGY | Age: 62
End: 2022-02-17
Payer: COMMERCIAL

## 2022-02-17 VITALS
HEIGHT: 71 IN | OXYGEN SATURATION: 100 % | DIASTOLIC BLOOD PRESSURE: 78 MMHG | WEIGHT: 215 LBS | RESPIRATION RATE: 16 BRPM | TEMPERATURE: 98.1 F | SYSTOLIC BLOOD PRESSURE: 157 MMHG | BODY MASS INDEX: 30.1 KG/M2 | HEART RATE: 95 BPM

## 2022-02-17 DIAGNOSIS — M54.6 LEFT-SIDED THORACIC BACK PAIN, UNSPECIFIED CHRONICITY: ICD-10-CM

## 2022-02-17 DIAGNOSIS — M25.512 ACUTE PAIN OF LEFT SHOULDER: Primary | ICD-10-CM

## 2022-02-17 PROCEDURE — 73030 X-RAY EXAM OF SHOULDER: CPT

## 2022-02-17 PROCEDURE — 72072 X-RAY EXAM THORAC SPINE 3VWS: CPT

## 2022-02-17 PROCEDURE — 99282 EMERGENCY DEPT VISIT SF MDM: CPT

## 2022-02-17 ASSESSMENT — ENCOUNTER SYMPTOMS
ABDOMINAL PAIN: 0
VOMITING: 0
COUGH: 0
NAUSEA: 0
SHORTNESS OF BREATH: 0
COLOR CHANGE: 0
BACK PAIN: 1

## 2022-02-17 ASSESSMENT — PAIN DESCRIPTION - LOCATION: LOCATION: BACK

## 2022-02-17 ASSESSMENT — PAIN DESCRIPTION - FREQUENCY: FREQUENCY: INTERMITTENT

## 2022-02-17 ASSESSMENT — PAIN DESCRIPTION - DESCRIPTORS: DESCRIPTORS: SHARP

## 2022-02-17 ASSESSMENT — PAIN - FUNCTIONAL ASSESSMENT: PAIN_FUNCTIONAL_ASSESSMENT: 0-10

## 2022-02-17 ASSESSMENT — PAIN SCALES - GENERAL: PAINLEVEL_OUTOF10: 7

## 2022-02-17 NOTE — ED PROVIDER NOTES
Team [address] ED  eMERGENCY dEPARTMENT eNCOUnter      Pt Name: Gunnar Gudion  MRN: [id number]  Angelagfjosephine 1960  Date of evaluation: 2/17/2022  Provider: AURORA Lima [id number]       Chief Complaint   Patient presents with    Back Pain     onset 1 week after shoveling snow         HISTORY OF PRESENT ILLNESS  (Location/Symptom, Timing/Onset, Context/Setting, Quality, Duration, Modifying Factors, Severity.)   Gunnar Gudino is a 57 y.o. male who presents to the emergency department via private auto for pain to his left upper back and shoulder. Onset was after shoveling snow one week ago. Denies falling, injury, weakness, N/T. Rates his pain 7/10 at this time. Nursing Notes were reviewed. ALLERGIES     Patient has no known allergies.     CURRENT MEDICATIONS       Previous Medications    ACETAMINOPHEN (TYLENOL) 500 MG TABLET    Take 2 tablets by mouth every 6 hours as needed for Pain    AMLODIPINE-ATORVASTATATIN (CADUET) 10-80 MG PER TABLET    Take 1 tablet by mouth daily    APIXABAN (ELIQUIS) 5 MG TABS TABLET    Take 1 tablet by mouth 2 times daily    ASPIRIN 81 MG CHEWABLE TABLET    Take 81 mg by mouth every morning    CILOSTAZOL (PLETAL) 100 MG TABLET    Take 100 mg by mouth 2 times daily    CLONIDINE (CATAPRES) 0.3 MG TABLET    Take 1 tablet by mouth 2 times daily Do not take if HR < 60    CLOPIDOGREL (PLAVIX) 75 MG TABLET    Take 1 tablet by mouth daily    DIVALPROEX (DEPAKOTE) 250 MG DR TABLET    Take 2 tablets by mouth 2 times daily    DOCUSATE SODIUM (COLACE) 100 MG CAPSULE    Take 1 capsule by mouth 3 times daily as needed for Constipation    GABAPENTIN (NEURONTIN) 300 MG CAPSULE    TAKE ONE CAPSULE BY MOUTH THREE TIMES A DAY    GLIMEPIRIDE (AMARYL) 2 MG TABLET    TAKE ONE TABLET BY MOUTH EVERY MORNING BEFORE BREAKFAST    HEPARIN SODIUM, PORCINE, (HEPARIN, PORCINE,) 1000 UNIT/ML INJECTION    Heparin Sodium (Porcine) 1,000 Units/mL Systemic    HYDRALAZINE (APRESOLINE) 25 MG TABLET    Take 25 mg by mouth 2 times daily     LABETALOL (NORMODYNE) 200 MG TABLET    Take 200 mg by mouth 2 times daily    LINAGLIPTIN (TRADJENTA) 5 MG TABLET    TAKE ONE TABLET BY MOUTH DAILY    LISINOPRIL (PRINIVIL;ZESTRIL) 10 MG TABLET    TAKE 1 TABLET BY MOUTH  TWICE DAILY    LISINOPRIL (PRINIVIL;ZESTRIL) 20 MG TABLET        LOKELMA 5 G PACK ORAL SUSPENSION        MINOXIDIL (LONITEN) 2.5 MG TABLET        MULTIPLE VITAMINS-MINERALS (RENAPLEX-D PO)    Take 1 tablet by mouth daily     NONFORMULARY        OXYCODONE-ACETAMINOPHEN (PERCOCET) 7.5-325 MG PER TABLET    Take 1 tablet by mouth daily as needed for Pain for up to 30 days. Intended supply: 30 days    SUCROFERRIC OXYHYDROXIDE (VELPHORO) 500 MG CHEW    Take 2 tablets by mouth 3 times daily (with meals) Crush or chew and shallow 2 tablets three times a day with meals    TADALAFIL (CIALIS) 20 MG TABLET    Take 1 tablet by mouth once as needed for Erectile Dysfunction    TENS UNIT MISC    by Does not apply route    TRAMADOL (ULTRAM) 50 MG TABLET           PAST MEDICAL HISTORY         Diagnosis Date    Chronic bilateral low back pain with bilateral sciatica     CRF (chronic renal failure)     [facility] MWF    DDD (degenerative disc disease), lumbar 12/15/2020    [facility]     Dialysis patient [facility])     ED (erectile dysfunction)     [facility] 2014    [facility]    HTN (hypertension)     Hyperlipidemia     LVH (left ventricular hypertrophy)     PVD [facility]peripheral [facility] disease) [facility]     S/P bilateral BKA (below knee amputation) ([facility]     Wears glasses        SURGICAL HISTORY           Procedure Laterality Date    CATARACT REMOVAL WITH IMPLANT      DIALYSIS FISTULA CREATION Bilateral     As of 2019, RUE AVF functioning, LUE AVF nonfunctioning.     FINGER AMPUTATION      right pointer finger    FINGER SURGERY Left     I & D    GLAUCOMA SURGERY      LEG AMPUTATION BELOW KNEE Bilateral     TUNNELED VENOUS CATHETER PLACEMENT      PC placed and removed         FAMILY HISTORY           Problem Relation Age of Onset    Diabetes Mother     Coronary Art Dis Mother     Hypertension Mother     Diabetes Father     Hypertension Father     Stroke Father     Cancer Sister     Hypertension Brother      Family Status   Relation Name Status    Mother  (Not Specified)    Father  (Not Specified)    Sister  (Not Specified)    Brother  (Not Specified)        SOCIAL HISTORY      reports that he has never smoked. He has never used smokeless tobacco. He reports current alcohol use. He reports that he does not use drugs. REVIEW OF SYSTEMS    (2-9 systems for level 4, 10 or more for level 5)     Review of Systems   Constitutional: Negative for chills, diaphoresis, fatigue and fever. Respiratory: Negative for cough and shortness of breath. Cardiovascular: Negative for chest pain. Gastrointestinal: Negative for abdominal pain, nausea and vomiting. Musculoskeletal: Positive for arthralgias, back pain and myalgias. Negative for gait problem and neck pain. Skin: Negative for color change, rash and wound. Neurological: Negative for dizziness, weakness, light-headedness, numbness and headaches. Except as noted above the remainder of the review of systems was reviewed and negative. PHYSICAL EXAM    (up to 7 for level 4, 8 or more for level 5)     ED Triage Vitals [02/17/22 1335]   BP Temp Temp Source Pulse Resp SpO2 Height Weight   (!) 203/101 98.1 °F (36.7 °C) Oral 95 16 100 % 5' 11\" (1.803 m) 215 lb (97.5 kg)     Physical Exam  Vitals reviewed. Constitutional:       General: He is not in acute distress. Appearance: He is well-developed. He is not diaphoretic. Eyes:      General: No scleral icterus. Conjunctiva/sclera: Conjunctivae normal.   Cardiovascular:      Rate and Rhythm: Normal rate. Pulmonary:      Effort: Pulmonary effort is normal. No respiratory distress. Breath sounds: No stridor. HISTORY: ORDERING SYSTEM PROVIDED HISTORY: pain TECHNOLOGIST PROVIDED HISTORY: pain Reason for Exam: Pt c/o pain to left shoulder, and upper back. Unknown if injury occurred. Best films possible due to pt being an amputee and legally blind. FINDINGS: Advanced arthropathy in the glenohumeral joint. Mild degenerative change in the acromioclavicular joint. No acute osseous abnormality identified. Soft tissue calcification is noted near the rotator cuff, which may represent calcific tendinopathy. Extensive vascular calcification is noted. Partially visualized tunneled left internal jugular dialysis catheter with tips at the level of the innominate vein. 1.  Advanced arthropathy in the shoulder. No acute osseous abnormality or malalignment identified. 2.  Partially visualized left internal jugular dialysis catheter with tips at the level of the innominate vein. EMERGENCY DEPARTMENT COURSE and DIFFERENTIAL DIAGNOSIS/MDM:   Vitals:    Vitals:    02/17/22 1335   BP: (!) 203/101   Pulse: 95   Resp: 16   Temp: 98.1 °F (36.7 °C)   TempSrc: Oral   SpO2: 100%   Weight: 215 lb (97.5 kg)   Height: 5' 11\" (1.803 m)       CLINICAL DECISION MAKING:  The patient presented alert with a nontoxic appearance and was seen in conjunction with Dr. Jaz Zambrano. Imaging findings were discussed with the patient. He is in pain management. follow up for a recheck, further evaluation and treatment. Evaluation and treatment course in the ED, and plan of care upon discharge was discussed in length with the patient. Patient had no further questions prior to being discharged and was instructed to return to the ED for new or worsening symptoms. Care was provided during an unprecedented national emergency due to the novel coronavirus, Covid-19. FINAL IMPRESSION      1. Acute pain of left shoulder    2.  Left-sided thoracic back pain, unspecified chronicity            Problem List  Patient Active Problem List   Diagnosis Code    Hyperlipidemia E78.5    Essential hypertension I10    ESRD on hemod[facility] ([facility] MWF N18.6, Z99.2    Insomnia G47.00    ED (erectile dysfunction) N52.9    Chronic bilateral low back pain with bilateral sciatica M54.42, M54.41, G89.29    Controlled diabetes mellitus type 2 with complications (HCC) A01.3    S/P bilateral below knee amputation (HCC) Z89.512, Z89.511    Long term (current) use of antithrombotics/antiplatelets V24.87    Chronic use of opiate drugs therapeutic purposes Z79.891    Severe comorbid illness R69    DDD (degenerative disc disease), lumbar M51.36    Spinal stenosis of lumbar region with neurogenic claudication M48.062         DISPOSITION/PLAN   DISPOSITION        PATIENT REFERRED TO:   Karyle Feil, MD  [address] [address] [facility]  [phone]    Schedule an appointment as soon as possible for a visit       Kenyetta Zurita MD  . [place] [address]  [phone]    Schedule an appointment as soon as possible for a visit         DISCHARGE MEDICATIONS:     New Prescriptions    No medications on file           (Please note that portions of this note were completed with a voice recognition program.  Efforts were made to edit the dictations but occasionally words are mis-transcribed.)    AURORA Kwon CNP, APRN - CNP  02/17/22 3824

## 2022-02-17 NOTE — ED PROVIDER NOTES
This visit was performed by both a physician and an APC. I personally evaluated and examined the patient. I performed all aspects of the MDM as documented. X-ray findings are discussed with the patient. He'll follow-up with his pain management physician for this chronic problem.      Seven Stack MD  02/17/22 1147

## 2022-02-18 RX ORDER — DIVALPROEX SODIUM 250 MG/1
250 TABLET, DELAYED RELEASE ORAL 2 TIMES DAILY
Qty: 180 TABLET | Refills: 0 | Status: SHIPPED | OUTPATIENT
Start: 2022-02-18 | End: 2022-04-12 | Stop reason: SDUPTHER

## 2022-02-18 NOTE — ADDENDUM NOTE
Addended by: Rustam Eye on: 2/18/2022 08:10 AM     Modules accepted: Orders Pre-Excision Curettage Text (Leave Blank If You Do Not Want): Prior to drawing the surgical margin the visible lesion was removed with electrodesiccation and curettage to clearly define the lesion size.

## 2022-02-25 ENCOUNTER — TELEPHONE (OUTPATIENT)
Dept: INTERNAL MEDICINE CLINIC | Age: 62
End: 2022-02-25

## 2022-02-25 NOTE — TELEPHONE ENCOUNTER
Pt is now requesting viagra through assistance program.  States he has been without caduet since 2 weeks. Asking for a call from [facility].

## 2022-03-09 ENCOUNTER — TELEPHONE (OUTPATIENT)
Dept: INTERNAL MEDICINE CLINIC | Age: 62
End: 2022-03-09

## 2022-03-09 NOTE — TELEPHONE ENCOUNTER
----- Message from Nolan Cushing sent at 3/8/2022  4:45 PM EST -----  Subject: Message to Provider    QUESTIONS  Information for Provider? Patient is calling to let the PCP know that The   assisted program he is in needs the PCP to submit information regarding   his Viagra . ---------------------------------------------------------------------------  --------------  Naty SEGOVIA  What is the best way for the office to contact you? OK to leave message on   voicemail  Preferred Call Back Phone Number? [phone]  ---------------------------------------------------------------------------  --------------  SCRIPT ANSWERS  Relationship to Patient?  Self

## 2022-03-10 ENCOUNTER — TELEPHONE (OUTPATIENT)
Dept: INTERNAL MEDICINE CLINIC | Age: 62
End: 2022-03-10

## 2022-03-10 NOTE — TELEPHONE ENCOUNTER
[employer] is now doing the viagra patient assistance     Patient is asking for a RX for viagra 100mg.?     Know that Dr. Ila Rodriguez is out of the country until 3/14/22    Please advise

## 2022-03-11 ENCOUNTER — OFFICE VISIT (OUTPATIENT)
Dept: PAIN MANAGEMENT | Age: 62
End: 2022-03-11
Payer: COMMERCIAL

## 2022-03-11 VITALS
BODY MASS INDEX: 29.6 KG/M2 | SYSTOLIC BLOOD PRESSURE: 147 MMHG | HEART RATE: 71 BPM | HEIGHT: 71 IN | DIASTOLIC BLOOD PRESSURE: 75 MMHG | WEIGHT: 211.4 LBS | OXYGEN SATURATION: 100 %

## 2022-03-11 DIAGNOSIS — Z89.512 S/P BILATERAL BELOW KNEE AMPUTATION (HCC): Primary | ICD-10-CM

## 2022-03-11 DIAGNOSIS — N18.6 ESRD ON HEMODIALYSIS (HCC): ICD-10-CM

## 2022-03-11 DIAGNOSIS — M48.062 SPINAL STENOSIS OF LUMBAR REGION WITH NEUROGENIC CLAUDICATION: ICD-10-CM

## 2022-03-11 DIAGNOSIS — Z89.511 S/P BILATERAL BELOW KNEE AMPUTATION (HCC): Primary | ICD-10-CM

## 2022-03-11 DIAGNOSIS — Z99.2 ESRD ON HEMODIALYSIS (HCC): ICD-10-CM

## 2022-03-11 DIAGNOSIS — Z79.891 CHRONIC USE OF OPIATE DRUG FOR THERAPEUTIC PURPOSE: ICD-10-CM

## 2022-03-11 PROCEDURE — 3017F COLORECTAL CA SCREEN DOC REV: CPT | Performed by: NURSE PRACTITIONER

## 2022-03-11 PROCEDURE — G8484 FLU IMMUNIZE NO ADMIN: HCPCS | Performed by: NURSE PRACTITIONER

## 2022-03-11 PROCEDURE — 99213 OFFICE O/P EST LOW 20 MIN: CPT | Performed by: NURSE PRACTITIONER

## 2022-03-11 PROCEDURE — G8427 DOCREV CUR MEDS BY ELIG CLIN: HCPCS | Performed by: NURSE PRACTITIONER

## 2022-03-11 PROCEDURE — 1036F TOBACCO NON-USER: CPT | Performed by: NURSE PRACTITIONER

## 2022-03-11 PROCEDURE — G8417 CALC BMI ABV UP PARAM F/U: HCPCS | Performed by: NURSE PRACTITIONER

## 2022-03-11 RX ORDER — OXYCODONE AND ACETAMINOPHEN 7.5; 325 MG/1; MG/1
1 TABLET ORAL DAILY PRN
Qty: 20 TABLET | Refills: 0 | Status: SHIPPED | OUTPATIENT
Start: 2022-03-11 | End: 2022-04-08 | Stop reason: SDUPTHER

## 2022-03-11 RX ORDER — TRAMADOL HYDROCHLORIDE 50 MG/1
TABLET ORAL
Status: CANCELLED | OUTPATIENT
Start: 2022-03-11

## 2022-03-11 RX ORDER — TRAMADOL HYDROCHLORIDE 50 MG/1
50 TABLET ORAL DAILY PRN
Qty: 30 TABLET | Refills: 0 | Status: SHIPPED | OUTPATIENT
Start: 2022-03-11 | End: 2022-04-08 | Stop reason: SDUPTHER

## 2022-03-11 ASSESSMENT — ENCOUNTER SYMPTOMS
BACK PAIN: 1
COUGH: 0
SHORTNESS OF BREATH: 0
CONSTIPATION: 0

## 2022-03-11 NOTE — PROGRESS NOTES
Chief Complaint   Patient presents with    Back Pain    Follow-up    Medication Refill     percocet # 2 todays count     Leg Pain             PMH  76-year-old man who is established patient of this clinic  Is seen for chronic generalized all over the body pain  Is diagnosed with end-stage renal disease with hemodialysis. Reports not able to have transplant d/t lack of insurance coverage  Hx of bilat BKA using cane to ambulate  Not a candidate for any interventional procedure  He has been on tramadol and oxycodone daily for this chronic pain issue  Is been stable on the regimen without any side effect    HPI  Back Pain  This is a chronic problem. The current episode started more than 1 year ago. The problem occurs constantly. The problem is unchanged. The pain is present in the lumbar spine. The quality of the pain is described as aching. The pain does not radiate (reports bilat stump pain). The pain is at a severity of 7/10. The pain is moderate. The pain is worse during the day. The symptoms are aggravated by position and standing (walking). Pertinent negatives include no chest pain or fever. He has tried analgesics for the symptoms. The treatment provided mild relief. Medication Refill:  percocet     Pain score Today:  7  Adverse effects (Constipation / Nausea / Sedation / sexual Dysfunction / others) : no  Mood: good  Sleep pattern and quality: poor  Activity level: good    Pill count Today: Percocet # 2 last filled in jan. Last dose taken 03/11/2022  [place]AR[place] report reviewed today: yes  Morphine equivalent: 12.5  ER/Hospitalizations/PCP visit related to pain since last visit:ER for back pain with 4 day script for norco  Any legal problems e.g. DUI etc.:No  Satisfied with current management: Yes    Opioid Contract:03/16/2021  Last Urine Dug screen dated: HD pt.       Lab Results   Component Value Date    LABA1C 7.1 (H) 02/01/2022     Lab Results   Component Value Date     02/01/2022 Past Medical History, Past Surgical History, Social History, Allergies and Medications reviewed and updated in EPIC as indicated    Family History reviewed and is noncontributory. Controlled Substance Monitoring:    Acute and Chronic Pain Monitoring:   RX Monitoring 3/11/2022   Attestation -   Periodic Controlled Substance Monitoring Possible medication side effects, risk of tolerance/dependence & alternative treatments discussed. ;No signs of potential drug abuse or diversion identified. ;Assessed functional status. ;Obtaining appropriate analgesic effect of treatment. Chronic Pain > 50 MEDD -           Periodic Controlled Substance Monitoring: Possible medication side effects, risk of tolerance/dependence & alternative treatments discussed. ,No signs of potential drug abuse or diversion identified. ,Assessed functional status. ,Obtaining appropriate analgesic effect of treatment. Shubham Sears, APRN - CNP)      Past Medical History:   Diagnosis Date    Chronic bilateral low back pain with bilateral sciatica     CRF (chronic renal failure)     [facility] MWF    DDD (degenerative disc disease), lumbar 12/15/2020    [facility]     [facility]ialysis [facility])     ED (erectile dysfunction)     [facility]ogram 2014    [facility]    HTN (hypertension)     Hyperlipidemia     LVH (left ventricular hypertrophy)     PVD [facility]peripheral vascular disease) [facility]     S/P bilateral BKA (below knee amputation) ([facility]     Wears glasses        Past Surgical History:   Procedure Laterality Date    ARM SURGERY      CATARACT REMOVAL WITH IMPLANT      DIALYSIS FISTULA CREATION Bilateral     As of 2019, RUE AVF functioning, LUE AVF nonfunctioning.     FINGER AMPUTATION      right pointer finger    FINGER SURGERY Left     I & D    GLAUCOMA SURGERY      LEG AMPUTATION BELOW KNEE Bilateral     TUNNELED VENOUS CATHETER PLACEMENT      PC placed and removed       No Known Allergies      Current Outpatient Medications:     oxyCODONE-acetaminophen (PERCOCET) 7.5-325 MG per tablet, Take 1 tablet by mouth daily as needed for Pain for up to 30 days. Intended supply: 30 days, Disp: 20 tablet, Rfl: 0    traMADol (ULTRAM) 50 MG tablet, Take 1 tablet by mouth daily as needed for Pain for up to 30 days. , Disp: 30 tablet, Rfl: 0    divalproex (DEPAKOTE) 250 MG DR tablet, Take 1 tablet by mouth 2 times daily, Disp: 180 tablet, Rfl: 0    divalproex (DEPAKOTE) 250 MG DR tablet, Take 2 tablets by mouth 2 times daily, Disp: 90 tablet, Rfl: 0    LOKELMA 5 g PACK oral suspension, , Disp: , Rfl:     minoxidil (LONITEN) 2.5 MG tablet, , Disp: , Rfl:     linagliptin (TRADJENTA) 5 MG tablet, TAKE ONE TABLET BY MOUTH DAILY, Disp: 90 tablet, Rfl: 0    lisinopril (PRINIVIL;ZESTRIL) 20 MG tablet, , Disp: , Rfl:     labetalol (NORMODYNE) 200 MG tablet, Take 200 mg by mouth 2 times daily, Disp: , Rfl:     cilostazol (PLETAL) 100 MG tablet, Take 100 mg by mouth 2 times daily, Disp: , Rfl:     glimepiride (AMARYL) 2 MG tablet, TAKE ONE TABLET BY MOUTH EVERY MORNING BEFORE BREAKFAST, Disp: 90 tablet, Rfl: 1    acetaminophen (TYLENOL) 500 MG tablet, Take 2 tablets by mouth every 6 hours as needed for Pain, Disp: 60 tablet, Rfl: 0    docusate sodium (COLACE) 100 MG capsule, Take 1 capsule by mouth 3 times daily as needed for Constipation, Disp: 30 capsule, Rfl: 0    amLODIPine-atorvastatatin (CADUET) 10-80 MG per tablet, Take 1 tablet by mouth daily, Disp: , Rfl:     Heparin Sodium, Porcine, (HEPARIN, PORCINE,) 1000 UNIT/ML injection, Heparin Sodium (Porcine) 1,000 Units/mL Systemic, Disp: , Rfl:     Multiple Vitamins-Minerals (RENAPLEX-D PO), Take 1 tablet by mouth daily , Disp: , Rfl:     hydrALAZINE (APRESOLINE) 25 MG tablet, Take 25 mg by mouth 2 times daily , Disp: , Rfl:     apixaban (ELIQUIS) 5 MG TABS tablet, Take 1 tablet by mouth 2 times daily, Disp: 180 tablet, Rfl: 1    clopidogrel (PLAVIX) 75 MG tablet, Take 1 tablet of Food in the Last Year: Never true    [address] in the Last Year: Never true   Transportation Needs:     Lack of Transportation (Medical): Not on file    Lack of Transportation (Non-Medical): Not on file   Physical Activity:     Days of Exercise per Week: Not on file    Minutes of Exercise per Session: Not on file   Stress:     Feeling of Stress : Not on file   Social Connections:     Frequency of Communication with Friends and Family: Not on file    Frequency of Social Gatherings with Friends and Family: Not on file    Attends Zoroastrianism Services: Not on file    Active Member of [address] [place] [address] [employer] or Organizations: Not on file    Attends Club or Organization Meetings: Not on file    Marital Status: Not on file   Intimate Partner Violence:     Fear of Current or Ex-Partner: Not on file    Emotionally Abused: Not on file    Physically Abused: Not on file    Sexually Abused: Not on file   Housing Stability:     Unable to Pay for Housing in the Last Year: Not on file    Number of [place] in the Last Year: Not on file    Unstable Housing in the Last Year: Not on file       Review of Systems:  Review of Systems   Constitutional: Negative for chills and fever. Cardiovascular: Negative for chest pain. Respiratory: Negative for cough and shortness of breath. Musculoskeletal: Positive for back pain. Gastrointestinal: Negative for constipation. Physical Exam:  BP (!) 147/75   Pulse 71   Ht 5' 11\" (1.803 m)   Wt 211 lb 6.4 oz (95.9 kg)   SpO2 100%   BMI 29.48 kg/m²     Physical Exam  Cardiovascular:      Rate and Rhythm: Normal rate. Pulmonary:      Effort: Pulmonary effort is normal.   Musculoskeletal:         General: Normal range of motion. Skin:     General: Skin is warm and dry. Neurological:      Mental Status: He is alert and oriented to person, place, and time.            Assessment:  Problem List Items Addressed This Visit     ESRD on hemodialysis ([facility] MWF    Relevant Medications    oxyCODONE-acetaminophen (PERCOCET) 7.5-325 MG per tablet    traMADol (ULTRAM) 50 MG tablet    S/P bilateral below knee amputation ([address] - Primary    Chronic use of opiate drugs therapeutic purposes    Relevant Medications    oxyCODONE-acetaminophen (PERCOCET) 7.5-325 MG per tablet    traMADol (ULTRAM) 50 MG tablet    Spinal stenosis of lumbar region with neurogenic claudication    Relevant Medications    oxyCODONE-acetaminophen (PERCOCET) 7.5-325 MG per tablet    traMADol (ULTRAM) 50 MG tablet             Treatment Plan:  Patient relates current medications are helping the pain. Patient reports taking pain medications as prescribed, denies obtaining medications from different sources and denies use of illegal drugs. Patient denies side effects from medications like nausea, vomiting, constipation or drowsiness. Patient reports current activities of daily living are possible due to medications and would like to continue them. As always, we encourage daily stretching and strengthening exercises, and recommend minimizing use of pain medications unless patient cannot get through daily activities due to pain. Contract requirements met. Continue opioid therapy.  Script written for percocet and tramadol  Follow up appointment made for 4 weeks

## 2022-03-21 ENCOUNTER — TELEPHONE (OUTPATIENT)
Dept: INTERNAL MEDICINE CLINIC | Age: 62
End: 2022-03-21

## 2022-03-21 DIAGNOSIS — Z89.511 S/P BILATERAL BELOW KNEE AMPUTATION (HCC): ICD-10-CM

## 2022-03-21 DIAGNOSIS — G89.29 CHRONIC BILATERAL LOW BACK PAIN WITH BILATERAL SCIATICA: Primary | ICD-10-CM

## 2022-03-21 DIAGNOSIS — M48.062 SPINAL STENOSIS OF LUMBAR REGION WITH NEUROGENIC CLAUDICATION: ICD-10-CM

## 2022-03-21 DIAGNOSIS — M54.42 CHRONIC BILATERAL LOW BACK PAIN WITH BILATERAL SCIATICA: Primary | ICD-10-CM

## 2022-03-21 DIAGNOSIS — Z89.512 S/P BILATERAL BELOW KNEE AMPUTATION (HCC): ICD-10-CM

## 2022-03-21 DIAGNOSIS — M54.41 CHRONIC BILATERAL LOW BACK PAIN WITH BILATERAL SCIATICA: Primary | ICD-10-CM

## 2022-03-21 DIAGNOSIS — M51.36 DDD (DEGENERATIVE DISC DISEASE), LUMBAR: ICD-10-CM

## 2022-03-21 NOTE — TELEPHONE ENCOUNTER
----- Message from Erin Holman sent at 3/21/2022  3:34 PM EDT -----  Subject: Referral Request    QUESTIONS   Reason for referral request? Dr. Ila Rodriguez request for a cane and the walker   for patient , the pharmacy only have the walker patient request if Dr. Ila Rodriguez can request for the cane as well   Has the physician seen you for this condition before? No   Preferred Specialist (if applicable)? Do you already have an appointment scheduled? No  Additional Information for Provider?   ---------------------------------------------------------------------------  --------------  CALL BACK INFO  What is the best way for the office to contact you? OK to leave message on   voicemail  Preferred Call Back Phone Number?  [phone]

## 2022-03-22 NOTE — TELEPHONE ENCOUNTER
Pt called in and was informed. Pt states [employer] told him he is eligible. Verified [employer] assistance program is not for government based insurance such as medicare and medicaid.

## 2022-03-22 NOTE — TELEPHONE ENCOUNTER
Patient called back, states markell told him today that we need to call and get exempt form and they will fill for him.   I will call tomorrow to f/u

## 2022-03-28 NOTE — TELEPHONE ENCOUNTER
I did speak to [employer] and they stated for it to be approved patient would of had to be on viagra with pfizer the previous year or have no insurace. Patient doesn't meet requirements.   Patient was notified on 3/24/22

## 2022-03-30 RX ORDER — GLIMEPIRIDE 2 MG/1
TABLET ORAL
Qty: 90 TABLET | Refills: 1 | Status: ON HOLD | OUTPATIENT
Start: 2022-03-30 | End: 2022-09-19

## 2022-03-30 NOTE — TELEPHONE ENCOUNTER
Amanda Art is calling to request a refill on the following medication(s):    Medication Request:  Requested Prescriptions     Pending Prescriptions Disp Refills    glimepiride (AMARYL) 2 MG tablet [Pharmacy Med Name: GLIMEPIRIDE 2 MG TABLET] 90 tablet 1     Sig: TAKE ONE TABLET BY MOUTH EVERY MORNING BEFORE BREAKFAST    linagliptin (TRADJENTA) 5 MG tablet [Pharmacy Med Name: TRADJENTA 5 MG TABLET] 90 tablet 0     Sig: TAKE ONE TABLET BY MOUTH DAILY       Last Visit Date (If Applicable):  5/6/9496    Next Visit Date:    5/5/2022

## 2022-04-04 RX ORDER — SILDENAFIL 100 MG/1
100 TABLET, FILM COATED ORAL PRN
Status: ON HOLD | COMMUNITY
End: 2022-05-28 | Stop reason: HOSPADM

## 2022-04-08 ENCOUNTER — OFFICE VISIT (OUTPATIENT)
Dept: PAIN MANAGEMENT | Age: 62
End: 2022-04-08
Payer: COMMERCIAL

## 2022-04-08 VITALS
WEIGHT: 208 LBS | OXYGEN SATURATION: 100 % | SYSTOLIC BLOOD PRESSURE: 160 MMHG | HEIGHT: 71 IN | DIASTOLIC BLOOD PRESSURE: 75 MMHG | BODY MASS INDEX: 29.12 KG/M2 | HEART RATE: 82 BPM

## 2022-04-08 DIAGNOSIS — N18.6 ESRD ON HEMODIALYSIS (HCC): ICD-10-CM

## 2022-04-08 DIAGNOSIS — Z99.2 ESRD ON HEMODIALYSIS (HCC): ICD-10-CM

## 2022-04-08 DIAGNOSIS — M48.062 SPINAL STENOSIS OF LUMBAR REGION WITH NEUROGENIC CLAUDICATION: ICD-10-CM

## 2022-04-08 DIAGNOSIS — Z79.891 CHRONIC USE OF OPIATE DRUG FOR THERAPEUTIC PURPOSE: ICD-10-CM

## 2022-04-08 PROCEDURE — G8417 CALC BMI ABV UP PARAM F/U: HCPCS | Performed by: NURSE PRACTITIONER

## 2022-04-08 PROCEDURE — 1036F TOBACCO NON-USER: CPT | Performed by: NURSE PRACTITIONER

## 2022-04-08 PROCEDURE — 99213 OFFICE O/P EST LOW 20 MIN: CPT | Performed by: NURSE PRACTITIONER

## 2022-04-08 PROCEDURE — 3017F COLORECTAL CA SCREEN DOC REV: CPT | Performed by: NURSE PRACTITIONER

## 2022-04-08 PROCEDURE — G8427 DOCREV CUR MEDS BY ELIG CLIN: HCPCS | Performed by: NURSE PRACTITIONER

## 2022-04-08 RX ORDER — OXYCODONE AND ACETAMINOPHEN 7.5; 325 MG/1; MG/1
1 TABLET ORAL DAILY PRN
Qty: 20 TABLET | Refills: 0 | Status: SHIPPED | OUTPATIENT
Start: 2022-04-10 | End: 2022-04-29 | Stop reason: SDUPTHER

## 2022-04-08 RX ORDER — TRAMADOL HYDROCHLORIDE 50 MG/1
50 TABLET ORAL DAILY PRN
Qty: 30 TABLET | Refills: 0 | Status: SHIPPED | OUTPATIENT
Start: 2022-04-10 | End: 2022-04-29 | Stop reason: SDUPTHER

## 2022-04-08 ASSESSMENT — ENCOUNTER SYMPTOMS
WHEEZING: 0
COUGH: 0
SHORTNESS OF BREATH: 0
CONSTIPATION: 0
BACK PAIN: 1

## 2022-04-09 ENCOUNTER — HOSPITAL ENCOUNTER (EMERGENCY)
Age: 62
Discharge: HOME | End: 2022-04-09
Attending: EMERGENCY MEDICINE
Payer: COMMERCIAL

## 2022-04-09 ENCOUNTER — APPOINTMENT (OUTPATIENT)
Dept: CT IMAGING | Age: 62
End: 2022-04-09
Payer: COMMERCIAL

## 2022-04-09 ENCOUNTER — APPOINTMENT (OUTPATIENT)
Dept: GENERAL RADIOLOGY | Age: 62
End: 2022-04-09
Payer: COMMERCIAL

## 2022-04-09 VITALS
WEIGHT: 205 LBS | TEMPERATURE: 98.9 F | HEIGHT: 71 IN | OXYGEN SATURATION: 100 % | DIASTOLIC BLOOD PRESSURE: 90 MMHG | RESPIRATION RATE: 16 BRPM | SYSTOLIC BLOOD PRESSURE: 177 MMHG | BODY MASS INDEX: 28.7 KG/M2 | HEART RATE: 79 BPM

## 2022-04-09 DIAGNOSIS — S46.912A STRAIN OF LEFT SHOULDER, INITIAL ENCOUNTER: ICD-10-CM

## 2022-04-09 DIAGNOSIS — S09.90XA INJURY OF HEAD, INITIAL ENCOUNTER: Primary | ICD-10-CM

## 2022-04-09 PROCEDURE — 70450 CT HEAD/BRAIN W/O DYE: CPT

## 2022-04-09 PROCEDURE — 72125 CT NECK SPINE W/O DYE: CPT

## 2022-04-09 PROCEDURE — 73030 X-RAY EXAM OF SHOULDER: CPT

## 2022-04-09 PROCEDURE — 99283 EMERGENCY DEPT VISIT LOW MDM: CPT

## 2022-04-09 ASSESSMENT — ENCOUNTER SYMPTOMS
VOMITING: 0
DIARRHEA: 0
RHINORRHEA: 0
EYE REDNESS: 0
SORE THROAT: 0
COLOR CHANGE: 0
COUGH: 0
NAUSEA: 0
SHORTNESS OF BREATH: 0
EYE DISCHARGE: 0

## 2022-04-09 NOTE — ED PROVIDER NOTES
EMERGENCY DEPARTMENT ENCOUNTER    Pt Name: Rd Correa  MRN: [id number]  [place] 1960  Date of evaluation: 4/9/22  CHIEF COMPLAINT       Chief Complaint   Patient presents with    Headache     onset last night    Shoulder Pain     fell three weeks ago, now feeling pain in head, fell on left elbow and pain shot up to shoulder     HISTORY OF PRESENT ILLNESS   This is a 69-year-old male that presents with complaints of headache, left shoulder pain and neck pain. Patient states that about 3 weeks ago he fell, he fell off of his porch, landed on his left side. Patient states that he was initially having some pain in his left elbow, his symptoms subsequently improved. He denies any fevers or chills, has no chest pain or shortness of breath but states that his family member said he should come get checked out. Patient describes his symptoms as mild. REVIEW OF SYSTEMS     Review of Systems   Constitutional: Negative for chills and fever. HENT: Negative for rhinorrhea and sore throat. Eyes: Negative for discharge, redness and visual disturbance. Respiratory: Negative for cough and shortness of breath. Cardiovascular: Negative for chest pain, palpitations and leg swelling. Gastrointestinal: Negative for diarrhea, nausea and vomiting. Musculoskeletal: Negative for arthralgias, myalgias and neck pain. Left shoulder pain, neck pain, headache   Skin: Negative for color change and rash. Neurological: Negative for seizures, weakness and headaches. Psychiatric/Behavioral: Negative for hallucinations, self-injury and suicidal ideas.      PASTMEDICAL HISTORY     Past Medical History:   Diagnosis Date    Chronic bilateral low back pain with bilateral sciatica     CRF (chronic renal failure)     [facility] MWF    DDD (degenerative disc disease), lumbar 12/15/2020    [facility]     [facility])     ED (erectile dysfunction)     [facility]    HTN (hypertension)     Hyperlipidemia     LVH (left ventricular hypertrophy)     [facility] ([facility]     S/P bilateral BKA (below knee amputation) ([facility]     Wears glasses      Past Problem List  Patient Active Problem List   Diagnosis Code    Hyperlipidemia E78.5    Essential hypertension I10    ESRD on hemodialysis ([facility] MWF N18.6, Z99.2    Insomnia G47.00    ED (erectile dysfunction) N52.9    Chronic bilateral low back pain with bilateral sciatica M54.42, M54.41, G89.29    Controlled diabetes mellitus type 2 with complications ([facility]) T32.0    S/P bilateral below knee amputation ([facility]) Z89.512, Z89.511    Long term (current) use of antithrombotics/antiplatelets E51.19    Chronic use of opiate drugs therapeutic purposes Z79.891    Severe comorbid illness R69    DDD (degenerative disc disease), lumbar M51.36    Spinal stenosis of lumbar region with neurogenic claudication M48.062     SURGICAL HISTORY       Past Surgical History:   Procedure Laterality Date    ARM SURGERY      CATARACT REMOVAL WITH IMPLANT      DIALYSIS FISTULA CREATION Bilateral     As of 2019, RUE AVF functioning, LUE AVF nonfunctioning.  FINGER AMPUTATION      right pointer finger    FINGER SURGERY Left     I & D    GLAUCOMA SURGERY      LEG AMPUTATION BELOW KNEE Bilateral     TUNNELED VENOUS CATHETER PLACEMENT      PC placed and removed     CURRENT MEDICATIONS       Current Discharge Medication List      CONTINUE these medications which have NOT CHANGED    Details   oxyCODONE-acetaminophen (PERCOCET) 7.5-325 MG per tablet Take 1 tablet by mouth daily as needed for Pain for up to 30 days. Intended supply: 30 days  Qty: 20 tablet, Refills: 0    Comments: Reduce doses taken as pain becomes manageable  Associated Diagnoses: Chronic use of opiate drug for therapeutic purpose; ESRD on hemodialysis ([facility]  Spinal stenosis of lumbar region with neurogenic claudication      traMADol (ULTRAM) 50 MG tablet Take 1 tablet by mouth daily as needed for Pain for up to 30 days. Qty: 30 tablet, Refills: 0    Comments: Reduce doses taken as pain becomes manageable  Associated Diagnoses: Chronic use of opiate drug for therapeutic purpose; ESRD on hemodialysis ([facility] [address] [facility]  Spinal stenosis of lumbar region with neurogenic claudication      sildenafil (VIAGRA) 100 MG tablet Take 100 mg by mouth as needed for Erectile Dysfunction      glimepiride (AMARYL) 2 MG tablet TAKE ONE TABLET BY MOUTH EVERY MORNING BEFORE BREAKFAST  Qty: 90 tablet, Refills: 1      linagliptin (TRADJENTA) 5 MG tablet TAKE ONE TABLET BY MOUTH DAILY  Qty: 90 tablet, Refills: 0      !! divalproex (DEPAKOTE) 250 MG DR tablet Take 1 tablet by mouth 2 times daily  Qty: 180 tablet, Refills: 0      !! divalproex (DEPAKOTE) 250 MG DR tablet Take 2 tablets by mouth 2 times daily  Qty: 90 tablet, Refills: 0      LOKELMA 5 g PACK oral suspension       minoxidil (LONITEN) 2.5 MG tablet       !! lisinopril (PRINIVIL;ZESTRIL) 20 MG tablet       labetalol (NORMODYNE) 200 MG tablet Take 200 mg by mouth 2 times daily      cilostazol (PLETAL) 100 MG tablet Take 100 mg by mouth 2 times daily      acetaminophen (TYLENOL) 500 MG tablet Take 2 tablets by mouth every 6 hours as needed for Pain  Qty: 60 tablet, Refills: 0      docusate sodium (COLACE) 100 MG capsule Take 1 capsule by mouth 3 times daily as needed for Constipation  Qty: 30 capsule, Refills: 0      amLODIPine-atorvastatatin (CADUET) 10-80 MG per tablet Take 1 tablet by mouth daily      Heparin Sodium, Porcine, (HEPARIN, PORCINE,) 1000 UNIT/ML injection Heparin Sodium (Porcine) 1,000 Units/mL Systemic      Multiple Vitamins-Minerals (RENAPLEX-D PO) Take 1 tablet by mouth daily       hydrALAZINE (APRESOLINE) 25 MG tablet Take 25 mg by mouth 2 times daily       apixaban (ELIQUIS) 5 MG TABS tablet Take 1 tablet by mouth 2 times daily  Qty: 180 tablet, Refills: 1      clopidogrel (PLAVIX) 75 MG tablet Take 1 tablet by mouth daily  Qty: 30 tablet, Refills: 3      cloNIDine (CATAPRES) 0.3 MG tablet Take 1 tablet by mouth 2 times daily Do not take if HR < 60  Qty: 180 tablet, Refills: 3      aspirin 81 MG chewable tablet Take 81 mg by mouth every morning      Sucroferric Oxyhydroxide (VELPHORO) 500 MG CHEW Take 2 tablets by mouth 3 times daily (with meals) Crush or chew and shallow 2 tablets three times a day with meals      Tens Unit MISC by Does not apply route  Qty: 1 each, Refills: 0    Associated Diagnoses: Chronic bilateral low back pain with bilateral sciatica; DDD (degenerative disc disease), lumbar      !! lisinopril (PRINIVIL;ZESTRIL) 10 MG tablet TAKE 1 TABLET BY MOUTH  TWICE DAILY  Qty: 180 tablet, Refills: 0      NONFORMULARY       gabapentin (NEURONTIN) 300 MG capsule TAKE ONE CAPSULE BY MOUTH THREE TIMES A DAY  Qty: 90 capsule, Refills: 2       !! - Potential duplicate medications found. Please discuss with provider. ALLERGIES     has No Known Allergies. FAMILY HISTORY     He indicated that the status of his mother is unknown. He indicated that the status of his father is unknown. He indicated that the status of his sister is unknown. He indicated that the status of his brother is unknown. SOCIAL HISTORY       Social History     Tobacco Use    Smoking status: Never Smoker    Smokeless tobacco: Never Used   Substance Use Topics    Alcohol use: Yes     Comment: rare    Drug use: No     PHYSICAL EXAM     INITIAL VITALS: BP (!) 177/90   Pulse 79   Temp 98.9 °F (37.2 °C) (Oral)   Resp 16   Ht 5' 11\" (1.803 m)   Wt 205 lb (93 kg)   SpO2 100%   BMI 28.59 kg/m²    Physical Exam  Constitutional:       Appearance: Normal appearance. HENT:      Head: Normocephalic and atraumatic. Eyes:      Extraocular Movements: Extraocular movements intact. Pupils: Pupils are equal, round, and reactive to light. Neck:     Cardiovascular:      Rate and Rhythm: Normal rate and regular rhythm.    Pulmonary: Effort: Pulmonary effort is normal.      Breath sounds: Normal breath sounds. Abdominal:      General: Abdomen is flat. Palpations: Abdomen is soft. Tenderness: There is no abdominal tenderness. Musculoskeletal:      Cervical back: Pain with movement and spinous process tenderness present. Neurological:      Mental Status: He is alert. Cranial Nerves: Cranial nerves are intact. Sensory: Sensation is intact. Motor: Motor function is intact. Coordination: Coordination is intact. MEDICAL DECISION MAKIN-year-old male presents with a fall, continued headache, pain in his neck and left shoulder. Plan is CT scans and reevaluation. 10:57 AM EDT  Patient's CT scans negative, plan is discharged with outpatient follow-up. CRITICAL CARE:       PROCEDURES:    Procedures    DIAGNOSTIC RESULTS   EKG:All EKG's are interpreted by the Emergency Department Physician who either signs or Co-signs this chart in the absence of a cardiologist.        RADIOLOGY:All plain film, CT, MRI, and formal ultrasound images (except ED bedside ultrasound) are read by the radiologist, see reports below, unless otherwisenoted in MDM or here. XR SHOULDER LEFT (MIN 2 VIEWS)   Final Result   Osteoarthritis of the shoulder      No acute bony abnormality is noted         CT CERVICAL SPINE WO CONTRAST   Preliminary Result   No acute intracranial abnormality. No evidence of acute cervical spine fractures or traumatic malalignment. Mild anterolisthesis of C7 over T1 which is likely degenerative. Erosive   changes in the odontoid as described above. Moderate to severe degenerative   this disease worse between C3 and C6.         CT HEAD WO CONTRAST   Preliminary Result   No acute intracranial abnormality. No evidence of acute cervical spine fractures or traumatic malalignment. Mild anterolisthesis of C7 over T1 which is likely degenerative.   Erosive   changes in the odontoid as described above. Moderate to severe degenerative   this disease worse between C3 and C6. LABS: All lab results were reviewed by myself, and all abnormals are listed below. Labs Reviewed - No data to display    EMERGENCY DEPARTMENTCOURSE:         Vitals:    Vitals:    04/09/22 0933   BP: (!) 177/90   Pulse: 79   Resp: 16   Temp: 98.9 °F (37.2 °C)   TempSrc: Oral   SpO2: 100%   Weight: 205 lb (93 kg)   Height: 5' 11\" (1.803 m)       The patient was given the following medications while in the emergency department:  No orders of the defined types were placed in this encounter. CONSULTS:  None    FINAL IMPRESSION      1. Injury of head, initial encounter    2. Strain of left shoulder, initial encounter          DISPOSITION/PLAN   DISPOSITION Decision To Discharge 04/09/2022 10:58:11 AM      PATIENT REFERRED TO:  Flower Bowles MD  [address] [address] [address]  [phone]    Schedule an appointment as soon as possible for a visit in 2 days      DISCHARGE MEDICATIONS:  Current Discharge Medication List        The care is provided during an unprecedented national emergency due to the novel coronavirus, COVID 19.   MD Scott Birmingham MD  04/09/22 [id number]

## 2022-04-12 RX ORDER — DIVALPROEX SODIUM 250 MG/1
500 TABLET, DELAYED RELEASE ORAL EVERY MORNING
Qty: 180 TABLET | Refills: 0 | Status: SHIPPED
Start: 2022-04-12 | End: 2022-05-17 | Stop reason: SDUPTHER

## 2022-04-25 ENCOUNTER — TELEPHONE (OUTPATIENT)
Dept: PAIN MANAGEMENT | Age: 62
End: 2022-04-25

## 2022-04-25 NOTE — TELEPHONE ENCOUNTER
Pharmacy called stated they either need a call back or ne RX written pt.  Tried to  his tramodol and percocet yesterday and it has lorena 14 days past they time they can fill without you giving a verbal ok or new rx sent in

## 2022-04-28 RX ORDER — DIVALPROEX SODIUM 250 MG/1
TABLET, DELAYED RELEASE ORAL
Qty: 90 TABLET | Refills: 0 | OUTPATIENT
Start: 2022-04-28

## 2022-04-28 NOTE — TELEPHONE ENCOUNTER
Tevin Dasilva is calling to request a refill on the following medication(s):    Medication Request:  Requested Prescriptions     Refused Prescriptions Disp Refills    divalproex (DEPAKOTE) 250 MG DR tablet [Pharmacy Med Name: DIVALPROEX SOD  MG TAB] 90 tablet 0     Sig: TAKE ONE TABLET BY MOUTH AT BEDTIME     Refused By: Felipe Gill     Reason for Refusal: Duplicate Request       Last Visit Date (If Applicable):  2/7/9100    Next Visit Date:    5/5/2022

## 2022-04-29 ENCOUNTER — OFFICE VISIT (OUTPATIENT)
Dept: PAIN MANAGEMENT | Age: 62
End: 2022-04-29
Payer: COMMERCIAL

## 2022-04-29 VITALS
BODY MASS INDEX: 29.68 KG/M2 | HEART RATE: 72 BPM | DIASTOLIC BLOOD PRESSURE: 100 MMHG | SYSTOLIC BLOOD PRESSURE: 181 MMHG | OXYGEN SATURATION: 100 % | WEIGHT: 212 LBS | HEIGHT: 71 IN

## 2022-04-29 DIAGNOSIS — Z99.2 ESRD ON HEMODIALYSIS (HCC): ICD-10-CM

## 2022-04-29 DIAGNOSIS — N18.6 ESRD ON HEMODIALYSIS (HCC): ICD-10-CM

## 2022-04-29 DIAGNOSIS — M48.062 SPINAL STENOSIS OF LUMBAR REGION WITH NEUROGENIC CLAUDICATION: ICD-10-CM

## 2022-04-29 DIAGNOSIS — Z89.511 S/P BILATERAL BELOW KNEE AMPUTATION (HCC): Primary | ICD-10-CM

## 2022-04-29 DIAGNOSIS — Z79.891 CHRONIC USE OF OPIATE DRUG FOR THERAPEUTIC PURPOSE: ICD-10-CM

## 2022-04-29 DIAGNOSIS — Z89.512 S/P BILATERAL BELOW KNEE AMPUTATION (HCC): Primary | ICD-10-CM

## 2022-04-29 PROCEDURE — G8427 DOCREV CUR MEDS BY ELIG CLIN: HCPCS | Performed by: NURSE PRACTITIONER

## 2022-04-29 PROCEDURE — 99213 OFFICE O/P EST LOW 20 MIN: CPT | Performed by: NURSE PRACTITIONER

## 2022-04-29 PROCEDURE — 3017F COLORECTAL CA SCREEN DOC REV: CPT | Performed by: NURSE PRACTITIONER

## 2022-04-29 PROCEDURE — G8417 CALC BMI ABV UP PARAM F/U: HCPCS | Performed by: NURSE PRACTITIONER

## 2022-04-29 PROCEDURE — 1036F TOBACCO NON-USER: CPT | Performed by: NURSE PRACTITIONER

## 2022-04-29 RX ORDER — OXYCODONE AND ACETAMINOPHEN 7.5; 325 MG/1; MG/1
1 TABLET ORAL DAILY PRN
Qty: 20 TABLET | Refills: 0 | Status: SHIPPED | OUTPATIENT
Start: 2022-04-29 | End: 2022-06-16 | Stop reason: SDUPTHER

## 2022-04-29 RX ORDER — TRAMADOL HYDROCHLORIDE 50 MG/1
50 TABLET ORAL DAILY PRN
Qty: 30 TABLET | Refills: 0 | Status: CANCELLED | OUTPATIENT
Start: 2022-04-29 | End: 2022-05-29

## 2022-04-29 RX ORDER — OXYCODONE AND ACETAMINOPHEN 7.5; 325 MG/1; MG/1
1 TABLET ORAL DAILY PRN
Qty: 20 TABLET | Refills: 0 | Status: CANCELLED | OUTPATIENT
Start: 2022-04-29 | End: 2022-05-29

## 2022-04-29 RX ORDER — TRAMADOL HYDROCHLORIDE 50 MG/1
50 TABLET ORAL DAILY PRN
Qty: 30 TABLET | Refills: 0 | Status: SHIPPED | OUTPATIENT
Start: 2022-04-29 | End: 2022-06-16 | Stop reason: SDUPTHER

## 2022-04-29 ASSESSMENT — ENCOUNTER SYMPTOMS
CONSTIPATION: 0
BACK PAIN: 1
WHEEZING: 0
NAUSEA: 0
SHORTNESS OF BREATH: 0
SORE THROAT: 0
DIARRHEA: 0
COUGH: 0
VOMITING: 0

## 2022-04-29 NOTE — PROGRESS NOTES
Chief Complaint   Patient presents with    Medication Refill     Tramadol, Percocet    Back Pain    Leg Pain       PMH     78-year-old man who is established patient of this clinic  Is seen for chronic generalized all over the body pain  Is diagnosed with end-stage renal disease with hemodialysis. Reports not able to have transplant d/t lack of insurance coverage  Hx of bilat BKA using cane to ambulate  Not a candidate for any interventional procedure  He has been on tramadol daily and oxycodone on HD days for this chronic pain issue  He has been stable on the regimen without any side effects       HPI:   Back Pain  This is a chronic problem. The current episode started more than 1 year ago. The problem occurs constantly. The problem is unchanged. The pain is present in the lumbar spine. The quality of the pain is described as aching. The pain does not radiate (does have bilat phantom pain). The pain is at a severity of 0/10. The patient is experiencing no pain. The symptoms are aggravated by position and standing. Associated symptoms include paresthesias. Pertinent negatives include no fever. He has tried analgesics for the symptoms. The treatment provided mild relief.      Medication Refill: Tramadol, Percocet    Pain score Today:  0  Adverse effects (Constipation / Nausea / Sedation / sexual Dysfunction / others) : no  Mood: good  Sleep pattern and quality: good  Activity level: poor    Pill count Today:3 Tramadol 2 Percocet  Last dose taken 04/29/2022  OARRS report reviewed today: yes  Morphine equivalent: 16.25  ER/Hospitalizations/PCP visit related to pain since last visit:no   Any legal problems e.g. DUI etc.:No  Satisfied with current management: Yes    Opioid Contract:04/08/2022  Last Urine Dug screen dated:03/11/2022    Lab Results   Component Value Date    LABA1C 7.1 (H) 02/01/2022     Lab Results   Component Value Date     02/01/2022       Past Medical History, Past Surgical History, Social History, Allergies and Medications reviewed and updated in EPIC as indicated    Family History reviewed and is noncontributory. Controlled Substance Monitoring:    Acute and Chronic Pain Monitoring:   RX Monitoring 4/29/2022   Attestation -   Periodic Controlled Substance Monitoring Possible medication side effects, risk of tolerance/dependence & alternative treatments discussed. ;No signs of potential drug abuse or diversion identified. ;Assessed functional status. ;Obtaining appropriate analgesic effect of treatment. Chronic Pain > 50 MEDD -             Periodic Controlled Substance Monitoring: Possible medication side effects, risk of tolerance/dependence & alternative treatments discussed. ,No signs of potential drug abuse or diversion identified. ,Assessed functional status. ,Obtaining appropriate analgesic effect of treatment. Oumar Alexandre, APRN - CNP)      Past Medical History:   Diagnosis Date    Chronic bilateral low back pain with bilateral sciatica     CRF (chronic renal failure)     [facility] MWF    DDD (degenerative disc disease), lumbar 12/15/2020    Diabetes mellitus ([facility]     Dialysis patient [facility])     ED (erectile dysfunction)     History of echocardiogram 2014    [facility]    HTN (hypertension)     Hyperlipidemia     LVH (left ventricular hypertrophy)     PVD (peripheral vascular disease) ([facility]     S/P bilateral BKA (below knee amputation) ([facility]     Wears glasses        Past Surgical History:   Procedure Laterality Date    ARM SURGERY      CATARACT REMOVAL WITH IMPLANT      DIALYSIS FISTULA CREATION Bilateral     As of 2019, RUE AVF functioning, LUE AVF nonfunctioning.     FINGER AMPUTATION      right pointer finger    FINGER SURGERY Left     I & D    GLAUCOMA SURGERY      LEG AMPUTATION BELOW KNEE Bilateral     TUNNELED VENOUS CATHETER PLACEMENT      PC placed and removed       No Known Allergies      Current Outpatient Medications:     oxyCODONE-acetaminophen (PERCOCET) 7.5-325 MG per tablet, Take 1 tablet by mouth daily as needed for Pain for up to 30 days. Intended supply: 30 days, Disp: 20 tablet, Rfl: 0    traMADol (ULTRAM) 50 MG tablet, Take 1 tablet by mouth daily as needed for Pain for up to 30 days. , Disp: 30 tablet, Rfl: 0    divalproex (DEPAKOTE) 250 MG DR tablet, Take 2 tablets by mouth every morning, Disp: 180 tablet, Rfl: 0    sildenafil (VIAGRA) 100 MG tablet, Take 100 mg by mouth as needed for Erectile Dysfunction, Disp: , Rfl:     glimepiride (AMARYL) 2 MG tablet, TAKE ONE TABLET BY MOUTH EVERY MORNING BEFORE BREAKFAST, Disp: 90 tablet, Rfl: 1    linagliptin (TRADJENTA) 5 MG tablet, TAKE ONE TABLET BY MOUTH DAILY, Disp: 90 tablet, Rfl: 0    divalproex (DEPAKOTE) 250 MG DR tablet, Take 2 tablets by mouth 2 times daily, Disp: 90 tablet, Rfl: 0    LOKELMA 5 g PACK oral suspension, , Disp: , Rfl:     minoxidil (LONITEN) 2.5 MG tablet, , Disp: , Rfl:     lisinopril (PRINIVIL;ZESTRIL) 20 MG tablet, , Disp: , Rfl:     labetalol (NORMODYNE) 200 MG tablet, Take 200 mg by mouth 2 times daily, Disp: , Rfl:     cilostazol (PLETAL) 100 MG tablet, Take 100 mg by mouth 2 times daily, Disp: , Rfl:     acetaminophen (TYLENOL) 500 MG tablet, Take 2 tablets by mouth every 6 hours as needed for Pain, Disp: 60 tablet, Rfl: 0    docusate sodium (COLACE) 100 MG capsule, Take 1 capsule by mouth 3 times daily as needed for Constipation, Disp: 30 capsule, Rfl: 0    amLODIPine-atorvastatatin (CADUET) 10-80 MG per tablet, Take 1 tablet by mouth daily, Disp: , Rfl:     Heparin Sodium, Porcine, (HEPARIN, PORCINE,) 1000 UNIT/ML injection, Heparin Sodium (Porcine) 1,000 Units/mL Systemic, Disp: , Rfl:     Multiple Vitamins-Minerals (RENAPLEX-D PO), Take 1 tablet by mouth daily , Disp: , Rfl:     hydrALAZINE (APRESOLINE) 25 MG tablet, Take 25 mg by mouth 2 times daily , Disp: , Rfl:     apixaban (ELIQUIS) 5 MG TABS tablet, Take 1 tablet by mouth 2 times daily, Disp: 180 tablet, Rfl: 1    clopidogrel (PLAVIX) 75 MG tablet, Take 1 tablet by mouth daily, Disp: 30 tablet, Rfl: 3    cloNIDine (CATAPRES) 0.3 MG tablet, Take 1 tablet by mouth 2 times daily Do not take if HR < 60, Disp: 180 tablet, Rfl: 3    aspirin 81 MG chewable tablet, Take 81 mg by mouth every morning, Disp: , Rfl:     Sucroferric Oxyhydroxide (VELPHORO) 500 MG CHEW, Take 2 tablets by mouth 3 times daily (with meals) Crush or chew and shallow 2 tablets three times a day with meals, Disp: , Rfl:     Tens Unit MISC, by Does not apply route, Disp: 1 each, Rfl: 0    lisinopril (PRINIVIL;ZESTRIL) 10 MG tablet, TAKE 1 TABLET BY MOUTH  TWICE DAILY (Patient taking differently: Take 5 mg by mouth daily TAKE 1 TABLET BY MOUTH  TWICE DAILY), Disp: 180 tablet, Rfl: 0    NONFORMULARY, , Disp: , Rfl:     gabapentin (NEURONTIN) 300 MG capsule, TAKE ONE CAPSULE BY MOUTH THREE TIMES A DAY, Disp: 90 capsule, Rfl: 2    Family History   Problem Relation Age of Onset    Diabetes Mother     Coronary Art Dis Mother     Hypertension Mother     Diabetes Father     Hypertension Father     Stroke Father     Cancer Sister     Hypertension Brother        Social History     Socioeconomic History    Marital status:      Spouse name: Not on file    Number of children: Not on file    Years of education: Not on file    Highest education level: Not on file   Occupational History    Not on file   Tobacco Use    Smoking status: Never Smoker    Smokeless tobacco: Never Used   Substance and Sexual Activity    Alcohol use: Yes     Comment: rare    Drug use: No    Sexual activity: Not on file   Other Topics Concern    Not on file   Social History Narrative    Not on file     Social Determinants of Health     Financial Resource Strain: Low Risk     Difficulty of Paying Living Expenses: Not hard at all   Food Insecurity: No Food Insecurity    Worried About Running Out of Food in the Last Year: Never true    Joao of Food in the Last Year: Never true   Transportation Needs:     Lack of Transportation (Medical): Not on file    Lack of Transportation (Non-Medical): Not on file   Physical Activity:     Days of Exercise per Week: Not on file    Minutes of Exercise per Session: Not on file   Stress:     Feeling of Stress : Not on file   Social Connections:     Frequency of Communication with Friends and Family: Not on file    Frequency of Social Gatherings with Friends and Family: Not on file    Attends Sikhism Services: Not on file    Active Member of [address] [place] [address] [address] or Organizations: Not on file    Attends Club or Organization Meetings: Not on file    Marital Status: Not on file   Intimate Partner Violence:     Fear of Current or Ex-Partner: Not on file    Emotionally Abused: Not on file    Physically Abused: Not on file    Sexually Abused: Not on file   Housing Stability:     Unable to Pay for Housing in the Last Year: Not on file    Number of [place] in the Last Year: Not on file    Unstable Housing in the Last Year: Not on file       Review of Systems:  Review of Systems   Constitutional: Negative for chills and fever. HENT: Negative for congestion and sore throat. Respiratory: Negative for cough, shortness of breath and wheezing. Musculoskeletal: Positive for back pain. Gastrointestinal: Negative for constipation, diarrhea, nausea and vomiting. Neurological: Positive for paresthesias. Physical Exam:  BP (!) 181/100   Pulse 72   Ht 5' 11\" (1.803 m)   Wt 212 lb (96.2 kg)   SpO2 100%   BMI 29.57 kg/m²     Physical Exam  Cardiovascular:      Rate and Rhythm: Normal rate. Pulmonary:      Effort: Pulmonary effort is normal.   Musculoskeletal:         General: Normal range of motion. Skin:     General: Skin is warm and dry. Neurological:      Mental Status: He is alert and oriented to person, place, and time.               Assessment:  Problem List Items Addressed This Visit     ESRD on hemodialysis ([facility] MWF    Relevant Medications    oxyCODONE-acetaminophen (PERCOCET) 7.5-325 MG per tablet    traMADol (ULTRAM) 50 MG tablet    S/P bilateral below knee amputation ([facility] - Primary    Chronic use of opiate drugs therapeutic purposes    Relevant Medications    oxyCODONE-acetaminophen (PERCOCET) 7.5-325 MG per tablet    traMADol (ULTRAM) 50 MG tablet    Spinal stenosis of lumbar region with neurogenic claudication    Relevant Medications    oxyCODONE-acetaminophen (PERCOCET) 7.5-325 MG per tablet    traMADol (ULTRAM) 50 MG tablet             Treatment Plan:  Patient relates current medications are helping the pain. Patient reports taking pain medications as prescribed, denies obtaining medications from different sources and denies use of illegal drugs. Patient denies side effects from medications like nausea, vomiting, constipation or drowsiness. Patient reports current activities of daily living are possible due to medications and would like to continue them. As always, we encourage daily stretching and strengthening exercises, and recommend minimizing use of pain medications unless patient cannot get through daily activities due to pain. Contract requirements met. Continue opioid therapy. Script sent for tramadol and percocet  Follow up appointment made for 4 weeks    I have reviewed the chief complaint and history of present illness (including ROS and PFSH) and vital documentation by my staff and I agree with their documentation and have added where applicable.

## 2022-05-11 ENCOUNTER — TELEPHONE (OUTPATIENT)
Dept: PAIN MANAGEMENT | Age: 62
End: 2022-05-11

## 2022-05-11 NOTE — TELEPHONE ENCOUNTER
I did not have a discussion with pt re switching - please have him discuss with the MD who prescribes his gabapentin or have him check with nephrologist d/t his hx of kidney failure and dialysis to prescribe.   Thanks

## 2022-05-17 ENCOUNTER — OFFICE VISIT (OUTPATIENT)
Dept: INTERNAL MEDICINE CLINIC | Age: 62
End: 2022-05-17
Payer: COMMERCIAL

## 2022-05-17 ENCOUNTER — HOSPITAL ENCOUNTER (OUTPATIENT)
Age: 62
Setting detail: SPECIMEN
Discharge: HOME | End: 2022-05-17

## 2022-05-17 VITALS
TEMPERATURE: 96.4 F | SYSTOLIC BLOOD PRESSURE: 126 MMHG | DIASTOLIC BLOOD PRESSURE: 76 MMHG | OXYGEN SATURATION: 94 % | RESPIRATION RATE: 16 BRPM | WEIGHT: 212 LBS | HEIGHT: 71 IN | BODY MASS INDEX: 29.68 KG/M2 | HEART RATE: 74 BPM

## 2022-05-17 DIAGNOSIS — N52.01 ERECTILE DYSFUNCTION DUE TO ARTERIAL INSUFFICIENCY: ICD-10-CM

## 2022-05-17 DIAGNOSIS — M51.36 DDD (DEGENERATIVE DISC DISEASE), LUMBAR: ICD-10-CM

## 2022-05-17 DIAGNOSIS — M54.42 CHRONIC BILATERAL LOW BACK PAIN WITH BILATERAL SCIATICA: ICD-10-CM

## 2022-05-17 DIAGNOSIS — Z79.891 CHRONIC USE OF OPIATE DRUG FOR THERAPEUTIC PURPOSE: ICD-10-CM

## 2022-05-17 DIAGNOSIS — Z79.02 LONG TERM (CURRENT) USE OF ANTITHROMBOTICS/ANTIPLATELETS: ICD-10-CM

## 2022-05-17 DIAGNOSIS — G89.29 CHRONIC BILATERAL LOW BACK PAIN WITH BILATERAL SCIATICA: ICD-10-CM

## 2022-05-17 DIAGNOSIS — F51.04 PSYCHOPHYSIOLOGICAL INSOMNIA: ICD-10-CM

## 2022-05-17 DIAGNOSIS — Z89.511 S/P BILATERAL BELOW KNEE AMPUTATION (HCC): ICD-10-CM

## 2022-05-17 DIAGNOSIS — M48.062 SPINAL STENOSIS OF LUMBAR REGION WITH NEUROGENIC CLAUDICATION: ICD-10-CM

## 2022-05-17 DIAGNOSIS — Z99.2 ESRD ON HEMODIALYSIS (HCC): ICD-10-CM

## 2022-05-17 DIAGNOSIS — M54.41 CHRONIC BILATERAL LOW BACK PAIN WITH BILATERAL SCIATICA: ICD-10-CM

## 2022-05-17 DIAGNOSIS — I10 ESSENTIAL HYPERTENSION: ICD-10-CM

## 2022-05-17 DIAGNOSIS — E11.8 CONTROLLED TYPE 2 DIABETES MELLITUS WITH COMPLICATION, WITH LONG-TERM CURRENT USE OF INSULIN (HCC): ICD-10-CM

## 2022-05-17 DIAGNOSIS — E78.2 MIXED HYPERLIPIDEMIA: Primary | ICD-10-CM

## 2022-05-17 DIAGNOSIS — R69 SEVERE COMORBID ILLNESS: ICD-10-CM

## 2022-05-17 DIAGNOSIS — Z79.4 CONTROLLED TYPE 2 DIABETES MELLITUS WITH COMPLICATION, WITH LONG-TERM CURRENT USE OF INSULIN (HCC): ICD-10-CM

## 2022-05-17 DIAGNOSIS — Z89.512 S/P BILATERAL BELOW KNEE AMPUTATION (HCC): ICD-10-CM

## 2022-05-17 DIAGNOSIS — N18.6 ESRD ON HEMODIALYSIS (HCC): ICD-10-CM

## 2022-05-17 PROCEDURE — 99214 OFFICE O/P EST MOD 30 MIN: CPT | Performed by: FAMILY MEDICINE

## 2022-05-17 PROCEDURE — 3017F COLORECTAL CA SCREEN DOC REV: CPT | Performed by: FAMILY MEDICINE

## 2022-05-17 PROCEDURE — 2022F DILAT RTA XM EVC RTNOPTHY: CPT | Performed by: FAMILY MEDICINE

## 2022-05-17 PROCEDURE — 3051F HG A1C>EQUAL 7.0%<8.0%: CPT | Performed by: FAMILY MEDICINE

## 2022-05-17 PROCEDURE — G8427 DOCREV CUR MEDS BY ELIG CLIN: HCPCS | Performed by: FAMILY MEDICINE

## 2022-05-17 PROCEDURE — G8417 CALC BMI ABV UP PARAM F/U: HCPCS | Performed by: FAMILY MEDICINE

## 2022-05-17 PROCEDURE — 1036F TOBACCO NON-USER: CPT | Performed by: FAMILY MEDICINE

## 2022-05-17 RX ORDER — HYDROCODONE BITARTRATE AND ACETAMINOPHEN 5; 325 MG/1; MG/1
TABLET ORAL
COMMUNITY
Start: 2022-03-03 | End: 2022-05-17

## 2022-05-17 RX ORDER — AMLODIPINE BESYLATE 10 MG/1
TABLET ORAL
Status: ON HOLD | COMMUNITY
Start: 2022-03-02 | End: 2022-05-28 | Stop reason: SDUPTHER

## 2022-05-17 RX ORDER — ATORVASTATIN CALCIUM 80 MG/1
80 TABLET, FILM COATED ORAL DAILY
Status: ON HOLD | COMMUNITY
Start: 2022-05-04 | End: 2022-09-19

## 2022-05-17 ASSESSMENT — PATIENT HEALTH QUESTIONNAIRE - PHQ9
SUM OF ALL RESPONSES TO PHQ QUESTIONS 1-9: 0
SUM OF ALL RESPONSES TO PHQ QUESTIONS 1-9: 0
SUM OF ALL RESPONSES TO PHQ9 QUESTIONS 1 & 2: 0
1. LITTLE INTEREST OR PLEASURE IN DOING THINGS: 0
SUM OF ALL RESPONSES TO PHQ QUESTIONS 1-9: 0
SUM OF ALL RESPONSES TO PHQ QUESTIONS 1-9: 0
2. FEELING DOWN, DEPRESSED OR HOPELESS: 0

## 2022-05-17 NOTE — PROGRESS NOTES
Subjective:      Patient ID: Constance Ryder is a 60 y.o. male. Diabetes  He presents for his follow-up diabetic visit. He has type 2 diabetes mellitus. His disease course has been worsening. Hypoglycemia symptoms include nervousness/anxiousness. There are no diabetic associated symptoms. There are no hypoglycemic complications. Symptoms are stable. Diabetic complications include nephropathy and PVD. Risk factors for coronary artery disease include diabetes mellitus, dyslipidemia and male sex. Current diabetic treatment includes oral agent (dual therapy). He is compliant with treatment all of the time. His weight is stable. He is following a generally healthy diet. Meal planning includes ADA exchanges, avoidance of concentrated sweets, calorie counting and carbohydrate counting. He has had a previous visit with a dietitian. He participates in exercise three times a week. His home blood glucose trend is increasing steadily. An ACE inhibitor/angiotensin II receptor blocker is contraindicated. Review of Systems   Constitutional: Negative. HENT: Negative. Eyes: Negative. Respiratory: Negative. Cardiovascular: Negative. Gastrointestinal: Negative. Endocrine: Negative. Musculoskeletal: Positive for arthralgias. Skin: Negative. Allergic/Immunologic: Negative. Neurological: Negative. Hematological: Negative. Psychiatric/Behavioral: The patient is nervous/anxious. Past family and social history unremarkable. Diagnosis Orders   1. Mixed hyperlipidemia     2. Essential hypertension     3. ESRD on hemodialysis ([facility]) MWF     4. Psychophysiological insomnia     5. Erectile dysfunction due to arterial insufficiency     6. Chronic bilateral low back pain with bilateral sciatica     7. Controlled type 2 diabetes mellitus with complication, with long-term current use of insulin ([facility])  Hemoglobin A1C   8. S/P bilateral below knee amputation ([address]     9.  Long term (current) use of antithrombotics/antiplatelets     10. Chronic use of opiate drugs therapeutic purposes     11. Severe comorbid illness     12. DDD (degenerative disc disease), lumbar     13. Spinal stenosis of lumbar region with neurogenic claudication           Objective:   Physical Exam  Vitals and nursing note reviewed. Constitutional:       Appearance: He is well-developed. HENT:      Head: Normocephalic and atraumatic. Right Ear: External ear normal.      Left Ear: External ear normal.      Nose: Nose normal.   Eyes:      Conjunctiva/sclera: Conjunctivae normal.      Pupils: Pupils are equal, round, and reactive to light. Cardiovascular:      Rate and Rhythm: Normal rate and regular rhythm. Heart sounds: Normal heart sounds. Comments: Diabetes mellitus  Hypertension  Hyperlipidemia  Pulmonary:      Effort: Pulmonary effort is normal.      Breath sounds: Normal breath sounds. Abdominal:      General: Bowel sounds are normal.      Palpations: Abdomen is soft. Genitourinary:     Comments: ESRD-hemodialysis done 3/week  Musculoskeletal:         General: Normal range of motion. Cervical back: Normal range of motion and neck supple. Comments: Bilateral lower extremity amputee secondary to complication of diabetes mellitus  Chronic pain syndrome opiate dependent, gabapentin as provided by pain management   Skin:     General: Skin is warm and dry. Neurological:      Mental Status: He is alert and oriented to person, place, and time. Deep Tendon Reflexes: Reflexes are normal and symmetric. Psychiatric:         Behavior: Behavior normal.         Thought Content: Thought content normal.         Assessment:       Diagnosis Orders   1. Mixed hyperlipidemia     2. Essential hypertension     3. ESRD on hemodialysis ([facility]) MWF     4. Psychophysiological insomnia     5. Erectile dysfunction due to arterial insufficiency     6. Chronic bilateral low back pain with bilateral sciatica     7.  Controlled

## 2022-05-18 LAB
ESTIMATED AVERAGE GLUCOSE: 120 MG/DL
HBA1C MFR BLD: 5.8 % (ref 4–6)

## 2022-05-26 ENCOUNTER — APPOINTMENT (OUTPATIENT)
Dept: GENERAL RADIOLOGY | Age: 62
End: 2022-05-26
Payer: COMMERCIAL

## 2022-05-26 ENCOUNTER — HOSPITAL ENCOUNTER (OUTPATIENT)
Age: 62
Setting detail: OBSERVATION
Discharge: HOME | End: 2022-05-28
Attending: EMERGENCY MEDICINE | Admitting: STUDENT IN AN ORGANIZED HEALTH CARE EDUCATION/TRAINING PROGRAM
Payer: COMMERCIAL

## 2022-05-26 DIAGNOSIS — I50.9 ACUTE CONGESTIVE HEART FAILURE, UNSPECIFIED HEART FAILURE TYPE (HCC): ICD-10-CM

## 2022-05-26 DIAGNOSIS — R53.1 GENERAL WEAKNESS: Primary | ICD-10-CM

## 2022-05-26 PROBLEM — R07.89 ATYPICAL CHEST PAIN: Status: ACTIVE | Noted: 2022-05-26

## 2022-05-26 LAB
ABSOLUTE EOS #: 0.48 K/UL (ref 0–0.44)
ABSOLUTE IMMATURE GRANULOCYTE: <0.03 K/UL (ref 0–0.3)
ABSOLUTE LYMPH #: 1.47 K/UL (ref 1.1–3.7)
ABSOLUTE MONO #: 1.12 K/UL (ref 0.1–1.2)
ANION GAP SERPL CALCULATED.3IONS-SCNC: 15 MMOL/L (ref 9–17)
BASOPHILS # BLD: 0 % (ref 0–2)
BASOPHILS ABSOLUTE: <0.03 K/UL (ref 0–0.2)
BUN BLDV-MCNC: 21 MG/DL (ref 8–23)
CALCIUM SERPL-MCNC: 10.1 MG/DL (ref 8.6–10.4)
CHLORIDE BLD-SCNC: 93 MMOL/L (ref 98–107)
CHP ED QC CHECK: YES
CO2: 24 MMOL/L (ref 20–31)
CREAT SERPL-MCNC: 6.05 MG/DL (ref 0.7–1.2)
EOSINOPHILS RELATIVE PERCENT: 6 % (ref 1–4)
GFR AFRICAN AMERICAN: 11 ML/MIN
GFR NON-AFRICAN AMERICAN: 9 ML/MIN
GFR SERPL CREATININE-BSD FRML MDRD: ABNORMAL ML/MIN/{1.73_M2}
GLUCOSE BLD-MCNC: 145 MG/DL
GLUCOSE BLD-MCNC: 145 MG/DL (ref 75–110)
GLUCOSE BLD-MCNC: 146 MG/DL (ref 70–99)
HCT VFR BLD CALC: 35.6 % (ref 40.7–50.3)
HEMOGLOBIN: 11.4 G/DL (ref 13–17)
IMMATURE GRANULOCYTES: 0 %
LYMPHOCYTES # BLD: 19 % (ref 24–43)
MCH RBC QN AUTO: 25.7 PG (ref 25.2–33.5)
MCHC RBC AUTO-ENTMCNC: 32 G/DL (ref 28.4–34.8)
MCV RBC AUTO: 80.4 FL (ref 82.6–102.9)
MONOCYTES # BLD: 15 % (ref 3–12)
NRBC AUTOMATED: 0 PER 100 WBC
PDW BLD-RTO: 19.8 % (ref 11.8–14.4)
PLATELET # BLD: ABNORMAL K/UL (ref 138–453)
PLATELET, FLUORESCENCE: 158 K/UL (ref 138–453)
PLATELET, IMMATURE FRACTION: 5.7 % (ref 1.1–10.3)
POTASSIUM SERPL-SCNC: 4 MMOL/L (ref 3.7–5.3)
PRO-BNP: ABNORMAL PG/ML
RBC # BLD: 4.43 M/UL (ref 4.21–5.77)
RBC # BLD: ABNORMAL 10*6/UL
SEDIMENTATION RATE, ERYTHROCYTE: 69 MM/HR (ref 0–20)
SEG NEUTROPHILS: 59 % (ref 36–65)
SEGMENTED NEUTROPHILS ABSOLUTE COUNT: 4.52 K/UL (ref 1.5–8.1)
SODIUM BLD-SCNC: 132 MMOL/L (ref 135–144)
TOTAL CK: 511 U/L (ref 39–308)
TROPONIN, HIGH SENSITIVITY: 337 NG/L (ref 0–22)
TROPONIN, HIGH SENSITIVITY: 357 NG/L (ref 0–22)
TSH SERPL DL<=0.05 MIU/L-ACNC: 1.78 UIU/ML (ref 0.3–5)
WBC # BLD: 7.6 K/UL (ref 3.5–11.3)

## 2022-05-26 PROCEDURE — 82947 ASSAY GLUCOSE BLOOD QUANT: CPT

## 2022-05-26 PROCEDURE — 99222 1ST HOSP IP/OBS MODERATE 55: CPT | Performed by: INTERNAL MEDICINE

## 2022-05-26 PROCEDURE — G0378 HOSPITAL OBSERVATION PER HR: HCPCS

## 2022-05-26 PROCEDURE — 84484 ASSAY OF TROPONIN QUANT: CPT

## 2022-05-26 PROCEDURE — 71045 X-RAY EXAM CHEST 1 VIEW: CPT

## 2022-05-26 PROCEDURE — 85652 RBC SED RATE AUTOMATED: CPT

## 2022-05-26 PROCEDURE — 93005 ELECTROCARDIOGRAM TRACING: CPT | Performed by: STUDENT IN AN ORGANIZED HEALTH CARE EDUCATION/TRAINING PROGRAM

## 2022-05-26 PROCEDURE — 84443 ASSAY THYROID STIM HORMONE: CPT

## 2022-05-26 PROCEDURE — 2060000000 HC ICU INTERMEDIATE R&B

## 2022-05-26 PROCEDURE — 85055 RETICULATED PLATELET ASSAY: CPT

## 2022-05-26 PROCEDURE — 83880 ASSAY OF NATRIURETIC PEPTIDE: CPT

## 2022-05-26 PROCEDURE — 99285 EMERGENCY DEPT VISIT HI MDM: CPT

## 2022-05-26 PROCEDURE — 85025 COMPLETE CBC W/AUTO DIFF WBC: CPT

## 2022-05-26 PROCEDURE — 82550 ASSAY OF CK (CPK): CPT

## 2022-05-26 PROCEDURE — 80048 BASIC METABOLIC PNL TOTAL CA: CPT

## 2022-05-26 RX ORDER — SODIUM CHLORIDE 9 MG/ML
INJECTION, SOLUTION INTRAVENOUS PRN
Status: DISCONTINUED | OUTPATIENT
Start: 2022-05-26 | End: 2022-05-28 | Stop reason: HOSPADM

## 2022-05-26 RX ORDER — LABETALOL 200 MG/1
200 TABLET, FILM COATED ORAL 2 TIMES DAILY
Status: DISCONTINUED | OUTPATIENT
Start: 2022-05-26 | End: 2022-05-28 | Stop reason: HOSPADM

## 2022-05-26 RX ORDER — TRAMADOL HYDROCHLORIDE 50 MG/1
50 TABLET ORAL DAILY PRN
Status: DISCONTINUED | OUTPATIENT
Start: 2022-05-26 | End: 2022-05-28 | Stop reason: HOSPADM

## 2022-05-26 RX ORDER — SODIUM CHLORIDE 0.9 % (FLUSH) 0.9 %
5-40 SYRINGE (ML) INJECTION EVERY 12 HOURS SCHEDULED
Status: DISCONTINUED | OUTPATIENT
Start: 2022-05-26 | End: 2022-05-28 | Stop reason: HOSPADM

## 2022-05-26 RX ORDER — ONDANSETRON 2 MG/ML
4 INJECTION INTRAMUSCULAR; INTRAVENOUS EVERY 6 HOURS PRN
Status: DISCONTINUED | OUTPATIENT
Start: 2022-05-26 | End: 2022-05-28 | Stop reason: HOSPADM

## 2022-05-26 RX ORDER — DEXTROSE MONOHYDRATE 50 MG/ML
100 INJECTION, SOLUTION INTRAVENOUS PRN
Status: DISCONTINUED | OUTPATIENT
Start: 2022-05-26 | End: 2022-05-26 | Stop reason: SDUPTHER

## 2022-05-26 RX ORDER — DEXTROSE MONOHYDRATE 50 MG/ML
100 INJECTION, SOLUTION INTRAVENOUS PRN
Status: DISCONTINUED | OUTPATIENT
Start: 2022-05-26 | End: 2022-05-28 | Stop reason: HOSPADM

## 2022-05-26 RX ORDER — AMLODIPINE BESYLATE 10 MG/1
10 TABLET ORAL DAILY
Status: DISCONTINUED | OUTPATIENT
Start: 2022-05-27 | End: 2022-05-28 | Stop reason: HOSPADM

## 2022-05-26 RX ORDER — CLOPIDOGREL BISULFATE 75 MG/1
75 TABLET ORAL DAILY
Status: DISCONTINUED | OUTPATIENT
Start: 2022-05-27 | End: 2022-05-27

## 2022-05-26 RX ORDER — ACETAMINOPHEN 325 MG/1
650 TABLET ORAL EVERY 8 HOURS PRN
Status: DISCONTINUED | OUTPATIENT
Start: 2022-05-26 | End: 2022-05-26 | Stop reason: SDUPTHER

## 2022-05-26 RX ORDER — GABAPENTIN 300 MG/1
300 CAPSULE ORAL 3 TIMES DAILY
Status: DISCONTINUED | OUTPATIENT
Start: 2022-05-26 | End: 2022-05-28 | Stop reason: HOSPADM

## 2022-05-26 RX ORDER — M-VIT,TX,IRON,MINS/CALC/FOLIC 27MG-0.4MG
TABLET ORAL DAILY
Status: DISCONTINUED | OUTPATIENT
Start: 2022-05-27 | End: 2022-05-28 | Stop reason: HOSPADM

## 2022-05-26 RX ORDER — INSULIN LISPRO 100 [IU]/ML
0-9 INJECTION, SOLUTION INTRAVENOUS; SUBCUTANEOUS NIGHTLY
Status: DISCONTINUED | OUTPATIENT
Start: 2022-05-26 | End: 2022-05-26

## 2022-05-26 RX ORDER — SODIUM CHLORIDE 0.9 % (FLUSH) 0.9 %
10 SYRINGE (ML) INJECTION PRN
Status: DISCONTINUED | OUTPATIENT
Start: 2022-05-26 | End: 2022-05-28 | Stop reason: HOSPADM

## 2022-05-26 RX ORDER — CILOSTAZOL 100 MG/1
100 TABLET ORAL 2 TIMES DAILY
Status: DISCONTINUED | OUTPATIENT
Start: 2022-05-26 | End: 2022-05-27

## 2022-05-26 RX ORDER — DIVALPROEX SODIUM 500 MG/1
500 TABLET, DELAYED RELEASE ORAL 2 TIMES DAILY
Status: DISCONTINUED | OUTPATIENT
Start: 2022-05-26 | End: 2022-05-26

## 2022-05-26 RX ORDER — ATORVASTATIN CALCIUM 80 MG/1
80 TABLET, FILM COATED ORAL NIGHTLY
Status: DISCONTINUED | OUTPATIENT
Start: 2022-05-26 | End: 2022-05-28 | Stop reason: HOSPADM

## 2022-05-26 RX ORDER — INSULIN LISPRO 100 [IU]/ML
0-6 INJECTION, SOLUTION INTRAVENOUS; SUBCUTANEOUS
Status: DISCONTINUED | OUTPATIENT
Start: 2022-05-27 | End: 2022-05-28

## 2022-05-26 RX ORDER — ASPIRIN 81 MG/1
81 TABLET, CHEWABLE ORAL EVERY MORNING
Status: DISCONTINUED | OUTPATIENT
Start: 2022-05-27 | End: 2022-05-28 | Stop reason: HOSPADM

## 2022-05-26 RX ORDER — LISINOPRIL 10 MG/1
10 TABLET ORAL 2 TIMES DAILY
Status: DISCONTINUED | OUTPATIENT
Start: 2022-05-26 | End: 2022-05-28 | Stop reason: HOSPADM

## 2022-05-26 RX ORDER — ACETAMINOPHEN 325 MG/1
650 TABLET ORAL EVERY 6 HOURS PRN
Status: DISCONTINUED | OUTPATIENT
Start: 2022-05-26 | End: 2022-05-28 | Stop reason: HOSPADM

## 2022-05-26 RX ORDER — INSULIN LISPRO 100 [IU]/ML
0-18 INJECTION, SOLUTION INTRAVENOUS; SUBCUTANEOUS
Status: DISCONTINUED | OUTPATIENT
Start: 2022-05-27 | End: 2022-05-26

## 2022-05-26 RX ORDER — DOCUSATE SODIUM 100 MG/1
100 CAPSULE, LIQUID FILLED ORAL 3 TIMES DAILY PRN
Status: DISCONTINUED | OUTPATIENT
Start: 2022-05-26 | End: 2022-05-28 | Stop reason: HOSPADM

## 2022-05-26 RX ORDER — OXYCODONE HYDROCHLORIDE AND ACETAMINOPHEN 5; 325 MG/1; MG/1
1 TABLET ORAL DAILY PRN
Status: DISCONTINUED | OUTPATIENT
Start: 2022-05-26 | End: 2022-05-28 | Stop reason: HOSPADM

## 2022-05-26 RX ORDER — INSULIN LISPRO 100 [IU]/ML
0-3 INJECTION, SOLUTION INTRAVENOUS; SUBCUTANEOUS NIGHTLY
Status: DISCONTINUED | OUTPATIENT
Start: 2022-05-26 | End: 2022-05-28

## 2022-05-26 RX ORDER — ALOGLIPTIN 6.25 MG/1
6.25 TABLET, FILM COATED ORAL DAILY
Status: DISCONTINUED | OUTPATIENT
Start: 2022-05-27 | End: 2022-05-28 | Stop reason: HOSPADM

## 2022-05-26 RX ORDER — OXYCODONE AND ACETAMINOPHEN 7.5; 325 MG/1; MG/1
1 TABLET ORAL DAILY PRN
Status: DISCONTINUED | OUTPATIENT
Start: 2022-05-26 | End: 2022-05-26

## 2022-05-26 RX ORDER — ONDANSETRON 4 MG/1
4 TABLET, ORALLY DISINTEGRATING ORAL EVERY 8 HOURS PRN
Status: DISCONTINUED | OUTPATIENT
Start: 2022-05-26 | End: 2022-05-28 | Stop reason: HOSPADM

## 2022-05-26 RX ORDER — OXYCODONE HYDROCHLORIDE 5 MG/1
2.5 TABLET ORAL DAILY PRN
Status: DISCONTINUED | OUTPATIENT
Start: 2022-05-26 | End: 2022-05-28 | Stop reason: HOSPADM

## 2022-05-26 RX ORDER — ACETAMINOPHEN 650 MG/1
650 SUPPOSITORY RECTAL EVERY 6 HOURS PRN
Status: DISCONTINUED | OUTPATIENT
Start: 2022-05-26 | End: 2022-05-28 | Stop reason: HOSPADM

## 2022-05-26 SDOH — ECONOMIC STABILITY: TRANSPORTATION INSECURITY
IN THE PAST 12 MONTHS, HAS THE LACK OF TRANSPORTATION KEPT YOU FROM MEDICAL APPOINTMENTS OR FROM GETTING MEDICATIONS?: NO

## 2022-05-26 SDOH — ECONOMIC STABILITY: INCOME INSECURITY: IN THE LAST 12 MONTHS, WAS THERE A TIME WHEN YOU WERE NOT ABLE TO PAY THE MORTGAGE OR RENT ON TIME?: NO

## 2022-05-26 SDOH — HEALTH STABILITY: PHYSICAL HEALTH: ON AVERAGE, HOW MANY DAYS PER WEEK DO YOU ENGAGE IN MODERATE TO STRENUOUS EXERCISE (LIKE A BRISK WALK)?: 0 DAYS

## 2022-05-26 SDOH — ECONOMIC STABILITY: HOUSING INSECURITY
IN THE LAST 12 MONTHS, WAS THERE A TIME WHEN YOU DID NOT HAVE A STEADY PLACE TO SLEEP OR SLEPT IN A SHELTER (INCLUDING NOW)?: NO

## 2022-05-26 SDOH — ECONOMIC STABILITY: TRANSPORTATION INSECURITY
IN THE PAST 12 MONTHS, HAS LACK OF TRANSPORTATION KEPT YOU FROM MEETINGS, WORK, OR FROM GETTING THINGS NEEDED FOR DAILY LIVING?: NO

## 2022-05-26 SDOH — HEALTH STABILITY: PHYSICAL HEALTH: ON AVERAGE, HOW MANY MINUTES DO YOU ENGAGE IN EXERCISE AT THIS LEVEL?: 0 MIN

## 2022-05-26 ASSESSMENT — SOCIAL DETERMINANTS OF HEALTH (SDOH)
WITHIN THE LAST YEAR, HAVE YOU BEEN AFRAID OF YOUR PARTNER OR EX-PARTNER?: NO
WITHIN THE LAST YEAR, HAVE YOU BEEN HUMILIATED OR EMOTIONALLY ABUSED IN OTHER WAYS BY YOUR PARTNER OR EX-PARTNER?: NO
HOW OFTEN DO YOU GET TOGETHER WITH FRIENDS OR RELATIVES?: MORE THAN THREE TIMES A WEEK
HOW OFTEN DO YOU ATTENT MEETINGS OF THE CLUB OR ORGANIZATION YOU BELONG TO?: NEVER
HOW OFTEN DO YOU ATTEND CHURCH OR RELIGIOUS SERVICES?: MORE THAN 4 TIMES PER YEAR
DO YOU BELONG TO ANY CLUBS OR ORGANIZATIONS SUCH AS CHURCH GROUPS UNIONS, FRATERNAL OR ATHLETIC GROUPS, OR SCHOOL GROUPS?: YES
WITHIN THE LAST YEAR, HAVE YOU BEEN KICKED, HIT, SLAPPED, OR OTHERWISE PHYSICALLY HURT BY YOUR PARTNER OR EX-PARTNER?: NO
WITHIN THE LAST YEAR, HAVE TO BEEN RAPED OR FORCED TO HAVE ANY KIND OF SEXUAL ACTIVITY BY YOUR PARTNER OR EX-PARTNER?: NO
IN A TYPICAL WEEK, HOW MANY TIMES DO YOU TALK ON THE PHONE WITH FAMILY, FRIENDS, OR NEIGHBORS?: MORE THAN THREE TIMES A WEEK

## 2022-05-26 ASSESSMENT — ENCOUNTER SYMPTOMS
NAUSEA: 0
SHORTNESS OF BREATH: 0
BACK PAIN: 0
SORE THROAT: 0
COUGH: 0
SINUS PAIN: 0
DIARRHEA: 0
EYE PAIN: 0
VOMITING: 0
ABDOMINAL PAIN: 0

## 2022-05-26 ASSESSMENT — LIFESTYLE VARIABLES: HOW OFTEN DO YOU HAVE A DRINK CONTAINING ALCOHOL: NEVER

## 2022-05-26 NOTE — ED PROVIDER NOTES
[facility] [address] [facility]     Emergency Department     Faculty Attestation        I performed a history and physical examination of the patient and discussed management with the resident. I reviewed the residents note and agree with the documented findings and plan of care. Any areas of disagreement are noted on the chart. I was personally present for the key portions of any procedures. I have documented in the chart those procedures where I was not present during the key portions. I have reviewed the emergency nurses triage note. I agree with the chief complaint, past medical history, past surgical history, allergies, medications, social and family history as documented unless otherwise noted below. For Physician Assistant/ Nurse Practitioner cases/documentation I have personally evaluated this patient and have completed at least one if not all key elements of the E/M (history, physical exam, and MDM). Additional findings are as noted. Vital Signs: BP: (!) 159/73  Heart Rate: 82  Resp: 20  Temp: 98.6 °F (37 °C) SpO2: 96 %  PCP:  Erlinda Balbuena MD    Pertinent Comments:           EKG Interpretation    Interpreted by emergency department physician    Rhythm: Irregularly irregular  Rate: Normal rate with atrial fibrillation rhythm at 82 bpm  Axis: normal  Conduction: Other  ST Segments: no acute change  T Waves: no acute change  Q Waves: no acute change    Clinical Impression: Atrial Fibrillation with Current Spontaneous Rate Control and when compared to previous EKG on 7/4/2021 that had atrial flutter on this appears relatively unchanged in overall morphology        Critical Care  None    This patient was evaluated in the Emergency Department for symptoms described in the history of present illness.  He/she was evaluated in the context of the global COVID-19 pandemic, which necessitated consideration that the patient might be at risk for infection with the SARS-CoV-2 virus that causes COVID-19. Institutional protocols and algorithms that pertain to the evaluation of patients at risk for COVID-19 are in a state of rapid change based on information released by regulatory bodies including the CDC and federal and state organizations. These policies and algorithms were followed during the patient's care in the ED. (Please note that portions of this note were completed with a voice recognition program. Efforts were made to edit the dictations but occasionally words are mis-transcribed.  Whenever words are used in this note in any gender, they shall be construed as though they were used in the gender appropriate to the circumstances; and whenever words are used in this note in the singular or plural form, they shall be construed as though they were used in the form appropriate to the circumstances.)    MD Samantha Plunkett  Attending Emergency Medicine Physician           Concepcion Chisholm MD  05/26/22 Trever Hopson MD  05/26/22 [id number]

## 2022-05-26 NOTE — ED PROVIDER NOTES
[facility]  ED  Emergency Department Encounter  EmergencyMedicineResident     This patient was seen during the COVID-19 crisis. There were limited resources and those resources we did have had to be conserved for the sickest of patients. Pt Name: Vikram Coto  MRN: [id number]  [place] 1960  Date of evaluation: 5/26/22  PCP: Flower Bowles MD    [address] [place] [address]       Chief Complaint   Patient presents with    Fatigue     x 3 months, worse today       HISTORY OF PRESENT ILLNESS  (Location/Symptom, Timing/Onset, Context/Setting, Quality, Duration, Modifying Factors, Severity.)      Vikram Coto is a 60 y.o. male who presents evaluation of weakness. Patient reports for the last 3 months has been feeling weak. He states that he has to pull himself up by the sink to get up from the floor. It took 3 ER staff members to get him out of the wheelchair. He denies chest pain shortness of breath or any other symptoms besides weakness. He states the last time he felt like this he ended up getting a stent. He reports that he has not missed any of his dialysis sessions and he actually has a T-shirt for perfect attendance. PAST MEDICAL / SURGICAL /SOCIAL / FAMILY HISTORY      has a past medical history of Chronic bilateral low back pain with bilateral sciatica, CRF (chronic renal failure), DDD (degenerative disc disease), lumbar, Diabetes mellitus ([address] Dialysis patient [facility]), ED (erectile dysfunction), History of echocardiogram, HTN (hypertension), Hyperlipidemia, LVH (left ventricular hypertrophy), PVD (peripheral vascular disease) ([address] S/P bilateral BKA (below knee amputation) ([facility] [address] and Wears glasses. has a past surgical history that includes Glaucoma surgery; Cataract removal with implant; Leg amputation below knee (Bilateral); Tunneled venous catheter placement; Dialysis fistula creation (Bilateral); Finger surgery (Left); Finger amputation; and Arm Surgery.       Social History     Socioeconomic History    Marital status:      Spouse name: Not on file    Number of children: Not on file    Years of education: Not on file    Highest education level: Not on file   Occupational History    Not on file   Tobacco Use    Smoking status: Never Smoker    Smokeless tobacco: Never Used   Substance and Sexual Activity    Alcohol use: Yes     Comment: rare    Drug use: No    Sexual activity: Not on file   Other Topics Concern    Not on file   Social History Narrative    Not on file     Social Determinants of Health     Financial Resource Strain: Low Risk     Difficulty of Paying Living Expenses: Not hard at all   Food Insecurity: No Food Insecurity    Worried About [address] [place] in the Last Year: Never true    [facility] in the Last Year: Never true   Transportation Needs:     Lack of Transportation (Medical): Not on file    Lack of Transportation (Non-Medical):  Not on file   Physical Activity:     Days of Exercise per Week: Not on file    Minutes of Exercise per Session: Not on file   Stress:     Feeling of Stress : Not on file   Social Connections:     Frequency of Communication with Friends and Family: Not on file    Frequency of Social Gatherings with Friends and Family: Not on file    Attends Shinto Services: Not on file    Active Member of [address] [place] [address] [place] or Organizations: Not on file    Attends Club or Organization Meetings: Not on file    Marital Status: Not on file   Intimate Partner Violence:     Fear of Current or Ex-Partner: Not on file    Emotionally Abused: Not on file    Physically Abused: Not on file    Sexually Abused: Not on file   Housing Stability:     Unable to Pay for Housing in the Last Year: Not on file    Number of [place] in the Last Year: Not on file    Unstable Housing in the Last Year: Not on file       Family History   Problem Relation Age of Onset    Diabetes Mother     Coronary Art Dis Mother     Hypertension Mother     Diabetes Father     Hypertension Father     Stroke Father     Cancer Sister     Hypertension Brother        Allergies:  Patient has no known allergies. Home Medications:  Prior to Admission medications    Medication Sig Start Date End Date Taking? Authorizing Provider   amLODIPine (NORVASC) 10 MG tablet  3/2/22   Historical Provider, MD   atorvastatin (LIPITOR) 80 MG tablet  5/4/22   Historical Provider, MD   LOPERAMIDE HCL PO Take 2 mg by mouth 4/8/22 4/7/23  Historical Provider, MD   oxyCODONE-acetaminophen (PERCOCET) 7.5-325 MG per tablet Take 1 tablet by mouth daily as needed for Pain for up to 30 days. Intended supply: 30 days 4/29/22 5/29/22  AURORA Calles CNP   traMADol (ULTRAM) 50 MG tablet Take 1 tablet by mouth daily as needed for Pain for up to 30 days.  4/29/22 5/29/22  AURORA Calles CNP   sildenafil (VIAGRA) 100 MG tablet Take 100 mg by mouth as needed for Erectile Dysfunction    Historical Provider, MD   glimepiride (AMARYL) 2 MG tablet TAKE ONE TABLET BY MOUTH EVERY MORNING BEFORE BREAKFAST 3/30/22   Libertad Duran MD   linagliptin (TRADJENTA) 5 MG tablet TAKE ONE TABLET BY MOUTH DAILY 3/30/22   Libertad Duran MD   divalproex (DEPAKOTE) 250 MG DR tablet Take 2 tablets by mouth 2 times daily 2/15/22   Libertad Duran MD   PATIENT’S [facility] 5 g PACK oral suspension  2/7/22   Historical Provider, MD   minoxidil (LONITEN) 2.5 MG tablet 10 mg 2 times daily  1/15/22   Historical Provider, MD   labetalol (NORMODYNE) 200 MG tablet Take 200 mg by mouth 2 times daily    Historical Provider, MD   cilostazol (PLETAL) 100 MG tablet Take 100 mg by mouth 2 times daily    Historical Provider, MD   acetaminophen (TYLENOL) 500 MG tablet Take 2 tablets by mouth every 6 hours as needed for Pain 9/1/21   Jose C Neto, MD   docusate sodium (COLACE) 100 MG capsule Take 1 capsule by mouth 3 times daily as needed for Constipation 8/10/21   Sheila Small PA-C   amLODIPine-atorvastatatin (CADUET) 10-80 MG per tablet Take 1 tablet by mouth daily    Historical Provider, MD   Heparin Sodium, Porcine, (HEPARIN, PORCINE,) 1000 UNIT/ML injection Heparin Sodium (Porcine) 1,000 Units/mL Systemic 3/1/21   Historical Provider, MD   Multiple Vitamins-Minerals (RENAPLEX-D PO) Take 1 tablet by mouth daily     Historical Provider, MD   hydrALAZINE (APRESOLINE) 25 MG tablet Take 25 mg by mouth 2 times daily   Patient not taking: Reported on 5/17/2022    Historical Provider, MD   apixaban (ELIQUIS) 5 MG TABS tablet Take 1 tablet by mouth 2 times daily  Patient taking differently: Take 5 mg by mouth 3 times daily  1/7/21   Amanda Ozuna MD   clopidogrel (PLAVIX) 75 MG tablet Take 1 tablet by mouth daily 1/8/21   Amanda Ozuna MD   cloNIDine (CATAPRES) 0.3 MG tablet Take 1 tablet by mouth 2 times daily Do not take if HR < 60  Patient taking differently: Take 0.3 mg by mouth 3 times daily Do not take if HR < 60 1/7/21   Amanda Ozuna MD   aspirin 81 MG chewable tablet Take 81 mg by mouth every morning    Historical Provider, MD   Sucroferric Oxyhydroxide (VELPHORO) 500 MG CHEW Take 2 tablets by mouth 3 times daily (with meals) Crush or chew and shallow 2 tablets three times a day with meals    Historical Provider, MD   Tens Unit [facility] by Does not apply route 12/15/20   Alesha Yan MD   lisinopril (PRINIVIL;ZESTRIL) 10 MG tablet TAKE 1 TABLET BY MOUTH  TWICE DAILY  Patient taking differently: Take 10 mg by mouth in the morning and at bedtime  7/10/20   Key Freeman MD   NONFORMULARY     Historical Provider, MD   gabapentin (NEURONTIN) 300 MG capsule TAKE ONE CAPSULE BY MOUTH THREE TIMES A DAY 2/13/17   Yanique Smith, DO       REVIEW OF SYSTEMS    (2-9 systems for level 4, 10 or more forlevel 5)      Review of Systems   Constitutional: Positive for activity change and fatigue. Negative for chills and fever. HENT: Negative for congestion, sinus pain and sore throat.     Eyes: Negative for pain and visual disturbance. Respiratory: Negative for cough and shortness of breath. Cardiovascular: Negative for chest pain. Gastrointestinal: Negative for abdominal pain, diarrhea, nausea and vomiting. Genitourinary: Negative for difficulty urinating, dysuria and hematuria. Musculoskeletal: Negative for back pain and myalgias. Skin: Negative for rash and wound. Neurological: Positive for weakness. Negative for dizziness, light-headedness and headaches. Psychiatric/Behavioral: Negative for agitation and confusion. PHYSICAL EXAM   (up to 7 for level 4, 8 or more forlevel 5)      ED TRIAGE VITALS BP: (!) 159/73, Temp: 98.6 °F (37 °C), Heart Rate: 82, Resp: 20, SpO2: 96 %    Vitals:    05/26/22 1545   BP: (!) 159/73   Pulse: 82   Resp: 20   Temp: 98.6 °F (37 °C)   SpO2: 96%   Weight: 212 lb (96.2 kg)         Physical Exam  Vitals and nursing note reviewed. Constitutional:       Appearance: Normal appearance. HENT:      Head: Normocephalic and atraumatic. Nose: Nose normal.      Mouth/Throat:      Mouth: Mucous membranes are moist.   Eyes:      Extraocular Movements: Extraocular movements intact. Pupils: Pupils are equal, round, and reactive to light. Cardiovascular:      Rate and Rhythm: Normal rate and regular rhythm. Pulses: Normal pulses. Heart sounds: Normal heart sounds. Pulmonary:      Effort: Pulmonary effort is normal.      Breath sounds: Normal breath sounds. No wheezing or rales. Abdominal:      General: Abdomen is flat. Palpations: Abdomen is soft. Musculoskeletal:      Cervical back: Normal range of motion. Comments: Bilateral lower extremity amputations   Skin:     General: Skin is warm and dry. Capillary Refill: Capillary refill takes less than 2 seconds. Neurological:      General: No focal deficit present. Mental Status: He is alert and oriented to person, place, and time.    Psychiatric:         Mood and Affect: Mood normal. Behavior: Behavior normal.           DIFFERENTIAL  DIAGNOSIS     PLAN (LABS / IMAGING / EKG):  Orders Placed This Encounter   Procedures    XR CHEST PORTABLE    CBC with Auto Differential    Basic Metabolic Panel w/ Reflex to MG    Troponin    Brain Natriuretic Peptide    Troponin    Immature Platelet Fraction    POCT Glucose    POC Glucose Fingerstick    EKG 12 Lead       MEDICATIONS ORDERED:  ED Medication Orders (From admission, onward)    None          DIAGNOSTIC RESULTS / EMERGENCY DEPARTMENT COURSE / MDM     IMPRESSION & INITIAL PLAN:  79-year-old male history of past medical history of ESRD and CAD presenting return today for evaluation of generalized weakness. He reports he has been feeling this way for last 3 months. He states he is unable to get up on the ground without grabbing onto something. Plan get ACS work-up and admit to medicine observation unit based on work-up    Patient signed out to Dr. Rayray Marshall prior to lab work being obtained. EKG shows either rate controlled A. fib or sinus arrhythmia with first-degree block.     LABS:  Results for orders placed or performed during the hospital encounter of 05/26/22   CBC with Auto Differential   Result Value Ref Range    WBC 7.6 3.5 - 11.3 k/uL    RBC 4.43 4.21 - 5.77 m/uL    Hemoglobin 11.4 (L) 13.0 - 17.0 g/dL    Hematocrit 35.6 (L) 40.7 - 50.3 %    MCV 80.4 (L) 82.6 - 102.9 fL    MCH 25.7 25.2 - 33.5 pg    MCHC 32.0 28.4 - 34.8 g/dL    RDW 19.8 (H) 11.8 - 14.4 %    Platelets See Reflexed IPF Result 138 - 453 k/uL    NRBC Automated 0.0 0.0 per 100 WBC    RBC Morphology ANISOCYTOSIS PRESENT     Seg Neutrophils 59 36 - 65 %    Lymphocytes 19 (L) 24 - 43 %    Monocytes 15 (H) 3 - 12 %    Eosinophils % 6 (H) 1 - 4 %    Basophils 0 0 - 2 %    Immature Granulocytes 0 0 %    Segs Absolute 4.52 1.50 - 8.10 k/uL    Absolute Lymph # 1.47 1.10 - 3.70 k/uL    Absolute Mono # 1.12 0.10 - 1.20 k/uL    Absolute Eos # 0.48 (H) 0.00 - 0.44 k/uL Basophils Absolute <0.03 0.00 - 0.20 k/uL    Absolute Immature Granulocyte <0.03 0.00 - 0.30 k/uL   Troponin   Result Value Ref Range    Troponin, High Sensitivity 357 (HH) 0 - 22 ng/L   Immature Platelet Fraction   Result Value Ref Range    Platelet, Immature Fraction 5.7 1.1 - 10.3 %    Platelet, Fluorescence 158 138 - 453 k/uL   POCT Glucose   Result Value Ref Range    Glucose 145 mg/dL    QC OK? yes    POC Glucose Fingerstick   Result Value Ref Range    POC Glucose 145 (H) 75 - 110 mg/dL       RADIOLOGY:  XR CHEST PORTABLE   Final Result   Left-sided central venous catheter terminating in the innominate vein      Findings suggest congestive heart failure             ECG: A. fib rate controlled 82, left axis, intervals within normal limits no T wave abnormalities    All EKG's are interpreted by the Emergency Department Physician who either signsor Co-signs this chart in the absence of a cardiologist.    CONSULTS:  None    CRITICAL CARE:  See attending physician note    FINAL IMPRESSION      1. General weakness          DISPOSITION / PLAN     DISPOSITION    Admission    PATIENT REFERRED TO:  No follow-up provider specified.     DISCHARGE MEDICATIONS:  New Prescriptions    No medications on file     Modified Medications    No medications on file        Essence Mandujano MD  Emergency Medicine Resident    (Please note that portions of this note were completed with a voice recognition program.  Efforts were made to edit the dictations but occasionally words are mis-transcribed.)       Essence Mandujano MD  Resident  05/26/22 2070

## 2022-05-26 NOTE — ED PROVIDER NOTES
[facility] ED  Emergency Department  Emergency Medicine Resident Sign-out     Care of Kely Sanches was assumed from Dr. Johnny Javed and is being seen for Fatigue (x 3 months, worse today)  . The patient's initial evaluation and plan have been discussed with the prior provider who initially evaluated the patient. EMERGENCY DEPARTMENT COURSE / MEDICAL DECISION MAKING:       MEDICATIONS GIVEN:  No orders of the defined types were placed in this encounter. LABS / RADIOLOGY:     Labs Reviewed   CBC WITH AUTO DIFFERENTIAL - Abnormal; Notable for the following components:       Result Value    Hemoglobin 11.4 (*)     Hematocrit 35.6 (*)     MCV 80.4 (*)     RDW 19.8 (*)     Lymphocytes 19 (*)     Monocytes 15 (*)     Eosinophils % 6 (*)     Absolute Eos # 0.48 (*)     All other components within normal limits   POC GLUCOSE FINGERSTICK - Abnormal; Notable for the following components:    POC Glucose 145 (*)     All other components within normal limits   POCT GLUCOSE - Normal   IMMATURE PLATELET FRACTION   BASIC METABOLIC PANEL W/ REFLEX TO MG FOR LOW K   TROPONIN   BRAIN NATRIURETIC PEPTIDE   TROPONIN       XR CHEST PORTABLE    Result Date: 5/26/2022  EXAMINATION: ONE XRAY VIEW OF THE CHEST 5/26/2022 4:16 pm COMPARISON: 01/06/2021 HISTORY: ORDERING SYSTEM PROVIDED HISTORY: weakness TECHNOLOGIST PROVIDED HISTORY: weakness FINDINGS: Left-sided central venous catheter terminates in the innominate vein. Cardiomegaly. Pulmonary vascular congestion. Possible pulmonary edema. No focal pulmonary consolidation. No pneumothorax. Left-sided central venous catheter terminating in the innominate vein Findings suggest congestive heart failure       RECENT VITALS:     Temp: 98.6 °F (37 °C),  Heart Rate: 82, Resp: 20, BP: (!) 159/73, SpO2: 96 %      This patient is a 60 y.o. Male with weakness for 3 months progessivley worsening. Bilateral BKA, with CAD w/stent.     Patient's elevated BNP and chest x-ray and lung sounds consistent with CHF exacerbation. Troponins elevated per baseline however down trended. Patient admitted to medicine service for further care      ED Course as of 05/26/22 2215   Thu May 26, 2022   1801 Patient evaluated bedside. He says he still feeling weak. Still evidence of shortness of breath. BMP grossly elevated having CHF exacerbation. Admit to medicine service [ZE]   56 Spoke with [facility] attending they will accept patient [ZE]      ED Course User Index  [ZE] Les Raman DO       OUTSTANDING TASKS / RECOMMENDATIONS:    1. F/u labs  2. Anticipate admission      FINAL IMPRESSION:     1. General weakness        DISPOSITION:         DISPOSITION:  []  Discharge   []  Transfer -    [x]  Admission -     []  Against Medical Advice   []  Eloped   FOLLOW-UP: No follow-up provider specified.    DISCHARGE MEDICATIONS: New Prescriptions    No medications on file          Janice Davison DO  Emergency Medicine Resident  [address] [place]  Resident  05/26/22 2774

## 2022-05-26 NOTE — ED NOTES
Pt to ED with complaints of weakness for roughly 3 months. Pt states it got increasingly worse and today it was unbearable. Pt is a dialysis patient MWF and has not missed any treatments. Pt states his sugars have somewhat low, especially yesterday. Pt is usually able to walk at home. Pt placed on full cardiac monitor. EKG obtained. IV established. Dr. Hassan Overcast at bedside.      Spencer Acosta RN  05/26/22 7997

## 2022-05-27 PROBLEM — R53.81 PHYSICAL DECONDITIONING: Status: ACTIVE | Noted: 2022-05-27

## 2022-05-27 PROBLEM — R79.89 ELEVATED TROPONIN: Status: ACTIVE | Noted: 2022-01-01

## 2022-05-27 PROBLEM — R77.8 ELEVATED TROPONIN: Status: ACTIVE | Noted: 2022-05-27

## 2022-05-27 LAB
ANION GAP SERPL CALCULATED.3IONS-SCNC: 13 MMOL/L (ref 9–17)
BUN BLDV-MCNC: 30 MG/DL (ref 8–23)
CALCIUM SERPL-MCNC: 9.6 MG/DL (ref 8.6–10.4)
CHLORIDE BLD-SCNC: 95 MMOL/L (ref 98–107)
CO2: 25 MMOL/L (ref 20–31)
CREAT SERPL-MCNC: 7.3 MG/DL (ref 0.7–1.2)
GFR AFRICAN AMERICAN: 9 ML/MIN
GFR NON-AFRICAN AMERICAN: 8 ML/MIN
GFR SERPL CREATININE-BSD FRML MDRD: ABNORMAL ML/MIN/{1.73_M2}
GLUCOSE BLD-MCNC: 113 MG/DL (ref 70–99)
GLUCOSE BLD-MCNC: 204 MG/DL (ref 75–110)
GLUCOSE BLD-MCNC: 226 MG/DL (ref 75–110)
GLUCOSE BLD-MCNC: 308 MG/DL (ref 75–110)
POTASSIUM SERPL-SCNC: 3.9 MMOL/L (ref 3.7–5.3)
SODIUM BLD-SCNC: 133 MMOL/L (ref 135–144)

## 2022-05-27 PROCEDURE — 1200000000 HC SEMI PRIVATE

## 2022-05-27 PROCEDURE — 99226 PR SBSQ OBSERVATION CARE/DAY 35 MINUTES: CPT | Performed by: STUDENT IN AN ORGANIZED HEALTH CARE EDUCATION/TRAINING PROGRAM

## 2022-05-27 PROCEDURE — 97535 SELF CARE MNGMENT TRAINING: CPT

## 2022-05-27 PROCEDURE — 90935 HEMODIALYSIS ONE EVALUATION: CPT

## 2022-05-27 PROCEDURE — 6370000000 HC RX 637 (ALT 250 FOR IP): Performed by: INTERNAL MEDICINE

## 2022-05-27 PROCEDURE — G0378 HOSPITAL OBSERVATION PER HR: HCPCS

## 2022-05-27 PROCEDURE — 97166 OT EVAL MOD COMPLEX 45 MIN: CPT

## 2022-05-27 PROCEDURE — 6360000002 HC RX W HCPCS: Performed by: INTERNAL MEDICINE

## 2022-05-27 PROCEDURE — 80048 BASIC METABOLIC PNL TOTAL CA: CPT

## 2022-05-27 PROCEDURE — 99233 SBSQ HOSP IP/OBS HIGH 50: CPT | Performed by: INTERNAL MEDICINE

## 2022-05-27 PROCEDURE — 2580000003 HC RX 258: Performed by: INTERNAL MEDICINE

## 2022-05-27 PROCEDURE — 82947 ASSAY GLUCOSE BLOOD QUANT: CPT

## 2022-05-27 RX ORDER — HEPARIN SODIUM 1000 [USP'U]/ML
1700 INJECTION, SOLUTION INTRAVENOUS; SUBCUTANEOUS ONCE
Status: COMPLETED | OUTPATIENT
Start: 2022-05-27 | End: 2022-05-27

## 2022-05-27 RX ORDER — HEPARIN SODIUM 1000 [USP'U]/ML
1800 INJECTION, SOLUTION INTRAVENOUS; SUBCUTANEOUS ONCE
Status: COMPLETED | OUTPATIENT
Start: 2022-05-27 | End: 2022-05-27

## 2022-05-27 RX ADMIN — ATORVASTATIN CALCIUM 80 MG: 80 TABLET, FILM COATED ORAL at 20:00

## 2022-05-27 RX ADMIN — GABAPENTIN 300 MG: 300 CAPSULE ORAL at 16:42

## 2022-05-27 RX ADMIN — APIXABAN 5 MG: 5 TABLET, FILM COATED ORAL at 16:46

## 2022-05-27 RX ADMIN — GABAPENTIN 300 MG: 300 CAPSULE ORAL at 00:23

## 2022-05-27 RX ADMIN — ASPIRIN 81 MG: 81 TABLET, CHEWABLE ORAL at 16:46

## 2022-05-27 RX ADMIN — LISINOPRIL 10 MG: 10 TABLET ORAL at 16:45

## 2022-05-27 RX ADMIN — INSULIN LISPRO 2 UNITS: 100 INJECTION, SOLUTION INTRAVENOUS; SUBCUTANEOUS at 20:00

## 2022-05-27 RX ADMIN — OXYCODONE 2.5 MG: 5 TABLET ORAL at 18:40

## 2022-05-27 RX ADMIN — ALOGLIPTIN 6.25 MG: 6.25 TABLET, FILM COATED ORAL at 16:45

## 2022-05-27 RX ADMIN — APIXABAN 5 MG: 5 TABLET, FILM COATED ORAL at 00:23

## 2022-05-27 RX ADMIN — OXYCODONE HYDROCHLORIDE AND ACETAMINOPHEN 1 TABLET: 5; 325 TABLET ORAL at 17:06

## 2022-05-27 RX ADMIN — GABAPENTIN 300 MG: 300 CAPSULE ORAL at 16:45

## 2022-05-27 RX ADMIN — HEPARIN SODIUM 1800 UNITS: 1000 INJECTION INTRAVENOUS; SUBCUTANEOUS at 11:53

## 2022-05-27 RX ADMIN — INSULIN LISPRO 2 UNITS: 100 INJECTION, SOLUTION INTRAVENOUS; SUBCUTANEOUS at 18:35

## 2022-05-27 RX ADMIN — CLONIDINE HYDROCHLORIDE 0.3 MG: 0.1 TABLET ORAL at 00:23

## 2022-05-27 RX ADMIN — HEPARIN SODIUM 1700 UNITS: 1000 INJECTION INTRAVENOUS; SUBCUTANEOUS at 11:52

## 2022-05-27 RX ADMIN — INSULIN LISPRO 1 UNITS: 100 INJECTION, SOLUTION INTRAVENOUS; SUBCUTANEOUS at 00:30

## 2022-05-27 RX ADMIN — LABETALOL HYDROCHLORIDE 200 MG: 200 TABLET, FILM COATED ORAL at 16:46

## 2022-05-27 RX ADMIN — LISINOPRIL 10 MG: 10 TABLET ORAL at 23:12

## 2022-05-27 RX ADMIN — AMLODIPINE BESYLATE 10 MG: 10 TABLET ORAL at 16:45

## 2022-05-27 RX ADMIN — ATORVASTATIN CALCIUM 80 MG: 80 TABLET, FILM COATED ORAL at 00:23

## 2022-05-27 RX ADMIN — LABETALOL HYDROCHLORIDE 200 MG: 200 TABLET, FILM COATED ORAL at 00:23

## 2022-05-27 RX ADMIN — CLONIDINE HYDROCHLORIDE 0.3 MG: 0.1 TABLET ORAL at 16:46

## 2022-05-27 RX ADMIN — SODIUM CHLORIDE, PRESERVATIVE FREE 10 ML: 5 INJECTION INTRAVENOUS at 23:12

## 2022-05-27 RX ADMIN — GABAPENTIN 300 MG: 300 CAPSULE ORAL at 20:00

## 2022-05-27 RX ADMIN — SODIUM CHLORIDE, PRESERVATIVE FREE 10 ML: 5 INJECTION INTRAVENOUS at 01:15

## 2022-05-27 RX ADMIN — LISINOPRIL 10 MG: 10 TABLET ORAL at 00:23

## 2022-05-27 RX ADMIN — APIXABAN 5 MG: 5 TABLET, FILM COATED ORAL at 20:00

## 2022-05-27 RX ADMIN — Medication 1 TABLET: at 16:45

## 2022-05-27 RX ADMIN — DOCUSATE SODIUM 100 MG: 100 CAPSULE, LIQUID FILLED ORAL at 20:00

## 2022-05-27 NOTE — PROGRESS NOTES
Occupational Therapy  Facility/Department: [facility] 2  Occupational Therapy Initial Assessment      Name: Laney Mehta  : 1960  MRN: [id number]  Date of Service: 2022    Discharge Recommendations:      Chief Complaint   Patient presents with    Fatigue     x 3 months, worse today     OT Equipment Recommendations  Equipment Needed: Yes  Mobility Devices: ADL Assistive Devices  ADL Assistive Devices: Grab Bars - toilet;Grab Bars - tub (Bedrail (x1))    Patient Diagnosis(es): The primary encounter diagnosis was General weakness. A diagnosis of Acute congestive heart failure, unspecified heart failure type [facility]) was also pertinent to this visit. Past Medical History:  has a past medical history of Acute congestive heart failure (HCC), Chronic bilateral low back pain with bilateral sciatica, CRF (chronic renal failure), DDD (degenerative disc disease), lumbar, Diabetes mellitus ([facility] [address] Dialysis patient [facility]), ED (erectile dysfunction), History of echocardiogram, HTN (hypertension), Hyperlipidemia, LVH (left ventricular hypertrophy), PVD (peripheral vascular disease) ([facility] [address] S/P bilateral BKA (below knee amputation) ([facility] [address] and Wears glasses. Past Surgical History:  has a past surgical history that includes Glaucoma surgery; Cataract removal with implant; Leg amputation below knee (Bilateral); Tunneled venous catheter placement; Dialysis fistula creation (Bilateral); Finger surgery (Left); Finger amputation; and Arm Surgery. Assessment   Performance deficits / Impairments: Decreased functional mobility ; Decreased endurance;Decreased ADL status; Decreased balance;Decreased strength;Decreased safe awareness;Decreased high-level IADLs  Assessment: Pt previously functioned Independently / Mod I for all ADLs and Mobility. At this time, he has impairments / deficits which impact his ability to regain PLOF.   Pt would benefit from continued (acute) OT services, but will not require additional OT services at time of Discharge. Prognosis: Good  Decision Making: Medium Complexity  No Skilled OT:  (Continue Acute OT only. No continuation of OT at time of DC.)  REQUIRES OT FOLLOW-UP: Yes (Acute only)  Activity Tolerance  Activity Tolerance: Patient Tolerated treatment well        Plan   Plan  Times per Week: 3-4x/week  Current Treatment Recommendations: Strengthening,Balance training,Functional mobility training,Endurance training,Equipment evaluation, education, & procurement,Patient/Caregiver education & training,Safety education & training,Self-Care / ADL,Home management training     Restrictions  Restrictions/Precautions  Restrictions/Precautions: General Precautions,Up as Tolerated  Position Activity Restriction  Other position/activity restrictions: Hx BKAs (wears Prostheses)    Subjective   General  Patient assessed for rehabilitation services?: Yes  Family / Caregiver Present: No  Diagnosis: Atypical Chest Pain  Subjective  Subjective: RN approved Pt to be seen for OT evaluation. General Comment  Comments: Pt was agreeable and cooperative. Social/Functional History  Social/Functional History  Lives With: Alone  Type of Home: House  Home Layout: One level,Work area in basement (laundry and gym (for exercise) in basement)  Home Access: Stairs to enter with rails  Entrance Stairs - Number of Steps: 4 steps to enter (with HR). 13 steps basement to 1st floor (with HR). Bathroom Shower/Tub: Tub/Shower unit  Bathroom Toilet: Standard (No DME for toilet)  Bathroom Equipment: Tub transfer bench  Home Equipment: Crutches (Pt has Bilateral LE Prostheses (from 2009 & 2014); Has Crutches (typically does not use, is currently using to ensure balance))  ADL Assistance: Independent (All tasks, including donning and doffing prostheses)  Homemaking Assistance: Independent (Pt does all tasks (cooking, cleaning, working out, laundry))  Ambulation Assistance: Independent (Mod I with Bilateral Prostheses.   No DME typically - current use of Crutches (for balance support). )  Transfer Assistance: Independent  Active : No  Patient's  Info: Medical Cab     Objective   Vision Exceptions: Wears glasses for reading  Hearing: Within functional limits       Safety Devices  Type of Devices: All fall risk precautions in place;Call light within reach; Left in bed  Restraints  Restraints Initially in Place: No     Bed Mobility Training  Bed Mobility Training: Yes  Overall Level of Assistance: Modified independent; Adaptive equipment; Additional time (Use of hospital bed (and bedrail Bilateral))  Interventions: Verbal cues; Safety awareness training  Supine to Sit: Modified independent  Scooting: Modified independent    Balance  Sitting: Intact  Standing: With support (with crutches)  Transfer Training  Transfer Training: Yes  Overall Level of Assistance: Stand-by assistance; Additional time; Adaptive equipment (Crutches)  Interventions: Verbal cues; Safety awareness training (Intermittent Min VCs for safe / accurate technique.)  Sit to Stand: Stand-by assistance  Stand to Sit: Stand-by assistance  Stand Pivot Transfers: Stand-by assistance    Gait  Overall Level of Assistance: Stand-by assistance; Additional time; Adaptive equipment (Using crutches, for functional mobility in-room and in-bathroom))  Interventions: Verbal cues; Safety awareness training (Intermittent Min VCs for safe / accurate technique.)    Toilet Transfers  Toilet - Technique: Ambulating  Equipment Used: Grab bars  Toilet Transfer: Stand by assistance  Toilet Transfers Comments: Simulated toilet t/f with SBA with Bilateral Grab Bars and Crutches. Intermittent Min VCs for safe / accurate technique. ADL  Feeding: Modified independent   Feeding Skilled Clinical Factors: Seated at EOB  UE Dressing: Modified independent   UE Dressing Skilled Clinical Factors: Seated at EOB to don gown. LE Dressing: Supervision;Setup; Increased time to complete  LE Dressing Skilled Clinical Factors: Seated EOB to don BLE Prostheses prior to stand / transfer / mobility. Transfers  Stand Pivot Transfers: Stand by assistance  Sit to stand: Stand by assistance  Stand to sit: Stand by assistance  Transfer Comments: Using crutches. Intermittent Min VCs for safe / accurate technique. No LOB. Cognition  Overall Cognitive Status: WFL     LUE AROM (degrees)  LUE AROM : WFL  LUE General AROM: BUE MMT WFL  Left Hand AROM (degrees)  Left Hand AROM: WFL  Left Hand General AROM: BUE MMT WFL  RUE AROM (degrees)  RUE AROM : WFL  RUE General AROM: BUE MMT WFL  Right Hand AROM (degrees)  Right Hand AROM: WFL  Right Hand General AROM: BUE MMT WFL    AM-PAC Score  AM-PAC Inpatient Daily Activity Raw Score: 20 (05/27/22 1629)  AM-PAC Inpatient ADL T-Scale Score : 42.03 (05/27/22 1629)  ADL Inpatient CMS 0-100% Score: 38.32 (05/27/22 1629)  ADL Inpatient CMS G-Code Modifier : CJ (05/27/22 1629)    Goals  Short Term Goals  Time Frame for Short term goals: By Discharge  Short Term Goal 1: Pt will verbalize / demo Mod I and Good Integration of EC & Ax Pacing during all functional tasks. Short Term Goal 2: Pt will verbalize / demo Mod I and Correct Use of AE / DME during all ADLs / ADL Transfers. Short Term Goal 3: Pt will maintain Fair+ Dynamic Standing Balance (10-12 mins) while participating in functional tasks. Short Term Goal 4: Pt will complete Functional Mobility (with item retrieval / transport) with Mod I with Good Integration of Safety.        Therapy Time   Individual Concurrent Group Co-treatment   Time In 7458         Time Out 1544         Minutes 29         Timed Code Treatment Minutes: 23 Minutes (ADL)       RENATO Crespo, OTR/L

## 2022-05-27 NOTE — PROGRESS NOTES
[facility]  Office: [address], DO, Yelitza Shoemaker, DO, Ross Patee, DO, Maira Donohue Blood, DO, Daniel Pedersen MD, Mei Hopkins MD, Jamie Tapia MD, Manuel Bourne MD, Dominick Murphy MD, Christy Guzmán MD, Alberto De La Cruz MD, Johnnie Thibodeaux, DO, Sharon Freeman, DO, Jaskaran Sharpe MD,  Adri Gonzalez, DO, Jeremy Adrian MD, Solo Biswas MD, Eric Millard MD, Ricci Lundborg, DO, Jena Giraldo MD, Jerome Platt MD, Siva Hamilton MD, Edouard Watters, [facility], [facility], CNP, Rosalee Graham, CNP, Jessie Arias, CNP, Deanna Quintana, CNP, Radha Otto, CNP, JEB BurnsC, Te Marcelo, DNP, David Ortiz, CNP, Gilberto Martinez, CNP, Ava Smith, CNP, Mylene Strauss, CNS, Demetrice Cardoza, DNP, Kae Aponte, CNP, Dorcas Rucker, CNP, Rema Jackson, CNP         Kike Parker [place] [address]    Progress Note    5/27/2022    2:12 PM    Name:   Constance Pino  MRN:     [id number]     [place]:      [de-identified]   Room:   2018/2018-01  IP Day:  1  Admit Date:  5/26/2022  3:34 PM    PCP:   Bre Bateman MD  Code Status:  Full Code    Subjective:     C/C:   Chief Complaint   Patient presents with   Dorena Kevin Fatigue     x 3 months, worse today     Interval History Status: not changed  . Patient states that he feels weak and fatigued and is not able to take care of himself  States that he feels like he is passing out  No fever or chills  No abnormal body movement  No chest pain or shortness of breath  Undergoing dialysis on evaluation    Brief History:     60-year-old male with significant medical history of ESRD on hemodialysis, systolic heart failure, coronary artery disease, bilateral BKA presented to the hospital with generalized fatigue, weakness going on for last few months. Patient is not able to take care of himself at home. Patient states that he felt as if he was passing out for few seconds. No abnormal body movements reported.   He lives alone and nobody witnessed the episode. Patient was noted to have elevated troponin and cardiology was consulted and patient admitted for evaluation. Review of Systems:     Constitutional:  negative for chills, fevers, sweats, positive for fatigue  Respiratory:  negative for cough, dyspnea on exertion, shortness of breath, wheezing  Cardiovascular:  negative for chest pain, chest pressure/discomfort, lower extremity edema, palpitations  Gastrointestinal:  negative for abdominal pain, constipation, diarrhea, nausea, vomiting  Neurological:  negative for dizziness, headache    Medications:      Allergies:  No Known Allergies    Current Meds:   Scheduled Meds:    amLODIPine  10 mg Oral Daily    atorvastatin  80 mg Oral Nightly    apixaban  5 mg Oral BID    aspirin  81 mg Oral QAM    cloNIDine  0.3 mg Oral TID    gabapentin  300 mg Oral TID    labetalol  200 mg Oral BID    alogliptin  6.25 mg Oral Daily    lisinopril  10 mg Oral BID    therapeutic multivitamin-minerals   Oral Daily    Sucroferric Oxyhydroxide  2 tablet Oral TID WC    sodium chloride flush  5-40 mL IntraVENous 2 times per day    insulin lispro  0-6 Units SubCUTAneous TID WC    insulin lispro  0-3 Units SubCUTAneous Nightly     Continuous Infusions:    sodium chloride      dextrose       PRN Meds: docusate sodium, traMADol, sodium chloride flush, sodium chloride, ondansetron **OR** ondansetron, acetaminophen **OR** acetaminophen, bisacodyl, glucose, dextrose bolus **OR** dextrose bolus, glucagon (rDNA), dextrose, oxyCODONE-acetaminophen **AND** oxyCODONE    Data:     Past Medical History:   has a past medical history of Chronic bilateral low back pain with bilateral sciatica, CRF (chronic renal failure), DDD (degenerative disc disease), lumbar, Diabetes mellitus ([facility] [address] Dialysis patient [facility]), ED (erectile dysfunction), History of echocardiogram, HTN (hypertension), Hyperlipidemia, LVH (left ventricular hypertrophy), PVD (peripheral vascular disease) ([facility] [address] S/P bilateral BKA (below knee amputation) ([facility] and Wears glasses. Social History:   reports that he has never smoked. He has never used smokeless tobacco. He reports current alcohol use. He reports that he does not use drugs. Family History:   Family History   Problem Relation Age of Onset    Diabetes Mother     Coronary Art Dis Mother     Hypertension Mother     Diabetes Father     Hypertension Father     Stroke Father     Cancer Sister     Hypertension Brother        Vitals:  /63   Pulse 70   Temp 98.2 °F (36.8 °C)   Resp 20   Wt 205 lb 0.4 oz (93 kg)   SpO2 93%   BMI 28.60 kg/m²   Temp (24hrs), Av.2 °F (36.8 °C), Min:97.5 °F (36.4 °C), Max:98.6 °F (37 °C)    Recent Labs     22  0011   POCGLU 145* 226*       I/O (24Hr):   No intake or output data in the 24 hours ending 22 1412    Labs:  Hematology:  Recent Labs     22  160   WBC 7.6   RBC 4.43   HGB 11.4*   HCT 35.6*   MCV 80.4*   MCH 25.7   MCHC 32.0   RDW 19.8*   PLT See Reflexed IPF Result   SEDRATE 69*     Chemistry:  Recent Labs     22  16022  16122  1705 22  0938   *  --   --  133*   K 4.0  --   --  3.9   CL 93*  --   --  95*   CO2 24  --   --  25   GLUCOSE 146* 145  --  113*   BUN 21  --   --  30*   CREATININE 6.05*  --   --  7.30*   ANIONGAP 15  --   --  13   LABGLOM 9*  --   --  8*   GFRAA 11*  --   --  9*   CALCIUM 10.1  --   --  9.6   PROBNP 60,131*  --   --   --    TROPHS 357*  --  337*  --    CKTOTAL 511*  --   --   --      Recent Labs     22  1601 22  1615 22  0011   TSH 1.78  --   --    POCGLU  --  145* 226*     ABG:No results found for: POCPH, PHART, PH, POCPCO2, QCV0NCP, PCO2, POCPO2, PO2ART, PO2, POCHCO3, BXK1XUO, HCO3, NBEA, PBEA, BEART, BE, THGBART, THB, NZY3DEB, WVPG3AJC, S6HZNHXW, O2SAT, FIO2  Lab Results   Component Value Date/Time    SPECIAL NOT REPORTED 2017 12:30 PM     Lab Results Component Value Date/Time    CULTURE DUPLICATE ORDER 41/02/1002 12:30 PM    CULTURE  09/07/2017 12:30 PM     Performed at [facility] [id number] [facility], [address] [place] [address] [phone]       Radiology:  XR CHEST PORTABLE    Result Date: 5/26/2022  Left-sided central venous catheter terminating in the innominate vein Findings suggest congestive heart failure       Physical Examination:        General appearance:  alert, cooperative and no distress  Mental Status:  oriented to person, place and time and normal affect  Lungs:  clear to auscultation bilaterally, normal effort  Heart:  regular rate and rhythm, no murmur  Abdomen:  soft, nontender, nondistended, normal bowel sounds, no masses, hepatomegaly, splenomegaly  Extremities: Bilateral BKA, trace edema  Skin:  no gross lesions, rashes, induration  Tunneled catheter present on left side  Assessment:        Hospital Problems           Last Modified POA    * (Principal) Atypical chest pain 5/26/2022 Yes    Elevated troponin 5/27/2022 Yes    Physical deconditioning 5/27/2022 Yes    Hyperlipidemia 5/27/2022 Yes    Essential hypertension 5/27/2022 Yes    ESRD on hemodialysis ([address] MWF 5/27/2022 Yes    Controlled diabetes mellitus type 2 with complications ([address] 2/25/3363 Yes    S/P bilateral below knee amputation ([facility] [address] 5/27/2022 Yes    Long term (current) use of antithrombotics/antiplatelets 9/74/6358 Yes          Plan:        Patient denies any chest pain  EKG showing chronic A. fib  Cardiology planning Holter monitor, previous bradycardic episodes  Obtain echo  DC Plavix per cardiology  Continue aspirin and Eliquis  Elevated troponin likely secondary to ESRD  Continue antihypertensives, monitor blood pressure  Obtain orthostatics as patient reports dizziness  Volume management and dialysis per nephrology  Will need sleep study outpatient  Chronic pain syndrome- watch opioids use  PT OT evaluation  Discharge planning    Pantera White MD  5/27/2022  2:12 PM

## 2022-05-27 NOTE — CONSULTS
Attestation signed by      Attending Physician Statement:    I have discussed the care of  Renetta Nina , including pertinent history and exam findings, with the Cardiology fellow/resident. I have seen and examined the patient and the key elements of all parts of the encounter have been performed by me. I agree with the assessment, plan and orders as documented by the fellow/resident, after I modified exam findings and plan of treatments, and the final version is my approved version of the assessment. Additional Comments: Cardiology consulted for elevated HS trop elevation which is chronic in nature. He denies any chest pain. ECG showed atrial fibrillation with no ischemic changes. Still on ASA/plavix and eliquis. DC plavix. Continue ASA and eliquis. Volume and electrolytes management per nephrology. He reports episode of heading bobbing while sitting. ? Falling a sleep. Will obtain holter monitor for 14 days. He has b/l Maurie Severe, MD               [facility] Card[facility]    Consult / H&P               Today's Date: 5/27/2022  Patient Name: Renetta Nina  Date of admission: 5/26/2022  3:34 PM  Patient's age: 60 y.o., 1960  Admission Dx: Atypical chest pain [R07.89]  General weakness [R53.1]  Acute congestive heart failure, unspecified heart failure type ([address] [I50.9]    Reason for Consult:  Cardiac evaluation    Requesting Physician: Pat Pires MD    CHIEF COMPLAINT:  Fatigue and weak    History Obtained From:  patient, electronic medical record    HISTORY OF PRESENT ILLNESS:      The patient is a 60 y.o. male with underlying CHF, ESRD and bilateral BKA presenting with chronic fatigue and weakness over the past few months more exacerbated now. Patient also has been reporting some episodes of brief blackouts for the past couple of months.   Patient was brought to the ER and found to be in acute CHF with bilateral crackles on lung exam with elevated proBNP and chest x-ray showing fluid overload. Initial troponin was elevated with a flat trend with EKG showing no acute ischemic changes. Cardiology was consulted for NSTEMI versus acute CHF exacerbation. Past Medical History:   has a past medical history of Chronic bilateral low back pain with bilateral sciatica, CRF (chronic renal failure), DDD (degenerative disc disease), lumbar, Diabetes mellitus ([facility] [address] Dialysis patient [facility]), ED (erectile dysfunction), History of echocardiogram, HTN (hypertension), Hyperlipidemia, LVH (left ventricular hypertrophy), PVD (peripheral vascular disease) ([facility] [address] S/P bilateral BKA (below knee amputation) ([address] and Wears glasses. Past Surgical History:   has a past surgical history that includes Glaucoma surgery; Cataract removal with implant; Leg amputation below knee (Bilateral); Tunneled venous catheter placement; Dialysis fistula creation (Bilateral); Finger surgery (Left); Finger amputation; and Arm Surgery. Home Medications:    Prior to Admission medications    Medication Sig Start Date End Date Taking? Authorizing Provider   amLODIPine (NORVASC) 10 MG tablet  3/2/22   Historical Provider, MD   atorvastatin (LIPITOR) 80 MG tablet  5/4/22   Historical Provider, MD   LOPERAMIDE HCL PO Take 2 mg by mouth 4/8/22 4/7/23  Historical Provider, MD   oxyCODONE-acetaminophen (PERCOCET) 7.5-325 MG per tablet Take 1 tablet by mouth daily as needed for Pain for up to 30 days. Intended supply: 30 days 4/29/22 5/29/22  Cleatus Faster, APRN - CNP   traMADol (ULTRAM) 50 MG tablet Take 1 tablet by mouth daily as needed for Pain for up to 30 days.  4/29/22 5/29/22  Cleatus Faster, APRN - CNP   sildenafil (VIAGRA) 100 MG tablet Take 100 mg by mouth as needed for Erectile Dysfunction    Historical Provider, MD   glimepiride (AMARYL) 2 MG tablet TAKE ONE TABLET BY MOUTH EVERY MORNING BEFORE BREAKFAST 3/30/22   Bre Bateman MD   linagliptin (TRADJENTA) 5 MG tablet TAKE ONE TABLET BY MOUTH DAILY 3/30/22   Bre Bateman MD divalproex (DEPAKOTE) 250 MG DR tablet Take 2 tablets by mouth 2 times daily  Patient not taking: Reported on 5/26/2022 2/15/22   Darby Holman MD   PATIENT’S [facility] 5 g PACK oral suspension  2/7/22   Historical Provider, MD   minoxidil (LONITEN) 2.5 MG tablet 10 mg 2 times daily   Patient not taking: Reported on 5/26/2022 1/15/22   Historical Provider, MD   labetalol (NORMODYNE) 200 MG tablet Take 200 mg by mouth 2 times daily  Patient not taking: Reported on 5/26/2022    Historical Provider, MD   cilostazol (PLETAL) 100 MG tablet Take 100 mg by mouth 2 times daily  Patient not taking: Reported on 5/26/2022    Historical Provider, MD   acetaminophen (TYLENOL) 500 MG tablet Take 2 tablets by mouth every 6 hours as needed for Pain 9/1/21   Annabella Cardenas MD   docusate sodium (COLACE) 100 MG capsule Take 1 capsule by mouth 3 times daily as needed for Constipation  Patient not taking: Reported on 5/26/2022 8/10/21   Mariola Barbosa PA-C   amLODIPine-atorvastatatin (CADUET) 10-80 MG per tablet Take 1 tablet by mouth daily     Historical Provider, MD   Heparin Sodium, Porcine, (HEPARIN, PORCINE,) 1000 UNIT/ML injection Heparin Sodium (Porcine) 1,000 Units/mL Systemic 3/1/21   Historical Provider, MD   Multiple Vitamins-Minerals (RENAPLEX-D PO) Take 1 tablet by mouth daily     Historical Provider, MD   hydrALAZINE (APRESOLINE) 25 MG tablet Take 25 mg by mouth 2 times daily     Historical Provider, MD   apixaban (ELIQUIS) 5 MG TABS tablet Take 1 tablet by mouth 2 times daily  Patient taking differently: Take 5 mg by mouth 3 times daily  1/7/21   Shannen Christian MD   clopidogrel (PLAVIX) 75 MG tablet Take 1 tablet by mouth daily 1/8/21   Shannen Christian MD   cloNIDine (CATAPRES) 0.3 MG tablet Take 1 tablet by mouth 2 times daily Do not take if HR < 60  Patient taking differently: Take 0.3 mg by mouth 3 times daily Do not take if HR < 60 1/7/21   Shannen Christian MD   aspirin 81 MG chewable tablet Take 81 mg by mouth mg, 650 mg, Rectal, Q6H PRN  bisacodyl (DULCOLAX) EC tablet 5 mg, 5 mg, Oral, Daily PRN  insulin lispro (HUMALOG) injection vial 0-6 Units, 0-6 Units, SubCUTAneous, TID WC  insulin lispro (HUMALOG) injection vial 0-3 Units, 0-3 Units, SubCUTAneous, Nightly  glucose chewable tablet 16 g, 4 tablet, Oral, PRN  dextrose bolus 10% 125 mL, 125 mL, IntraVENous, PRN **OR** dextrose bolus 10% 250 mL, 250 mL, IntraVENous, PRN  glucagon (rDNA) injection 1 mg, 1 mg, IntraMUSCular, PRN  dextrose 5 % solution, 100 mL/hr, IntraVENous, PRN  oxyCODONE-acetaminophen (PERCOCET) 5-325 MG per tablet 1 tablet, 1 tablet, Oral, Daily PRN **AND** oxyCODONE (ROXICODONE) immediate release tablet 2.5 mg, 2.5 mg, Oral, Daily PRN    Allergies:  Patient has no known allergies. Social History:   reports that he has never smoked. He has never used smokeless tobacco. He reports current alcohol use. He reports that he does not use drugs. Family History: family history includes Cancer in his sister; Coronary Art Dis in his mother; Diabetes in his father and mother; Hypertension in his brother, father, and mother; Stroke in his father. REVIEW OF SYSTEMS:    · Constitutional: there has been no unanticipated weight loss. There's been No change in energy level, No change in activity level. · Eyes: No visual changes or diplopia. No scleral icterus. · ENT: No Headaches  · Cardiovascular: As above. · Respiratory: No previous pulmonary problems, No cough  · Gastrointestinal: No abdominal pain. No change in bowel or bladder habits. · Genitourinary: No dysuria, trouble voiding, or hematuria. · Musculoskeletal:  No gait disturbance, No weakness or joint complaints. · Integumentary: No rash or pruritis. · Neurological: No headache, diplopia, change in muscle strength, numbness or tingling. No change in gait, balance, coordination, mood, affect, memory, mentation, behavior. · Psychiatric: No anxiety, or depression.   · Endocrine: No temperature intolerance. No excessive thirst, fluid intake, or urination. No tremor. · Hematologic/Lymphatic: No abnormal bruising or bleeding, blood clots or swollen lymph nodes. · Allergic/Immunologic: No nasal congestion or hives. PHYSICAL EXAM:      BP (!) 151/71   Pulse 71   Temp 98.4 °F (36.9 °C) (Oral)   Resp 16   Wt 204 lb 4.8 oz (92.7 kg)   SpO2 93%   BMI 28.49 kg/m²    Constitutional and General Appearance: alert, cooperative, no distress and appears stated age  HEENT: PERRL, no cervical lymphadenopathy. No masses palpable. Normal oral mucosa  Respiratory:  · Normal excursion and expansion without use of accessory muscles  · Resp Auscultation: Good respiratory effort. No for increased work of breathing. On auscultation: clear to auscultation bilaterally  Cardiovascular:  · The apical impulse is not displaced  · irregular S1 and S2. Abdomen:   · No masses or tenderness  · Bowel sounds present  Extremities:  ·  BKA  Neurological:  · Alert and oriented. · Moves all extremities well        Labs:     CBC:   Recent Labs     05/26/22  1601   WBC 7.6   HGB 11.4*   HCT 35.6*   PLT See Reflexed IPF Result     BMP:   Recent Labs     05/26/22  1601 05/26/22  1615   *  --    K 4.0  --    CO2 24  --    BUN 21  --    CREATININE 6.05*  --    LABGLOM 9*  --    GLUCOSE 146* 145     BNP: No results for input(s): BNP in the last 72 hours. PT/INR: No results for input(s): PROTIME, INR in the last 72 hours. APTT:No results for input(s): APTT in the last 72 hours. CARDIAC ENZYMES:  Recent Labs     05/26/22  1601   CKTOTAL 511*     FASTING LIPID PANEL:  Lab Results   Component Value Date    HDL 55 02/01/2022    TRIG 52 09/28/2021     LIVER PROFILE:No results for input(s): AST, ALT, LABALBU in the last 72 hours. Cardiac Cath 01/2021  LMCA: Normal 0% stenosis.     LAD: Mild irregularities 20-30%. Apical - distal 80% stenosis 9Small  territory     LCx: Mild irregularities 10-20%. OM1: Ostial 90% stenosis (Small)  OM2: Minimal disease     RCA: Ostial / proximal calcified 90% stenosis       Lesion on Prox RCA: Proximal subsection. 99% stenosis 8 mm length reduced    to 20%. Pre procedure MARIA GUADALUPE II flow was noted. Post Procedure MARIA GUADALUPE III    flow was present. Good runoff was present. The lesion was diagnosed as    High Risk (C). Conclusions:   Two vessel CAD   Successful PTCA -KAMILAH RCA   EBL: 20 CC      Recommendations      Post stent protocol    Echo 12/2020:  LVEF 55%. Mild Dilated RV. Mildly reduced RV systolic function. Biatrial dilatation. Moderate posterior annular calcification of mitral valve, mildly thickened leaflets. RVSP 36 mmHg. IMPRESSION:    1.  NSTEMI likely type II with elevated troponin with a flat trend, likely secondary to ESRD. 2.  Initial presentation with fatigue with elevated proBNP and chest x-ray with fluid overload. 3. Brief episodes of syncope over the last few months. 4.  ESRD on hemodialysis. No missed sessions of hemodialysis. 5.  Previous history of CAD status post RCA stent in 2020.  6.  A. Fib, likely Paroxysmal, was needed diagnosed in 2020. On Eliquis at home. 7. Type 2 diabetes mellitus. RECOMMENDATIONS:  1. Follow up echocardiogram to rule out WMA with LVEF. 2. No EKG changes and troponin trend to suggest acute cardiac ischemia at this time. 3. Will restart home medications for blood pressure. 4. Fluid removal for CHF as per nephrology. 5. Restart home eliquis. 6. Further recommendations based on Echo. Discussed with patient and Nurse.     Wil Matthews MD       Cardiovascular Fellow PGY-4  5/27/2022, 9:16 AM

## 2022-05-27 NOTE — PROGRESS NOTES
Dialysis Post Treatment Note  Patient tolerated treatment well. Denies complaints at time of discharge. Vitals:    05/27/22 1300   BP: (!) 157/82   Pulse: 74   Resp: 18   Temp: 98.4 °F (36.9 °C)   SpO2:      Pre-Weight = 93kg   Post-weight = Weight: 198 lb 6.6 oz (90 kg)  Total Liters Processed = Total Liters Processed (l/min): 78.4 l/min  Rinseback Volume (mL) = Rinseback Volume (ml): 300 ml  Net Removal (mL) = 3000ml   Length of treatment=210 minutes   Access: Left Tunneled catheter       Treatment completed, tolerated well, no complications noted.

## 2022-05-27 NOTE — CARE COORDINATION
Case Management Initial Discharge Plan  Breanna Murphy,             Met with:patient to discuss discharge plans. Information verified: address, contacts, phone number, , insurance Yes  Insurance Provider: Yesica Bello:     Emergency Contact/Next of Kin name & number: Bandar Cardoza (son) [phone]  Who are involved in patient's support system? family    PCP: Claudia Pryor MD  Date of last visit: 2 weeks      Discharge Planning    Living Arrangements:  [address] has 1 [address]  4 stairs to climb to get into front door    Patient able to perform ADL's:Independent    Current Services (outpatient & in home) HD MWF  DME equipment: crutches, bilateral lower prosthetics  DME provider:     Is patient receiving oral anticoagulation therapy? Yes Eliquis    If indicated:   Physician managing anticoagulation treatment:   Where does patient obtain lab work for ATC treatment? Does patient have any issues/concerns obtaining medications? No  If yes, what are patient's concerns? Is there a preferred Pharmacy after hours or on weekends? Yes    If yes, which pharmacy? [facility] on  Needed:  N/A    Patient agreeable to home care: No  [place] of choice provided:  n/a    Prior SNF/Rehab Placement and Facility: no  Agreeable to SNF/Rehab: No  [place] of choice provided: n/a     Evaluation: no    Expected Discharge date:       Patient expects to be discharged to: If home: is the family and/or caregiver wiling & able to provide support at home? N/a independent  Who will be providing this support? Follow Up Appointment: Best Day/ Time:      Transportation provider: medical cab  Transportation arrangements needed for discharge: Yes    Readmission Risk              Risk of Unplanned Readmission:  22             Does patient have a readmission risk score greater than 14?: Yes  If yes, follow-up appointment must be made within 7 days of discharge.      Goals of Care: comfort      Educated patient on transitional options, provided freedom of choice and are agreeable with plan      Discharge Plan: home alone. Requesting straight cane.  PS sent to Dr Madison Meza          Electronically signed by Paula Hathaway RN on 5/27/22 at 3:52 PM EDT

## 2022-05-27 NOTE — PROGRESS NOTES
Dialysis Time Out  To be done by RN and tech or 2 RNs  Staff Names RN BALWINDER and MIKE Massey     [x]  Identity of the patient using 2 patient identifiers  [x]  Consent for treatment  [x]  Equipment-proper machine and dialyzer  [x]  B-Hep B status  [x]  Orders- to include bath, blood flow, dialyzer, time and fluid removal  [x]  Access-Correct site and in working order  [x]  Time for patient to ask questions.

## 2022-05-27 NOTE — PROGRESS NOTES
Physical Therapy         Physical Therapy Cancel Note      DATE: 2022    NAME: Rd Correa  MRN: [id number]   : 1960      Patient not seen this date for Physical Therapy due to:    Unavailable for PT eval. At dialysis.       Electronically signed by Tirso Harry PT on 2022 at 1:34 PM

## 2022-05-27 NOTE — PROGRESS NOTES
[facility]  Documentation Note                Admission Dx: Atypical chest pain [R07.89]  General weakness [R53.1]  Acute congestive heart failure, unspecified heart failure type ([facility] [address] [I50.9]    Past Medical History:   has a past medical history of Chronic bilateral low back pain with bilateral sciatica, CRF (chronic renal failure), DDD (degenerative disc disease), lumbar, Diabetes mellitus ([facility] [address] Dialysis patient [facility]), ED (erectile dysfunction), History of echocardiogram, HTN (hypertension), Hyperlipidemia, LVH (left ventricular hypertrophy), PVD (peripheral vascular disease) ([facility] [address] S/P bilateral BKA (below knee amputation) ([facility] [address] and Wears glasses. Previous Testing:     HOLTER 1/7/2021:   1. Sinus rhythm with first degree AV block. 2.  Sinus bradycardia and sinus tachycardia. 3.  Rare PVC's. CATH 1/5/2021:   Two vessel CAD  Successful PTCA -KAMILAH RCA  EBL: 20 CC    ECHO 12/17/2020: EF 50%, mild AI, mild-moderate TR, PAP 39 mmHg, dilated RV with GIOVANY.      DSE 4/2014: EF 60%, no segmental WMA. Previous office/hospital visit:   Dr. Xie Heart 5/19/2022:   1. History of hypertension. 2. History of chronic kidney disease, on dialysis. 3. History of type 2 diabetes mellitus. 4. History of below-knee amputation bilaterally. 5. History of Dobutamine stress echocardiogram done at [facility] in April 2014 showing ejection fraction to be preserved with no segmental wall motion abnormalities seen. 6. Echocardiogram 7/ 2015 Concentric left ventricular, Estimated ejection fraction is 74%, grade 1 diastolic dysfunction and mild tricuspid regurgitation    7. KAMILAH to proximal RCA Feb 2021. 8. PAF, on Eliquis    Plan --   1. Essential HTN, LVEF 40% by ventriculogram 2/5/21; no CHF on exam  2. ESRD on chronic HD  3. Type II DM  4. s/p BKA  5. Stable CAD, s/p KAMILAH to proximal RCA 2/5/21  6. PAF    1. Continue amlodipine and lisinopril  2. Continue ASA, Plavix and Eliquis  3. Follow-up for ESRD and HTN with Dr. Jarrett Ellis  4. Will see him back in 6 months.     Demetra Gerard, [facility]

## 2022-05-27 NOTE — ED NOTES
ED to inpatient nurses report    Chief Complaint   Patient presents with    Fatigue     x 3 months, worse today      Present to ED from HOME for increased weakness. Pt states this has been going on for roughly 3 months but is worse today. Unable to care for self at home d/t weakness. Pt is a dialysis pt MWF and is compliant with dialysis. Pt is BLE amputee. Pt is alert and oriented x 4.   LOC: alert and orientated to name, place, date  Vital signs   Vitals:    05/26/22 1545 05/26/22 1716 05/26/22 1736 05/26/22 1956   BP: (!) 159/73  (!) 153/98 (!) 176/92   Pulse: 82 76 76 83   Resp: 20 18 12 16   Temp: 98.6 °F (37 °C)      SpO2: 96% 94% 94% 99%   Weight: 212 lb (96.2 kg)         Oxygen Baseline RA    Current needs required RA, requires some oxygen while sleeping, has hx of sleep apnea but does not wear CPAP   LDAs:   Peripheral IV 05/26/22 Left Antecubital (Active)   Site Assessment Clean, dry & intact 05/26/22 1559   Line Status Blood return noted;Normal saline locked 05/26/22 1559     Mobility: Fully dependent  Pending ED orders: None  Present condition: None  Code Status: Full   Consults:  [x]  Hospitalist  Completed  [x] yes [] no  []  Medicine  Completed  [] yes [] No  [x]  Cardiology  Completed  [x] yes [] No  []  GI   Completed  [] yes [] No  []  Neurology  Completed  [] yes [] No  []  Nephrology Completed  [] yes [] No  []  Vascular  Completed  [] yes [] No   []  Surgery  Completed  [] yes [] No   []  Urology  Completed  [] yes [] No   []  Plastics  Completed  [] yes [] No   []  ENT  Completed  [] yes [] No   []  Other N/A  Completed  [] yes [] No  Pertinent event(s) None  Pertinent event(s) None  Electronically signed by Amanda Dumas RN on 5/26/2022 at 7:57 PM       Amanda Dumas RN  05/26/22 2004

## 2022-05-27 NOTE — H&P
[unfilled]    [facility]    HISTORY AND PHYSICAL EXAMINATION            Date:   5/26/2022  Patient name:  Helyn Dakins  Date of admission:  5/26/2022  3:34 PM  MRN:   [id number]  Account:  [de-identified]  YOB: 1960  PCP:    Faizan Fay MD  Room:   24/24  Code Status:    Prior    Chief Complaint:     3 months of vague complaints including generalized weakness    History of Present Illness:     79-year-old male with underlying cardiovascular disease on DAPT, A. fib on AC, diabetes with neuropathy, bilateral BKA requiring wheelchair and ESRD on HD   who is been caring for himself and becoming more fatigue and weak over the past few months. Patient reports his weakness is gotten to the point where he cannot lift himself up to use the bathroom or move around. Patient feels something is not right. P.o. intake is reduced. He reports reduced exercise tolerance, gets fatigued and short of breath with very little movement. Feels like he cannot care for himself. He denies any typical chest pain but feels similarly to a previous admission where he required cardiac stent placement. Denies any shortness of breath at rest.  Denies any fever, chills, nausea, vomiting, urinary or bowel complaints. Reports chronic back pain. Upon arrival, patient elevated troponin with nonischemic EKG.   He was admitted for further work-up and evaluation    Past Medical History:     Past Medical History:   Diagnosis Date    Chronic bilateral low back pain with bilateral sciatica     CRF (chronic renal failure)     [facility] MWF    DDD (degenerative disc disease), lumbar 12/15/2020    Diabetes mellitus ([facility]     [facility])     ED (erectile dysfunction)     [facility] of echocardiogram 2014    [facility]    HTN (hypertension)     Hyperlipidemia     LVH (left ventricular hypertrophy)     PVD [facility]peripheral vascular disease) ([facility])     S/P bilateral BKA (below knee amputation) ([facility]     Wears glasses         Past Surgical History:     Past Surgical History:   Procedure Laterality Date    ARM SURGERY      CATARACT REMOVAL WITH IMPLANT      DIALYSIS FISTULA CREATION Bilateral     As of 2019, RUE AVF functioning, LUE AVF nonfunctioning.  FINGER AMPUTATION      right pointer finger    FINGER SURGERY Left     I & D    GLAUCOMA SURGERY      LEG AMPUTATION BELOW KNEE Bilateral     TUNNELED VENOUS CATHETER PLACEMENT      PC placed and removed        Medications Prior to Admission:     Prior to Admission medications    Medication Sig Start Date End Date Taking? Authorizing Provider   amLODIPine (NORVASC) 10 MG tablet  3/2/22   Historical Provider, MD   atorvastatin (LIPITOR) 80 MG tablet  5/4/22   Historical Provider, MD   LOPERAMIDE HCL PO Take 2 mg by mouth 4/8/22 4/7/23  Historical Provider, MD   oxyCODONE-acetaminophen (PERCOCET) 7.5-325 MG per tablet Take 1 tablet by mouth daily as needed for Pain for up to 30 days. Intended supply: 30 days 4/29/22 5/29/22  AURORA Brower - CNP   traMADol (ULTRAM) 50 MG tablet Take 1 tablet by mouth daily as needed for Pain for up to 30 days.  4/29/22 5/29/22  AURORA Brower - CNP   sildenafil (VIAGRA) 100 MG tablet Take 100 mg by mouth as needed for Erectile Dysfunction    Historical Provider, MD   glimepiride (AMARYL) 2 MG tablet TAKE ONE TABLET BY MOUTH EVERY MORNING BEFORE BREAKFAST 3/30/22   Pao Richardson MD   linagliptin (TRADJENTA) 5 MG tablet TAKE ONE TABLET BY MOUTH DAILY 3/30/22   Pao Richardson MD   divalproex (DEPAKOTE) 250 MG DR tablet Take 2 tablets by mouth 2 times daily 2/15/22   Pao Richardson MD   [facility] 5 g PACK oral suspension  2/7/22   Historical Provider, MD   minoxidil (LONITEN) 2.5 MG tablet 10 mg 2 times daily  1/15/22   Historical Provider, MD   labetalol (NORMODYNE) 200 MG tablet Take 200 mg by mouth 2 times daily    Historical Provider, MD   cilostazol (PLETAL) 100 MG tablet Take 100 mg by mouth 2 times daily    Historical Provider, MD   acetaminophen (TYLENOL) 500 MG tablet Take 2 tablets by mouth every 6 hours as needed for Pain 9/1/21   Jose Manuel Valdez MD   docusate sodium (COLACE) 100 MG capsule Take 1 capsule by mouth 3 times daily as needed for Constipation 8/10/21   Rickie Patricia PA-C   amLODIPine-atorvastatatin (CADUET) 10-80 MG per tablet Take 1 tablet by mouth daily    Historical Provider, MD   Heparin Sodium, Porcine, (HEPARIN, PORCINE,) 1000 UNIT/ML injection Heparin Sodium (Porcine) 1,000 Units/mL Systemic 3/1/21   Historical Provider, MD   Multiple Vitamins-Minerals (RENAPLEX-D PO) Take 1 tablet by mouth daily     Historical Provider, MD   hydrALAZINE (APRESOLINE) 25 MG tablet Take 25 mg by mouth 2 times daily   Patient not taking: Reported on 5/17/2022    Historical Provider, MD   apixaban (ELIQUIS) 5 MG TABS tablet Take 1 tablet by mouth 2 times daily  Patient taking differently: Take 5 mg by mouth 3 times daily  1/7/21   Humphrey Cortez MD   clopidogrel (PLAVIX) 75 MG tablet Take 1 tablet by mouth daily 1/8/21   Humphrey Cortez MD   cloNIDine (CATAPRES) 0.3 MG tablet Take 1 tablet by mouth 2 times daily Do not take if HR < 60  Patient taking differently: Take 0.3 mg by mouth 3 times daily Do not take if HR < 60 1/7/21   Humphrey Cortez MD   aspirin 81 MG chewable tablet Take 81 mg by mouth every morning    Historical Provider, MD   Sucroferric Oxyhydroxide (VELPHORO) 500 MG CHEW Take 2 tablets by mouth 3 times daily (with meals) Crush or chew and shallow 2 tablets three times a day with meals    Historical Provider, MD   Tens Unit [address] [facility] by Does not apply route 12/15/20   Margorie Crigler, MD   lisinopril (PRINIVIL;ZESTRIL) 10 MG tablet TAKE 1 TABLET BY MOUTH  TWICE DAILY  Patient taking differently: Take 10 mg by mouth in the morning and at bedtime  7/10/20   Christine Montague MD   NONFORMULARY     Historical Provider, MD   gabapentin (NEURONTIN) 300 MG capsule TAKE ONE CAPSULE BY MOUTH THREE TIMES A DAY 17   Lissette Severe, DO        Allergies:     Patient has no known allergies. Social History:     Tobacco:    reports that he has never smoked. He has never used smokeless tobacco.  Alcohol:      reports current alcohol use. Drug Use:  reports no history of drug use. Family History:     Family History   Problem Relation Age of Onset   Barrera Diabetes Mother     Coronary Art Dis Mother     Hypertension Mother     Diabetes Father     Hypertension Father     Stroke Father     Cancer Sister     Hypertension Brother        Review of Systems:     Positive and Negative as described in HPI.     ROS negative other than the above listed    Physical Exam:   BP (!) 176/92   Pulse 83   Temp 98.6 °F (37 °C)   Resp 16   Wt 212 lb (96.2 kg)   SpO2 99%   BMI 29.57 kg/m²   Temp (24hrs), Av.6 °F (37 °C), Min:98.6 °F (37 °C), Max:98.6 °F (37 °C)    Recent Labs     22  1615   POCGLU 145*     No intake or output data in the 24 hours ending 22    General Appearance: AO3, chronically ill, frail, soft-spoken, weak  Mental status: oriented to person, place, and time  Head: normocephalic, atraumatic  Eye: no icterus, redness, pupils equal and reactive, extraocular eye movements intact, conjunctiva clear  Ear: normal external ear, no discharge, hearing intact  Nose: no drainage noted  Mouth: mucous membranes dry  Neck: supple, no carotid bruits, thyroid not palpable  Lungs: Bilateral equal air entry, clear to ausculation, no wheezing, rales or rhonchi, normal effort  Cardiovascular: normal rate, regular rhythm, no murmur, gallop, rub  Abdomen: Soft, nontender, nondistended, normal bowel sounds, no hepatomegaly or splenomegaly  Neurologic: There are no new focal motor or sensory deficits, normal muscle tone and bulk, no abnormal sensation, normal speech, cranial nerves II through XII grossly intact  Skin: No gross lesions, rashes, bruising or bleeding on exposed skin area. Backside not assessed.    Extremities: BLT BKA without any open ulcers  Psych: depressed mood    Investigations:      Laboratory Testing:  Recent Results (from the past 24 hour(s))   CBC with Auto Differential    Collection Time: 05/26/22  4:01 PM   Result Value Ref Range    WBC 7.6 3.5 - 11.3 k/uL    RBC 4.43 4.21 - 5.77 m/uL    Hemoglobin 11.4 (L) 13.0 - 17.0 g/dL    Hematocrit 35.6 (L) 40.7 - 50.3 %    MCV 80.4 (L) 82.6 - 102.9 fL    MCH 25.7 25.2 - 33.5 pg    MCHC 32.0 28.4 - 34.8 g/dL    RDW 19.8 (H) 11.8 - 14.4 %    Platelets See Reflexed IPF Result 138 - 453 k/uL    NRBC Automated 0.0 0.0 per 100 WBC    RBC Morphology ANISOCYTOSIS PRESENT     Seg Neutrophils 59 36 - 65 %    Lymphocytes 19 (L) 24 - 43 %    Monocytes 15 (H) 3 - 12 %    Eosinophils % 6 (H) 1 - 4 %    Basophils 0 0 - 2 %    Immature Granulocytes 0 0 %    Segs Absolute 4.52 1.50 - 8.10 k/uL    Absolute Lymph # 1.47 1.10 - 3.70 k/uL    Absolute Mono # 1.12 0.10 - 1.20 k/uL    Absolute Eos # 0.48 (H) 0.00 - 0.44 k/uL    Basophils Absolute <0.03 0.00 - 0.20 k/uL    Absolute Immature Granulocyte <0.03 0.00 - 0.30 k/uL   Basic Metabolic Panel w/ Reflex to MG    Collection Time: 05/26/22  4:01 PM   Result Value Ref Range    Glucose 146 (H) 70 - 99 mg/dL    BUN 21 8 - 23 mg/dL    CREATININE 6.05 (HH) 0.70 - 1.20 mg/dL    Calcium 10.1 8.6 - 10.4 mg/dL    Sodium 132 (L) 135 - 144 mmol/L    Potassium 4.0 3.7 - 5.3 mmol/L    Chloride 93 (L) 98 - 107 mmol/L    CO2 24 20 - 31 mmol/L    Anion Gap 15 9 - 17 mmol/L    GFR Non-African American 9 (L) >60 mL/min    GFR  11 (L) >60 mL/min    GFR Comment         Troponin    Collection Time: 05/26/22  4:01 PM   Result Value Ref Range    Troponin, High Sensitivity 357 (HH) 0 - 22 ng/L   Brain Natriuretic Peptide    Collection Time: 05/26/22  4:01 PM   Result Value Ref Range    Pro-BNP 60,131 (H) <300 pg/mL   Immature Platelet Fraction    Collection Time: 05/26/22  4:01 PM   Result Value Ref Range Platelet, Immature Fraction 5.7 1.1 - 10.3 %    Platelet, Fluorescence 158 138 - 453 k/uL   POCT Glucose    Collection Time: 05/26/22  4:15 PM   Result Value Ref Range    Glucose 145 mg/dL    QC OK? yes    POC Glucose Fingerstick    Collection Time: 05/26/22  4:15 PM   Result Value Ref Range    POC Glucose 145 (H) 75 - 110 mg/dL   Troponin    Collection Time: 05/26/22  5:05 PM   Result Value Ref Range    Troponin, High Sensitivity 337 (HH) 0 - 22 ng/L       Imaging/Diagnostics:  XR CHEST PORTABLE    Result Date: 5/26/2022  Left-sided central venous catheter terminating in the innominate vein Findings suggest congestive heart failure       Assessment :      Hospital Problems           Last Modified POA    * (Principal) Atypical chest pain 5/26/2022 Yes        #atypical ACS  -Hx of CAD/PVD with stents.  -Underlying DM, elderly  -troponin 357-->337  -EKG without acute ischemic changes  -CXR: Pulmonary vascular congestion  -Already on asa and plavix, eliquis, resumed  -trend cardiac biomarkers, sublingail nitro for cp, tele monitoring, obtain mg, EKF for CP, as per cardio for risk stratification  -Consider pericardial disease as possibility       #Generalized weakness  -Underlying PAD, BLT amputee, progrssive weakeness unable to care for self   -wheelchair bound  -deconditioned  -on tramadol 50mg PRN daily  -Obtain TSH,ESR/CRP, CPK []  -PTOT,       #Chronic a fib  -rate controlled and hemodynamilcy stable at this time   -Home meds: labetaolol , apixaban 5mg, resume     #ESRD  -MWF  -No missed sessions  -right IJ clean and intact.   -Resume HD with help of nephro.  Nephro consulted       #TIFF  -noncompliant with CPAP  -02 while sleeping  -continue nocturnal o2 as needed     #PVD  -S/P bilateral BKA in past, wheel chair bound  -On plavix and aspirin , statin    #DM with neuropathy  -Glucose within acceptable level on admission  -on glemepiride, trajenta and gabapentin at home  -resume home meds, sliding scale, hypoglycemia protocol, DM diet     #Seizures  -on depekote 500mg BID, resume    #HTN BP   -fluctuating but trending toward acceptable levelsuating   -on amloodipine, labetaolol, clonidine 0.3 TID, lisinopril

## 2022-05-27 NOTE — CONSULTS
REASON FOR  NEPHROLOGY CONSULT     Fluid and BP management in ESRD     ACCESS   Tunnel catheter    DRY WEIGHT   97 kg    NEPHROLOGIST    [id number] [facility],[address]    HISTORY OF PRESENTING ILLNESS               This is a 60 y.o. male with end stage renal disease on hemodialysis with excellent compliance has been hospitalized for evaluation of generalized weakness. Patient reported gradually is being coming more fatigued and weak last few months. He reports his weakness got to the point where he could not lift himself to use the bathroom or move around. Oral intake marginally reduced. He has bilateral amputee. No reported history of fever/chest pain/shortness of breath/weight loss. Appetite suboptimal.  No prior history of thyroid problems. 's dialysis compliance is excellent. With excellent clearances. Upon arrival he was noted evaluated troponin with nonischemic EKG changes. He was hospitalized for further work-up and cardiology consultation. We have been consulted for dialysis management. Currently on hemodialysis. Below his dry weight    PAST MEDICAL HISTORY         Diagnosis Date    Chronic bilateral low back pain with bilateral sciatica     CRF (chronic renal failure)     [facility] MWF    DDD (degenerative disc disease), lumbar 12/15/2020    Diabetes [facility] ([facility]     [facility]ialysis [facility])     ED (erectile dysfunction)     [facility]    [facility]    HTN (hypertension)     Hyperlipidemia     LVH (left ventricular hypertrophy)     PVD (peripheral vascular disease) ([facility]     S/P bilateral BKA (below knee amputation) ([facility]     Wears glasses          PAST SURGICAL HISTORY         Procedure Laterality Date    ARM SURGERY      CATARACT REMOVAL WITH IMPLANT      DIALYSIS FISTULA CREATION Bilateral     As of 2019, RUE AVF functioning, LUE AVF nonfunctioning.     FINGER AMPUTATION      right pointer finger    FINGER SURGERY Left     I & D    GLAUCOMA SURGERY      LEG AMPUTATION BELOW KNEE Bilateral     TUNNELED VENOUS CATHETER PLACEMENT      PC placed and removed       MEDICATIONS     Home Meds:                Medications Prior to Admission: amLODIPine (NORVASC) 10 MG tablet,   atorvastatin (LIPITOR) 80 MG tablet,   LOPERAMIDE HCL PO, Take 2 mg by mouth  oxyCODONE-acetaminophen (PERCOCET) 7.5-325 MG per tablet, Take 1 tablet by mouth daily as needed for Pain for up to 30 days. Intended supply: 30 days  traMADol (ULTRAM) 50 MG tablet, Take 1 tablet by mouth daily as needed for Pain for up to 30 days.   sildenafil (VIAGRA) 100 MG tablet, Take 100 mg by mouth as needed for Erectile Dysfunction  glimepiride (AMARYL) 2 MG tablet, TAKE ONE TABLET BY MOUTH EVERY MORNING BEFORE BREAKFAST  linagliptin (TRADJENTA) 5 MG tablet, TAKE ONE TABLET BY MOUTH DAILY  divalproex (DEPAKOTE) 250 MG DR tablet, Take 2 tablets by mouth 2 times daily (Patient not taking: Reported on 5/26/2022)  LOKELMA 5 g PACK oral suspension,   minoxidil (LONITEN) 2.5 MG tablet, 10 mg 2 times daily  (Patient not taking: Reported on 5/26/2022)  labetalol (NORMODYNE) 200 MG tablet, Take 200 mg by mouth 2 times daily (Patient not taking: Reported on 5/26/2022)  cilostazol (PLETAL) 100 MG tablet, Take 100 mg by mouth 2 times daily (Patient not taking: Reported on 5/26/2022)  acetaminophen (TYLENOL) 500 MG tablet, Take 2 tablets by mouth every 6 hours as needed for Pain  docusate sodium (COLACE) 100 MG capsule, Take 1 capsule by mouth 3 times daily as needed for Constipation (Patient not taking: Reported on 5/26/2022)  amLODIPine-atorvastatatin (CADUET) 10-80 MG per tablet, Take 1 tablet by mouth daily   Heparin Sodium, Porcine, (HEPARIN, PORCINE,) 1000 UNIT/ML injection, Heparin Sodium (Porcine) 1,000 Units/mL Systemic  Multiple Vitamins-Minerals (RENAPLEX-D PO), Take 1 tablet by mouth daily   hydrALAZINE (APRESOLINE) 25 MG tablet, Take 25 mg by mouth 2 times daily   apixaban (ELIQUIS) 5 MG TABS tablet, Take 1 tablet by mouth 2 times daily (Patient taking differently: Take 5 mg by mouth 3 times daily )  clopidogrel (PLAVIX) 75 MG tablet, Take 1 tablet by mouth daily  cloNIDine (CATAPRES) 0.3 MG tablet, Take 1 tablet by mouth 2 times daily Do not take if HR < 60 (Patient taking differently: Take 0.3 mg by mouth 3 times daily Do not take if HR < 60)  aspirin 81 MG chewable tablet, Take 81 mg by mouth every morning  Sucroferric Oxyhydroxide (VELPHORO) 500 MG CHEW, Take 2 tablets by mouth 3 times daily (with meals) Crush or chew and shallow 2 tablets three times a day with meals  Tens Unit MISC, by Does not apply route  lisinopril (PRINIVIL;ZESTRIL) 10 MG tablet, TAKE 1 TABLET BY MOUTH  TWICE DAILY (Patient taking differently: Take 10 mg by mouth in the morning and at bedtime )  NONFORMULARY,   gabapentin (NEURONTIN) 300 MG capsule, TAKE ONE CAPSULE BY MOUTH THREE TIMES A DAY  Scheduled Meds:    amLODIPine  10 mg Oral Daily    atorvastatin  80 mg Oral Nightly    apixaban  5 mg Oral BID    aspirin  81 mg Oral QAM    clopidogrel  75 mg Oral Daily    cloNIDine  0.3 mg Oral TID    gabapentin  300 mg Oral TID    labetalol  200 mg Oral BID    alogliptin  6.25 mg Oral Daily    lisinopril  10 mg Oral BID    therapeutic multivitamin-minerals   Oral Daily    Sucroferric Oxyhydroxide  2 tablet Oral TID WC    sodium chloride flush  5-40 mL IntraVENous 2 times per day    insulin lispro  0-6 Units SubCUTAneous TID WC    insulin lispro  0-3 Units SubCUTAneous Nightly     Continuous Infusions:    sodium chloride      dextrose       PRN Meds:  docusate sodium, traMADol, sodium chloride flush, sodium chloride, ondansetron **OR** ondansetron, acetaminophen **OR** acetaminophen, bisacodyl, glucose, dextrose bolus **OR** dextrose bolus, glucagon (rDNA), dextrose, oxyCODONE-acetaminophen **AND** oxyCODONE    ALLERGY     Patient has no known allergies.     SOCIAL HISTORY     Social History     Socioeconomic History    Marital status:      Spouse name: Not on file    Number of children: Not on file    Years of education: Not on file    Highest education level: Not on file   Occupational History    Not on file   Tobacco Use    Smoking status: Never Smoker    Smokeless tobacco: Never Used   Vaping Use    Vaping Use: Never used   Substance and Sexual Activity    Alcohol use: Yes     Comment: rare    Drug use: No    Sexual activity: Not on file   Other Topics Concern    Not on file   Social History Narrative    Not on file     Social Determinants of Health     Financial Resource Strain: Low Risk     Difficulty of Paying Living Expenses: Not hard at all   Food Insecurity: No Food Insecurity    Worried About [address] [place] [address] in the Last Year: Never true    Joao of Food in the Last Year: Never true   Transportation Needs: No Transportation Needs    Lack of Transportation (Medical): No    Lack of Transportation (Non-Medical): No   Physical Activity: Inactive    Days of Exercise per Week: 0 days    Minutes of Exercise per Session: 0 min   Stress: No Stress Concern Present    Feeling of Stress : Not at all   Social Connections: Moderately Integrated    Frequency of Communication with Friends and Family: More than three times a week    Frequency of Social Gatherings with Friends and Family: More than three times a week    Attends Judaism Services: More than 4 times per year    Active Member of [address] [place] [address] [employer] or Organizations:  Yes    Attends Club or Organization Meetings: Never    Marital Status:    Intimate Partner Violence: Not At Risk    Fear of Current or Ex-Partner: No    Emotionally Abused: No    Physically Abused: No    Sexually Abused: No   Housing Stability: Unknown    Unable to Pay for Housing in the Last Year: No    Number of Places Lived in the Last Year: Not on file    Unstable Housing in the Last Year: No       FAMILY HISTORY      Family History   Problem Relation Age of Onset    Diabetes Mother     Coronary Art Dis Mother     Hypertension Mother     Diabetes Father     Hypertension Father     Stroke Father     Cancer Sister     Hypertension Brother           REVIEW OF SYSTEM      Constitutional: Present asthenia/no weight loss/anorexia    HEENT : No epistaxis/visual blurriness/rhinorrhoea/sorethroat/trauma  Cardiovascular:No chest pain/palpitation/SOB  Respiratory: No cough/fever/SOB/Wheezing    Gastrointestinal: No abdominal pain/nausea/vomiting/diarrhoea/constipation  Genitourinary: No dysuria/pyuria/hematuria/incomplete emptying of bladder  Musculoskeletal:  No gait disturbance/weakness or joint complaints  Integumentary: No rash or pruritis. Neurological: No headache/diplopia/change in muscle strength/numbness or tingling. No change in gait, balance, coordination, mood, affect, memory, mentation, behavior. Psychiatric: No anxiety/depression. Endocrine: No temperature intolerance. No excessive thirst, fluid intake, or urination. No tremor. Hematologic/Lymphatic: No abnormal bruising or bleeding, blood clots or swolle lymph nodes. Allergic/Immunologic: No nasal congestion or hives    EXAMINATION       Vitals:    05/27/22 1001 05/27/22 1033 05/27/22 1104 05/27/22 1135   BP: (!) 150/69 139/76 126/74 113/63   Pulse: 77 73 72 70   Resp:       Temp:       TempSrc:       SpO2:       Weight:         24HR INTAKE/OUTPUT:  No intake or output data in the 24 hours ending 05/27/22 1216    General appearance:Awake, alert, in no acute distress  Skin: warm and dry, no rash or erythema  Eyes: conjunctivae normal and sclera anicteric  ENT: no thrush no pharyngeal congestion  orodental hygiene   Neck: No JVD, Lymphadenopathty or thyromegaly  Respiratory: vesicular breath sounds,no wheeze/crackles  Cardiovascular: S1 S2 normal,no gallop or organic murmur. No carotid bruit  Abdomen:Non tender/non distended. Bowel sounds present  Extremities: No Cyanosis or Clubbing,Lower extremity edema bilateral amputee  Neurological:Alert and oriented. No abnormalities of mood, affect, memory, mentation, or behavior are noted    INVESTIGATIONS     PTH:  No results found for: PTH  abs:   CBC:   Recent Labs     05/26/22  1601   WBC 7.6   RBC 4.43   HGB 11.4*   HCT 35.6*   MCV 80.4*   MCH 25.7   MCHC 32.0   RDW 19.8*   PLT See Reflexed IPF Result      BMP:   Recent Labs     05/26/22  1601 05/26/22  1615 05/27/22  0938   *  --  133*   K 4.0  --  3.9   CL 93*  --  95*   CO2 24  --  25   BUN 21  --  30*   CREATININE 6.05*  --  7.30*   GLUCOSE 146* 145 113*   CALCIUM 10.1  --  9.6        Phosphorus:  No results for input(s): PHOS in the last 72 hours. Magnesium: No results for input(s): MG in the last 72 hours. Albumin: No results for input(s): LABALBU in the last 72 hours. ASSESSMENT     1. ESRD on maintenance hemodialysis Monday Wednesday Friday and lastly using tunnel catheter and follows up with me. Dry weight around 97 kg  #2 hospitalized with generalized weakness and inability to take care of himself  No hypotension/excess UF/evidence of sepsis  #3 bilateral amputee  #4 type 2 diabetes  #5 essential hypertension  #6 ischemic cardiomyopathy EF 45% status post PCI  #7 secondary hyperparathyroidism    PLAN     #1 seen and examined in hemodialysis. Orders reviewed with the nursing staff. He is below dry weight  #2 resume home medications  #3 rule out occult malignancy versus thyroid issues  #4 we will follow      Thank you for the consultation. Please do not hesitate to call with questions.     This note is created with the assistance of a speech-recognition program. While intending to generate a document that actually reflects the content of the visit, no guarantees can be provided that every mistake has been identified and corrected by editing      Makeda Nguyen MD MD, [facility] [place]), [address]   5/27/2022 12:16 PM  [facility] [place]

## 2022-05-27 NOTE — PROGRESS NOTES
Occupational [address]  Occupational Therapy Not Seen Note    DATE: 2022    NAME: Goldie Gee  MRN: [id number]   : 1960      Patient not seen this date for Occupational Therapy due to:    Pt off the floor for Hemodialysis. OT will re-attempt at later time / date as appropriate.       Electronically signed by RENATO Barrios OTR/L on 2022 at 1:51 PM

## 2022-05-28 VITALS
RESPIRATION RATE: 18 BRPM | DIASTOLIC BLOOD PRESSURE: 73 MMHG | BODY MASS INDEX: 28.24 KG/M2 | WEIGHT: 202.5 LBS | TEMPERATURE: 98.4 F | OXYGEN SATURATION: 98 % | HEART RATE: 76 BPM | SYSTOLIC BLOOD PRESSURE: 120 MMHG

## 2022-05-28 LAB
EKG ATRIAL RATE: 80 BPM
EKG Q-T INTERVAL: 406 MS
EKG QRS DURATION: 104 MS
EKG QTC CALCULATION (BAZETT): 474 MS
EKG R AXIS: -52 DEGREES
EKG T AXIS: 136 DEGREES
EKG VENTRICULAR RATE: 82 BPM
GLUCOSE BLD-MCNC: 184 MG/DL (ref 75–110)
GLUCOSE BLD-MCNC: 194 MG/DL (ref 75–110)

## 2022-05-28 PROCEDURE — G0378 HOSPITAL OBSERVATION PER HR: HCPCS

## 2022-05-28 PROCEDURE — 2500000003 HC RX 250 WO HCPCS: Performed by: NURSE PRACTITIONER

## 2022-05-28 PROCEDURE — 97530 THERAPEUTIC ACTIVITIES: CPT

## 2022-05-28 PROCEDURE — 6370000000 HC RX 637 (ALT 250 FOR IP): Performed by: INTERNAL MEDICINE

## 2022-05-28 PROCEDURE — 99232 SBSQ HOSP IP/OBS MODERATE 35: CPT | Performed by: INTERNAL MEDICINE

## 2022-05-28 PROCEDURE — 6370000000 HC RX 637 (ALT 250 FOR IP): Performed by: STUDENT IN AN ORGANIZED HEALTH CARE EDUCATION/TRAINING PROGRAM

## 2022-05-28 PROCEDURE — 96374 THER/PROPH/DIAG INJ IV PUSH: CPT

## 2022-05-28 PROCEDURE — 93246 EXT ECG>7D<15D RECORDING: CPT

## 2022-05-28 PROCEDURE — 93247 EXT ECG>7D<15D SCAN A/R: CPT

## 2022-05-28 PROCEDURE — 82947 ASSAY GLUCOSE BLOOD QUANT: CPT

## 2022-05-28 PROCEDURE — 97162 PT EVAL MOD COMPLEX 30 MIN: CPT

## 2022-05-28 PROCEDURE — 2580000003 HC RX 258: Performed by: INTERNAL MEDICINE

## 2022-05-28 PROCEDURE — 99217 PR OBSERVATION CARE DISCHARGE MANAGEMENT: CPT | Performed by: STUDENT IN AN ORGANIZED HEALTH CARE EDUCATION/TRAINING PROGRAM

## 2022-05-28 RX ORDER — MINOXIDIL 10 MG/1
10 TABLET ORAL 2 TIMES DAILY
Status: DISCONTINUED | OUTPATIENT
Start: 2022-05-28 | End: 2022-05-28 | Stop reason: HOSPADM

## 2022-05-28 RX ORDER — HYDRALAZINE HYDROCHLORIDE 25 MG/1
25 TABLET, FILM COATED ORAL EVERY 12 HOURS SCHEDULED
Status: DISCONTINUED | OUTPATIENT
Start: 2022-05-28 | End: 2022-05-28 | Stop reason: HOSPADM

## 2022-05-28 RX ORDER — METOPROLOL TARTRATE 5 MG/5ML
2.5 INJECTION INTRAVENOUS ONCE
Status: COMPLETED | OUTPATIENT
Start: 2022-05-28 | End: 2022-05-28

## 2022-05-28 RX ORDER — LABETALOL 200 MG/1
200 TABLET, FILM COATED ORAL 2 TIMES DAILY
Qty: 60 TABLET | Refills: 3 | Status: ON HOLD | OUTPATIENT
Start: 2022-05-28 | End: 2022-09-19

## 2022-05-28 RX ORDER — INSULIN LISPRO 100 [IU]/ML
0-12 INJECTION, SOLUTION INTRAVENOUS; SUBCUTANEOUS
Status: DISCONTINUED | OUTPATIENT
Start: 2022-05-28 | End: 2022-05-28 | Stop reason: HOSPADM

## 2022-05-28 RX ORDER — INSULIN LISPRO 100 [IU]/ML
0-6 INJECTION, SOLUTION INTRAVENOUS; SUBCUTANEOUS NIGHTLY
Status: DISCONTINUED | OUTPATIENT
Start: 2022-05-28 | End: 2022-05-28 | Stop reason: HOSPADM

## 2022-05-28 RX ORDER — AMLODIPINE BESYLATE 10 MG/1
10 TABLET ORAL DAILY
Qty: 30 TABLET | Refills: 3 | Status: ON HOLD | OUTPATIENT
Start: 2022-05-28 | End: 2022-09-19

## 2022-05-28 RX ADMIN — LISINOPRIL 10 MG: 10 TABLET ORAL at 08:27

## 2022-05-28 RX ADMIN — MINOXIDIL 10 MG: 10 TABLET ORAL at 12:06

## 2022-05-28 RX ADMIN — APIXABAN 5 MG: 5 TABLET, FILM COATED ORAL at 09:00

## 2022-05-28 RX ADMIN — ASPIRIN 81 MG: 81 TABLET, CHEWABLE ORAL at 08:27

## 2022-05-28 RX ADMIN — INSULIN LISPRO 2 UNITS: 100 INJECTION, SOLUTION INTRAVENOUS; SUBCUTANEOUS at 11:59

## 2022-05-28 RX ADMIN — INSULIN LISPRO 1 UNITS: 100 INJECTION, SOLUTION INTRAVENOUS; SUBCUTANEOUS at 08:26

## 2022-05-28 RX ADMIN — CLONIDINE HYDROCHLORIDE 0.3 MG: 0.1 TABLET ORAL at 08:27

## 2022-05-28 RX ADMIN — ALOGLIPTIN 6.25 MG: 6.25 TABLET, FILM COATED ORAL at 14:03

## 2022-05-28 RX ADMIN — METOPROLOL TARTRATE 2.5 MG: 1 INJECTION, SOLUTION INTRAVENOUS at 05:41

## 2022-05-28 RX ADMIN — SODIUM CHLORIDE, PRESERVATIVE FREE 10 ML: 5 INJECTION INTRAVENOUS at 08:27

## 2022-05-28 RX ADMIN — Medication 1 TABLET: at 08:27

## 2022-05-28 NOTE — PROGRESS NOTES
1950: NP informed of hypotension and BP meds due at 2100. Order rec'd to hold and recheck BP in 2 hrs. 2250: BP rechecked, NP informed. Order rec'd to give lisinopril and hold catapres and labetalol.

## 2022-05-28 NOTE — PROGRESS NOTES
[facility]  Office: [address], DO, Cedricgustavo Stringer, DO, Chrisslava Cardenas, DO, Gracie Stoner Blood, DO, Jhon Gonzales MD, Yareli Woodard MD, Dominga Weinberg MD, Deon Whitfield MD, Ck Jack MD, Maverick Rascon MD, Jose Luis Radford MD, Ascencion Campoverde, DO, Vanessa Riddle, DO, Raimundo Cao MD,  Arvind Macias, DO, Sara Wood MD, Orin Cullen MD, Aurelio Lange MD, Zeenat Willett DO, Daron Hastings MD, Baltazar Grijalva MD, Osiris Diaz MD, Ciaran Herman, [facility], [facility], [facility], Derick Lebron, CNP, Kristal Lancaster, CNP, Luisa Panda, CNP, Stepan Celestin, CNP, JEB ShawC, Silvino Goodman, [facility], Annika Luna, CNP, Rochelle Cadet, CNP, Con Distance, CNP, Everardo Alejandre, CNS, Lieutenant Mirza, [facility], Hafsa Aguayo, [facility], [place], [facility], Rosleyn Zepedaont, [address] [place] [address]    Progress Note    5/28/2022    12:19 PM    Name:   Rd Correa  MRN:     [id number]     [place]:      [de-identified]   Room:   2018/2018-01   Day:  0  Admit Date:  5/26/2022  3:34 PM    PCP:   Selam Garnica MD  Code Status:  Full Code    Subjective:     C/C:   Chief Complaint   Patient presents with   [facility] Fatigue     x 3 months, worse today     Interval History Status: not changed  . Patient feels well  States that he wants to go home  Requesting cane as he has difficulty ambulating around. He lives alone and takes care of himself. Blood pressure elevated but home medications resumed  Tolerated dialysis well yesterday  Plan to be discharged on Holter  States the fatigue is improving. Brief History:     79-year-old male with significant medical history of ESRD on hemodialysis, systolic heart failure, coronary artery disease, bilateral BKA presented to the hospital with generalized fatigue, weakness going on for last few months. Patient is not able to take care of himself at home.   Patient states that he felt as if he was passing out for few seconds. No abnormal body movements reported. He lives alone and nobody witnessed the episode. Patient was noted to have elevated troponin and cardiology was consulted and patient admitted for evaluation. Review of Systems:     Constitutional:  negative for chills, fevers, sweats, positive for fatigue  Respiratory:  negative for cough, dyspnea on exertion, shortness of breath, wheezing  Cardiovascular:  negative for chest pain, chest pressure/discomfort, lower extremity edema, palpitations  Gastrointestinal:  negative for abdominal pain, constipation, diarrhea, nausea, vomiting  Neurological:  negative for dizziness, headache    Medications:      Allergies:  No Known Allergies    Current Meds:   Scheduled Meds:    minoxidil  10 mg Oral BID    insulin lispro  0-12 Units SubCUTAneous TID WC    insulin lispro  0-6 Units SubCUTAneous Nightly    amLODIPine  10 mg Oral Daily    atorvastatin  80 mg Oral Nightly    apixaban  5 mg Oral BID    aspirin  81 mg Oral QAM    cloNIDine  0.3 mg Oral TID    gabapentin  300 mg Oral TID    labetalol  200 mg Oral BID    alogliptin  6.25 mg Oral Daily    lisinopril  10 mg Oral BID    therapeutic multivitamin-minerals   Oral Daily    Sucroferric Oxyhydroxide  2 tablet Oral TID WC    sodium chloride flush  5-40 mL IntraVENous 2 times per day     Continuous Infusions:    sodium chloride      dextrose       PRN Meds: docusate sodium, traMADol, sodium chloride flush, sodium chloride, ondansetron **OR** ondansetron, acetaminophen **OR** acetaminophen, bisacodyl, glucose, dextrose bolus **OR** dextrose bolus, glucagon (rDNA), dextrose, oxyCODONE-acetaminophen **AND** oxyCODONE    Data:     Past Medical History:   has a past medical history of Acute congestive heart failure (HCC), Chronic bilateral low back pain with bilateral sciatica, CRF (chronic renal failure), DDD (degenerative disc disease), lumbar, Diabetes mellitus ([facility] [address] Dialysis patient [facility]), ED (erectile dysfunction), History of echocardiogram, HTN (hypertension), Hyperlipidemia, LVH (left ventricular hypertrophy), PVD (peripheral vascular disease) ([facility] [address] S/P bilateral BKA (below knee amputation) [facility] and Wears glasses. Social History:   reports that he has never smoked. He has never used smokeless tobacco. He reports current alcohol use. He reports that he does not use drugs. Family History:   Family History   Problem Relation Age of Onset    Diabetes Mother     Coronary Art Dis Mother     Hypertension Mother     Diabetes Father     Hypertension Father     Stroke Father     Cancer Sister     Hypertension Brother        Vitals:  BP (!) 188/101   Pulse 73   Temp 98.4 °F (36.9 °C) (Oral)   Resp 18   Wt 202 lb 8 oz (91.9 kg)   SpO2 98%   BMI 28.24 kg/m²   Temp (24hrs), Av.2 °F (36.8 °C), Min:97.7 °F (36.5 °C), Max:98.4 °F (36.9 °C)    Recent Labs     22  1553 22  1946 22  0817 22  1158   POCGLU 204* 308* 184* 194*       I/O (24Hr):     Intake/Output Summary (Last 24 hours) at 2022 1219  Last data filed at 2022 1300  Gross per 24 hour   Intake 300 ml   Output 3300 ml   Net -3000 ml       Labs:  Hematology:  Recent Labs     22  1601   WBC 7.6   RBC 4.43   HGB 11.4*   HCT 35.6*   MCV 80.4*   MCH 25.7   MCHC 32.0   RDW 19.8*   PLT See Reflexed IPF Result   SEDRATE 69*     Chemistry:  Recent Labs     22  1601 22  1615 22  1705 22  0938   *  --   --  133*   K 4.0  --   --  3.9   CL 93*  --   --  95*   CO2 24  --   --  25   GLUCOSE 146* 145  --  113*   BUN 21  --   --  30*   CREATININE 6.05*  --   --  7.30*   ANIONGAP 15  --   --  13   LABGLOM 9*  --   --  8*   GFRAA 11*  --   --  9*   CALCIUM 10.1  --   --  9.6   PROBNP 60,131*  --   --   --    TROPHS 357*  --  337*  --    CKTOTAL 511*  --   --   --      Recent Labs     22  1601 22  1615 22  0011 22  1553 22  1946 22  0817 22  1158   TSH 1.78  --   --   --   --   --   --    POCGLU  --  145* 226* 204* 308* 184* 194*     ABG:No results found for: POCPH, PHART, PH, POCPCO2, USH0QVG, PCO2, POCPO2, PO2ART, PO2, POCHCO3, UZZ9QPS, HCO3, NBEA, PBEA, BEART, BE, THGBART, THB, QKM4LFT, MBUK7AMN, X1QJKSJO, O2SAT, FIO2  Lab Results   Component Value Date/Time    SPECIAL NOT REPORTED 09/07/2017 12:30 PM     Lab Results   Component Value Date/Time    CULTURE DUPLICATE ORDER 05/77/9309 12:30 PM    CULTURE  09/07/2017 12:30 PM     Performed at [address], [address] [place] [address] [phone]       Radiology:  XR CHEST PORTABLE    Result Date: 5/26/2022  Left-sided central venous catheter terminating in the innominate vein Findings suggest congestive heart failure       Physical Examination:        General appearance:  alert, cooperative and no distress  Mental Status:  oriented to person, place and time and normal affect  Lungs:  clear to auscultation bilaterally, normal effort  Heart:  regular rate and rhythm, no murmur  Abdomen:  soft, nontender, nondistended, normal bowel sounds, no masses, hepatomegaly, splenomegaly  Extremities: Bilateral BKA, trace edema  Skin:  no gross lesions, rashes, induration  Tunneled catheter present on left side  Assessment:        Hospital Problems           Last Modified POA    * (Principal) Atypical chest pain 5/26/2022 Yes    Elevated troponin 5/27/2022 Yes    Physical deconditioning 5/27/2022 Yes    General weakness 5/27/2022 Yes    Acute congestive heart failure ([address] 5/27/2022 Yes    Hyperlipidemia 5/27/2022 Yes    Essential hypertension 5/27/2022 Yes    ESRD on hemodialysis ([address] MWF 5/27/2022 Yes    Controlled diabetes mellitus type 2 with complications ([address] 6/99/4004 Yes    S/P bilateral below knee amputation ([address] 5/27/2022 Yes    Long term (current) use of antithrombotics/antiplatelets 6/29/5258 Yes          Plan:        Patient denies any chest pain  EKG showing chronic A. fib  Cardiology planning Holter monitor, previous bradycardic episodes  Obtain echo outpatient  DC Plavix per cardiology  Continue aspirin and Eliquis  Elevated troponin likely secondary to ESRD  Continue antihypertensives, monitor blood pressure  Volume management and dialysis per nephrology  Will need sleep study outpatient  Chronic pain syndrome- watch opioids use  PT OT evaluation, patient insist on going home, states that he takes care of himself. Willing to consider home care with therapy.   DME for cane placed for difficulty ambulation, bilateral prosthesis  Discharge planning today    Fermin Siddiqi MD  5/28/2022  12:19 PM

## 2022-05-28 NOTE — PROGRESS NOTES
Vikram Coto requires a cane due to impaired ambulation and for increased stability in order to participate in or complete daily living tasks of: personal cares and ambulating. The patient is able to safely use the cane and the functional mobility deficit can be sufficiently resolved.

## 2022-05-28 NOTE — PROGRESS NOTES
glasses. Past Surgical History:  has a past surgical history that includes Glaucoma surgery; Cataract removal with implant; Leg amputation below knee (Bilateral); Tunneled venous catheter placement; Dialysis fistula creation (Bilateral); Finger surgery (Left); Finger amputation; and Arm Surgery. Assessment    Pt cooperative, motivated, unsteady gait (refused to use crutches even though his were in the room) and pt admits to history of falls, though \"none recently\". Pt has decreased vision, and had a lot of trouble donning his RLE prosthesis, putting in on and taking it off at least 3 times. Pt feels he's better than when he came to the hospital, and insisted on doing things \"for myself\" d/t needing to be independent at home. I recommend continued PT services to improve strength, mobility and independence. Body Structures, Functions, Activity Limitations Requiring Skilled Therapeutic Intervention: Decreased functional mobility ; Decreased ROM; Decreased endurance;Decreased balance;Decreased posture  Therapy Prognosis: Good  Decision Making: Medium Complexity  Requires PT Follow-Up: Yes  Activity Tolerance  Activity Tolerance: Patient tolerated treatment well     Plan   Plan  Plan:  (5-6 visits weekly)  Current Treatment Recommendations: Strengthening,ROM,Balance training,Functional mobility training,Transfer training,Endurance training,Gait training,Stair training  Safety Devices  Type of Devices: Call light within reach,Gait belt,Patient at risk for falls,Left in chair  Restraints  Restraints Initially in Place: No     Restrictions  Restrictions/Precautions  Restrictions/Precautions: Cardiac,Fall Risk,General Precautions,Up as Tolerated  Required Braces or Orthoses?: No (wears BLE BKA prostheses)  Position Activity Restriction  Other position/activity restrictions: Hx BKAs (wears Prostheses)     Subjective   General  Patient assessed for rehabilitation services?: Yes  Response To Previous Treatment: Not applicable  Family / Caregiver Present: No  Follows Commands: Within Functional Limits  Subjective  Subjective: denies pain         Social/Functional History  Social/Functional History  Lives With: Alone  Type of Home: House  Home Layout: One level,Work area in basement  Home Access: Stairs to enter with rails  Entrance Stairs - Number of Steps: 4 steps to enter (with HR).   13 steps basement (with HR)  Bathroom Shower/Tub: Tub/Shower unit  Bathroom Toilet: Standard (No DME for toilet)  Bathroom Equipment: Tub transfer bench  Home Equipment: Cane,Crutches,Wheelchair-manual  Has the patient had two or more falls in the past year or any fall with injury in the past year?: Yes (pt states he has \"several falls\"/year--states he hasn't had any\"recently\")  Receives Help From: Family,Friend(s) (pt tries not to ask for help \" too much, unless I really need to\" per pt)  ADL Assistance: Independent  Homemaking Assistance: Independent (Pt does all tasks (cooking, cleaning, working out, laundry))  Ambulation Assistance: Independent (states he normally uses a cane when ambulating in the community)  Transfer Assistance: Independent  Active : No  Patient's  Info: Medical Cab; can get rides from family if needed  Vision/Hearing  Vision Exceptions: Legally blind (pt states he has magnifying equipment to assist with reading)  Hearing: Within functional limits    Cognition   Orientation  Overall Orientation Status: Within Functional Limits  Cognition  Overall Cognitive Status: WFL  Cognition Comment: pt is impulsive     Objective   Observation/Palpation  Posture: Fair        PROM RLE (degrees)  RLE PROM: WFL  PROM LLE (degrees)  LLE PROM: WFL  PROM RUE (degrees)  RUE PROM: WFL  PROM LUE (degrees)  LUE PROM: Exceptions--shoulder elevation ~90 degrees; shoulder ER ~50 degrees; elbow distal WFL  Strength RLE  Strength RLE: WFL  Strength LLE  Strength LLE: WFL  Strength RUE  Strength RUE: WFL  Strength LUE  Strength LUE: WFL Bed mobility  Rolling to Right: Modified independent  Supine to Sit: Modified independent  Scooting: Independent  Transfers  Sit to Stand: Stand by assistance (pt requested that PT not assist \"I have to do this by myself at home\")  Stand to sit: Stand by assistance  Bed to Chair: Stand by assistance  Stand Pivot Transfers: Stand by assistance   Pt took ~20+ minutes to fang his prostheses, tram R d/t not being able to get it to \"lock\". He kept putting them on, then taking them off, then donning stump socks, then putting them back on, taking them off again, etc.  He finally did get them on and was able to ambulate in the room without a device, but unsteady. Ambulation  Surface: level tile  Device: No Device  Assistance: Stand by assistance  Quality of Gait: flexed posture, meandering gait, able to correct and LOB  Gait Deviations: Slow Gayathri; Increased ALVARO; Decreased step length;Decreased step height;Decreased arm swing;Decreased head and trunk rotation;Deviated path  Distance: 25' pacing in room  Stairs/Curb  Stairs?: No     Balance  Posture: Fair  Sitting - Static: Good  Sitting - Dynamic: Good  Standing - Static: Fair  Standing - Dynamic: Fair;-  Exercise Treatment: AROM x 4  AM-PAC Score  AM-PAC Inpatient Mobility Raw Score : 20 (05/28/22 1214)  AM-PAC Inpatient T-Scale Score : 47.67 (05/28/22 1214)  Mobility Inpatient CMS 0-100% Score: 35.83 (05/28/22 1214)  Mobility Inpatient CMS G-Code Modifier : CJ (05/28/22 1214)          Goals  Long Term Goals  Time Frame for Long term goals : 8 visits  Long term goal 1: independent bed mobility without use of bed rails/controls  Long term goal 2: independent don/doff LE prostheses  Long term goal 3: independent transfers  Long term goal 4: independent gait with appropriate device vs none x 50'  Long term goal 5: independent stair ambulation x 1 flight with 1 HR  Patient Goals   Patient goals : go home independently       Education  Patient Education  Education Given To: Patient  Education Provided: Role of Therapy;Plan of Care;Transfer Training; Fall Prevention Strategies  Education Method: Verbal  Barriers to Learning: None  Education Outcome: Continued education needed      Therapy Time   Individual Concurrent Group Co-treatment   Time In 1110         Time Out 1201         Minutes 51         Timed Code Treatment Minutes: 120 Moreno Vasquez, PT

## 2022-05-28 NOTE — CARE COORDINATION
Met with pt to discuss transitional planning. He is returning home independently. Freedom of choice provided for cane. Faxed to [employer]. Lynne Villa notified. Redgie Blocker will be delivered to pt's room. Pt will call for cab at discharge.  Pt denies other needs    Discharge Report    [facility] Case Management Department  Written by: Rani Alcantara RN    Patient Name: Yvonne Garcia  Attending Provider: Nguyen Srivastava MD  Admit Date: 2022  3:34 PM  MRN: [id number]  Account: [de-identified]                     : 1960  Discharge Date: 2022      Disposition: home    Rani Alcantara RN

## 2022-05-28 NOTE — PROGRESS NOTES
Renal Progress Note    Patient :  Goldie Gee; 60 y.o. MRN# [id number]  Location:  2018/2018-01  Attending:  Bethany Zamora MD  Admit Date:  5/26/2022   Hospital Day: 0      Subjective: Following for ESRD MWF via tunnel cath under Dr Carie Manuel at [place], admitted with generalized weakness. Had HD yesterday without issues. 3 liters removed. Patient wants to go home. Feels better  Arrangements for outpatient Holter. Outpatient Medications:     Medications Prior to Admission: atorvastatin (LIPITOR) 80 MG tablet,   LOPERAMIDE HCL PO, Take 2 mg by mouth  oxyCODONE-acetaminophen (PERCOCET) 7.5-325 MG per tablet, Take 1 tablet by mouth daily as needed for Pain for up to 30 days. Intended supply: 30 days  traMADol (ULTRAM) 50 MG tablet, Take 1 tablet by mouth daily as needed for Pain for up to 30 days.   [DISCONTINUED] sildenafil (VIAGRA) 100 MG tablet, Take 100 mg by mouth as needed for Erectile Dysfunction  glimepiride (AMARYL) 2 MG tablet, TAKE ONE TABLET BY MOUTH EVERY MORNING BEFORE BREAKFAST  linagliptin (TRADJENTA) 5 MG tablet, TAKE ONE TABLET BY MOUTH DAILY  [DISCONTINUED] divalproex (DEPAKOTE) 250 MG DR tablet, Take 2 tablets by mouth 2 times daily (Patient not taking: Reported on 5/26/2022)  [DISCONTINUED] LOKELMA 5 g PACK oral suspension,   minoxidil (LONITEN) 2.5 MG tablet, 10 mg 2 times daily  (Patient not taking: Reported on 5/26/2022)  [DISCONTINUED] cilostazol (PLETAL) 100 MG tablet, Take 100 mg by mouth 2 times daily (Patient not taking: Reported on 5/26/2022)  acetaminophen (TYLENOL) 500 MG tablet, Take 2 tablets by mouth every 6 hours as needed for Pain  docusate sodium (COLACE) 100 MG capsule, Take 1 capsule by mouth 3 times daily as needed for Constipation (Patient not taking: Reported on 5/26/2022)  [DISCONTINUED] amLODIPine-atorvastatatin (CADUET) 10-80 MG per tablet, Take 1 tablet by mouth daily   Heparin Sodium, Porcine, (HEPARIN, PORCINE,) 1000 UNIT/ML injection, Heparin Sodium (Porcine) 1,000 Units/mL Systemic  Multiple Vitamins-Minerals (RENAPLEX-D PO), Take 1 tablet by mouth daily   hydrALAZINE (APRESOLINE) 25 MG tablet, Take 25 mg by mouth 2 times daily   cloNIDine (CATAPRES) 0.3 MG tablet, Take 1 tablet by mouth 2 times daily Do not take if HR < 60 (Patient taking differently: Take 0.3 mg by mouth 3 times daily Do not take if HR < 60)  [DISCONTINUED] clopidogrel (PLAVIX) 75 MG tablet, Take 1 tablet by mouth daily  aspirin 81 MG chewable tablet, Take 81 mg by mouth every morning  Sucroferric Oxyhydroxide (VELPHORO) 500 MG CHEW, Take 2 tablets by mouth 3 times daily (with meals) Crush or chew and shallow 2 tablets three times a day with meals  Tens Unit MISC, by Does not apply route  lisinopril (PRINIVIL;ZESTRIL) 10 MG tablet, TAKE 1 TABLET BY MOUTH  TWICE DAILY (Patient taking differently: Take 10 mg by mouth in the morning and at bedtime )  NONFORMULARY,   gabapentin (NEURONTIN) 300 MG capsule, TAKE ONE CAPSULE BY MOUTH THREE TIMES A DAY    Current Medications:     Scheduled Meds:    minoxidil  10 mg Oral BID    insulin lispro  0-12 Units SubCUTAneous TID WC    insulin lispro  0-6 Units SubCUTAneous Nightly    hydrALAZINE  25 mg Oral 2 times per day    amLODIPine  10 mg Oral Daily    atorvastatin  80 mg Oral Nightly    apixaban  5 mg Oral BID    aspirin  81 mg Oral QAM    cloNIDine  0.3 mg Oral TID    gabapentin  300 mg Oral TID    labetalol  200 mg Oral BID    alogliptin  6.25 mg Oral Daily    lisinopril  10 mg Oral BID    therapeutic multivitamin-minerals   Oral Daily    Sucroferric Oxyhydroxide  2 tablet Oral TID WC    sodium chloride flush  5-40 mL IntraVENous 2 times per day     Continuous Infusions:    sodium chloride      dextrose       PRN Meds:  docusate sodium, traMADol, sodium chloride flush, sodium chloride, ondansetron **OR** ondansetron, acetaminophen **OR** acetaminophen, bisacodyl, glucose, dextrose bolus **OR** dextrose bolus, glucagon (rDNA), dextrose, oxyCODONE-acetaminophen **AND** oxyCODONE    Input/Output:       I/O last 3 completed shifts: In: 300   Out: 3300 . Patient Vitals for the past 96 hrs (Last 3 readings):   Weight   22 0549 202 lb 8 oz (91.9 kg)   22 1300 198 lb 6.6 oz (90 kg)   22 0903 205 lb 0.4 oz (93 kg)       Vital Signs:   Temperature:  Temp: 98.4 °F (36.9 °C)  TMax:   Temp (24hrs), Av.2 °F (36.8 °C), Min:97.7 °F (36.5 °C), Max:98.4 °F (36.9 °C)    Respirations:  Resp: 18  Pulse:   Heart Rate: 76  BP:    BP: (!) 188/101  BP Range: Systolic (46THC), YPM:032 , Min:106 , DG       Diastolic (13UVO), XXR:54, Min:57, Max:101      Physical Examination:     General:  AAO x 3, speaking in full sentences, no accessory muscle use. HEENT: Atraumatic, normocephalic, no throat congestion, moist mucosa. Eyes:   Pupils equal, round and reactive to light, EOMI. Neck:   No JVD, no thyromegaly, no lymphadenopathy. Chest:              Bilateral vesicular breath sounds, no rales or wheezes. Cardiac:  S1 S2 RR, no murmurs, gallops or rubs, JVP not raised. Abdomen: Soft, non-tender, no masses or organomegaly, BS audible. :   No suprapubic or flank tenderness. Neuro:  AAO x 3, No FND. SKIN:  No rashes, good skin turgor. Extremities:  No edema, palpable peripheral pulses, no calf tenderness.   Has left chest tunnel cath    Labs:       Recent Labs     22  1601   WBC 7.6   RBC 4.43   HGB 11.4*   HCT 35.6*   MCV 80.4*   MCH 25.7   MCHC 32.0   RDW 19.8*   PLT See Reflexed IPF Result      BMP:   Recent Labs     22  1601 22  1615 22  0938   *  --  133*   K 4.0  --  3.9   CL 93*  --  95*   CO2 24  --  25   BUN 21  --  30*   CREATININE 6.05*  --  7.30*   GLUCOSE 146* 145 113*   CALCIUM 10.1  --  9.6      BNP:      Lab Results   Component Value Date    BNP 3,088 2013     SPEP:  Lab Results   Component Value Date    PROT 7.5 2022     Hep BsAg:         Lab Results   Component Value Date HEPBSAG NONREACTIVE 09/28/2021     Hep C AB:          Lab Results   Component Value Date    HEPCAB NONREACTIVE 09/28/2021       Urinalysis/Chemistries:      Lab Results   Component Value Date    NITRU NEGATIVE 06/13/2013    COLORU YELLOW 06/13/2013    PHUR 7.5 06/13/2013    WBCUA 0 TO 2 06/13/2013    RBCUA 10 TO 20 06/13/2013    MUCUS NOT REPORTED 06/13/2013    TRICHOMONAS NOT REPORTED 06/13/2013    YEAST NOT REPORTED 06/13/2013    BACTERIA FEW 06/13/2013    SPECGRAV 1.015 06/13/2013    LEUKOCYTESUR NEGATIVE 06/13/2013    UROBILINOGEN Normal 06/13/2013    BILIRUBINUR NEGATIVE 06/13/2013    GLUCOSEU 3+ 06/13/2013    KETUA NEGATIVE 06/13/2013    AMORPHOUS NOT REPORTED 06/13/2013         Radiology:     CXR:     Assessment:      1. ESRD on maintenance hemodialysis Monday Wednesday Friday and lastly using tunnel catheter and follows up with me. Dry weight around 97 kg  2. Generalized weakness and inability to take care of himself  No hypotension/excess UF/evidence of sepsis  3. Bilateral amputee  4. Type 2 diabetes  5. Essential hypertension  6. Ischemic cardiomyopathy EF 45% status post PCI  7. Secondary hyperparathyroidism    Plan:   1. Dialysis per MWF schedule  2. OK for discharge from nephrology standpoint,    Nutrition   Please ensure that patient is on a renal diet/TF. Avoid nephrotoxic drugs/contrast exposure. We will continue to follow along with you. Dru Goodson CNP    Attending Physician Statement  I have discussed the care of Renetta Nina, including pertinent history and exam findings with the NP I have reviewed the key elements of all parts of the encounter with the np. Note was updated and recorded changes were made I have seen and examined the patient with the np. I agree with the assessment and plan and status of the problem list as documented.   Kandy Chavez MD

## 2022-05-28 NOTE — DISCHARGE SUMMARY
[facility]  Office: [address], DO, Sukhwinder Sara, DO, [place] Chen, DO, Mary Martines Blood, DO, Rolanda Vora MD, Rochelle Workman MD, Peter Joyce MD, Salty Osborn MD, Karen Padron MD, Georgiana Kraft MD, Raiza Madison MD, Allan Pena, DO, Fidencio Chung, DO, Jadene Boast, MD,  Debby Paulino, DO, Ronaldo Hightower MD, Pantera White MD, Flavia Sahu MD, Purnima Curtis DO, Ruma Guerrero MD, Luis Beckett MD, Nichelle Banda MD, Deirdre Robles [facility], [facility], CNP, Veronica Bah, CNP, Betty Pratt, CNP, Becki Francois, CNP, Megan Gordon, CNP, Berenice Treviño PA-C, Jose Rojas [facility], Helga Jacobs, CNP, Bryan Coyle, CNP, Saul Stringer, CNP, Kai Vázquez, CNS, Kelli Vasquez, [facility], Adriane Schwab, CNP, Zeferino Hernandez, CNP, Otto Matute, [facility]    Discharge Summary     Patient ID: Helyn Dakins  :  1960   MRN: [id number]     ACCOUNT:  [de-identified]   Patient's PCP: Faizan Fay MD  Admit Date: 2022   Discharge Date: 2022     Length of Stay: 0  Code Status:  Full Code  Admitting Physician: Pantera White MD  Discharge Physician: Pantera White MD     Active Discharge Diagnoses:     Hospital Problem Lists:  Principal Problem:    Atypical chest pain  Active Problems:    Elevated troponin    Physical deconditioning    General weakness    Acute congestive heart failure ([facility]    Hyperlipidemia    Essential hypertension    ESRD on hemodialysis ([facility] MWF    Controlled diabetes mellitus type 2 with complications ([facility]    S/P bilateral below knee amputation ([facility]    Long term (current) use of antithrombotics/antiplatelets  Resolved Problems:    * No resolved hospital problems.  *      Admission Condition:  fair     Discharged Condition: good    Hospital Stay:     Hospital Course:  Helyn Dakins is a 60 y.o. male who was admitted for the management of   Atypical chest pain , presented to ER with Fatigue (x 3 months, worse today)      29-year-old male with significant medical history of ESRD on hemodialysis, systolic heart failure, coronary artery disease, bilateral BKA presented to the hospital with generalized fatigue, weakness going on for last few months. Patient is not able to take care of himself at home. Patient states that he felt as if he was passing out for few seconds. No abnormal body movements reported. He lives alone and nobody witnessed the episode. Patient was noted to have elevated troponin and cardiology was consulted and patient admitted for evaluation. Cardiology recommended holter monitor on discharge. Patient improved and stated that he wants to go home. Significant therapeutic interventions:   Patient denies any chest pain  EKG showing chronic A. fib  Cardiology planning Holter monitor  Obtain echo outpatient  Discuss cardiology   DC Plavix per cardiology  Continue aspirin and Eliquis  Elevated troponin likely secondary to ESRD  Continue antihypertensives, monitor blood pressure  Volume management and dialysis per nephrology  Will need sleep study outpatient  Chronic pain syndrome- watch opioids use  PT OT evaluation, patient insist on going home, states that he takes care of himself. Willing to consider home care with therapy.   DME for cane placed for difficulty ambulation, bilateral prosthesis    Significant Diagnostic Studies:   Labs / Micro:  CBC:   Lab Results   Component Value Date    WBC 7.6 05/26/2022    RBC 4.43 05/26/2022    HGB 11.4 05/26/2022    HCT 35.6 05/26/2022    MCV 80.4 05/26/2022    MCH 25.7 05/26/2022    MCHC 32.0 05/26/2022    RDW 19.8 05/26/2022    PLT See Reflexed IPF Result 05/26/2022     BMP:    Lab Results   Component Value Date    GLUCOSE 113 05/27/2022     05/27/2022    K 3.9 05/27/2022    CL 95 05/27/2022    CO2 25 05/27/2022    ANIONGAP 13 05/27/2022    BUN 30 05/27/2022    CREATININE 7.30 05/27/2022    BUNCRER NOT REPORTED 02/01/2022    CALCIUM 9.6 05/27/2022    LABGLOM 8 05/27/2022    GFRAA 9 05/27/2022    GFR      05/27/2022     HFP:    Lab Results   Component Value Date    PROT 7.5 02/01/2022     CMP:    Lab Results   Component Value Date    GLUCOSE 113 05/27/2022     05/27/2022    K 3.9 05/27/2022    CL 95 05/27/2022    CO2 25 05/27/2022    BUN 30 05/27/2022    CREATININE 7.30 05/27/2022    ANIONGAP 13 05/27/2022    ALKPHOS 171 02/01/2022    ALT 24 02/01/2022    AST 33 02/01/2022    BILITOT 0.46 02/01/2022    LABALBU 4.5 02/01/2022    ALBUMIN 1.5 02/01/2022    LABGLOM 8 05/27/2022    GFRAA 9 05/27/2022    GFR      05/27/2022    PROT 7.5 02/01/2022    CALCIUM 9.6 05/27/2022        Radiology:  XR CHEST PORTABLE    Result Date: 5/26/2022  Left-sided central venous catheter terminating in the innominate vein Findings suggest congestive heart failure       Consultations:    Consults:     Final Specialist Recommendations/Findings:   IP CONSULT TO HOSPITALIST  IP CONSULT TO FAMILY MEDICINE  IP CONSULT TO CARDIOLOGY  IP CONSULT TO NEPHROLOGY  IP CONSULT TO HOME CARE NEEDS      The patient was seen and examined on day of discharge and this discharge summary is in conjunction with any daily progress note from day of discharge. Discharge plan:     Disposition: Home    Physician Follow Up:     Belvie Jeans, MD  [address] [address] [place] [phone]    Schedule an appointment as soon as possible for a visit in 3 days      Albino Molina.  [address]    Schedule an appointment as soon as possible for a visit in 1 week         Requiring Further Evaluation/Follow Up POST HOSPITALIZATION/Incidental Findings: holter monitor follow up    Diet: cardiac diet and renal diet    Activity: As tolerated    Instructions to Patient:Stop plavix  eliquis twice a day  Take bp meds  Please consider sleep study with pcp    Discharge Medications:      Medication List      CHANGE how you take these medications    amLODIPine 10 MG tablet  Commonly known as: NORVASC  Take 1 tablet by mouth daily  What changed:   how much to take  how to take this  when to take this     apixaban 5 MG Tabs tablet  Commonly known as: Eliquis  Take 1 tablet by mouth 2 times daily  What changed: when to take this     cloNIDine 0.3 MG tablet  Commonly known as: CATAPRES  Take 1 tablet by mouth 2 times daily Do not take if HR < 60  What changed: when to take this     lisinopril 10 MG tablet  Commonly known as: PRINIVIL;ZESTRIL  TAKE 1 TABLET BY MOUTH  TWICE DAILY  What changed:   how much to take  how to take this  when to take this  additional instructions        CONTINUE taking these medications    acetaminophen 500 MG tablet  Commonly known as: TYLENOL  Take 2 tablets by mouth every 6 hours as needed for Pain     aspirin 81 MG chewable tablet     atorvastatin 80 MG tablet  Commonly known as: LIPITOR     docusate sodium 100 mg capsule  Commonly known as: Colace  Take 1 capsule by mouth 3 times daily as needed for Constipation     gabapentin 300 MG capsule  Commonly known as: NEURONTIN  TAKE ONE CAPSULE BY MOUTH THREE TIMES A DAY     glimepiride 2 MG tablet  Commonly known as: AMARYL  TAKE ONE TABLET BY MOUTH EVERY MORNING BEFORE BREAKFAST     heparin (porcine) 1000 UNIT/ML injection     hydrALAZINE 25 MG tablet  Commonly known as: APRESOLINE     labetalol 200 MG tablet  Commonly known as: NORMODYNE  Take 1 tablet by mouth 2 times daily     linagliptin 5 MG tablet  Commonly known as: Tradjenta  TAKE ONE TABLET BY MOUTH DAILY     LOPERAMIDE HCL PO     minoxidil 2.5 MG tablet  Commonly known as: LONITEN     NONFORMULARY     oxyCODONE-acetaminophen 7.5-325 MG per tablet  Commonly known as: Percocet  Take 1 tablet by mouth daily as needed for Pain for up to 30 days.  Intended supply: 30 days     RENAPLEX-D PO     Tens Unit Misc  by Does not apply route     traMADol 50 MG tablet  Commonly known as: ULTRAM  Take 1 tablet by mouth daily as needed for Pain for up to 30 days. Velphoro 500 MG Chew  Generic drug: Sucroferric Oxyhydroxide        STOP taking these medications    Caduet 10-80 MG per tablet  Generic drug: amLODIPine-atorvastatatin     cilostazol 100 MG tablet  Commonly known as: PLETAL     clopidogrel 75 MG tablet  Commonly known as: PLAVIX     Depakote 250 MG DR tablet  Generic drug: divalproex     Lokelma 5 g Pack oral suspension  Generic drug: sodium zirconium cyclosilicate     Viagra 943 MG tablet  Generic drug: sildenafil           Where to Get Your Medications      These medications were sent to [facility] [address] [place] [address], [place] [address]    Phone: [phone]   amLODIPine 10 MG tablet  apixaban 5 MG Tabs tablet  labetalol 200 MG tablet         Discharge Procedure Orders   Echocardiogram complete   Standing Status: Future Standing Exp. Date: 07/27/22     Order Specific Question Answer Comments   Reason for exam: check ef and wall motion abnormality, complains of dypnea and fatigue        Time Spent on discharge is  35 mins in patient examination, evaluation, counseling as well as medication reconciliation, prescriptions for required medications, discharge plan and follow up. Electronically signed by   Pat Pires MD  5/28/2022  5:20 PM      Thank you Dr. Kaur Melendez MD for the opportunity to be involved in this patient's care.

## 2022-06-02 ENCOUNTER — TELEPHONE (OUTPATIENT)
Dept: ADMINISTRATIVE | Age: 62
End: 2022-06-02

## 2022-06-02 NOTE — TELEPHONE ENCOUNTER
The Pt called wanting to reschedule his appt on Mon. 6/6/22 at 1:00 pm. With Jens Wan. He is in Dialysis on Mon-Wed.'s & Fridays. He is off on Tuesdays & Thursdays. He can do a Late afternoon appt on Mon-Wed-or Fridays if it is the last appt of the day. The Pt also needs Medication Refills. The Pt is having issues with his phone. You can try to reach him at [phone]. He may or may Not be able to answer the phone.  He is requesting a call back today (If at All possible)

## 2022-06-03 ENCOUNTER — TELEPHONE (OUTPATIENT)
Dept: PAIN MANAGEMENT | Age: 62
End: 2022-06-03

## 2022-06-03 NOTE — TELEPHONE ENCOUNTER
----- Message from Deb Nelson sent at 6/2/2022  2:27 PM EDT -----  Regarding: The patient called to reschedule his Mon 6/6/22 at 1:00 pm. with Anne Navarro  The Pt called wanting to reschedule his appt on Mon. 6/6/22 at 1:00 pm. With Anne Navarro. He is in Dialysis on Mon-Wed.'s & Fridays. He is off on Tuesdays & Thursdays. He can do a Late afternoon appt on Mon-Wed-or Fridays if it is the last appt of the day. The Pt also needs Medication Refills. The Pt is having issues with his phone. You can try to reach him at [phone]. He may or may Not be able to answer the phone.  He is requesting a call back today (If at All possible)

## 2022-06-07 ENCOUNTER — TELEPHONE (OUTPATIENT)
Dept: INTERNAL MEDICINE CLINIC | Age: 62
End: 2022-06-07

## 2022-06-07 NOTE — TELEPHONE ENCOUNTER
Patient called stating that it is time for a refill on his Caduet.  Through the patient assistance program    Says that the company told him he had to call us    Please advise

## 2022-06-14 ENCOUNTER — TELEPHONE (OUTPATIENT)
Dept: PAIN MANAGEMENT | Age: 62
End: 2022-06-14

## 2022-06-14 NOTE — TELEPHONE ENCOUNTER
Pt is requesting more percocet to get him to his appointment with you on 7/15/22. He also wanted me to let you know that Dr. Nydia Ordoñez told him it would be ok to switch from gabapentin to lyrica.

## 2022-06-16 DIAGNOSIS — M48.062 SPINAL STENOSIS OF LUMBAR REGION WITH NEUROGENIC CLAUDICATION: ICD-10-CM

## 2022-06-16 DIAGNOSIS — Z99.2 ESRD ON HEMODIALYSIS (HCC): ICD-10-CM

## 2022-06-16 DIAGNOSIS — N18.6 ESRD ON HEMODIALYSIS (HCC): ICD-10-CM

## 2022-06-16 DIAGNOSIS — Z79.891 CHRONIC USE OF OPIATE DRUG FOR THERAPEUTIC PURPOSE: ICD-10-CM

## 2022-06-16 RX ORDER — TRAMADOL HYDROCHLORIDE 50 MG/1
50 TABLET ORAL DAILY PRN
Qty: 30 TABLET | Refills: 0 | Status: SHIPPED | OUTPATIENT
Start: 2022-06-16 | End: 2022-07-15 | Stop reason: SDUPTHER

## 2022-06-16 RX ORDER — OXYCODONE AND ACETAMINOPHEN 7.5; 325 MG/1; MG/1
1 TABLET ORAL DAILY PRN
Qty: 20 TABLET | Refills: 0 | Status: SHIPPED | OUTPATIENT
Start: 2022-06-16 | End: 2022-07-15 | Stop reason: SDUPTHER

## 2022-06-20 ENCOUNTER — TELEPHONE (OUTPATIENT)
Dept: INTERNAL MEDICINE CLINIC | Age: 62
End: 2022-06-20

## 2022-06-20 NOTE — PROCEDURES
[address]                  [address] [facility]                                 HOLTER MONITOR    PATIENT NAME: Sharion Snellen                     :        1960  MED REC NO:   [id number]                             ROOM:       2018  ACCOUNT NO:   [de-identified]                           ADMIT DATE: 2022  PROVIDER:     Christine Cervantes    HOLTER MONITOR 48-HOURS    DATE OF STUDY:  2022    INDICATIONS: Chest pain    ORDERING PROVIDER: Dr. Marisela Morris PROVIDER: Dr. Ivanna Rosales: Dr. Schofield Rout:  Atrial fibrillation and Atrial flutter         Leida Fernandez    D: 2022 15:34:05       T: 2022 15:43:22     [id number]  Job#: [id number]     Doc#: Unknown    CC:

## 2022-06-20 NOTE — TELEPHONE ENCOUNTER
Js from dialysis called, states patient has had some low bs readings lately. States about 3-4 weeks ago it was below 30, it didn't even register a number. They are asking if you are wanting to change any of his dm medications.

## 2022-06-26 ENCOUNTER — APPOINTMENT (OUTPATIENT)
Dept: CT IMAGING | Age: 62
End: 2022-06-26
Payer: COMMERCIAL

## 2022-06-26 ENCOUNTER — HOSPITAL ENCOUNTER (EMERGENCY)
Age: 62
Discharge: HOME | End: 2022-06-26
Attending: EMERGENCY MEDICINE
Payer: COMMERCIAL

## 2022-06-26 VITALS
TEMPERATURE: 99 F | OXYGEN SATURATION: 95 % | BODY MASS INDEX: 30.24 KG/M2 | SYSTOLIC BLOOD PRESSURE: 141 MMHG | RESPIRATION RATE: 14 BRPM | HEART RATE: 93 BPM | DIASTOLIC BLOOD PRESSURE: 55 MMHG | HEIGHT: 71 IN | WEIGHT: 216 LBS

## 2022-06-26 DIAGNOSIS — K52.9 GASTROENTERITIS: Primary | ICD-10-CM

## 2022-06-26 PROBLEM — R77.8 ELEVATED TROPONIN: Status: RESOLVED | Noted: 2022-05-27 | Resolved: 2022-06-26

## 2022-06-26 PROBLEM — R79.89 ELEVATED TROPONIN: Status: RESOLVED | Noted: 2022-01-01 | Resolved: 2022-01-01

## 2022-06-26 LAB
ABSOLUTE EOS #: <0.03 K/UL (ref 0–0.44)
ABSOLUTE IMMATURE GRANULOCYTE: 0.02 K/UL (ref 0–0.3)
ABSOLUTE LYMPH #: 0.71 K/UL (ref 1.1–3.7)
ABSOLUTE MONO #: 0.73 K/UL (ref 0.1–1.2)
ALBUMIN SERPL-MCNC: 4.3 G/DL (ref 3.5–5.2)
ALP BLD-CCNC: 101 U/L (ref 40–129)
ALT SERPL-CCNC: 26 U/L (ref 5–41)
AMYLASE: 53 U/L (ref 28–100)
ANION GAP SERPL CALCULATED.3IONS-SCNC: 15 MMOL/L (ref 9–17)
AST SERPL-CCNC: 62 U/L
BASOPHILS # BLD: 0 % (ref 0–2)
BASOPHILS ABSOLUTE: <0.03 K/UL (ref 0–0.2)
BILIRUB SERPL-MCNC: 0.52 MG/DL (ref 0.3–1.2)
BILIRUBIN DIRECT: 0.24 MG/DL
BILIRUBIN, INDIRECT: 0.28 MG/DL (ref 0–1)
BUN BLDV-MCNC: 25 MG/DL (ref 8–23)
BUN/CREAT BLD: 3 (ref 9–20)
CALCIUM SERPL-MCNC: 9.7 MG/DL (ref 8.6–10.4)
CHLORIDE BLD-SCNC: 97 MMOL/L (ref 98–107)
CO2: 25 MMOL/L (ref 20–31)
CREAT SERPL-MCNC: 7.18 MG/DL (ref 0.7–1.2)
EOSINOPHILS RELATIVE PERCENT: 0 % (ref 1–4)
GFR AFRICAN AMERICAN: 9 ML/MIN
GFR NON-AFRICAN AMERICAN: 8 ML/MIN
GFR SERPL CREATININE-BSD FRML MDRD: ABNORMAL ML/MIN/{1.73_M2}
GLUCOSE BLD-MCNC: 106 MG/DL (ref 70–99)
HCT VFR BLD CALC: 36.7 % (ref 40.7–50.3)
HEMOGLOBIN: 11.2 G/DL (ref 13–17)
IMMATURE GRANULOCYTES: 0 %
LIPASE: 15 U/L (ref 13–60)
LYMPHOCYTES # BLD: 14 % (ref 24–43)
MAGNESIUM: 1.9 MG/DL (ref 1.6–2.6)
MCH RBC QN AUTO: 24.2 PG (ref 25.2–33.5)
MCHC RBC AUTO-ENTMCNC: 30.5 G/DL (ref 28.4–34.8)
MCV RBC AUTO: 79.4 FL (ref 82.6–102.9)
MONOCYTES # BLD: 14 % (ref 3–12)
NRBC AUTOMATED: 0 PER 100 WBC
PDW BLD-RTO: 19.9 % (ref 11.8–14.4)
PHOSPHORUS: 3.8 MG/DL (ref 2.5–4.5)
PLATELET # BLD: 132 K/UL (ref 138–453)
PMV BLD AUTO: ABNORMAL FL (ref 8.1–13.5)
POTASSIUM SERPL-SCNC: 4.3 MMOL/L (ref 3.7–5.3)
RBC # BLD: 4.62 M/UL (ref 4.21–5.77)
RBC # BLD: ABNORMAL 10*6/UL
SEG NEUTROPHILS: 71 % (ref 36–65)
SEGMENTED NEUTROPHILS ABSOLUTE COUNT: 3.72 K/UL (ref 1.5–8.1)
SODIUM BLD-SCNC: 137 MMOL/L (ref 135–144)
TOTAL PROTEIN: 7.5 G/DL (ref 6.4–8.3)
WBC # BLD: 5.2 K/UL (ref 3.5–11.3)

## 2022-06-26 PROCEDURE — 99284 EMERGENCY DEPT VISIT MOD MDM: CPT

## 2022-06-26 PROCEDURE — 6360000002 HC RX W HCPCS: Performed by: NURSE PRACTITIONER

## 2022-06-26 PROCEDURE — 80076 HEPATIC FUNCTION PANEL: CPT

## 2022-06-26 PROCEDURE — 80048 BASIC METABOLIC PNL TOTAL CA: CPT

## 2022-06-26 PROCEDURE — 85025 COMPLETE CBC W/AUTO DIFF WBC: CPT

## 2022-06-26 PROCEDURE — 83735 ASSAY OF MAGNESIUM: CPT

## 2022-06-26 PROCEDURE — 82150 ASSAY OF AMYLASE: CPT

## 2022-06-26 PROCEDURE — 96374 THER/PROPH/DIAG INJ IV PUSH: CPT

## 2022-06-26 PROCEDURE — 84100 ASSAY OF PHOSPHORUS: CPT

## 2022-06-26 PROCEDURE — 74176 CT ABD & PELVIS W/O CONTRAST: CPT

## 2022-06-26 PROCEDURE — 83690 ASSAY OF LIPASE: CPT

## 2022-06-26 RX ORDER — ONDANSETRON 4 MG/1
4 TABLET, ORALLY DISINTEGRATING ORAL EVERY 8 HOURS PRN
Qty: 12 TABLET | Refills: 0 | Status: ON HOLD | OUTPATIENT
Start: 2022-06-26 | End: 2022-09-19

## 2022-06-26 RX ORDER — ONDANSETRON 2 MG/ML
4 INJECTION INTRAMUSCULAR; INTRAVENOUS ONCE
Status: COMPLETED | OUTPATIENT
Start: 2022-06-26 | End: 2022-06-26

## 2022-06-26 RX ADMIN — ONDANSETRON 4 MG: 2 INJECTION INTRAMUSCULAR; INTRAVENOUS at 14:41

## 2022-06-26 ASSESSMENT — PAIN - FUNCTIONAL ASSESSMENT: PAIN_FUNCTIONAL_ASSESSMENT: NONE - DENIES PAIN

## 2022-06-26 ASSESSMENT — ENCOUNTER SYMPTOMS
BLOOD IN STOOL: 0
COUGH: 0
NAUSEA: 1
DIARRHEA: 1
BACK PAIN: 0
VOMITING: 1
SHORTNESS OF BREATH: 0
ABDOMINAL PAIN: 0

## 2022-06-26 ASSESSMENT — VISUAL ACUITY: OU: 1

## 2022-06-26 NOTE — ED PROVIDER NOTES
[address] [facility] ED  EMERGENCY DEPARTMENT ENCOUNTER      Pt Name: Maris Cerda  MRN: [id number]  Angelagfjosephine 1960  Date of evaluation: 6/26/2022  Provider: Washington Salomon       Chief Complaint   Patient presents with    Emesis    Diarrhea         HISTORY OFPRESENT ILLNESS  (Location/Symptom, Timing/Onset, Context/Setting, Quality, Duration, Modifying Factors, Severity.)   Maris Cerda is a 60 y.o. male who presents to the emergency department by private auto for evaluation of nausea, vomiting, and diarrhea has been going on for the past 6 days after he ate a cheeseburger at [employer] 6 days ago. States he was able to eat some ice and had a taco today prior to arrival without emesis. Has not had episode of diarrhea today as well. Denies abdominal pain, chest pain cough or shortness of breath. No blood in the stool. History of ESRD on dialysis Monday Wednesday Friday. Last dialysis was Friday. Will have dialysis tomorrow. No shortness of breath. Also has history of CHF, diabetes, hypertension, and bilateral BKA. Nursing Notes were reviewed. PASTMEDICAL HISTORY     Past Medical History:   Diagnosis Date    Acute congestive heart failure (HCC)     Chronic bilateral low back pain with bilateral sciatica     CRF (chronic renal failure)     [facility] MWF    DDD (degenerative disc disease), lumbar 12/15/2020    Diabetes mellitus ([facility]     Dialysis patient [facility])     ED (erectile dysfunction)     [facility] of echocardi[facility]    [facility]    HTN (hypertension)     Hyperlipidemia     LVH (left ventricular hypertrophy)     PVD (peripheral vascular disease) ([address]     S/P bilateral BKA (below knee amputation) ([address]     Wears glasses          SURGICAL HISTORY       Past Surgical History:   Procedure Laterality Date    ARM SURGERY      CATARACT REMOVAL WITH IMPLANT      DIALYSIS FISTULA CREATION Bilateral     As of 2019, RUE AVF functioning, LUE AVF nonfunctioning.     FINGER AMPUTATION      right pointer finger    FINGER SURGERY Left     I & D    GLAUCOMA SURGERY      LEG AMPUTATION BELOW KNEE Bilateral     TUNNELED VENOUS CATHETER PLACEMENT      PC placed and removed         CURRENT MEDICATIONS     Discharge Medication List as of 6/26/2022  3:09 PM      CONTINUE these medications which have NOT CHANGED    Details   oxyCODONE-acetaminophen (PERCOCET) 7.5-325 MG per tablet Take 1 tablet by mouth daily as needed for Pain for up to 30 days. On HD days, Disp-20 tablet, R-0Normal      traMADol (ULTRAM) 50 MG tablet Take 1 tablet by mouth daily as needed for Pain for up to 30 days. , Disp-30 tablet, R-0Normal      apixaban (ELIQUIS) 5 MG TABS tablet Take 1 tablet by mouth 2 times daily, Disp-180 tablet, R-1Normal      amLODIPine (NORVASC) 10 MG tablet Take 1 tablet by mouth daily, Disp-30 tablet, R-3Normal      labetalol (NORMODYNE) 200 MG tablet Take 1 tablet by mouth 2 times daily, Disp-60 tablet, R-3Normal      atorvastatin (LIPITOR) 80 MG tablet Historical Med      LOPERAMIDE HCL PO Take 2 mg by mouthHistorical Med      glimepiride (AMARYL) 2 MG tablet TAKE ONE TABLET BY MOUTH EVERY MORNING BEFORE BREAKFAST, Disp-90 tablet, R-1Normal      linagliptin (TRADJENTA) 5 MG tablet TAKE ONE TABLET BY MOUTH DAILY, Disp-90 tablet, R-0Normal      minoxidil (LONITEN) 2.5 MG tablet 10 mg 2 times daily Historical Med      acetaminophen (TYLENOL) 500 MG tablet Take 2 tablets by mouth every 6 hours as needed for Pain, Disp-60 tablet, R-0Print      docusate sodium (COLACE) 100 MG capsule Take 1 capsule by mouth 3 times daily as needed for Constipation, Disp-30 capsule, R-0Normal      Heparin Sodium, Porcine, (HEPARIN, PORCINE,) 1000 UNIT/ML injection Heparin Sodium (Porcine) 1,000 Units/mL SystemicHistorical Med      Multiple Vitamins-Minerals (RENAPLEX-D PO) Take 1 tablet by mouth daily Historical Med      hydrALAZINE (APRESOLINE) 25 MG tablet Take 25 mg by mouth 2 times daily Historical Med cloNIDine (CATAPRES) 0.3 MG tablet Take 1 tablet by mouth 2 times daily Do not take if HR < 60, Disp-180 tablet, R-3Normal      aspirin 81 MG chewable tablet Take 81 mg by mouth every morningHistorical Med      Sucroferric Oxyhydroxide (VELPHORO) 500 MG CHEW Take 2 tablets by mouth 3 times daily (with meals) Crush or chew and shallow 2 tablets three times a day with mealsHistorical Med      Tens Unit [facility] Starting Tue 12/15/2020, Disp-1 each, R-0, Print      lisinopril (PRINIVIL;ZESTRIL) 10 MG tablet TAKE 1 TABLET BY MOUTH  TWICE DAILY, Disp-180 tablet,R-0Normal      NONFORMULARY Historical Med      gabapentin (NEURONTIN) 300 MG capsule TAKE ONE CAPSULE BY MOUTH THREE TIMES A DAY, Disp-90 capsule, R-2Normal             ALLERGIES     Patient has no known allergies.     FAMILY HISTORY       Family History   Problem Relation Age of Onset    Diabetes Mother     Coronary Art Dis Mother     Hypertension Mother     Diabetes Father     Hypertension Father     Stroke Father     Cancer Sister     Hypertension Brother           SOCIAL HISTORY       Social History     Socioeconomic History    Marital status:      Spouse name: None    Number of children: None    Years of education: None    Highest education level: None   Occupational History    None   Tobacco Use    Smoking status: Never Smoker    Smokeless tobacco: Never Used   Vaping Use    Vaping Use: Never used   Substance and Sexual Activity    Alcohol use: Yes     Comment: rare    Drug use: No    Sexual activity: None   Other Topics Concern    None   Social History Narrative    None     Social Determinants of Health     Financial Resource Strain: Low Risk     Difficulty of Paying Living Expenses: Not hard at all   Food Insecurity: No Food Insecurity    Worried About Running Out of Food in the Last Year: Never true    Joao of Food in the Last Year: Never true   Transportation Needs: No Transportation Needs    Lack of Transportation (Medical): No    Lack of Transportation (Non-Medical): No   Physical Activity: Inactive    Days of Exercise per Week: 0 days    Minutes of Exercise per Session: 0 min   Stress: No Stress Concern Present    Feeling of Stress : Not at all   Social Connections: Moderately Integrated    Frequency of Communication with Friends and Family: More than three times a week    Frequency of Social Gatherings with Friends and Family: More than three times a week    Attends Oriental orthodox Services: More than 4 times per year    Active Member of [address] [place] [address] [place] or Organizations: Yes    Attends Club or Organization Meetings: Never    Marital Status:    Intimate Partner Violence: Not At Risk    Fear of Current or Ex-Partner: No    Emotionally Abused: No    Physically Abused: No    Sexually Abused: No   Housing Stability: Unknown    Unable to Pay for Housing in the Last Year: No    Number of [place] in the Last Year: Not on file    Unstable Housing in the Last Year: No         REVIEW OF SYSTEMS    (2-9 systems for level 4, 10 or more for level 5)     Review of Systems   Constitutional: Positive for appetite change. Respiratory: Negative for cough and shortness of breath. Cardiovascular: Negative for chest pain. Gastrointestinal: Positive for diarrhea, nausea and vomiting. Negative for abdominal pain and blood in stool. Musculoskeletal: Negative for back pain. Neurological: Negative for dizziness and headaches. All other systems reviewed and are negative. Except as noted above the remainder of the review of systems was reviewed and negative. PHYSICAL EXAM    (up to 7 for level 4, 8 or more for level 5)     ED Triage Vitals [06/26/22 1357]   BP Temp Temp Source Heart Rate Resp SpO2 Height Weight   (!) 141/55 99 °F (37.2 °C) Oral 93 14 95 % 5' 11\" (1.803 m) 216 lb (98 kg)       Physical Exam  Constitutional:       General: He is not in acute distress. Appearance: Normal appearance.  He is well-developed, well-groomed and normal weight. HENT:      Head: Normocephalic. Right Ear: External ear normal.      Left Ear: External ear normal.      Nose: Nose normal.      Mouth/Throat:      Mouth: Mucous membranes are moist.   Eyes:      General: Lids are normal. Vision grossly intact. Extraocular Movements: Extraocular movements intact. Conjunctiva/sclera: Conjunctivae normal.      Pupils: Pupils are equal, round, and reactive to light. Cardiovascular:      Rate and Rhythm: Normal rate and regular rhythm. Heart sounds: Normal heart sounds. Pulmonary:      Effort: Pulmonary effort is normal.      Breath sounds: Normal breath sounds. No decreased breath sounds, wheezing, rhonchi or rales. Abdominal:      General: Bowel sounds are normal. There is no distension. Palpations: Abdomen is soft. Tenderness: There is abdominal tenderness in the epigastric area. There is no guarding or rebound. Comments: Abdomen is soft. Patient has mild epigastric tenderness palpation. No guarding or rebound. No lower abdominal tenderness. Musculoskeletal:         General: Normal range of motion. Cervical back: Normal range of motion and neck supple. Skin:     General: Skin is warm and dry. Neurological:      Mental Status: He is alert and oriented to person, place, and time. Psychiatric:         Mood and Affect: Mood normal.         Behavior: Behavior normal.         Thought Content:  Thought content normal.         Judgment: Judgment normal.           DIAGNOSTIC RESULTS     EKG:All EKG's are interpreted by the Emergency Department Physician who either signs or Co-signs this chart in the absence of a cardiologist.        RADIOLOGY:   Non-plain film images such as CT, Ultrasound and MRI are read by theradiologist. Plain radiographic images are visualized and preliminarily interpreted by the emergency physician with the below findings:    [address] Additional Contrast? None    Result Date: 6/26/2022  EXAMINATION: CT OF THE ABDOMEN AND PELVIS WITHOUT CONTRAST 6/26/2022 2:23 pm TECHNIQUE: CT of the abdomen and pelvis was performed without the administration of intravenous contrast. Multiplanar reformatted images are provided for review. Automated exposure control, iterative reconstruction, and/or weight based adjustment of the mA/kV was utilized to reduce the radiation dose to as low as reasonably achievable. COMPARISON: None. HISTORY: ORDERING SYSTEM PROVIDED HISTORY: Emesis, diarrhea x1 week TECHNOLOGIST PROVIDED HISTORY: Emesis, diarrhea x1 week Decision Support Exception - unselect if not a suspected or confirmed emergency medical condition->Emergency Medical Condition (MA) Reason for Exam: Pt on dialysis,  nausea, vomiting, diarrhea for one week. FINDINGS: Lower Chest: There is a trace right basilar effusion. There is a mild-to-moderate pericardial effusion. Organs: The liver and spleen are normal size and overall attenuation. The gallbladder is contracted. The pancreas and adrenal glands are unremarkable. Kidneys are trophic without obstructive uropathy. The urinary bladder is unremarkable. GI/Bowel: The stomach is unremarkable. Loops of small bowel are normal in caliber without evidence for obstruction. The colon contains air and fecal residue and is otherwise unremarkable. The appendix is normal.  There is no intraperitoneal free air or free fluid. Pelvis: Prostate gland and seminal vesicles are unremarkable. Peritoneum/Retroperitoneum: The psoas muscles are symmetric. The abdominal aorta is normal in caliber. The inferior vena cava is unremarkable. There is no retroperitoneal or mesenteric adenopathy. Bones/Soft Tissues: There is mild anasarca. There is no acute osseous abnormality. Mild-to-moderate pericardial effusion. Trace right pleural effusion. No acute abdominal or pelvic abnormality. Mild anasarca.      Interpretation per the Radiologist below, if available at the time of this note:    CT ABDOMEN PELVIS WO CONTRAST Additional Contrast? None   Final Result   Mild-to-moderate pericardial effusion. Trace right pleural effusion. No acute abdominal or pelvic abnormality. Mild anasarca. EDBEDSIDE ULTRASOUND:   Performed by Shaista Hirsch - none    LABS:  Labs Reviewed   CBC WITH AUTO DIFFERENTIAL - Abnormal; Notable for the following components:       Result Value    Hemoglobin 11.2 (*)     Hematocrit 36.7 (*)     MCV 79.4 (*)     MCH 24.2 (*)     RDW 19.9 (*)     Platelets 865 (*)     Seg Neutrophils 71 (*)     Lymphocytes 14 (*)     Monocytes 14 (*)     Eosinophils % 0 (*)     Absolute Lymph # 0.71 (*)     All other components within normal limits   BASIC METABOLIC PANEL W/ REFLEX TO MG FOR LOW K - Abnormal; Notable for the following components:    Glucose 106 (*)     BUN 25 (*)     CREATININE 7.18 (*)     Bun/Cre Ratio 3 (*)     Chloride 97 (*)     GFR Non- 8 (*)     GFR  9 (*)     All other components within normal limits   HEPATIC FUNCTION PANEL - Abnormal; Notable for the following components:    AST 62 (*)     All other components within normal limits   LIPASE   MAGNESIUM   AMYLASE   PHOSPHORUS       All other labs were within normal range or not returned as of this dictation. EMERGENCY DEPARTMENT COURSE andDIFFERENTIAL DIAGNOSIS/MDM:   CT abdomen pelvis mild to moderate pericardial effusion. Trace right pleural effusion. No acute abdominal or pelvic abnormality. Mild anasarca. Labs reviewed. WBC 5.2. Hemoglobin 11.2. Creatinine 7. 18.  K4.3. LFTs unremarkable. Lipase 15. Mag 1.9. Phos 3.8. She was given Zofran in the ED. I discussed test results with patient. Likely gastroenteritis. Prescriptions written for pain. Instructed follow-up with his doctor. Return ED if symptoms worsen.     Vitals:    Vitals:    06/26/22 1357   BP: (!) 141/55   Pulse: 93   Resp: 14   Temp: 99 °F (37.2 °C)   TempSrc: Oral   SpO2: 95%   Weight: 216 lb (98 kg)   Height: 5' 11\" (1.803 m)         CONSULTS:  None    RES:  Procedures    FINAL IMPRESSION      1.  Gastroenteritis          DISPOSITION/PLAN   DISPOSITION Decision To Discharge 06/26/2022 03:08:37 PM      PATIENT REFERRED TO:   Ekta Valdez MD  [address] [address] [facility]  [phone]    Schedule an appointment as soon as possible for a visit       [address]  [phone]    If symptoms worsen      DISCHARGE MEDICATIONS:     Discharge Medication List as of 6/26/2022  3:09 PM      START taking these medications    Details   ondansetron (ZOFRAN ODT) 4 MG disintegrating tablet Take 1 tablet by mouth every 8 hours as needed for Nausea or Vomiting, Disp-12 tablet, R-0Print           Electronically signed by AURORA Allred 6/28/2022 at 2:45 PM            AURORA Allred CNP  06/28/22 1440

## 2022-06-26 NOTE — ED PROVIDER NOTES
eMERGENCY dEPARTMENT eNCOUnter   Independent Attestation     Pt Name: Kely Sanches  MRN: [id number]  [place] 1960  Date of evaluation: 6/26/22     Kely Sanches is a 60 y.o. male with CC: Emesis and Diarrhea        This visit was performed by both a physician and an APC. I performed all aspects of the MDM as documented. The care is provided during an unprecedented national emergency due to the novel coronavirus, COVID 19.     Jt Simental MD  Attending Emergency Physician            Jt Simental MD  06/26/22 1963

## 2022-06-27 ENCOUNTER — TELEPHONE (OUTPATIENT)
Dept: INTERNAL MEDICINE CLINIC | Age: 62
End: 2022-06-27

## 2022-06-27 NOTE — TELEPHONE ENCOUNTER
[facility] ([facility]) ED Follow up Call    Reason for ED visit: gastroenteritis    6/27/2022     FU appts/Provider:    Future Appointments   Date Time Provider Staci Vuong   7/15/2022  4:20 PM Ina Arsenio, APRN - CNP [facility]   8/18/2022  2:00 PM [facility]yer [facility]         VOICEMAIL DOCUMENTATION  Hi, this message is for [address]. This is Vanessa Ax, [address] from [employer] office. Just calling to see how you are doing after your recent visit to the Emergency Room. [employer] wants to make sure you were able to fill any prescriptions and that you understand your discharge instructions. Please return our call if you need to make a follow up appointment with your provider or have any further needs. Our phone number is [phone]. Have a great day.

## 2022-06-29 ENCOUNTER — HOSPITAL ENCOUNTER (EMERGENCY)
Age: 62
Discharge: HOME | End: 2022-06-29
Attending: EMERGENCY MEDICINE
Payer: COMMERCIAL

## 2022-06-29 ENCOUNTER — APPOINTMENT (OUTPATIENT)
Dept: GENERAL RADIOLOGY | Age: 62
End: 2022-06-29
Payer: COMMERCIAL

## 2022-06-29 VITALS
TEMPERATURE: 98.2 F | DIASTOLIC BLOOD PRESSURE: 67 MMHG | WEIGHT: 216 LBS | RESPIRATION RATE: 15 BRPM | HEART RATE: 75 BPM | SYSTOLIC BLOOD PRESSURE: 106 MMHG | BODY MASS INDEX: 30.24 KG/M2 | HEIGHT: 71 IN | OXYGEN SATURATION: 92 %

## 2022-06-29 DIAGNOSIS — I95.9 HYPOTENSION, UNSPECIFIED HYPOTENSION TYPE: Primary | ICD-10-CM

## 2022-06-29 LAB
ABSOLUTE EOS #: 0.23 K/UL (ref 0–0.44)
ABSOLUTE IMMATURE GRANULOCYTE: 0.02 K/UL (ref 0–0.3)
ABSOLUTE LYMPH #: 1.54 K/UL (ref 1.1–3.7)
ABSOLUTE MONO #: 0.91 K/UL (ref 0.1–1.2)
ANION GAP SERPL CALCULATED.3IONS-SCNC: 12 MMOL/L (ref 9–17)
BASOPHILS # BLD: 1 % (ref 0–2)
BASOPHILS ABSOLUTE: 0.03 K/UL (ref 0–0.2)
BUN BLDV-MCNC: 23 MG/DL (ref 8–23)
BUN/CREAT BLD: 4 (ref 9–20)
CALCIUM SERPL-MCNC: 8.5 MG/DL (ref 8.6–10.4)
CHLORIDE BLD-SCNC: 96 MMOL/L (ref 98–107)
CO2: 27 MMOL/L (ref 20–31)
CREAT SERPL-MCNC: 5.98 MG/DL (ref 0.7–1.2)
EOSINOPHILS RELATIVE PERCENT: 4 % (ref 1–4)
GFR AFRICAN AMERICAN: 12 ML/MIN
GFR NON-AFRICAN AMERICAN: 10 ML/MIN
GFR SERPL CREATININE-BSD FRML MDRD: ABNORMAL ML/MIN/{1.73_M2}
GLUCOSE BLD-MCNC: 145 MG/DL (ref 70–99)
HCT VFR BLD CALC: 31.4 % (ref 40.7–50.3)
HEMOGLOBIN: 9.5 G/DL (ref 13–17)
IMMATURE GRANULOCYTES: 0 %
LYMPHOCYTES # BLD: 28 % (ref 24–43)
MCH RBC QN AUTO: 24.6 PG (ref 25.2–33.5)
MCHC RBC AUTO-ENTMCNC: 30.3 G/DL (ref 28.4–34.8)
MCV RBC AUTO: 81.3 FL (ref 82.6–102.9)
MONOCYTES # BLD: 17 % (ref 3–12)
MYOGLOBIN: 884 NG/ML (ref 28–72)
MYOGLOBIN: 905 NG/ML (ref 28–72)
NRBC AUTOMATED: 0 PER 100 WBC
PDW BLD-RTO: 19.4 % (ref 11.8–14.4)
PLATELET # BLD: 131 K/UL (ref 138–453)
PMV BLD AUTO: ABNORMAL FL (ref 8.1–13.5)
POTASSIUM SERPL-SCNC: 3.7 MMOL/L (ref 3.7–5.3)
RBC # BLD: 3.86 M/UL (ref 4.21–5.77)
RBC # BLD: ABNORMAL 10*6/UL
SEG NEUTROPHILS: 50 % (ref 36–65)
SEGMENTED NEUTROPHILS ABSOLUTE COUNT: 2.74 K/UL (ref 1.5–8.1)
SODIUM BLD-SCNC: 135 MMOL/L (ref 135–144)
TROPONIN, HIGH SENSITIVITY: 517 NG/L (ref 0–22)
TROPONIN, HIGH SENSITIVITY: 528 NG/L (ref 0–22)
WBC # BLD: 5.5 K/UL (ref 3.5–11.3)

## 2022-06-29 PROCEDURE — 71045 X-RAY EXAM CHEST 1 VIEW: CPT

## 2022-06-29 PROCEDURE — 83874 ASSAY OF MYOGLOBIN: CPT

## 2022-06-29 PROCEDURE — 36415 COLL VENOUS BLD VENIPUNCTURE: CPT

## 2022-06-29 PROCEDURE — 84484 ASSAY OF TROPONIN QUANT: CPT

## 2022-06-29 PROCEDURE — 85025 COMPLETE CBC W/AUTO DIFF WBC: CPT

## 2022-06-29 PROCEDURE — 80048 BASIC METABOLIC PNL TOTAL CA: CPT

## 2022-06-29 PROCEDURE — 99285 EMERGENCY DEPT VISIT HI MDM: CPT

## 2022-06-29 PROCEDURE — 93005 ELECTROCARDIOGRAM TRACING: CPT | Performed by: EMERGENCY MEDICINE

## 2022-06-29 ASSESSMENT — ENCOUNTER SYMPTOMS
COLOR CHANGE: 0
FACIAL SWELLING: 0
EYE DISCHARGE: 0
DIARRHEA: 0
COUGH: 0
CONSTIPATION: 0
SHORTNESS OF BREATH: 0
EYE REDNESS: 0
VOMITING: 0
ABDOMINAL PAIN: 0

## 2022-06-29 ASSESSMENT — PAIN - FUNCTIONAL ASSESSMENT: PAIN_FUNCTIONAL_ASSESSMENT: NONE - DENIES PAIN

## 2022-06-29 NOTE — ED PROVIDER NOTES
[address] [place] [address] ED  EMERGENCY DEPARTMENT ENCOUNTER      Pt Name: Markell Garcia  MRN: [id number]  [place] 1960  Date of evaluation: 6/29/2022  Provider: Seb Young MD    CHIEF COMPLAINT       Chief Complaint   Patient presents with    Hypotension     Pt states he became hypotensive at dialysis today; did not complete dialysis          HISTORY OF PRESENT ILLNESS  (Location/Symptom, Timing/Onset, Context/Setting, Quality, Duration, Modifying Factors, Severity.)   Markell Garcia is a 60 y.o. male who presents to the emergency department because of possible low blood pressure. He was at dialysis and had 1 and half hours of his 4-1/2-hour treatment. They thought he was drowsy but he did not think he was in the sent him here. He has no complaints now except that he is hungry. He does not feel dizzy or lightheaded and has not had a fever or cough or shortness of breath. He states he has been taking all of his medications. Nursing Notes were reviewed. ALLERGIES     Patient has no known allergies.     CURRENT MEDICATIONS       Previous Medications    ACETAMINOPHEN (TYLENOL) 500 MG TABLET    Take 2 tablets by mouth every 6 hours as needed for Pain    AMLODIPINE (NORVASC) 10 MG TABLET    Take 1 tablet by mouth daily    APIXABAN (ELIQUIS) 5 MG TABS TABLET    Take 1 tablet by mouth 2 times daily    ASPIRIN 81 MG CHEWABLE TABLET    Take 81 mg by mouth every morning    ATORVASTATIN (LIPITOR) 80 MG TABLET        CLONIDINE (CATAPRES) 0.3 MG TABLET    Take 1 tablet by mouth 2 times daily Do not take if HR < 60    DOCUSATE SODIUM (COLACE) 100 MG CAPSULE    Take 1 capsule by mouth 3 times daily as needed for Constipation    GABAPENTIN (NEURONTIN) 300 MG CAPSULE    TAKE ONE CAPSULE BY MOUTH THREE TIMES A DAY    GLIMEPIRIDE (AMARYL) 2 MG TABLET    TAKE ONE TABLET BY MOUTH EVERY MORNING BEFORE BREAKFAST    HEPARIN SODIUM, PORCINE, (HEPARIN, PORCINE,) 1000 UNIT/ML INJECTION    Heparin Sodium (Porcine) 1,000 Units/mL Systemic    HYDRALAZINE (APRESOLINE) 25 MG TABLET    Take 25 mg by mouth 2 times daily     LABETALOL (NORMODYNE) 200 MG TABLET    Take 1 tablet by mouth 2 times daily    LINAGLIPTIN (TRADJENTA) 5 MG TABLET    TAKE ONE TABLET BY MOUTH DAILY    LISINOPRIL (PRINIVIL;ZESTRIL) 10 MG TABLET    TAKE 1 TABLET BY MOUTH  TWICE DAILY    LOPERAMIDE HCL PO    Take 2 mg by mouth    MINOXIDIL (LONITEN) 2.5 MG TABLET    10 mg 2 times daily     MULTIPLE VITAMINS-MINERALS (RENAPLEX-D PO)    Take 1 tablet by mouth daily     NONFORMULARY        ONDANSETRON (ZOFRAN ODT) 4 MG DISINTEGRATING TABLET    Take 1 tablet by mouth every 8 hours as needed for Nausea or Vomiting    OXYCODONE-ACETAMINOPHEN (PERCOCET) 7.5-325 MG PER TABLET    Take 1 tablet by mouth daily as needed for Pain for up to 30 days. On HD days    SUCROFERRIC OXYHYDROXIDE (VELPHORO) 500 MG CHEW    Take 2 tablets by mouth 3 times daily (with meals) Crush or chew and shallow 2 tablets three times a day with meals    TENS UNIT MISC    by Does not apply route    TRAMADOL (ULTRAM) 50 MG TABLET    Take 1 tablet by mouth daily as needed for Pain for up to 30 days. PAST MEDICAL HISTORY         Diagnosis Date    Acute congestive heart failure (HCC)     Chronic bilateral low back pain with bilateral sciatica     CRF (chronic renal failure)     [facility] M[facility]    DDD (degenerative disc disease), lumbar 12/15/2020    [facility]     Dialysis patient [facility])     ED (erectile dysfunction)     [facility] of echocardiogram 2014    [facility]    HTN (hypertension)     Hyperlipidemia     LVH (left ventricular hypertrophy)     PVD [facility]peripheral vascular disease) [facility]     S/P bilateral BKA (below knee amputation) ([facility]     Wears glasses        SURGICAL HISTORY           Procedure Laterality Date    ARM SURGERY      CATARACT REMOVAL WITH IMPLANT      DIALYSIS FISTULA CREATION Bilateral     As of 2019, RUE AVF functioning, LUE AVF nonfunctioning.     FINGER AMPUTATION right pointer finger    FINGER SURGERY Left     I & D    GLAUCOMA SURGERY      LEG AMPUTATION BELOW KNEE Bilateral     TUNNELED VENOUS CATHETER PLACEMENT      PC placed and removed         FAMILY HISTORY           Problem Relation Age of Onset    Diabetes Mother     Coronary Art Dis Mother     Hypertension Mother     Diabetes Father     Hypertension Father     Stroke Father     Cancer Sister     Hypertension Brother      Family Status   Relation Name Status    Mother  (Not Specified)    Father  (Not Specified)    Sister  (Not Specified)    Brother  (Not Specified)        SOCIAL HISTORY      reports that he has never smoked. He has never used smokeless tobacco. He reports current alcohol use. He reports that he does not use drugs. REVIEW OF SYSTEMS    (2-9 systems for level 4, 10 or more for level 5)     Review of Systems   Constitutional: Negative for chills, fatigue and fever. HENT: Negative for congestion, ear discharge and facial swelling. Eyes: Negative for discharge and redness. Respiratory: Negative for cough and shortness of breath. Cardiovascular: Negative for chest pain. Gastrointestinal: Negative for abdominal pain, constipation, diarrhea and vomiting. Genitourinary: Negative for dysuria and hematuria. Musculoskeletal: Negative for arthralgias. Skin: Negative for color change and rash. Neurological: Negative for syncope, numbness and headaches. Hematological: Negative for adenopathy. Psychiatric/Behavioral: Negative for confusion. The patient is not nervous/anxious. Except as noted above the remainder of the review of systems was reviewed and negative. PHYSICAL EXAM    (up to 7 for level 4, 8 or more for level 5)     Vitals:    06/29/22 1347 06/29/22 1443   BP: 114/64 106/67   Pulse: 75    Resp: 15    Temp: 98.2 °F (36.8 °C)    SpO2: 92%    Weight: 216 lb (98 kg)    Height: 5' 11\" (1.803 m)        Physical Exam  Vitals reviewed.    Constitutional: General: He is not in acute distress. Appearance: He is well-developed. He is not diaphoretic. HENT:      Head: Normocephalic and atraumatic. Eyes:      General: No scleral icterus. Right eye: No discharge. Left eye: No discharge. Cardiovascular:      Rate and Rhythm: Normal rate and regular rhythm. Pulmonary:      Effort: Pulmonary effort is normal. No respiratory distress. Breath sounds: Normal breath sounds. No stridor. No wheezing or rales. Abdominal:      General: There is no distension. Palpations: Abdomen is soft. Tenderness: There is no abdominal tenderness. Musculoskeletal:         General: Normal range of motion. Cervical back: Neck supple. Lymphadenopathy:      Cervical: No cervical adenopathy. Skin:     General: Skin is warm and dry. Findings: No erythema or rash. Neurological:      Mental Status: He is alert and oriented to person, place, and time.    Psychiatric:         Behavior: Behavior normal.             DIAGNOSTIC RESULTS     EKG: All EKG's are interpreted by the Emergency Department Physician who either signs or Co-signs this chart in the absence of a cardiologist.    EKG my interpretation shows no acute fine    RADIOLOGY:   Non-plain film images such as CT, Ultrasound and MRI are read by the radiologist. Plain radiographic images are visualized and preliminarily interpreted by the emergency physician with the below findings:    Interpretation per the Radiologist below, if available at the time of this note:    XR CHEST PORTABLE    Result Date: 6/29/2022  EXAMINATION: [address] 6/29/2022 2:21 pm COMPARISON: May 26, 2022 chest exam HISTORY: ORDERING SYSTEM PROVIDED HISTORY: dialysis patient, weak TECHNOLOGIST PROVIDED HISTORY: dialysis patient, weak Reason for Exam: Dizziness, weakness, hypotension today FINDINGS: Stable large bore left IJ catheter with tip projected at the superior vena cava Stable cardiomegaly/moderate tortuosity of the thoracic aorta Normal pulmonary vascularity. There are no significant pleural or parenchymal changes Degenerative changes of the thoracic spine/shoulders     Stable cardiomegaly Stable support line       LABS:  Labs Reviewed   CBC WITH AUTO DIFFERENTIAL - Abnormal; Notable for the following components:       Result Value    RBC 3.86 (*)     Hemoglobin 9.5 (*)     Hematocrit 31.4 (*)     MCV 81.3 (*)     MCH 24.6 (*)     RDW 19.4 (*)     Platelets 319 (*)     Monocytes 17 (*)     All other components within normal limits   BASIC METABOLIC PANEL - Abnormal; Notable for the following components:    Glucose 145 (*)     CREATININE 5.98 (*)     Bun/Cre Ratio 4 (*)     Calcium 8.5 (*)     Chloride 96 (*)     GFR Non- 10 (*)     GFR  12 (*)     All other components within normal limits   TROP/MYOGLOBIN - Abnormal; Notable for the following components:    Troponin, High Sensitivity 517 (*)     Myoglobin 884 (*)     All other components within normal limits   TROP/MYOGLOBIN - Abnormal; Notable for the following components:    Troponin, High Sensitivity 528 (*)     Myoglobin 905 (*)     All other components within normal limits       All other labs were within normal range or not returned as of this dictation. EMERGENCY DEPARTMENT COURSE and DIFFERENTIAL DIAGNOSIS/MDM:   Vitals:    Vitals:    06/29/22 1347 06/29/22 1443   BP: 114/64 106/67   Pulse: 75    Resp: 15    Temp: 98.2 °F (36.8 °C)    SpO2: 92%    Weight: 216 lb (98 kg)    Height: 5' 11\" (1.803 m)        No orders of the defined types were placed in this encounter. Medical Decision Making: Second troponin is slightly lower than the first.  He has no symptoms and he is not dizzy, just tired. Blood pressure is 003 systolic and is able to be discharged home. Treatment diagnosis and follow-up were discussed with the patient.       CONSULTS:  None    PROCEDURES:  None    FINAL IMPRESSION 1. Hypotension, unspecified hypotension type          DISPOSITION/PLAN   DISPOSITION Decision To Discharge 06/29/2022 04:28:27 PM      PATIENT REFERRED TO:   Kely Magallon MD  [address] [address] [address]  [phone]      As needed    [facility] ED  [address] [facility]  [phone]    If symptoms worsen      DISCHARGE MEDICATIONS:     New Prescriptions    No medications on file       The care is provided during an unprecedented national emergency due to the novel coronavirus, COVID-19.     (Please note that portions of this note were completed with a voice recognition program.  Efforts were made to edit the dictations but occasionally words are mis-transcribed.)    Rojelio Govea MD  Attending Emergency Physician           Rojelio Govea MD  06/29/22 1556

## 2022-06-30 LAB
EKG ATRIAL RATE: 65 BPM
EKG Q-T INTERVAL: 434 MS
EKG QRS DURATION: 92 MS
EKG QTC CALCULATION (BAZETT): 461 MS
EKG R AXIS: -91 DEGREES
EKG T AXIS: 150 DEGREES
EKG VENTRICULAR RATE: 68 BPM

## 2022-07-11 ENCOUNTER — TELEPHONE (OUTPATIENT)
Dept: INTERNAL MEDICINE CLINIC | Age: 62
End: 2022-07-11

## 2022-07-11 NOTE — TELEPHONE ENCOUNTER
Patient stating that his pain management doctor is asking for a letter stating that it would be ok to switch gabapentin to lyrica?     Please advise

## 2022-07-11 NOTE — TELEPHONE ENCOUNTER
Chano Colby is calling to request a refill on the following medication(s):    Medication Request:  Requested Prescriptions     Pending Prescriptions Disp Refills    linagliptin (TRADJENTA) 5 MG tablet [Pharmacy Med Name: TRADJENTA 5 MG TABLET] 90 tablet 0     Sig: TAKE ONE TABLET BY MOUTH DAILY     Last filled 3/30/22 90 day no refill    Last Visit Date (If Applicable):  3/44/0548    Next Visit Date:    7/11/2022

## 2022-07-11 NOTE — TELEPHONE ENCOUNTER
It is up to the discretion of pain management to make a switch from gabapentin to Lyrica.   I do not see any contraindication

## 2022-07-15 ENCOUNTER — OFFICE VISIT (OUTPATIENT)
Dept: PAIN MANAGEMENT | Age: 62
End: 2022-07-15
Payer: COMMERCIAL

## 2022-07-15 VITALS
HEART RATE: 58 BPM | HEIGHT: 71 IN | DIASTOLIC BLOOD PRESSURE: 76 MMHG | BODY MASS INDEX: 30.24 KG/M2 | SYSTOLIC BLOOD PRESSURE: 128 MMHG | WEIGHT: 216 LBS

## 2022-07-15 DIAGNOSIS — N18.6 ESRD ON HEMODIALYSIS (HCC): ICD-10-CM

## 2022-07-15 DIAGNOSIS — Z79.891 CHRONIC USE OF OPIATE DRUG FOR THERAPEUTIC PURPOSE: ICD-10-CM

## 2022-07-15 DIAGNOSIS — M48.062 SPINAL STENOSIS OF LUMBAR REGION WITH NEUROGENIC CLAUDICATION: ICD-10-CM

## 2022-07-15 DIAGNOSIS — Z99.2 ESRD ON HEMODIALYSIS (HCC): ICD-10-CM

## 2022-07-15 PROCEDURE — G8427 DOCREV CUR MEDS BY ELIG CLIN: HCPCS | Performed by: NURSE PRACTITIONER

## 2022-07-15 PROCEDURE — 3017F COLORECTAL CA SCREEN DOC REV: CPT | Performed by: NURSE PRACTITIONER

## 2022-07-15 PROCEDURE — 1036F TOBACCO NON-USER: CPT | Performed by: NURSE PRACTITIONER

## 2022-07-15 PROCEDURE — G8417 CALC BMI ABV UP PARAM F/U: HCPCS | Performed by: NURSE PRACTITIONER

## 2022-07-15 PROCEDURE — 99213 OFFICE O/P EST LOW 20 MIN: CPT | Performed by: NURSE PRACTITIONER

## 2022-07-15 RX ORDER — TRAMADOL HYDROCHLORIDE 50 MG/1
50 TABLET ORAL DAILY PRN
Qty: 30 TABLET | Refills: 0 | Status: SHIPPED | OUTPATIENT
Start: 2022-07-15 | End: 2022-08-09 | Stop reason: SDUPTHER

## 2022-07-15 RX ORDER — OXYCODONE AND ACETAMINOPHEN 7.5; 325 MG/1; MG/1
1 TABLET ORAL DAILY PRN
Qty: 20 TABLET | Refills: 0 | Status: CANCELLED | OUTPATIENT
Start: 2022-07-15 | End: 2022-08-14

## 2022-07-15 RX ORDER — OXYCODONE AND ACETAMINOPHEN 7.5; 325 MG/1; MG/1
1 TABLET ORAL DAILY PRN
Qty: 20 TABLET | Refills: 0 | Status: SHIPPED | OUTPATIENT
Start: 2022-07-15 | End: 2022-08-09 | Stop reason: SDUPTHER

## 2022-07-15 ASSESSMENT — ENCOUNTER SYMPTOMS
WHEEZING: 0
NAUSEA: 0
COUGH: 0
SHORTNESS OF BREATH: 0
CONSTIPATION: 0
VOMITING: 0
DIARRHEA: 0

## 2022-07-15 NOTE — PROGRESS NOTES
Chief Complaint   Patient presents with    Knee Pain    Medication Refill         PMH     59-year-old man who is established patient of this clinic  Is seen for chronic generalized all over the body pain  Is diagnosed with end-stage renal disease with hemodialysis. Reports not able to have transplant d/t lack of insurance coverage  Hx of jess SHINE using cane to ambulate  Not a candidate for any interventional procedure  He has been on tramadol daily and oxycodone on HD days for this chronic pain issue  He has been stable on the regimen without any side effects    Inpt stay for generalized fatigue, weakness with Cardiology recommended holter monitor on discharge. but pt did not complete and needs to call PCP    Consdier changing brenton to lyrica     HPI:   Knee Pain   Pertinent negatives include no numbness. Medication Refill:  Tramdol     Pain score Today:  0  Adverse effects (Constipation / Nausea / Sedation / sexual Dysfunction / others) :  no  Mood: good  Sleep pattern and quality: poor  Activity level: fair    Pill count Today: Tramadol # 0 due betina  Last dose taken  07/15/2022  [place] report reviewed today: yes  ER/Hospitalizations/PCP visit related to pain since last visit:no   Any legal problems e.g. DUI etc.:No  Satisfied with current management: Yes    Opioid Contract:04/08/2022  Last Urine Dug screen dated:04/08/2022    Lab Results   Component Value Date    LABA1C 5.8 05/17/2022     Lab Results   Component Value Date     05/17/2022       Past Medical History, Past Surgical History, Social History, Allergies and Medications reviewed and updated in EPIC as indicated    Family History reviewed and is noncontributory. Periodic Controlled Substance Monitoring: Possible medication side effects, risk of tolerance/dependence & alternative treatments discussed., No signs of potential drug abuse or diversion identified., Obtaining appropriate analgesic effect of treatment.  AURORA De León - CNP)      Past Medical History:   Diagnosis Date    Acute congestive heart failure (HCC)     Chronic bilateral low back pain with bilateral sciatica     CRF (chronic renal failure)     [facility]    DDD (degenerative disc disease), lumbar 12/15/2020    Diabetes mellitus ([facility]     Dialysis patient [facility])     ED (erectile dysfunction)     [facility] of echocardiogram 2014    [facility]    HTN (hypertension)     Hyperlipidemia     LVH (left ventricular hypertrophy)     PVD (peripheral vascular disease) ([facility])     S/P bilateral BKA (below knee amputation) ([facility]     Wears glasses        Past Surgical History:   Procedure Laterality Date    ARM SURGERY      CATARACT REMOVAL WITH IMPLANT      DIALYSIS FISTULA CREATION Bilateral     As of 2019, RUE AVF functioning, LUE AVF nonfunctioning. FINGER AMPUTATION      right pointer finger    FINGER SURGERY Left     I & D    GLAUCOMA SURGERY      LEG AMPUTATION BELOW KNEE Bilateral     TUNNELED VENOUS CATHETER PLACEMENT      PC placed and removed       No Known Allergies      Current Outpatient Medications:     traMADol (ULTRAM) 50 MG tablet, Take 1 tablet by mouth daily as needed for Pain for up to 30 days. , Disp: 30 tablet, Rfl: 0    oxyCODONE-acetaminophen (PERCOCET) 7.5-325 MG per tablet, Take 1 tablet by mouth daily as needed for Pain for up to 30 days.  On HD days, Disp: 20 tablet, Rfl: 0    linagliptin (TRADJENTA) 5 MG tablet, TAKE ONE TABLET BY MOUTH DAILY, Disp: 90 tablet, Rfl: 0    ondansetron (ZOFRAN ODT) 4 MG disintegrating tablet, Take 1 tablet by mouth every 8 hours as needed for Nausea or Vomiting, Disp: 12 tablet, Rfl: 0    apixaban (ELIQUIS) 5 MG TABS tablet, Take 1 tablet by mouth 2 times daily, Disp: 180 tablet, Rfl: 1    amLODIPine (NORVASC) 10 MG tablet, Take 1 tablet by mouth daily, Disp: 30 tablet, Rfl: 3    labetalol (NORMODYNE) 200 MG tablet, Take 1 tablet by mouth 2 times daily, Disp: 60 tablet, Rfl: 3    atorvastatin (LIPITOR) 80 MG tablet, , Disp: , Rfl: LOPERAMIDE HCL PO, Take 2 mg by mouth, Disp: , Rfl:     glimepiride (AMARYL) 2 MG tablet, TAKE ONE TABLET BY MOUTH EVERY MORNING BEFORE BREAKFAST, Disp: 90 tablet, Rfl: 1    minoxidil (LONITEN) 2.5 MG tablet, 10 mg  in the morning and 10 mg before bedtime. , Disp: , Rfl:     acetaminophen (TYLENOL) 500 MG tablet, Take 2 tablets by mouth every 6 hours as needed for Pain, Disp: 60 tablet, Rfl: 0    docusate sodium (COLACE) 100 MG capsule, Take 1 capsule by mouth 3 times daily as needed for Constipation, Disp: 30 capsule, Rfl: 0    Heparin Sodium, Porcine, (HEPARIN, PORCINE,) 1000 UNIT/ML injection, Heparin Sodium (Porcine) 1,000 Units/mL Systemic, Disp: , Rfl:     Multiple Vitamins-Minerals (RENAPLEX-D PO), Take 1 tablet by mouth daily , Disp: , Rfl:     hydrALAZINE (APRESOLINE) 25 MG tablet, Take 25 mg by mouth 2 times daily , Disp: , Rfl:     cloNIDine (CATAPRES) 0.3 MG tablet, Take 1 tablet by mouth 2 times daily Do not take if HR < 60 (Patient taking differently: Take 0.3 mg by mouth in the morning and 0.3 mg at noon and 0.3 mg before bedtime.  Do not take if HR < 60.), Disp: 180 tablet, Rfl: 3    aspirin 81 MG chewable tablet, Take 81 mg by mouth every morning, Disp: , Rfl:     Sucroferric Oxyhydroxide (VELPHORO) 500 MG CHEW, Take 2 tablets by mouth 3 times daily (with meals) Crush or chew and shallow 2 tablets three times a day with meals, Disp: , Rfl:     Tens Unit MISC, by Does not apply route, Disp: 1 each, Rfl: 0    lisinopril (PRINIVIL;ZESTRIL) 10 MG tablet, TAKE 1 TABLET BY MOUTH  TWICE DAILY (Patient taking differently: Take 10 mg by mouth in the morning and at bedtime), Disp: 180 tablet, Rfl: 0    NONFORMULARY, , Disp: , Rfl:     gabapentin (NEURONTIN) 300 MG capsule, TAKE ONE CAPSULE BY MOUTH THREE TIMES A DAY, Disp: 90 capsule, Rfl: 2    Family History   Problem Relation Age of Onset    Diabetes Mother     Coronary Art Dis Mother     Hypertension Mother     Diabetes Father     Hypertension Father Stroke Father     Cancer Sister     Hypertension Brother        Social History     Socioeconomic History    Marital status:      Spouse name: Not on file    Number of children: Not on file    Years of education: Not on file    Highest education level: Not on file   Occupational History    Not on file   Tobacco Use    Smoking status: Never    Smokeless tobacco: Never   Vaping Use    Vaping Use: Never used   Substance and Sexual Activity    Alcohol use: Yes     Comment: rare    Drug use: No    Sexual activity: Not on file   Other Topics Concern    Not on file   Social History Narrative    Not on file     Social Determinants of Health     Financial Resource Strain: Low Risk     Difficulty of Paying Living Expenses: Not hard at all   Food Insecurity: No Food Insecurity    Worried About Running Out of Food in the Last Year: Never true    [address] in the Last Year: Never true   Transportation Needs: No Transportation Needs    Lack of Transportation (Medical): No    Lack of Transportation (Non-Medical): No   Physical Activity: Inactive    Days of Exercise per Week: 0 days    Minutes of Exercise per Session: 0 min   Stress: No Stress Concern Present    Feeling of Stress : Not at all   Social Connections:  Moderately Integrated    Frequency of Communication with Friends and Family: More than three times a week    Frequency of Social Gatherings with Friends and Family: More than three times a week    Attends [employer] Services: More than 4 times per year    Active Member of [address] [place] [address] [employer] or Organizations: Yes    Attends Club or Organization Meetings: Never    Marital Status:    Intimate Partner Violence: Not At Risk    Fear of Current or Ex-Partner: No    Emotionally Abused: No    Physically Abused: No    Sexually Abused: No   Housing Stability: Unknown    Unable to Pay for Housing in the Last Year: No    Number of Places Lived in the Last Year: Not on file    Unstable Housing in the Last Year: No       Review we encourage daily stretching and strengthening exercises, and recommend minimizing use of pain medications unless patient cannot get through daily activities due to pain. Contract requirements met. Continue opioid therapy. Script written for percocet and tramadol  Follow up appointment made for 4 weeks    I have reviewed the chief complaint and history of present illness (including ROS and PFSH) and vital documentation by my staff and I agree with their documentation and have added where applicable.

## 2022-08-09 ENCOUNTER — OFFICE VISIT (OUTPATIENT)
Dept: PAIN MANAGEMENT | Age: 62
End: 2022-08-09
Payer: COMMERCIAL

## 2022-08-09 VITALS
HEART RATE: 66 BPM | HEIGHT: 71 IN | WEIGHT: 216 LBS | BODY MASS INDEX: 30.24 KG/M2 | SYSTOLIC BLOOD PRESSURE: 139 MMHG | DIASTOLIC BLOOD PRESSURE: 70 MMHG | OXYGEN SATURATION: 99 %

## 2022-08-09 DIAGNOSIS — Z79.891 CHRONIC USE OF OPIATE DRUG FOR THERAPEUTIC PURPOSE: ICD-10-CM

## 2022-08-09 DIAGNOSIS — Z99.2 ESRD ON HEMODIALYSIS (HCC): ICD-10-CM

## 2022-08-09 DIAGNOSIS — M48.062 SPINAL STENOSIS OF LUMBAR REGION WITH NEUROGENIC CLAUDICATION: ICD-10-CM

## 2022-08-09 DIAGNOSIS — N18.6 ESRD ON HEMODIALYSIS (HCC): ICD-10-CM

## 2022-08-09 PROCEDURE — 99213 OFFICE O/P EST LOW 20 MIN: CPT | Performed by: ANESTHESIOLOGY

## 2022-08-09 PROCEDURE — G8417 CALC BMI ABV UP PARAM F/U: HCPCS | Performed by: ANESTHESIOLOGY

## 2022-08-09 PROCEDURE — 3017F COLORECTAL CA SCREEN DOC REV: CPT | Performed by: ANESTHESIOLOGY

## 2022-08-09 PROCEDURE — G8427 DOCREV CUR MEDS BY ELIG CLIN: HCPCS | Performed by: ANESTHESIOLOGY

## 2022-08-09 PROCEDURE — 1036F TOBACCO NON-USER: CPT | Performed by: ANESTHESIOLOGY

## 2022-08-09 RX ORDER — OXYCODONE AND ACETAMINOPHEN 7.5; 325 MG/1; MG/1
1 TABLET ORAL DAILY PRN
Qty: 20 TABLET | Refills: 0 | Status: SHIPPED | OUTPATIENT
Start: 2022-08-17 | End: 2022-09-13 | Stop reason: SDUPTHER

## 2022-08-09 RX ORDER — TRAMADOL HYDROCHLORIDE 50 MG/1
50 TABLET ORAL DAILY PRN
Qty: 30 TABLET | Refills: 0 | Status: SHIPPED | OUTPATIENT
Start: 2022-08-17 | End: 2022-09-13 | Stop reason: SDUPTHER

## 2022-08-09 ASSESSMENT — ENCOUNTER SYMPTOMS
CONSTIPATION: 0
DIARRHEA: 0
VOMITING: 0
SHORTNESS OF BREATH: 0
COUGH: 0
NAUSEA: 0
BACK PAIN: 0
WHEEZING: 0

## 2022-08-09 NOTE — PROGRESS NOTES
The patient is a 60 y. o. Non- / non  male. Chief Complaint   Patient presents with    Back Pain    Medication Refill     Tramadol, Percocet     Leg Pain        Back Pain  Pertinent negatives include no fever, headaches, numbness or weakness. Medication Refill: Tramadol. Percocet     Pain score Today:  1  Adverse effects (Constipation / Nausea / Sedation / sexual Dysfunction / others) : no  Mood: good  Sleep pattern and quality: poor  Activity level: good    Pill count Today: Percocet # Tramadol # Did not bring do not need refills just wants to talk   Last dose taken  08/09/2022  OARRS report reviewed today: yes  ER/Hospitalizations/PCP visit related to pain since last visit:no   Any legal problems e.g. DUI etc.:No  Satisfied with current management: Yes    Opioid Contract: 04/08/2022  Last Urine Dug screen dated: 04/08/2022    Lab Results   Component Value Date    LABA1C 5.8 05/17/2022     Lab Results   Component Value Date     05/17/2022       Past Medical History, Past Surgical History, Social History, Allergies and Medications reviewed and updated in EPIC as indicated    Family History reviewed and is noncontributory.         Past Medical History:   Diagnosis Date    Acute congestive heart failure (HCC)     Chronic bilateral low back pain with bilateral sciatica     CRF (chronic renal failure)     [facility] MWF    DDD (degenerative disc disease), lumbar 12/15/2020    Diabetes mellitus ([facility] [address]     Dialysis patient [facility])     ED (erectile dysfunction)     History of echocardiogram 2014    [facility]    HTN (hypertension)     Hyperlipidemia     LVH (left ventricular hypertrophy)     PVD (peripheral vascular disease) ([facility])     S/P bilateral BKA (below knee amputation) ([facility] [address]     Wears glasses         Past Surgical History:   Procedure Laterality Date    ARM SURGERY      CATARACT REMOVAL WITH IMPLANT      DIALYSIS FISTULA CREATION Bilateral     As of 2019, RUE AVF functioning, LUE AVF nonfunctioning. FINGER AMPUTATION      right pointer finger    FINGER SURGERY Left     I & D    GLAUCOMA SURGERY      LEG AMPUTATION BELOW KNEE Bilateral     TUNNELED VENOUS CATHETER PLACEMENT      PC placed and removed       Social History     Socioeconomic History    Marital status:    Tobacco Use    Smoking status: Never    Smokeless tobacco: Never   Vaping Use    Vaping Use: Never used   Substance and Sexual Activity    Alcohol use: Yes     Comment: rare    Drug use: No     Social Determinants of Health     Financial Resource Strain: Low Risk     Difficulty of Paying Living Expenses: Not hard at all   Food Insecurity: No Food Insecurity    Worried About Running Out of Food in the Last Year: Never true    Ran Out of Food in the Last Year: Never true   Transportation Needs: No Transportation Needs    Lack of Transportation (Medical): No    Lack of Transportation (Non-Medical): No   Physical Activity: Inactive    Days of Exercise per Week: 0 days    Minutes of Exercise per Session: 0 min   Stress: No Stress Concern Present    Feeling of Stress : Not at all   Social Connections:  Moderately Integrated    Frequency of Communication with Friends and Family: More than three times a week    Frequency of Social Gatherings with Friends and Family: More than three times a week    Attends [employer] Services: More than 4 times per year    Active Member of [address] [place] [address] [employer] or Organizations: Yes    Attends Club or Organization Meetings: Never    Marital Status:    Intimate Partner Violence: Not At Risk    Fear of Current or Ex-Partner: No    Emotionally Abused: No    Physically Abused: No    Sexually Abused: No   Housing Stability: Unknown    Unable to Pay for Housing in the Last Year: No    Unstable Housing in the Last Year: No       Family History   Problem Relation Age of Onset    Diabetes Mother     Coronary Art Dis Mother     Hypertension Mother     Diabetes Father     Hypertension Father     Stroke Father Cancer Sister     Hypertension Brother        No Known Allergies    There were no vitals filed for this visit. Current Outpatient Medications   Medication Sig Dispense Refill    traMADol (ULTRAM) 50 MG tablet Take 1 tablet by mouth daily as needed for Pain for up to 30 days. 30 tablet 0    oxyCODONE-acetaminophen (PERCOCET) 7.5-325 MG per tablet Take 1 tablet by mouth daily as needed for Pain for up to 30 days. On HD days 20 tablet 0    linagliptin (TRADJENTA) 5 MG tablet TAKE ONE TABLET BY MOUTH DAILY 90 tablet 0    ondansetron (ZOFRAN ODT) 4 MG disintegrating tablet Take 1 tablet by mouth every 8 hours as needed for Nausea or Vomiting 12 tablet 0    apixaban (ELIQUIS) 5 MG TABS tablet Take 1 tablet by mouth 2 times daily 180 tablet 1    amLODIPine (NORVASC) 10 MG tablet Take 1 tablet by mouth daily 30 tablet 3    labetalol (NORMODYNE) 200 MG tablet Take 1 tablet by mouth 2 times daily 60 tablet 3    atorvastatin (LIPITOR) 80 MG tablet       LOPERAMIDE HCL PO Take 2 mg by mouth      glimepiride (AMARYL) 2 MG tablet TAKE ONE TABLET BY MOUTH EVERY MORNING BEFORE BREAKFAST 90 tablet 1    minoxidil (LONITEN) 2.5 MG tablet 10 mg  in the morning and 10 mg before bedtime. acetaminophen (TYLENOL) 500 MG tablet Take 2 tablets by mouth every 6 hours as needed for Pain 60 tablet 0    docusate sodium (COLACE) 100 MG capsule Take 1 capsule by mouth 3 times daily as needed for Constipation 30 capsule 0    Heparin Sodium, Porcine, (HEPARIN, PORCINE,) 1000 UNIT/ML injection Heparin Sodium (Porcine) 1,000 Units/mL Systemic      Multiple Vitamins-Minerals (RENAPLEX-D PO) Take 1 tablet by mouth daily       hydrALAZINE (APRESOLINE) 25 MG tablet Take 25 mg by mouth 2 times daily       cloNIDine (CATAPRES) 0.3 MG tablet Take 1 tablet by mouth 2 times daily Do not take if HR < 60 (Patient taking differently: Take 0.3 mg by mouth in the morning and 0.3 mg at noon and 0.3 mg before bedtime.  Do not take if HR < 60.) 180 tablet 3 aspirin 81 MG chewable tablet Take 81 mg by mouth every morning      Sucroferric Oxyhydroxide (VELPHORO) 500 MG CHEW Take 2 tablets by mouth 3 times daily (with meals) Crush or chew and shallow 2 tablets three times a day with meals      Tens Unit MISC by Does not apply route 1 each 0    lisinopril (PRINIVIL;ZESTRIL) 10 MG tablet TAKE 1 TABLET BY MOUTH  TWICE DAILY (Patient taking differently: Take 10 mg by mouth in the morning and at bedtime) 180 tablet 0    NONFORMULARY       gabapentin (NEURONTIN) 300 MG capsule TAKE ONE CAPSULE BY MOUTH THREE TIMES A DAY 90 capsule 2     No current facility-administered medications for this visit. Review of Systems   Constitutional:  Negative for chills, fatigue and fever. Respiratory:  Negative for cough, shortness of breath and wheezing. Gastrointestinal:  Negative for constipation, diarrhea, nausea and vomiting. Musculoskeletal:  Positive for gait problem. Negative for back pain, neck pain and neck stiffness. Neurological:  Negative for dizziness, weakness, numbness and headaches. Objective:  Vital signs: (most recent): Height 5' 11\" (1.803 m), weight 216 lb (98 kg). No fever. Assessment & Plan  1. Chronic use of opiate drugs therapeutic purposes    2. ESRD on hemodialysis ([facility] [address] MWF    3. Spinal stenosis of lumbar region with neurogenic claudication        No orders of the defined types were placed in this encounter. No orders of the defined types were placed in this encounter.            Electronically signed by Eric Bruner MA on 8/9/2022 at 11:52 AM

## 2022-08-09 NOTE — PROGRESS NOTES
The patient is a 60 y. o. Non- / non  male. Chief Complaint   Patient presents with    Back Pain    Medication Refill     Tramadol, Percocet     Leg Pain        Back Pain  Associated symptoms include leg pain. Pertinent negatives include no fever, headaches, numbness or weakness. Leg Pain   Pertinent negatives include no numbness. 57-year-old man with multiple major comorbidities and  On chronic dialysis  She is seen in our clinic with chief chronic generalized body pain  Prescribed Percocet and tramadol on alternate days for generalized body pain  Reports no side effect  Find it helpful  Is not a candidate for any interventional procedures    Medication Refill: Tramadol. Percocet     Pain score Today:  1  Adverse effects (Constipation / Nausea / Sedation / sexual Dysfunction / others) : no  Mood: good  Sleep pattern and quality: poor  Activity level: good    Pill count Today: Percocet # Tramadol # Did not bring do not need refills just wants to talk   Last dose taken  08/09/2022  OARRS report reviewed today: yes  ER/Hospitalizations/PCP visit related to pain since last visit:no   Any legal problems e.g. DUI etc.:No  Satisfied with current management: Yes    Opioid Contract: 04/08/2022  Last Urine Dug screen dated: 04/08/2022    Lab Results   Component Value Date    LABA1C 5.8 05/17/2022     Lab Results   Component Value Date     05/17/2022       Past Medical History, Past Surgical History, Social History, Allergies and Medications reviewed and updated in EPIC as indicated    Family History reviewed and is noncontributory.         Past Medical History:   Diagnosis Date    Acute congestive heart failure (HCC)     Chronic bilateral low back pain with bilateral sciatica     CRF (chronic renal failure)     [facility] MWF    DDD (degenerative disc disease), lumbar 12/15/2020    Diabetes mellitus ([facility] [address]     Dialysis patient [facility])     ED (erectile dysfunction)     History of echocardiogram 2014 [facility]    HTN (hypertension)     Hyperlipidemia     LVH (left ventricular hypertrophy)     PVD (peripheral vascular disease) ([facility])     S/P bilateral BKA (below knee amputation) ([address]     Wears glasses         Past Surgical History:   Procedure Laterality Date    ARM SURGERY      CATARACT REMOVAL WITH IMPLANT      DIALYSIS FISTULA CREATION Bilateral     As of 2019, RUE AVF functioning, LUE AVF nonfunctioning. FINGER AMPUTATION      right pointer finger    FINGER SURGERY Left     I & D    GLAUCOMA SURGERY      LEG AMPUTATION BELOW KNEE Bilateral     TUNNELED VENOUS CATHETER PLACEMENT      PC placed and removed       Social History     Socioeconomic History    Marital status:    Tobacco Use    Smoking status: Never    Smokeless tobacco: Never   Vaping Use    Vaping Use: Never used   Substance and Sexual Activity    Alcohol use: Yes     Comment: rare    Drug use: No     Social Determinants of Health     Financial Resource Strain: Low Risk     Difficulty of Paying Living Expenses: Not hard at all   Food Insecurity: No Food Insecurity    Worried About Running Out of Food in the Last Year: Never true    Ran Out of Food in the Last Year: Never true   Transportation Needs: No Transportation Needs    Lack of Transportation (Medical): No    Lack of Transportation (Non-Medical): No   Physical Activity: Inactive    Days of Exercise per Week: 0 days    Minutes of Exercise per Session: 0 min   Stress: No Stress Concern Present    Feeling of Stress : Not at all   Social Connections:  Moderately Integrated    Frequency of Communication with Friends and Family: More than three times a week    Frequency of Social Gatherings with Friends and Family: More than three times a week    Attends [employer] Services: More than 4 times per year    Active Member of [address] [place] [address] [employer] or Organizations: Yes    Attends Club or Organization Meetings: Never    Marital Status:    Intimate Partner Violence: Not At Risk    Fear of Current or Ex-Partner: No    Emotionally Abused: No    Physically Abused: No    Sexually Abused: No   Housing Stability: Unknown    Unable to Pay for Housing in the Last Year: No    Unstable Housing in the Last Year: No       Family History   Problem Relation Age of Onset    Diabetes Mother     Coronary Art Dis Mother     Hypertension Mother     Diabetes Father     Hypertension Father     Stroke Father     Cancer Sister     Hypertension Brother        No Known Allergies    Vitals:    08/09/22 1149   BP: 139/70   Pulse: 66   SpO2: 99%       Current Outpatient Medications   Medication Sig Dispense Refill    [START ON 8/17/2022] traMADol (ULTRAM) 50 MG tablet Take 1 tablet by mouth daily as needed for Pain for up to 30 days. 30 tablet 0    [START ON 8/17/2022] oxyCODONE-acetaminophen (PERCOCET) 7.5-325 MG per tablet Take 1 tablet by mouth daily as needed for Pain for up to 30 days. On HD days 20 tablet 0    linagliptin (TRADJENTA) 5 MG tablet TAKE ONE TABLET BY MOUTH DAILY 90 tablet 0    ondansetron (ZOFRAN ODT) 4 MG disintegrating tablet Take 1 tablet by mouth every 8 hours as needed for Nausea or Vomiting 12 tablet 0    apixaban (ELIQUIS) 5 MG TABS tablet Take 1 tablet by mouth 2 times daily 180 tablet 1    amLODIPine (NORVASC) 10 MG tablet Take 1 tablet by mouth daily 30 tablet 3    labetalol (NORMODYNE) 200 MG tablet Take 1 tablet by mouth 2 times daily 60 tablet 3    atorvastatin (LIPITOR) 80 MG tablet       LOPERAMIDE HCL PO Take 2 mg by mouth      glimepiride (AMARYL) 2 MG tablet TAKE ONE TABLET BY MOUTH EVERY MORNING BEFORE BREAKFAST 90 tablet 1    minoxidil (LONITEN) 2.5 MG tablet 10 mg  in the morning and 10 mg before bedtime.       acetaminophen (TYLENOL) 500 MG tablet Take 2 tablets by mouth every 6 hours as needed for Pain 60 tablet 0    docusate sodium (COLACE) 100 MG capsule Take 1 capsule by mouth 3 times daily as needed for Constipation 30 capsule 0    Heparin Sodium, Porcine, (HEPARIN, PORCINE,) 1000 UNIT/ML injection Heparin Sodium (Porcine) 1,000 Units/mL Systemic      Multiple Vitamins-Minerals (RENAPLEX-D PO) Take 1 tablet by mouth daily       hydrALAZINE (APRESOLINE) 25 MG tablet Take 25 mg by mouth 2 times daily       cloNIDine (CATAPRES) 0.3 MG tablet Take 1 tablet by mouth 2 times daily Do not take if HR < 60 (Patient taking differently: Take 0.3 mg by mouth in the morning and 0.3 mg at noon and 0.3 mg before bedtime. Do not take if HR < 60.) 180 tablet 3    aspirin 81 MG chewable tablet Take 81 mg by mouth every morning      Sucroferric Oxyhydroxide (VELPHORO) 500 MG CHEW Take 2 tablets by mouth 3 times daily (with meals) Crush or chew and shallow 2 tablets three times a day with meals      Tens Unit MISC by Does not apply route 1 each 0    lisinopril (PRINIVIL;ZESTRIL) 10 MG tablet TAKE 1 TABLET BY MOUTH  TWICE DAILY (Patient taking differently: Take 10 mg by mouth in the morning and at bedtime) 180 tablet 0    NONFORMULARY       gabapentin (NEURONTIN) 300 MG capsule TAKE ONE CAPSULE BY MOUTH THREE TIMES A DAY 90 capsule 2     No current facility-administered medications for this visit. Review of Systems   Constitutional:  Negative for chills, fatigue and fever. Respiratory:  Negative for cough, shortness of breath and wheezing. Gastrointestinal:  Negative for constipation, diarrhea, nausea and vomiting. Musculoskeletal:  Positive for gait problem. Negative for back pain, neck pain and neck stiffness. Neurological:  Negative for dizziness, weakness, numbness and headaches. Objective:  General Appearance:  Comfortable, in no acute distress and in pain. Vital signs: (most recent): Blood pressure 139/70, pulse 66, height 5' 11\" (1.803 m), weight 216 lb (98 kg), SpO2 99 %. Vital signs are normal.  No fever. Output: Producing urine and producing stool. Lungs:  Normal effort. He is not in respiratory distress. Heart: Normal rate.     Neurological: Patient is alert and oriented to person, place and time. Assessment & Plan  1. Chronic use of opiate drugs therapeutic purposes    2. ESRD on hemodialysis ([address] MWF    3. Spinal stenosis of lumbar region with neurogenic claudication        No orders of the defined types were placed in this encounter. Orders Placed This Encounter   Medications    traMADol (ULTRAM) 50 MG tablet     Sig: Take 1 tablet by mouth daily as needed for Pain for up to 30 days. Dispense:  30 tablet     Refill:  0     Reduce doses taken as pain becomes manageable    oxyCODONE-acetaminophen (PERCOCET) 7.5-325 MG per tablet     Sig: Take 1 tablet by mouth daily as needed for Pain for up to 30 days.  On HD days     Dispense:  20 tablet     Refill:  0     Reduce doses taken as pain becomes manageable              Electronically signed by Jody Sparrow MD on 8/9/2022 at 12:19 PM

## 2022-08-24 ENCOUNTER — TELEPHONE (OUTPATIENT)
Dept: INTERNAL MEDICINE CLINIC | Age: 62
End: 2022-08-24

## 2022-09-01 VITALS
DIASTOLIC BLOOD PRESSURE: 58 MMHG | HEART RATE: 67 BPM | BODY MASS INDEX: 30.8 KG/M2 | SYSTOLIC BLOOD PRESSURE: 117 MMHG | TEMPERATURE: 98 F | HEIGHT: 71 IN | RESPIRATION RATE: 14 BRPM | WEIGHT: 220 LBS | OXYGEN SATURATION: 98 %

## 2022-09-01 PROCEDURE — 93005 ELECTROCARDIOGRAM TRACING: CPT | Performed by: EMERGENCY MEDICINE

## 2022-09-01 PROCEDURE — 99285 EMERGENCY DEPT VISIT HI MDM: CPT

## 2022-09-01 ASSESSMENT — PAIN - FUNCTIONAL ASSESSMENT: PAIN_FUNCTIONAL_ASSESSMENT: NONE - DENIES PAIN

## 2022-09-02 ENCOUNTER — HOSPITAL ENCOUNTER (EMERGENCY)
Age: 62
Discharge: HOME | End: 2022-09-02
Attending: EMERGENCY MEDICINE
Payer: COMMERCIAL

## 2022-09-02 ENCOUNTER — APPOINTMENT (OUTPATIENT)
Dept: GENERAL RADIOLOGY | Age: 62
End: 2022-09-02
Payer: COMMERCIAL

## 2022-09-02 DIAGNOSIS — R53.1 GENERALIZED WEAKNESS: Primary | ICD-10-CM

## 2022-09-02 LAB
ABSOLUTE EOS #: 0.23 K/UL (ref 0–0.4)
ABSOLUTE IMMATURE GRANULOCYTE: 0 K/UL (ref 0–0.3)
ABSOLUTE LYMPH #: 1.47 K/UL (ref 1–4.8)
ABSOLUTE MONO #: 0.78 K/UL (ref 0.2–0.8)
ALBUMIN SERPL-MCNC: 4.2 G/DL (ref 3.5–5.2)
ALP BLD-CCNC: 148 U/L (ref 40–129)
ALT SERPL-CCNC: 32 U/L (ref 5–41)
ANION GAP SERPL CALCULATED.3IONS-SCNC: 14 MMOL/L (ref 9–17)
AST SERPL-CCNC: 52 U/L
BASOPHILS # BLD: 0 %
BASOPHILS ABSOLUTE: 0 K/UL (ref 0–0.2)
BILIRUB SERPL-MCNC: 0.5 MG/DL (ref 0.3–1.2)
BUN BLDV-MCNC: 25 MG/DL (ref 8–23)
BUN/CREAT BLD: 4 (ref 9–20)
CALCIUM SERPL-MCNC: 9.5 MG/DL (ref 8.6–10.4)
CHLORIDE BLD-SCNC: 98 MMOL/L (ref 98–107)
CO2: 26 MMOL/L (ref 20–31)
CREAT SERPL-MCNC: 6.64 MG/DL (ref 0.7–1.2)
EKG Q-T INTERVAL: 458 MS
EKG QRS DURATION: 90 MS
EKG QTC CALCULATION (BAZETT): 461 MS
EKG R AXIS: 86 DEGREES
EKG T AXIS: -154 DEGREES
EKG VENTRICULAR RATE: 61 BPM
EOSINOPHILS RELATIVE PERCENT: 5 % (ref 1–4)
GFR AFRICAN AMERICAN: 10 ML/MIN
GFR NON-AFRICAN AMERICAN: 8 ML/MIN
GFR SERPL CREATININE-BSD FRML MDRD: ABNORMAL ML/MIN/{1.73_M2}
GLUCOSE BLD-MCNC: 130 MG/DL (ref 70–99)
HCT VFR BLD CALC: 26.7 % (ref 40.7–50.3)
HEMOGLOBIN: 8.3 G/DL (ref 13–17)
IMMATURE GRANULOCYTES: 0 %
LYMPHOCYTES # BLD: 32 % (ref 24–44)
MCH RBC QN AUTO: 25 PG (ref 25.2–33.5)
MCHC RBC AUTO-ENTMCNC: 31.1 G/DL (ref 28.4–34.8)
MCV RBC AUTO: 80.4 FL (ref 82.6–102.9)
MONOCYTES # BLD: 17 % (ref 1–7)
MORPHOLOGY: ABNORMAL
NRBC AUTOMATED: 0 PER 100 WBC
PDW BLD-RTO: 21 % (ref 11.8–14.4)
PLATELET # BLD: 144 K/UL (ref 138–453)
PMV BLD AUTO: ABNORMAL FL (ref 8.1–13.5)
POTASSIUM SERPL-SCNC: 4.4 MMOL/L (ref 3.7–5.3)
PRO-BNP: ABNORMAL PG/ML
RBC # BLD: 3.32 M/UL (ref 4.21–5.77)
SARS-COV-2, RAPID: NOT DETECTED
SEG NEUTROPHILS: 46 % (ref 36–66)
SEGMENTED NEUTROPHILS ABSOLUTE COUNT: 2.12 K/UL (ref 1.8–7.7)
SODIUM BLD-SCNC: 138 MMOL/L (ref 135–144)
SPECIMEN DESCRIPTION: NORMAL
TOTAL PROTEIN: 7.4 G/DL (ref 6.4–8.3)
TROPONIN, HIGH SENSITIVITY: 386 NG/L (ref 0–22)
TROPONIN, HIGH SENSITIVITY: 387 NG/L (ref 0–22)
TSH SERPL DL<=0.05 MIU/L-ACNC: 2.71 UIU/ML (ref 0.3–5)
WBC # BLD: 4.6 K/UL (ref 3.5–11.3)

## 2022-09-02 PROCEDURE — 87635 SARS-COV-2 COVID-19 AMP PRB: CPT

## 2022-09-02 PROCEDURE — 71045 X-RAY EXAM CHEST 1 VIEW: CPT

## 2022-09-02 PROCEDURE — 36415 COLL VENOUS BLD VENIPUNCTURE: CPT

## 2022-09-02 PROCEDURE — 85025 COMPLETE CBC W/AUTO DIFF WBC: CPT

## 2022-09-02 PROCEDURE — 84443 ASSAY THYROID STIM HORMONE: CPT

## 2022-09-02 PROCEDURE — 84484 ASSAY OF TROPONIN QUANT: CPT

## 2022-09-02 PROCEDURE — 83880 ASSAY OF NATRIURETIC PEPTIDE: CPT

## 2022-09-02 PROCEDURE — 80053 COMPREHEN METABOLIC PANEL: CPT

## 2022-09-02 ASSESSMENT — ENCOUNTER SYMPTOMS
COUGH: 0
ABDOMINAL PAIN: 0
VOMITING: 0
NAUSEA: 0
DIARRHEA: 0
RHINORRHEA: 0
SORE THROAT: 0
SHORTNESS OF BREATH: 0

## 2022-09-02 NOTE — ED NOTES
Pt presents to the er c/o bilateral lower extremity weakness for the past few days     Mariola Donnelly, [facility]  09/02/22 1741

## 2022-09-02 NOTE — ED PROVIDER NOTES
[facility]  Emergency Department Encounter     Pt Name: Stef Overton  MRN: [id number]  [place] 1960  Date of evaluation: 9/2/22  PCP:  Gabrielle Berrios MD    CHIEF COMPLAINT       Chief Complaint   Patient presents with    Extremity Weakness     C/o bilateral lower extremity weakness, dialysis pt and has not missed any treatments, denies chest pain, sob, abd pain, or any other complaints       HISTORY OF PRESENT ILLNESS  (Location/Symptom, Timing/Onset, Context/Setting, Quality, Duration, Modifying Factors, Severity.)    Stef Overton is a 60 y.o. male who has a past medical history of congestive heart failure, diabetes, end-stage renal disease on hemodialysis Monday Wednesday Friday presents emergency department with generalized weakness from \"head to toe\". He cannot really provide me any further details and how long this has been going on. Has not had any cough congestion runny nose or sore throat. No abdominal pain nausea vomiting or diarrhea. No chest pain or shortness of breath. Has not missed any dialysis sessions and had a full uneventful session on Wednesday. His nephrologist is Dr. Mahogany Sutton / SURGICAL / Elizabeth Morillo / FAMILY HISTORY    has a past medical history of Acute congestive heart failure (HCC), Chronic bilateral low back pain with bilateral sciatica, CRF (chronic renal failure), DDD (degenerative disc disease), lumbar, Diabetes mellitus ([facility] [address] Dialysis patient [facility]), ED (erectile dysfunction), History of echocardiogram, HTN (hypertension), Hyperlipidemia, LVH (left ventricular hypertrophy), PVD (peripheral vascular disease) ([address] S/P bilateral BKA (below knee amputation) ([facility] [address] and Wears glasses. has a past surgical history that includes Glaucoma surgery; Cataract removal with implant; Leg amputation below knee (Bilateral); Tunneled venous catheter placement; Dialysis fistula creation (Bilateral); Finger surgery (Left);  Finger amputation; and Arm Surgery. Social History     Socioeconomic History    Marital status:      Spouse name: Not on file    Number of children: Not on file    Years of education: Not on file    Highest education level: Not on file   Occupational History    Not on file   Tobacco Use    Smoking status: Never    Smokeless tobacco: Never   Vaping Use    Vaping Use: Never used   Substance and Sexual Activity    Alcohol use: Yes     Comment: rare    Drug use: No    Sexual activity: Not on file   Other Topics Concern    Not on file   Social History Narrative    Not on file     Social Determinants of Health     Financial Resource Strain: Low Risk     Difficulty of Paying Living Expenses: Not hard at all   Food Insecurity: No Food Insecurity    Worried About Running Out of Food in the Last Year: Never true    [address] in the Last Year: Never true   Transportation Needs: No Transportation Needs    Lack of Transportation (Medical): No    Lack of Transportation (Non-Medical): No   Physical Activity: Inactive    Days of Exercise per Week: 0 days    Minutes of Exercise per Session: 0 min   Stress: No Stress Concern Present    Feeling of Stress : Not at all   Social Connections:  Moderately Integrated    Frequency of Communication with Friends and Family: More than three times a week    Frequency of Social Gatherings with Friends and Family: More than three times a week    Attends Buddhist Services: More than 4 times per year    Active Member of [address] [place] [address] [employer] or Organizations: Yes    Attends Club or Organization Meetings: Never    Marital Status:    Intimate Partner Violence: Not At Risk    Fear of Current or Ex-Partner: No    Emotionally Abused: No    Physically Abused: No    Sexually Abused: No   Housing Stability: Unknown    Unable to Pay for Housing in the Last Year: No    Number of Places Lived in the Last Year: Not on file    Unstable Housing in the Last Year: No       Family History   Problem Relation Age of Onset    Diabetes Mother     Coronary Art Dis Mother     Hypertension Mother     Diabetes Father     Hypertension Father     Stroke Father     Cancer Sister     Hypertension Brother        Allergies:    Patient has no known allergies. Home Medications:  Prior to Admission medications    Medication Sig Start Date End Date Taking? Authorizing Provider   traMADol (ULTRAM) 50 MG tablet Take 1 tablet by mouth daily as needed for Pain for up to 30 days. 8/17/22 9/16/22  Ludin Miller MD   oxyCODONE-acetaminophen (PERCOCET) 7.5-325 MG per tablet Take 1 tablet by mouth daily as needed for Pain for up to 30 days. On HD days 8/17/22 9/16/22  Ludin Miller MD   linagliptin (TRADJENTA) 5 MG tablet TAKE ONE TABLET BY MOUTH DAILY 7/11/22   Larry Jones MD   ondansetron (ZOFRAN ODT) 4 MG disintegrating tablet Take 1 tablet by mouth every 8 hours as needed for Nausea or Vomiting 6/26/22   AURORA Leon - CNP   apixaban (ELIQUIS) 5 MG TABS tablet Take 1 tablet by mouth 2 times daily 5/28/22   Serena Sotelo MD   amLODIPine (NORVASC) 10 MG tablet Take 1 tablet by mouth daily 5/28/22   Serena Sotelo MD   labetalol (NORMODYNE) 200 MG tablet Take 1 tablet by mouth 2 times daily 5/28/22   Serena Sotelo MD   atorvastatin (LIPITOR) 80 MG tablet  5/4/22   Historical Provider, MD   LOPERAMIDE HCL PO Take 2 mg by mouth 4/8/22 4/7/23  Historical Provider, MD   glimepiride (AMARYL) 2 MG tablet TAKE ONE TABLET BY MOUTH EVERY MORNING BEFORE BREAKFAST 3/30/22   Larry Jones MD   minoxidil (LONITEN) 2.5 MG tablet 10 mg  in the morning and 10 mg before bedtime.  1/15/22   Historical Provider, MD   acetaminophen (TYLENOL) 500 MG tablet Take 2 tablets by mouth every 6 hours as needed for Pain 9/1/21   Carmen Rivas MD   docusate sodium (COLACE) 100 MG capsule Take 1 capsule by mouth 3 times daily as needed for Constipation 8/10/21   Dale Canales PA-C   Heparin Sodium, Porcine, (HEPARIN, PORCINE,) 1000 UNIT/ML injection Heparin Sodium (Porcine) 1,000 Units/mL Systemic 3/1/21   Historical Provider, MD   Multiple Vitamins-Minerals (RENAPLEX-D PO) Take 1 tablet by mouth daily     Historical Provider, MD   hydrALAZINE (APRESOLINE) 25 MG tablet Take 25 mg by mouth 2 times daily     Historical Provider, MD   cloNIDine (CATAPRES) 0.3 MG tablet Take 1 tablet by mouth 2 times daily Do not take if HR < 60  Patient taking differently: Take 0.3 mg by mouth in the morning and 0.3 mg at noon and 0.3 mg before bedtime. Do not take if HR < 60. 1/7/21   Josh Christiansen MD   aspirin 81 MG chewable tablet Take 81 mg by mouth every morning    Historical Provider, MD   Sucroferric Oxyhydroxide (VELPHORO) 500 MG CHEW Take 2 tablets by mouth 3 times daily (with meals) Crush or chew and shallow 2 tablets three times a day with meals    Historical Provider, MD   Tens Unit MISC by Does not apply route 12/15/20   Yahaira Jean MD   lisinopril (PRINIVIL;ZESTRIL) 10 MG tablet TAKE 1 TABLET BY MOUTH  TWICE DAILY  Patient taking differently: Take 10 mg by mouth in the morning and at bedtime 7/10/20   Hafsa Jessica MD   NONFORMULARY     Historical Provider, MD   gabapentin (NEURONTIN) 300 MG capsule TAKE ONE CAPSULE BY MOUTH THREE TIMES A DAY 2/13/17   Huong House, DO       REVIEW OF SYSTEMS    (2-9 systems for level 4, 10 or more for level 5)    Review of Systems   Constitutional:  Positive for fatigue. Negative for chills, diaphoresis and fever. HENT:  Negative for congestion, rhinorrhea and sore throat. Respiratory:  Negative for cough and shortness of breath. Cardiovascular:  Negative for chest pain. Gastrointestinal:  Negative for abdominal pain, diarrhea, nausea and vomiting. Neurological:  Positive for weakness. Negative for dizziness, light-headedness, numbness and headaches.      PHYSICAL EXAM   (up to 7 for level 4, 8 or more for level 5)    VITALS:   Vitals:    09/01/22 2204   BP: (!) 117/58   Pulse: 67   Resp: 14   Temp: 98 °F (36.7 °C) TempSrc: Oral   SpO2: 98%   Weight: 220 lb (99.8 kg)   Height: 5' 11\" (1.803 m)       Physical Exam  Vitals and nursing note reviewed. Constitutional:       General: He is not in acute distress. Appearance: He is well-developed. He is not diaphoretic. HENT:      Head: Normocephalic and atraumatic. Eyes:      Conjunctiva/sclera: Conjunctivae normal.   Cardiovascular:      Rate and Rhythm: Normal rate and regular rhythm. Heart sounds: Normal heart sounds. No murmur heard. Pulmonary:      Effort: Pulmonary effort is normal. No respiratory distress. Breath sounds: Normal breath sounds. No wheezing, rhonchi or rales. Abdominal:      General: There is no distension. Palpations: Abdomen is soft. Tenderness: There is no abdominal tenderness. Musculoskeletal:         General: Normal range of motion. Cervical back: Normal range of motion. Comments: BL BKA   Skin:     General: Skin is warm and dry. Neurological:      General: No focal deficit present. Mental Status: He is alert. Psychiatric:         Behavior: Behavior normal.       DIFFERENTIAL  DIAGNOSIS   PLAN (LABS / IMAGING / EKG):  Orders Placed This Encounter   Procedures    COVID-19, Rapid    XR CHEST 1 VIEW    CBC with Auto Differential    Comprehensive Metabolic Panel w/ Reflex to MG    Troponin    Brain Natriuretic Peptide    TSH w/reflex to FT4    Troponin    EKG 12 Lead    Insert peripheral IV       MEDICATIONS ORDERED:  No orders of the defined types were placed in this encounter.       DIAGNOSTIC RESULTS / EMERGENCYDEPARTMENT COURSE / MDM   LABS:  Labs Reviewed   CBC WITH AUTO DIFFERENTIAL - Abnormal; Notable for the following components:       Result Value    RBC 3.32 (*)     Hemoglobin 8.3 (*)     Hematocrit 26.7 (*)     MCV 80.4 (*)     MCH 25.0 (*)     RDW 21.0 (*)     Monocytes 17 (*)     Eosinophils % 5 (*)     All other components within normal limits   COMPREHENSIVE METABOLIC PANEL W/ REFLEX TO MG FOR LOW K - Abnormal; Notable for the following components:    Glucose 130 (*)     BUN 25 (*)     Creatinine 6.64 (*)     Bun/Cre Ratio 4 (*)     Alkaline Phosphatase 148 (*)     AST 52 (*)     GFR Non- 8 (*)     GFR  10 (*)     All other components within normal limits   TROPONIN - Abnormal; Notable for the following components:    Troponin, High Sensitivity 387 (*)     All other components within normal limits   BRAIN NATRIURETIC PEPTIDE - Abnormal; Notable for the following components:    Pro-BNP 38,440 (*)     All other components within normal limits   TROPONIN - Abnormal; Notable for the following components:    Troponin, High Sensitivity 386 (*)     All other components within normal limits   COVID-19, RAPID   TSH WITH REFLEX       RADIOLOGY:  XR CHEST 1 VIEW    Result Date: 9/2/2022  EXAMINATION: ONE XRAY VIEW OF THE CHEST 9/2/2022 2:28 am COMPARISON: June 29, 2022. HISTORY: ORDERING SYSTEM PROVIDED HISTORY: fatigue generalized weakness TECHNOLOGIST PROVIDED HISTORY: fatigue generalized weakness Reason for Exam: weakness FINDINGS: Cardiomegaly. Vascular congestion. Thoracic aortic atherosclerotic disease. There is a tunneled dialysis catheter with the tip seen in the proximal superior vena cava directed laterally. No acute osseous abnormality is identified. Degenerative changes are seen within the shoulders. Cardiomegaly with vascular congestion. Similar appearing tunneled dialysis catheter with the tip terminating in the proximal superior vena cava directed laterally to the right. This has remained stable on multiple prior examinations and presumably is working well. If there is any difficulty with use of this catheter, could consider catheter exchange on an outpatient basis and change in position.       EKG    EKG Interpretation    Interpreted by emergency department physician    Rhythm: normal sinus   Rate: normal  Axis: normal  Ectopy: none  Conduction: normal  ST Segments: no acute change  T Waves: inversion in  v5, v6, I, II, and aVf  Q Waves: none    Clinical Impression: no acute changes    All EKG's are interpreted by the Emergency Department Physician whoeither signs or Co-signs this chart in the absence of a cardiologist.    EMERGENCY DEPARTMENT COURSE:  ED Course as of 09/02/22 0648   Fri Sep 02, 2022   0302 XR CHEST 1 VIEW [AO]   0334 CBC with Auto Differential(!):    WBC 4.6   RBC 3.32(!)   Hemoglobin Quant 8.3(!)   Hematocrit 26.7(!)   MCV 80.4(!)   MCH 25.0(!)   MCHC 31.1   RDW 21.0(!)   Platelet Count 332   MPV Abnormal   NRBC Automated 0.0   Seg Neutrophils PENDING   Lymphocytes PENDING   Monocytes PENDING   Eosinophils % PENDING   Basophils PENDING   Immature Granulocytes PENDING   Segs Absolute PENDING   Absolute Lymph # PENDING   Absolute Mono # PENDING   Absolute Eos # PENDING   Basophils Absolute PENDING   Absolute Immature Granulocyte PENDING [AO]   0349 SARS-CoV-2, Rapid: Not Detected [AO]   0430 Comprehensive Metabolic Panel w/ Reflex to MG(!!):    Glucose, Random 130(!)   BUN,BUNPL 25(!)   Creatinine 6.64(!!)   Bun/Cre Ratio 4(!)   CALCIUM, SERUM, 394752 9.5   Sodium 138   Potassium 4.4   Chloride 98   CO2 26   Anion Gap 14   Alk Phos 148(!)   ALT 32   AST 52(!)   Bilirubin 0.5   Total Protein 7.4   Albumin 4.2   GFR Non- 8(!)   GFR  10(!)   GFR Comment      [AO]   0430 TSH w/reflex to FT4:    TSH 2.71 [AO]   0430 Troponin(!!):    Troponin, High Sensitivity 387(!!) [AO]   0456 Brain Natriuretic Peptide(!):    Pro-BNP 38,440(!) [AO]   [id number] Troponin(!!):    Troponin, High Sensitivity 386(!!) [AO]      ED Course User Index  [AO] Dyanne Schaumann,        MDM  Number of Diagnoses or Management Options  Generalized weakness  Diagnosis management comments: 60-year-old male who has a history of end-stage renal disease on hemodialysis Monday Wednesday Friday who presents emergency department with generalized weakness.   No focalized weakness numbness or tingling. He has not had any other symptoms. Vital signs stable. No acute nonfocal exam including normal neuro exam.  Troponins elevated but are at baseline for patient due to his history of renal failure. Creatinine elevated, but he is due for dialysis today. No electrolyte disturbances. TSH within normal limits. EKG unchanged compared to prior. Chest x-ray revealed no pneumonia. COVID-19 negative. Patient slept comfortably, requesting something to eat and had a full box lunch in no acute distress during his evaluation. He does have baseline anemia likely secondary to his chronic kidney disease and he states that his nephrologist has discussed with him getting injections to stimulate red blood cell production as well as potential iron infusions. I encouraged him to push this as she was this may be contributing to his symptoms. He was discharged home so that he can make his dialysis appointment later this morning. Amount and/or Complexity of Data Reviewed  Clinical lab tests: ordered and reviewed  Tests in the radiology section of CPT®: ordered and reviewed  Review and summarize past medical records: yes  Independent visualization of images, tracings, or specimens: yes    Patient Progress  Patient progress: stable      PROCEDURES:  Procedures     CONSULTS:  None    CRITICAL CARE:  NONE    FINAL IMPRESSION     1. Generalized weakness          DISPOSITION / PLAN   DISPOSITION Decision To Discharge 09/02/2022 06:38:30 AM      Evaluation and treatment course in the ED, and plan of care upon discharge was discussed in length with the patient. Patient had no further questions prior to being discharged and was instructed to return to the ED for new or worsening symptoms. Any changes to existing medications or new prescriptions were reviewed with patient and they expressed understanding of how to correctly take their medications and the possible side effects.     PATIENT REFERRED TO:  Paulette Plunkett MD  [address] [id number]  [address] [facility] ED  [address] [address]  [phone]    As needed, If symptoms worsen    DISCHARGE MEDICATIONS:  New Prescriptions    No medications on file       Samira Concepcion DO  Emergency Medicine Physician    (Please note that portions of this note were completed with a voice recognition program.  Efforts were made to edit the dictations but occasionally words are mis-transcribed.)        Trever Evans 1721,   09/02/22 4654

## 2022-09-06 ENCOUNTER — TELEPHONE (OUTPATIENT)
Dept: INTERNAL MEDICINE CLINIC | Age: 62
End: 2022-09-06

## 2022-09-06 PROBLEM — I25.10 CORONARY ARTERY DISEASE INVOLVING NATIVE CORONARY ARTERY: Status: ACTIVE | Noted: 2022-09-06

## 2022-09-06 PROBLEM — R53.81 PHYSICAL DECONDITIONING: Status: RESOLVED | Noted: 2022-05-27 | Resolved: 2022-09-06

## 2022-09-06 PROBLEM — M51.36 DDD (DEGENERATIVE DISC DISEASE), LUMBAR: Status: RESOLVED | Noted: 2020-12-15 | Resolved: 2022-09-06

## 2022-09-06 PROBLEM — R69 SEVERE COMORBID ILLNESS: Status: RESOLVED | Noted: 2020-12-15 | Resolved: 2022-01-01

## 2022-09-06 PROBLEM — R07.89 ATYPICAL CHEST PAIN: Status: RESOLVED | Noted: 2022-05-26 | Resolved: 2022-09-06

## 2022-09-06 NOTE — TELEPHONE ENCOUNTER
Patient calling back with the dates that he seen Dr. Alexsandra Toscano    5/17/22 and next appt will be on 11/17/22

## 2022-09-13 ENCOUNTER — OFFICE VISIT (OUTPATIENT)
Dept: PAIN MANAGEMENT | Age: 62
End: 2022-09-13
Payer: COMMERCIAL

## 2022-09-13 VITALS
SYSTOLIC BLOOD PRESSURE: 142 MMHG | HEIGHT: 71 IN | BODY MASS INDEX: 30.8 KG/M2 | DIASTOLIC BLOOD PRESSURE: 81 MMHG | WEIGHT: 220 LBS

## 2022-09-13 DIAGNOSIS — M48.062 SPINAL STENOSIS OF LUMBAR REGION WITH NEUROGENIC CLAUDICATION: ICD-10-CM

## 2022-09-13 DIAGNOSIS — Z99.2 ESRD ON HEMODIALYSIS (HCC): ICD-10-CM

## 2022-09-13 DIAGNOSIS — E11.40 PAINFUL DIABETIC NEUROPATHY (HCC): ICD-10-CM

## 2022-09-13 DIAGNOSIS — Z79.891 CHRONIC USE OF OPIATE DRUG FOR THERAPEUTIC PURPOSE: Primary | ICD-10-CM

## 2022-09-13 DIAGNOSIS — N18.6 ESRD ON HEMODIALYSIS (HCC): ICD-10-CM

## 2022-09-13 DIAGNOSIS — Z79.02 LONG TERM (CURRENT) USE OF ANTITHROMBOTICS/ANTIPLATELETS: ICD-10-CM

## 2022-09-13 PROCEDURE — 3044F HG A1C LEVEL LT 7.0%: CPT | Performed by: ANESTHESIOLOGY

## 2022-09-13 PROCEDURE — G8417 CALC BMI ABV UP PARAM F/U: HCPCS | Performed by: ANESTHESIOLOGY

## 2022-09-13 PROCEDURE — 1036F TOBACCO NON-USER: CPT | Performed by: ANESTHESIOLOGY

## 2022-09-13 PROCEDURE — 3017F COLORECTAL CA SCREEN DOC REV: CPT | Performed by: ANESTHESIOLOGY

## 2022-09-13 PROCEDURE — 2022F DILAT RTA XM EVC RTNOPTHY: CPT | Performed by: ANESTHESIOLOGY

## 2022-09-13 PROCEDURE — G8427 DOCREV CUR MEDS BY ELIG CLIN: HCPCS | Performed by: ANESTHESIOLOGY

## 2022-09-13 PROCEDURE — 99213 OFFICE O/P EST LOW 20 MIN: CPT | Performed by: ANESTHESIOLOGY

## 2022-09-13 RX ORDER — OXYCODONE AND ACETAMINOPHEN 7.5; 325 MG/1; MG/1
1 TABLET ORAL DAILY PRN
Qty: 20 TABLET | Refills: 0 | Status: ON HOLD | OUTPATIENT
Start: 2022-09-13 | End: 2022-09-19

## 2022-09-13 RX ORDER — TRAMADOL HYDROCHLORIDE 50 MG/1
50 TABLET ORAL DAILY PRN
Qty: 30 TABLET | Refills: 1 | Status: ON HOLD
Start: 2022-09-13 | End: 2022-09-20 | Stop reason: HOSPADM

## 2022-09-13 RX ORDER — PREGABALIN 75 MG/1
75 CAPSULE ORAL DAILY
Qty: 30 CAPSULE | Refills: 0 | Status: SHIPPED | OUTPATIENT
Start: 2022-09-13 | End: 2022-10-13

## 2022-09-13 ASSESSMENT — ENCOUNTER SYMPTOMS
RESPIRATORY NEGATIVE: 1
EYES NEGATIVE: 1

## 2022-09-13 NOTE — PROGRESS NOTES
The patient is a 60 y. o. Non- / non  male. Chief Complaint   Patient presents with    Back Pain    Leg Pain    Medication Refill        HPI    Medication Refill: Tramadol & Oxycodone     Pain score Today:  9  Adverse effects (Constipation / Nausea / Sedation / sexual Dysfunction / others) :  none  Mood: good  Sleep pattern and quality: poor  Activity level: good    Pill count Today:  Tramadol - 0  Oxycodone - 2    Last dose taken    OARRS report reviewed today: yes  ER/Hospitalizations/PCP visit related to pain since last visit:no   Any legal problems e.g. DUI etc.:No  Satisfied with current management: Yes    Opioid Contract: 04/08/2022  Last Urine Dug screen dated: none on file    Lab Results   Component Value Date    LABA1C 5.8 05/17/2022     Lab Results   Component Value Date     05/17/2022       Past Medical History, Past Surgical History, Social History, Allergies and Medications reviewed and updated in EPIC as indicated    Family History reviewed and is noncontributory. Past Medical History:   Diagnosis Date    Acute congestive heart failure (HCC)     Chronic bilateral low back pain with bilateral sciatica     Coronary artery disease involving native coronary artery 9/6/2022    CRF (chronic renal failure)     [facility] MWF    DDD (degenerative disc disease), lumbar 12/15/2020    Diabetes mellitus ([facility] [address]     Dialysis patient [facility])     ED (erectile dysfunction)     History of echocardiogram 2014    [facility]    HTN (hypertension)     Hyperlipidemia     LVH (left ventricular hypertrophy)     PVD (peripheral vascular disease) ([facility])     S/P bilateral BKA (below knee amputation) ([facility] [address]     Wears glasses         Past Surgical History:   Procedure Laterality Date    ARM SURGERY      CATARACT REMOVAL WITH IMPLANT      DIALYSIS FISTULA CREATION Bilateral     As of 2019, RUE AVF functioning, LUE AVF nonfunctioning.     FINGER AMPUTATION      right pointer finger    FINGER SURGERY Left     I & D GLAUCOMA SURGERY      LEG AMPUTATION BELOW KNEE Bilateral     TUNNELED VENOUS CATHETER PLACEMENT      PC placed and removed       Social History     Socioeconomic History    Marital status:      Spouse name: None    Number of children: None    Years of education: None    Highest education level: None   Tobacco Use    Smoking status: Never    Smokeless tobacco: Never   Vaping Use    Vaping Use: Never used   Substance and Sexual Activity    Alcohol use: Yes     Comment: rare    Drug use: No     Social Determinants of Health     Financial Resource Strain: Low Risk     Difficulty of Paying Living Expenses: Not hard at all   Food Insecurity: No Food Insecurity    Worried About Running Out of Food in the Last Year: Never true    [address] in the Last Year: Never true   Transportation Needs: No Transportation Needs    Lack of Transportation (Medical): No    Lack of Transportation (Non-Medical): No   Physical Activity: Inactive    Days of Exercise per Week: 0 days    Minutes of Exercise per Session: 0 min   Stress: No Stress Concern Present    Feeling of Stress : Not at all   Social Connections:  Moderately Integrated    Frequency of Communication with Friends and Family: More than three times a week    Frequency of Social Gatherings with Friends and Family: More than three times a week    Attends [employer] Services: More than 4 times per year    Active Member of [employer] or Organizations: Yes    Attends Club or Organization Meetings: Never    Marital Status:    Intimate Partner Violence: Not At Risk    Fear of Current or Ex-Partner: No    Emotionally Abused: No    Physically Abused: No    Sexually Abused: No   Housing Stability: Unknown    Unable to Pay for Housing in the Last Year: No    Unstable Housing in the Last Year: No       Family History   Problem Relation Age of Onset    Diabetes Mother     Coronary Art Dis Mother     Hypertension Mother     Diabetes Father     Hypertension Father     Stroke Father     Cancer Sister     Hypertension Brother        No Known Allergies    There were no vitals filed for this visit. Current Outpatient Medications   Medication Sig Dispense Refill    traMADol (ULTRAM) 50 MG tablet Take 1 tablet by mouth daily as needed for Pain for up to 30 days. 30 tablet 0    oxyCODONE-acetaminophen (PERCOCET) 7.5-325 MG per tablet Take 1 tablet by mouth daily as needed for Pain for up to 30 days. On HD days 20 tablet 0    linagliptin (TRADJENTA) 5 MG tablet TAKE ONE TABLET BY MOUTH DAILY 90 tablet 0    ondansetron (ZOFRAN ODT) 4 MG disintegrating tablet Take 1 tablet by mouth every 8 hours as needed for Nausea or Vomiting 12 tablet 0    apixaban (ELIQUIS) 5 MG TABS tablet Take 1 tablet by mouth 2 times daily 180 tablet 1    amLODIPine (NORVASC) 10 MG tablet Take 1 tablet by mouth daily 30 tablet 3    labetalol (NORMODYNE) 200 MG tablet Take 1 tablet by mouth 2 times daily 60 tablet 3    atorvastatin (LIPITOR) 80 MG tablet       LOPERAMIDE HCL PO Take 2 mg by mouth      glimepiride (AMARYL) 2 MG tablet TAKE ONE TABLET BY MOUTH EVERY MORNING BEFORE BREAKFAST 90 tablet 1    minoxidil (LONITEN) 2.5 MG tablet 10 mg  in the morning and 10 mg before bedtime.       acetaminophen (TYLENOL) 500 MG tablet Take 2 tablets by mouth every 6 hours as needed for Pain 60 tablet 0    docusate sodium (COLACE) 100 MG capsule Take 1 capsule by mouth 3 times daily as needed for Constipation 30 capsule 0    Heparin Sodium, Porcine, (HEPARIN, PORCINE,) 1000 UNIT/ML injection Heparin Sodium (Porcine) 1,000 Units/mL Systemic      Multiple Vitamins-Minerals (RENAPLEX-D PO) Take 1 tablet by mouth daily       hydrALAZINE (APRESOLINE) 25 MG tablet Take 25 mg by mouth 2 times daily       cloNIDine (CATAPRES) 0.3 MG tablet Take 1 tablet by mouth 2 times daily Do not take if HR < 60 (Patient taking differently: Take 0.3 mg by mouth 3 times daily Do not take if HR < 60) 180 tablet 3    aspirin 81 MG chewable tablet Take 81 mg by mouth every morning      Sucroferric Oxyhydroxide (VELPHORO) 500 MG CHEW Take 2 tablets by mouth 3 times daily (with meals) Crush or chew and shallow 2 tablets three times a day with meals      Tens Unit MISC by Does not apply route 1 each 0    lisinopril (PRINIVIL;ZESTRIL) 10 MG tablet TAKE 1 TABLET BY MOUTH  TWICE DAILY (Patient taking differently: Take 10 mg by mouth in the morning and at bedtime) 180 tablet 0    NONFORMULARY       gabapentin (NEURONTIN) 300 MG capsule TAKE ONE CAPSULE BY MOUTH THREE TIMES A DAY 90 capsule 2     No current facility-administered medications for this visit. Review of Systems   Constitutional: Negative. HENT: Negative. Eyes: Negative. Respiratory: Negative. Cardiovascular: Negative. Objective:  Vital signs: (most recent): There were no vitals taken for this visit. Assessment & Plan  No diagnosis found. No orders of the defined types were placed in this encounter. No orders of the defined types were placed in this encounter.            Electronically signed by Aparna Kellogg MA on 9/13/2022 at 12:33 PM

## 2022-09-13 NOTE — PROGRESS NOTES
The patient is a 60 y. o. Non- / non  male. Chief Complaint   Patient presents with    Back Pain    Leg Pain    Medication Refill        HPI    79-year-old man with the history of high blood pressure, diabetes, peripheral vascular disease, end-stage renal disease on hemodialysis, on long-term antiplatelet therapy, diabetic peripheral neuropathy and is status post a bilateral knee amputation    Coronary artery disease with coronary stenting in 2021    Have chronic generalized all over the body pain related to diabetic neuropathy and other comorbidities  Currently on low-dose opioid therapy with Percocet and gabapentin gabapentin and tramadol  Reports no side effect  Report poor pain control  He will do not find gabapentin much helpful for his neuropathy and wants to try Lyrica    Medication Refill: Tramadol & Oxycodone     Pain score Today:  9  Adverse effects (Constipation / Nausea / Sedation / sexual Dysfunction / others) :  none  Mood: good  Sleep pattern and quality: poor  Activity level: good    Pill count Today:  Tramadol - 0  Oxycodone - 2    Last dose taken    OARRS report reviewed today: yes  ER/Hospitalizations/PCP visit related to pain since last visit:no   Any legal problems e.g. DUI etc.:No  Satisfied with current management: Yes    Opioid Contract: 04/08/2022  Last Urine Dug screen dated: none on file    Lab Results   Component Value Date    LABA1C 5.8 05/17/2022     Lab Results   Component Value Date     05/17/2022       Past Medical History, Past Surgical History, Social History, Allergies and Medications reviewed and updated in EPIC as indicated    Family History reviewed and is noncontributory.         Past Medical History:   Diagnosis Date    Acute congestive heart failure (HCC)     Chronic bilateral low back pain with bilateral sciatica     Coronary artery disease involving native coronary artery 9/6/2022    CRF (chronic renal failure)     [facility]    DDD (degenerative disc disease), lumbar 12/15/2020    Diabetes mellitus ([facility] [address]     Dialysis patient [facility])     ED (erectile dysfunction)     History of echocardiogram 2014    [facility]    HTN (hypertension)     Hyperlipidemia     LVH (left ventricular hypertrophy)     PVD (peripheral vascular disease) ([facility])     S/P bilateral BKA (below knee amputation) ([facility] [address]     Wears glasses         Past Surgical History:   Procedure Laterality Date    ARM SURGERY      CATARACT REMOVAL WITH IMPLANT      DIALYSIS FISTULA CREATION Bilateral     As of 2019, RUE AVF functioning, LUE AVF nonfunctioning. FINGER AMPUTATION      right pointer finger    FINGER SURGERY Left     I & D    GLAUCOMA SURGERY      LEG AMPUTATION BELOW KNEE Bilateral     TUNNELED VENOUS CATHETER PLACEMENT      PC placed and removed       Social History     Socioeconomic History    Marital status:      Spouse name: None    Number of children: None    Years of education: None    Highest education level: None   Tobacco Use    Smoking status: Never    Smokeless tobacco: Never   Vaping Use    Vaping Use: Never used   Substance and Sexual Activity    Alcohol use: Yes     Comment: rare    Drug use: No     Social Determinants of Health     Financial Resource Strain: Low Risk     Difficulty of Paying Living Expenses: Not hard at all   Food Insecurity: No Food Insecurity    Worried About Running Out of Food in the Last Year: Never true    Ran Out of Food in the Last Year: Never true   Transportation Needs: No Transportation Needs    Lack of Transportation (Medical): No    Lack of Transportation (Non-Medical): No   Physical Activity: Inactive    Days of Exercise per Week: 0 days    Minutes of Exercise per Session: 0 min   Stress: No Stress Concern Present    Feeling of Stress : Not at all   Social Connections:  Moderately Integrated    Frequency of Communication with Friends and Family: More than three times a week    Frequency of Social Gatherings with Friends and Family: More than three times a week    Attends Sabianist Services: More than 4 times per year    Active Member of Clubs or Organizations: Yes    Attends Club or Organization Meetings: Never    Marital Status:    Intimate Partner Violence: Not At Risk    Fear of Current or Ex-Partner: No    Emotionally Abused: No    Physically Abused: No    Sexually Abused: No   Housing Stability: Unknown    Unable to Pay for Housing in the Last Year: No    Unstable Housing in the Last Year: No       Family History   Problem Relation Age of Onset    Diabetes Mother     Coronary Art Dis Mother     Hypertension Mother     Diabetes Father     Hypertension Father     Stroke Father     Cancer Sister     Hypertension Brother        No Known Allergies    Vitals:    09/13/22 1247   BP: (!) 142/81       Current Outpatient Medications   Medication Sig Dispense Refill    traMADol (ULTRAM) 50 MG tablet Take 1 tablet by mouth daily as needed for Pain for up to 30 days. 30 tablet 1    oxyCODONE-acetaminophen (PERCOCET) 7.5-325 MG per tablet Take 1 tablet by mouth daily as needed for Pain for up to 30 days. On HD days 20 tablet 0    pregabalin (LYRICA) 75 MG capsule Take 1 capsule by mouth daily for 30 days. 30 capsule 0    linagliptin (TRADJENTA) 5 MG tablet TAKE ONE TABLET BY MOUTH DAILY 90 tablet 0    ondansetron (ZOFRAN ODT) 4 MG disintegrating tablet Take 1 tablet by mouth every 8 hours as needed for Nausea or Vomiting 12 tablet 0    apixaban (ELIQUIS) 5 MG TABS tablet Take 1 tablet by mouth 2 times daily 180 tablet 1    amLODIPine (NORVASC) 10 MG tablet Take 1 tablet by mouth daily 30 tablet 3    labetalol (NORMODYNE) 200 MG tablet Take 1 tablet by mouth 2 times daily 60 tablet 3    atorvastatin (LIPITOR) 80 MG tablet       LOPERAMIDE HCL PO Take 2 mg by mouth      glimepiride (AMARYL) 2 MG tablet TAKE ONE TABLET BY MOUTH EVERY MORNING BEFORE BREAKFAST 90 tablet 1    minoxidil (LONITEN) 2.5 MG tablet 10 mg  in the morning and 10 mg before bedtime. time.    Assessment & Plan  1. Chronic use of opiate drugs therapeutic purposes    2. ESRD on hemodialysis ([address] MWF    3. Spinal stenosis of lumbar region with neurogenic claudication    4. Long term (current) use of antithrombotics/antiplatelets    5. Painful diabetic neuropathy (HCC)        No orders of the defined types were placed in this encounter. Orders Placed This Encounter   Medications    traMADol (ULTRAM) 50 MG tablet     Sig: Take 1 tablet by mouth daily as needed for Pain for up to 30 days. Dispense:  30 tablet     Refill:  1     Reduce doses taken as pain becomes manageable    oxyCODONE-acetaminophen (PERCOCET) 7.5-325 MG per tablet     Sig: Take 1 tablet by mouth daily as needed for Pain for up to 30 days. On HD days     Dispense:  20 tablet     Refill:  0     Reduce doses taken as pain becomes manageable    pregabalin (LYRICA) 75 MG capsule     Sig: Take 1 capsule by mouth daily for 30 days. Dispense:  30 capsule     Refill:  0      Controlled Substance Monitoring:    Acute and Chronic Pain Monitoring:   RX Monitoring 9/13/2022   Attestation -   Periodic Controlled Substance Monitoring No signs of potential drug abuse or diversion identified. ;Possible medication side effects, risk of tolerance/dependence & alternative treatments discussed. ;Assessed functional status. Chronic Pain > 50 MEDD Obtained or confirmed \"Consent for Opioid Use\" on file.                Electronically signed by Latoya Wilson MD on 9/13/2022 at 1:05 PM

## 2022-09-18 ENCOUNTER — APPOINTMENT (OUTPATIENT)
Dept: CT IMAGING | Age: 62
DRG: 602 | End: 2022-09-18
Payer: COMMERCIAL

## 2022-09-18 ENCOUNTER — HOSPITAL ENCOUNTER (INPATIENT)
Age: 62
LOS: 2 days | Discharge: HOME | DRG: 602 | End: 2022-09-20
Attending: EMERGENCY MEDICINE | Admitting: FAMILY MEDICINE
Payer: COMMERCIAL

## 2022-09-18 ENCOUNTER — HOSPITAL ENCOUNTER (EMERGENCY)
Age: 62
Discharge: HOME | DRG: 602 | End: 2022-09-18
Attending: EMERGENCY MEDICINE
Payer: COMMERCIAL

## 2022-09-18 VITALS
DIASTOLIC BLOOD PRESSURE: 54 MMHG | RESPIRATION RATE: 18 BRPM | BODY MASS INDEX: 30.68 KG/M2 | OXYGEN SATURATION: 95 % | HEART RATE: 71 BPM | SYSTOLIC BLOOD PRESSURE: 95 MMHG | TEMPERATURE: 99.1 F | HEIGHT: 71 IN

## 2022-09-18 DIAGNOSIS — D64.9 ANEMIA, UNSPECIFIED TYPE: Primary | ICD-10-CM

## 2022-09-18 DIAGNOSIS — L02.91 ABSCESS: Primary | ICD-10-CM

## 2022-09-18 LAB
ABSOLUTE EOS #: 0 K/UL (ref 0–0.44)
ABSOLUTE EOS #: 0.08 K/UL (ref 0–0.44)
ABSOLUTE IMMATURE GRANULOCYTE: 0 K/UL (ref 0–0.3)
ABSOLUTE IMMATURE GRANULOCYTE: 0.05 K/UL (ref 0–0.3)
ABSOLUTE LYMPH #: 1.41 K/UL (ref 1.1–3.7)
ABSOLUTE LYMPH #: 1.58 K/UL (ref 1.1–3.7)
ABSOLUTE MONO #: 0.81 K/UL (ref 0.1–1.2)
ABSOLUTE MONO #: 1.19 K/UL (ref 0.1–1.2)
ABSOLUTE RETIC #: 0.06 M/UL (ref 0.03–0.08)
ANION GAP SERPL CALCULATED.3IONS-SCNC: 10 MMOL/L (ref 9–17)
ANION GAP SERPL CALCULATED.3IONS-SCNC: 13 MMOL/L (ref 9–17)
BASOPHILS # BLD: 0 % (ref 0–2)
BASOPHILS # BLD: 0 % (ref 0–2)
BASOPHILS ABSOLUTE: 0 K/UL (ref 0–0.2)
BASOPHILS ABSOLUTE: <0.03 K/UL (ref 0–0.2)
BUN BLDV-MCNC: 29 MG/DL (ref 8–23)
BUN BLDV-MCNC: 34 MG/DL (ref 8–23)
BUN/CREAT BLD: 5 (ref 9–20)
BUN/CREAT BLD: 5 (ref 9–20)
CALCIUM SERPL-MCNC: 8.8 MG/DL (ref 8.6–10.4)
CALCIUM SERPL-MCNC: 9.5 MG/DL (ref 8.6–10.4)
CHLORIDE BLD-SCNC: 97 MMOL/L (ref 98–107)
CHLORIDE BLD-SCNC: 97 MMOL/L (ref 98–107)
CO2: 24 MMOL/L (ref 20–31)
CO2: 27 MMOL/L (ref 20–31)
CREAT SERPL-MCNC: 5.68 MG/DL (ref 0.7–1.2)
CREAT SERPL-MCNC: 6.55 MG/DL (ref 0.7–1.2)
EOSINOPHILS RELATIVE PERCENT: 0 % (ref 1–4)
EOSINOPHILS RELATIVE PERCENT: 1 % (ref 1–4)
GFR AFRICAN AMERICAN: 10 ML/MIN
GFR AFRICAN AMERICAN: 12 ML/MIN
GFR NON-AFRICAN AMERICAN: 10 ML/MIN
GFR NON-AFRICAN AMERICAN: 9 ML/MIN
GFR SERPL CREATININE-BSD FRML MDRD: ABNORMAL ML/MIN/{1.73_M2}
GFR SERPL CREATININE-BSD FRML MDRD: ABNORMAL ML/MIN/{1.73_M2}
GLUCOSE BLD-MCNC: 135 MG/DL (ref 70–99)
GLUCOSE BLD-MCNC: 178 MG/DL (ref 70–99)
HCT VFR BLD CALC: 15 % (ref 40.7–50.3)
HCT VFR BLD CALC: 23 % (ref 40.7–50.3)
HEMOGLOBIN: 4.6 G/DL (ref 13–17)
HEMOGLOBIN: 7.1 G/DL (ref 13–17)
IMMATURE GRANULOCYTES: 0 %
IMMATURE GRANULOCYTES: 1 %
IMMATURE RETIC FRACT: 44.6 % (ref 2.7–18.3)
LYMPHOCYTES # BLD: 18 % (ref 24–43)
LYMPHOCYTES # BLD: 20 % (ref 24–43)
MAGNESIUM: 2.1 MG/DL (ref 1.6–2.6)
MCH RBC QN AUTO: 25.4 PG (ref 25.2–33.5)
MCH RBC QN AUTO: 25.7 PG (ref 25.2–33.5)
MCHC RBC AUTO-ENTMCNC: 30.7 G/DL (ref 28.4–34.8)
MCHC RBC AUTO-ENTMCNC: 30.9 G/DL (ref 28.4–34.8)
MCV RBC AUTO: 82.1 FL (ref 82.6–102.9)
MCV RBC AUTO: 83.8 FL (ref 82.6–102.9)
MONOCYTES # BLD: 10 % (ref 3–12)
MONOCYTES # BLD: 15 % (ref 3–12)
MORPHOLOGY: ABNORMAL
MORPHOLOGY: ABNORMAL
NRBC AUTOMATED: 0 PER 100 WBC
NRBC AUTOMATED: 0 PER 100 WBC
PDW BLD-RTO: 20 % (ref 11.8–14.4)
PDW BLD-RTO: 20.2 % (ref 11.8–14.4)
PHOSPHORUS: 4 MG/DL (ref 2.5–4.5)
PLATELET # BLD: 151 K/UL (ref 138–453)
PLATELET # BLD: 170 K/UL (ref 138–453)
PMV BLD AUTO: 10.3 FL (ref 8.1–13.5)
PMV BLD AUTO: 10.9 FL (ref 8.1–13.5)
POTASSIUM SERPL-SCNC: 3.6 MMOL/L (ref 3.7–5.3)
POTASSIUM SERPL-SCNC: 4.1 MMOL/L (ref 3.7–5.3)
RBC # BLD: 1.79 M/UL (ref 4.21–5.77)
RBC # BLD: 2.8 M/UL (ref 4.21–5.77)
RBC # BLD: ABNORMAL 10*6/UL
RETIC %: 3.1 % (ref 0.5–1.9)
RETIC HEMOGLOBIN: 25 PG (ref 28.2–35.7)
SEG NEUTROPHILS: 65 % (ref 36–65)
SEG NEUTROPHILS: 70 % (ref 36–65)
SEGMENTED NEUTROPHILS ABSOLUTE COUNT: 5.13 K/UL (ref 1.5–8.1)
SEGMENTED NEUTROPHILS ABSOLUTE COUNT: 5.54 K/UL (ref 1.5–8.1)
SODIUM BLD-SCNC: 134 MMOL/L (ref 135–144)
SODIUM BLD-SCNC: 134 MMOL/L (ref 135–144)
WBC # BLD: 7.9 K/UL (ref 3.5–11.3)
WBC # BLD: 7.9 K/UL (ref 3.5–11.3)

## 2022-09-18 PROCEDURE — 87205 SMEAR GRAM STAIN: CPT

## 2022-09-18 PROCEDURE — 72192 CT PELVIS W/O DYE: CPT

## 2022-09-18 PROCEDURE — 86900 BLOOD TYPING SEROLOGIC ABO: CPT

## 2022-09-18 PROCEDURE — 87077 CULTURE AEROBIC IDENTIFY: CPT

## 2022-09-18 PROCEDURE — 36430 TRANSFUSION BLD/BLD COMPNT: CPT

## 2022-09-18 PROCEDURE — 36415 COLL VENOUS BLD VENIPUNCTURE: CPT

## 2022-09-18 PROCEDURE — 2060000000 HC ICU INTERMEDIATE R&B

## 2022-09-18 PROCEDURE — 83735 ASSAY OF MAGNESIUM: CPT

## 2022-09-18 PROCEDURE — P9016 RBC LEUKOCYTES REDUCED: HCPCS

## 2022-09-18 PROCEDURE — 80048 BASIC METABOLIC PNL TOTAL CA: CPT

## 2022-09-18 PROCEDURE — 86920 COMPATIBILITY TEST SPIN: CPT

## 2022-09-18 PROCEDURE — 0H98XZZ DRAINAGE OF BUTTOCK SKIN, EXTERNAL APPROACH: ICD-10-PCS | Performed by: EMERGENCY MEDICINE

## 2022-09-18 PROCEDURE — 87070 CULTURE OTHR SPECIMN AEROBIC: CPT

## 2022-09-18 PROCEDURE — 85045 AUTOMATED RETICULOCYTE COUNT: CPT

## 2022-09-18 PROCEDURE — 99285 EMERGENCY DEPT VISIT HI MDM: CPT

## 2022-09-18 PROCEDURE — 85025 COMPLETE CBC W/AUTO DIFF WBC: CPT

## 2022-09-18 PROCEDURE — 2500000003 HC RX 250 WO HCPCS: Performed by: EMERGENCY MEDICINE

## 2022-09-18 PROCEDURE — 86850 RBC ANTIBODY SCREEN: CPT

## 2022-09-18 PROCEDURE — 87186 SC STD MICRODIL/AGAR DIL: CPT

## 2022-09-18 PROCEDURE — 84100 ASSAY OF PHOSPHORUS: CPT

## 2022-09-18 PROCEDURE — 86901 BLOOD TYPING SEROLOGIC RH(D): CPT

## 2022-09-18 RX ORDER — INSULIN LISPRO 100 [IU]/ML
0-4 INJECTION, SOLUTION INTRAVENOUS; SUBCUTANEOUS NIGHTLY
Status: DISCONTINUED | OUTPATIENT
Start: 2022-09-18 | End: 2022-09-20 | Stop reason: SDUPTHER

## 2022-09-18 RX ORDER — HYDROCODONE BITARTRATE AND ACETAMINOPHEN 5; 325 MG/1; MG/1
1 TABLET ORAL EVERY 6 HOURS PRN
Status: DISCONTINUED | OUTPATIENT
Start: 2022-09-18 | End: 2022-09-20 | Stop reason: HOSPADM

## 2022-09-18 RX ORDER — SODIUM CHLORIDE 9 MG/ML
INJECTION, SOLUTION INTRAVENOUS PRN
Status: DISCONTINUED | OUTPATIENT
Start: 2022-09-18 | End: 2022-09-20 | Stop reason: HOSPADM

## 2022-09-18 RX ORDER — INSULIN LISPRO 100 [IU]/ML
0-8 INJECTION, SOLUTION INTRAVENOUS; SUBCUTANEOUS
Status: DISCONTINUED | OUTPATIENT
Start: 2022-09-19 | End: 2022-09-20 | Stop reason: SDUPTHER

## 2022-09-18 RX ORDER — CEPHALEXIN 500 MG/1
500 CAPSULE ORAL 4 TIMES DAILY
Qty: 28 CAPSULE | Refills: 0 | Status: ON HOLD | OUTPATIENT
Start: 2022-09-18 | End: 2022-09-20 | Stop reason: HOSPADM

## 2022-09-18 RX ORDER — SODIUM CHLORIDE 0.9 % (FLUSH) 0.9 %
5-40 SYRINGE (ML) INJECTION PRN
Status: DISCONTINUED | OUTPATIENT
Start: 2022-09-18 | End: 2022-09-20 | Stop reason: HOSPADM

## 2022-09-18 RX ORDER — SODIUM CHLORIDE 0.9 % (FLUSH) 0.9 %
5-40 SYRINGE (ML) INJECTION EVERY 12 HOURS SCHEDULED
Status: DISCONTINUED | OUTPATIENT
Start: 2022-09-18 | End: 2022-09-20 | Stop reason: HOSPADM

## 2022-09-18 RX ORDER — LIDOCAINE HYDROCHLORIDE AND EPINEPHRINE 10; 10 MG/ML; UG/ML
20 INJECTION, SOLUTION INFILTRATION; PERINEURAL ONCE
Status: COMPLETED | OUTPATIENT
Start: 2022-09-18 | End: 2022-09-18

## 2022-09-18 RX ADMIN — LIDOCAINE HYDROCHLORIDE,EPINEPHRINE BITARTRATE 20 ML: 10; .01 INJECTION, SOLUTION INFILTRATION; PERINEURAL at 06:43

## 2022-09-18 ASSESSMENT — ENCOUNTER SYMPTOMS
EYE REDNESS: 0
NAUSEA: 0
EYE REDNESS: 0
COLOR CHANGE: 0
COUGH: 0
EYE DISCHARGE: 0
COUGH: 0
DIARRHEA: 0
SHORTNESS OF BREATH: 0
CONSTIPATION: 0
VOMITING: 0
SHORTNESS OF BREATH: 0
ABDOMINAL PAIN: 0
SORE THROAT: 0
COLOR CHANGE: 0
FACIAL SWELLING: 0
VOMITING: 0
EYE DISCHARGE: 0
RHINORRHEA: 0
DIARRHEA: 0

## 2022-09-18 ASSESSMENT — PAIN - FUNCTIONAL ASSESSMENT: PAIN_FUNCTIONAL_ASSESSMENT: 0-10

## 2022-09-18 ASSESSMENT — PAIN SCALES - GENERAL: PAINLEVEL_OUTOF10: 10

## 2022-09-18 NOTE — ED NOTES
ED to inpatient nurses report     Chief Complaint   Patient presents with    Abscess     Bloody drainage to abscess drained this am to right buttock      Present to ED from home. Arrives per EMS for abscess that continues to bleed. Pt. Lives alone at home and son checks on him once a day. Pt. Reports that son sent him here for the bleeding. Son did not come with patient. Pt. Had right buttock abscess drained this am in ER. Bleeding scant amount upon arrival to ER. LOC: alert and orientated to name, place, date  Vital signs   Vitals:    09/18/22 1612 09/18/22 1640 09/18/22 1725   BP: (!) 76/42 (!) 81/44 (!) 132/43   Pulse: 95     Resp: 20     Temp: 98 °F (36.7 °C)     SpO2: 95%        Oxygen Baseline: RA    Current needs required:    LDAs:   Peripheral IV 09/18/22 Left Antecubital (Active)   Site Assessment Clean, dry & intact 09/18/22 1900     Mobility: Pt has Bilateral BKA, but can get around with a cane  Fall Risk:    Pending ED orders:   Present condition: stable  Code Status: full  Consults: IP CONSULT TO HOSPITALIST  IP CONSULT TO GENERAL SURGERY  IP CONSULT TO NEPHROLOGY  []  Hospitalist  Completed  [] yes [] no Who:   []  Medicine  Completed  [] yes [] No Who:   []  Cardiology  Completed  [] yes [] No Who:   []  GI   Completed  [] yes [] No Who:   []  Neurology  Completed  [] yes [] No Who:   []  Nephrology Completed  [] yes [] No Who:    []  Vascular  Completed  [] yes [] No Who:   []  Ortho  Completed  [] yes [] No Who:     []  Surgery  Completed  [] yes [] No Who:    []  Urology  Completed  [] yes [] No Who:    []  CT Surgery Completed  [] yes [] No Who:   []  Podiatry  Completed  [] yes [] No Who:    []  Other    Completed  [] yes [] No Who:  Interventions: type and screen,   Important Events: hemoglobin 4.6, pt's abscess has packing in it and no longer has drainage coming from wound. Pt has very poor eye sight.        Electronically signed by Ann-Marie Sullivan RN on 9/18/2022 at 7:49 PM Conrad Huber RN  09/18/22 Regulo Prince RN  09/18/22 2004

## 2022-09-18 NOTE — ED NOTES
ED to inpatient nurses report     Chief Complaint   Patient presents with    Abscess     Bloody drainage to abscess drained this am to right buttock      Present to ED from home. Arrives per EMS for abscess that continues to bleed. Pt. Lives alone at home and son checks on him once a day. Pt. Reports that son sent him here for the bleeding. Son did not come with patient. Pt. Had right buttock abscess drained this am in ER. Bleeding scant amount upon arrival to ER.    LOC: alert and orientated to name, place, date  Vital signs   Vitals:    09/18/22 1612 09/18/22 1640 09/18/22 1725   BP: (!) 76/42 (!) 81/44 (!) 132/43   Pulse: 95     Resp: 20     Temp: 98 °F (36.7 °C)     SpO2: 95%        Oxygen Baseline     Current needs required    LDAs:    Mobility: Fully dependent, bilateral BKA  Fall Risk:    Pending ED orders:   Present condition:   Code Status:   Consults: None  []  Hospitalist  Completed  [] yes [] no Who:   []  Medicine  Completed  [] yes [] No Who:   []  Cardiology  Completed  [] yes [] No Who:   []  GI   Completed  [] yes [] No Who:   []  Neurology  Completed  [] yes [] No Who:   []  Nephrology Completed  [] yes [] No Who:    []  Vascular  Completed  [] yes [] No Who:   []  Ortho  Completed  [] yes [] No Who:     []  Surgery  Completed  [] yes [] No Who:    []  Urology  Completed  [] yes [] No Who:    []  CT Surgery Completed  [] yes [] No Who:   []  Podiatry  Completed  [] yes [] No Who:    []  Other    Completed  [] yes [] No Who:  Interventions: type and cross  Important Events: Hgb 4.6       Electronically signed by Rossi Weaver RN on 9/18/2022 at 6:27 PM       Karla Mari RN  09/18/22 9574

## 2022-09-18 NOTE — ED PROVIDER NOTES
[address] [place] [address] ED  EMERGENCY DEPARTMENT ENCOUNTER      Pt Name: Talia Carrero  MRN: [id number]  [place] 1960  Date of evaluation: 9/18/2022  Provider: Mynor Dougherty MD    CHIEF COMPLAINT       Chief Complaint   Patient presents with    Abscess     Bloody drainage to abscess drained this am to right buttock         HISTORY OF PRESENT ILLNESS  (Location/Symptom, Timing/Onset, Context/Setting, Quality, Duration, Modifying Factors, Severity.)   Talia Carrero is a 60 y.o. male who presents to the emergency department to have his abscess checked. He had a right buttock abscess incised and drained this morning here. There was some bleeding at home and he was sent back to be checked. There was no dressing on the area upon arrival.  There is no active bleeding now. He does not feel dizzy or lightheaded. Tomorrow is his normal dialysis day. Nursing Notes were reviewed. ALLERGIES     Patient has no known allergies.     CURRENT MEDICATIONS       Previous Medications    ACETAMINOPHEN (TYLENOL) 500 MG TABLET    Take 2 tablets by mouth every 6 hours as needed for Pain    AMLODIPINE (NORVASC) 10 MG TABLET    Take 1 tablet by mouth daily    APIXABAN (ELIQUIS) 5 MG TABS TABLET    Take 1 tablet by mouth 2 times daily    ASPIRIN 81 MG CHEWABLE TABLET    Take 81 mg by mouth every morning    ATORVASTATIN (LIPITOR) 80 MG TABLET        CEPHALEXIN (KEFLEX) 500 MG CAPSULE    Take 1 capsule by mouth 4 times daily for 7 days    CLONIDINE (CATAPRES) 0.3 MG TABLET    Take 1 tablet by mouth 2 times daily Do not take if HR < 60    DOCUSATE SODIUM (COLACE) 100 MG CAPSULE    Take 1 capsule by mouth 3 times daily as needed for Constipation    GABAPENTIN (NEURONTIN) 300 MG CAPSULE    TAKE ONE CAPSULE BY MOUTH THREE TIMES A DAY    GLIMEPIRIDE (AMARYL) 2 MG TABLET    TAKE ONE TABLET BY MOUTH EVERY MORNING BEFORE BREAKFAST    HEPARIN SODIUM, PORCINE, (HEPARIN, PORCINE,) 1000 UNIT/ML INJECTION    Heparin Sodium (Porcine) 1,000 Units/mL Systemic    HYDRALAZINE (APRESOLINE) 25 MG TABLET    Take 25 mg by mouth 2 times daily     LABETALOL (NORMODYNE) 200 MG TABLET    Take 1 tablet by mouth 2 times daily    LINAGLIPTIN (TRADJENTA) 5 MG TABLET    TAKE ONE TABLET BY MOUTH DAILY    LISINOPRIL (PRINIVIL;ZESTRIL) 10 MG TABLET    TAKE 1 TABLET BY MOUTH  TWICE DAILY    LOPERAMIDE HCL PO    Take 2 mg by mouth    MINOXIDIL (LONITEN) 2.5 MG TABLET    10 mg  in the morning and 10 mg before bedtime. MULTIPLE VITAMINS-MINERALS (RENAPLEX-D PO)    Take 1 tablet by mouth daily     NONFORMULARY        ONDANSETRON (ZOFRAN ODT) 4 MG DISINTEGRATING TABLET    Take 1 tablet by mouth every 8 hours as needed for Nausea or Vomiting    OXYCODONE-ACETAMINOPHEN (PERCOCET) 7.5-325 MG PER TABLET    Take 1 tablet by mouth daily as needed for Pain for up to 30 days. On HD days    PREGABALIN (LYRICA) 75 MG CAPSULE    Take 1 capsule by mouth daily for 30 days. SUCROFERRIC OXYHYDROXIDE (VELPHORO) 500 MG CHEW    Take 2 tablets by mouth 3 times daily (with meals) Crush or chew and shallow 2 tablets three times a day with meals    TENS UNIT MISC    by Does not apply route    TRAMADOL (ULTRAM) 50 MG TABLET    Take 1 tablet by mouth daily as needed for Pain for up to 30 days.        PAST MEDICAL HISTORY         Diagnosis Date    Acute congestive heart failure (HCC)     Chronic bilateral low back pain with bilateral sciatica     Coronary artery disease involving native coronary artery 9/6/2022    CRF (chronic renal failure)     [facility] MWF    DDD (degenerative disc disease), lumbar 12/15/2020    Diabetes mellitus ([facility] [address]     Dialysis patient [facility])     ED (erectile dysfunction)     History of echocardiogram 2014    [facility]    HTN (hypertension)     Hyperlipidemia     LVH (left ventricular hypertrophy)     PVD (peripheral vascular disease) ([facility])     S/P bilateral BKA (below knee amputation) ([facility] [address]     Wears glasses        SURGICAL HISTORY           Procedure Laterality Date    ARM SURGERY      CATARACT REMOVAL WITH IMPLANT      DIALYSIS FISTULA CREATION Bilateral     As of 2019, RUE AVF functioning, LUE AVF nonfunctioning. FINGER AMPUTATION      right pointer finger    FINGER SURGERY Left     I & D    GLAUCOMA SURGERY      LEG AMPUTATION BELOW KNEE Bilateral     TUNNELED VENOUS CATHETER PLACEMENT      PC placed and removed         FAMILY HISTORY           Problem Relation Age of Onset    Diabetes Mother     Coronary Art Dis Mother     Hypertension Mother     Diabetes Father     Hypertension Father     Stroke Father     Cancer Sister     Hypertension Brother      Family Status   Relation Name Status    Mother  (Not Specified)    Father  (Not Specified)    Sister  (Not Specified)    Brother  (Not Specified)        SOCIAL HISTORY      reports that he has never smoked. He has never used smokeless tobacco. He reports current alcohol use. He reports that he does not use drugs. REVIEW OF SYSTEMS    (2-9 systems for level 4, 10 or more for level 5)     Review of Systems   Constitutional:  Negative for chills, fatigue and fever. HENT:  Negative for congestion, ear discharge and facial swelling. Eyes:  Negative for discharge and redness. Respiratory:  Negative for cough and shortness of breath. Cardiovascular:  Negative for chest pain. Gastrointestinal:  Negative for abdominal pain, constipation, diarrhea and vomiting. Genitourinary:  Negative for dysuria and hematuria. Musculoskeletal:  Negative for arthralgias. Skin:  Negative for color change and rash. Neurological:  Negative for syncope, numbness and headaches. Hematological:  Negative for adenopathy. Psychiatric/Behavioral:  Negative for confusion. The patient is not nervous/anxious. Except as noted above the remainder of the review of systems was reviewed and negative.      PHYSICAL EXAM    (up to 7 for level 4, 8 or more for level 5)     Vitals:    09/18/22 1612 09/18/22 1640 09/18/22 1725   BP: (!) 76/42 (!) 81/44 (!) 132/43   Pulse: 95     Resp: 20     Temp: 98 °F (36.7 °C)     SpO2: 95%         Physical Exam  Constitutional:       General: He is not in acute distress. Appearance: He is well-developed. He is not diaphoretic. HENT:      Head: Normocephalic and atraumatic. Eyes:      General: No scleral icterus. Right eye: No discharge. Left eye: No discharge. Cardiovascular:      Rate and Rhythm: Normal rate and regular rhythm. Pulmonary:      Effort: Pulmonary effort is normal. No respiratory distress. Breath sounds: Normal breath sounds. No stridor. No wheezing or rales. Abdominal:      General: There is no distension. Palpations: Abdomen is soft. Tenderness: no abdominal tenderness   Musculoskeletal:         General: Normal range of motion. Cervical back: Neck supple. Lymphadenopathy:      Cervical: No cervical adenopathy. Skin:     General: Skin is warm and dry. Findings: No erythema or rash. Comments: Right buttock is examined. There is packing coming out of an opening consistent with an abscess. There is no active bleeding. Neurological:      Mental Status: He is alert and oriented to person, place, and time.    Psychiatric:         Behavior: Behavior normal.           DIAGNOSTIC RESULTS     EKG: All EKG's are interpreted by the Emergency Department Physician who either signs or Co-signs this chart in the absence of a cardiologist.    RADIOLOGY:   Non-plain film images such as CT, Ultrasound and MRI are read by the radiologist. Plain radiographic images are visualized and preliminarily interpreted by the emergency physician with the below findings:    Interpretation per the Radiologist below, if available at the time of this note:        LABS:  Labs Reviewed   CBC WITH AUTO DIFFERENTIAL - Abnormal; Notable for the following components:       Result Value    RBC 1.79 (*)     Hemoglobin 4.6 (*)     Hematocrit 15.0 (*)     RDW 20.2 (*)     Lymphocytes 20 (*)     Monocytes 15 (*)     Eosinophils % 0 (*)     All other components within normal limits   BASIC METABOLIC PANEL - Abnormal; Notable for the following components:    Glucose 135 (*)     BUN 34 (*)     Creatinine 6.55 (*)     Bun/Cre Ratio 5 (*)     Sodium 134 (*)     Chloride 97 (*)     GFR Non- 9 (*)     GFR  10 (*)     All other components within normal limits   RETICULOCYTES - Abnormal; Notable for the following components:    Retic % 3.1 (*)     Immature Retic Fract 44.600 (*)     Retic Hemoglobin 25.0 (*)     All other components within normal limits   PERIPHERAL BLOOD SMEAR, PATH REVIEW   TYPE AND CROSSMATCH       All other labs were within normal range or not returned as of this dictation. EMERGENCY DEPARTMENT COURSE and DIFFERENTIAL DIAGNOSIS/MDM:   Vitals:    Vitals:    09/18/22 1612 09/18/22 1640 09/18/22 1725   BP: (!) 76/42 (!) 81/44 (!) 132/43   Pulse: 95     Resp: 20     Temp: 98 °F (36.7 °C)     SpO2: 95%         No orders of the defined types were placed in this encounter. Medical Decision Making: His hemoglobin is found to be 4.2, down from 7.1 this morning. Type and cross is ordered and he will need to be admitted for transfusion. Tomorrow is his typical dialysis day. Findings are discussed with the patient. CONSULTS:  IP CONSULT TO HOSPITALIST  IP CONSULT TO GENERAL SURGERY    PROCEDURES:  None    FINAL IMPRESSION      1. Anemia, unspecified type          DISPOSITION/PLAN   DISPOSITION Decision To Admit 09/18/2022 07:10:07 PM      PATIENT REFERRED TO:   No follow-up provider specified. DISCHARGE MEDICATIONS:     New Prescriptions    No medications on file       The care is provided during an unprecedented national emergency due to the novel coronavirus, COVID-19.     (Please note that portions of this note were completed with a voice recognition program.  Efforts were made to edit the dictations but occasionally words are mis-transcribed.)    Zaire Mancia MD  Attending Emergency Physician           Zaire Mancia MD  09/18/22 1911       Zaire Mancia MD  09/18/22 1945

## 2022-09-18 NOTE — ED PROVIDER NOTES
EMERGENCY DEPARTMENT ENCOUNTER    Pt Name: Laura Holm  MRN: [id number]  [place] 1960  Date of evaluation: 9/18/22  CHIEF COMPLAINT       Chief Complaint   Patient presents with    Abscess     R buttock     HISTORY OF PRESENT ILLNESS   This is a 76-year-old male that presents with complaints of pain and swelling to his right buttocks. Patient states that over the past 24 hours he noticed a large swollen area concerning for an abscess. It sounds as if this is been ongoing for some time and it may be a cyst.  Patient denies any fevers or chills. REVIEW OF SYSTEMS     Review of Systems   Constitutional:  Negative for chills and fever. HENT:  Negative for rhinorrhea and sore throat. Eyes:  Negative for discharge, redness and visual disturbance. Respiratory:  Negative for cough and shortness of breath. Cardiovascular:  Negative for chest pain, palpitations and leg swelling. Gastrointestinal:  Negative for diarrhea, nausea and vomiting. Musculoskeletal:  Negative for arthralgias, myalgias and neck pain. Skin:  Positive for wound. Negative for color change and rash. Neurological:  Negative for seizures, weakness and headaches. Psychiatric/Behavioral:  Negative for hallucinations, self-injury and suicidal ideas.     PASTMEDICAL HISTORY     Past Medical History:   Diagnosis Date    Acute congestive heart failure (HCC)     Chronic bilateral low back pain with bilateral sciatica     Coronary artery disease involving native coronary artery 9/6/2022    CRF (chronic renal failure)     [facility] MWF    DDD (degenerative disc disease), lumbar 12/15/2020    [facility]     Dialysis patient [facility])     ED (erectile dysfunction)     History of echocardiogram 2014    [facility]    HTN (hypertension)     Hyperlipidemia     LVH (left ventricular hypertrophy)     PVD (peripheral vascular disease) ([facility])     S/P bilateral BKA (below knee amputation) ([facility]     Wears glasses      Past Problem List  Patient Active Problem List   Diagnosis Code    Hyperlipidemia E78.5    Essential hypertension I10    ESRD on hemodialysis ([facility] MWF N18.6, Z99.2    Insomnia G47.00    ED (erectile dysfunction) N52.9    Chronic bilateral low back pain with bilateral sciatica M54.42, M54.41, G89.29    Controlled diabetes mellitus type 2 with complications (HCC) T05.4    S/P bilateral below knee amputation (HCC) Z89.512, Z89.511    Long term (current) use of antithrombotics/antiplatelets M21.32    Chronic use of opiate drugs therapeutic purposes Z79.891    Spinal stenosis of lumbar region with neurogenic claudication M48.062    Coronary artery disease involving native coronary artery I25.10     SURGICAL HISTORY       Past Surgical History:   Procedure Laterality Date    ARM SURGERY      CATARACT REMOVAL WITH IMPLANT      DIALYSIS FISTULA CREATION Bilateral     As of 2019, RUE AVF functioning, LUE AVF nonfunctioning.     FINGER AMPUTATION      right pointer finger    FINGER SURGERY Left     I & D    GLAUCOMA SURGERY      LEG AMPUTATION BELOW KNEE Bilateral     TUNNELED VENOUS CATHETER PLACEMENT      PC placed and removed     CURRENT MEDICATIONS       Previous Medications    ACETAMINOPHEN (TYLENOL) 500 MG TABLET    Take 2 tablets by mouth every 6 hours as needed for Pain    AMLODIPINE (NORVASC) 10 MG TABLET    Take 1 tablet by mouth daily    APIXABAN (ELIQUIS) 5 MG TABS TABLET    Take 1 tablet by mouth 2 times daily    ASPIRIN 81 MG CHEWABLE TABLET    Take 81 mg by mouth every morning    ATORVASTATIN (LIPITOR) 80 MG TABLET        CLONIDINE (CATAPRES) 0.3 MG TABLET    Take 1 tablet by mouth 2 times daily Do not take if HR < 60    DOCUSATE SODIUM (COLACE) 100 MG CAPSULE    Take 1 capsule by mouth 3 times daily as needed for Constipation    GABAPENTIN (NEURONTIN) 300 MG CAPSULE    TAKE ONE CAPSULE BY MOUTH THREE TIMES A DAY    GLIMEPIRIDE (AMARYL) 2 MG TABLET    TAKE ONE TABLET BY MOUTH EVERY MORNING BEFORE BREAKFAST    HEPARIN SODIUM, PORCINE, (HEPARIN, PORCINE,) 1000 UNIT/ML INJECTION    Heparin Sodium (Porcine) 1,000 Units/mL Systemic    HYDRALAZINE (APRESOLINE) 25 MG TABLET    Take 25 mg by mouth 2 times daily     LABETALOL (NORMODYNE) 200 MG TABLET    Take 1 tablet by mouth 2 times daily    LINAGLIPTIN (TRADJENTA) 5 MG TABLET    TAKE ONE TABLET BY MOUTH DAILY    LISINOPRIL (PRINIVIL;ZESTRIL) 10 MG TABLET    TAKE 1 TABLET BY MOUTH  TWICE DAILY    LOPERAMIDE HCL PO    Take 2 mg by mouth    MINOXIDIL (LONITEN) 2.5 MG TABLET    10 mg  in the morning and 10 mg before bedtime. MULTIPLE VITAMINS-MINERALS (RENAPLEX-D PO)    Take 1 tablet by mouth daily     NONFORMULARY        ONDANSETRON (ZOFRAN ODT) 4 MG DISINTEGRATING TABLET    Take 1 tablet by mouth every 8 hours as needed for Nausea or Vomiting    OXYCODONE-ACETAMINOPHEN (PERCOCET) 7.5-325 MG PER TABLET    Take 1 tablet by mouth daily as needed for Pain for up to 30 days. On HD days    PREGABALIN (LYRICA) 75 MG CAPSULE    Take 1 capsule by mouth daily for 30 days. SUCROFERRIC OXYHYDROXIDE (VELPHORO) 500 MG CHEW    Take 2 tablets by mouth 3 times daily (with meals) Crush or chew and shallow 2 tablets three times a day with meals    TENS UNIT MISC    by Does not apply route    TRAMADOL (ULTRAM) 50 MG TABLET    Take 1 tablet by mouth daily as needed for Pain for up to 30 days. ALLERGIES     has No Known Allergies. FAMILY HISTORY     He indicated that the status of his mother is unknown. He indicated that the status of his father is unknown. He indicated that the status of his sister is unknown. He indicated that the status of his brother is unknown.      SOCIAL HISTORY       Social History     Tobacco Use    Smoking status: Never    Smokeless tobacco: Never   Vaping Use    Vaping Use: Never used   Substance Use Topics    Alcohol use: Yes     Comment: rare    Drug use: No     PHYSICAL EXAM     INITIAL VITALS: BP (!) 95/54   Pulse 71   Temp 99.1 °F (37.3 °C) (Oral)   Resp 18   Ht materials:  1/2 in gauze  Post-procedure details:     Procedure completion:  Tolerated well, no immediate complications    DIAGNOSTIC RESULTS   EKG:All EKG's are interpreted by the Emergency Department Physician who either signs or Co-signs this chart in the absence of a cardiologist.        RADIOLOGY:All plain film, CT, MRI, and formal ultrasound images (except ED bedside ultrasound) are read by the radiologist, see reports below, unless otherwisenoted in MDM or here. CT PELVIS WO CONTRAST Additional Contrast? None   Final Result   1. Within the subcutaneous soft tissues of the buttock, axial image 66,   series 2, there is a fluid collection measuring 6.0 x 3.4 cm in transverse   and AP dimensions respectively, though no internal gas. This could represent   a liquefied soft tissue hematoma, abscess or seroma. Given history above   reports both a left and right buttock lesion, though the images are labeled   as a right-sided abnormality. 2. Diffuse anasarca. 3. Severe atherosclerosis. LABS: All lab results were reviewed by myself, and all abnormals are listed below.   Labs Reviewed   CBC WITH AUTO DIFFERENTIAL - Abnormal; Notable for the following components:       Result Value    RBC 2.80 (*)     Hemoglobin 7.1 (*)     Hematocrit 23.0 (*)     MCV 82.1 (*)     RDW 20.0 (*)     Seg Neutrophils 70 (*)     Lymphocytes 18 (*)     Immature Granulocytes 1 (*)     All other components within normal limits   BASIC METABOLIC PANEL - Abnormal; Notable for the following components:    Glucose 178 (*)     BUN 29 (*)     Creatinine 5.68 (*)     Bun/Cre Ratio 5 (*)     Sodium 134 (*)     Potassium 3.6 (*)     Chloride 97 (*)     GFR Non- 10 (*)     GFR  12 (*)     All other components within normal limits   CULTURE, WOUND   MAGNESIUM   PHOSPHORUS       EMERGENCY DEPARTMENTCOURSE:         Vitals:    Vitals:    09/18/22 0419   BP: (!) 95/54   Pulse: 71   Resp: 18   Temp: 99.1 °F (37.3 °C)   TempSrc: Oral   SpO2: 95%   Height: 5' 11\" (1.803 m)       The patient was given the following medications while in the emergency department:  Orders Placed This Encounter   Medications    lidocaine-EPINEPHrine 1 %-1:[id number] injection 20 mL    cephALEXin (KEFLEX) 500 MG capsule     Sig: Take 1 capsule by mouth 4 times daily for 7 days     Dispense:  28 capsule     Refill:  0     CONSULTS:  None    FINAL IMPRESSION      1. Abscess          DISPOSITION/PLAN   DISPOSITION Decision To Discharge 09/18/2022 06:46:45 AM      PATIENT REFERRED TO:  Gretchen Baumgarten, MD  [address] [address] [place] [phone]    Schedule an appointment as soon as possible for a visit in 2 days    DISCHARGE MEDICATIONS:  New Prescriptions    CEPHALEXIN (KEFLEX) 500 MG CAPSULE    Take 1 capsule by mouth 4 times daily for 7 days     The care is provided during an unprecedented national emergency due to the novel coronavirus, COVID 19.   MD Eriberto Abdul MD  09/18/22 7014

## 2022-09-18 NOTE — ED NOTES
Complete linen change. I&D site (right buttock) not bleeding at this time, packing still in place.       Wesley Vickers RN  09/18/22 1927

## 2022-09-19 LAB
ABSOLUTE EOS #: 0.05 K/UL (ref 0–0.44)
ABSOLUTE IMMATURE GRANULOCYTE: 0.04 K/UL (ref 0–0.3)
ABSOLUTE LYMPH #: 2.13 K/UL (ref 1.1–3.7)
ABSOLUTE MONO #: 1.46 K/UL (ref 0.1–1.2)
ANION GAP SERPL CALCULATED.3IONS-SCNC: 12 MMOL/L (ref 9–17)
BASOPHILS # BLD: 0 % (ref 0–2)
BASOPHILS ABSOLUTE: 0.03 K/UL (ref 0–0.2)
BUN BLDV-MCNC: 38 MG/DL (ref 8–23)
BUN/CREAT BLD: 5 (ref 9–20)
CALCIUM SERPL-MCNC: 8.5 MG/DL (ref 8.6–10.4)
CHLORIDE BLD-SCNC: 98 MMOL/L (ref 98–107)
CO2: 24 MMOL/L (ref 20–31)
CREAT SERPL-MCNC: 7.17 MG/DL (ref 0.7–1.2)
EOSINOPHILS RELATIVE PERCENT: 1 % (ref 1–4)
GFR AFRICAN AMERICAN: 9 ML/MIN
GFR NON-AFRICAN AMERICAN: 8 ML/MIN
GFR SERPL CREATININE-BSD FRML MDRD: ABNORMAL ML/MIN/{1.73_M2}
GLUCOSE BLD-MCNC: 161 MG/DL (ref 75–110)
GLUCOSE BLD-MCNC: 162 MG/DL (ref 75–110)
GLUCOSE BLD-MCNC: 163 MG/DL (ref 75–110)
GLUCOSE BLD-MCNC: 183 MG/DL (ref 70–99)
GLUCOSE BLD-MCNC: 193 MG/DL (ref 75–110)
GLUCOSE BLD-MCNC: 253 MG/DL (ref 75–110)
HCT VFR BLD CALC: 18.4 % (ref 40.7–50.3)
HCT VFR BLD CALC: 19.5 % (ref 40.7–50.3)
HEMOGLOBIN: 5.8 G/DL (ref 13–17)
HEMOGLOBIN: 6.3 G/DL (ref 13–17)
IMMATURE GRANULOCYTES: 1 %
LYMPHOCYTES # BLD: 25 % (ref 24–43)
MCH RBC QN AUTO: 26.7 PG (ref 25.2–33.5)
MCHC RBC AUTO-ENTMCNC: 32.3 G/DL (ref 28.4–34.8)
MCV RBC AUTO: 82.6 FL (ref 82.6–102.9)
MONOCYTES # BLD: 17 % (ref 3–12)
NRBC AUTOMATED: 0.2 PER 100 WBC
PDW BLD-RTO: 18 % (ref 11.8–14.4)
PLATELET # BLD: 185 K/UL (ref 138–453)
PMV BLD AUTO: 11.3 FL (ref 8.1–13.5)
POTASSIUM SERPL-SCNC: 4.4 MMOL/L (ref 3.7–5.3)
RBC # BLD: 2.36 M/UL (ref 4.21–5.77)
RBC # BLD: ABNORMAL 10*6/UL
SEG NEUTROPHILS: 56 % (ref 36–65)
SEGMENTED NEUTROPHILS ABSOLUTE COUNT: 4.73 K/UL (ref 1.5–8.1)
SODIUM BLD-SCNC: 134 MMOL/L (ref 135–144)
SURGICAL PATHOLOGY REPORT: NORMAL
WBC # BLD: 8.4 K/UL (ref 3.5–11.3)

## 2022-09-19 PROCEDURE — 86900 BLOOD TYPING SEROLOGIC ABO: CPT

## 2022-09-19 PROCEDURE — 6370000000 HC RX 637 (ALT 250 FOR IP): Performed by: FAMILY MEDICINE

## 2022-09-19 PROCEDURE — 6370000000 HC RX 637 (ALT 250 FOR IP): Performed by: INTERNAL MEDICINE

## 2022-09-19 PROCEDURE — 36430 TRANSFUSION BLD/BLD COMPNT: CPT

## 2022-09-19 PROCEDURE — 5A1D70Z PERFORMANCE OF URINARY FILTRATION, INTERMITTENT, LESS THAN 6 HOURS PER DAY: ICD-10-PCS | Performed by: INTERNAL MEDICINE

## 2022-09-19 PROCEDURE — 82947 ASSAY GLUCOSE BLOOD QUANT: CPT

## 2022-09-19 PROCEDURE — 80048 BASIC METABOLIC PNL TOTAL CA: CPT

## 2022-09-19 PROCEDURE — 99223 1ST HOSP IP/OBS HIGH 75: CPT | Performed by: FAMILY MEDICINE

## 2022-09-19 PROCEDURE — 85025 COMPLETE CBC W/AUTO DIFF WBC: CPT

## 2022-09-19 PROCEDURE — 85018 HEMOGLOBIN: CPT

## 2022-09-19 PROCEDURE — P9016 RBC LEUKOCYTES REDUCED: HCPCS

## 2022-09-19 PROCEDURE — 6360000002 HC RX W HCPCS: Performed by: FAMILY MEDICINE

## 2022-09-19 PROCEDURE — 85014 HEMATOCRIT: CPT

## 2022-09-19 PROCEDURE — 36415 COLL VENOUS BLD VENIPUNCTURE: CPT

## 2022-09-19 PROCEDURE — 2580000003 HC RX 258: Performed by: FAMILY MEDICINE

## 2022-09-19 PROCEDURE — 2060000000 HC ICU INTERMEDIATE R&B

## 2022-09-19 PROCEDURE — 2500000003 HC RX 250 WO HCPCS: Performed by: INTERNAL MEDICINE

## 2022-09-19 RX ORDER — MIDODRINE HYDROCHLORIDE 5 MG/1
5 TABLET ORAL
Status: COMPLETED | OUTPATIENT
Start: 2022-09-19 | End: 2022-09-19

## 2022-09-19 RX ORDER — SENNOSIDES 8.6 MG
650 CAPSULE ORAL EVERY 8 HOURS PRN
COMMUNITY

## 2022-09-19 RX ORDER — CLOPIDOGREL BISULFATE 75 MG/1
75 TABLET ORAL DAILY
Status: ON HOLD | COMMUNITY
End: 2022-09-20 | Stop reason: HOSPADM

## 2022-09-19 RX ORDER — SODIUM CITRATE 4 % (5 ML)
1.8 SYRINGE (ML) MISCELLANEOUS PRN
Status: DISCONTINUED | OUTPATIENT
Start: 2022-09-19 | End: 2022-09-20 | Stop reason: HOSPADM

## 2022-09-19 RX ORDER — CLONIDINE HYDROCHLORIDE 0.3 MG/1
0.3 TABLET ORAL 3 TIMES DAILY
Status: ON HOLD | COMMUNITY
End: 2022-09-20 | Stop reason: HOSPADM

## 2022-09-19 RX ORDER — DIVALPROEX SODIUM 250 MG/1
250 TABLET, DELAYED RELEASE ORAL 2 TIMES DAILY
Status: ON HOLD | COMMUNITY
End: 2022-09-19

## 2022-09-19 RX ORDER — SODIUM CITRATE 4 % (5 ML)
1.7 SYRINGE (ML) MISCELLANEOUS PRN
Status: DISCONTINUED | OUTPATIENT
Start: 2022-09-19 | End: 2022-09-20 | Stop reason: HOSPADM

## 2022-09-19 RX ORDER — MIDODRINE HYDROCHLORIDE 5 MG/1
5 TABLET ORAL 2 TIMES DAILY
Status: DISCONTINUED | OUTPATIENT
Start: 2022-09-19 | End: 2022-09-20 | Stop reason: HOSPADM

## 2022-09-19 RX ORDER — SUCROFERRIC OXYHYDROXIDE 500 MG/1
1000 TABLET, CHEWABLE ORAL 3 TIMES DAILY
COMMUNITY

## 2022-09-19 RX ORDER — DEXTROSE MONOHYDRATE 100 MG/ML
INJECTION, SOLUTION INTRAVENOUS CONTINUOUS PRN
Status: DISCONTINUED | OUTPATIENT
Start: 2022-09-19 | End: 2022-09-20 | Stop reason: SDUPTHER

## 2022-09-19 RX ORDER — LISINOPRIL 20 MG/1
20 TABLET ORAL 2 TIMES DAILY
Status: ON HOLD | COMMUNITY
End: 2022-09-20 | Stop reason: HOSPADM

## 2022-09-19 RX ORDER — AMLODIPINE BESYLATE AND ATORVASTATIN CALCIUM 10; 80 MG/1; MG/1
1 TABLET, FILM COATED ORAL DAILY
COMMUNITY
End: 2022-10-07 | Stop reason: SDUPTHER

## 2022-09-19 RX ADMIN — VANCOMYCIN HYDROCHLORIDE 1000 MG: 1 INJECTION, POWDER, LYOPHILIZED, FOR SOLUTION INTRAVENOUS at 20:38

## 2022-09-19 RX ADMIN — HYDROCODONE BITARTRATE AND ACETAMINOPHEN 1 TABLET: 5; 325 TABLET ORAL at 18:37

## 2022-09-19 RX ADMIN — VANCOMYCIN HYDROCHLORIDE 2000 MG: 1 INJECTION, POWDER, LYOPHILIZED, FOR SOLUTION INTRAVENOUS at 01:06

## 2022-09-19 RX ADMIN — Medication 1.7 ML: at 15:36

## 2022-09-19 RX ADMIN — SODIUM CHLORIDE, PRESERVATIVE FREE 10 ML: 5 INJECTION INTRAVENOUS at 11:03

## 2022-09-19 RX ADMIN — Medication 1.8 ML: at 15:37

## 2022-09-19 RX ADMIN — CEFTRIAXONE SODIUM 1000 MG: 1 INJECTION, POWDER, FOR SOLUTION INTRAMUSCULAR; INTRAVENOUS at 00:10

## 2022-09-19 RX ADMIN — MIDODRINE HYDROCHLORIDE 5 MG: 5 TABLET ORAL at 12:20

## 2022-09-19 RX ADMIN — CEFTRIAXONE SODIUM 1000 MG: 1 INJECTION, POWDER, FOR SOLUTION INTRAMUSCULAR; INTRAVENOUS at 23:34

## 2022-09-19 RX ADMIN — SODIUM CHLORIDE, PRESERVATIVE FREE 10 ML: 5 INJECTION INTRAVENOUS at 20:38

## 2022-09-19 ASSESSMENT — PAIN DESCRIPTION - LOCATION: LOCATION: GENERALIZED

## 2022-09-19 NOTE — PROGRESS NOTES
Writer remained with patient during first 15 minutes of blood transfusion, no adverse reaction noted. Will continue to monitor patient.

## 2022-09-19 NOTE — CONSULTS
Renal Consult Note    Patient :  Colon Factor; 60 y.o. MRN# [id number]  Location:  [facility]/6006-  Attending:  Lazaro Gray MD  Admit Date:  9/18/2022   Hospital Day: 1    Reason for Consult:     Asked by Dr Lazaro Gray MD to see for EVA/Elevated Creatinine. History Obtained From:     Patient, electronic medical record, patient's nurse. HD Access:     previous [facility]    HD Unit:     [facility]    Nephrologist:     Dr. Les Curiel. History of Present Illness:     Breanna Murphy; 60 y.o. male with past medical history as mentioned below presented to the hospital with the chief complaint of bleeding from gluteal abscess from I&D site. Patient had I&D of gluteal abscess done in ED and presented subsequently due to positive bleeding from incision site. His hemoglobin arrival was 4.7 did receive blood transfusion. General surgery was consulted and wound care was done and patient did not seem to have any active bleeding as per the notes. His most recent hemoglobin from today was 6.3. BMP results reviewed. He normally gets dialysis as per [facility] schedule. Nephrology consulted due to ESRD on HD. Past History/Allergies? Social History:     Past Medical History:   Diagnosis Date    Acute congestive heart failure (HCC)     Chronic bilateral low back pain with bilateral sciatica     Coronary artery disease involving native coronary artery 9/6/2022    CRF (chronic renal failure)     [facility] MWF    DDD (degenerative disc disease), lumbar 12/15/2020    [facility]     [facility] patient [facility])     ED (erectile dysfunction)     History of echocardiogram 2014    [facility]    HTN (hypertension)     Hyperlipidemia     LVH (left ventricular hypertrophy)     PVD (peripheral vascular disease) ([facility]     S/P bilateral BKA (below knee amputation) ([facility]     Wears glasses        Past Surgical History:   Procedure Laterality Date    ARM SURGERY      CATARACT REMOVAL WITH IMPLANT      DIALYSIS FISTULA CREATION Bilateral As of 2019, RUE AVF functioning, LUE AVF nonfunctioning. FINGER AMPUTATION      right pointer finger    FINGER SURGERY Left     I & D    GLAUCOMA SURGERY      LEG AMPUTATION BELOW KNEE Bilateral     TUNNELED VENOUS CATHETER PLACEMENT      PC placed and removed       No Known Allergies    Social History     Socioeconomic History    Marital status:      Spouse name: Not on file    Number of children: Not on file    Years of education: Not on file    Highest education level: Not on file   Occupational History    Not on file   Tobacco Use    Smoking status: Never    Smokeless tobacco: Never   Vaping Use    Vaping Use: Never used   Substance and Sexual Activity    Alcohol use: Yes     Comment: rare    Drug use: No    Sexual activity: Not on file   Other Topics Concern    Not on file   Social History Narrative    Not on file     Social Determinants of Health     Financial Resource Strain: Low Risk     Difficulty of Paying Living Expenses: Not hard at all   Food Insecurity: No Food Insecurity    Worried About Running Out of Food in the Last Year: Never true    Ran Out of Food in the Last Year: Never true   Transportation Needs: No Transportation Needs    Lack of Transportation (Medical): No    Lack of Transportation (Non-Medical): No   Physical Activity: Inactive    Days of Exercise per Week: 0 days    Minutes of Exercise per Session: 0 min   Stress: No Stress Concern Present    Feeling of Stress : Not at all   Social Connections: Moderately Integrated    Frequency of Communication with Friends and Family: More than three times a week    Frequency of Social Gatherings with Friends and Family: More than three times a week    Attends Christianity Services: More than 4 times per year    Active Member of [employer] or Organizations: Yes    Attends Club or Organization Meetings: Never    Marital Status:    Intimate Partner Violence: Not At Risk    Fear of Current or Ex-Partner: No    Emotionally Abused:  No Physically Abused: No    Sexually Abused: No   Housing Stability: Unknown    Unable to Pay for Housing in the Last Year: No    Number of Places Lived in the Last Year: Not on file    Unstable Housing in the Last Year: No       Family History:        Family History   Problem Relation Age of Onset    Diabetes Mother     Coronary Art Dis Mother     Hypertension Mother     Diabetes Father     Hypertension Father     Stroke Father     Cancer Sister     Hypertension Brother        Outpatient Medications:     Medications Prior to Admission: cephALEXin (KEFLEX) 500 MG capsule, Take 1 capsule by mouth 4 times daily for 7 days  traMADol (ULTRAM) 50 MG tablet, Take 1 tablet by mouth daily as needed for Pain for up to 30 days. oxyCODONE-acetaminophen (PERCOCET) 7.5-325 MG per tablet, Take 1 tablet by mouth daily as needed for Pain for up to 30 days. On HD days  pregabalin (LYRICA) 75 MG capsule, Take 1 capsule by mouth daily for 30 days. linagliptin (TRADJENTA) 5 MG tablet, TAKE ONE TABLET BY MOUTH DAILY  ondansetron (ZOFRAN ODT) 4 MG disintegrating tablet, Take 1 tablet by mouth every 8 hours as needed for Nausea or Vomiting  apixaban (ELIQUIS) 5 MG TABS tablet, Take 1 tablet by mouth 2 times daily  amLODIPine (NORVASC) 10 MG tablet, Take 1 tablet by mouth daily  labetalol (NORMODYNE) 200 MG tablet, Take 1 tablet by mouth 2 times daily  atorvastatin (LIPITOR) 80 MG tablet,   LOPERAMIDE HCL PO, Take 2 mg by mouth  glimepiride (AMARYL) 2 MG tablet, TAKE ONE TABLET BY MOUTH EVERY MORNING BEFORE BREAKFAST  minoxidil (LONITEN) 2.5 MG tablet, 10 mg  in the morning and 10 mg before bedtime.   acetaminophen (TYLENOL) 500 MG tablet, Take 2 tablets by mouth every 6 hours as needed for Pain  docusate sodium (COLACE) 100 MG capsule, Take 1 capsule by mouth 3 times daily as needed for Constipation  Heparin Sodium, Porcine, (HEPARIN, PORCINE,) 1000 UNIT/ML injection, Heparin Sodium (Porcine) 1,000 Units/mL Systemic  Multiple Vitamins-Minerals (RENAPLEX-D PO), Take 1 tablet by mouth daily   hydrALAZINE (APRESOLINE) 25 MG tablet, Take 25 mg by mouth 2 times daily   cloNIDine (CATAPRES) 0.3 MG tablet, Take 1 tablet by mouth 2 times daily Do not take if HR < 60 (Patient taking differently: Take 0.3 mg by mouth 3 times daily Do not take if HR < 60)  aspirin 81 MG chewable tablet, Take 81 mg by mouth every morning  Sucroferric Oxyhydroxide (VELPHORO) 500 MG CHEW, Take 2 tablets by mouth 3 times daily (with meals) Crush or chew and shallow 2 tablets three times a day with meals  Tens Unit MISC, by Does not apply route  lisinopril (PRINIVIL;ZESTRIL) 10 MG tablet, TAKE 1 TABLET BY MOUTH  TWICE DAILY (Patient taking differently: Take 10 mg by mouth in the morning and at bedtime)  NONFORMULARY,   gabapentin (NEURONTIN) 300 MG capsule, TAKE ONE CAPSULE BY MOUTH THREE TIMES A DAY    Current Medications:     Scheduled Meds:    vancomycin  1,000 mg IntraVENous Once    midodrine  5 mg Oral Once in dialysis    sodium chloride flush  5-40 mL IntraVENous 2 times per day    insulin lispro  0-8 Units SubCUTAneous TID WC    insulin lispro  0-4 Units SubCUTAneous Nightly    cefTRIAXone (ROCEPHIN) IV  1,000 mg IntraVENous Q24H    vancomycin (VANCOCIN) intermittent dosing (placeholder)   Other RX Placeholder     Continuous Infusions:    dextrose      sodium chloride      sodium chloride      sodium chloride       PRN Meds:  glucose, dextrose bolus **OR** dextrose bolus, glucagon (rDNA), dextrose, sodium chloride flush, sodium chloride, sodium chloride, sodium chloride, HYDROcodone 5 mg - acetaminophen    Review of Systems:     Constitutional: No fever, no chills, no lethargy, no weakness. HEENT:  No headache, otalgia, itchy eyes, nasal discharge or sore throat. Cardiac:  No chest pain, dyspnea, orthopnea or PND. Chest:   No cough, phlegm or wheezing. Abdomen:  No abdominal pain, nausea or vomiting.   Neuro:  No focal weakness, abnormal movements orseizucollin like activity. Skin:   No rashes, no itching. Positive bleeding from gluteal abscess site. :   No hematuria, no pyuria, no dysuria, no flank pain. Extremities:  No swelling or joint pains. ROS was otherwise negative except as mentioned in the [address] Input/Output:     I/O last 3 completed shifts: In: 80 [Blood:710]  Out: -       Vital Signs:   Temperature:  Temp: 99.3 °F (37.4 °C)  TMax:   Temp (24hrs), Av °F (36.7 °C), Min:97 °F (36.1 °C), Max:99.3 °F (37.4 °C)    Respirations:  Resp: 18  Pulse:   Heart Rate: 67  BP:    BP: (!) 109/41  BP Range: Systolic (33DJE), TDU:277 , Min:76 , YMX:506       Diastolic (48DYT), LHQ:89, Min:41, Max:100      Physical Examination:     General:  AAO x 3, speaking in full sentences, no accessory muscle use. HEENT: Atraumatic, normocephalic, no throat congestion, moist mucosa. Eyes:   Pupils equal, round and reactive to light, EOMI. Neck:   Supple  Chest:   Bilateral vesicular breath sounds, no rales or wheezes. Cardiac:  S1 S2 RR, no murmurs, gallops or rubs. Abdomen: Soft, non-tender, no masses or organomegaly, BS audible. :   No suprapubic or flank tenderness. Neuro:  AAO x 3, No FND. SKIN:  No rashes, good skin turgor. Extremities:  Bilateral extremity amputations.     Labs:       Recent Labs     22  0517 22  1800 22  0105 22  0630   WBC 7.9 7.9  --  8.4   RBC 2.80* 1.79*  --  2.36*   HGB 7.1* 4.6* 5.8* 6.3*   HCT 23.0* 15.0* 18.4* 19.5*   MCV 82.1* 83.8  --  82.6   MCH 25.4 25.7  --  26.7   MCHC 30.9 30.7  --  32.3   RDW 20.0* 20.2*  --  18.0*    170  --  185   MPV 10.3 10.9  --  11.3      BMP:   Recent Labs     22  0517 22  1800 22  0630   * 134* 134*   K 3.6* 4.1 4.4   CL 97* 97* 98   CO2 27 24 24   BUN 29* 34* 38*   CREATININE 5.68* 6.55* 7.17*   GLUCOSE 178* 135* 183*   CALCIUM 9.5 8.8 8.5*      Phosphorus:     Recent Labs     22   PHOS 4.0     Magnesium:    Recent Labs 09/18/22  0517   MG 2.1     Albumin:  No results for input(s): LABALBU in the last 72 hours. BNP:      Lab Results   Component Value Date/Time    BNP 3,088 06/13/2013 06:32 AM     PTH:   No results found for: IPTH  Blood cultures:  No results found for: [facility]    Urinalysis/Chemistries:      Lab Results   Component Value Date/Time    NITRU NEGATIVE 06/13/2013 10:30 AM    COLORU YELLOW 06/13/2013 10:30 AM    PHUR 7.5 06/13/2013 10:30 AM    WBCUA 0 TO 2 06/13/2013 10:30 AM    RBCUA 10 TO 20 06/13/2013 10:30 AM    MUCUS NOT REPORTED 06/13/2013 10:30 AM    TRICHOMONAS NOT REPORTED 06/13/2013 10:30 AM    YEAST NOT REPORTED 06/13/2013 10:30 AM    BACTERIA FEW 06/13/2013 10:30 AM    SPECGRAV 1.015 06/13/2013 10:30 AM    LEUKOCYTESUR NEGATIVE 06/13/2013 10:30 AM    UROBILINOGEN Normal 06/13/2013 10:30 AM    BILIRUBINUR NEGATIVE 06/13/2013 10:30 AM    GLUCOSEU 3+ 06/13/2013 10:30 AM    KETUA NEGATIVE 06/13/2013 10:30 AM    AMORPHOUS NOT REPORTED 06/13/2013 10:30 AM       Radiology:     Reviewed. Assessment:       ESRD on Hemodialysis. His regular HD days are MWF at [facility] hemodialysis facility using tunnel catheter under Dr. Juma Hastings. Bleeding from gluteal abscess with history of I&D. Hypokalemia. Acute blood loss along with anemia of chronic disease. Secondary hyperparathyroidism. Hypertension. History of bilateral amputations. Plan:     Patient to get regular dialysis today as per MWF schedule. Orders were given for dialysis nurse. Strict Input and Output, Daily weigh and document in the chart. Low Potassium, Low phosphorus and low salt diet. Fluids to be restricted to 1500ml/day. IV Aranesp/Epogen for anemia of chronic disease with HD weekly. IV Zemplar per protocol for secondary hyperparathyroidism with HD thrice a week. Gluteal abscess management as per general surgery. Okay to discharge from nephrology standpoint. Nutrition   Please ensure that patient is on a renal diet/TF.     Thank you for the consultation. Please do not hesitate to contact us for any further questions/concerns. Robe Wolff MD  [facility]     This note is created with the assistance of a speech-recognition program. While intending to generate a document that actually reflects the content of the visit, no guarantees can be provided that every mistake has been identified and corrected by editing.

## 2022-09-19 NOTE — DISCHARGE INSTR - COC
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    S/P bilateral below knee amputation ([address] Z89.512, Z89.511    Long term (current) use of antithrombotics/antiplatelets K11.43    Chronic use of opiate drugs therapeutic purposes Z79.891    Spinal stenosis of lumbar region with neurogenic claudication M48.062    Coronary artery disease involving native coronary artery I25.10    Anemia D64.9       Isolation/Infection:   Isolation            Contact          Patient Infection Status       Infection Onset Added Last Indicated Last Indicated By Review Planned Expiration Resolved Resolved By    MRSA  09/06/17 09/06/17 Juan Clarke RN        Arm - 9/2017    Resolved    COVID-19 (Rule Out) 09/02/22 09/02/22 09/02/22 COVID-19, Rapid (Ordered)   09/02/22 Rule-Out Test Resulted            Nurse Assessment:  Last Vital Signs: /66   Pulse 74   Temp 98.1 °F (36.7 °C)   Resp 18   SpO2 97%     Last documented pain score (0-10 scale): Pain Level: 10  Last Weight:   Wt Readings from Last 1 Encounters:   09/13/22 220 lb (99.8 kg)     Mental Status:  oriented and alert    IV Access:  - Dialysis Catheter  - site  left and subclavian, insertion date: 5/1/22    Nursing Mobility/ADLs:  Walking   Assisted  Transfer  Assisted  Bathing  Assisted  Dressing  Assisted  Toileting  Assisted  Feeding  Independent  Med Admin  Independent  Med Delivery   whole    Wound Care Documentation and Therapy:  Incision 09/05/13 Arm Left (Active)   Number of days: 3301       Incision 10/29/13 Arm Left (Active)   Number of days: 3247       Incision 02/18/14 Leg Lower;Right (Active)   Number of days: 3134       Incision 09/18/22 Buttocks Right (Active)   Dressing Status Clean;Dry; Intact 09/19/22 0900   Drainage Amount Scant 09/19/22 0900   Drainage Description Sanguinous 09/19/22 0900   Number of days: 0        Wound care: Pack gluteal right I and D site with 1/2\" iodoform packing and cover with ABD and paper tape. Change BID.    Elimination:  Continence: Bowel: No  Bladder: No  Urinary Catheter: None   Colostomy/Ileostomy/Ileal Conduit: No       Date of Last BM: 9/18/2022    Intake/Output Summary (Last 24 hours) at 9/19/2022 1545  Last data filed at 9/19/2022 0612  Gross per 24 hour   Intake 710 ml   Output --   Net 710 ml     I/O last 3 completed shifts: In: 80 [Blood:710]  Out: -     Safety Concerns: At Risk for Falls    Impairments/Disabilities:      Vision and Amputation - impaired vision bilaterally, bilateral BKA    Nutrition Therapy:  Current Nutrition Therapy:   - Oral Diet:  Carb Control 4 carbs/meal (1800kcals/day) and Renal    Routes of Feeding: Oral  Liquids: Thin Liquids  Daily Fluid Restriction: no  Last Modified Barium Swallow with Video (Video Swallowing Test): not done    Treatments at the Time of Hospital Discharge:   Respiratory Treatments:   Oxygen Therapy:  is not on home oxygen therapy.   Ventilator:    - No ventilator support    Rehab Therapies: Physical Therapy, Occupational Therapy, and Orthotics/Prosthetics  Weight Bearing Status/Restrictions: No weight bearing restrictions  Other Medical Equipment (for information only, NOT a DME order):  wheelchair and walker  Other Treatments: Pt to discharge with bilateral prosthetics    Patient's personal belongings (please select all that are sent with patient):  Pt to discharge with bilateral prosthetics    RN SIGNATURE:  Electronically signed by Yonas Guzman RN on 9/20/22 at 1:59 PM EDT    CASE MANAGEMENT/SOCIAL WORK SECTION    Inpatient Status Date: 9/18/22    Readmission Risk Assessment Score:  Readmission Risk              Risk of Unplanned Readmission:  32           Discharging to Facility/ Agency   Name: [facility]  Phone: [phone]  Fax: [phone]    Dialysis Facility (if applicable)   Name:[facility]  Address:  Dialysis Schedule:M/W/F [id number]  Phone:[phone]  Fax:[phone]    / signature: Electronically signed by ALYSON Sims on 9/19/22 at 3:46 PM EDT    PHYSICIAN SECTION    Prognosis: Good    Condition at Discharge: Stable    Rehab Potential (if transferring to Rehab): Good    Recommended Labs or Other Treatments After Discharge:     Physician Certification: I certify the above information and transfer of Kristy Vazquez  is necessary for the continuing treatment of the diagnosis listed and that he requires Home Care for less 30 days.      Update Admission H&P: No change in H&P    PHYSICIAN SIGNATURE:  Electronically signed by Alan Schroeder MD on 9/19/22 at 4:49 PM EDT

## 2022-09-19 NOTE — PROGRESS NOTES
Treatment time: 210 minutes    Net UF: 1000 mL    Pre weight: 99.8 kg  Post weight: 98.8 kg  EDW: 98 kg    Access used: CVC Left chest  Access function: good    Medications or blood products given: 1 unit PRBC    Regular outpatient schedule: MWF    Summary of response to treatment: Patient tolerated treatment well, Dr. Jovany Brown evaluated patient during dialysis, new dressing applied to catheter site, report given to Amelia Mercado RN. Copy of dialysis treatment record placed in chart, to be scanned into EMR.

## 2022-09-19 NOTE — PROGRESS NOTES
Occupational [address]  Occupational Therapy Not Seen Note    Patient not available for Occupational Therapy due to:    [] Testing:    [] Hemodialysis    [] Cancelled by RN:    [x] Critical Lab Value Level:  Pt's HgB is 6.3 this morning. Pt not medically appropriate for therapy eval at this time.   OT will continue to follow and ck back as able.      [] Surgery:     [] Intubation:     [] Pain Medication:    [] Sedation:     [] Spine Precautions :    [] Medical Instability:    [] Other:      Daria Kirk, OT

## 2022-09-19 NOTE — PROGRESS NOTES
Physical Therapy  DATE: 2022    NAME: Markell Garcia  MRN: [id number]   : 1960    Patient not seen this date for Physical Therapy due to:      [] Cancel by RN or physician due to:    [] Hemodialysis    [x] Critical Lab Value Level:  Pt's HgB is 6.3 this morning. Pt not medically appropriate for therapy eval at this time. PT will continue to follow and ck back as able.      [] Blood transfusion in progress    [] Acute or unstable cardiovascular status   _MAP < 55 or more than >115  _HR < 40 or > 130    [] Acute or unstable pulmonary status   -FiO2 > 60%   _RR < 5 or >40    _O2 sats < 85%    [] Strict Bedrest    [] Off Unit for surgery or procedure    [] Off Unit for testing       [] Pending imaging to R/O fracture    [] Refusal by Patient      [] Other      [] PT being discontinued at this time. Patient independent. No further needs. [] PT being discontinued at this time as the patient has been transferred to hospice care. No further needs.       Katarzyna Lyons, PT

## 2022-09-19 NOTE — ACP (ADVANCE CARE PLANNING)
Advance Care Planning     Advance Care Planning Activator (Inpatient)  Conversation Note      Date of ACP Conversation: 9/19/2022     Conversation Conducted with: Patient with Decision Making Capacity    ACP Activator: Lissette Beckman RN        Health Care Decision Maker:     Current Designated Health Care Decision Maker:     Primary Decision Maker: Jeny Mendoza - Child - [phone]  Click here to complete Healthcare Decision Makers including section of the Healthcare Decision Maker Relationship (ie \"Primary\")  Today we documented Decision Maker(s) consistent with Legal Next of Kin hierarchy. Care Preferences    Ventilation: \"If you were in your present state of health and suddenly became very ill and were unable to breathe on your own, what would your preference be about the use of a ventilator (breathing machine) if it were available to you? \"      Would the patient desire the use of ventilator (breathing machine)?: yes    \"If your health worsens and it becomes clear that your chance of recovery is unlikely, what would your preference be about the use of a ventilator (breathing machine) if it were available to you? \"     Would the patient desire the use of ventilator (breathing machine)?: No      Resuscitation  \"CPR works best to restart the heart when there is a sudden event, like a heart attack, in someone who is otherwise healthy. Unfortunately, CPR does not typically restart the heart for people who have serious health conditions or who are very sick. \"    \"In the event your heart stopped as a result of an underlying serious health condition, would you want attempts to be made to restart your heart (answer \"yes\" for attempt to resuscitate) or would you prefer a natural death (answer \"no\" for do not attempt to resuscitate)? \" yes       [x] Yes   [] No   Educated Patient / Estel Sixto regarding differences between Advance Directives and portable DNR orders.     Length of ACP Conversation in minutes: Conversation Outcomes:  [x] ACP discussion completed  [] Existing advance directive reviewed with patient; no changes to patient's previously recorded wishes  [] New Advance Directive completed  [] Portable Do Not Rescitate prepared for Provider review and signature  [] POLST/POST/MOLST/MOST prepared for Provider review and signature      Follow-up plan:    [] Schedule follow-up conversation to continue planning  [] Referred individual to Provider for additional questions/concerns   [] Advised patient/agent/surrogate to review completed ACP document and update if needed with changes in condition, patient preferences or care setting    [] This note routed to one or more involved healthcare providers

## 2022-09-19 NOTE — H&P
Hemoglobin dropped  Buttock abscess  History of present illness  79-year-old -American male with history of gluteal Botox was seen in the emergency room early morning yesterday and underwent incision and drainage. He was sent on Keflex. He returns with large amount of bleeding from incision site. Hemoglobin dropped from 7.1-4.2. He denies fever, chills  Past medical history  Insulin-dependent diabetes mellitus  Hypertension  Hyperlipidemia  Peripheral vascular disease  Congestive heart failure and chronic A. fib  Past surgical history  Bilateral BKA  Medications per list reviewed  Allergies per list reviewed  Social history negative for tobacco alcohol or illicit drug use  Family history  Review of system all 12 systems reviewed per  History of present illness  Vitals afebrile hemodynamically stable. Chronic A. fib. Rate controlled  Systemic exam  HEENT unremarkable  Neck unremarkable  Lungs bilateral CTA  CVS A. fib rate controlled dynamic symmetrical pulses  Abdomen soft discomfort benign to palpation normoactive bowel sound  CNS cranial nerves II through XII grossly intact. Bilateral above-knee amputee.   Stumps unremarkable  Labs     Latest Reference Range & Units 9/19/22 01:05 9/19/22 06:30   Sodium 135 - 144 mmol/L  134 (L)   Potassium 3.7 - 5.3 mmol/L  4.4   Chloride 98 - 107 mmol/L  98   CO2 20 - 31 mmol/L  24   BUN,BUNPL 8 - 23 mg/dL  38 (H)   Creatinine 0.70 - 1.20 mg/dL  7.17 (HH)   Bun/Cre Ratio 9 - 20   5 (L)   Anion Gap 9 - 17 mmol/L  12   GFR Non- >60 mL/min  8 (L)   GFR  >60 mL/min  9 (L)   Glucose, Random 70 - 99 mg/dL  183 (H)   CALCIUM, SERUM, 930050 8.6 - 10.4 mg/dL  8.5 (L)   GFR Comment          WBC 3.5 - 11.3 k/uL  8.4   RBC 4.21 - 5.77 m/uL  2.36 (L)   Hemoglobin Quant 13.0 - 17.0 g/dL 5.8 (LL) 6.3 (LL)   Hematocrit 40.7 - 50.3 % 18.4 (L) 19.5 (L)   MCV 82.6 - 102.9 fL  82.6   MCH 25.2 - 33.5 pg  26.7   MCHC 28.4 - 34.8 g/dL  32.3   MPV 8.1 - 13.5 fL  11.3   RDW 11.8 - 14.4 %  18.0 (H)   Platelet Count 084 - 453 k/uL  185   Absolute Mono # 0.10 - 1.20 k/uL  1.46 (H)   Eosinophils % 1 - 4 %  1   Basophils Absolute 0.00 - 0.20 k/uL  0.03   Seg Neutrophils 36 - 65 %  56   Segs Absolute 1.50 - 8.10 k/uL  4.73   Lymphocytes 24 - 43 %  25   Absolute Lymph # 1.10 - 3.70 k/uL  2.13   Monocytes 3 - 12 %  17 (H)   Absolute Eos # 0.00 - 0.44 k/uL  0.05   Basophils 0 - 2 %  0   Immature Granulocytes 0 %  1 (H)   RBC Morphology   ANISOCYTOSIS PRESENT   Absolute Immature Granulocyte 0.00 - 0.30 k/uL  0.04   NRBC Automated 0.0 per 100 WBC  0.2 (H)   (HH): Data is critically high  (LL): Data is critically low  (L): Data is abnormally low  (H): Data is abnormally high  Impression   1. Within the subcutaneous soft tissues of the buttock, axial image 66,   series 2, there is a fluid collection measuring 6.0 x 3.4 cm in transverse   and AP dimensions respectively, though no internal gas. This could represent   a liquefied soft tissue hematoma, abscess or seroma. Given history above   reports both a left and right buttock lesion, though the images are labeled   as a right-sided abnormality. 2. Diffuse anasarca. 3. Severe atherosclerosis. Assessment and plan. 78-year-old male with buttock abscess status post incision and drainage done yesterday in a.m. were discharged home on Keflex. He returns with large amount of bright red blood from the incision site. Hemoglobin dropped by 3 g. Hemodynamically stable. He has already been seen by surgery and underwent wound packing that appears to be drying at this time. He has already gotten 3 units of packed cell blood transfusion that he tolerated well. Hemoglobin is 7. RN is advised to transfuse if hemoglobin drops below 7. Continue to stop Plavix, Eliquis  He continues with Rocephin and vancomycin dosed per pharmacy cultures pending  ESRD hemodialysis dependent.   He had dialysis done today with removal of 1 L  Diabetes mellitus. He is euglycemic. Continue sliding scale  Chronic A. fib. Rate controlled  Hyperlipidemia  History of bilateral above-knee amputee as complication of diabetes  Med list and available labs reviewed, discussed with patient, questions answered  Discharge once cleared by surgery. Giorgi Boyd He is pending culture and sensitivity of abscess  This note is created with a voice recognition program and while intend to generate a document that accurately reflects the content of the visit, no guarantee can be provided that every mistake has been identified and corrected by editing.

## 2022-09-19 NOTE — PLAN OF CARE
Problem: Safety - Adult  Goal: Free from fall injury  Outcome: Progressing   Pt fall risk, fall band present, falling star, safety alarm activated and in use as needed. Hourly rounding performed. Pt encouraged to use call light. See Leticia Cross fall risk assessment.

## 2022-09-19 NOTE — PROGRESS NOTES
HEMODIALYSIS PRE-TREATMENT NOTE    Patient Identifiers prior to treatment: Name,     Isolation Required: No                      Isolation Type: N/A       (please document if patient is being managed as a PUI/COVID-19 patient)        Hepatitis status:                           Date Drawn                             Result  Hepatitis B Surface Antigen 8/15/22     neg                     Hepatitis B Surface Antibody 21 Immune        Hepatitis B Core Antibody N/A N/A          How was Hepatitis Status verified: Outpatient Labs     Was a copy of the labs you documented provided to facility for the patient's chart: Yes    Hemodialysis orders verified: Yes, with Dr. Francisco Javier Trejo Within normal limits ( I.e. s/s of infection,...): WNL     Pre-Assessment completed: Yes    Pre-dialysis report received from: Carina Mandujano RN                      Time: 11:30

## 2022-09-19 NOTE — CONSULTS
Pharmacy Consult      Mimi Alcantar is a 60 y.o. male for whom pharmacy has been consulted to dose ceftriaxone for surgical site infection. Consult received from Dr. Luciana Halsted. Patient Active Problem List   Diagnosis    Hyperlipidemia    Essential hypertension    ESRD on hemodialysis ([facility] [address] MWF    Insomnia    ED (erectile dysfunction)    Chronic bilateral low back pain with bilateral sciatica    Controlled diabetes mellitus type 2 with complications (HCC)    S/P bilateral below knee amputation ([facility] [address]    Long term (current) use of antithrombotics/antiplatelets    Chronic use of opiate drugs therapeutic purposes    Spinal stenosis of lumbar region with neurogenic claudication    Coronary artery disease involving native coronary artery    Anemia       Allergies:  Patient has no known allergies. Recent Labs     09/18/22  0517 09/18/22  1800   CREATININE 5.68* 6.55*       Ht/Wt:   Ht Readings from Last 1 Encounters:   09/18/22 5' 11\" (1.803 m)        Wt Readings from Last 1 Encounters:   09/13/22 220 lb (99.8 kg)         Estimated Creatinine Clearance: 14 mL/min (A) (based on SCr of 6.55 mg/dL [facility])). Assessment/Plan:    Pharmacy to give Ceftriaxone 1 g every 24 hours. No dosage adjustment necessary for renal impairment. Thank you for the consult. Ammy Xiao.  Rochelle Moore, [address]  9/18/2022  11:12 PM

## 2022-09-19 NOTE — CARE COORDINATION
Social Work-Met with patient to discuss dc options. Discussed short term SNF for wound care and therapy. He is agreeable. Provided [employer] of providers. [facility] prefers [facility].  Sent referral. Radha Vences

## 2022-09-19 NOTE — CONSULTS
GENERAL SURGERY  CONSULT NOTE    PATIENT: Radha Pereira           : 1960  MRN: [id number]  ADMISSION DATE: 2022  3:59 PM   TODAY'S DATE: 22     ATTENDING PHYSICIAN: Delores Suárez MD  CONSULTING PHYSICIAN: Dr. aRi Roberson:  Bleeding from gluteal abscess s/p incision and drainage    HISTORY OF PRESENT ILLNESS:  Patient is a 59-year-old male who presented yesterday evening with bleeding from a gluteal incision and drainage site. Patient had been seen in the emergency department earlier in the morning with a gluteal abscess that was drained and packed by the emergency department physician. He was sent home and subsequently returned after having a large amount of bleeding from the incision. His hemoglobin on arrival was 4.2, from 7.1 when he originally presented for the incision and drainage. In the emergency department he had stopped bleeding and blood transfusion was started. He has now received 3 units of PRBC and most recent hemoglobin is 5.8. Patient denies feeling dizzy or lightheaded, however he is slow to answer questions and seems a bit confused. He dialysis from a hemodialysis catheter on , due today. Medical history includes CHF, diabetes, hypertension, bilateral BKA. He takes Eliquis at home and this is currently held.     PAST MEDICAL HISTORY:        Diagnosis Date    Acute congestive heart failure (HCC)     Chronic bilateral low back pain with bilateral sciatica     Coronary artery disease involving native coronary artery 2022    CRF (chronic renal failure)     [facility] MWF    DDD (degenerative disc disease), lumbar 12/15/2020    Diabetes mellitus ([facility] [address]     Dialysis patient [facility])     ED (erectile dysfunction)     History of echocardiogram     [facility]    HTN (hypertension)     Hyperlipidemia     LVH (left ventricular hypertrophy)     PVD (peripheral vascular disease) (HCC)     S/P bilateral BKA (below knee amputation) ([facility] [address]     Wears glasses        PAST SURGICAL HISTORY:        Procedure Laterality Date    ARM SURGERY      CATARACT REMOVAL WITH IMPLANT      DIALYSIS FISTULA CREATION Bilateral     As of 2019, RUE AVF functioning, LUE AVF nonfunctioning. FINGER AMPUTATION      right pointer finger    FINGER SURGERY Left     I & D    GLAUCOMA SURGERY      LEG AMPUTATION BELOW KNEE Bilateral     TUNNELED VENOUS CATHETER PLACEMENT      PC placed and removed       ALLERGIES:    Patient has no known allergies. SOCIAL HISTORY   Social History     Tobacco Use    Smoking status: Never    Smokeless tobacco: Never   Vaping Use    Vaping Use: Never used   Substance Use Topics    Alcohol use: Yes     Comment: rare    Drug use: No        FAMILY HISTORY:       Problem Relation Age of Onset    Diabetes Mother     Coronary Art Dis Mother     Hypertension Mother     Diabetes Father     Hypertension Father     Stroke Father     Cancer Sister     Hypertension Brother        MEDICATIONS PRIOR TO ADMISSION:   Medications Prior to Admission: cephALEXin (KEFLEX) 500 MG capsule, Take 1 capsule by mouth 4 times daily for 7 days  traMADol (ULTRAM) 50 MG tablet, Take 1 tablet by mouth daily as needed for Pain for up to 30 days. oxyCODONE-acetaminophen (PERCOCET) 7.5-325 MG per tablet, Take 1 tablet by mouth daily as needed for Pain for up to 30 days. On HD days  pregabalin (LYRICA) 75 MG capsule, Take 1 capsule by mouth daily for 30 days.   linagliptin (TRADJENTA) 5 MG tablet, TAKE ONE TABLET BY MOUTH DAILY  ondansetron (ZOFRAN ODT) 4 MG disintegrating tablet, Take 1 tablet by mouth every 8 hours as needed for Nausea or Vomiting  apixaban (ELIQUIS) 5 MG TABS tablet, Take 1 tablet by mouth 2 times daily  amLODIPine (NORVASC) 10 MG tablet, Take 1 tablet by mouth daily  labetalol (NORMODYNE) 200 MG tablet, Take 1 tablet by mouth 2 times daily  atorvastatin (LIPITOR) 80 MG tablet,   LOPERAMIDE HCL PO, Take 2 mg by mouth  glimepiride (AMARYL) 2 MG tablet, TAKE ONE TABLET BY MOUTH EVERY MORNING BEFORE BREAKFAST  minoxidil (LONITEN) 2.5 MG tablet, 10 mg  in the morning and 10 mg before bedtime. acetaminophen (TYLENOL) 500 MG tablet, Take 2 tablets by mouth every 6 hours as needed for Pain  docusate sodium (COLACE) 100 MG capsule, Take 1 capsule by mouth 3 times daily as needed for Constipation  Heparin Sodium, Porcine, (HEPARIN, PORCINE,) 1000 UNIT/ML injection, Heparin Sodium (Porcine) 1,000 Units/mL Systemic  Multiple Vitamins-Minerals (RENAPLEX-D PO), Take 1 tablet by mouth daily   hydrALAZINE (APRESOLINE) 25 MG tablet, Take 25 mg by mouth 2 times daily   cloNIDine (CATAPRES) 0.3 MG tablet, Take 1 tablet by mouth 2 times daily Do not take if HR < 60 (Patient taking differently: Take 0.3 mg by mouth 3 times daily Do not take if HR < 60)  aspirin 81 MG chewable tablet, Take 81 mg by mouth every morning  Sucroferric Oxyhydroxide (VELPHORO) 500 MG CHEW, Take 2 tablets by mouth 3 times daily (with meals) Crush or chew and shallow 2 tablets three times a day with meals  Tens Unit MISC, by Does not apply route  lisinopril (PRINIVIL;ZESTRIL) 10 MG tablet, TAKE 1 TABLET BY MOUTH  TWICE DAILY (Patient taking differently: Take 10 mg by mouth in the morning and at bedtime)  NONFORMULARY,   gabapentin (NEURONTIN) 300 MG capsule, TAKE ONE CAPSULE BY MOUTH THREE TIMES A DAY    REVIEW OF SYSTEMS:    CONSTITUTIONAL: Denies weight loss, fever and chills  HEENT: Denies changes in vision and hearing  RESP: Denies SOB and cough  CV: Denies palpitations and CP  GI: Denies abdominal pain, nausea, vomiting and diarrhea  : Denies dysuria and urinary frequency  MSK: Denies myalgia and joint pain  SKIN: Incision and drainage of right buttocks, no longer bleeding  NEURO: Denies headache and syncope  PSYCH: Denies recent changes in mood.  Denies anxiety and depression    PHYSICAL EXAM:    Temp  Av.6 °F (36.4 °C)  Min: 97 °F (36.1 °C)  Max: 99 °F (33.0 °C)  Systolic (80XTF), CPH:395 , Min:76 , Max:132 Diastolic (19NXD), XYP:37, Min:42, Max:100  Pulse  Av.2  Min: 60  Max: 95  Resp  Av.4  Min: 16  Max: 22  SpO2: 96 % SpO2  Av.7 %  Min: 95 %  Max: 100 %     General: No acute distress. A&Ox3. HEENT:  NC/AT. Conjunctiva moist without icterus. Ears are symmetric. Nares are patent. Oral mucus membranes are moist.  Neck:  Supple. No LAD. Cardiovascular:  Regular rate and rhythm. Warm, well perfused. Respiratory:  Equal chest rise bilaterally. No wheezes, stridor, or increased work of breathing. Abdomen:  Soft, non tender, non distended. No organomegaly. No masses palpated. Neuro: Motor and sensory grossly intact. Extremities:  Warm, dry, and well perfused. Limbs without apparent deformity. Skin: There is a small 1 cm incision on the right glutes with packing that is not actively bleeding. There is surrounding induration however no additional areas of fluctuance  LABS:  Recent Labs     22  0517 22  1800 22  0105   WBC 7.9 7.9  --    HGB 7.1* 4.6* 5.8*    170  --      Recent Labs     22  0517 22  1800   * 134*   K 3.6* 4.1   CL 97* 97*   CO2 27 24   BUN 29* 34*   CREATININE 5.68* 6.55*   GLUCOSE 178* 135*     No results for input(s): AST, ALT, ALB, ALKPHOS, BILITOT, BILIDIR in the last 72 hours. No results for input(s): APTT, PROT, INR in the last 72 hours. IMAGING:  CT PELVIS WO CONTRAST Additional Contrast? None    Result Date: 2022  1. Within the subcutaneous soft tissues of the buttock, axial image 66, series 2, there is a fluid collection measuring 6.0 x 3.4 cm in transverse and AP dimensions respectively, though no internal gas. This could represent a liquefied soft tissue hematoma, abscess or seroma. Given history above reports both a left and right buttock lesion, though the images are labeled as a right-sided abnormality. 2. Diffuse anasarca. 3. Severe atherosclerosis.        ASSESSMENT   Patient Active Problem List   Diagnosis Hyperlipidemia    Essential hypertension    ESRD on hemodialysis ([facility] [address] MWF    Insomnia    ED (erectile dysfunction)    Chronic bilateral low back pain with bilateral sciatica    Controlled diabetes mellitus type 2 with complications (HCC)    S/P bilateral below knee amputation ([facility] [address]    Long term (current) use of antithrombotics/antiplatelets    Chronic use of opiate drugs therapeutic purposes    Spinal stenosis of lumbar region with neurogenic claudication    Coronary artery disease involving native coronary artery    Anemia         PLAN  -Patient seen and evaluated. History, physical exam, laboratory and radiographic findings reviewed and discussed with on-call attending.  -Continue to hold Eliquis  -Continue to transfuse prbc as needed  -There is no active bleeding from the buttocks and no further areas of fluctuance. At this point there is no surgical intervention warranted.    -We recommend twice daily packing changes by nursing staff to the gluteal incision.  -Patient to receive dialysis today.     Yun Kaufman DO PGY-2  9/19/22 5:31 AM

## 2022-09-19 NOTE — PROGRESS NOTES
Transitions of Care Pharmacy Service   Medication Review    The patient's list of current home medications has been reviewed and updated. Source(s) of information: Patient, [place] ([place] [address] [place])    Home meds need to be reconciled by a physician    Please feel free to call with any questions about this encounter. Thank you. Torey Bain, [address] [facility]  Transitions of Care Pharmacy Service  Phone:  [phone]  Fax: [phone]        Prior to Admission medications    Medication Sig Start Date End Date Taking? Authorizing Provider   lisinopril (PRINIVIL;ZESTRIL) 20 MG tablet Take 20 mg by mouth in the morning and at bedtime   Yes Historical Provider, MD   cloNIDine (CATAPRES) 0.3 MG tablet Take 0.3 mg by mouth in the morning, at noon, and at bedtime Indications: hold for HR < 60   Yes Historical Provider, MD   clopidogrel (PLAVIX) 75 MG tablet Take 75 mg by mouth daily   Yes Historical Provider, MD   sodium zirconium cyclosilicate (LOKELMA) 5 g PACK oral suspension Take 5 g by mouth three times a week Indications: tues/thurs/sat (non HD days)   Yes Historical Provider, MD   acetaminophen (TYLENOL 8 HOUR) 650 MG extended release tablet Take 650 mg by mouth every 8 hours as needed for Pain   Yes Historical Provider, MD   amLODIPine-atorvastatatin (CADUET) 10-80 MG per tablet Take 1 tablet by mouth daily   Yes Historical Provider, MD   Sucroferric Oxyhydroxide (VELPHORO) 500 MG CHEW Take 1,000 mg by mouth 3 times daily   Yes Historical Provider, MD   cephALEXin (KEFLEX) 500 MG capsule Take 1 capsule by mouth 4 times daily for 7 days 9/18/22 9/25/22  Maya Pang MD   traMADol (ULTRAM) 50 MG tablet Take 1 tablet by mouth daily as needed for Pain for up to 30 days. 9/13/22 10/13/22  Jessica Gil MD   pregabalin (LYRICA) 75 MG capsule Take 1 capsule by mouth daily for 30 days.  9/13/22 10/13/22  Jessica Gil MD   linagliptin (TRADJENTA) 5 MG tablet TAKE ONE TABLET BY MOUTH DAILY 7/11/22   Rosy Henson MD   apixaban (ELIQUIS) 5 MG TABS tablet Take 1 tablet by mouth 2 times daily 5/28/22   Ann-Marie Rios MD   LOPERAMIDE HCL PO Take 2 mg by mouth every 6 hours as needed (diarrhea) 4/8/22 4/7/23  Historical Provider, MD   minoxidil (LONITEN) 2.5 MG tablet Take 5 mg by mouth 2 times daily 1/15/22   Historical Provider, MD   docusate sodium (COLACE) 100 MG capsule Take 1 capsule by mouth 3 times daily as needed for Constipation 8/10/21   Kerry Tanner PA-C   Multiple Vitamins-Minerals (RENAPLEX-D PO) Take 1 tablet by mouth daily     Historical Provider, MD   aspirin 81 MG chewable tablet Take 81 mg by mouth every morning    Historical Provider, MD

## 2022-09-19 NOTE — PROGRESS NOTES
General Surgery Update:    Patient seen and examined this morning on rounds and again with attending. Wound was repacked with 1/2\" iodoform packing. No signs of active bleeding with packing removed. -Continue to transfuse as needed to keep HgB >7  -Wound care: BID iodoform packing changes to gluteal region while inpatient. Daily packing changes once discharged. Will need home care for assistance. -If patient does not respond appropriately to transfusions from a HgB standpoint, consider looking into other sources of bleeding   -Ok to discharge from a surgery perspective once HgB stabilizes     No active bleeding on exams this AM. Surgery will sign off, but please call back with any questions or concerns.      Frederic Hylton, DO  General Surgery PGY-4

## 2022-09-19 NOTE — CONSULTS
[facility] Pharmacokinetic Monitoring Service - Vancomycin    Edwin Chakraborty is a 60 y.o. male starting on vancomycin therapy for surgical site infection, abscess. Pharmacy consulted by Eyal Landis for monitoring and adjustment. Target Concentration: Pre-Dialysis Concentration 15-20 mg/L  Additional Antimicrobials: Ceftriaxone    Pertinent Laboratory Values: Wt Readings from Last 1 Encounters:   09/13/22 220 lb (99.8 kg)     Temp Readings from Last 1 Encounters:   09/19/22 98.6 °F (37 °C)     Recent Labs     09/18/22  0517 09/18/22  1800   CREATININE 5.68* 6.55*   WBC 7.9 7.9     Procalcitonin: ---    Pertinent Cultures:  Culture Date Source Results   9/18/22 Abscess rt buttocks Few gram negative rods, pending   MRSA Nasal Swab: N/A. Non-respiratory infection. Plan:  Concentration-guided dosing due to renal impairment and intermittent hemodialysis   Start vancomycin 2000 mg x1 loading dose  Vancomycin concentration ordered for: to be determined @ , prior to HD session. Home dialysis schedule is Mon/Wed/Fri;  Will need to order level once inpatient dialysis schedule is known.    4.  Pharmacy will continue to monitor patient and adjust therapy as indicated    Thank you for the consult,  BRANDON Amezcua [facility]  9/19/2022 1:27 AM

## 2022-09-19 NOTE — CARE COORDINATION
Case Management Initial Discharge Plan  Breanna Murphy,         Readmission Risk              Risk of Unplanned Readmission:  28             Met with:patient to discuss discharge plans. Information verified: address, contacts, phone number, , insurance Yes  PCP: Alpesh Wyman MD  Date of last visit:     Insurance Provider: [employer] [place] dual     Discharge Planning  Current Residence:  private home   Living Arrangements:    alone       Home has 1 stories/4 stairs to climb  Support Systems:    son and ex wife       Current Services PTA:  hemo  Agency: MWF at [facility] on [address] under dr Clifford Mancilla     Patient able to perform ADL's:Independent  DME in home:  b/l prosthetics , crutches, cane and shower chair. DME used to aid ambulation prior to admission:   prosthetics   DME used during admission:  tbd    Potential Assistance Needed:    snf vs home care     Pharmacy: [facility] on [address]   Potential Assistance Purchasing Medications:   no   Does patient want to participate in local refill/ meds to beds program?  Yes    Patient agreeable to home care: Yes  Freedom of choice provided:   may need snf       Type of Home Care Services:   skilled nursing  Patient expects to be discharged to:   snf vs home care     Prior SNF/Rehab Placement and Facility: unsure of name   Agreeable to SNF/Rehab: Yes  [place] of choice provided: yes   Evaluation: yes    Expected Discharge date:      Follow Up Appointment: Best Day/ Time:  any     Transportation provider: per [facility] vs family  Transportation arrangements needed for discharge: Yes    Discharge Plan:   Met with patient to complete discharge plan. Patient lives at home alone . Patient is independent in most things. He has bilateral BKA with prosthetics. He uses cane or crutches at times. Patient is HD on MWF at [address] Clifford Mancilla. He takes medicaid cab. Patient admitted with abscess to gluteal region.  He has wound packing with

## 2022-09-19 NOTE — CONSENT
Informed Consent for Blood Component Transfusion Note    I have discussed with the patient the rationale for blood component transfusion; its benefits in treating or preventing fatigue, organ damage, or death; and its risk which includes mild transfusion reactions, rare risk of blood borne infection, or more serious but rare reactions. I have discussed the alternatives to transfusion, including the risk and consequences of not receiving transfusion. The patient had an opportunity to ask questions and had agreed to proceed with transfusion of blood components.     Electronically signed by Iron Barnes MD on 9/19/22 at 8:25 AM EDT

## 2022-09-20 VITALS
SYSTOLIC BLOOD PRESSURE: 126 MMHG | OXYGEN SATURATION: 92 % | TEMPERATURE: 98.8 F | DIASTOLIC BLOOD PRESSURE: 65 MMHG | HEART RATE: 68 BPM | RESPIRATION RATE: 18 BRPM

## 2022-09-20 LAB
ABO/RH: NORMAL
ABSOLUTE EOS #: 0.19 K/UL (ref 0–0.44)
ABSOLUTE IMMATURE GRANULOCYTE: 0.03 K/UL (ref 0–0.3)
ABSOLUTE LYMPH #: 1.72 K/UL (ref 1.1–3.7)
ABSOLUTE MONO #: 1.49 K/UL (ref 0.1–1.2)
ANION GAP SERPL CALCULATED.3IONS-SCNC: 12 MMOL/L (ref 9–17)
ANTIBODY SCREEN: NEGATIVE
ARM BAND NUMBER: NORMAL
BASOPHILS # BLD: 1 % (ref 0–2)
BASOPHILS ABSOLUTE: 0.04 K/UL (ref 0–0.2)
BLD PROD TYP BPU: NORMAL
BPU ID: NORMAL
BUN BLDV-MCNC: 24 MG/DL (ref 8–23)
BUN/CREAT BLD: 4 (ref 9–20)
CALCIUM SERPL-MCNC: 8.9 MG/DL (ref 8.6–10.4)
CHLORIDE BLD-SCNC: 96 MMOL/L (ref 98–107)
CO2: 25 MMOL/L (ref 20–31)
CREAT SERPL-MCNC: 5.74 MG/DL (ref 0.7–1.2)
CROSSMATCH RESULT: NORMAL
CULTURE: ABNORMAL
DIRECT EXAM: ABNORMAL
DIRECT EXAM: ABNORMAL
DISPENSE STATUS BLOOD BANK: NORMAL
EOSINOPHILS RELATIVE PERCENT: 2 % (ref 1–4)
EXPIRATION DATE: NORMAL
GFR AFRICAN AMERICAN: 12 ML/MIN
GFR NON-AFRICAN AMERICAN: 10 ML/MIN
GFR SERPL CREATININE-BSD FRML MDRD: ABNORMAL ML/MIN/{1.73_M2}
GLUCOSE BLD-MCNC: 168 MG/DL (ref 75–110)
GLUCOSE BLD-MCNC: 192 MG/DL (ref 75–110)
GLUCOSE BLD-MCNC: 206 MG/DL (ref 75–110)
GLUCOSE BLD-MCNC: 227 MG/DL (ref 70–99)
HCT VFR BLD CALC: 22.9 % (ref 40.7–50.3)
HEMOGLOBIN: 7.5 G/DL (ref 13–17)
IMMATURE GRANULOCYTES: 0 %
LYMPHOCYTES # BLD: 22 % (ref 24–43)
MCH RBC QN AUTO: 27.1 PG (ref 25.2–33.5)
MCHC RBC AUTO-ENTMCNC: 32.8 G/DL (ref 28.4–34.8)
MCV RBC AUTO: 82.7 FL (ref 82.6–102.9)
MONOCYTES # BLD: 19 % (ref 3–12)
NRBC AUTOMATED: 0.5 PER 100 WBC
PATHOLOGIST REVIEW: NORMAL
PDW BLD-RTO: 17.7 % (ref 11.8–14.4)
PLATELET # BLD: 201 K/UL (ref 138–453)
PMV BLD AUTO: 10.9 FL (ref 8.1–13.5)
POTASSIUM SERPL-SCNC: 4.3 MMOL/L (ref 3.7–5.3)
RBC # BLD: 2.77 M/UL (ref 4.21–5.77)
RBC # BLD: ABNORMAL 10*6/UL
SEG NEUTROPHILS: 56 % (ref 36–65)
SEGMENTED NEUTROPHILS ABSOLUTE COUNT: 4.38 K/UL (ref 1.5–8.1)
SODIUM BLD-SCNC: 133 MMOL/L (ref 135–144)
SPECIMEN DESCRIPTION: ABNORMAL
TRANSFUSION STATUS: NORMAL
UNIT DIVISION: 0
WBC # BLD: 7.9 K/UL (ref 3.5–11.3)

## 2022-09-20 PROCEDURE — 97163 PT EVAL HIGH COMPLEX 45 MIN: CPT

## 2022-09-20 PROCEDURE — 83036 HEMOGLOBIN GLYCOSYLATED A1C: CPT

## 2022-09-20 PROCEDURE — 97530 THERAPEUTIC ACTIVITIES: CPT

## 2022-09-20 PROCEDURE — 97535 SELF CARE MNGMENT TRAINING: CPT

## 2022-09-20 PROCEDURE — 99239 HOSP IP/OBS DSCHRG MGMT >30: CPT | Performed by: FAMILY MEDICINE

## 2022-09-20 PROCEDURE — 2580000003 HC RX 258: Performed by: FAMILY MEDICINE

## 2022-09-20 PROCEDURE — 6370000000 HC RX 637 (ALT 250 FOR IP): Performed by: INTERNAL MEDICINE

## 2022-09-20 PROCEDURE — 82947 ASSAY GLUCOSE BLOOD QUANT: CPT

## 2022-09-20 PROCEDURE — 6370000000 HC RX 637 (ALT 250 FOR IP): Performed by: FAMILY MEDICINE

## 2022-09-20 PROCEDURE — 85025 COMPLETE CBC W/AUTO DIFF WBC: CPT

## 2022-09-20 PROCEDURE — 97166 OT EVAL MOD COMPLEX 45 MIN: CPT

## 2022-09-20 PROCEDURE — 36415 COLL VENOUS BLD VENIPUNCTURE: CPT

## 2022-09-20 PROCEDURE — 80048 BASIC METABOLIC PNL TOTAL CA: CPT

## 2022-09-20 RX ORDER — INSULIN GLARGINE 100 [IU]/ML
16 INJECTION, SOLUTION SUBCUTANEOUS NIGHTLY
Status: DISCONTINUED | OUTPATIENT
Start: 2022-09-20 | End: 2022-09-20 | Stop reason: HOSPADM

## 2022-09-20 RX ORDER — ATORVASTATIN CALCIUM 80 MG/1
80 TABLET, FILM COATED ORAL DAILY
Status: DISCONTINUED | OUTPATIENT
Start: 2022-09-20 | End: 2022-09-20 | Stop reason: CLARIF

## 2022-09-20 RX ORDER — LISINOPRIL 20 MG/1
20 TABLET ORAL 2 TIMES DAILY
Qty: 30 TABLET | Refills: 3 | Status: SHIPPED | OUTPATIENT
Start: 2022-09-20

## 2022-09-20 RX ORDER — LISINOPRIL 20 MG/1
20 TABLET ORAL 2 TIMES DAILY
Status: DISCONTINUED | OUTPATIENT
Start: 2022-09-20 | End: 2022-09-20 | Stop reason: HOSPADM

## 2022-09-20 RX ORDER — INSULIN LISPRO 100 [IU]/ML
0-8 INJECTION, SOLUTION INTRAVENOUS; SUBCUTANEOUS
Status: DISCONTINUED | OUTPATIENT
Start: 2022-09-20 | End: 2022-09-20 | Stop reason: HOSPADM

## 2022-09-20 RX ORDER — CEFDINIR 300 MG/1
300 CAPSULE ORAL DAILY
Status: DISCONTINUED | OUTPATIENT
Start: 2022-09-20 | End: 2022-09-20

## 2022-09-20 RX ORDER — CEFDINIR 300 MG/1
300 CAPSULE ORAL DAILY
Qty: 10 CAPSULE | Refills: 0 | Status: SHIPPED | OUTPATIENT
Start: 2022-09-20 | End: 2022-09-30

## 2022-09-20 RX ORDER — CLONIDINE HYDROCHLORIDE 0.3 MG/1
0.3 TABLET ORAL 2 TIMES DAILY
Qty: 180 TABLET | Refills: 3 | Status: SHIPPED | OUTPATIENT
Start: 2022-09-20

## 2022-09-20 RX ORDER — GLIMEPIRIDE 2 MG/1
TABLET ORAL
Qty: 90 TABLET | Refills: 1 | Status: SHIPPED | OUTPATIENT
Start: 2022-09-20

## 2022-09-20 RX ORDER — ATORVASTATIN CALCIUM 80 MG/1
80 TABLET, FILM COATED ORAL DAILY
Status: DISCONTINUED | OUTPATIENT
Start: 2022-09-20 | End: 2022-09-20 | Stop reason: HOSPADM

## 2022-09-20 RX ORDER — CEFDINIR 300 MG/1
300 CAPSULE ORAL DAILY
Status: DISCONTINUED | OUTPATIENT
Start: 2022-09-20 | End: 2022-09-20 | Stop reason: HOSPADM

## 2022-09-20 RX ORDER — AMLODIPINE BESYLATE 10 MG/1
10 TABLET ORAL DAILY
Status: DISCONTINUED | OUTPATIENT
Start: 2022-09-20 | End: 2022-09-20 | Stop reason: CLARIF

## 2022-09-20 RX ORDER — DEXTROSE MONOHYDRATE 100 MG/ML
INJECTION, SOLUTION INTRAVENOUS CONTINUOUS PRN
Status: DISCONTINUED | OUTPATIENT
Start: 2022-09-20 | End: 2022-09-20 | Stop reason: HOSPADM

## 2022-09-20 RX ORDER — AMLODIPINE BESYLATE 10 MG/1
10 TABLET ORAL DAILY
Status: DISCONTINUED | OUTPATIENT
Start: 2022-09-20 | End: 2022-09-20 | Stop reason: HOSPADM

## 2022-09-20 RX ORDER — ATORVASTATIN CALCIUM 80 MG/1
80 TABLET, FILM COATED ORAL DAILY
Qty: 30 TABLET | Refills: 3 | Status: SHIPPED
Start: 2022-09-21 | End: 2022-09-20 | Stop reason: HOSPADM

## 2022-09-20 RX ORDER — MIDODRINE HYDROCHLORIDE 5 MG/1
5 TABLET ORAL 2 TIMES DAILY
Qty: 90 TABLET | Refills: 3 | Status: SHIPPED | OUTPATIENT
Start: 2022-09-20

## 2022-09-20 RX ORDER — AMLODIPINE BESYLATE 10 MG/1
10 TABLET ORAL DAILY
Qty: 30 TABLET | Refills: 3 | Status: SHIPPED
Start: 2022-09-21 | End: 2022-09-20 | Stop reason: HOSPADM

## 2022-09-20 RX ORDER — INSULIN LISPRO 100 [IU]/ML
0-4 INJECTION, SOLUTION INTRAVENOUS; SUBCUTANEOUS NIGHTLY
Status: DISCONTINUED | OUTPATIENT
Start: 2022-09-20 | End: 2022-09-20 | Stop reason: HOSPADM

## 2022-09-20 RX ADMIN — INSULIN LISPRO 2 UNITS: 100 INJECTION, SOLUTION INTRAVENOUS; SUBCUTANEOUS at 10:40

## 2022-09-20 RX ADMIN — AMLODIPINE BESYLATE 10 MG: 10 TABLET ORAL at 10:39

## 2022-09-20 RX ADMIN — MIDODRINE HYDROCHLORIDE 5 MG: 5 TABLET ORAL at 17:52

## 2022-09-20 RX ADMIN — SODIUM CHLORIDE, PRESERVATIVE FREE 10 ML: 5 INJECTION INTRAVENOUS at 10:39

## 2022-09-20 RX ADMIN — CEFDINIR 300 MG: 300 CAPSULE ORAL at 17:52

## 2022-09-20 RX ADMIN — ATORVASTATIN CALCIUM 80 MG: 80 TABLET, FILM COATED ORAL at 10:39

## 2022-09-20 RX ADMIN — MIDODRINE HYDROCHLORIDE 5 MG: 5 TABLET ORAL at 04:33

## 2022-09-20 RX ADMIN — LISINOPRIL 20 MG: 20 TABLET ORAL at 12:04

## 2022-09-20 ASSESSMENT — PAIN SCALES - GENERAL
PAINLEVEL_OUTOF10: 0
PAINLEVEL_OUTOF10: 0

## 2022-09-20 NOTE — PROGRESS NOTES
ANTIMICROBIAL STEWARDSHIP  Upon review of the patient's chart, the committee has the following recommendation for your consideration:    Indication: buttock abscess s/p I&D     Day of Antimicrobial Therapy: 2    Recommendation   Patient was empirically initiated on ceftriaxone/vancomycin for the indication above. Wound culture from abscess grew GNR with K. Aerogenes which is susceptible to ceftriaxone on c&s results. We recommend discontinuing vancomycin. Recent Labs     09/19/22  0630 09/20/22  0537   WBC 8.4 7.9     No results for input(s): PROCAL in the last 72 hours. Unnecessary or inappropriate antimicrobial use increases the risk of subsequent infections with resistant bacterial infections and drug-associated toxicities including C. difficile infection. Thank you. Ramiro Alaniz, [facility], PharmD  9/20/2022  11:41 AM    The Antimicrobial Stewardship Committee at [facility] is led by  Gely Bradley MD, Infectious Diseases Section Chair.

## 2022-09-20 NOTE — PROGRESS NOTES
Pt alert, oriented and medically stable for discharge. Dr. Jamilah Soria stated patient may discharge home in cab. Pt iv removed. Pt verbalized need for follow up appointment, to obtain atb, side effects. Pt wheeled down to ER entrance with bilateral prosthetics, dirty clothing, grapes, and discharged in hospital gown and pants.

## 2022-09-20 NOTE — PROGRESS NOTES
Physical Therapy  Facility/Department: [facility] PROGRESSIVE CARE  Physical Therapy Initial Assessment    Name: Skip Jaime  : 1960  MRN: [id number]  Date of Service: 2022    Discharge Recommendations:  Patient would benefit from continued therapy after discharge          Patient Diagnosis(es): The encounter diagnosis was Anemia, unspecified type. Past Medical History:  has a past medical history of Acute congestive heart failure (HCC), Chronic bilateral low back pain with bilateral sciatica, Coronary artery disease involving native coronary artery, CRF (chronic renal failure), DDD (degenerative disc disease), lumbar, Diabetes mellitus ([facility] [address] Dialysis patient [facility]), ED (erectile dysfunction), History of echocardiogram, HTN (hypertension), Hyperlipidemia, LVH (left ventricular hypertrophy), PVD (peripheral vascular disease) ([facility] [address] S/P bilateral BKA (below knee amputation) ([facility] and Wears glasses. Past Surgical History:  has a past surgical history that includes Glaucoma surgery; Cataract removal with implant; Leg amputation below knee (Bilateral); Tunneled venous catheter placement; Dialysis fistula creation (Bilateral); Finger surgery (Left); Finger amputation; and Arm Surgery.     Assessment   Body Structures, Functions, Activity Limitations Requiring Skilled Therapeutic Intervention: Decreased balance;Decreased posture;Decreased endurance  Assessment: Pt lacks activity tolerance  Therapy Prognosis: Good  Decision Making: High Complexity  Requires PT Follow-Up: Yes  Activity Tolerance  Activity Tolerance: Patient limited by endurance     Plan   Plan  Plan: 5-7 times per week  Current Treatment Recommendations: Balance training, Transfer training, Gait training, Stair training, Endurance training (Posture)  Safety Devices  Type of Devices: Left in bed, Bed alarm in place, Gait belt, Nurse notified, Call light within reach     Restrictions  Restrictions/Precautions  Restrictions/Precautions: General Precautions, Fall Risk  Required Braces or Orthoses?:  (B BKA prostheses)     Subjective   Pain: Denies pain  General  Chart Reviewed: Yes  Patient assessed for rehabilitation services?: Yes  Response To Previous Treatment: Not applicable  Family / Caregiver Present: No  Follows Commands: Within Functional Limits  General Comment  Comments: OK for PT per Joie Zhou RN         Social/Functional History  Social/Functional History  Lives With: Alone  Type of Home: House  Home Layout: One level, Laundry in basement (ramp to basement)  Home Access: Stairs to enter with rails  Entrance Stairs - Number of Steps: 4 MATHIEU  Entrance Stairs - Rails: Both  Bathroom Shower/Tub: Tub/Shower unit  Bathroom Toilet: Standard  Bathroom Equipment: Tub transfer bench (Pt reporting feeling as though he needs railing in tub)  Home Equipment: [employer] ([employer])  Has the patient had two or more falls in the past year or any fall with injury in the past year?: Yes (\"oh plenty\" Pt reports multiple falls)  ADL Assistance: Independent  Homemaking Assistance: Independent  Homemaking Responsibilities: Yes  Ambulation Assistance: Independent (uses cane when up with prostetics)  Transfer Assistance: Independent  Active : Yes (son and girlfriend or a cab)  Occupation: Retired  Additional Comments: Pt reports hx of multiple falls  Vision/Hearing       Cognition   Orientation  Overall Orientation Status: Within Normal Limits  Orientation Level: Oriented X4  Cognition  Overall Cognitive Status: WNL     Objective   Heart Rate: 76  Heart Rate Source: Monitor  BP: (!) 142/64  BP Location: Left lower arm  Patient Position: Sitting  MAP (Calculated): 90  Resp: 18  SpO2: 92 %  O2 Device: None (Room air)     Observation/Palpation  Posture: Fair  Gross Assessment  AROM: Within functional limits  Strength:  Within functional limits  Coordination: Within functional limits  Tone: Normal  Sensation: Intact        Strength RLE  Strength RLE: WFL  Comment: B hips 5/5  Strength LLE  Strength LLE: WFL  Strength RUE  Comment: See OT flako for B UE MMT        Balance  Sitting: Intact  Standing: With support (w/ RW and Mod/Max A x 2)  Bed mobility  Rolling to Left: Independent  Supine to Sit: Modified independent  Scooting: Modified independent  Transfers  Sit to Stand: Moderate Assistance;Maximum Assistance;2 Person Assistance  Stand to sit: Moderate Assistance;Maximum Assistance;2 Person Assistance  Stand Pivot Transfers: Moderate Assistance;Maximum Assistance;2 Person Assistance  Ambulation  Surface: level tile  Device: Rolling Walker  Assistance: Maximum assistance;2 Person assistance  Comments: Pt unable to don L BKA prosthesis, stood w/ only R prosthesis in place w/ Max a x 2 and 2 short L sidesteps     Balance  Posture: Fair  Sitting - Static: Good  Sitting - Dynamic: Good  Standing - Static: Poor;+  Standing - Dynamic: Poor           OutComes Score                                                  AM-PAC Score  AM-PAC Inpatient Mobility Raw Score : 15 (09/20/22 1521)  AM-PAC Inpatient T-Scale Score : 39.45 (09/20/22 1521)  Mobility Inpatient CMS 0-100% Score: 57.7 (09/20/22 1521)  Mobility Inpatient CMS G-Code Modifier : CK (09/20/22 1521)          Tinneti Score       Goals  Short Term Goals  Time Frame for Short term goals: 12 treatments  Short term goal 1: Independent transfers  Short term goal 2: Independent ambulation w/ B BKA prostheses and approrpiate assistive device 200' x 1  Short term goal 3: Good standing balance/posture  Short term goal 4: Tolerate 30 min ther act  Short term goal 5:  Independently ascend 4 steps w/ B HR  Patient Goals   Patient goals : Home       Education  Patient Education  Education Given To: Patient  Education Provided: Role of Therapy;Plan of Care  Education Provided Comments: Fall prevention handout  Education Method: Verbal;Printed Information/Hand-outs  Barriers to Learning: None      Therapy Time   Individual Concurrent Group Co-treatment   Time In 1840 (Treatment time 31 minutes)         Time Out 1400         Minutes [id number] [facility]

## 2022-09-20 NOTE — PROGRESS NOTES
Reviewed discharge home medications with Dr. Rhiannon Hatfield via phone.  Updated orders per request.

## 2022-09-20 NOTE — PROGRESS NOTES
Occupational Therapy  Facility/Department: [facility] PROGRESSIVE CARE  Occupational Therapy Initial Assessment    Name: Talia Carrero  : 1960  MRN: [id number]  Date of Service: 2022    RN reports patient is medically stable for therapy treatment this date. Chart reviewed prior to treatment and patient is agreeable for therapy. All lines intact and patient positioned comfortably at end of treatment. All patient needs addressed prior to ending therapy session. Discharge Recommendations:  Patient would benefit from continued therapy after discharge  Pt currently functioning below baseline. Recommend daily inpatient skilled therapy at time of discharge to maximize long term outcomes and prevent re-admission. Please refer to AM-PAC score for current level of function. OT Equipment Recommendations  Equipment Needed: Yes  Mobility Devices: ADL Assistive Devices  ADL Assistive Devices: Reacher;Long-handled Sponge       Patient Diagnosis(es): The encounter diagnosis was Anemia, unspecified type. Past Medical History:  has a past medical history of Acute congestive heart failure (HCC), Chronic bilateral low back pain with bilateral sciatica, Coronary artery disease involving native coronary artery, CRF (chronic renal failure), DDD (degenerative disc disease), lumbar, Diabetes mellitus ([facility] Dialysis patient [facility]), ED (erectile dysfunction), History of echocardiogram, HTN (hypertension), Hyperlipidemia, LVH (left ventricular hypertrophy), PVD (peripheral vascular disease) ([facility] S/P bilateral BKA (below knee amputation) ([facility] and Wears glasses. Past Surgical History:  has a past surgical history that includes Glaucoma surgery; Cataract removal with implant; Leg amputation below knee (Bilateral); Tunneled venous catheter placement; Dialysis fistula creation (Bilateral); Finger surgery (Left); Finger amputation; and Arm Surgery.      PER H&P: This is a 77-year-old male that presents with complaints of pain and swelling to his right buttocks. Patient states that over the past 24 hours he noticed a large swollen area concerning for an abscess. It sounds as if this is been ongoing for some time and it may be a cyst.  Patient denies any fevers or chills. Assessment   Performance deficits / Impairments: Decreased functional mobility ; Decreased ADL status; Decreased safe awareness;Decreased balance;Decreased cognition;Decreased posture;Decreased high-level IADLs;Decreased endurance;Decreased strength;Decreased fine motor control  Assessment: Pt would benefit from continued skilled OT services to increase I and safety during functional task to return [facility] at [facility] as able. Prognosis: Good  Decision Making: Medium Complexity  Activity Tolerance  Activity Tolerance: Patient Tolerated treatment well;Treatment limited secondary to decreased cognition  Activity Tolerance Comments: fair        Plan   Plan  Times per Week: 4-5x/wk 1x/day as andrews  Current Treatment Recommendations: Strengthening, Balance training, Functional mobility training, Safety education & training, Equipment evaluation, education, & procurement, Self-Care / ADL, Home management training, Patient/Caregiver education & training     Restrictions  Restrictions/Precautions  Restrictions/Precautions: General Precautions, Fall Risk, Contact Precautions  Required Braces or Orthoses?:  (B BKA prostheses)  Position Activity Restriction  Other position/activity restrictions: B BKA, up with assist, telemetry, LUE IV, L chest port    Subjective   General  Chart Reviewed: Yes  Patient assessed for rehabilitation services?: Yes  Family / Caregiver Present: No  Subjective  Subjective: Pt sitting on EOB, with RN in room.  Pt agreeable to OT eval  Denies pain      Social/Functional History  Social/Functional History  Lives With: Alone  Type of Home: House  Home Layout: One level, Laundry in basement (ramp to basement)  Home Access: Stairs to enter with rails  Entrance Stairs - Number of Steps: 4 MATHIEU  Entrance Stairs - Rails: Both  Bathroom Shower/Tub: Tub/Shower unit  Bathroom Toilet: Standard  Bathroom Equipment: Tub transfer bench (Pt reporting feeling as though he needs railing in tub)  Home Equipment: [place] [employer] (B protestetics)  Has the patient had two or more falls in the past year or any fall with injury in the past year?: Yes (\"oh plenty\" Pt reports multiple falls)  ADL Assistance: Independent  Homemaking Assistance: Independent  Homemaking Responsibilities: Yes  Ambulation Assistance: Independent (uses cane when up with prostetics)  Transfer Assistance: Independent  Active : Yes (son and girlfriend or a cab)  Occupation: Retired  Additional Comments: Pt reports hx of multiple falls       Objective   Observation/Palpation  Posture: Fair  Observation: B BKA, disheveled appearance  Edema: L hand  Safety Devices  Type of Devices: Left in bed;Bed alarm in place;Gait belt;Nurse notified;Call light within reach    Balance  Sitting:  (CGA)  Standing:  (Mod-Max x2 with RW and R prostetic leg)  Functional Mobility   Overall Level of Assistance:  (Pt was unable to take steps this date d/t inability to get L prostetic leg on properly)     AROM: Within functional limits  PROM: Within functional limits  Strength: Generally decreased, functional (B UE ~ 4-/5)  Coordination: Generally decreased, functional (slowed/delayed B opposition)  Tone: Normal  Sensation: Intact      ADL  Feeding: Setup  Grooming: Setup;Stand by assistance (seated)  UE Bathing: Setup;Minimal assistance  LE Bathing: Setup; Moderate assistance  UE Dressing: Setup;Minimal assistance (to tie/adjust hosp gown)  LE Dressing: Setup; Moderate assistance (Pt required EXTENDED time to attempt to don L prostetic LE while seated on EOB unable to get it to \"click in\" refusing assistance from therapist. Pt able to don R prostetic leg with increased time/effort)  Toileting: Minimal assistance  Additional Comments: Pt has decreased safety awareness throughout declining assistance. Pt is limited currently by cognition and fatigue. Activity Tolerance  Activity Tolerance: Patient limited by endurance      Bed mobility  Supine to Sit: Unable to assess  Sit to Supine: Unable to assess  Scooting: Unable to assess  Bed Mobility Comments: Pt sitting on EOB at beginning and end of session (MIKE Keith notified)      Transfers  Sit to stand: Maximum assistance; Moderate assistance;2 Person assistance (with RW and R prostetic leg)  Stand to sit: Maximum assistance; Moderate assistance;2 Person assistance  Transfer Comments: Pt attempted 1 STS with lowered bed height unable to achieve full upright posture. Pt pulling on RW and demonstrating a stong posterior lean. Second attempt pt stood with Mod-max x2 assist and RW with bed height raised. Pt required Mod verbal cues throughout for RW safety, upright posture, pacing self, controlled stand to sit and squaring self/AD up to surface prior to sitting all to increase safety. Vision  Vision: Impaired  Vision Exceptions: Wears glasses at all times  Hearing  Hearing: Within functional limits      Cognition  Overall Cognitive Status: Exceptions  Arousal/Alertness: Appropriate responses to stimuli  Following Commands: Follows multistep commands with repitition; Follows multistep commands with increased time  Attention Span: Appears intact  Memory: Appears intact  Safety Judgement: Decreased awareness of need for assistance;Decreased awareness of need for safety  Problem Solving: Decreased awareness of errors;Assistance required to identify errors made;Assistance required to correct errors made;Assistance required to implement solutions;Assistance required to generate solutions  Insights: Decreased awareness of deficits  Initiation: Requires cues for some  Sequencing: Requires cues for some  Orientation  Overall Orientation Status: Within Normal Limits  Orientation Level: Oriented X4 Education Given To: Patient  Education Provided: Role of Therapy;Transfer Training;Plan of Care;Energy Conservation; ADL Adaptive Strategies; Fall Prevention Strategies  Education Provided Comments: safety in function, fall prevention/call light use, benefits of being oob, pursed lip breathing, OT POC, recommendations for continued therapy  Education Method: Verbal  Barriers to Learning: None  Education Outcome: Continued education needed                                                    AM-PAC Score                    Goals  Short Term Goals  Time Frame for Short term goals: By discharge, pt to demo  Short Term Goal 1: ADL transfers and functional mobility to Min A with use of AD as needed. Short Term Goal 2: toileting to Min A with use of AD/grab bars as needed. Short Term Goal 3: increased B UE strength by 1/2 grade to assist with functional tasks/I with B UE HEP with use of handouts as needed. Short Term Goal 4: UB ADLs to Set up and LB ALDs to SBA with use of AD/AE as needed. Short Term Goal 5: bed mobility with use of bedrails as needed. Long Term Goals  Long Term Goal 1: Pt to be I with fall prevention education, EC/WS tech, recommendations for discharge/AE with use of handouts as needed. Patient Goals   Patient goals : To go home! Therapy Time   Individual Concurrent Group Co-treatment   Time In 8847         Time Out 1409         Minutes 54          Tx time: 40 min     Co-treatment with PT warranted secondary to decreased safety and independence requiring 2 skilled therapy professionals to address individual discipline's goals.        Jimmy Harrison, OT

## 2022-09-20 NOTE — CARE COORDINATION
Social Work-Met with patient to discuss dc options. He does not want SNF. He states that he would like home care. Discussed that he is receiving wound care BiD and asked if he would have assistance. He stated that his girlfriend will assist with wound care.  Brittani Umanzor

## 2022-09-20 NOTE — PROGRESS NOTES
DAILY  [DISCONTINUED] ondansetron (ZOFRAN ODT) 4 MG disintegrating tablet, Take 1 tablet by mouth every 8 hours as needed for Nausea or Vomiting  apixaban (ELIQUIS) 5 MG TABS tablet, Take 1 tablet by mouth 2 times daily  [DISCONTINUED] amLODIPine (NORVASC) 10 MG tablet, Take 1 tablet by mouth daily  [DISCONTINUED] labetalol (NORMODYNE) 200 MG tablet, Take 1 tablet by mouth 2 times daily  LOPERAMIDE HCL PO, Take 2 mg by mouth every 6 hours as needed (diarrhea)  [DISCONTINUED] atorvastatin (LIPITOR) 80 MG tablet, Take 80 mg by mouth daily  [DISCONTINUED] glimepiride (AMARYL) 2 MG tablet, TAKE ONE TABLET BY MOUTH EVERY MORNING BEFORE BREAKFAST  minoxidil (LONITEN) 2.5 MG tablet, Take 5 mg by mouth 2 times daily  [DISCONTINUED] acetaminophen (TYLENOL) 500 MG tablet, Take 2 tablets by mouth every 6 hours as needed for Pain  docusate sodium (COLACE) 100 MG capsule, Take 1 capsule by mouth 3 times daily as needed for Constipation  [DISCONTINUED] Heparin Sodium, Porcine, (HEPARIN, PORCINE,) 1000 UNIT/ML injection, Heparin Sodium (Porcine) 1,000 Units/mL Systemic  Multiple Vitamins-Minerals (RENAPLEX-D PO), Take 1 tablet by mouth daily   [DISCONTINUED] hydrALAZINE (APRESOLINE) 25 MG tablet, Take 25 mg by mouth 2 times daily   [DISCONTINUED] cloNIDine (CATAPRES) 0.3 MG tablet, Take 1 tablet by mouth 2 times daily Do not take if HR < 60 (Patient taking differently: Take 0.3 mg by mouth 3 times daily Do not take if HR < 60)  aspirin 81 MG chewable tablet, Take 81 mg by mouth every morning  [DISCONTINUED] Sucroferric Oxyhydroxide (VELPHORO) 500 MG CHEW, Take 2 tablets by mouth 3 times daily (with meals) Crush or chew and shallow 2 tablets three times a day with meals  [DISCONTINUED] Tens Unit MISC, by Does not apply route  [DISCONTINUED] lisinopril (PRINIVIL;ZESTRIL) 10 MG tablet, TAKE 1 TABLET BY MOUTH  TWICE DAILY (Patient taking differently: Take 10 mg by mouth in the morning and at bedtime)  [DISCONTINUED] NONFORMULARY, [DISCONTINUED] gabapentin (NEURONTIN) 300 MG capsule, TAKE ONE CAPSULE BY MOUTH THREE TIMES A DAY    Current Medications:     Scheduled Meds:    amLODIPine  10 mg Oral Daily    And    atorvastatin  80 mg Oral Daily    insulin lispro  0-8 Units SubCUTAneous TID WC    insulin lispro  0-4 Units SubCUTAneous Nightly    insulin glargine  16 Units SubCUTAneous Nightly    lisinopril  20 mg Oral BID    midodrine  5 mg Oral BID    sodium chloride flush  5-40 mL IntraVENous 2 times per day    cefTRIAXone (ROCEPHIN) IV  1,000 mg IntraVENous Q24H    vancomycin (VANCOCIN) intermittent dosing (placeholder)   Other RX Placeholder     Continuous Infusions:    dextrose      sodium chloride      sodium chloride      sodium chloride       PRN Meds:  glucose, dextrose bolus **OR** dextrose bolus, glucagon (rDNA), dextrose, sodium citrate, sodium citrate, sodium chloride flush, sodium chloride, sodium chloride, sodium chloride, HYDROcodone 5 mg - acetaminophen    Input/Output:       I/O last 3 completed shifts: In: 1500 [P.O.:790; Blood:710]  Out: - . No data found. Vital Signs:   Temperature:  Temp: 98.8 °F (37.1 °C)  TMax:   Temp (24hrs), Av.4 °F (36.9 °C), Min:98 °F (36.7 °C), Max:99 °F (37.2 °C)    Respirations:  Resp: 18  Pulse:   Heart Rate: 67  BP:    BP: (!) 141/67  BP Range: Systolic (79KFC), UAR:264 , Min:81 , EMW:009       Diastolic (13BYL), AOI:49, Min:46, Max:79      Physical Examination:     General:  AAO x 3, speaking in full sentences, no accessory muscle use. HEENT: Atraumatic, normocephalic, no throat congestion, moist mucosa. Eyes:   Pupils equal, round and reactive to light, EOMI. Neck:   Supple  Chest:   Bilateral vesicular breath sounds, no rales or wheezes. Cardiac:  S1 S2 RR, no murmurs, gallops or rubs. Abdomen: Soft, non-tender, no masses or organomegaly, BS audible. :   No suprapubic or flank tenderness. Neuro:  AAO x 3, No FND. SKIN:  No rashes, good skin turgor.   Extremities: Bilateral lower extremity amputations. Labs:       Recent Labs     09/18/22  1800 09/19/22  0105 09/19/22  0630 09/20/22  0537   WBC 7.9  --  8.4 7.9   RBC 1.79*  --  2.36* 2.77*   HGB 4.6* 5.8* 6.3* 7.5*   HCT 15.0* 18.4* 19.5* 22.9*   MCV 83.8  --  82.6 82.7   MCH 25.7  --  26.7 27.1   MCHC 30.7  --  32.3 32.8   RDW 20.2*  --  18.0* 17.7*     --  185 201   MPV 10.9  --  11.3 10.9      BMP:   Recent Labs     09/18/22  1800 09/19/22  0630 09/20/22  0537   * 134* 133*   K 4.1 4.4 4.3   CL 97* 98 96*   CO2 24 24 25   BUN 34* 38* 24*   CREATININE 6.55* 7.17* 5.74*   GLUCOSE 135* 183* 227*   CALCIUM 8.8 8.5* 8.9      Phosphorus:     Recent Labs     09/18/22  0517   PHOS 4.0     Magnesium:    Recent Labs     09/18/22  0517   MG 2.1     BNP:      Lab Results   Component Value Date/Time    BNP 3,088 06/13/2013 06:32 AM     SPEP:  Lab Results   Component Value Date/Time    PROT 7.4 09/02/2022 02:45 AM     Hep BsAg:         Lab Results   Component Value Date/Time    HEPBSAG NONREACTIVE 09/28/2021 03:15 PM     Hep C AB:          Lab Results   Component Value Date/Time    HEPCAB NONREACTIVE 09/28/2021 03:15 PM       Urinalysis/Chemistries:      Lab Results   Component Value Date/Time    NITRU NEGATIVE 06/13/2013 10:30 AM    COLORU YELLOW 06/13/2013 10:30 AM    PHUR 7.5 06/13/2013 10:30 AM    WBCUA 0 TO 2 06/13/2013 10:30 AM    RBCUA 10 TO 20 06/13/2013 10:30 AM    MUCUS NOT REPORTED 06/13/2013 10:30 AM    TRICHOMONAS NOT REPORTED 06/13/2013 10:30 AM    YEAST NOT REPORTED 06/13/2013 10:30 AM    BACTERIA FEW 06/13/2013 10:30 AM    SPECGRAV 1.015 06/13/2013 10:30 AM    LEUKOCYTESUR NEGATIVE 06/13/2013 10:30 AM    UROBILINOGEN Normal 06/13/2013 10:30 AM    BILIRUBINUR NEGATIVE 06/13/2013 10:30 AM    GLUCOSEU 3+ 06/13/2013 10:30 AM    KETUA NEGATIVE 06/13/2013 10:30 AM    AMORPHOUS NOT REPORTED 06/13/2013 10:30 AM     Radiology:     Reviewed. Assessment:     ESRD on Hemodialysis.  His regular HD days are MWF at [facility] hemod[facility] facility using tunnel catheter under Dr. Chris Ortiz. Bleeding from gluteal abscess with history of I&D. Hypokalemia-improved. Acute blood loss along with anemia of chronic disease. Secondary hyperparathyroidism. Hypertension. History of bilateral amputations. Plan:   No acute need for dialysis today. Will get regular dialysis in a.m. as per [facility] schedule. Keep renal diet including fluid restrictions less than 1500 cc/day. Okay to discharge from nephrology standpoint. Nutrition   Please ensure that patient is on a renal diet/TF. Avoid nephrotoxic drugs/contrast exposure. Robe Alexander MD  [facility]     This note is created with the assistance of a speech-recognition program. While intending to generate a document that actually reflects the content of the visit, no guarantees can be provided that every mistake has been identified and corrected by editing.

## 2022-09-20 NOTE — PLAN OF CARE
Problem: Discharge Planning  Goal: Discharge to home or other facility with appropriate resources  Outcome: Progressing   Referrals sent to [address] [place] [address]    Problem: Pain  Goal: Verbalizes/displays adequate comfort level or baseline comfort level  Outcome: Progressing   No c/o pain    Problem: Safety - Adult  Goal: Free from fall injury  Outcome: Progressing  Flowsheets (Taken 9/20/2022 0027)  Free From Fall Injury: Instruct family/caregiver on patient safety   Pt free of falls; bed alarm on, call light within reach, bed in low position    Problem: Skin/Tissue Integrity  Goal: Absence of new skin breakdown  Description: 1. Monitor for areas of redness and/or skin breakdown  2. Assess vascular access sites hourly  3. Every 4-6 hours minimum:  Change oxygen saturation probe site  4. Every 4-6 hours:  If on nasal continuous positive airway pressure, respiratory therapy assess nares and determine need for appliance change or resting period.   Outcome: Progressing   No new skin breakdown; continue packing/dressing gluteal wound BID

## 2022-09-20 NOTE — DISCHARGE INSTR - DIET
Good nutrition is important when healing from an illness, injury, or surgery. Follow any nutrition recommendations given to you during your hospital stay. If you were given an oral nutrition supplement while in the hospital, continue to take this supplement at home. You can take it with meals, in-between meals, and/or before bedtime. These supplements can be purchased at most local grocery stores, pharmacies, and chain [employer]-stores. If you have any questions about your diet or nutrition, call the hospital and ask for the dietitian.     Carb control 4, renal diet

## 2022-09-21 LAB
ESTIMATED AVERAGE GLUCOSE: 105 MG/DL
HBA1C MFR BLD: 5.3 % (ref 4–6)

## 2022-09-21 NOTE — DISCHARGE SUMMARY
Discharge diagnosis  Abscess buttock  Acute blood loss anemia  Diabetes mellitus  Hypertension  Hyperlipidemia  Chronic A. fib. Chronic anticoagulation  ESRD hemodialysis dependent  Anemia of chronic disease  Chronic pain syndrome  Consult  Nephrology  Details  28-year-old male was seen early in the emergency room on the day of admission to the hospital with an abscess on the buttock that was incised and drained and sent home on Keflex. He returns the same day in the later part with complaint of large amount of bright red blood at the surgical site. Surgery was consulted, wound was packed. He showed hemoglobin drop of 3 g plus. He got a total of 3 units of blood transfusion and hemoglobin stabilized at 7+. He remained hemodynamically stable. His chronic anticoagulation, Plavix and Eliquis was put on hold. He was started on vancomycin and Rocephin. Wound culture showed positive for Klebsiella. He was switched to ROSE PENELOPE with renal dose adjustment. He did not have any further discharge from his packing. He had wound care services on board. No further surgical intervention was recommended by surgery. Pain was well controlled  He also underwent dialysis with removal of 1 L that he tolerated well. Underlying history of chronic hypertension on ProAmatine. He remained hemodynamically stable  History of chronic A. fib. Rate controlled. Plavix and Eliquis was temporarily put on hold. However Eliquis was resumed at the time of discharge  At the time of discharge he was afebrile hemodynamically stable. Hemoglobin remained stabilized at 7+ after 3 units of packed cell transfusion, pain control and no obvious discharge from the wound packing.   The plan is to continue wound care in the SNF as per surgery and start Omnicef 300 mg p.o. daily for 10 days  Discharge condition improved  Disposition to SNF  Medications reconciled  ROSALINE signed, discharge orders placed and discussed with RN  More than 30 minutes were spent organizing this discharge  This note is created with a voice recognition program and while intend to generate a document that accurately reflects the content of the visit, no guarantee can be provided that every mistake has been identified and corrected by editing.

## 2022-09-29 ENCOUNTER — TELEPHONE (OUTPATIENT)
Dept: INTERNAL MEDICINE CLINIC | Age: 62
End: 2022-09-29

## 2022-09-29 NOTE — TELEPHONE ENCOUNTER
PT calling to get a verbal okay to see patient 1 time per week for four weeks after hospital discharge.   [phone] Melissa García from [id number] [facility]

## 2022-10-04 RX ORDER — ACETAMINOPHEN/DIPHENHYDRAMINE 500MG-25MG
1 TABLET ORAL NIGHTLY PRN
Qty: 200 TABLET | Refills: 0 | Status: SHIPPED | OUTPATIENT
Start: 2022-10-04

## 2022-10-07 RX ORDER — AMLODIPINE BESYLATE AND ATORVASTATIN CALCIUM 10; 80 MG/1; MG/1
1 TABLET, FILM COATED ORAL DAILY
Qty: 90 TABLET | Refills: 1 | Status: SHIPPED | OUTPATIENT
Start: 2022-10-07

## 2022-10-07 NOTE — TELEPHONE ENCOUNTER
Miguel Gutierrez is calling to request a refill on the following medication(s):    Medication Request:  Requested Prescriptions     Pending Prescriptions Disp Refills    amLODIPine-atorvastatatin (CADUET) 10-80 MG per tablet 30 tablet      Sig: Take 1 tablet by mouth daily       Last Visit Date (If Applicable):  1/6/9517    Next Visit Date:    10/13/2022

## 2022-10-11 DIAGNOSIS — E11.40 PAINFUL DIABETIC NEUROPATHY (HCC): ICD-10-CM

## 2022-10-12 NOTE — TELEPHONE ENCOUNTER
Xavier Hopkins is calling to request a refill on the following medication(s):    Medication Request:  Requested Prescriptions     Pending Prescriptions Disp Refills    linagliptin (TRADJENTA) 5 MG tablet [Pharmacy Med Name: TRADJENTA 5 MG TABLET] 90 tablet 0     Sig: TAKE ONE TABLET BY MOUTH DAILY       Last Visit Date (If Applicable):  3/9/8536    Next Visit Date:    10/13/2022

## 2022-10-13 ENCOUNTER — OFFICE VISIT (OUTPATIENT)
Dept: INTERNAL MEDICINE CLINIC | Age: 62
End: 2022-10-13

## 2022-10-13 VITALS
HEIGHT: 71 IN | OXYGEN SATURATION: 97 % | HEART RATE: 63 BPM | TEMPERATURE: 97.5 F | BODY MASS INDEX: 30.8 KG/M2 | DIASTOLIC BLOOD PRESSURE: 76 MMHG | RESPIRATION RATE: 16 BRPM | SYSTOLIC BLOOD PRESSURE: 118 MMHG | WEIGHT: 220 LBS

## 2022-10-13 DIAGNOSIS — E11.8 CONTROLLED TYPE 2 DIABETES MELLITUS WITH COMPLICATION, WITHOUT LONG-TERM CURRENT USE OF INSULIN (HCC): ICD-10-CM

## 2022-10-13 DIAGNOSIS — I10 ESSENTIAL HYPERTENSION: ICD-10-CM

## 2022-10-13 DIAGNOSIS — N52.01 ERECTILE DYSFUNCTION DUE TO ARTERIAL INSUFFICIENCY: ICD-10-CM

## 2022-10-13 DIAGNOSIS — Z89.511 S/P BILATERAL BELOW KNEE AMPUTATION (HCC): ICD-10-CM

## 2022-10-13 DIAGNOSIS — L02.31 ABSCESS OF BUTTOCK, RIGHT: ICD-10-CM

## 2022-10-13 DIAGNOSIS — E78.2 MIXED HYPERLIPIDEMIA: ICD-10-CM

## 2022-10-13 DIAGNOSIS — F51.04 PSYCHOPHYSIOLOGICAL INSOMNIA: ICD-10-CM

## 2022-10-13 DIAGNOSIS — Z79.02 LONG TERM (CURRENT) USE OF ANTITHROMBOTICS/ANTIPLATELETS: ICD-10-CM

## 2022-10-13 DIAGNOSIS — M48.062 SPINAL STENOSIS OF LUMBAR REGION WITH NEUROGENIC CLAUDICATION: ICD-10-CM

## 2022-10-13 DIAGNOSIS — I25.10 CORONARY ARTERY DISEASE INVOLVING NATIVE CORONARY ARTERY OF NATIVE HEART WITHOUT ANGINA PECTORIS: ICD-10-CM

## 2022-10-13 DIAGNOSIS — Z12.11 COLON CANCER SCREENING: ICD-10-CM

## 2022-10-13 DIAGNOSIS — Z79.891 CHRONIC USE OF OPIATE DRUG FOR THERAPEUTIC PURPOSE: ICD-10-CM

## 2022-10-13 DIAGNOSIS — Z09 HOSPITAL DISCHARGE FOLLOW-UP: Primary | ICD-10-CM

## 2022-10-13 DIAGNOSIS — Z99.2 ESRD ON HEMODIALYSIS (HCC): ICD-10-CM

## 2022-10-13 DIAGNOSIS — G89.29 CHRONIC BILATERAL LOW BACK PAIN WITH BILATERAL SCIATICA: ICD-10-CM

## 2022-10-13 DIAGNOSIS — D50.0 IRON DEFICIENCY ANEMIA DUE TO CHRONIC BLOOD LOSS: ICD-10-CM

## 2022-10-13 DIAGNOSIS — M54.42 CHRONIC BILATERAL LOW BACK PAIN WITH BILATERAL SCIATICA: ICD-10-CM

## 2022-10-13 DIAGNOSIS — Z89.512 S/P BILATERAL BELOW KNEE AMPUTATION (HCC): ICD-10-CM

## 2022-10-13 DIAGNOSIS — N18.6 ESRD ON HEMODIALYSIS (HCC): ICD-10-CM

## 2022-10-13 DIAGNOSIS — M54.41 CHRONIC BILATERAL LOW BACK PAIN WITH BILATERAL SCIATICA: ICD-10-CM

## 2022-10-13 RX ORDER — AMLODIPINE BESYLATE 10 MG/1
TABLET ORAL
COMMUNITY
Start: 2022-09-21 | End: 2022-10-13

## 2022-10-13 RX ORDER — PREGABALIN 75 MG/1
CAPSULE ORAL
Qty: 30 CAPSULE | OUTPATIENT
Start: 2022-10-13

## 2022-10-13 SDOH — ECONOMIC STABILITY: FOOD INSECURITY: WITHIN THE PAST 12 MONTHS, THE FOOD YOU BOUGHT JUST DIDN'T LAST AND YOU DIDN'T HAVE MONEY TO GET MORE.: NEVER TRUE

## 2022-10-13 SDOH — ECONOMIC STABILITY: FOOD INSECURITY: WITHIN THE PAST 12 MONTHS, YOU WORRIED THAT YOUR FOOD WOULD RUN OUT BEFORE YOU GOT MONEY TO BUY MORE.: NEVER TRUE

## 2022-10-13 ASSESSMENT — ENCOUNTER SYMPTOMS
EYES NEGATIVE: 1
RESPIRATORY NEGATIVE: 1
ALLERGIC/IMMUNOLOGIC NEGATIVE: 1
GASTROINTESTINAL NEGATIVE: 1

## 2022-10-13 ASSESSMENT — SOCIAL DETERMINANTS OF HEALTH (SDOH): HOW HARD IS IT FOR YOU TO PAY FOR THE VERY BASICS LIKE FOOD, HOUSING, MEDICAL CARE, AND HEATING?: NOT HARD AT ALL

## 2022-10-13 NOTE — PROGRESS NOTES
Subjective:      Patient ID: Shaggy Forbes is a 60 y.o. male. This is a hospital follow-up. 50-year-old male who initially had incision and drainage of right buttock abscess was admitted to [facility] with significant blood loss anemia with serosanguineous discharge. Review of Systems   Constitutional: Negative. HENT: Negative. Eyes: Negative. Respiratory: Negative. Cardiovascular: Negative. Gastrointestinal: Negative. Endocrine: Negative. Musculoskeletal:  Positive for arthralgias. Skin: Negative. Allergic/Immunologic: Negative. Neurological: Negative. Hematological: Negative. Psychiatric/Behavioral:  The patient is nervous/anxious. Past family and social history unremarkable. Diagnosis Orders   1. Hospital discharge follow-up        2. Colon cancer screening        3. Abscess of buttock, right        4. Coronary artery disease involving native coronary artery of native heart without angina pectoris        5. Iron deficiency anemia due to chronic blood loss        6. Mixed hyperlipidemia        7. Essential hypertension        8. ESRD on hemodialysis ([address] MWF        9. Psychophysiological insomnia        10. Erectile dysfunction due to arterial insufficiency        11. Chronic bilateral low back pain with bilateral sciatica        12. Controlled type 2 diabetes mellitus with complication, without long-term current use of insulin ([address]        13. S/P bilateral below knee amputation ([address]        14. Long term (current) use of antithrombotics/antiplatelets        15. Chronic use of opiate drugs therapeutic purposes        16. Spinal stenosis of lumbar region with neurogenic claudication            Objective:   Physical Exam  Vitals and nursing note reviewed. Constitutional:       Appearance: He is well-developed. HENT:      Head: Normocephalic and atraumatic.       Right Ear: External ear normal.      Left Ear: External ear normal.      Nose: Nose normal.   Eyes:      Conjunctiva/sclera: Conjunctivae normal.      Pupils: Pupils are equal, round, and reactive to light. Cardiovascular:      Rate and Rhythm: Normal rate and regular rhythm. Heart sounds: Normal heart sounds. Comments: Coronary artery disease  Hypertension  Hyperlipidemia  Chronic A. fib  Chronic anticoagulation  Pulmonary:      Effort: Pulmonary effort is normal.      Breath sounds: Normal breath sounds. Abdominal:      General: Bowel sounds are normal.      Palpations: Abdomen is soft. Genitourinary:     Comments: ESRD-hemodialysis dependent on 3/week  Musculoskeletal:         General: Normal range of motion. Cervical back: Normal range of motion and neck supple. Comments: Bilateral below-knee amputee  Healing right buttock abscess with modest serous discharge. He continues with daily dressing   Skin:     General: Skin is warm and dry. Neurological:      Mental Status: He is alert and oriented to person, place, and time. Deep Tendon Reflexes: Reflexes are normal and symmetric. Psychiatric:         Behavior: Behavior normal.         Thought Content: Thought content normal.       Assessment:       Diagnosis Orders   1. Hospital discharge follow-up        2. Colon cancer screening        3. Abscess of buttock, right        4. Coronary artery disease involving native coronary artery of native heart without angina pectoris        5. Iron deficiency anemia due to chronic blood loss        6. Mixed hyperlipidemia        7. Essential hypertension        8. ESRD on hemodialysis ([address] MWF        9. Psychophysiological insomnia        10. Erectile dysfunction due to arterial insufficiency        11. Chronic bilateral low back pain with bilateral sciatica        12. Controlled type 2 diabetes mellitus with complication, without long-term current use of insulin ([address]        13. S/P bilateral below knee amputation ([address]        14.  Long term (current) use of antithrombotics/antiplatelets        15. Chronic use of opiate drugs therapeutic purposes        16. Spinal stenosis of lumbar region with neurogenic claudication                Plan: This is a hospital follow-up. 69-year-old -American male with history of chronic A. fib on aspirin Plavix and Eliquis underwent incision and drainage of right buttock abscess and discharged home. He returned with significant serosanguineous discharge from the abscess with acute on chronic anemia. He was comanaged with general surgery. He had 3 units of blood transfusion. .  Currently his abscess is significantly improving. There is no fluctuation. Modest serous discharge from the punctum. He continues with home dressing. H&H is stable. Last A1c of 7.5 as of 9/2022. Continue sucroferric Oxyhydroxide  ESRD hemodialysis dependent on 3/week that he is tolerating well  Anemia of chronic disease  Secondary hyperparathyroidism  Well-controlled diabetes mellitus with A1c of 5.3. He denies hypoglycemia    History of hyper insulin ProAmatine 5 mg p.o. twice daily  Is advised to continue Eliquis. He is advised to stop aspirin and Plavix  History of bilateral lower knee amputee. He is scheduled to have replacement of liner  Med list and available labs reviewed, discussed with patient, questions answered  Fall precaution is advised  Call for any concern  This note is created with a voice recognition program and while intend to generate a document that accurately reflects the content of the visit, no guarantee can be provided that every mistake has been identified and corrected by editing.       Magdalena Trevino MD

## 2022-10-18 RX ORDER — TRAZODONE HYDROCHLORIDE 50 MG/1
50 TABLET ORAL NIGHTLY PRN
Qty: 90 TABLET | Refills: 0 | Status: SHIPPED | OUTPATIENT
Start: 2022-10-18

## 2022-10-27 ENCOUNTER — TELEPHONE (OUTPATIENT)
Dept: PAIN MANAGEMENT | Age: 62
End: 2022-10-27

## 2022-10-27 DIAGNOSIS — Z79.891 CHRONIC USE OF OPIATE DRUG FOR THERAPEUTIC PURPOSE: ICD-10-CM

## 2022-10-27 DIAGNOSIS — M48.062 SPINAL STENOSIS OF LUMBAR REGION WITH NEUROGENIC CLAUDICATION: ICD-10-CM

## 2022-10-27 DIAGNOSIS — N18.6 ESRD ON HEMODIALYSIS (HCC): ICD-10-CM

## 2022-10-27 DIAGNOSIS — Z99.2 ESRD ON HEMODIALYSIS (HCC): ICD-10-CM

## 2022-10-27 RX ORDER — TRAMADOL HYDROCHLORIDE 50 MG/1
50 TABLET ORAL DAILY PRN
Qty: 30 TABLET | Refills: 1 | Status: SHIPPED | OUTPATIENT
Start: 2022-10-27 | End: 2022-11-26

## 2022-10-27 RX ORDER — OXYCODONE AND ACETAMINOPHEN 7.5; 325 MG/1; MG/1
1 TABLET ORAL DAILY PRN
Qty: 20 TABLET | Refills: 0 | Status: SHIPPED | OUTPATIENT
Start: 2022-10-27 | End: 2022-11-26

## 2022-10-31 ENCOUNTER — TELEPHONE (OUTPATIENT)
Dept: PAIN MANAGEMENT | Age: 62
End: 2022-10-31

## 2022-10-31 NOTE — TELEPHONE ENCOUNTER
Patient is following up on a previous request for refills on Percocet and Tramadol. Tawanasue Arringtonens He is asking for a return call today after 4pm as he will be at dialysis. He does not have a working voicemail. He is requesting to pick this medication up today at [facility] after his dialysis appointment. Thank you. standard pandemic precautions

## 2022-11-13 ENCOUNTER — APPOINTMENT (OUTPATIENT)
Dept: GENERAL RADIOLOGY | Age: 62
DRG: 291 | End: 2022-11-13
Payer: COMMERCIAL

## 2022-11-13 ENCOUNTER — HOSPITAL ENCOUNTER (INPATIENT)
Age: 62
LOS: 2 days | Discharge: HOME | DRG: 291 | End: 2022-11-15
Attending: EMERGENCY MEDICINE | Admitting: FAMILY MEDICINE
Payer: COMMERCIAL

## 2022-11-13 ENCOUNTER — APPOINTMENT (OUTPATIENT)
Dept: CT IMAGING | Age: 62
DRG: 291 | End: 2022-11-13
Payer: COMMERCIAL

## 2022-11-13 DIAGNOSIS — R11.2 NAUSEA AND VOMITING, UNSPECIFIED VOMITING TYPE: Primary | ICD-10-CM

## 2022-11-13 PROBLEM — R11.10 EMESIS: Status: ACTIVE | Noted: 2022-11-13

## 2022-11-13 LAB
ABSOLUTE EOS #: 0 K/UL (ref 0–0.4)
ABSOLUTE IMMATURE GRANULOCYTE: 0 K/UL (ref 0–0.3)
ABSOLUTE LYMPH #: 0.55 K/UL (ref 1–4.8)
ABSOLUTE MONO #: 0.63 K/UL (ref 0.2–0.8)
ALBUMIN SERPL-MCNC: 4.8 G/DL (ref 3.5–5.2)
ALP BLD-CCNC: 153 U/L (ref 40–129)
ALT SERPL-CCNC: 38 U/L (ref 5–41)
ANION GAP SERPL CALCULATED.3IONS-SCNC: 22 MMOL/L (ref 9–17)
AST SERPL-CCNC: 56 U/L
BASOPHILS # BLD: 0 %
BASOPHILS ABSOLUTE: 0 K/UL (ref 0–0.2)
BILIRUB SERPL-MCNC: 1 MG/DL (ref 0.3–1.2)
BUN BLDV-MCNC: 17 MG/DL (ref 8–23)
BUN/CREAT BLD: 3 (ref 9–20)
CALCIUM SERPL-MCNC: 11 MG/DL (ref 8.6–10.4)
CHLORIDE BLD-SCNC: 90 MMOL/L (ref 98–107)
CHP ED QC CHECK: NORMAL
CO2: 21 MMOL/L (ref 20–31)
CREAT SERPL-MCNC: 5.42 MG/DL (ref 0.7–1.2)
EOSINOPHILS RELATIVE PERCENT: 0 % (ref 1–4)
GFR SERPL CREATININE-BSD FRML MDRD: 11 ML/MIN/1.73M2
GLUCOSE BLD-MCNC: 106 MG/DL (ref 75–110)
GLUCOSE BLD-MCNC: 176 MG/DL (ref 75–110)
GLUCOSE BLD-MCNC: 264 MG/DL (ref 75–110)
GLUCOSE BLD-MCNC: 57 MG/DL (ref 70–99)
GLUCOSE BLD-MCNC: 68 MG/DL (ref 75–110)
GLUCOSE BLD-MCNC: 76 MG/DL (ref 75–110)
GLUCOSE BLD-MCNC: 78 MG/DL
GLUCOSE BLD-MCNC: 78 MG/DL (ref 75–110)
GLUCOSE BLD-MCNC: 78 MG/DL (ref 75–110)
HBV SURFACE AB TITR SER: 87.34 MIU/ML
HCT VFR BLD CALC: 47.1 % (ref 40.7–50.3)
HEMOGLOBIN: 14.6 G/DL (ref 13–17)
HEPATITIS B CORE TOTAL ANTIBODY: REACTIVE
HEPATITIS B SURFACE ANTIGEN: NONREACTIVE
IMMATURE GRANULOCYTES: 0 %
LACTIC ACID, SEPSIS: 3.4 MMOL/L (ref 0.5–1.9)
LIPASE: 15 U/L (ref 13–60)
LYMPHOCYTES # BLD: 7 % (ref 24–44)
MCH RBC QN AUTO: 25.3 PG (ref 25.2–33.5)
MCHC RBC AUTO-ENTMCNC: 31 G/DL (ref 28.4–34.8)
MCV RBC AUTO: 81.5 FL (ref 82.6–102.9)
MONOCYTES # BLD: 8 % (ref 1–7)
MORPHOLOGY: ABNORMAL
NRBC AUTOMATED: 0 PER 100 WBC
PDW BLD-RTO: 20 % (ref 11.8–14.4)
PLATELET # BLD: 162 K/UL (ref 138–453)
PMV BLD AUTO: ABNORMAL FL (ref 8.1–13.5)
POTASSIUM SERPL-SCNC: 4.6 MMOL/L (ref 3.7–5.3)
PRO-BNP: ABNORMAL PG/ML
PROCALCITONIN: 0.91 NG/ML
RBC # BLD: 5.78 M/UL (ref 4.21–5.77)
SARS-COV-2, RAPID: NOT DETECTED
SEG NEUTROPHILS: 85 % (ref 36–66)
SEGMENTED NEUTROPHILS ABSOLUTE COUNT: 6.72 K/UL (ref 1.8–7.7)
SODIUM BLD-SCNC: 133 MMOL/L (ref 135–144)
SPECIMEN DESCRIPTION: NORMAL
TOTAL PROTEIN: 8.8 G/DL (ref 6.4–8.3)
WBC # BLD: 7.9 K/UL (ref 3.5–11.3)

## 2022-11-13 PROCEDURE — 96374 THER/PROPH/DIAG INJ IV PUSH: CPT

## 2022-11-13 PROCEDURE — 74176 CT ABD & PELVIS W/O CONTRAST: CPT

## 2022-11-13 PROCEDURE — 99291 CRITICAL CARE FIRST HOUR: CPT | Performed by: FAMILY MEDICINE

## 2022-11-13 PROCEDURE — 2580000003 HC RX 258: Performed by: FAMILY MEDICINE

## 2022-11-13 PROCEDURE — 5A1D70Z PERFORMANCE OF URINARY FILTRATION, INTERMITTENT, LESS THAN 6 HOURS PER DAY: ICD-10-PCS | Performed by: INTERNAL MEDICINE

## 2022-11-13 PROCEDURE — 2580000003 HC RX 258: Performed by: EMERGENCY MEDICINE

## 2022-11-13 PROCEDURE — 87340 HEPATITIS B SURFACE AG IA: CPT

## 2022-11-13 PROCEDURE — 36415 COLL VENOUS BLD VENIPUNCTURE: CPT

## 2022-11-13 PROCEDURE — 6370000000 HC RX 637 (ALT 250 FOR IP): Performed by: FAMILY MEDICINE

## 2022-11-13 PROCEDURE — 83605 ASSAY OF LACTIC ACID: CPT

## 2022-11-13 PROCEDURE — 80053 COMPREHEN METABOLIC PANEL: CPT

## 2022-11-13 PROCEDURE — 2000000000 HC ICU R&B

## 2022-11-13 PROCEDURE — 83880 ASSAY OF NATRIURETIC PEPTIDE: CPT

## 2022-11-13 PROCEDURE — 83690 ASSAY OF LIPASE: CPT

## 2022-11-13 PROCEDURE — 0W993ZZ DRAINAGE OF RIGHT PLEURAL CAVITY, PERCUTANEOUS APPROACH: ICD-10-PCS | Performed by: RADIOLOGY

## 2022-11-13 PROCEDURE — 6360000002 HC RX W HCPCS: Performed by: FAMILY MEDICINE

## 2022-11-13 PROCEDURE — 6360000002 HC RX W HCPCS: Performed by: EMERGENCY MEDICINE

## 2022-11-13 PROCEDURE — 86704 HEP B CORE ANTIBODY TOTAL: CPT

## 2022-11-13 PROCEDURE — 84145 PROCALCITONIN (PCT): CPT

## 2022-11-13 PROCEDURE — 86317 IMMUNOASSAY INFECTIOUS AGENT: CPT

## 2022-11-13 PROCEDURE — 6360000002 HC RX W HCPCS: Performed by: NURSE PRACTITIONER

## 2022-11-13 PROCEDURE — 85025 COMPLETE CBC W/AUTO DIFF WBC: CPT

## 2022-11-13 PROCEDURE — 71045 X-RAY EXAM CHEST 1 VIEW: CPT

## 2022-11-13 PROCEDURE — 2700000000 HC OXYGEN THERAPY PER DAY

## 2022-11-13 PROCEDURE — 2500000003 HC RX 250 WO HCPCS: Performed by: FAMILY MEDICINE

## 2022-11-13 PROCEDURE — 82947 ASSAY GLUCOSE BLOOD QUANT: CPT

## 2022-11-13 PROCEDURE — 96375 TX/PRO/DX INJ NEW DRUG ADDON: CPT

## 2022-11-13 PROCEDURE — 99285 EMERGENCY DEPT VISIT HI MDM: CPT

## 2022-11-13 PROCEDURE — 2500000003 HC RX 250 WO HCPCS: Performed by: EMERGENCY MEDICINE

## 2022-11-13 PROCEDURE — 87635 SARS-COV-2 COVID-19 AMP PRB: CPT

## 2022-11-13 PROCEDURE — 93005 ELECTROCARDIOGRAM TRACING: CPT | Performed by: EMERGENCY MEDICINE

## 2022-11-13 RX ORDER — LABETALOL HYDROCHLORIDE 5 MG/ML
20 INJECTION, SOLUTION INTRAVENOUS ONCE
Status: COMPLETED | OUTPATIENT
Start: 2022-11-13 | End: 2022-11-13

## 2022-11-13 RX ORDER — ONDANSETRON 4 MG/1
4 TABLET, ORALLY DISINTEGRATING ORAL EVERY 8 HOURS PRN
Status: DISCONTINUED | OUTPATIENT
Start: 2022-11-13 | End: 2022-11-15 | Stop reason: HOSPADM

## 2022-11-13 RX ORDER — DIPHENHYDRAMINE HYDROCHLORIDE 50 MG/ML
12.5 INJECTION INTRAMUSCULAR; INTRAVENOUS ONCE
Status: COMPLETED | OUTPATIENT
Start: 2022-11-13 | End: 2022-11-13

## 2022-11-13 RX ORDER — CLONIDINE HYDROCHLORIDE 0.3 MG/1
0.3 TABLET ORAL 2 TIMES DAILY
Status: DISCONTINUED | OUTPATIENT
Start: 2022-11-13 | End: 2022-11-15 | Stop reason: HOSPADM

## 2022-11-13 RX ORDER — ONDANSETRON 2 MG/ML
4 INJECTION INTRAMUSCULAR; INTRAVENOUS ONCE
Status: COMPLETED | OUTPATIENT
Start: 2022-11-13 | End: 2022-11-13

## 2022-11-13 RX ORDER — ACETAMINOPHEN 325 MG/1
650 TABLET ORAL EVERY 4 HOURS PRN
Status: DISCONTINUED | OUTPATIENT
Start: 2022-11-13 | End: 2022-11-14 | Stop reason: SDUPTHER

## 2022-11-13 RX ORDER — ONDANSETRON 2 MG/ML
4 INJECTION INTRAMUSCULAR; INTRAVENOUS EVERY 6 HOURS PRN
Status: DISCONTINUED | OUTPATIENT
Start: 2022-11-13 | End: 2022-11-15 | Stop reason: HOSPADM

## 2022-11-13 RX ORDER — TRAZODONE HYDROCHLORIDE 50 MG/1
50 TABLET ORAL NIGHTLY PRN
Status: DISCONTINUED | OUTPATIENT
Start: 2022-11-13 | End: 2022-11-15 | Stop reason: HOSPADM

## 2022-11-13 RX ORDER — DROPERIDOL 2.5 MG/ML
0.62 INJECTION, SOLUTION INTRAMUSCULAR; INTRAVENOUS EVERY 6 HOURS PRN
Status: DISCONTINUED | OUTPATIENT
Start: 2022-11-13 | End: 2022-11-15 | Stop reason: HOSPADM

## 2022-11-13 RX ORDER — LISINOPRIL 20 MG/1
20 TABLET ORAL 2 TIMES DAILY
Status: DISCONTINUED | OUTPATIENT
Start: 2022-11-13 | End: 2022-11-15 | Stop reason: HOSPADM

## 2022-11-13 RX ORDER — AMLODIPINE BESYLATE AND ATORVASTATIN CALCIUM 10; 80 MG/1; MG/1
1 TABLET, FILM COATED ORAL DAILY
Status: DISCONTINUED | OUTPATIENT
Start: 2022-11-13 | End: 2022-11-13

## 2022-11-13 RX ORDER — ALPRAZOLAM 0.5 MG/1
0.5 TABLET ORAL 2 TIMES DAILY PRN
Status: DISCONTINUED | OUTPATIENT
Start: 2022-11-13 | End: 2022-11-15 | Stop reason: HOSPADM

## 2022-11-13 RX ORDER — SODIUM CHLORIDE 0.9 % (FLUSH) 0.9 %
5-40 SYRINGE (ML) INJECTION EVERY 12 HOURS SCHEDULED
Status: DISCONTINUED | OUTPATIENT
Start: 2022-11-13 | End: 2022-11-15 | Stop reason: HOSPADM

## 2022-11-13 RX ORDER — DEXTROSE MONOHYDRATE 100 MG/ML
INJECTION, SOLUTION INTRAVENOUS CONTINUOUS PRN
Status: DISCONTINUED | OUTPATIENT
Start: 2022-11-13 | End: 2022-11-15 | Stop reason: HOSPADM

## 2022-11-13 RX ORDER — METOPROLOL TARTRATE 5 MG/5ML
5 INJECTION INTRAVENOUS EVERY 6 HOURS
Status: DISCONTINUED | OUTPATIENT
Start: 2022-11-13 | End: 2022-11-13

## 2022-11-13 RX ORDER — ATORVASTATIN CALCIUM 80 MG/1
80 TABLET, FILM COATED ORAL NIGHTLY
Status: DISCONTINUED | OUTPATIENT
Start: 2022-11-13 | End: 2022-11-15 | Stop reason: HOSPADM

## 2022-11-13 RX ORDER — ENOXAPARIN SODIUM 100 MG/ML
30 INJECTION SUBCUTANEOUS DAILY
Status: DISCONTINUED | OUTPATIENT
Start: 2022-11-13 | End: 2022-11-13

## 2022-11-13 RX ORDER — SODIUM CHLORIDE 0.9 % (FLUSH) 0.9 %
5-40 SYRINGE (ML) INJECTION PRN
Status: DISCONTINUED | OUTPATIENT
Start: 2022-11-13 | End: 2022-11-15 | Stop reason: HOSPADM

## 2022-11-13 RX ORDER — METOPROLOL TARTRATE 5 MG/5ML
5 INJECTION INTRAVENOUS EVERY 6 HOURS PRN
Status: DISCONTINUED | OUTPATIENT
Start: 2022-11-13 | End: 2022-11-15 | Stop reason: HOSPADM

## 2022-11-13 RX ORDER — DEXAMETHASONE SODIUM PHOSPHATE 10 MG/ML
6 INJECTION, SOLUTION INTRAMUSCULAR; INTRAVENOUS ONCE
Status: COMPLETED | OUTPATIENT
Start: 2022-11-13 | End: 2022-11-13

## 2022-11-13 RX ORDER — METOCLOPRAMIDE HYDROCHLORIDE 5 MG/ML
10 INJECTION INTRAMUSCULAR; INTRAVENOUS ONCE
Status: COMPLETED | OUTPATIENT
Start: 2022-11-13 | End: 2022-11-13

## 2022-11-13 RX ORDER — DEXTROSE MONOHYDRATE 100 MG/ML
INJECTION, SOLUTION INTRAVENOUS CONTINUOUS
Status: ACTIVE | OUTPATIENT
Start: 2022-11-13 | End: 2022-11-13

## 2022-11-13 RX ORDER — AMLODIPINE BESYLATE 10 MG/1
10 TABLET ORAL DAILY
Status: DISCONTINUED | OUTPATIENT
Start: 2022-11-13 | End: 2022-11-15 | Stop reason: HOSPADM

## 2022-11-13 RX ORDER — SODIUM CHLORIDE 9 MG/ML
INJECTION, SOLUTION INTRAVENOUS PRN
Status: DISCONTINUED | OUTPATIENT
Start: 2022-11-13 | End: 2022-11-15 | Stop reason: HOSPADM

## 2022-11-13 RX ORDER — HEPARIN SODIUM 5000 [USP'U]/ML
5000 INJECTION, SOLUTION INTRAVENOUS; SUBCUTANEOUS EVERY 8 HOURS SCHEDULED
Status: DISCONTINUED | OUTPATIENT
Start: 2022-11-13 | End: 2022-11-13

## 2022-11-13 RX ADMIN — SODIUM CHLORIDE, PRESERVATIVE FREE 10 ML: 5 INJECTION INTRAVENOUS at 10:24

## 2022-11-13 RX ADMIN — ONDANSETRON 4 MG: 2 INJECTION INTRAMUSCULAR; INTRAVENOUS at 10:24

## 2022-11-13 RX ADMIN — LABETALOL HYDROCHLORIDE 20 MG: 5 INJECTION INTRAVENOUS at 01:55

## 2022-11-13 RX ADMIN — APIXABAN 5 MG: 5 TABLET, FILM COATED ORAL at 20:23

## 2022-11-13 RX ADMIN — METOPROLOL TARTRATE 5 MG: 5 INJECTION, SOLUTION INTRAVENOUS at 12:50

## 2022-11-13 RX ADMIN — LISINOPRIL 20 MG: 20 TABLET ORAL at 20:23

## 2022-11-13 RX ADMIN — DROPERIDOL 0.62 MG: 2.5 INJECTION, SOLUTION INTRAMUSCULAR; INTRAVENOUS at 01:55

## 2022-11-13 RX ADMIN — DEXAMETHASONE SODIUM PHOSPHATE 6 MG: 10 INJECTION, SOLUTION INTRAMUSCULAR; INTRAVENOUS at 10:48

## 2022-11-13 RX ADMIN — HEPARIN SODIUM 5000 UNITS: 5000 INJECTION INTRAVENOUS; SUBCUTANEOUS at 12:50

## 2022-11-13 RX ADMIN — ATORVASTATIN CALCIUM 80 MG: 80 TABLET, FILM COATED ORAL at 20:23

## 2022-11-13 RX ADMIN — METOPROLOL TARTRATE 5 MG: 5 INJECTION, SOLUTION INTRAVENOUS at 18:31

## 2022-11-13 RX ADMIN — AMLODIPINE BESYLATE 10 MG: 10 TABLET ORAL at 18:27

## 2022-11-13 RX ADMIN — ONDANSETRON 4 MG: 2 INJECTION INTRAMUSCULAR; INTRAVENOUS at 01:02

## 2022-11-13 RX ADMIN — METOCLOPRAMIDE 10 MG: 5 INJECTION, SOLUTION INTRAMUSCULAR; INTRAVENOUS at 05:05

## 2022-11-13 RX ADMIN — CLONIDINE HYDROCHLORIDE 0.3 MG: 0.3 TABLET ORAL at 20:22

## 2022-11-13 RX ADMIN — DEXTROSE MONOHYDRATE 100 ML: 100 INJECTION, SOLUTION INTRAVENOUS at 01:45

## 2022-11-13 RX ADMIN — DIPHENHYDRAMINE HYDROCHLORIDE 12.5 MG: 50 INJECTION, SOLUTION INTRAMUSCULAR; INTRAVENOUS at 05:05

## 2022-11-13 RX ADMIN — SODIUM CHLORIDE, PRESERVATIVE FREE 10 ML: 5 INJECTION INTRAVENOUS at 20:23

## 2022-11-13 ASSESSMENT — PAIN DESCRIPTION - DESCRIPTORS
DESCRIPTORS: CRAMPING

## 2022-11-13 ASSESSMENT — ENCOUNTER SYMPTOMS
DIARRHEA: 0
CHEST TIGHTNESS: 0
VOMITING: 1
SINUS PAIN: 0
SHORTNESS OF BREATH: 0
ABDOMINAL DISTENTION: 0
ABDOMINAL PAIN: 1
EYES NEGATIVE: 1
APNEA: 0
COLOR CHANGE: 0
CONSTIPATION: 0
NAUSEA: 1

## 2022-11-13 ASSESSMENT — PAIN - FUNCTIONAL ASSESSMENT
PAIN_FUNCTIONAL_ASSESSMENT: 0-10
PAIN_FUNCTIONAL_ASSESSMENT: 0-10
PAIN_FUNCTIONAL_ASSESSMENT: PREVENTS OR INTERFERES SOME ACTIVE ACTIVITIES AND ADLS

## 2022-11-13 ASSESSMENT — PAIN SCALES - GENERAL
PAINLEVEL_OUTOF10: 4
PAINLEVEL_OUTOF10: 7
PAINLEVEL_OUTOF10: 0
PAINLEVEL_OUTOF10: 3
PAINLEVEL_OUTOF10: 0
PAINLEVEL_OUTOF10: 0

## 2022-11-13 ASSESSMENT — PAIN DESCRIPTION - ONSET: ONSET: ON-GOING

## 2022-11-13 ASSESSMENT — PAIN DESCRIPTION - LOCATION
LOCATION: ABDOMEN

## 2022-11-13 ASSESSMENT — PAIN DESCRIPTION - FREQUENCY: FREQUENCY: INTERMITTENT

## 2022-11-13 ASSESSMENT — PAIN DESCRIPTION - ORIENTATION: ORIENTATION: RIGHT;LEFT;ANTERIOR;MID

## 2022-11-13 ASSESSMENT — PAIN DESCRIPTION - PAIN TYPE: TYPE: ACUTE PAIN

## 2022-11-13 NOTE — PROGRESS NOTES
Rn sent message to Dr. Janae Strauss about patient bp is 171/110, pt is in Afib. his vomit is more spit up, clear frothy with a little bit of yellow tinge to is, Pulmonology thinks he is in fluid overload. I rechecked his oxygen saturation and he was in the 70's so i now have 4 liters of nasal canula oxygen on him. has 2+ bilateral upper arm edema. no orders for  to treat blood pressure, need orders for oxygen. Dr. Janae Strauss called back and gave verbal orders for metoprolol 5mg IV every 6 hours for SBP>160, also consult Dr. Nico Villegas, Cardiology. And to contact Nephrology about seeing if he can have dialysis today due to fluid overload.

## 2022-11-13 NOTE — CONSULTS
Renal Consult Note    Patient :  Josselyn Gee; 60 y.o. MRN# [id number]  Location:  2037/2037-01  Attending:  Maninder Puente MD  Admit Date:  11/13/2022   Hospital Day: 0    Reason for Consult:     Asked by Dr Maninder Puente MD to see for EVA/Elevated Creatinine. History Obtained From:     patient, electronic medical record    HD Access:     previous permacath, previously had right upper arm fistula which is now clotted. Catheter has been in place of few months, per patient    HD Unit:     [facility]    Nephrologist:     Dr. Dorian Black Weight:     97.5 kg    Admission Weight:     97.5 kg    History of Present Illness:     Josselyn Gee; 60 y.o. male with past medical history as mentioned below presented to the hospital with the chief complaint of abdominal pain nausea vomiting, which started yesterday and continued to get worse this morning. The CT shows moderate right pleural effusion and small to moderate left pleural effusion. There is a pericardial effusion and diffuse edema in the tissues of the abdominal and pelvic walls, and gluteal region indicating anasarca. There is also a large amount of stool in the proximal and mid colon, no diverticulosis. Labs: Sodium 133, potassium 4.6, bicarb 21, creatinine 5.42, calcium 11, proBNP 61,492. Patient is ESRD on Hemodialysis. His regular HD days are Monday Wednesday Friday at [facility] hemodialysis facility using CVC under Dr Adalberto Nguyễn. He was admitted at his dry weight of 97.5kg. On examination nation, patient continues to endorse abdominal discomfort and says he has continued to have nausea with some vomiting. He was sitting upright in bed with oxygen via nasal cannula. He did desat for short time and his oxygen was increased to 4 L/min.      Past Medical History:   Diagnosis Date    Acute congestive heart failure (HCC)     Chronic bilateral low back pain with bilateral sciatica     Coronary artery disease involving native coronary artery 9/6/2022    CRF (chronic renal failure)     [facility] MWF    DDD (degenerative disc disease), lumbar 12/15/2020    Diabetes mellitus ([facility]     Dialysis patient [facility])     ED (erectile dysfunction)     History of echocardiogram 2014    [facility]    HTN (hypertension)     Hyperlipidemia     LVH (left ventricular hypertrophy)     PVD (peripheral vascular disease) ([facility])     S/P bilateral BKA (below knee amputation) ([facility]     Wears glasses        No Known Allergies    Social History     Socioeconomic History    Marital status:      Spouse name: Not on file    Number of children: Not on file    Years of education: Not on file    Highest education level: Not on file   Occupational History    Not on file   Tobacco Use    Smoking status: Never    Smokeless tobacco: Never   Vaping Use    Vaping Use: Never used   Substance and Sexual Activity    Alcohol use: Yes     Comment: rare    Drug use: No    Sexual activity: Not on file   Other Topics Concern    Not on file   Social History Narrative    Not on file     Social Determinants of Health     Financial Resource Strain: Low Risk     Difficulty of Paying Living Expenses: Not hard at all   Food Insecurity: No Food Insecurity    Worried About Running Out of Food in the Last Year: Never true    Ran Out of Food in the Last Year: Never true   Transportation Needs: No Transportation Needs    Lack of Transportation (Medical): No    Lack of Transportation (Non-Medical): No   Physical Activity: Inactive    Days of Exercise per Week: 0 days    Minutes of Exercise per Session: 0 min   Stress: No Stress Concern Present    Feeling of Stress : Not at all   Social Connections:  Moderately Integrated    Frequency of Communication with Friends and Family: More than three times a week    Frequency of Social Gatherings with Friends and Family: More than three times a week    Attends Congregation Services: More than 4 times per year    Active Member of [employer] or Organizations: Yes    Attends Club or Organization Meetings: Never    Marital Status:    Intimate Partner Violence: Not At Risk    Fear of Current or Ex-Partner: No    Emotionally Abused: No    Physically Abused: No    Sexually Abused: No   Housing Stability: Unknown    Unable to Pay for Housing in the Last Year: No    Number of Places Lived in the Last Year: Not on file    Unstable Housing in the Last Year: No       Family History:        Family History   Problem Relation Age of Onset    Diabetes Mother     Coronary Art Dis Mother     Hypertension Mother     Diabetes Father     Hypertension Father     Stroke Father     Cancer Sister     Hypertension Brother        Outpatient Medications:     Medications Prior to Admission: traMADol (ULTRAM) 50 MG tablet, Take 1 tablet by mouth daily as needed for Pain for up to 30 days. oxyCODONE-acetaminophen (PERCOCET) 7.5-325 MG per tablet, Take 1 tablet by mouth daily as needed for Pain for up to 30 days.  On HD days  traZODone (DESYREL) 50 MG tablet, Take 1 tablet by mouth nightly as needed for Sleep  linagliptin (TRADJENTA) 5 MG tablet, TAKE ONE TABLET BY MOUTH DAILY  amLODIPine-atorvastatatin (CADUET) 10-80 MG per tablet, Take 1 tablet by mouth daily  diphenhydrAMINE-APAP, sleep, (TYLENOL PM EXTRA STRENGTH)  MG tablet, Take 1 tablet by mouth nightly as needed for Sleep  lisinopril (PRINIVIL;ZESTRIL) 20 MG tablet, Take 1 tablet by mouth in the morning and at bedtime  midodrine (PROAMATINE) 5 MG tablet, Take 1 tablet by mouth 2 times daily  glimepiride (AMARYL) 2 MG tablet, TAKE ONE TABLET BY MOUTH EVERY MORNING BEFORE BREAKFAST  cloNIDine (CATAPRES) 0.3 MG tablet, Take 1 tablet by mouth 2 times daily Do not take if HR < 60  acetaminophen (TYLENOL) 650 MG extended release tablet, Take 650 mg by mouth every 8 hours as needed for Pain  Sucroferric Oxyhydroxide (VELPHORO) 500 MG CHEW, Take 1,000 mg by mouth 3 times daily  pregabalin (LYRICA) 75 MG capsule, Take 1 capsule by mouth daily for 30 days.  apixaban (ELIQUIS) 5 MG TABS tablet, Take 1 tablet by mouth 2 times daily  Multiple Vitamins-Minerals (RENAPLEX-D PO), Take 1 tablet by mouth daily     Current Medications:     Scheduled Meds:    sodium chloride flush  5-40 mL IntraVENous 2 times per day    heparin (porcine)  5,000 Units SubCUTAneous 3 times per day    metoprolol  5 mg IntraVENous Q6H     Continuous Infusions:    sodium chloride      dextrose       PRN Meds:  droperidol, sodium chloride flush, sodium chloride, acetaminophen, ondansetron **OR** ondansetron, glucose, dextrose bolus **OR** dextrose bolus, glucagon (rDNA), dextrose    Review of Systems:     Constitutional: No fever, no chills, no lethargy, no weakness. Tunneled hemodialysis catheter in place, left chest.  HEENT:  No headache, otalgia, itchy eyes, nasal discharge or sore throat. Cardiac:  No chest pain, dyspnea, orthopnea or PND. Chest:  No cough, phlegm or wheezing. Abdomen:  No abdominal pain, nausea or vomiting. Neuro:  No focal weakness, abnormal movements orseizure like activity. Skin:   No rashes, no itching. :   No hematuria, no pyuria, no dysuria, no flank pain. Extremities:  No swelling or joint pains. ROS was otherwise negative except as mentioned in the [address] Input/Output:     No intake/output data recorded. Vital Signs:   Temperature:  Temp: 98.3 °F (36.8 °C)  TMax:   Temp (24hrs), Av.9 °F (36.6 °C), Min:97.5 °F (36.4 °C), Max:98.3 °F (36.8 °C)    Respirations:  Resp: (!) 32  Pulse:   Heart Rate: 91  BP:    BP: (!) 171/110  BP Range: Systolic (77YMT), ACF:187 , Min:112 , ISW:692       Diastolic (91WMZ), TIY:764, Min:99, Max:111      Physical Examination:     General:  AAO x 3, speaking in full sentences, no accessory muscle use. HEENT: Atraumatic, normocephalic, no throat congestion, moist mucosa. Neck:   Supple  Chest:   Decreased vesicular breath sounds, no rales or wheezes.   Cardiac:  S1 S2 RR, no murmurs, gallops or rubs, JVP not raised. Abdomen: Soft, non-tender, no masses or organomegaly, BS audible. :   No suprapubic or flank tenderness. Neuro:  AAO x 3, No FND. SKIN:  No rashes, good skin turgor. Extremities:  Bilateral amputation 1+ peripheral edema    Labs:       Recent Labs     11/13/22 0045   WBC 7.9   RBC 5.78*   HGB 14.6   HCT 47.1   MCV 81.5*   MCH 25.3   MCHC 31.0   RDW 20.0*      MPV Abnormal      BMP:   Recent Labs     11/13/22  0045 11/13/22  0454   *  --    K 4.6  --    CL 90*  --    CO2 21  --    BUN 17  --    CREATININE 5.42*  --    GLUCOSE 57* 78   CALCIUM 11.0*  --       Phosphorus:   No results for input(s): PHOS in the last 72 hours. Magnesium:  No results for input(s): MG in the last 72 hours. Albumin:    Recent Labs     11/13/22 0045   LABALBU 4.8     BNP:      Lab Results   Component Value Date/Time    BNP 3,088 06/13/2013 06:32 AM     PTH:   No results found for: IPTH  Blood cultures:  No results found for: [facility]    Urinalysis/Chemistries:      Lab Results   Component Value Date/Time    NITRU NEGATIVE 06/13/2013 10:30 AM    COLORU YELLOW 06/13/2013 10:30 AM    PHUR 7.5 06/13/2013 10:30 AM    WBCUA 0 TO 2 06/13/2013 10:30 AM    RBCUA 10 TO 20 06/13/2013 10:30 AM    MUCUS NOT REPORTED 06/13/2013 10:30 AM    TRICHOMONAS NOT REPORTED 06/13/2013 10:30 AM    YEAST NOT REPORTED 06/13/2013 10:30 AM    BACTERIA FEW 06/13/2013 10:30 AM    SPECGRAV 1.015 06/13/2013 10:30 AM    LEUKOCYTESUR NEGATIVE 06/13/2013 10:30 AM    UROBILINOGEN Normal 06/13/2013 10:30 AM    BILIRUBINUR NEGATIVE 06/13/2013 10:30 AM    GLUCOSEU 3+ 06/13/2013 10:30 AM    KETUA NEGATIVE 06/13/2013 10:30 AM    AMORPHOUS NOT REPORTED 06/13/2013 10:30 AM       Radiology:     CT of abdomen and pelvis without contrast     Impression   Moderate right pleural effusion. Small to moderate left pleural effusion. Mild atelectatic changes, and/or infiltrate in the base of lungs bilaterally.        There is pericardial effusion with a thickness of including pertinent history and exam findings with the CNP. I have reviewed the key elements of all parts of the encounter with the CNP. I have seen and examined the patient. I agree with the assessment and plan and status of the problem list as documented. Addiitionally I recommend hemodialysis today secondary to the patient's total body fluid overload. Unfortunately, the patient refused dialysis. I personally had placed dialysis orders in the record. But the patient is refusing dialysis. We will attempt to dialyze the patient tomorrow per his usual schedule.     Marcello Negro MD   Nephrology Attending Physician  [facility]  11/13/2022

## 2022-11-13 NOTE — PROGRESS NOTES
Rn sent message to Dr. Nazanin Fields [facility] about patient possibly needing dialysis today. Dr. Chriss Wiseman and Dr. Danny Machuca feel this patient is in fluid overload and feel that he should be dialyzed today. I believe Cindee Severs rounded earlier but she has not put her note in yet so i don't know if she had planned on HD today or not?  Awaiting response

## 2022-11-13 NOTE — ED NOTES
Placed on 2L/o2 for safety. SPO2 was 85-90's, fingers are cold, placed ear probe on but is not reading with good wave form. MD updated.  More warm blankets were provided     Sergey Davis RN  11/13/22 0157

## 2022-11-13 NOTE — H&P
Abdominal pain  Nausea nonbilious vomiting  Volume overload  Hypoxia  History of present illness  28-year-old -American male with history of end-stage renal disease hemodialysis dependent, heart failure with routine ejection fraction and diabetic is presented to the emergency room with subjective complaint of diffuse presentation including nonlocalizing abdominal pain, nausea nonbilious vomiting. On presentation he was seen to be hypoxic with resting O2 sats in 70s. However he denies fever chills or expectoration. He was more concerned about nausea which is significant improved. Currently he is seen to be tolerating Jell-O. Currently is awake however somewhat confused  Past medical history  ESRD  Hypertension  Hyperlipidemia  Anemia of chronic disease  Secondary hyperparathyroidism  Chronic A. fib. Heart failure with routine ejection fraction  Peripheral arterial disease  Diabetes mellitus  Chronic pain syndrome  Past surgical history  Bilateral lower extremity amputee  AV fistula  Medications per list reviewed  Allergies per list reviewed  Social history negative for tobacco alcohol or illicit drug use  Family history  Review of system all 12 systems reviewed pertinent pain history of present illness  Vitals hypertensive, A. fib with ventricular response rate in 80s to 90s. Resting sat at 70% on room air  Systemic exam  HEENT normocephalic atraumatic, PERRLA, no conjunctival injection, bilateral pupils 3 mm each no lid lag lid retraction or nystagmus. No facial asymmetry. Oral examination shows moist and pink mucosa. Gag reflex and oral airway intact. Soft palate is unremarkable. No tongue deviation, uvula central  Neck trachea central no JVD or carotid bruit, no restriction to range of motion  Lungs. Subjective complaint of dry cough however good bilateral air entry  CVS A. fib, dynamic symmetrical pulses  Abdomen soft discomfort benign to palpation normoactive bowel sound  CNS.   Patient appears somewhat confused however cranial nerves II to XII grossly intact. Bilateral lower extremity amputee. Stumps are unremarkable  Labs     Latest Reference Range & Units 11/13/22 00:45   Sodium 135 - 144 mmol/L 133 (L)   Potassium 3.7 - 5.3 mmol/L 4.6   Chloride 98 - 107 mmol/L 90 (L)   CO2 20 - 31 mmol/L 21   BUN,BUNPL 8 - 23 mg/dL 17   Creatinine 0.70 - 1.20 mg/dL 5.42 (HH)   Bun/Cre Ratio 9 - 20  3 (L)   Anion Gap 9 - 17 mmol/L 22 (H)   Est, Glom Filt Rate >60 mL/min/1.73m2 11 (L)   Lactic Acid, Sepsis 0.5 - 1.9 mmol/L 3.4 (H)   Glucose, Random 70 - 99 mg/dL 57 (L)   CALCIUM, SERUM, 272323 8.6 - 10.4 mg/dL 11.0 (H)   Total Protein 6.4 - 8.3 g/dL 8.8 (H)   Procalcitonin <0.09 ng/mL 0.91 (H)   Pro-BNP <300 pg/mL 61,492 (H)   Albumin 3.5 - 5.2 g/dL 4.8   Alk Phos 40 - 129 U/L 153 (H)   ALT 5 - 41 U/L 38   AST <40 U/L 56 (H)   Bilirubin 0.3 - 1.2 mg/dL 1.0   Lipase 13 - 60 U/L 15   WBC 3.5 - 11.3 k/uL 7.9   RBC 4.21 - 5.77 m/uL 5.78 (H)   Hemoglobin Quant 13.0 - 17.0 g/dL 14.6   Hematocrit 40.7 - 50.3 % 47.1   MCV 82.6 - 102.9 fL 81.5 (L)   MCH 25.2 - 33.5 pg 25.3   MCHC 28.4 - 34.8 g/dL 31.0   MPV 8.1 - 13.5 fL Abnormal   RDW 11.8 - 14.4 % 20.0 (H)   Platelet Count 183 - 453 k/uL 162   Absolute Mono # 0.2 - 0.8 k/uL 0.63   Eosinophils % 1 - 4 % 0 (L)   Basophils Absolute 0.0 - 0.2 k/uL 0.00   Seg Neutrophils 36 - 66 % 85 (H)   Segs Absolute 1.8 - 7.7 k/uL 6.72   Lymphocytes 24 - 44 % 7 (L)   Absolute Lymph # 1.0 - 4.8 k/uL 0.55 (L)   Monocytes 1 - 7 % 8 (H)   Absolute Eos # 0.0 - 0.4 k/uL 0.00   Basophils % 0   Immature Granulocytes 0 % 0   Morphology  1+ SCHISTOCYTES  ANISOCYTOSIS PRESENT  1+ POLYCHROMASIA   Absolute Immature Granulocyte 0.00 - 0.30 k/uL 0.00   NRBC Automated 0.0 per 100 WBC 0.0   (HH): Data is critically high  (L): Data is abnormally low  (H): Data is abnormally high  Impression   Moderate right pleural effusion. Small to moderate left pleural effusion.    Mild atelectatic changes, and/or infiltrate in the base of lungs bilaterally. There is pericardial effusion with a thickness of 2 cm and increased as   compared to previous CT scan in June, 2022. Diffuse edema in the soft   tissues of abdominal walls, pelvic walls and soft tissues of back and soft   tissues of gluteal regions, indicating diffuse anasarca. Minimal ascites   around the lower portion of the liver. Cholelithiasis. A few granular calculi in gallbladder redemonstrated. Extensive vascular calcified plaques in abdominal aorta, celiac artery and   branches, SMA and branches, renal arteries and branches and pelvic arteries,   as also noted previously. Normal appendix inferomedial to lower cecum. Large amount of retained stool in the proximal colon and mid colon. No   obvious diverticulosis coli. No evidence of colitis. No obvious obstructive uropathy. Bilateral renal sinuses include numerous   arterial vascular calcifications and therefore, any renal calculus may be   obscured. No obvious hydronephrosis in the kidneys. Assessment and plan  Acute respiratory failure with CT scan showing bilateral pleural effusion, pericardial effusion, and anasarca. He is on 4 L of oxygen at nasal cannula. He is scheduled for thoracentesis in a.m. by interventional radiology. Pulmonary critical care on board. Appreciate input. BiPAP support, incentive spirometry  History of CHF. Review of the record shows ejection fraction of 50% back in 2015. Cardiology on board pending face-to-face evaluation. He is scheduled for 2D echo in the morning. Dialysis dependent. ESRD hemodialysis dependent. He has been seen by nephrology staff this morning. He was offered dialysis today however he expressed reluctance and said that he would not have more than 1 hour. Annette Hatch He has scheduled HD tomorrow.   Oral fluid limitation as per nephrology  aVF chronic disease he is on Aranesp with dialysis  Secondary hyperparathyroidism on Zemplar along with dialysis  Hypertension. We will place him back on his home medications since nausea and vomiting is significantly improved and he is tolerating p.o. He will get metoprolol IV push as needed  History of chronic A. fib. He will be placed back on Eliquis. DC heparin  Elevated cardiac enzymes in the setting of ESRD. Cardiology on board. History of diabetes mellitus. He is hypoglycemic. Continue hypoglycemic protocol  History of bilateral lower extremity amputee as complication of peripheral vascular disease. Stumps are unremarkable  Plan of care discussed with patient, nursing staff  This note is created with a voice recognition program and while intend to generate a document that accurately reflects the content of the visit, no guarantee can be provided that every mistake has been identified and corrected by editing.

## 2022-11-13 NOTE — ED NOTES
ED to inpatient nurses report     Chief Complaint   Patient presents with    Nausea    Emesis     Been vomiting all day, unable to keep anything down      Present to ED from nausea and vomiting  LOC:  Alert and oriented x4  Vital signs   Vitals:    11/13/22 0104 11/13/22 0206 11/13/22 0213 11/13/22 0336   BP: (!) 167/109   (!) 151/110   Pulse: 98      Resp: 18   20   Temp: 97.5 °F (36.4 °C)      TempSrc: Oral      SpO2: 90% 93% 93% 93%   Weight:       Height:          Oxygen Baseline room air    Current needs required oxygen 2liter  SEPSIS: no  [no] Lactate X 2 ordered (Yes or No)  [no] Antibiotics given (Yes or No)  [no] IV Fluids ordered (Yes or No)             [no] IV completed (Yes or No)  [no] Hourly Vital Signs (Validated)  [no] Outstanding Orders:     LDAs:   Peripheral IV 11/13/22 Left;Upper Forearm (Active)   Site Assessment Clean, dry & intact 11/13/22 0044   Line Status Blood return noted; Flushed;Brisk blood return 11/13/22 0044   Phlebitis Assessment No symptoms 11/13/22 0044   Infiltration Assessment 0 11/13/22 0044   Dressing Status New dressing applied 11/13/22 0044   Dressing Type Transparent 11/13/22 0044   Dressing Intervention New 11/13/22 0044     Mobility: pt walks using crutches, is a bilateral leg amputee with Prosthetics   Fall Risk:  high  Pending ED orders: none  Present condition: stable but still nauseated  Code Status: full  Consults: IP CONSULT TO INTERNAL MEDICINE  [x]  Hospitalist  Completed  [x] yes [] no Who:  art  []  Medicine  Completed  [] yes [] No Who:   []  Cardiology  Completed  [] yes [] No Who:   []  GI   Completed  [] yes [] No Who:   []  Neurology  Completed  [] yes [] No Who:   [x]  Nephrology Completed  [] yes [x] No Who:    []  Vascular  Completed  [] yes [] No Who:   []  Ortho  Completed  [] yes [] No Who:     []  Surgery  Completed  [] yes [] No Who:    []  Urology  Completed  [] yes [] No Who:    []  CT Surgery Completed  [] yes [] No Who:   [] Podiatry  Completed  [] yes [] No Who:    []  Other    Completed  [] yes [] No Who:  Interventions: lots of nausea meds, detrose infusion to increase blood sugar  Important Events: last dialysis Friday, (m-w-f),        Electronically signed by Ant Metz, RN on 11/13/2022 at 5:23 AM     Ant Metz RN  11/13/22 [id number] [facility] [address], RN  11/13/22 0365

## 2022-11-13 NOTE — ED PROVIDER NOTES
EMERGENCY DEPARTMENT ENCOUNTER    Pt Name: Dorys Melendez  MRN: [id number]  [place] 1960  Date of evaluation: 11/13/22  CHIEF COMPLAINT       Chief Complaint   Patient presents with    Nausea    Emesis     Been vomiting all day, unable to keep anything down     HISTORY OF PRESENT ILLNESS   58-year-old male presents emergency room for generalized abdominal discomfort nausea and vomiting. Patient reports symptoms started couple hours prior to arrival.  Patient overall is very poor historian. It is extremely difficult to get pertinent history from the patient. Patient is on dialysis. He did report that he went to dialysis Friday at his regular time. Patient denies any shortness of breath or chest discomfort. REVIEW OF SYSTEMS     Review of Systems   Constitutional:  Negative for activity change, chills and diaphoresis. HENT:  Negative for congestion, sinus pain and tinnitus. Eyes: Negative. Respiratory:  Negative for apnea, chest tightness and shortness of breath. Gastrointestinal:  Positive for abdominal pain, nausea and vomiting. Negative for abdominal distention, constipation and diarrhea. Genitourinary:  Negative for difficulty urinating and frequency. Musculoskeletal:  Negative for arthralgias and myalgias. Skin:  Negative for color change and rash. Neurological:  Negative for dizziness. Hematological: Negative. Psychiatric/Behavioral: Negative.        PASTMEDICAL HISTORY     Past Medical History:   Diagnosis Date    Acute congestive heart failure (HCC)     Chronic bilateral low back pain with bilateral sciatica     Coronary artery disease involving native coronary artery 9/6/2022    CRF (chronic renal failure)     [facility] MWF    DDD (degenerative disc disease), lumbar 12/15/2020    Diabetes mellitus ([facility]     Dialysis patient [facility])     ED (erectile dysfunction)     History of echocardiogram 2014    [facility]    HTN (hypertension)     Hyperlipidemia     LVH (left ventricular hypertrophy)     PVD (peripheral vascular disease) ([facility])     S/P bilateral BKA (below knee amputation) ([facility]     Wears glasses      Past Problem List  Patient Active Problem List   Diagnosis Code    Hyperlipidemia E78.5    Essential hypertension I10    ESRD on hemodialysis ([facility] MWF N18.6, Z99.2    Insomnia G47.00    ED (erectile dysfunction) N52.9    Chronic bilateral low back pain with bilateral sciatica M54.42, M54.41, G89.29    Controlled diabetes mellitus type 2 with complications ([facility] Y25.9    S/P bilateral below knee amputation ([facility] Z89.512, Z89.511    Long term (current) use of antithrombotics/antiplatelets U27.69    Chronic use of opiate drugs therapeutic purposes Z79.891    Spinal stenosis of lumbar region with neurogenic claudication M48.062    Coronary artery disease involving native coronary artery I25.10    Anemia D64.9    Emesis R11.10     SURGICAL HISTORY       Past Surgical History:   Procedure Laterality Date    ARM SURGERY      CATARACT REMOVAL WITH IMPLANT      DIALYSIS FISTULA CREATION Bilateral     As of 2019, RUE AVF functioning, LUE AVF nonfunctioning.     FINGER AMPUTATION      right pointer finger    FINGER SURGERY Left     I & D    GLAUCOMA SURGERY      LEG AMPUTATION BELOW KNEE Bilateral     TUNNELED VENOUS CATHETER PLACEMENT      PC placed and removed     CURRENT MEDICATIONS       Previous Medications    ACETAMINOPHEN (TYLENOL) 650 MG EXTENDED RELEASE TABLET    Take 650 mg by mouth every 8 hours as needed for Pain    AMLODIPINE-ATORVASTATATIN (CADUET) 10-80 MG PER TABLET    Take 1 tablet by mouth daily    APIXABAN (ELIQUIS) 5 MG TABS TABLET    Take 1 tablet by mouth 2 times daily    CLONIDINE (CATAPRES) 0.3 MG TABLET    Take 1 tablet by mouth 2 times daily Do not take if HR < 60    DIPHENHYDRAMINE-APAP, SLEEP, (TYLENOL PM EXTRA STRENGTH)  MG TABLET    Take 1 tablet by mouth nightly as needed for Sleep    GLIMEPIRIDE (AMARYL) 2 MG TABLET    TAKE ONE TABLET BY MOUTH EVERY MORNING BEFORE BREAKFAST    LINAGLIPTIN (TRADJENTA) 5 MG TABLET    TAKE ONE TABLET BY MOUTH DAILY    LISINOPRIL (PRINIVIL;ZESTRIL) 20 MG TABLET    Take 1 tablet by mouth in the morning and at bedtime    MIDODRINE (PROAMATINE) 5 MG TABLET    Take 1 tablet by mouth 2 times daily    MULTIPLE VITAMINS-MINERALS (RENAPLEX-D PO)    Take 1 tablet by mouth daily     OXYCODONE-ACETAMINOPHEN (PERCOCET) 7.5-325 MG PER TABLET    Take 1 tablet by mouth daily as needed for Pain for up to 30 days. On HD days    PREGABALIN (LYRICA) 75 MG CAPSULE    Take 1 capsule by mouth daily for 30 days. SUCROFERRIC OXYHYDROXIDE (VELPHORO) 500 MG CHEW    Take 1,000 mg by mouth 3 times daily    TRAMADOL (ULTRAM) 50 MG TABLET    Take 1 tablet by mouth daily as needed for Pain for up to 30 days. TRAZODONE (DESYREL) 50 MG TABLET    Take 1 tablet by mouth nightly as needed for Sleep     ALLERGIES     has No Known Allergies. FAMILY HISTORY     He indicated that the status of his mother is unknown. He indicated that the status of his father is unknown. He indicated that the status of his sister is unknown. He indicated that the status of his brother is unknown. SOCIAL HISTORY       Social History     Tobacco Use    Smoking status: Never    Smokeless tobacco: Never   Vaping Use    Vaping Use: Never used   Substance Use Topics    Alcohol use: Yes     Comment: rare    Drug use: No     PHYSICAL EXAM     INITIAL VITALS: BP (!) 151/110   Pulse 98   Temp 97.5 °F (36.4 °C) (Oral)   Resp 20   Ht 5' 11\" (1.803 m)   Wt 215 lb (97.5 kg)   SpO2 93%   BMI 29.99 kg/m²    Physical Exam  Constitutional:       General: He is not in acute distress. Appearance: He is well-developed. HENT:      Head: Normocephalic. Eyes:      Pupils: Pupils are equal, round, and reactive to light. Cardiovascular:      Rate and Rhythm: Normal rate and regular rhythm. Heart sounds: Normal heart sounds.    Pulmonary:      Effort: Pulmonary effort is normal. No respiratory distress. Breath sounds: Normal breath sounds. Abdominal:      General: Bowel sounds are normal.      Palpations: Abdomen is soft. Tenderness: There is no abdominal tenderness. Musculoskeletal:         General: Normal range of motion. Skin:     General: Skin is warm and dry. Neurological:      Mental Status: He is alert and oriented to person, place, and time. MEDICAL DECISION MAKIN-year-old male presented to the emergency room for recurrent episodes of emesis over the last 1 day. Despite multiple antiemetics patient is still symptomatic. Etiology is unclear at this time patient does not have significant abdominal pain. CT abdomen pelvis without contrast is negative for acute intra-abdominal pathology. Patient does have swelling in both upper extremities and pleural effusion greater on the right than left with intermittent episodes of hypoxia. Given that he is on dialysis I do suspect some fluid overload. Plan is for admission for both the fluid overload and recurrent emesis. Case discussed with Dr. Basim Alvarez who will admit. CRITICAL CARE:       PROCEDURES:    Procedures    DIAGNOSTIC RESULTS   EKG:All EKG's are interpreted by the Emergency Department Physician who either signs or Co-signs this chart in the absence of a cardiologist.    EKG atrial fibrillation with PVC present  Ventricular rate 98  Right axis deviation  Motion artifact present affecting interpretation  No STEMI criteria      RADIOLOGY:All plain film, CT, MRI, and formal ultrasound images (except ED bedside ultrasound) are read by the radiologist, see reports below, unless otherwisenoted in MDM or here. CT ABDOMEN PELVIS WO CONTRAST Additional Contrast? None   Preliminary Result   Moderate right pleural effusion. Small to moderate left pleural effusion. Mild atelectatic changes, and/or infiltrate in the basal lungs bilaterally.       There is pericardial effusion with a thickness of 2 cm and increased as   compared to previous CT scan in June, 2022. Diffuse edema in the soft   tissues of abdominal walls, pelvic walls and soft tissues of back and soft   tissues of gluteal regions, indicating diffuse anasarca. Minimal ascites   around the lower portion of the liver. Cholelithiasis. A few granular calculi in gallbladder redemonstrated. Extensive vascular calcified plaques in abdominal aorta, celiac artery and   branches, SMA and branches, renal arteries and branches and pelvic arteries,   as also noted previously. Normal appendix infero medial to lower cecum. Large amount of retained stool in the proximal colon and mid colon. No   obvious diverticulosis coli. No evidence of colitis. No obvious obstructive uropathy. Bilateral renal sinuses include numerous   arterial vascular calcifications and therefore, any renal calculus may be   obscured. No obvious hydronephrosis in the kidneys. XR CHEST PORTABLE   Final Result   New moderate dense atelectatic changes, and/or infiltrates in the right lower   lung field with significant right basilar pleural effusion. New mild atelectatic changes, and/or infiltrate in the left lower lung field   with probable left basilar pleural effusion. Redemonstration of marked cardiomegaly without evidence of CHF. Stable position of left IJ double lumen central venous catheter. LABS: All lab results were reviewed by myself, and all abnormals are listed below.   Labs Reviewed   CBC WITH AUTO DIFFERENTIAL - Abnormal; Notable for the following components:       Result Value    RBC 5.78 (*)     MCV 81.5 (*)     RDW 20.0 (*)     Seg Neutrophils 85 (*)     Lymphocytes 7 (*)     Monocytes 8 (*)     Eosinophils % 0 (*)     Absolute Lymph # 0.55 (*)     All other components within normal limits   COMPREHENSIVE METABOLIC PANEL W/ REFLEX TO MG FOR LOW K - Abnormal; Notable for the following components:    Glucose 57 (*) Creatinine 5.42 (*)     Est, Glom Filt Rate 11 (*)     Bun/Cre Ratio 3 (*)     Calcium 11.0 (*)     Sodium 133 (*)     Chloride 90 (*)     Anion Gap 22 (*)     Alkaline Phosphatase 153 (*)     AST 56 (*)     Total Protein 8.8 (*)     All other components within normal limits   LACTATE, SEPSIS - Abnormal; Notable for the following components:    Lactic Acid, Sepsis 3.4 (*)     All other components within normal limits   BRAIN NATRIURETIC PEPTIDE - Abnormal; Notable for the following components:    Pro-BNP 61,492 (*)     All other components within normal limits   POCT GLUCOSE - Normal   COVID-19, RAPID   LIPASE   LACTATE, SEPSIS   LIPASE   POC GLUCOSE FINGERSTICK   POC GLUCOSE FINGERSTICK       EMERGENCY DEPARTMENTCOURSE:         Vitals:    Vitals:    11/13/22 0104 11/13/22 0206 11/13/22 0213 11/13/22 0336   BP: (!) 167/109   (!) 151/110   Pulse: 98      Resp: 18   20   Temp: 97.5 °F (36.4 °C)      TempSrc: Oral      SpO2: 90% 93% 93% 93%   Weight:       Height:           The patient was given the following medications while in the emergency department:  Orders Placed This Encounter   Medications    ondansetron (ZOFRAN) injection 4 mg    dextrose 10 % infusion    labetalol (NORMODYNE;TRANDATE) injection 20 mg    droperidol (INAPSINE) injection 0.625 mg    metoclopramide (REGLAN) injection 10 mg    diphenhydrAMINE (BENADRYL) injection 12.5 mg    sodium chloride flush 0.9 % injection 5-40 mL    sodium chloride flush 0.9 % injection 5-40 mL    0.9 % sodium chloride infusion    enoxaparin Sodium (LOVENOX) injection 30 mg     Order Specific Question:   Indication of Use     Answer:   Prophylaxis-DVT/PE    acetaminophen (TYLENOL) tablet 650 mg    OR Linked Order Group     ondansetron (ZOFRAN-ODT) disintegrating tablet 4 mg     ondansetron (ZOFRAN) injection 4 mg     CONSULTS:  IP CONSULT TO INTERNAL MEDICINE  IP CONSULT TO NEPHROLOGY    FINAL IMPRESSION      1.  Nausea and vomiting, unspecified vomiting type DISPOSITION/PLAN   DISPOSITION Admitted 11/13/2022 05:19:09 AM      PATIENT REFERRED TO:  No follow-up provider specified. DISCHARGE MEDICATIONS:  New Prescriptions    No medications on file     Jayden Cooper MD  Attending Emergency Physician      Care during this encounter was due to an unprecedented national emergency due to COVID-19.              Nitin Garner MD  11/13/22 8016

## 2022-11-13 NOTE — PROGRESS NOTES
Dialysis nurse showed up and patient refused the 3 hour run and said would only allow an hours session. RN and Charge nurse tried to explain to him the importance of this treatment, but he still refused the Dialysis treatment all together and the dialysis nurse left. RN sent message to Dr. Luis Joaquin, Dr. Kevin Mayes nephrology and Dr. Jennie Ortiz, pulmonology.

## 2022-11-13 NOTE — ED TRIAGE NOTES
C/o all over abdominal pain and vomiting since this morning.  Unable to take any medications today due to nausea,

## 2022-11-13 NOTE — PROGRESS NOTES
RN contacted Dr. Ron Mendez about Patient just arrived on floor, patient states he is a diabetic, bs is 79, have no orders for any hypoglycemia protocols. Can i get some orders for diabetic protocol please. Responded for hypoglycemia protocol, Heparin 5000units SQ TID, Phenergan 12.5mg IM every 6 hours PRN for nausea and vomiting. RN responded that we no longer carry IV Phenergan in [employer] system and we have Reglan and Compazine in IV form, Would you like to order something else. RN also asked to clarify is he wanted the lovenox that was already ordered or the new order for the Heparin? Dr. Ron Mendez  responded to keep Lovenox 30mg Sq daily.

## 2022-11-13 NOTE — CONSULTS
Pulmonary Medicine and [address] Radha Flores MD      Patient - Dorys Melendez   MRN -  [id number]   [facility]   - 1960      Date of Admission -  2022 12:37 AM  Date of evaluation -  2022  Room -    Ever Cordova MD Primary Care Physician - Issac Otero MD     Reason for Consult    Bilateral pleural effusions    Assessment   Acute hypoxic respiratory insufficiency   Bilateral pleural effusions/pulmonary edema/fluid overload/CHF  ESRD on hemodialysis  Pericardial effusion, 2 cm thickness, increased since 2022  Nausea and vomiting/constipation/stool retention  Bilateral BKA, CAD, DM, HLD, HTN, PAD    Recommendations   Oxygen via nasal cannula,  keep SPO2 90% or greater   Incentive spirometry every hour while awake  Check ABG  BiPAP support   IR to evaluate for right thoracentesis on Monday   Fluid removal/hemodialysis per nephrology   Monitor blood sugars  Cardiology consult  Check echocardiogram  X-ray chest in am  Labs: CBC and BMP in am  DVT prophylaxis, subcu heparin  Discussed with RN   Will follow with you    Problem List      Patient Active Problem List   Diagnosis    Hyperlipidemia    Essential hypertension    ESRD on hemodialysis ([address] MWF    Insomnia    ED (erectile dysfunction)    Chronic bilateral low back pain with bilateral sciatica    Controlled diabetes mellitus type 2 with complications (HCC)    S/P bilateral below knee amputation ([address]    Long term (current) use of antithrombotics/antiplatelets    Chronic use of opiate drugs therapeutic purposes    Spinal stenosis of lumbar region with neurogenic claudication    Coronary artery disease involving native coronary artery    Anemia    Emesis       HPI     Dorys Melendez is 60 y.o.,  male presented to the emergency room early this morning with complaints of generalized abdominal discomfort, nausea and vomiting.   His symptoms started a few hours prior to his arrival.  Patient is ESRD on hemodialysis. He states he did not miss any of his dialysis treatments, last treatment Friday. He denied any shortness of breath, cough or any chest pain. His evaluation in the ED revealed bilateral pleural effusions greater on the right. He also was having intermittent episodes of hypoxia. He has not been checked for sleep apnea. Does not wear oxygen at home. Currently fairly sleepy at this time but does awaken and able to answer some of my questions but falls back asleep pretty quickly. He smoked for short period of time back in the 70s he tells me. He denies any chest pain or significant cough. He has some mild shortness of breath. He tells me he had chills this morning. Last bowel movement was Thursday. PMHx   Past Medical History      Diagnosis Date    Acute congestive heart failure (HCC)     Chronic bilateral low back pain with bilateral sciatica     Coronary artery disease involving native coronary artery 9/6/2022    CRF (chronic renal failure)     [facility] MWF    DDD (degenerative disc disease), lumbar 12/15/2020    Diabetes mellitus ([facility]     Dialysis patient [facility])     ED (erectile dysfunction)     History of echocardiogram 2014    [facility]    HTN (hypertension)     Hyperlipidemia     LVH (left ventricular hypertrophy)     PVD (peripheral vascular disease) ([facility])     S/P bilateral BKA (below knee amputation) ([facility]     Wears glasses       Past Surgical History        Procedure Laterality Date    ARM SURGERY      CATARACT REMOVAL WITH IMPLANT      DIALYSIS FISTULA CREATION Bilateral     As of 2019, RUE AVF functioning, LUE AVF nonfunctioning.     FINGER AMPUTATION      right pointer finger    FINGER SURGERY Left     I & D    GLAUCOMA SURGERY      LEG AMPUTATION BELOW KNEE Bilateral     TUNNELED VENOUS CATHETER PLACEMENT      PC placed and removed       Meds    Current Medications    sodium chloride flush  5-40 mL IntraVENous 2 times per day    enoxaparin  30 mg SubCUTAneous Daily    dexamethasone  6 mg IntraVENous Once     droperidol, sodium chloride flush, sodium chloride, acetaminophen, ondansetron **OR** ondansetron, glucose, dextrose bolus **OR** dextrose bolus, glucagon (rDNA), dextrose  IV Drips/Infusions   sodium chloride      dextrose       Home Medications  Medications Prior to Admission: traMADol (ULTRAM) 50 MG tablet, Take 1 tablet by mouth daily as needed for Pain for up to 30 days. oxyCODONE-acetaminophen (PERCOCET) 7.5-325 MG per tablet, Take 1 tablet by mouth daily as needed for Pain for up to 30 days. On HD days  traZODone (DESYREL) 50 MG tablet, Take 1 tablet by mouth nightly as needed for Sleep  linagliptin (TRADJENTA) 5 MG tablet, TAKE ONE TABLET BY MOUTH DAILY  amLODIPine-atorvastatatin (CADUET) 10-80 MG per tablet, Take 1 tablet by mouth daily  diphenhydrAMINE-APAP, sleep, (TYLENOL PM EXTRA STRENGTH)  MG tablet, Take 1 tablet by mouth nightly as needed for Sleep  lisinopril (PRINIVIL;ZESTRIL) 20 MG tablet, Take 1 tablet by mouth in the morning and at bedtime  midodrine (PROAMATINE) 5 MG tablet, Take 1 tablet by mouth 2 times daily  glimepiride (AMARYL) 2 MG tablet, TAKE ONE TABLET BY MOUTH EVERY MORNING BEFORE BREAKFAST  cloNIDine (CATAPRES) 0.3 MG tablet, Take 1 tablet by mouth 2 times daily Do not take if HR < 60  acetaminophen (TYLENOL) 650 MG extended release tablet, Take 650 mg by mouth every 8 hours as needed for Pain  Sucroferric Oxyhydroxide (VELPHORO) 500 MG CHEW, Take 1,000 mg by mouth 3 times daily  pregabalin (LYRICA) 75 MG capsule, Take 1 capsule by mouth daily for 30 days. apixaban (ELIQUIS) 5 MG TABS tablet, Take 1 tablet by mouth 2 times daily  Multiple Vitamins-Minerals (RENAPLEX-D PO), Take 1 tablet by mouth daily     Allergies    Patient has no known allergies.   Social History     Social History     Tobacco Use    Smoking status: Never    Smokeless tobacco: Never   Substance Use Topics    Alcohol use: Yes     Comment: rare Family History          Problem Relation Age of Onset    Diabetes Mother     Coronary Art Dis Mother     Hypertension Mother     Diabetes Father     Hypertension Father     Stroke Father     Cancer Sister     Hypertension Brother      ROS - 6 systems   General Denies any fever or chills  HEENT Denies any diplopia, tinnitus or vertigo  Resp positive for  dry cough  Cardiac Denies any chest pain, palpitations, claudication or edema  GI Denies any melena, hematochezia, hematemesis or pyrosis   Denies any frequency, urgency, hesitancy or incontinence  Heme Denies bruising or bleeding easily  Endocrine Denies any history of thyroid disease  Neuro Denies any focal motor or sensory deficits  Psychiatric Denies anxiety, depression, suicidal ideation  Skin Denies rashes, itching, open sores    Vitals     height is 5' 11\" (1.803 m) and weight is 215 lb (97.5 kg). His oral temperature is 97.5 °F (36.4 °C). His blood pressure is 112/99 (abnormal) and his pulse is 91. His respiration is 18 and oxygen saturation is 97%. Body mass index is 29.99 kg/m². I/O    No intake or output data in the 24 hours ending 11/13/22 1027  No intake/output data recorded. Patient Vitals for the past 96 hrs (Last 3 readings):   Weight   11/13/22 0039 215 lb (97.5 kg)     Exam   General Appearance Sleepy, in no acute distress  HEENT - Head is normocephalic, atraumatic. Pupil reactive to light  Neck - Supple,  trachea midline and straight  Lungs -decreased air exchange bibasilarly no wheezing or crackles  Cardiovascular - Heart sounds are normal.  Regular rhythm normal rate without murmur, gallop or rub. Abdomen - Soft, nontender, nondistended, no masses or organomegaly  Neurologic - CN II-XII are grossly intact. There are no focal motor or sensory deficits  Skin - No bruising or bleeding  Extremities - No cyanosis, clubbing.   Positive bilateral BKA  Labs  - Old records and notes have been reviewed in [facility]   CBC     Lab Results Component Value Date/Time    WBC 7.9 11/13/2022 12:45 AM    RBC 5.78 11/13/2022 12:45 AM    HGB 14.6 11/13/2022 12:45 AM    HCT 47.1 11/13/2022 12:45 AM     11/13/2022 12:45 AM    MCV 81.5 11/13/2022 12:45 AM    MCH 25.3 11/13/2022 12:45 AM    MCHC 31.0 11/13/2022 12:45 AM    RDW 20.0 11/13/2022 12:45 AM    LYMPHOPCT 7 11/13/2022 12:45 AM    MONOPCT 8 11/13/2022 12:45 AM    BASOPCT 0 11/13/2022 12:45 AM    MONOSABS 0.63 11/13/2022 12:45 AM    LYMPHSABS 0.55 11/13/2022 12:45 AM    EOSABS 0.00 11/13/2022 12:45 AM    BASOSABS 0.00 11/13/2022 12:45 AM    DIFFTYPE NOT REPORTED 07/04/2021 09:28 PM     BMP   Lab Results   Component Value Date/Time     11/13/2022 12:45 AM    K 4.6 11/13/2022 12:45 AM    CL 90 11/13/2022 12:45 AM    CO2 21 11/13/2022 12:45 AM    BUN 17 11/13/2022 12:45 AM    CREATININE 5.42 11/13/2022 12:45 AM    GLUCOSE 78 11/13/2022 04:54 AM    CALCIUM 11.0 11/13/2022 12:45 AM    MG 2.1 09/18/2022 05:17 AM     LFTS  Lab Results   Component Value Date/Time    ALKPHOS 153 11/13/2022 12:45 AM    ALT 38 11/13/2022 12:45 AM    AST 56 11/13/2022 12:45 AM    PROT 8.8 11/13/2022 12:45 AM    BILITOT 1.0 11/13/2022 12:45 AM    BILIDIR 0.24 06/26/2022 02:24 PM    IBILI 0.28 06/26/2022 02:24 PM    LABALBU 4.8 11/13/2022 12:45 AM     PTT  Lab Results   Component Value Date    APTT 28.7 01/06/2021     INR   Lab Results   Component Value Date    INR 0.9 09/18/2019    INR 1.0 04/03/2014    INR 1.0 12/04/2013    PROTIME 10.1 09/18/2019    PROTIME 10.5 04/03/2014    PROTIME 11.1 12/04/2013       Radiology    CXR       CT Scans    (See actual reports for details)    \"Thank you for asking us to see this patient\"    Case discussed with nurse and patient  Questions and concerns addressed.     Electronically signed by     David Oropeza MD on 11/13/2022 at 11:51 AM  Pulmonary Critical Care and Sleep Medicine,  [facility]  Cell: [phone]  Office: [phone]

## 2022-11-13 NOTE — DIALYSIS
Upon arrival to dialysis room to setup machine. Patient was awake in bed with supplemental oxygen per NC in place and labored breathing at rest noted. Oxygen saturation was 95%. Patient was upset about my time of arrival and stated  \"I mide as well wait until the morning since I been waiting this long\". Patient refused dialysis stating, \"I'm the patient, I have the right to say what I want. \" I reassured patient and educated him on the importance of this off schedule HD treatment to help improve breathing as he has an overload of fluid causing concerns for his breathing. Patient verbalized understanding however, he had no change in his decision. Hospital manager Jovany MCKEON also came in room to reinforce importance of this treatment. Patient continued to refuse and again, stated \"I will wait until the morning\". Jovany MCKEON educated and informed patient of potential adverse effects of refusing and waiting until tomorrow and patient stated any complications will not happen, \"I will be okay\" and continued to refuse dialysis today.  Kailyn Tyler RN stated it was okay for me to leave. Dialysis staff will be on stand by until further notice. Hospital staff made aware to call the on call dialysis number Gilmore Duff) at [phone] if tx plans should change today. Patient is added to the dialysis schedule for tomorrow AM. Dr. Zayda kaufman.

## 2022-11-14 ENCOUNTER — APPOINTMENT (OUTPATIENT)
Dept: GENERAL RADIOLOGY | Age: 62
DRG: 291 | End: 2022-11-14
Payer: COMMERCIAL

## 2022-11-14 ENCOUNTER — APPOINTMENT (OUTPATIENT)
Dept: INTERVENTIONAL RADIOLOGY/VASCULAR | Age: 62
DRG: 291 | End: 2022-11-14
Payer: COMMERCIAL

## 2022-11-14 LAB
ANION GAP SERPL CALCULATED.3IONS-SCNC: 16 MMOL/L (ref 9–17)
BUN BLDV-MCNC: 36 MG/DL (ref 8–23)
BUN/CREAT BLD: 5 (ref 9–20)
CALCIUM SERPL-MCNC: 9.7 MG/DL (ref 8.6–10.4)
CHLORIDE BLD-SCNC: 93 MMOL/L (ref 98–107)
CO2: 23 MMOL/L (ref 20–31)
CREAT SERPL-MCNC: 7.13 MG/DL (ref 0.7–1.2)
EKG ATRIAL RATE: 89 BPM
EKG Q-T INTERVAL: 434 MS
EKG QRS DURATION: 84 MS
EKG QTC CALCULATION (BAZETT): 554 MS
EKG R AXIS: 105 DEGREES
EKG T AXIS: -146 DEGREES
EKG VENTRICULAR RATE: 98 BPM
GFR SERPL CREATININE-BSD FRML MDRD: 8 ML/MIN/1.73M2
GLUCOSE BLD-MCNC: 119 MG/DL (ref 70–99)
GLUCOSE BLD-MCNC: 141 MG/DL (ref 75–110)
GLUCOSE BLD-MCNC: 162 MG/DL (ref 75–110)
GLUCOSE BLD-MCNC: 167 MG/DL (ref 75–110)
GLUCOSE BLD-MCNC: 206 MG/DL (ref 75–110)
INR BLD: 1.8
LACTATE DEHYDROGENASE, FLUID: 76 U/L
LV EF: 38 %
LVEF MODALITY: NORMAL
PARTIAL THROMBOPLASTIN TIME: 40.9 SEC (ref 23.9–33.8)
PH FLUID: 8
POTASSIUM SERPL-SCNC: 5.4 MMOL/L (ref 3.7–5.3)
PROTHROMBIN TIME: 20.9 SEC (ref 11.5–14.2)
SODIUM BLD-SCNC: 132 MMOL/L (ref 135–144)
SPECIMEN TYPE: NORMAL
TOTAL PROTEIN, BODY FLUID: 1.9 G/DL

## 2022-11-14 PROCEDURE — 71045 X-RAY EXAM CHEST 1 VIEW: CPT

## 2022-11-14 PROCEDURE — 36415 COLL VENOUS BLD VENIPUNCTURE: CPT

## 2022-11-14 PROCEDURE — 99233 SBSQ HOSP IP/OBS HIGH 50: CPT | Performed by: FAMILY MEDICINE

## 2022-11-14 PROCEDURE — 83615 LACTATE (LD) (LDH) ENZYME: CPT

## 2022-11-14 PROCEDURE — 93306 TTE W/DOPPLER COMPLETE: CPT

## 2022-11-14 PROCEDURE — 85610 PROTHROMBIN TIME: CPT

## 2022-11-14 PROCEDURE — 2580000003 HC RX 258: Performed by: FAMILY MEDICINE

## 2022-11-14 PROCEDURE — 88305 TISSUE EXAM BY PATHOLOGIST: CPT

## 2022-11-14 PROCEDURE — 2700000000 HC OXYGEN THERAPY PER DAY

## 2022-11-14 PROCEDURE — 94761 N-INVAS EAR/PLS OXIMETRY MLT: CPT

## 2022-11-14 PROCEDURE — 93010 ELECTROCARDIOGRAM REPORT: CPT | Performed by: INTERNAL MEDICINE

## 2022-11-14 PROCEDURE — 84157 ASSAY OF PROTEIN OTHER: CPT

## 2022-11-14 PROCEDURE — 87206 SMEAR FLUORESCENT/ACID STAI: CPT

## 2022-11-14 PROCEDURE — 87205 SMEAR GRAM STAIN: CPT

## 2022-11-14 PROCEDURE — 90935 HEMODIALYSIS ONE EVALUATION: CPT

## 2022-11-14 PROCEDURE — 2060000000 HC ICU INTERMEDIATE R&B

## 2022-11-14 PROCEDURE — 87116 MYCOBACTERIA CULTURE: CPT

## 2022-11-14 PROCEDURE — 80048 BASIC METABOLIC PNL TOTAL CA: CPT

## 2022-11-14 PROCEDURE — C1729 CATH, DRAINAGE: HCPCS

## 2022-11-14 PROCEDURE — 87075 CULTR BACTERIA EXCEPT BLOOD: CPT

## 2022-11-14 PROCEDURE — 88112 CYTOPATH CELL ENHANCE TECH: CPT

## 2022-11-14 PROCEDURE — 83986 ASSAY PH BODY FLUID NOS: CPT

## 2022-11-14 PROCEDURE — 87102 FUNGUS ISOLATION CULTURE: CPT

## 2022-11-14 PROCEDURE — 87015 SPECIMEN INFECT AGNT CONCNTJ: CPT

## 2022-11-14 PROCEDURE — 32555 ASPIRATE PLEURA W/ IMAGING: CPT

## 2022-11-14 PROCEDURE — 85730 THROMBOPLASTIN TIME PARTIAL: CPT

## 2022-11-14 PROCEDURE — 2500000003 HC RX 250 WO HCPCS: Performed by: INTERNAL MEDICINE

## 2022-11-14 PROCEDURE — 82947 ASSAY GLUCOSE BLOOD QUANT: CPT

## 2022-11-14 PROCEDURE — 6370000000 HC RX 637 (ALT 250 FOR IP): Performed by: FAMILY MEDICINE

## 2022-11-14 PROCEDURE — 87070 CULTURE OTHR SPECIMN AEROBIC: CPT

## 2022-11-14 RX ORDER — SODIUM CITRATE 4 % (5 ML)
1.8 SYRINGE (ML) MISCELLANEOUS PRN
Status: DISCONTINUED | OUTPATIENT
Start: 2022-11-14 | End: 2022-11-15 | Stop reason: HOSPADM

## 2022-11-14 RX ORDER — ACETAMINOPHEN 325 MG/1
650 TABLET ORAL EVERY 4 HOURS PRN
Status: DISCONTINUED | OUTPATIENT
Start: 2022-11-14 | End: 2022-11-15 | Stop reason: HOSPADM

## 2022-11-14 RX ORDER — SODIUM CITRATE 4 % (5 ML)
1.7 SYRINGE (ML) MISCELLANEOUS PRN
Status: DISCONTINUED | OUTPATIENT
Start: 2022-11-14 | End: 2022-11-15 | Stop reason: HOSPADM

## 2022-11-14 RX ADMIN — CLONIDINE HYDROCHLORIDE 0.3 MG: 0.3 TABLET ORAL at 14:08

## 2022-11-14 RX ADMIN — Medication 1.8 ML: at 13:29

## 2022-11-14 RX ADMIN — LISINOPRIL 20 MG: 20 TABLET ORAL at 14:07

## 2022-11-14 RX ADMIN — ATORVASTATIN CALCIUM 80 MG: 80 TABLET, FILM COATED ORAL at 22:57

## 2022-11-14 RX ADMIN — Medication 1.7 ML: at 13:30

## 2022-11-14 RX ADMIN — APIXABAN 5 MG: 5 TABLET, FILM COATED ORAL at 22:57

## 2022-11-14 RX ADMIN — APIXABAN 5 MG: 5 TABLET, FILM COATED ORAL at 14:08

## 2022-11-14 RX ADMIN — SODIUM CHLORIDE, PRESERVATIVE FREE 10 ML: 5 INJECTION INTRAVENOUS at 22:58

## 2022-11-14 RX ADMIN — CLONIDINE HYDROCHLORIDE 0.3 MG: 0.3 TABLET ORAL at 22:57

## 2022-11-14 RX ADMIN — SODIUM CHLORIDE, PRESERVATIVE FREE 10 ML: 5 INJECTION INTRAVENOUS at 14:09

## 2022-11-14 RX ADMIN — LISINOPRIL 20 MG: 20 TABLET ORAL at 22:57

## 2022-11-14 RX ADMIN — AMLODIPINE BESYLATE 10 MG: 10 TABLET ORAL at 14:07

## 2022-11-14 ASSESSMENT — PAIN SCALES - GENERAL
PAINLEVEL_OUTOF10: 0
PAINLEVEL_OUTOF10: 0

## 2022-11-14 NOTE — CONSULTS
[facility] Cardiology    Consult                        Today's Date: 11/14/2022  Patient Name: Juan Dang  Date of admission: 11/13/2022 12:37 AM  Patient's age: 60 y.o., 1960  Admission Dx: Emesis [R11.10]  Nausea and vomiting, unspecified vomiting type [R11.2]    Reason for Consult: Shortness of breath    Requesting Physician: Jeancarlos Barron MD    CHIEF COMPLAINT: Shortness of breath    History Obtained From:  patient    HISTORY OF PRESENT ILLNESS:      The patient is a 60 y.o.  male who is admitted to the hospital for abdominal pain  The patient presented to the hospital with abdominal pain nausea. He was also short of breath and he was found to be in acute respiratory failure with oxygen saturation in the 70s. He was not volume overloaded. Currently he feels better and less short of breath. No chest pain no dizziness no syncope    Past Medical History:   has a past medical history of Acute congestive heart failure (HCC), Chronic bilateral low back pain with bilateral sciatica, Coronary artery disease involving native coronary artery, CRF (chronic renal failure), DDD (degenerative disc disease), lumbar, Diabetes mellitus ([facility] [address] Dialysis patient [facility]), ED (erectile dysfunction), History of echocardiogram, HTN (hypertension), Hyperlipidemia, LVH (left ventricular hypertrophy), PVD (peripheral vascular disease) ([facility] [address] S/P bilateral BKA (below knee amputation) [facility] and Wears glasses. Past Surgical History:   has a past surgical history that includes Glaucoma surgery; Cataract removal with implant; Leg amputation below knee (Bilateral); Tunneled venous catheter placement; Dialysis fistula creation (Bilateral); Finger surgery (Left); Finger amputation; and Arm Surgery. Home Medications:    Prior to Admission medications    Medication Sig Start Date End Date Taking?  Authorizing Provider   traMADol (ULTRAM) 50 MG tablet Take 1 tablet by mouth daily as needed for Pain for up to Provider, MD   aspirin 81 MG chewable tablet Take 81 mg by mouth every morning  9/20/22  Historical Provider, MD       Allergies:  Patient has no known allergies. Social History:   reports that he has never smoked. He has never used smokeless tobacco. He reports current alcohol use. He reports that he does not use drugs. Family History: family history includes Cancer in his sister; Coronary Art Dis in his mother; Diabetes in his father and mother; Hypertension in his brother, father, and mother; Stroke in his father. No h/o sudden cardiac death. No for premature CAD    REVIEW OF SYSTEMS:    Constitutional: there has been no unanticipated weight loss. There's been Yes change in energy level, Yes change in activity level. Eyes: No visual changes or diplopia. No scleral icterus. ENT: No Headaches, hearing loss or vertigo. No mouth sores or sore throat. Cardiovascular: No chest pain, Yes dyspnea on exertion, No palpitations or No loss of consciousness. No cough, hemoptysis, No pleuritic pain, or phlebitis. Respiratory: No cough or wheezing, no sputum production. No hematemesis. Gastrointestinal: No abdominal pain, appetite loss, blood in stools. No change in bowel or bladder habits. Genitourinary: No dysuria, trouble voiding, or hematuria. Musculoskeletal:  No gait disturbance, No weakness or joint complaints. Integumentary: No rash or pruritis. Neurological: No headache, diplopia, change in muscle strength, numbness or tingling. No change in gait, balance, coordination, mood, affect, memory, mentation, behavior. Psychiatric: No anxiety, or depression. Endocrine: No temperature intolerance. No excessive thirst, fluid intake, or urination. No tremor. Hematologic/Lymphatic: No abnormal bruising or bleeding, blood clots or swollen lymph nodes. Allergic/Immunologic: No nasal congestion or hives.     PHYSICAL EXAM:      BP (!) 134/101   Pulse (!) 113   Temp 97.9 °F (36.6 °C)   Resp 16   Ht 5' 11\" (1.803 m)   Wt 209 lb 7 oz (95 kg)   SpO2 90%   BMI 29.21 kg/m²    Constitutional and General Appearance: alert, cooperative, no distress, and appears stated age  [de-identified]: PERRL, no cervical lymphadenopathy. No masses palpable. Normal oral mucosa  Respiratory:  Normal excursion and expansion without use of accessory muscles  Resp Auscultation: Good respiratory effort. No for increased work of breathing. On auscultation: clear to auscultation bilaterally  Cardiovascular: The apical impulse is not displaced  Heart tones are crisp and normal. regular S1 and S2.  Jugular venous pulsation Normal  The carotid upstroke is normal in amplitude and contour without delay or bruit  Peripheral pulses are symmetrical and full  Abdomen:  No masses or tenderness  Bowel sounds present  Extremities:   No Cyanosis or Clubbing  Bilateral lower extremity amputation  Neurological:  Alert and oriented. Moves all extremities well  No abnormalities of mood, affect, memory, mentation, or behavior are noted    DATA:    Diagnostics:    EKG: Atrial fibrillation with nonspecific ST changes labs:   Cath 2021  Findings:  Left main: NL  LAD: mid 20%, distal at apical area 80% 9[place])  LCX: NL  RCA: Proximal / ostial calcified 90% stenosis. Required PTCA -KAMILAH [id number] stenosis to 20%  The LV gram was performed in the BUI 30 position. LVEF: 40%. LV Wall Motion: global mild hypokinesia     Estimated Blood Loss: < 20 cc. Conclusions:  Two vessel CAD  Successful PTCA -KAMILAH RCA  Mild LV dysfunction     Recommendation:  Post stent protocol  Recent Labs     11/13/22  0045   WBC 7.9   HGB 14.6   HCT 47.1        BMP:   Recent Labs     11/13/22  0045 11/13/22  0454 11/14/22  0837   *  --  132*   K 4.6  --  5.4*   CO2 21  --  23   BUN 17  --  36*   CREATININE 5.42*  --  7.13*   LABGLOM 11*  --  8*   GLUCOSE 57* 78 119*     BNP: No results for input(s): BNP in the last 72 hours.   PT/INR: No results for input(s): PROTIME, INR in the last 72 hours. APTT:No results for input(s): APTT in the last 72 hours. CARDIAC ENZYMES:No results for input(s): CKTOTAL, CKMB, CKMBINDEX, TROPONINI in the last 72 hours. FASTING LIPID PANEL:  Lab Results   Component Value Date/Time    HDL 55 02/01/2022 03:08 PM    TRIG 52 09/28/2021 03:15 PM     LIVER PROFILE:  Recent Labs     11/13/22  0045   AST 56*   ALT 38   LABALBU 4.8       IMPRESSION/RECOMMENDATIONS:  1. Acute CHF with volume overload. Possible thoracentesis today. The patient has end-stage renal disease and volume control via dialysis. Will obtain echocardiogram.  2. History of coronary artery disease s/p PCI. Stable. No signs of acute coronary syndrome. Had a chronically elevated troponins before which is most likely type II from end-stage renal disease. We will continue current medications observe for now. We will follow up on echocardiogram.  3.  Chronic atrial fibrillation. Rate controlled. Will observe for now resume Eliquis when okay with the primary service. Discussed with patient and Nurse.     Susan Stokes MD, MD [facility] Consult           [phone]

## 2022-11-14 NOTE — CARE COORDINATION
11/14/22 1402   Service Assessment   Patient Orientation Alert and Oriented;Person;Place;Situation;Self   Cognition Alert   History Provided By Patient   Primary Caregiver Self   Support Systems Children;Family Members   Patient's Healthcare Decision Maker is: Legal Next of Kin   PCP Verified by CM Yes   Last Visit to PCP Within last 3 months   Prior Functional Level Independent in ADLs/IADLs   Current Functional Level Independent in ADLs/IADLs   Can patient return to prior living arrangement Unknown at present   Ability to make needs known: Good   Family able to assist with home care needs: Yes   Would you like for me to discuss the discharge plan with any other family members/significant others, and if so, who? No   Social/Functional History   Lives With Alone   Type of [facility] One level   Home Access Stairs to enter without rails   Entrance Stairs - Number of Steps 4 steps   Bathroom Shower/Tub Tub/Shower unit   [address] Help From Family;Friend(s)   ADL Assistance Independent   Homemaking Assistance Independent   Ambulation Assistance Independent   Transfer Assistance Independent   Active  No   Mode of Transportation Cab; Family   Occupation On disability   Discharge Planning   Type of Residence House   Living Arrangements Alone   Current Services Prior To Admission Durable Medical Equipment   Current DME Prior to [employer]  (bilateral amputee. Has prosthesis and uses crutches.)   Potential Assistance Needed Durable Medical Equipment   DME Ordered? Crutches   Type of Home Care Services None   Patient expects to be discharged to: House   Follow Up Appointment: Best Day/Time  Monday AM   One/Two Story Residence One story   History of falls? 0   Services At/After Discharge   Services At/After Discharge None   [employer] Information Provided?  No   Mode of Transport at Discharge Other (see comment)  (cab or family)   Confirm Follow Up Transport Cab Condition of Participation: Discharge Planning   The Plan for Transition of Care is related to the following treatment goals: plan is home independently. The Patient and/or Patient Representative was provided with a Choice of Provider? Patient   The Patient and/Or Patient Representative agree with the Discharge Plan? Yes   Freedom of Choice list was provided with basic dialogue that supports the patient's individualized plan of care/goals, treatment preferences, and shares the quality data associated with the providers? Yes   Missed HD treatment. Dialysis at [facility] MWF. Uses cab for transpo. Lives alone. Plan is home independently. Needs a new set of crutches. Bilateral amputee. Has prosthetics. Ex-wife and son are patient's support. Plan for a thoracentesis today.

## 2022-11-14 NOTE — PROGRESS NOTES
HEMODIALYSIS PRE-TREATMENT NOTE    Patient Identifiers prior to treatment: Name  MRN    Isolation Required:  Yes                      Isolation Type: Contact       (please document if patient is being managed as a PUI/COVID-19 patient)        Hepatitis status:                           Date Drawn                             Result  Hepatitis B Surface Antigen 22     NEG                     Hepatitis B Surface Antibody 22 POS     87.34   Hepatitis B Core Antibody 22 POS          How was Hepatitis Status verified: [facility]     Was a copy of the labs you documented provided to facility for the patient's chart: Yes    Hemodialysis orders verified: Yes by Tuyet Decker    Access Within normal limits ( I.e. s/s of infection,...): WNL     Pre-Assessment completed: Mandy Lucero     Pre-dialysis report received from: [facility]                      Time: 10 Flavia Morocho

## 2022-11-14 NOTE — PROGRESS NOTES
Pulmonary Critical Care Progress Note  Aleena Wells MD     Patient seen for the follow up of  Emesis , acute hypoxic respiratory failure, pulmonary edema/bilateral pleural effusions    Subjective:  He is resting in bed, no distress. He is currently receiving dialysis. He is on room air saturating 99%. He denies shortness of breath, cough or chest pain. He is feeling much better today. He does not have any nausea or vomiting so far this morning. Examination:  Vitals: BP (!) 134/101   Pulse (!) 113   Temp 97.9 °F (36.6 °C)   Resp 16   Ht 5' 11\" (1.803 m)   Wt 209 lb 7 oz (95 kg)   SpO2 90%   BMI 29.21 kg/m²   General appearance: alert and cooperative with exam  Neck: No JVD  Lungs: Moderate to decreased air exchange, no crackles or wheezing  Heart: regular rate and rhythm, S1, S2 normal, no gallop  Abdomen: Soft, non tender, + BS  Extremities: no cyanosis or clubbing.  No significant edema    LABs:  CBC:   Recent Labs     11/13/22 0045   WBC 7.9   HGB 14.6   HCT 47.1        BMP:   Recent Labs     11/13/22  0045 11/13/22  0454 11/14/22  0837   *  --  132*   K 4.6  --  5.4*   CO2 21  --  23   BUN 17  --  36*   CREATININE 5.42*  --  7.13*   LABGLOM 11*  --  8*   GLUCOSE 57* 78 119*     LIVER PROFILE:  Recent Labs     11/13/22 0045   AST 56*   ALT 38   LABALBU 4.8      Latest Reference Range & Units 11/13/22 00:45   Procalcitonin <0.09 ng/mL 0.91 (H)   (H): Data is abnormally high    Radiology:  11/14/22      Impression:  Acute hypoxic respiratory insufficiency   Bilateral pleural effusions/pulmonary edema/fluid overload/CHF  ESRD on hemodialysis  Pericardial effusion, 2 cm thickness, increased since June 2022  Nausea and vomiting/constipation/stool retention  Bilateral BKA, CAD, DM, HLD, HTN, PAD    Recommendations:  Oxygen via nasal cannula,  keep SPO2 90% or greater   Incentive spirometry every hour while awake  BiPAP support if necessary  IR to evaluate for right thoracentesis   Fluid removal/hemodialysis per nephrology   Cardiology consult  Echocardiogram  X-ray chest in am  Labs: CBC and BMP in am  DVT prophylaxis, subcu heparin  Will follow with you    Electronically signed by     Riddhi Cruz MD on 11/14/2022 at 12:20 PM  Pulmonary Critical Care and Sleep Medicine,  [facility]  Cell: [phone]  Office: [phone]

## 2022-11-14 NOTE — PLAN OF CARE
Problem: Chronic Conditions and Co-morbidities  Goal: Patient's chronic conditions and co-morbidity symptoms are monitored and maintained or improved  Outcome: Progressing  Flowsheets (Taken 11/14/2022 0800)  Care Plan - Patient's Chronic Conditions and Co-Morbidity Symptoms are Monitored and Maintained or Improved: Monitor and assess patient's chronic conditions and comorbid symptoms for stability, deterioration, or improvement     Problem: Discharge Planning  Goal: Discharge to home or other facility with appropriate resources  Outcome: Progressing  Flowsheets (Taken 11/14/2022 0800)  Discharge to home or other facility with appropriate resources: Identify barriers to discharge with patient and caregiver     Problem: Pain  Goal: Verbalizes/displays adequate comfort level or baseline comfort level  Outcome: Progressing     Problem: Neurosensory - Adult  Goal: Achieves stable or improved neurological status  Outcome: Progressing  Goal: Achieves maximal functionality and self care  Outcome: Progressing     Problem: Respiratory - Adult  Goal: Achieves optimal ventilation and oxygenation  Outcome: Progressing  Flowsheets (Taken 11/14/2022 0800)  Achieves optimal ventilation and oxygenation: Assess for changes in respiratory status     Problem: Cardiovascular - Adult  Goal: Maintains optimal cardiac output and hemodynamic stability  Outcome: Progressing  Flowsheets (Taken 11/14/2022 0800)  Maintains optimal cardiac output and hemodynamic stability: Monitor blood pressure and heart rate  Goal: Absence of cardiac dysrhythmias or at baseline  Outcome: Progressing  Flowsheets (Taken 11/14/2022 0800)  Absence of cardiac dysrhythmias or at baseline: Monitor cardiac rate and rhythm     Problem: Gastrointestinal - Adult  Goal: Minimal or absence of nausea and vomiting  Outcome: Progressing  Flowsheets (Taken 11/14/2022 0800)  Minimal or absence of nausea and vomiting: Administer ordered antiemetic medications as needed  Goal: Maintains or returns to baseline bowel function  Outcome: Progressing  Flowsheets (Taken 11/14/2022 0800)  Maintains or returns to baseline bowel function: Assess bowel function  Goal: Maintains adequate nutritional intake  Outcome: Progressing  Flowsheets (Taken 11/14/2022 0800)  Maintains adequate nutritional intake: Monitor percentage of each meal consumed     Problem: Metabolic/Fluid and Electrolytes - Adult  Goal: Electrolytes maintained within normal limits  Outcome: Progressing  Flowsheets (Taken 11/14/2022 0800)  Electrolytes maintained within normal limits: Monitor labs and assess patient for signs and symptoms of electrolyte imbalances  Goal: Hemodynamic stability and optimal renal function maintained  Outcome: Progressing  Flowsheets (Taken 11/14/2022 0800)  Hemodynamic stability and optimal renal function maintained: Monitor labs and assess for signs and symptoms of volume excess or deficit  Goal: Glucose maintained within prescribed range  Outcome: Progressing  Flowsheets (Taken 11/14/2022 0800)  Glucose maintained within prescribed range: Monitor blood glucose as ordered     Problem: Safety - Adult  Goal: Free from fall injury  Outcome: Progressing

## 2022-11-14 NOTE — PLAN OF CARE
Problem: Chronic Conditions and Co-morbidities  Goal: Patient's chronic conditions and co-morbidity symptoms are monitored and maintained or improved  Outcome: Progressing  Flowsheets  Taken 11/13/2022 1700  Care Plan - Patient's Chronic Conditions and Co-Morbidity Symptoms are Monitored and Maintained or Improved:   Monitor and assess patient's chronic conditions and comorbid symptoms for stability, deterioration, or improvement   Collaborate with multidisciplinary team to address chronic and comorbid conditions and prevent exacerbation or deterioration   Update acute care plan with appropriate goals if chronic or comorbid symptoms are exacerbated and prevent overall improvement and discharge  Taken 11/13/2022 1330  Care Plan - Patient's Chronic Conditions and Co-Morbidity Symptoms are Monitored and Maintained or Improved:   Monitor and assess patient's chronic conditions and comorbid symptoms for stability, deterioration, or improvement   Collaborate with multidisciplinary team to address chronic and comorbid conditions and prevent exacerbation or deterioration   Update acute care plan with appropriate goals if chronic or comorbid symptoms are exacerbated and prevent overall improvement and discharge  Taken 11/13/2022 1030  Care Plan - Patient's Chronic Conditions and Co-Morbidity Symptoms are Monitored and Maintained or Improved:   Monitor and assess patient's chronic conditions and comorbid symptoms for stability, deterioration, or improvement   Collaborate with multidisciplinary team to address chronic and comorbid conditions and prevent exacerbation or deterioration     Problem: Discharge Planning  Goal: Discharge to home or other facility with appropriate resources  Outcome: Progressing  Flowsheets  Taken 11/13/2022 1700  Discharge to home or other facility with appropriate resources:   Identify barriers to discharge with patient and caregiver   Arrange for needed discharge resources and transportation as appropriate   Identify discharge learning needs (meds, wound care, etc)   Refer to discharge planning if patient needs post-hospital services based on physician order or complex needs related to functional status, cognitive ability or social support system  Taken 11/13/2022 1330  Discharge to home or other facility with appropriate resources:   Identify barriers to discharge with patient and caregiver   Arrange for needed discharge resources and transportation as appropriate   Identify discharge learning needs (meds, wound care, etc)   Refer to discharge planning if patient needs post-hospital services based on physician order or complex needs related to functional status, cognitive ability or social support system  Taken 11/13/2022 1030  Discharge to home or other facility with appropriate resources:   Identify barriers to discharge with patient and caregiver   Arrange for needed discharge resources and transportation as appropriate   Identify discharge learning needs (meds, wound care, etc)   Refer to discharge planning if patient needs post-hospital services based on physician order or complex needs related to functional status, cognitive ability or social support system     Problem: Pain  Goal: Verbalizes/displays adequate comfort level or baseline comfort level  Outcome: Progressing  Flowsheets  Taken 11/13/2022 1600  Verbalizes/displays adequate comfort level or baseline comfort level:   Encourage patient to monitor pain and request assistance   Assess pain using appropriate pain scale   Administer analgesics based on type and severity of pain and evaluate response   Implement non-pharmacological measures as appropriate and evaluate response   Notify Licensed Independent Practitioner if interventions unsuccessful or patient reports new pain  Taken 11/13/2022 1300  Verbalizes/displays adequate comfort level or baseline comfort level:   Encourage patient to monitor pain and request assistance   Assess pain using appropriate pain scale   Administer analgesics based on type and severity of pain and evaluate response   Implement non-pharmacological measures as appropriate and evaluate response   Notify Licensed Independent Practitioner if interventions unsuccessful or patient reports new pain  Taken 11/13/2022 1030  Verbalizes/displays adequate comfort level or baseline comfort level:   Encourage patient to monitor pain and request assistance   Assess pain using appropriate pain scale   Administer analgesics based on type and severity of pain and evaluate response   Implement non-pharmacological measures as appropriate and evaluate response   Notify Licensed Independent Practitioner if interventions unsuccessful or patient reports new pain     Problem: Neurosensory - Adult  Goal: Achieves stable or improved neurological status  Outcome: Progressing  Flowsheets (Taken 11/13/2022 1700)  Achieves stable or improved neurological status:   Assess for and report changes in neurological status   Initiate measures to prevent increased intracranial pressure   Maintain blood pressure and fluid volume within ordered parameters to optimize cerebral perfusion and minimize risk of hemorrhage   Monitor temperature, glucose, and sodium.  Initiate appropriate interventions as ordered  Goal: Achieves maximal functionality and self care  Outcome: Progressing  Flowsheets (Taken 11/13/2022 1700)  Achieves maximal functionality and self care:   Monitor swallowing and airway patency with patient fatigue and changes in neurological status   Encourage visually impaired, hearing impaired and aphasic patients to use assistive/communication devices   Encourage and assist patient to increase activity and self care with guidance from physical therapy/occupational therapy     Problem: Respiratory - Adult  Goal: Achieves optimal ventilation and oxygenation  Outcome: Progressing  Flowsheets (Taken 11/13/2022 1700)  Achieves optimal ventilation and oxygenation:   Assess for changes in respiratory status   Assess for changes in mentation and behavior   Position to facilitate oxygenation and minimize respiratory effort   Oxygen supplementation based on oxygen saturation or arterial blood gases   Encourage broncho-pulmonary hygiene including cough, deep breathe, incentive spirometry   Assess the need for suctioning and aspirate as needed   Assess and instruct to report shortness of breath or any respiratory difficulty   Respiratory therapy support as indicated     Problem: Cardiovascular - Adult  Goal: Maintains optimal cardiac output and hemodynamic stability  Outcome: Progressing  Flowsheets (Taken 11/13/2022 1700)  Maintains optimal cardiac output and hemodynamic stability:   Monitor blood pressure and heart rate   Monitor urine output and notify Licensed Independent Practitioner for values outside of normal range   Assess for signs of decreased cardiac output   Administer fluid and/or volume expanders as ordered  Goal: Absence of cardiac dysrhythmias or at baseline  Outcome: Progressing  Flowsheets (Taken 11/13/2022 1700)  Absence of cardiac dysrhythmias or at baseline:   Monitor cardiac rate and rhythm   Assess for signs of decreased cardiac output   Administer antiarrhythmia medication and electrolyte replacement as ordered     Problem: Gastrointestinal - Adult  Goal: Minimal or absence of nausea and vomiting  Outcome: Progressing  Flowsheets (Taken 11/13/2022 1700)  Minimal or absence of nausea and vomiting:   Administer IV fluids as ordered to ensure adequate hydration   Maintain NPO status until nausea and vomiting are resolved   Administer ordered antiemetic medications as needed   Provide nonpharmacologic comfort measures as appropriate   Advance diet as tolerated, if ordered   Nutrition consult to assist patient with adequate nutrition and appropriate food choices  Goal: Maintains or returns to baseline bowel function  Outcome: Progressing  Flowsheets (Taken 11/13/2022 1700)  Maintains or returns to baseline bowel function:   Assess bowel function   Encourage oral fluids to ensure adequate hydration   Administer IV fluids as ordered to ensure adequate hydration   Administer ordered medications as needed   Encourage mobilization and activity   Nutrition consult to assist patient with appropriate food choices  Goal: Maintains adequate nutritional intake  Outcome: Progressing  Flowsheets (Taken 11/13/2022 1700)  Maintains adequate nutritional intake:   Monitor percentage of each meal consumed   Identify factors contributing to decreased intake, treat as appropriate   Assist with meals as needed   Monitor intake and output, weight and lab values   Obtain nutritional consult as needed     Problem: Metabolic/Fluid and Electrolytes - Adult  Goal: Electrolytes maintained within normal limits  Outcome: Progressing  Flowsheets (Taken 11/13/2022 1700)  Electrolytes maintained within normal limits:   Monitor labs and assess patient for signs and symptoms of electrolyte imbalances   Administer electrolyte replacement as ordered   Monitor response to electrolyte replacements, including repeat lab results as appropriate   Fluid restriction as ordered   Instruct patient on fluid and nutrition restrictions as appropriate  Goal: Hemodynamic stability and optimal renal function maintained  Outcome: Progressing  Flowsheets (Taken 11/13/2022 1700)  Hemodynamic stability and optimal renal function maintained:   Monitor labs and assess for signs and symptoms of volume excess or deficit   Monitor intake, output and patient weight   Monitor urine specific gravity, serum osmolarity and serum sodium as indicated or ordered   Monitor response to interventions for patient's volume status, including labs, urine output, blood pressure (other measures as available)   Encourage oral intake as appropriate   Instruct patient on fluid and nutrition restrictions as appropriate  Goal: Glucose maintained within prescribed range  Outcome: Progressing  Flowsheets (Taken 11/13/2022 1700)  Glucose maintained within prescribed range:   Monitor blood glucose as ordered   Assess for signs and symptoms of hyperglycemia and hypoglycemia   Administer ordered medications to maintain glucose within target range   Assess barriers to adequate nutritional intake and initiate nutrition consult as needed   Instruct patient on self management of diabetes and initiate consult as needed     Problem: Safety - Adult  Goal: Free from fall injury  Outcome: Progressing

## 2022-11-14 NOTE — FLOWSHEET NOTE
11/14/22 1056   Treatment Team Notification   Reason for Communication Evaluate; Review case   Team Member Name    Treatment Team Role Consulting Provider   Method of Communication Face to face   Response At bedside   Notification Time 105     Continue w/POC & meds

## 2022-11-14 NOTE — DISCHARGE INSTR - COC
Continuity of Care Form    Patient Name: Vernon Galo   :  1960  MRN:  [id number]    Admit date:  2022  Discharge date:  ***    Code Status Order: Full Code   Advance Directives:     Admitting Physician:  Emerson Hirsch MD  PCP: Emerson Hirsch MD    Discharging Nurse: [facility] Care Unit/Room#:   Discharging Unit Phone Number: ***    Emergency Contact:   Extended Emergency Contact Information  Primary Emergency Contact: Melvin Murphy  Address: [place] [address] Phone: [phone]  Relation: Child  Secondary Emergency Contact: Matthias Kan [address] Phone: [phone]  Relation: Other    Past Surgical History:  Past Surgical History:   Procedure Laterality Date    ARM SURGERY      CATARACT REMOVAL WITH IMPLANT      DIALYSIS FISTULA CREATION Bilateral     As of , RUE AVF functioning, LUE AVF nonfunctioning.     FINGER AMPUTATION      right pointer finger    FINGER SURGERY Left     I & D    GLAUCOMA SURGERY      LEG AMPUTATION BELOW KNEE Bilateral     TUNNELED VENOUS CATHETER PLACEMENT      PC placed and removed       Immunization History:   Immunization History   Administered Date(s) Administered    COVID-19, MODERNA BLUE border, Primary or Immunocompromised, (age 12y+), IM, 100 mcg/0.5mL 03/10/2021, 2021    COVID-19, MODERNA Booster BLUE border, (age 18y+), IM, 50mcg/0.25mL 2021    Influenza Vaccine, unspecified formulation 10/08/2014, 10/02/2015    Influenza Virus Vaccine 10/02/2019, 2020, 10/01/2021, 2022    Pneumococcal Conjugate 13-valent (Tyxoxud98) 2015, 2018    Pneumococcal Polysaccharide (Fensuacwg71) 2015    Tdap (Boostrix, Adacel) 2020       Active Problems:  Patient Active Problem List   Diagnosis Code    Hyperlipidemia E78.5    Essential hypertension I10    ESRD on hemodialysis ([facility] [address] MWF N18.6, Z99.2    Insomnia G47.00    ED (erectile dysfunction) N52.9    Chronic bilateral low back pain with bilateral sciatica M54.42, M54.41, G89.29    Controlled diabetes mellitus type 2 with complications ([facility] [address] X69.7    S/P bilateral below knee amputation ([facility] [address] Z89.512, Z89.511    Long term (current) use of antithrombotics/antiplatelets U69.85    Chronic use of opiate drugs therapeutic purposes Z79.891    Spinal stenosis of lumbar region with neurogenic claudication M48.062    Coronary artery disease involving native coronary artery I25.10    Anemia D64.9    Emesis R11.10       Isolation/Infection:   Isolation            Contact          Patient Infection Status       Infection Onset Added Last Indicated Last Indicated By Review Planned Expiration Resolved Resolved By    MRSA  09/06/17 09/06/17 Jeny Caldwell RN        Arm - 9/2017    Resolved    COVID-19 (Rule Out) 11/13/22 11/13/22 11/13/22 COVID-19, Rapid (Ordered)   11/13/22 Rule-Out Test Resulted    COVID-19 (Rule Out) 09/02/22 09/02/22 09/02/22 COVID-19, Rapid (Ordered)   09/02/22 Rule-Out Test Resulted            Nurse Assessment:  Last Vital Signs: BP (!) 139/98   Pulse 99   Temp 97.9 °F (36.6 °C)   Resp 16   Ht 5' 11\" (1.803 m)   Wt 202 lb 13.2 oz (92 kg)   SpO2 94%   BMI 28.29 kg/m²     Last documented pain score (0-10 scale): Pain Level: 0  Last Weight:   Wt Readings from Last 1 Encounters:   11/14/22 202 lb 13.2 oz (92 kg)     Mental Status:  {IP PT MENTAL STATUS:10503}    IV Access:  { ROSALINE IV ACCESS:068210224}    Nursing Mobility/ADLs:  Walking   {[facility] DME LMON:458937086}  Transfer  {[facility] DME PSPF:536770890}  Bathing  {[facility] DME ZZGF:055631134}  Dressing  {[facility] DME ZMDO:657005102}  Toileting  {[facility] DME WHSF:204527082}  Feeding  {[facility] DME YMOA:427540369}  Med Admin  {[facility] DME ABRE:613075129}  Med Delivery   { ROSALINE MED Delivery:359469877}    Wound Care Documentation and Therapy:  Incision 09/05/13 Arm Left (Active)   Number of days: 3357       Incision 10/29/13 Arm Left (Active)   Number of days: 3303       Incision 02/18/14 Leg Lower;Right (Active)   Number of days: 3191       Incision 22 Buttocks Right (Active)   Number of days: 56        Elimination:  Continence: Bowel: {YES / VE:72170}  Bladder: {YES / VE:33015}  Urinary Catheter: {Urinary Catheter:175273064}   Colostomy/Ileostomy/Ileal Conduit: {YES / DI:60718}       Date of Last BM: ***    Intake/Output Summary (Last 24 hours) at 2022 1614  Last data filed at 2022 1431  Gross per 24 hour   Intake 620 ml   Output 4350 ml   Net -3730 ml     I/O last 3 completed shifts:   In: 20 [I.V.:20]  Out: -     Safety Concerns:     [employer] Safety Concerns:118172323}    Impairments/Disabilities:      [employer] Impairments/Disabilities:883951570}    Nutrition Therapy:  Current Nutrition Therapy:   [employer] Diet List:639694237}    Routes of Feeding: {CHP DME Other Feedings:795855238}  Liquids: {Slp liquid thickness:83858}  Daily Fluid Restriction: {CHP DME Yes amt example:041353544}  Last Modified Barium Swallow with Video (Video Swallowing Test): {Done Not Done IQKX:336383452}    Treatments at the Time of Hospital Discharge:   Respiratory Treatments: ***  Oxygen Therapy:  {Therapy; copd oxygen:58513}  Ventilator:    { CC Vent GGJO:009995242}    Rehab Therapies: {THERAPEUTIC INTERVENTION:3076097692}  Weight Bearing Status/Restrictions: [employer]  Weight Bearin}  Other Medical Equipment (for information only, NOT a DME order):  {EQUIPMENT:557553548}  Other Treatments: ***    Patient's personal belongings (please select all that are sent with patient):  {[facility] DME Belongings:375885971}    RN SIGNATURE:  {Esignature:457183609}    CASE MANAGEMENT/SOCIAL WORK SECTION    Inpatient Status Date: ***    Readmission Risk Assessment Score:  Readmission Risk              Risk of Unplanned Readmission:  25           Discharging to Facility/ Agency   Name:   Address:  Phone:  Fax:    Dialysis Facility (if applicable)   Name:  Address:  Dialysis Schedule:  Phone:  Fax:    / signature: {Esignature:394582192}    PHYSICIAN SECTION    Prognosis: Fair    Condition at Discharge: Stable    Rehab Potential (if transferring to Rehab): Fair    Recommended Labs or Other Treatments After Discharge:     Physician Certification: I certify the above information and transfer of Mady Trinidad  is necessary for the continuing treatment of the diagnosis listed and that he requires [address] for less 30 days.      Update Admission H&P: No change in H&P    PHYSICIAN SIGNATURE:  Electronically signed by Igor Schwab MD on 11/14/22 at 4:14 PM EST

## 2022-11-14 NOTE — FLOWSHEET NOTE
11/14/22 1047   Treatment Team Notification   Reason for Communication Evaluate; Review case   Team Member Name   (may discharge today)   Treatment Team Role Advanced Practice Nurse   Method of Communication Face to face   Response At bedside   Notification Time [phone]

## 2022-11-14 NOTE — BRIEF OP NOTE
Brief Postoperative Note for Thoracentesis    Juan Dang  YOB: 1960  [id number]    Pre-operative Diagnosis: Right Pleural effusion      Post-operative Diagnosis: Same    Procedure: Ultrasound guided Thoracentesis    Anesthesia: 1% Lidocaine     Surgeons/Assistants: Rocío Fernanedz MD    Complications: None    Specimens: were obtained    Ultrasound guided right thoracentesis performed. 850 ml deanna fluid obtained. Dressing applied. Chest x-ray ordered.         Electronically signed by Rocío Fernandez MD on 11/14/2022 at 2:35 PM

## 2022-11-14 NOTE — PROGRESS NOTES
Nephrology Progress Note        Subjective: patient evaluated on dialysis. Pt is stable on dialysis. Access cannulated without problems. No new issues overnite. Stable hemodynamics. Tolerating treatment well. Aim for 3 L of ultrafiltration today. Denies any shortness of breath orthopnea at present. On BiPAP at present. Abdominal pain better. Appetite good. No nausea or vomiting. Labs today reviewed, potassium 5.4, bicarbonate 23, calcium 9.7. Chest x-ray reviewed showing vascular congestion. CT scan that showed generalized edema anasarca and pericardial effusion. Hemodynamically stable    Objective:  CURRENT TEMPERATURE:  Temp: 97.9 °F (36.6 °C)  MAXIMUM TEMPERATURE OVER 24HRS:  Temp (24hrs), Av.6 °F (36.4 °C), Min:97.4 °F (36.3 °C), Max:97.9 °F (36.6 °C)    CURRENT RESPIRATORY RATE:  Resp: 16  CURRENT PULSE:  Heart Rate: (!) 113  CURRENT BLOOD PRESSURE:  BP: (!) 134/101  24HR BLOOD PRESSURE RANGE:  Systolic (75LAU), HLB:876 , Min:106 , XVR:011   ; Diastolic (31UWC), QNZ:066, Min:72, Max:148    24HR INTAKE/OUTPUT:  No intake or output data in the 24 hours ending 22 1058  Patient Vitals for the past 96 hrs (Last 3 readings):   Weight   22 1005 209 lb 7 oz (95 kg)   22 0400 215 lb 9.6 oz (97.8 kg)   22 0039 215 lb (97.5 kg)         Physical Exam:  General appearance:Awake, alert, in no acute distress  Skin: warm and dry, no rash or erythema  Eyes: conjunctivae normal and sclera anicteric  ENT:no thrush no pharyngeal congestion   Neck:  No JVD, No Thyromegaly  Pulmonary: clear to auscultation and no wheezing or rhonchi   Cardiovascular: normal S1 and S2. NO rubs and NO murmur.  No S3 OR S4   Abdomen: soft nontender, bowel sounds present, no organomegaly,  no ascites   Extremities: no cyanosis, clubbing or edema     Access:  previous permacath    Current Medications:    droperidol (INAPSINE) injection 0.625 mg, Q6H PRN  sodium chloride flush 0.9 % injection 5-40 mL, 2 times per day  sodium chloride flush 0.9 % injection 5-40 mL, PRN  0.9 % sodium chloride infusion, PRN  acetaminophen (TYLENOL) tablet 650 mg, Q4H PRN  ondansetron (ZOFRAN-ODT) disintegrating tablet 4 mg, Q8H PRN   Or  ondansetron (ZOFRAN) injection 4 mg, Q6H PRN  glucose chewable tablet 16 g, PRN  dextrose bolus 10% 125 mL, PRN   Or  dextrose bolus 10% 250 mL, PRN  glucagon (rDNA) injection 1 mg, PRN  dextrose 10 % infusion, Continuous PRN  metoprolol (LOPRESSOR) injection 5 mg, Q6H PRN  apixaban (ELIQUIS) tablet 5 mg, BID  cloNIDine (CATAPRES) tablet 0.3 mg, BID  lisinopril (PRINIVIL;ZESTRIL) tablet 20 mg, BID  traZODone (DESYREL) tablet 50 mg, Nightly PRN  ALPRAZolam (XANAX) tablet 0.5 mg, BID PRN  amLODIPine (NORVASC) tablet 10 mg, Daily  atorvastatin (LIPITOR) tablet 80 mg, Nightly      Labs:   CBC:   Recent Labs     11/13/22 0045   WBC 7.9   RBC 5.78*   HGB 14.6   HCT 47.1      MPV Abnormal      BMP:   Recent Labs     11/13/22  0045 11/13/22  0454 11/14/22  0837   *  --  132*   K 4.6  --  5.4*   CL 90*  --  93*   CO2 21  --  23   BUN 17  --  36*   CREATININE 5.42*  --  7.13*   GLUCOSE 57* 78 119*   CALCIUM 11.0*  --  9.7        Phosphorus:  No results for input(s): PHOS in the last 72 hours. Magnesium: No results for input(s): MG in the last 72 hours. Albumin:   Recent Labs     11/13/22  0045   LABALBU 4.8       Dialysis bath: Dialysis Bath  K+ (Potassium): 2  Ca+ (Calcium): 2  Na+ (Sodium): 138  HCO3 (Bicarb): 35    Radiology:  Reviewed as available. Assessment:  1 ESRD on hemodialysis Monday Wednesday Friday at the [facility] unit via tunnel catheter under Dr. Samreen Chang dry weight 97 kg:dialysis bath reviewed with nurse. 2.HTN: Blood pressure well controlled  3 volume overload from noncompliance   4 bilateral lower extremity amputation  5.abdominal pain  6.pericardial effusion of unclear significance, noncompliance could be contributing    Plan:  1. Wt removal and dialysis orders reviewed.     2. Fluid restriction 1.5 L a day  3. Continue outpatient medications  4. Follow up labs ordered. 5.  Stable for discharge from nephrology standpoint  6. May need thoracentesis decision up to pulmonary service  7. Await cardiology recommendations for pericardial effusion      Please do not hesitate to call with questions.     Electronically signed by Chavo Cruz MD on 11/14/2022 at 10:58 AM

## 2022-11-14 NOTE — PROGRESS NOTES
Writer reinforced education on need for dialysis and encouraged patient to do treatment, but patient continues to refuse and states that he will do dialysis tomorrow. Continue to monitor.

## 2022-11-14 NOTE — PROGRESS NOTES
HEMODIALYSIS POST TREATMENT NOTE    Treatment time ordered: 3.5hrs    Actual treatment time: 3.5hrs    UltraFiltration Goal: 3.5kgs  UltraFiltration Removed: 3.5kgs      Pre Treatment weight: 95.0kgs  Post Treatment weight: 93.0kgs  Estimated Dry Weight: 98.0kgs    Access used:     Central Venous Catheter:          Tunneled or Non-tunneled: Tunneled           Site: Left Chestwall          Access Flow: Good      Internal Access:       AV Fistula or AV Graft: N/A         Site: N/A       Access Flow: N/A       Sign and symptoms of infection: No       If YES: N/A    Medications or blood products given: See MAR    Chronic outpatient schedule: [facility]    Chronic outpatient unit: [facility]    Summary of response to treatment: Patient tolerated treatment well 3.0 kgs of fluid removed without issue. Cath clamped and capped     Explain if orders NOT met, was physician notified:N/A      ACES flowsheet faxed to patient unit/ placed in patient chart: Yes    Post assessment completed: Conchita Quan    Report given to: Kathleen Berry documented Safety Checks include the followin) Access and face visible at all times. 2) All connections and blood lines are secure with no kinks. 3) NVL alarm engaged. 4) Hemosafe device applied (if applicable). 5) No collapse of Arterial or Venous blood chambers. 6) All blood lines / pump segments in the air detectors.

## 2022-11-14 NOTE — PROGRESS NOTES
Progress Note    Subjective: Follow-up on pericardial and pleural fluid  Congestive heart failure  ESRD  Volume overload  Chronic A. fib  Diabetes mellitus  Hypertension  Hyperlipidemia  Anxiety  The patient is awake and alert. No problems overnight. Denies chest pain, angina, and dyspnea. Denies abdominal pain. Tolerating diet. No nausea or vomiting. Admit Date:  11/13/2022    Patient Seen, Chart, Labs, Radiology studies, and Consults reviewed. Objective:   BP (!) 139/98   Pulse 99   Temp 97.9 °F (36.6 °C)   Resp 16   Ht 5' 11\" (1.803 m)   Wt 202 lb 13.2 oz (92 kg)   SpO2 94%   BMI 28.29 kg/m²     Intake/Output Summary (Last 24 hours) at 11/14/2022 1615  Last data filed at 11/14/2022 1431  Gross per 24 hour   Intake 620 ml   Output 4350 ml   Net -3730 ml     General appearance - alert, well appearing, and in no distress and oriented to person, place, and time  Heart:  RRR, no murmurs, gallops, or rubs, extrasystoles. Lungs:  CTA bilaterally, no wheeze, rales or rhonchi, good air entry, good respiratory effort  Abd: bowel sounds present, nontender, nondistended, no masses, no rigidity, no guarding, no peritoneal signs.   Integumentary: Skin good color, good turgor, no rashes, no acute lesions  Upper Extrem:  No clubbing bilateral hands, no cyanosis or edema  Lower Extrem:no cyanosis or edema, no calf tenderness to lower extremities  Neurological - alert, oriented, normal speech, no focal findings or movement disorder noted, neck supple without rigidity, motor and sensory grossly normal bilaterally      Medications:    sodium chloride flush  5-40 mL IntraVENous 2 times per day    apixaban  5 mg Oral BID    cloNIDine  0.3 mg Oral BID    lisinopril  20 mg Oral BID    amLODIPine  10 mg Oral Daily    atorvastatin  80 mg Oral Nightly       Data:  CBC:   Recent Labs     11/13/22 0045   WBC 7.9   RBC 5.78*   HGB 14.6   HCT 47.1   MCV 81.5*   RDW 20.0*        BMP:   Recent Labs     11/13/22 0045 11/14/22  0837   * 132*   K 4.6 5.4*   CL 90* 93*   CO2 21 23   BUN 17 36*   CREATININE 5.42* 7.13*     BNP: No results for input(s): BNP in the last 72 hours. PT/INR:   Recent Labs     11/14/22  1336   PROTIME 20.9*   INR 1.8     APTT:   Recent Labs     11/14/22  1336   APTT 40.9*     CARDIAC ENZYMES: No results for input(s): CKMB, CKMBINDEX, TROPONINI in the last 72 hours. Invalid input(s): CKTOTAL;3  FASTING LIPID PANEL:  Lab Results   Component Value Date    CHOL 96 09/28/2021    HDL 55 02/01/2022    TRIG 52 09/28/2021     LIVER PROFILE:   Recent Labs     11/13/22  0045   AST 56*   ALT 38   BILITOT 1.0   ALKPHOS 153*        Assessment/Plan:  Principal Problem:    Emesis  Resolved Problems:    * No resolved hospital problems. *  Patient had dialysis done today with removal of 3 kg that he tolerated well  Status post right thoracentesis with removal of 8 L pending cytology  Pericardial fluid. She is asymptomatic  Congestive heart failure. Routine ejection fraction. Diabetes mellitus. Continue sliding scale  Hemodynamically much improved to restarting his home medication. Cardiology, nephrology and pulmonary critical care on board. Appreciate input  Plan of care discussed with patient and the nursing staff  ROSALINE signed  Will be leaving out of town in emergency tonight. [facility] will be covering for me. [facility] office has been notified. Discussed with RN  This note is created with a voice recognition program and while intend to generate a document that accurately reflects the content of the visit, no guarantee can be provided that every mistake has been identified and corrected by editing.         Giovanni Briceño MD, MD 11/14/2022 4:15 PM

## 2022-11-15 VITALS
HEART RATE: 71 BPM | BODY MASS INDEX: 29.1 KG/M2 | HEIGHT: 71 IN | TEMPERATURE: 97.6 F | SYSTOLIC BLOOD PRESSURE: 119 MMHG | RESPIRATION RATE: 18 BRPM | WEIGHT: 207.9 LBS | OXYGEN SATURATION: 100 % | DIASTOLIC BLOOD PRESSURE: 78 MMHG

## 2022-11-15 LAB
CASE NUMBER:: NORMAL
GLUCOSE BLD-MCNC: 210 MG/DL (ref 75–110)
GLUCOSE BLD-MCNC: 285 MG/DL (ref 75–110)
SPECIMEN DESCRIPTION: NORMAL

## 2022-11-15 PROCEDURE — 2700000000 HC OXYGEN THERAPY PER DAY

## 2022-11-15 PROCEDURE — 2580000003 HC RX 258: Performed by: FAMILY MEDICINE

## 2022-11-15 PROCEDURE — 82947 ASSAY GLUCOSE BLOOD QUANT: CPT

## 2022-11-15 PROCEDURE — 99232 SBSQ HOSP IP/OBS MODERATE 35: CPT | Performed by: HOSPITALIST

## 2022-11-15 PROCEDURE — 94761 N-INVAS EAR/PLS OXIMETRY MLT: CPT

## 2022-11-15 PROCEDURE — 6370000000 HC RX 637 (ALT 250 FOR IP): Performed by: FAMILY MEDICINE

## 2022-11-15 RX ADMIN — SODIUM CHLORIDE, PRESERVATIVE FREE 10 ML: 5 INJECTION INTRAVENOUS at 09:45

## 2022-11-15 RX ADMIN — CLONIDINE HYDROCHLORIDE 0.3 MG: 0.3 TABLET ORAL at 09:44

## 2022-11-15 RX ADMIN — APIXABAN 5 MG: 5 TABLET, FILM COATED ORAL at 09:44

## 2022-11-15 RX ADMIN — LISINOPRIL 20 MG: 20 TABLET ORAL at 09:44

## 2022-11-15 RX ADMIN — AMLODIPINE BESYLATE 10 MG: 10 TABLET ORAL at 09:44

## 2022-11-15 NOTE — PROGRESS NOTES
[facility]  Progress Note                   Date:   11/15/2022  Patient name: Riya Pascal  Date of admission:  11/13/2022 12:37 AM  MRN:   [id number]  YOB: 1960  PCP: Stu Sigala MD    Reason for Admission: Emesis [R11.10]  Nausea and vomiting, unspecified vomiting type [R11.2]    Subjective:       Clinical Changes /Abnormalities:Stable. No acute CV issues/concerns overnight. Labs, vitals, & tele reviewed. Had thoracentesis yesterday with 850ml removed from right side. Review of Systems    Medications:   Scheduled Meds:   sodium chloride flush  5-40 mL IntraVENous 2 times per day    apixaban  5 mg Oral BID    cloNIDine  0.3 mg Oral BID    lisinopril  20 mg Oral BID    amLODIPine  10 mg Oral Daily    atorvastatin  80 mg Oral Nightly     Continuous Infusions:   sodium chloride      dextrose       CBC:   Recent Labs     11/13/22  0045   WBC 7.9   HGB 14.6        BMP:    Recent Labs     11/13/22  0045 11/13/22  0454 11/14/22  0837   *  --  132*   K 4.6  --  5.4*   CL 90*  --  93*   CO2 21  --  23   BUN 17  --  36*   CREATININE 5.42*  --  7.13*   GLUCOSE 57* 78 119*     Hepatic:  Recent Labs     11/13/22 0045   AST 56*   ALT 38   BILITOT 1.0   ALKPHOS 153*     Troponin: No results for input(s): TROPHS in the last 72 hours. BNP: No results for input(s): BNP in the last 72 hours. Lipids: No results for input(s): CHOL, HDL in the last 72 hours. Invalid input(s): LDLCALCU  INR:   Recent Labs     11/14/22  1336   INR 1.8       Objective:   Vitals: /78   Pulse 71   Temp 97.6 °F (36.4 °C) (Temporal)   Resp 18   Ht 5' 11\" (1.803 m)   Wt 207 lb 14.4 oz (94.3 kg)   SpO2 100%   BMI 29.00 kg/m²   General appearance: alert and cooperative with exam  HEENT: Head: Normocephalic, no lesions, without obvious abnormality. Neck:no JVD, trachea midline, no adenopathy  Lungs: Clear to auscultation throughout.  RA without distress  Heart: Irregular rate and rhythm, s1/s2 auscultated, no murmurs, Afib 70-90s  Abdomen: soft, non-tender, bowel sounds active  Extremities: generalized B/L UE edema. B/L amps with prosthetics on  Neurologic: not done    EKG: Atrial fibrillation with nonspecific ST changes labs:     Echo 11/14/22  Summary  Mild to moderate left ventricular hypertrophy. Left ventricle is normal in size Global left ventricular systolic function  is mild to moderately reduced Estimated ejection fraction is 35-40 % . Left atrium is mildly dilated. Right atrium is moderately dilated . Mildly dilated right ventricular cavity. Mildly hypokinetic right ventricular free wall motion. No obvious valvular abnormality. Mild tricuspid regurgitation. Moderate pulmonary hypertension. Estimated right ventricular systolic  pressure is 31-25LNON. Small to moderate pericardial effusion. Cath 2021  Findings:  Left main: NL  LAD: mid 20%, distal at apical area 80% 9Small territory)  LCX: NL  RCA: Proximal / ostial calcified 90% stenosis. Required PTCA -KAMILAH [id number] stenosis to 20%  The LV gram was performed in the BUI 30 position. LVEF: 40%. LV Wall Motion: global mild hypokinesia     Estimated Blood Loss: < 20 cc.      Conclusions:  Two vessel CAD  Successful PTCA -KAMILAH RCA  Mild LV dysfunction     Recommendation:  Post stent protocol    Assessment / Acute Cardiac Problems:   Acute on chronic systolic CHF exacerbation  Right pleural effusion  Hx of CAD/PCI as above  Chronic trop elevation  ESRD  Chronic Afib  HTN  Chronic pain    Patient Active Problem List:     Hyperlipidemia     Essential hypertension     ESRD on hemodialysis ([address] MWF     Insomnia     ED (erectile dysfunction)     Chronic bilateral low back pain with bilateral sciatica     Controlled diabetes mellitus type 2 with complications ([address]     S/P bilateral below knee amputation ([address]     Long term (current) use of antithrombotics/antiplatelets     Chronic use of opiate drugs therapeutic purposes     Spinal stenosis of lumbar region with neurogenic claudication     Coronary artery disease involving native coronary artery     Anemia     Emesis      Plan of Treatment:   Stable and improved. Ready for discharge. Continue present medications.  Volume management per nephrology/HD  F/U in clinic as OP as scheduled with Dr. Leyla Perry    Electronically signed by AURORA Villegas CNP on 11/15/2022 at [address] [place] [address] [address]  [phone]

## 2022-11-15 NOTE — PLAN OF CARE
Problem: Chronic Conditions and Co-morbidities  Goal: Patient's chronic conditions and co-morbidity symptoms are monitored and maintained or improved  11/15/2022 1559 by Tahir Rubio RN  Outcome: Adequate for Discharge     Problem: Discharge Planning  Goal: Discharge to home or other facility with appropriate resources  11/15/2022 1559 by Tahir Rubio RN  Outcome: Adequate for Discharge     Problem: Pain  Goal: Verbalizes/displays adequate comfort level or baseline comfort level  11/15/2022 1559 by Tahir Rubio RN  Outcome: Adequate for Discharge     Problem: Neurosensory - Adult  Goal: Achieves stable or improved neurological status  11/15/2022 1559 by Tahir Rubio RN  Outcome: Adequate for Discharge     Problem: Neurosensory - Adult  Goal: Achieves maximal functionality and self care  11/15/2022 1559 by Tahir Rubio RN  Outcome: Adequate for Discharge     Problem: Respiratory - Adult  Goal: Achieves optimal ventilation and oxygenation  11/15/2022 1559 by Tahir Rubio RN  Outcome: Adequate for Discharge     Problem: Cardiovascular - Adult  Goal: Maintains optimal cardiac output and hemodynamic stability  11/15/2022 1559 by Tahir Rubio RN  Outcome: Adequate for Discharge     Problem: Cardiovascular - Adult  Goal: Absence of cardiac dysrhythmias or at baseline  11/15/2022 1559 by Tahir Rubio RN  Outcome: Adequate for Discharge     Problem: Gastrointestinal - Adult  Goal: Minimal or absence of nausea and vomiting  11/15/2022 1559 by Tahir Rubio RN  Outcome: Adequate for Discharge     Problem: Gastrointestinal - Adult  Goal: Maintains or returns to baseline bowel function  11/15/2022 1559 by Tahir Rubio RN  Outcome: Adequate for Discharge     Problem: Gastrointestinal - Adult  Goal: Maintains adequate nutritional intake  11/15/2022 1559 by Tahir Rubio RN  Outcome: Adequate for Discharge     Problem: Metabolic/Fluid and Electrolytes - Adult  Goal: Electrolytes maintained within normal limits  11/15/2022 1559 by Margie Cabrera RN  Outcome: Adequate for Discharge     Problem: Metabolic/Fluid and Electrolytes - Adult  Goal: Hemodynamic stability and optimal renal function maintained  11/15/2022 1559 by Margie Cabrera RN  Outcome: Adequate for Discharge     Problem: Metabolic/Fluid and Electrolytes - Adult  Goal: Glucose maintained within prescribed range  11/15/2022 1559 by Margie Cabrera RN  Outcome: Adequate for Discharge     Problem: Safety - Adult  Goal: Free from fall injury  11/15/2022 1559 by Margie Cabrera RN  Outcome: Adequate for Discharge

## 2022-11-15 NOTE — DISCHARGE SUMMARY
[facility]  Office: [address], DO, Joe Sep, DO, Dyan Heart, DO, Peng Velarde Blood, DO, Mery Duncan MD, Lamont Kamara MD, Amrit Brand MD, Adriana Valle MD,  Huber Graves MD, Mindi Fernández MD, Soraya Merida, DO, Sebastian Blanco MD,  Terri Rushing MD, Mukesh Badillo MD, Bing Boxer, DO, Jah Roland MD, Jefe Tarango MD, Wander Murillo, DO, Slaon Parker MD, Eduarda Landeros MD, Chavo Tai MD, Sukhwinder Siddiquir, MD, Wilman Carrera DO, Jose Pabon MD, Fransico Rubio MD, Giovanna Reis, CNP,  Harvey Webb, CNP, Nicole Montgomery, CNP, Jovita Kapadia, CNP,  Esdras Yanes, [facility], Eulogio Rodríguez, CNP, Tia Maza, CNP, Eva Melvin, CNP, Riley Seals, CNP, Jeffery Bills, CNP, Raya Segura, PA-GILDA, Erlin Wylie, CNS, Tyree Erwin, [facility], Joi Sandersar, CNP, [place], [facility], Darlyn [facility], [facility]         [address] [facility]    Discharge Summary     Patient ID: Km Tapia  :  1960   MRN: [id number]     ACCOUNT:  [de-identified]   Patient's PCP: Kenisha Webb MD  Admit Date: 2022   Discharge Date: 11/15/2022     Length of Stay: 2  Code Status:  Full Code  Admitting Physician: Kenisha Webb MD  Discharge Physician: Eric Lombardo DO     Active Discharge Diagnoses:     Hospital Problem Lists:  Principal Problem:    Emesis  Resolved Problems:    * No resolved hospital problems. *      Admission Condition:  fair     Discharged Condition: good    Hospital Stay:     Hospital Course:  Km Tapia is a 60 y.o. male who was admitted for the management of  Emesis , presented to ER with Nausea and Emesis (Been vomiting all day, unable to keep anything down)    Is a very pleasant 59-year-old male presented to hospital with nausea and vomiting. The patient was also found to have an acute hypoxic respiratory failure.   Ultimately he was found to have decompensated acute on chronic systolic CHF exacerbation secondary to missed hemodialysis treatment. This was further complicated due to a pericardial effusion in addition to right pleural effusion. Cardiology was consulted and did not recommend any invention for the pericardial effusion. Regarding the right pleural effusion pulmonology recommended thoracentesis and the patient had an uneventful thoracentesis by interventional radiology. The patient was seen by nephrology and underwent hemodialysis treatment. His nausea and vomiting resolved along with his acute hypoxic respiratory failure with improvement of his fluid status. The patient has strongly been encouraged to maintain his hemodialysis schedule along with a 1500 mL fluid restriction. Significant therapeutic interventions: As above    Significant Diagnostic Studies:   Labs / Micro:  CBC:   Lab Results   Component Value Date/Time    WBC 7.9 11/13/2022 12:45 AM    RBC 5.78 11/13/2022 12:45 AM    HGB 14.6 11/13/2022 12:45 AM    HCT 47.1 11/13/2022 12:45 AM    MCV 81.5 11/13/2022 12:45 AM    MCH 25.3 11/13/2022 12:45 AM    MCHC 31.0 11/13/2022 12:45 AM    RDW 20.0 11/13/2022 12:45 AM     11/13/2022 12:45 AM     BMP:    Lab Results   Component Value Date/Time    GLUCOSE 119 11/14/2022 08:37 AM     11/14/2022 08:37 AM    K 5.4 11/14/2022 08:37 AM    CL 93 11/14/2022 08:37 AM    CO2 23 11/14/2022 08:37 AM    ANIONGAP 16 11/14/2022 08:37 AM    BUN 36 11/14/2022 08:37 AM    CREATININE 7.13 11/14/2022 08:37 AM    BUNCRER 5 11/14/2022 08:37 AM    CALCIUM 9.7 11/14/2022 08:37 AM    LABGLOM 8 11/14/2022 08:37 AM    GFRAA 12 09/20/2022 05:37 AM    GFR      09/20/2022 05:37 AM        Radiology:  CT ABDOMEN PELVIS WO CONTRAST Additional Contrast? None    Result Date: 11/13/2022  Moderate right pleural effusion. Small to moderate left pleural effusion. Mild atelectatic changes, and/or infiltrate in the base of lungs bilaterally.  There is pericardial effusion with a thickness of 2 cm and increased as compared to previous CT scan in June, 2022. Diffuse edema in the soft tissues of abdominal walls, pelvic walls and soft tissues of back and soft tissues of gluteal regions, indicating diffuse anasarca. Minimal ascites around the lower portion of the liver. Cholelithiasis. A few granular calculi in gallbladder redemonstrated. Extensive vascular calcified plaques in abdominal aorta, celiac artery and branches, SMA and branches, renal arteries and branches and pelvic arteries, as also noted previously. Normal appendix inferomedial to lower cecum. Large amount of retained stool in the proximal colon and mid colon. No obvious diverticulosis coli. No evidence of colitis. No obvious obstructive uropathy. Bilateral renal sinuses include numerous arterial vascular calcifications and therefore, any renal calculus may be obscured. No obvious hydronephrosis in the kidneys. XR CHEST PORTABLE    Result Date: 11/14/2022  Considerable reduction right pleural effusion status post thoracentesis. No pneumothorax. No other interval change. XR CHEST PORTABLE    Result Date: 11/14/2022  Cardiomegaly with scattered infiltrates. XR CHEST PORTABLE    Result Date: 11/13/2022  New moderate dense atelectatic changes, and/or infiltrates in the right lower lung field with significant right basilar pleural effusion. New mild atelectatic changes, and/or infiltrate in the left lower lung field with probable left basilar pleural effusion. Redemonstration of marked cardiomegaly without evidence of CHF. Stable position of left IJ double lumen central venous catheter. IR GUIDED THORACENTESIS PLEURAL    Result Date: 11/14/2022  Successful ultrasound guided therapeutic and diagnostic thoracentesis.        Consultations:    Consults:     Final Specialist Recommendations/Findings:   IP CONSULT TO INTERNAL MEDICINE  IP CONSULT TO NEPHROLOGY  IP CONSULT TO PULMONOLOGY  IP CONSULT TO CARDIOLOGY  IP CONSULT TO HOME CARE NEEDS  IP CONSULT TO DENTIST      The patient was seen and examined on day of discharge and this discharge summary is in conjunction with any daily progress note from day of discharge. Discharge plan:     Disposition: Home    Physician Follow Up:     Maninder Puente MD  [address] [address] [facility]  [phone]    Follow up       Requiring Further Evaluation/Follow Up POST HOSPITALIZATION/Incidental Findings: None    Diet: renal diet    Activity: As tolerated    Instructions to Patient: 1500 mL fluid restriction    Discharge Medications:      Medication List        CONTINUE taking these medications      acetaminophen 650 MG extended release tablet  Commonly known as: TYLENOL     amLODIPine-atorvastatatin 10-80 MG per tablet  Commonly known as: Caduet  Take 1 tablet by mouth daily     apixaban 5 MG Tabs tablet  Commonly known as: Eliquis  Take 1 tablet by mouth 2 times daily     cloNIDine 0.3 MG tablet  Commonly known as: CATAPRES  Take 1 tablet by mouth 2 times daily Do not take if HR < 60     glimepiride 2 MG tablet  Commonly known as: AMARYL  TAKE ONE TABLET BY MOUTH EVERY MORNING BEFORE BREAKFAST     linagliptin 5 MG tablet  Commonly known as: Tradjenta  TAKE ONE TABLET BY MOUTH DAILY     lisinopril 20 MG tablet  Commonly known as: PRINIVIL;ZESTRIL  Take 1 tablet by mouth in the morning and at bedtime     oxyCODONE-acetaminophen 7.5-325 MG per tablet  Commonly known as: Percocet  Take 1 tablet by mouth daily as needed for Pain for up to 30 days. On HD days     pregabalin 75 MG capsule  Commonly known as: Lyrica  Take 1 capsule by mouth daily for 30 days. RENAPLEX-D PO     traMADol 50 MG tablet  Commonly known as: ULTRAM  Take 1 tablet by mouth daily as needed for Pain for up to 30 days.      traZODone 50 MG tablet  Commonly known as: DESYREL  Take 1 tablet by mouth nightly as needed for Sleep     Tylenol PM Extra Strength  MG tablet  Generic drug: diphenhydrAMINE-APAP (sleep)  Take 1 tablet by mouth nightly as needed for Sleep     Velphoro 500 MG Chew chewable tablet  Generic drug: sucroferric oxyhydroxide            STOP taking these medications      aspirin 81 MG chewable tablet     atorvastatin 80 MG tablet  Commonly known as: LIPITOR     midodrine 5 MG tablet  Commonly known as: PROAMATINE     minoxidil 2.5 MG tablet  Commonly known as: LONITEN              No discharge procedures on file. Time Spent on discharge is  20 mins in patient examination, evaluation, counseling as well as medication reconciliation, prescriptions for required medications, discharge plan and follow up. Electronically signed by   Ivana Pires DO  11/15/2022  11:59 AM      Thank you Dr. Brian Wall MD for the opportunity to be involved in this patient's care.

## 2022-11-15 NOTE — PROGRESS NOTES
Pt given discharge information including home medications, follow-up, fluid restriction. Pt voices understanding of same. Given new crutches to take home. Pt discharged per wheelchair with all belongings in care of self. Given cab voucher and called [employer] to transport pt home.

## 2022-11-15 NOTE — PROGRESS NOTES
[facility]  Office: [address], DO, Cathleen Griffin, DO, Darryl Olivera, DO, Letashyanne Arizmendi, DO, Jessy Villanueva MD, Dominick Collier MD, Jaskaran Ceja MD, Babatunde Ba MD,  Sigifredo Milton MD, Sobeida Clark MD, Michael Caicedo, DO, Cathryn Rivas MD,  Dennis Ortiz MD, Pal Villarreal MD, Elvia Funes, DO, Kaylene Fuller MD, Radha Tinsley MD, Isael Lancaster DO, Fern Read MD, Penny Ivey MD, Christine Méndez MD, Jovanna Alonzo MD, Lei Martinez DO, Maria De Jesus Smith MD, Minnie Dupree MD, Laurence Escudero, CNP,  Fransisca Fitzgerald, CNP, Idania Perez, CNP, Robin Malone, CNP,  Imelda Parker, [facility], Jorge Moon, CNP, Kaitlin Thompson, CNP, Andrey Fields, CNP, Tiff Alvarez, CNP, Timothy Head, [facility], Skip Proctor, PA-C, Everardo Beltran, CNS, Kathe Buckner, DNP, Manuel Wagner, CNP, Esme Helm, CNP, Barrington Maurice, [facility]    Progress Note    11/15/2022    11:56 AM    Name:   Hany Moctezuma  MRN:     [id number]     Cindy Mail:      [de-identified]   Room:   2039/2039-01   Day:  2  Admit Date:  11/13/2022 12:37 AM    PCP:   Candance Katos, MD  Code Status:  Full Code    Subjective:     C/C:   Chief Complaint   Patient presents with    Nausea    Emesis     Been vomiting all day, unable to keep anything down     Patient seen in follow-up for shortness of breath, nausea, vomiting secondary to fluid overload. Patient states \"I feel better\"    Interval History Status: significantly improved. Patient is doing much better overall. Entry is noted, appreciate all services. Patient had missed a hemodialysis treatment and in conjunction with the right-sided pleural effusion presented hospital with shortness of breath along with associated nausea and vomiting. The patient had undergone thoracentesis along with hemodialysis.   His respiratory status is improved dramatically and at this point in time his nausea and vomiting has resolved. He is tolerating a diet and at this point in time eager for discharge home. Brief History: This 74-year-old male presented to hospital with nausea, vomiting, shortness of breath secondary to fluid overload status. The patient has undergone hemodialysis in addition to thoracentesis. His nausea and vomiting has resolved and his breathing has returned to baseline. Review of Systems:     Constitutional:  negative for chills, fevers, sweats  Respiratory:  negative for cough, dyspnea on exertion, shortness of breath, wheezing  Cardiovascular:  negative for chest pain, chest pressure/discomfort, lower extremity edema, palpitations  Gastrointestinal:  negative for abdominal pain, constipation, diarrhea, nausea, vomiting  Neurological:  negative for dizziness, headache    Medications:      Allergies:  No Known Allergies    Current Meds:   Scheduled Meds:    sodium chloride flush  5-40 mL IntraVENous 2 times per day    apixaban  5 mg Oral BID    cloNIDine  0.3 mg Oral BID    lisinopril  20 mg Oral BID    amLODIPine  10 mg Oral Daily    atorvastatin  80 mg Oral Nightly     Continuous Infusions:    sodium chloride      dextrose       PRN Meds: sodium citrate, sodium citrate, acetaminophen, droperidol, sodium chloride flush, sodium chloride, ondansetron **OR** ondansetron, glucose, dextrose bolus **OR** dextrose bolus, glucagon (rDNA), dextrose, metoprolol, traZODone, ALPRAZolam    Data:     Past Medical History:   has a past medical history of Acute congestive heart failure (HCC), Chronic bilateral low back pain with bilateral sciatica, Coronary artery disease involving native coronary artery, CRF (chronic renal failure), DDD (degenerative disc disease), lumbar, Diabetes mellitus ([facility] [address] Dialysis patient [facility]), ED (erectile dysfunction), History of echocardiogram, HTN (hypertension), Hyperlipidemia, LVH (left ventricular hypertrophy), PVD (peripheral vascular disease) ([facility] [address] S/P bilateral BKA (below knee amputation) ([facility] [address] and Wears glasses. Social History:   reports that he has never smoked. He has never used smokeless tobacco. He reports current alcohol use. He reports that he does not use drugs. Family History:   Family History   Problem Relation Age of Onset    Diabetes Mother     Coronary Art Dis Mother     Hypertension Mother     Diabetes Father     Hypertension Father     Stroke Father     Cancer Sister     Hypertension Brother        Vitals:  /78   Pulse 79   Temp 97.4 °F (36.3 °C) (Temporal)   Resp 18   Ht 5' 11\" (1.803 m)   Wt 207 lb 14.4 oz (94.3 kg)   SpO2 91%   BMI 29.00 kg/m²   Temp (24hrs), Av.5 °F (36.4 °C), Min:97 °F (36.1 °C), Max:97.9 °F (36.6 °C)    Recent Labs     22  1609 22  2054 11/15/22  0829 11/15/22  1109   POCGLU 162* 206* 210* 285*       I/O (24Hr):     Intake/Output Summary (Last 24 hours) at 11/15/2022 1156  Last data filed at 11/15/2022 0550  Gross per 24 hour   Intake 740 ml   Output 4350 ml   Net -3610 ml       Labs:  Hematology:  Recent Labs     22  1336   WBC 7.9  --    RBC 5.78*  --    HGB 14.6  --    HCT 47.1  --    MCV 81.5*  --    MCH 25.3  --    MCHC 31.0  --    RDW 20.0*  --      --    MPV Abnormal  --    INR  --  1.8     Chemistry:  Recent Labs     22  0045 22  0454 22  0837   *  --  132*   K 4.6  --  5.4*   CL 90*  --  93*   CO2 21  --  23   GLUCOSE 57* 78 119*   BUN 17  --  36*   CREATININE 5.42*  --  7.13*   ANIONGAP 22*  --  16   LABGLOM 11*  --  8*   CALCIUM 11.0*  --  9.7   PROBNP 61,492*  --   --      Recent Labs     22  0045 22  0252 22  0734 22  1142 22  1609 22  2054 11/15/22  0829 11/15/22  1109   PROT 8.8*  --   --   --   --   --   --   --    LABALBU 4.8  --   --   --   --   --   --   --    AST 56*  --   --   --   --   --   --   --    ALT 38  --   --   --   --   --   --   --    ALKPHOS 153*  --   --   --   --   --   --   -- BILITOT 1.0  --   --   --   --   --   --   --    LIPASE 15  --   --   --   --   --   --   --    POCGLU  --    < > 167* 141* 162* 206* 210* 285*    < > = values in this interval not displayed. ABG:No results found for: POCPH, PHART, PH, POCPCO2, MNC4HKK, PCO2, POCPO2, PO2ART, PO2, POCHCO3, QCS6QVT, HCO3, NBEA, PBEA, BEART, BE, THGBART, THB, RMG5EVP, JJNB9AHN, J0CHDSKE, O2SAT, FIO2  Lab Results   Component Value Date/Time    SPECIAL NOT REPORTED 09/07/2017 12:30 PM     Lab Results   Component Value Date/Time    CULTURE PENDING 11/14/2022 02:45 PM    CULTURE NO GROWTH 11 HOURS 11/14/2022 02:45 PM       Radiology:  CT ABDOMEN PELVIS WO CONTRAST Additional Contrast? None    Result Date: 11/13/2022  Moderate right pleural effusion. Small to moderate left pleural effusion. Mild atelectatic changes, and/or infiltrate in the base of lungs bilaterally. There is pericardial effusion with a thickness of 2 cm and increased as compared to previous CT scan in June, 2022. Diffuse edema in the soft tissues of abdominal walls, pelvic walls and soft tissues of back and soft tissues of gluteal regions, indicating diffuse anasarca. Minimal ascites around the lower portion of the liver. Cholelithiasis. A few granular calculi in gallbladder redemonstrated. Extensive vascular calcified plaques in abdominal aorta, celiac artery and branches, SMA and branches, renal arteries and branches and pelvic arteries, as also noted previously. Normal appendix inferomedial to lower cecum. Large amount of retained stool in the proximal colon and mid colon. No obvious diverticulosis coli. No evidence of colitis. No obvious obstructive uropathy. Bilateral renal sinuses include numerous arterial vascular calcifications and therefore, any renal calculus may be obscured. No obvious hydronephrosis in the kidneys. XR CHEST PORTABLE    Result Date: 11/14/2022  Considerable reduction right pleural effusion status post thoracentesis.   No pneumothorax. No other interval change. XR CHEST PORTABLE    Result Date: 11/14/2022  Cardiomegaly with scattered infiltrates. XR CHEST PORTABLE    Result Date: 11/13/2022  New moderate dense atelectatic changes, and/or infiltrates in the right lower lung field with significant right basilar pleural effusion. New mild atelectatic changes, and/or infiltrate in the left lower lung field with probable left basilar pleural effusion. Redemonstration of marked cardiomegaly without evidence of CHF. Stable position of left IJ double lumen central venous catheter. IR GUIDED THORACENTESIS PLEURAL    Result Date: 11/14/2022  Successful ultrasound guided therapeutic and diagnostic thoracentesis.        Physical Examination:        General appearance:  alert, cooperative and no distress  Mental Status:  oriented to person, place and time and normal affect  Lungs:  clear to auscultation bilaterally, normal effort  Heart:  regular rate and rhythm, no murmur  Abdomen:  soft, nontender, nondistended, normal bowel sounds, no masses, hepatomegaly, splenomegaly  Extremities: Bilateral lower extremity amputations noted  Skin:  no gross lesions, rashes, induration    Assessment:        Hospital Problems             Last Modified POA    * (Principal) Emesis 11/13/2022 Yes       Plan:        Acute on chronic systolic CHF exacerbation  Status post hemodialysis, fluid status improved  Secondary to missed hemodialysis treatment  Patient to maintain 1500 mL fluid restriction daily  Acute hypoxic respiratory failure  Secondary to above, patient has been weaned off oxygen  End-stage renal disease  Maintenance hemodialysis Monday/Wednesday/Friday  Appreciate nephrology's input/help  Right pleural effusion  Status post thoracentesis  Pericardial effusion  Appreciate cardiology input, no intervention at this point in time  Diabetes mellitus  Resume home medications on discharge, outpatient follow-up with primary care physician for

## 2022-11-15 NOTE — PROGRESS NOTES
Home Oxygen Evaluation    Home Oxygen Evaluation completed.     SpO2 on room air at rest 93%        Nicole Marcial RCP  1:22 PM

## 2022-11-15 NOTE — DISCHARGE INSTRUCTIONS
Keep appointments Monday, Wednesday, Friday at [facility] for hemodialysis. Maintain 1500 ml fluid restriction.

## 2022-11-15 NOTE — PROGRESS NOTES
Pulmonary Critical Care Progress Note  Ludmila Loco MD     Patient seen for the follow up of  Emesis , acute hypoxic respiratory failure, pulmonary edema/bilateral pleural effusions    Subjective:  Patient is sitting up in the bed, oxygenating well with room air oxygen 99%, smiling. He denies shortness of breath, cough or chest pain. He is feeling much better today. He does not have any nausea or vomiting so far this morning. He had thoracentesis done yesterday. Examination:  Vitals: /78   Pulse 79   Temp 97.4 °F (36.3 °C) (Temporal)   Resp 18   Ht 5' 11\" (1.803 m)   Wt 207 lb 14.4 oz (94.3 kg)   SpO2 91%   BMI 29.00 kg/m²   General appearance: alert and cooperative with exam  Neck: No JVD  Lungs: Moderate air exchange, no crackles or wheezing  Heart: regular rate and rhythm, S1, S2 normal, no gallop  Abdomen: Soft, non tender, + BS  Extremities: no cyanosis or clubbing.  No significant edema    LABs:  CBC:   Recent Labs     11/13/22 0045   WBC 7.9   HGB 14.6   HCT 47.1          BMP:   Recent Labs     11/13/22  0045 11/13/22  0454 11/14/22  0837   *  --  132*   K 4.6  --  5.4*   CO2 21  --  23   BUN 17  --  36*   CREATININE 5.42*  --  7.13*   LABGLOM 11*  --  8*   GLUCOSE 57* 78 119*       LIVER PROFILE:  Recent Labs     11/13/22 0045   AST 56*   ALT 38   LABALBU 4.8        Latest Reference Range & Units 11/13/22 00:45   Procalcitonin <0.09 ng/mL 0.91 (H)   (H): Data is abnormally high    Radiology:  11/14/22      Impression:  Acute hypoxic respiratory insufficiency   Bilateral pleural effusions/pulmonary edema/fluid overload/CHF  ESRD on hemodialysis  Pericardial effusion, 2 cm thickness, increased since June 2022  Nausea and vomiting/constipation/stool retention  Bilateral BKA, CAD, DM, HLD, HTN, PAD  Cardiomyopathy, EF 35 to 40%  Pulmonary hypertension with RVSP, 50-55 mmHg by echo on the 11th/40/22    Recommendations:  Home oxygen evaluation   incentive spirometry every hour while awake  BiPAP support if necessary  Fluid removal/hemodialysis per nephrology   Cardiology following  DVT prophylaxis, subcu heparin  OK for discharge planning from pulmonary stand point with follow up with pulmonary in 2-3 weeks. Plan was discussed with patient and he understands it.   Discussed with RN    Electronically signed by     Joanna Grullon MD on 11/15/2022 at 12:05 PM  Pulmonary Critical Care and Sleep Medicine,  [facility]  Cell: [phone]  Office: [phone]

## 2022-11-15 NOTE — PROGRESS NOTES
Renal Progress Note    Patient :  Trenda Boeck; 60 y.o. MRN# [id number]  Location:  2039/2039-01  Attending:  Nicky White DO  Admit Date:  11/13/2022   Hospital Day: 2      Subjective:     Oral intake good. Feels well. Brendan Contreras for discharge. Currently off BiPAP. Hemodialysis yesterday 3 L removed. Also had thoracentesis on the right side at 850 mL removed. Pleural fluid LDH was 86 cm LDH not done. Tunnel catheter works well. Echocardiogram done showed ejection fraction of 35% with pericardial effusion which is mild to moderate. No labs done today. Last potassium yesterday was 5.4 expected to have improved after dialysis. No fever or chills. No nausea vomiting. Outpatient Medications:     Medications Prior to Admission: traMADol (ULTRAM) 50 MG tablet, Take 1 tablet by mouth daily as needed for Pain for up to 30 days. oxyCODONE-acetaminophen (PERCOCET) 7.5-325 MG per tablet, Take 1 tablet by mouth daily as needed for Pain for up to 30 days.  On HD days  traZODone (DESYREL) 50 MG tablet, Take 1 tablet by mouth nightly as needed for Sleep  linagliptin (TRADJENTA) 5 MG tablet, TAKE ONE TABLET BY MOUTH DAILY  amLODIPine-atorvastatatin (CADUET) 10-80 MG per tablet, Take 1 tablet by mouth daily  diphenhydrAMINE-APAP, sleep, (TYLENOL PM EXTRA STRENGTH)  MG tablet, Take 1 tablet by mouth nightly as needed for Sleep  lisinopril (PRINIVIL;ZESTRIL) 20 MG tablet, Take 1 tablet by mouth in the morning and at bedtime  midodrine (PROAMATINE) 5 MG tablet, Take 1 tablet by mouth 2 times daily  glimepiride (AMARYL) 2 MG tablet, TAKE ONE TABLET BY MOUTH EVERY MORNING BEFORE BREAKFAST  cloNIDine (CATAPRES) 0.3 MG tablet, Take 1 tablet by mouth 2 times daily Do not take if HR < 60  acetaminophen (TYLENOL) 650 MG extended release tablet, Take 650 mg by mouth every 8 hours as needed for Pain  Sucroferric Oxyhydroxide (VELPHORO) 500 MG CHEW, Take 1,000 mg by mouth 3 times daily  pregabalin (LYRICA) 75 MG capsule, Take 1 capsule by mouth daily for 30 days. apixaban (ELIQUIS) 5 MG TABS tablet, Take 1 tablet by mouth 2 times daily  Multiple Vitamins-Minerals (RENAPLEX-D PO), Take 1 tablet by mouth daily     Current Medications:     Scheduled Meds:    sodium chloride flush  5-40 mL IntraVENous 2 times per day    apixaban  5 mg Oral BID    cloNIDine  0.3 mg Oral BID    lisinopril  20 mg Oral BID    amLODIPine  10 mg Oral Daily    atorvastatin  80 mg Oral Nightly     Continuous Infusions:    sodium chloride      dextrose       PRN Meds:  sodium citrate, sodium citrate, acetaminophen, droperidol, sodium chloride flush, sodium chloride, ondansetron **OR** ondansetron, glucose, dextrose bolus **OR** dextrose bolus, glucagon (rDNA), dextrose, metoprolol, traZODone, ALPRAZolam    Input/Output:       I/O last 3 completed shifts: In: 860 [P.O.:360]  Out: 4350 [Other:850]. Patient Vitals for the past 96 hrs (Last 3 readings):   Weight   11/15/22 0400 207 lb 14.4 oz (94.3 kg)   22 1337 202 lb 13.2 oz (92 kg)   22 1005 209 lb 7 oz (95 kg)       Vital Signs:   Temperature:  Temp: 97.4 °F (36.3 °C)  TMax:   Temp (24hrs), Av.5 °F (36.4 °C), Min:97 °F (36.1 °C), Max:97.9 °F (36.6 °C)    Respirations:  Resp: 18  Pulse:   Heart Rate: 79  BP:    BP: 122/78  BP Range: Systolic (53JGK), ETL:530 , Min:102 , XHA:477       Diastolic (23VIU), XWM:49, Min:68, Max:109      Physical Examination:     General:  AAO x 3, speaking in full sentences, no accessory muscle use. HEENT: Atraumatic, normocephalic, no throat congestion, moist mucosa. Eyes:   Pupils equal, round and reactive to light, EOMI. Neck:   No JVD, no thyromegaly, no lymphadenopathy. Chest:  Bilateral vesicular breath sounds, no rales or wheezes. Cardiac:  S1 S2 RR, no murmurs, gallops or rubs, JVP not raised. Abdomen: Soft, non-tender, no masses or organomegaly, BS audible. :   No suprapubic or flank tenderness. Neuro:  AAO x 3, No FND.   SKIN:  No rashes, good skin turgor. Extremities:  1+ edema, palpable peripheral pulses, no calf tenderness. Bilateral above-knee amputations  Labs:       Recent Labs     11/13/22 0045   WBC 7.9   RBC 5.78*   HGB 14.6   HCT 47.1   MCV 81.5*   MCH 25.3   MCHC 31.0   RDW 20.0*      MPV Abnormal      BMP:   Recent Labs     11/13/22  0045 11/13/22  0454 11/14/22  0837   *  --  132*   K 4.6  --  5.4*   CL 90*  --  93*   CO2 21  --  23   BUN 17  --  36*   CREATININE 5.42*  --  7.13*   GLUCOSE 57* 78 119*   CALCIUM 11.0*  --  9.7      Phosphorus:   No results for input(s): PHOS in the last 72 hours. Magnesium:  No results for input(s): MG in the last 72 hours.   Albumin:    Recent Labs     11/13/22 0045   LABALBU 4.8     BNP:      Lab Results   Component Value Date/Time    BNP 3,088 06/13/2013 06:32 AM     ANDREA:    No results found for: ANDREA  SPEP:  Lab Results   Component Value Date/Time    PROT 8.8 11/13/2022 12:45 AM     UPEP:   No results found for: LABPE  C3:   No results found for: C3  C4:   No results found for: C4  MPO ANCA:   No results found for: MPO  PR3 ANCA:   No results found for: PR3  Anti-GBM:   No results found for: GBMABIGG  Hep BsAg:         Lab Results   Component Value Date/Time    HEPBSAG NONREACTIVE 11/13/2022 12:55 PM     Hep C AB:          Lab Results   Component Value Date/Time    HEPCAB NONREACTIVE 09/28/2021 03:15 PM       Urinalysis/Chemistries:      Lab Results   Component Value Date/Time    NITRU NEGATIVE 06/13/2013 10:30 AM    COLORU YELLOW 06/13/2013 10:30 AM    PHUR 7.5 06/13/2013 10:30 AM    WBCUA 0 TO 2 06/13/2013 10:30 AM    RBCUA 10 TO 20 06/13/2013 10:30 AM    MUCUS NOT REPORTED 06/13/2013 10:30 AM    TRICHOMONAS NOT REPORTED 06/13/2013 10:30 AM    YEAST NOT REPORTED 06/13/2013 10:30 AM    BACTERIA FEW 06/13/2013 10:30 AM    SPECGRAV 1.015 06/13/2013 10:30 AM    LEUKOCYTESUR NEGATIVE 06/13/2013 10:30 AM    UROBILINOGEN Normal 06/13/2013 10:30 AM    BILIRUBINUR NEGATIVE 06/13/2013 10:30 AM GLUCOSEU 3+ 06/13/2013 10:30 AM    KETUA NEGATIVE 06/13/2013 10:30 AM    AMORPHOUS NOT REPORTED 06/13/2013 10:30 AM     Urine Sodium:   No results found for: GAIL  Urine Potassium:  No results found for: KUR  Urine Chloride:  No results found for: CLUR  Urine Osmolarity: No results found for: OSMOU  Urine Protein:   No components found for: TOTALPROTEIN, URINE   Urine Creatinine:   No results found for: LABCREA  Urine Eosinophils:  No components found for: UEOS    Radiology:     CXR:     Assessment:     1. ESRD on hemodialysis Monday Wednesday Friday at [facility] unit via tunnel catheter under Dr. Christal Longoria dry weight 97 kg which has been reduced to 94 kg  2. Respiratory failure secondary to volume overload in setting of ischemic cardiomyopathy doing better after ultrafiltration and hemodialysis  3. Right-sided pleural effusion from fluid overload status post thoracentesis  4. Mild to moderate pericardial effusion  5. Hypertension blood pressure well controlled  6. Abdominal pain resolved    Plan:   1. Hemodialysis in the morning if patient stays in the hospital  2. Stable for discharge  3. Fluid restriction 1.5 L a day  4. Await cardiology recommendations  5. We will follow if he stays    Nutrition   Please ensure that patient is on a renal diet/TF. Avoid nephrotoxic drugs/contrast exposure. We will continue to follow along with you.

## 2022-11-16 LAB — SURGICAL PATHOLOGY REPORT: NORMAL

## 2022-11-20 LAB
CULTURE: ABNORMAL
DIRECT EXAM: ABNORMAL
SPECIMEN DESCRIPTION: ABNORMAL

## 2022-12-06 ENCOUNTER — OFFICE VISIT (OUTPATIENT)
Dept: PAIN MANAGEMENT | Age: 62
End: 2022-12-06

## 2022-12-06 VITALS
DIASTOLIC BLOOD PRESSURE: 88 MMHG | SYSTOLIC BLOOD PRESSURE: 140 MMHG | HEIGHT: 71 IN | BODY MASS INDEX: 28.98 KG/M2 | WEIGHT: 207 LBS

## 2022-12-06 DIAGNOSIS — Z79.891 CHRONIC USE OF OPIATE DRUG FOR THERAPEUTIC PURPOSE: ICD-10-CM

## 2022-12-06 DIAGNOSIS — M48.062 SPINAL STENOSIS OF LUMBAR REGION WITH NEUROGENIC CLAUDICATION: ICD-10-CM

## 2022-12-06 DIAGNOSIS — Z99.2 ESRD ON HEMODIALYSIS (HCC): ICD-10-CM

## 2022-12-06 DIAGNOSIS — N18.6 ESRD ON HEMODIALYSIS (HCC): ICD-10-CM

## 2022-12-06 DIAGNOSIS — E11.40 PAINFUL DIABETIC NEUROPATHY (HCC): ICD-10-CM

## 2022-12-06 RX ORDER — OXYCODONE AND ACETAMINOPHEN 7.5; 325 MG/1; MG/1
1 TABLET ORAL DAILY PRN
Qty: 20 TABLET | Refills: 0 | Status: SHIPPED | OUTPATIENT
Start: 2022-12-06 | End: 2023-01-05

## 2022-12-06 RX ORDER — TRAMADOL HYDROCHLORIDE 50 MG/1
50 TABLET ORAL DAILY PRN
Qty: 30 TABLET | Refills: 1 | Status: SHIPPED | OUTPATIENT
Start: 2022-12-06 | End: 2023-01-05

## 2022-12-06 RX ORDER — MINOXIDIL 2.5 MG/1
TABLET ORAL
COMMUNITY
Start: 2022-10-21

## 2022-12-06 RX ORDER — PREGABALIN 75 MG/1
75 CAPSULE ORAL DAILY
Qty: 30 CAPSULE | Refills: 1 | Status: SHIPPED | OUTPATIENT
Start: 2022-12-06 | End: 2023-01-05

## 2022-12-06 ASSESSMENT — ENCOUNTER SYMPTOMS
NAUSEA: 0
VOMITING: 0
SHORTNESS OF BREATH: 0
CONSTIPATION: 0
CHEST TIGHTNESS: 0
DIARRHEA: 0
BACK PAIN: 1

## 2022-12-06 NOTE — PROGRESS NOTES
The patient is a 60 y. o. Non- / non  male. Chief Complaint   Patient presents with    Back Pain    Medication Refill     Percocet & tramadol     Leg Pain      58-year-old man with multiple major comorbidities including end-stage kidney disease, hypertension and diabetic peripheral neuropathy  On hemodialysis  On long-term anticoagulation therapy  History of bilateral knee amputation    Recent coronary event with stent placement in January  Following up with cardiology  Have been diagnosed with pericardial effusion  On chronic low-dose opioid therapy with alternate day tramadol and Percocet for chronic generalized body pain  Stable on current regimen  Denies any illicit substance use  Automated prescription report reviewed  No sign or symptom of any aberrancy  Safe use of medication discussed  Refill ordered    Pain score Today:  8  Adverse effects (Constipation / Nausea / Sedation / sexual Dysfunction / others) : no  Mood: good  Sleep pattern and quality: poor  Activity level: good    Pill count Today:4  Last dose taken  12/6/2022  OARRS report reviewed today: yes  ER/Hospitalizations/PCP visit related to pain since last visit:yes   Any legal problems e.g. DUI etc.:No  Satisfied with current management: Yes    Opioid Contract:12/6/2022  Last Urine Dug screen dated: unknown    Hemoglobin A1C   Date Value Ref Range Status   09/20/2022 5.3 4.0 - 6.0 % Final       Past Medical History, Past Surgical History, Social History, Allergies and Medications reviewed and updated in EPIC as indicated    Family History reviewed and is noncontributory.             Past Medical History:   Diagnosis Date    Acute congestive heart failure (HCC)     Chronic bilateral low back pain with bilateral sciatica     Coronary artery disease involving native coronary artery 9/6/2022    CRF (chronic renal failure)     [facility] MWF    DDD (degenerative disc disease), lumbar 12/15/2020    Diabetes mellitus ([facility] [address]     [facility]     ED (erectile dysfunction)     [facility] of echocardiogram 2014    [facility]    HTN (hypertension)     Hyperlipidemia     LVH (left ventricular hypertrophy)     PVD (peripheral vascular disease) ([facility])     S/P bilateral BKA (below knee amputation) ([address]     Wears glasses         Past Surgical History:   Procedure Laterality Date    ARM SURGERY      CATARACT REMOVAL WITH IMPLANT      DIALYSIS FISTULA CREATION Bilateral     As of 2019, RUE AVF functioning, LUE AVF nonfunctioning. FINGER AMPUTATION      right pointer finger    FINGER SURGERY Left     I & D    GLAUCOMA SURGERY      LEG AMPUTATION BELOW KNEE Bilateral     TUNNELED VENOUS CATHETER PLACEMENT      PC placed and removed       Social History     Socioeconomic History    Marital status:      Spouse name: None    Number of children: None    Years of education: None    Highest education level: None   Tobacco Use    Smoking status: Never    Smokeless tobacco: Never   Vaping Use    Vaping Use: Never used   Substance and Sexual Activity    Alcohol use: Yes     Comment: rare    Drug use: No     Social Determinants of Health     Financial Resource Strain: Low Risk     Difficulty of Paying Living Expenses: Not hard at all   Food Insecurity: No Food Insecurity    Worried About Running Out of Food in the Last Year: Never true    Ran Out of Food in the Last Year: Never true   Transportation Needs: No Transportation Needs    Lack of Transportation (Medical): No    Lack of Transportation (Non-Medical): No   Physical Activity: Inactive    Days of Exercise per Week: 0 days    Minutes of Exercise per Session: 0 min   Stress: No Stress Concern Present    Feeling of Stress : Not at all   Social Connections:  Moderately Integrated    Frequency of Communication with Friends and Family: More than three times a week    Frequency of Social Gatherings with Friends and Family: More than three times a week    Attends Buddhist Services: More than 4 times per year Active Member of Clubs or Organizations: Yes    Attends Club or Organization Meetings: Never    Marital Status:    Intimate Partner Violence: Not At Risk    Fear of Current or Ex-Partner: No    Emotionally Abused: No    Physically Abused: No    Sexually Abused: No   Housing Stability: Unknown    Unable to Pay for Housing in the Last Year: No    Unstable Housing in the Last Year: No       Family History   Problem Relation Age of Onset    Diabetes Mother     Coronary Art Dis Mother     Hypertension Mother     Diabetes Father     Hypertension Father     Stroke Father     Cancer Sister     Hypertension Brother        No Known Allergies    Vitals:    12/06/22 1234   BP: (!) 140/88       Current Outpatient Medications   Medication Sig Dispense Refill    oxyCODONE-acetaminophen (PERCOCET) 7.5-325 MG per tablet Take 1 tablet by mouth daily as needed for Pain for up to 30 days. On HD days 20 tablet 0    traMADol (ULTRAM) 50 MG tablet Take 1 tablet by mouth daily as needed for Pain for up to 30 days. 30 tablet 1    pregabalin (LYRICA) 75 MG capsule Take 1 capsule by mouth daily for 30 days.  30 capsule 1    minoxidil (LONITEN) 2.5 MG tablet       traZODone (DESYREL) 50 MG tablet Take 1 tablet by mouth nightly as needed for Sleep 90 tablet 0    linagliptin (TRADJENTA) 5 MG tablet TAKE ONE TABLET BY MOUTH DAILY 90 tablet 0    amLODIPine-atorvastatatin (CADUET) 10-80 MG per tablet Take 1 tablet by mouth daily 90 tablet 1    diphenhydrAMINE-APAP, sleep, (TYLENOL PM EXTRA STRENGTH)  MG tablet Take 1 tablet by mouth nightly as needed for Sleep 200 tablet 0    lisinopril (PRINIVIL;ZESTRIL) 20 MG tablet Take 1 tablet by mouth in the morning and at bedtime 30 tablet 3    glimepiride (AMARYL) 2 MG tablet TAKE ONE TABLET BY MOUTH EVERY MORNING BEFORE BREAKFAST 90 tablet 1    cloNIDine (CATAPRES) 0.3 MG tablet Take 1 tablet by mouth 2 times daily Do not take if HR < 60 180 tablet 3    acetaminophen (TYLENOL) 650 MG extended release tablet Take 650 mg by mouth every 8 hours as needed for Pain      Sucroferric Oxyhydroxide (VELPHORO) 500 MG CHEW Take 1,000 mg by mouth 3 times daily      apixaban (ELIQUIS) 5 MG TABS tablet Take 1 tablet by mouth 2 times daily 180 tablet 1    Multiple Vitamins-Minerals (RENAPLEX-D PO) Take 1 tablet by mouth daily        No current facility-administered medications for this visit. Review of Systems   Constitutional:  Negative for chills, fatigue and fever. Respiratory:  Negative for chest tightness and shortness of breath. Cardiovascular:  Negative for chest pain and leg swelling. Gastrointestinal:  Negative for constipation, diarrhea, nausea and vomiting. Musculoskeletal:  Positive for back pain. Negative for neck stiffness. Neurological:  Negative for dizziness, weakness and numbness. Objective:  General Appearance: In no acute distress and in pain. Vital signs: (most recent): Blood pressure (!) 140/88, height 5' 11\" (1.803 m), weight 207 lb (93.9 kg). Vital signs are normal.  No fever. Output: Producing urine and producing stool. Lungs:  Normal effort. He is not in respiratory distress. Heart: Normal rate. Neurological: Patient is alert and oriented to person, place and time. Assessment & Plan  1. Chronic use of opiate drugs therapeutic purposes    2. ESRD on hemodialysis ([address] MWF    3. Spinal stenosis of lumbar region with neurogenic claudication    4. Painful diabetic neuropathy (HCC)        No orders of the defined types were placed in this encounter. Orders Placed This Encounter   Medications    oxyCODONE-acetaminophen (PERCOCET) 7.5-325 MG per tablet     Sig: Take 1 tablet by mouth daily as needed for Pain for up to 30 days. On HD days     Dispense:  20 tablet     Refill:  0     Reduce doses taken as pain becomes manageable    traMADol (ULTRAM) 50 MG tablet     Sig: Take 1 tablet by mouth daily as needed for Pain for up to 30 days.      Dispense:  30 tablet     Refill:  1     Reduce doses taken as pain becomes manageable    pregabalin (LYRICA) 75 MG capsule     Sig: Take 1 capsule by mouth daily for 30 days. Dispense:  30 capsule     Refill:  1        Controlled Substance Monitoring:    Acute and Chronic Pain Monitoring:   RX Monitoring 9/13/2022   Attestation -   Periodic Controlled Substance Monitoring No signs of potential drug abuse or diversion identified. ;Possible medication side effects, risk of tolerance/dependence & alternative treatments discussed. ;Assessed functional status. Chronic Pain > 50 MEDD Obtained or confirmed \"Consent for Opioid Use\" on file.                Electronically signed by Jama Rose MD on 12/6/2022 at 12:52 PM

## 2022-12-06 NOTE — PROGRESS NOTES
The patient is a 60 y. o. Non- / non  male. Chief Complaint   Patient presents with    Back Pain    Medication Refill     Percocet & tramadol     Leg Pain        Chief Complaint:  Medication Refill:     Pain score Today:  8  Adverse effects (Constipation / Nausea / Sedation / sexual Dysfunction / others) : no  Mood: good  Sleep pattern and quality: poor  Activity level: good    Pill count Today:4  Last dose taken  12/6/2022  OARRS report reviewed today: yes  ER/Hospitalizations/PCP visit related to pain since last visit:yes   Any legal problems e.g. DUI etc.:No  Satisfied with current management: Yes    Opioid Contract:12/6/2022  Last Urine Dug screen dated: unknown    Hemoglobin A1C   Date Value Ref Range Status   09/20/2022 5.3 4.0 - 6.0 % Final       Past Medical History, Past Surgical History, Social History, Allergies and Medications reviewed and updated in EPIC as indicated    Family History reviewed and is noncontributory. Past Medical History:   Diagnosis Date    Acute congestive heart failure (HCC)     Chronic bilateral low back pain with bilateral sciatica     Coronary artery disease involving native coronary artery 9/6/2022    CRF (chronic renal failure)     [facility] MWF    DDD (degenerative disc disease), lumbar 12/15/2020    Diabetes mellitus ([facility] [address]     Dialysis patient [facility])     ED (erectile dysfunction)     History of echocardiogram 2014    [facility]    HTN (hypertension)     Hyperlipidemia     LVH (left ventricular hypertrophy)     PVD (peripheral vascular disease) ([facility])     S/P bilateral BKA (below knee amputation) ([facility] [address]     Wears glasses         Past Surgical History:   Procedure Laterality Date    ARM SURGERY      CATARACT REMOVAL WITH IMPLANT      DIALYSIS FISTULA CREATION Bilateral     As of 2019, RUE AVF functioning, LUE AVF nonfunctioning.     FINGER AMPUTATION      right pointer finger    FINGER SURGERY Left     I & D    GLAUCOMA SURGERY      LEG AMPUTATION BELOW KNEE Bilateral     TUNNELED VENOUS CATHETER PLACEMENT      PC placed and removed       Social History     Socioeconomic History    Marital status:    Tobacco Use    Smoking status: Never    Smokeless tobacco: Never   Vaping Use    Vaping Use: Never used   Substance and Sexual Activity    Alcohol use: Yes     Comment: rare    Drug use: No     Social Determinants of Health     Financial Resource Strain: Low Risk     Difficulty of Paying Living Expenses: Not hard at all   Food Insecurity: No Food Insecurity    Worried About Running Out of Food in the Last Year: Never true    Ran Out of Food in the Last Year: Never true   Transportation Needs: No Transportation Needs    Lack of Transportation (Medical): No    Lack of Transportation (Non-Medical): No   Physical Activity: Inactive    Days of Exercise per Week: 0 days    Minutes of Exercise per Session: 0 min   Stress: No Stress Concern Present    Feeling of Stress : Not at all   Social Connections: Moderately Integrated    Frequency of Communication with Friends and Family: More than three times a week    Frequency of Social Gatherings with Friends and Family: More than three times a week    Attends Zoroastrianism Services: More than 4 times per year    Active Member of [employer] or Organizations: Yes    Attends Club or Organization Meetings: Never    Marital Status:    Intimate Partner Violence: Not At Risk    Fear of Current or Ex-Partner: No    Emotionally Abused: No    Physically Abused: No    Sexually Abused: No   Housing Stability: Unknown    Unable to Pay for Housing in the Last Year: No    Unstable Housing in the Last Year: No       Family History   Problem Relation Age of Onset    Diabetes Mother     Coronary Art Dis Mother     Hypertension Mother     Diabetes Father     Hypertension Father     Stroke Father     Cancer Sister     Hypertension Brother        No Known Allergies    There were no vitals filed for this visit.     Current Outpatient Medications Medication Sig Dispense Refill    traZODone (DESYREL) 50 MG tablet Take 1 tablet by mouth nightly as needed for Sleep 90 tablet 0    linagliptin (TRADJENTA) 5 MG tablet TAKE ONE TABLET BY MOUTH DAILY 90 tablet 0    amLODIPine-atorvastatatin (CADUET) 10-80 MG per tablet Take 1 tablet by mouth daily 90 tablet 1    diphenhydrAMINE-APAP, sleep, (TYLENOL PM EXTRA STRENGTH)  MG tablet Take 1 tablet by mouth nightly as needed for Sleep 200 tablet 0    lisinopril (PRINIVIL;ZESTRIL) 20 MG tablet Take 1 tablet by mouth in the morning and at bedtime 30 tablet 3    glimepiride (AMARYL) 2 MG tablet TAKE ONE TABLET BY MOUTH EVERY MORNING BEFORE BREAKFAST 90 tablet 1    cloNIDine (CATAPRES) 0.3 MG tablet Take 1 tablet by mouth 2 times daily Do not take if HR < 60 180 tablet 3    acetaminophen (TYLENOL) 650 MG extended release tablet Take 650 mg by mouth every 8 hours as needed for Pain      Sucroferric Oxyhydroxide (VELPHORO) 500 MG CHEW Take 1,000 mg by mouth 3 times daily      pregabalin (LYRICA) 75 MG capsule Take 1 capsule by mouth daily for 30 days. 30 capsule 0    apixaban (ELIQUIS) 5 MG TABS tablet Take 1 tablet by mouth 2 times daily 180 tablet 1    Multiple Vitamins-Minerals (RENAPLEX-D PO) Take 1 tablet by mouth daily        No current facility-administered medications for this visit. Review of Systems   Constitutional:  Negative for chills, fatigue and fever. Respiratory:  Negative for chest tightness and shortness of breath. Cardiovascular:  Negative for chest pain and leg swelling. Gastrointestinal:  Negative for constipation, diarrhea, nausea and vomiting. Musculoskeletal:  Positive for back pain. Negative for neck stiffness. Neurological:  Negative for dizziness, weakness and numbness. Objective:  Vital signs: (most recent): Height 5' 11\" (1.803 m), weight 207 lb (93.9 kg). No fever. Assessment & Plan  No diagnosis found.     No orders of the defined types were placed in this encounter. No orders of the defined types were placed in this encounter.            Electronically signed by Onelia Maciel on 12/6/2022 at 12:23 PM

## 2022-12-28 NOTE — TELEPHONE ENCOUNTER
Trenda Boeck is calling to request a refill on the following medication(s):    Medication Request:  Requested Prescriptions     Pending Prescriptions Disp Refills    linagliptin (TRADJENTA) 5 MG tablet [Pharmacy Med Name: TRADJENTA 5 MG TABLET] 90 tablet 0     Sig: TAKE ONE TABLET BY MOUTH DAILY     Last filled 10/12/22 order pending    Last Visit Date (If Applicable):  44/10/4860    Next Visit Date:    1/12/2023

## 2023-01-01 ENCOUNTER — APPOINTMENT (OUTPATIENT)
Dept: GENERAL RADIOLOGY | Age: 63
End: 2023-01-01
Payer: COMMERCIAL

## 2023-01-01 ENCOUNTER — APPOINTMENT (OUTPATIENT)
Dept: CT IMAGING | Age: 63
End: 2023-01-01
Payer: COMMERCIAL

## 2023-01-01 ENCOUNTER — HOSPITAL ENCOUNTER (EMERGENCY)
Age: 63
End: 2023-05-22
Payer: COMMERCIAL

## 2023-01-01 VITALS
SYSTOLIC BLOOD PRESSURE: 93 MMHG | TEMPERATURE: 97.5 F | BODY MASS INDEX: 30.24 KG/M2 | HEIGHT: 71 IN | OXYGEN SATURATION: 53 % | DIASTOLIC BLOOD PRESSURE: 37 MMHG | WEIGHT: 216 LBS | RESPIRATION RATE: 30 BRPM

## 2023-01-01 DIAGNOSIS — E16.2 HYPOGLYCEMIA: Primary | ICD-10-CM

## 2023-01-01 LAB
ANION GAP SERPL CALCULATED.3IONS-SCNC: 27 MMOL/L (ref 9–17)
BASOPHILS # BLD: 0 K/UL (ref 0–0.2)
BASOPHILS NFR BLD: 0 %
BUN SERPL-MCNC: 33 MG/DL (ref 8–23)
BUN/CREAT SERPL: 6 (ref 9–20)
CALCIUM SERPL-MCNC: 10.1 MG/DL (ref 8.6–10.4)
CHLORIDE SERPL-SCNC: 90 MMOL/L (ref 98–107)
CO2 SERPL-SCNC: 16 MMOL/L (ref 20–31)
CREAT SERPL-MCNC: 5.68 MG/DL (ref 0.7–1.2)
EOSINOPHIL # BLD: 0 K/UL (ref 0–0.4)
EOSINOPHILS RELATIVE PERCENT: 0 % (ref 1–4)
ERYTHROCYTE [DISTWIDTH] IN BLOOD BY AUTOMATED COUNT: 22.7 % (ref 11.8–14.4)
GFR SERPL CREATININE-BSD FRML MDRD: 10 ML/MIN/1.73M2
GLUCOSE BLD-MCNC: 14 MG/DL (ref 75–110)
GLUCOSE SERPL-MCNC: 33 MG/DL (ref 70–99)
HCT VFR BLD AUTO: 30.6 % (ref 40.7–50.3)
HGB BLD-MCNC: 9.3 G/DL (ref 13–17)
IMM GRANULOCYTES # BLD AUTO: 0.15 K/UL (ref 0–0.3)
IMM GRANULOCYTES NFR BLD: 1 %
LACTATE BLDV-SCNC: 10.7 MMOL/L (ref 0.5–1.9)
LYMPHOCYTES # BLD: 15 % (ref 24–44)
LYMPHOCYTES NFR BLD: 2.25 K/UL (ref 1–4.8)
MCH RBC QN AUTO: 22 PG (ref 25.2–33.5)
MCHC RBC AUTO-ENTMCNC: 30.4 G/DL (ref 28.4–34.8)
MCV RBC AUTO: 72.3 FL (ref 82.6–102.9)
MONOCYTES NFR BLD: 1.8 K/UL (ref 0.2–0.8)
MONOCYTES NFR BLD: 12 % (ref 1–7)
MORPHOLOGY: ABNORMAL
NEUTROPHILS NFR BLD: 72 % (ref 36–66)
NEUTS SEG NFR BLD: 10.8 K/UL (ref 1.8–7.7)
NRBC AUTOMATED: 2.6 PER 100 WBC
PLATELET # BLD AUTO: 221 K/UL (ref 138–453)
PMV BLD AUTO: ABNORMAL FL (ref 8.1–13.5)
POTASSIUM SERPL-SCNC: 5.2 MMOL/L (ref 3.7–5.3)
RBC # BLD AUTO: 4.23 M/UL (ref 4.21–5.77)
SODIUM SERPL-SCNC: 133 MMOL/L (ref 135–144)
WBC OTHER # BLD: 15 K/UL (ref 3.5–11.3)

## 2023-01-01 PROCEDURE — 6360000002 HC RX W HCPCS: Performed by: EMERGENCY MEDICINE

## 2023-01-01 PROCEDURE — 6360000002 HC RX W HCPCS

## 2023-01-01 PROCEDURE — 92950 HEART/LUNG RESUSCITATION CPR: CPT

## 2023-01-01 PROCEDURE — 2580000003 HC RX 258: Performed by: PHYSICIAN ASSISTANT

## 2023-01-01 PROCEDURE — 6360000002 HC RX W HCPCS: Performed by: PHYSICIAN ASSISTANT

## 2023-01-01 PROCEDURE — 71045 X-RAY EXAM CHEST 1 VIEW: CPT

## 2023-01-01 PROCEDURE — 31500 INSERT EMERGENCY AIRWAY: CPT

## 2023-01-01 PROCEDURE — 96375 TX/PRO/DX INJ NEW DRUG ADDON: CPT

## 2023-01-01 PROCEDURE — 83605 ASSAY OF LACTIC ACID: CPT

## 2023-01-01 PROCEDURE — 96374 THER/PROPH/DIAG INJ IV PUSH: CPT

## 2023-01-01 PROCEDURE — 99285 EMERGENCY DEPT VISIT HI MDM: CPT

## 2023-01-01 PROCEDURE — 87040 BLOOD CULTURE FOR BACTERIA: CPT

## 2023-01-01 PROCEDURE — 80048 BASIC METABOLIC PNL TOTAL CA: CPT

## 2023-01-01 PROCEDURE — 82947 ASSAY GLUCOSE BLOOD QUANT: CPT

## 2023-01-01 PROCEDURE — 2500000003 HC RX 250 WO HCPCS: Performed by: EMERGENCY MEDICINE

## 2023-01-01 PROCEDURE — 85025 COMPLETE CBC W/AUTO DIFF WBC: CPT

## 2023-01-01 PROCEDURE — 2500000003 HC RX 250 WO HCPCS

## 2023-01-01 RX ORDER — NOREPINEPHRINE BIT/0.9 % NACL 16MG/250ML
INFUSION BOTTLE (ML) INTRAVENOUS
Status: DISCONTINUED
Start: 2023-01-01 | End: 2023-05-22 | Stop reason: HOSPADM

## 2023-01-01 RX ORDER — MORPHINE SULFATE 4 MG/ML
4 INJECTION, SOLUTION INTRAMUSCULAR; INTRAVENOUS ONCE
Status: COMPLETED | OUTPATIENT
Start: 2023-01-01 | End: 2023-01-01

## 2023-01-01 RX ORDER — SODIUM CHLORIDE 9 MG/ML
INJECTION, SOLUTION INTRAVENOUS PRN
Status: DISCONTINUED | OUTPATIENT
Start: 2023-01-01 | End: 2023-05-22 | Stop reason: HOSPADM

## 2023-01-01 RX ORDER — EPINEPHRINE 0.1 MG/ML
SYRINGE (ML) INJECTION DAILY PRN
Status: COMPLETED | OUTPATIENT
Start: 2023-01-01 | End: 2023-01-01

## 2023-01-01 RX ORDER — LIDOCAINE HYDROCHLORIDE AND EPINEPHRINE 10; 10 MG/ML; UG/ML
20 INJECTION, SOLUTION INFILTRATION; PERINEURAL ONCE
Status: DISCONTINUED | OUTPATIENT
Start: 2023-01-01 | End: 2023-05-22 | Stop reason: HOSPADM

## 2023-01-01 RX ORDER — SODIUM CHLORIDE 0.9 % (FLUSH) 0.9 %
5-40 SYRINGE (ML) INJECTION PRN
Status: DISCONTINUED | OUTPATIENT
Start: 2023-01-01 | End: 2023-05-22 | Stop reason: HOSPADM

## 2023-01-01 RX ORDER — ONDANSETRON 2 MG/ML
4 INJECTION INTRAMUSCULAR; INTRAVENOUS ONCE
Status: COMPLETED | OUTPATIENT
Start: 2023-01-01 | End: 2023-01-01

## 2023-01-01 RX ORDER — DEXTROSE MONOHYDRATE 100 MG/ML
INJECTION, SOLUTION INTRAVENOUS CONTINUOUS PRN
Status: CANCELLED | OUTPATIENT
Start: 2023-01-01

## 2023-01-01 RX ORDER — DEXTROSE MONOHYDRATE 25 G/50ML
25 INJECTION, SOLUTION INTRAVENOUS ONCE
Status: DISCONTINUED | OUTPATIENT
Start: 2023-01-01 | End: 2023-01-01 | Stop reason: CLARIF

## 2023-01-01 RX ORDER — DEXTROSE MONOHYDRATE 25 G/50ML
25 INJECTION, SOLUTION INTRAVENOUS ONCE
Status: DISCONTINUED | OUTPATIENT
Start: 2023-01-01 | End: 2023-05-22 | Stop reason: HOSPADM

## 2023-01-01 RX ORDER — 0.9 % SODIUM CHLORIDE 0.9 %
500 INTRAVENOUS SOLUTION INTRAVENOUS ONCE
Status: DISCONTINUED | OUTPATIENT
Start: 2023-01-01 | End: 2023-05-22 | Stop reason: HOSPADM

## 2023-01-01 RX ORDER — DEXTROSE MONOHYDRATE 25 G/50ML
INJECTION, SOLUTION INTRAVENOUS DAILY PRN
Status: COMPLETED | OUTPATIENT
Start: 2023-01-01 | End: 2023-01-01

## 2023-01-01 RX ORDER — SODIUM CHLORIDE 0.9 % (FLUSH) 0.9 %
5-40 SYRINGE (ML) INJECTION EVERY 12 HOURS SCHEDULED
Status: DISCONTINUED | OUTPATIENT
Start: 2023-01-01 | End: 2023-05-22 | Stop reason: HOSPADM

## 2023-01-01 RX ADMIN — ONDANSETRON 4 MG: 2 INJECTION INTRAMUSCULAR; INTRAVENOUS at 20:17

## 2023-01-01 RX ADMIN — DEXTROSE MONOHYDRATE 250 ML: 100 INJECTION, SOLUTION INTRAVENOUS at 20:36

## 2023-01-01 RX ADMIN — DEXTROSE MONOHYDRATE 25 G: 25 INJECTION, SOLUTION INTRAVENOUS at 20:33

## 2023-01-01 RX ADMIN — EPINEPHRINE 1 MG: 0.1 INJECTION INTRACARDIAC; INTRAVENOUS at 21:17

## 2023-01-01 RX ADMIN — EPINEPHRINE 1 MG: 0.1 INJECTION INTRACARDIAC; INTRAVENOUS at 21:10

## 2023-01-01 RX ADMIN — EPINEPHRINE 1 MG: 0.1 INJECTION INTRACARDIAC; INTRAVENOUS at 20:27

## 2023-01-01 RX ADMIN — MORPHINE SULFATE 4 MG: 4 INJECTION, SOLUTION INTRAMUSCULAR; INTRAVENOUS at 20:18

## 2023-01-01 RX ADMIN — SODIUM BICARBONATE 50 MEQ: 84 INJECTION, SOLUTION INTRAVENOUS at 21:18

## 2023-01-01 ASSESSMENT — PULMONARY FUNCTION TESTS: PIF_VALUE: 8

## 2023-01-01 ASSESSMENT — PAIN - FUNCTIONAL ASSESSMENT: PAIN_FUNCTIONAL_ASSESSMENT: 0-10

## 2023-01-01 ASSESSMENT — PAIN SCALES - GENERAL
PAINLEVEL_OUTOF10: 9
PAINLEVEL_OUTOF10: 9

## 2023-01-05 ENCOUNTER — APPOINTMENT (OUTPATIENT)
Dept: GENERAL RADIOLOGY | Age: 63
End: 2023-01-05
Payer: COMMERCIAL

## 2023-01-05 ENCOUNTER — HOSPITAL ENCOUNTER (EMERGENCY)
Age: 63
Discharge: HOME | End: 2023-01-05
Attending: EMERGENCY MEDICINE
Payer: COMMERCIAL

## 2023-01-05 VITALS
WEIGHT: 230 LBS | BODY MASS INDEX: 32.2 KG/M2 | TEMPERATURE: 97.8 F | RESPIRATION RATE: 16 BRPM | DIASTOLIC BLOOD PRESSURE: 61 MMHG | HEART RATE: 72 BPM | HEIGHT: 71 IN | SYSTOLIC BLOOD PRESSURE: 163 MMHG | OXYGEN SATURATION: 96 %

## 2023-01-05 DIAGNOSIS — M25.512 ACUTE PAIN OF LEFT SHOULDER: Primary | ICD-10-CM

## 2023-01-05 PROCEDURE — 99283 EMERGENCY DEPT VISIT LOW MDM: CPT

## 2023-01-05 PROCEDURE — 6370000000 HC RX 637 (ALT 250 FOR IP)

## 2023-01-05 PROCEDURE — 73030 X-RAY EXAM OF SHOULDER: CPT

## 2023-01-05 RX ORDER — ACETAMINOPHEN 500 MG
500 TABLET ORAL ONCE
Status: COMPLETED | OUTPATIENT
Start: 2023-01-05 | End: 2023-01-05

## 2023-01-05 RX ORDER — OXYCODONE HYDROCHLORIDE AND ACETAMINOPHEN 5; 325 MG/1; MG/1
1 TABLET ORAL ONCE
Status: DISCONTINUED | OUTPATIENT
Start: 2023-01-05 | End: 2023-01-05

## 2023-01-05 RX ADMIN — ACETAMINOPHEN 500 MG: 500 TABLET ORAL at 15:22

## 2023-01-05 ASSESSMENT — ENCOUNTER SYMPTOMS
VOMITING: 0
BACK PAIN: 0
SHORTNESS OF BREATH: 0
CHEST TIGHTNESS: 0
SINUS PAIN: 0
SORE THROAT: 0
VOICE CHANGE: 0
COUGH: 0
NAUSEA: 0
SINUS PRESSURE: 0
CONSTIPATION: 0
DIARRHEA: 0
ABDOMINAL PAIN: 0
BLOOD IN STOOL: 0

## 2023-01-05 ASSESSMENT — PAIN - FUNCTIONAL ASSESSMENT: PAIN_FUNCTIONAL_ASSESSMENT: 0-10

## 2023-01-05 ASSESSMENT — PAIN SCALES - GENERAL: PAINLEVEL_OUTOF10: 8

## 2023-01-05 NOTE — DISCHARGE INSTRUCTIONS
PLEASE RETURN TO THE EMERGENCY DEPARTMENT IMMEDIATELY if your symptoms worsen in anyway or in 8-12 hours if not improved for re-evaluation. You should immediately return to the ER for symptoms such as increasing pain, bloody stool, fever, a feeling of passing out, light headed, dizziness, chest pain, shortness of breath, persistent nausea and/or vomiting, numbness or weakness to the arms or legs, coolness or color change of the arms or legs. Take your medication as indicated and prescribed. If you are given an antibiotic then, make sure you get the prescription filled and take the antibiotics until finished. [address]!!! On behalf of the Emergency Department staff at [facility]), I would like to thank you for giving us the opportunity to address your health care needs and concerns. We hope that during your visit, our service was delivered in a professional and caring manner. Please keep [facility]) in mind as we walk with you down the path to your own personal wellness. Please expect an automated text message or email from us so we can ask a few questions about your health and progress. Based on your answers, a clinician may call you back to offer help and instructions. Please understand that early in the process of an illness or injury, an emergency department workup can be falsely reassuring. If you notice any worsening, changing or persistent symptoms please call your family doctor or return to the ER immediately.

## 2023-01-05 NOTE — ED PROVIDER NOTES
EMERGENCY DEPARTMENT ENCOUNTER   ATTENDING ATTESTATION     Pt Name: Skip Jaime  MRN: [id number]  [place] 1960  Date of evaluation: 1/5/23   Skip Jaime is a 60 y.o. male with CC: Shoulder Injury (Pt states about 4 months ago he fell down some stairs and hurt his Lt shoulder. )    MDM:     Fall several months ago, persistent pain  Hx of HD, has cath on same side    Plan for XR, pain control    CRITICAL CARE:       EKG: All EKG's are interpreted by the Emergency Department Physician who either signs or Co-signs this chart in the absence of a cardiologist.      RADIOLOGY:All plain film, CT, MRI, and formal ultrasound images (except ED bedside ultrasound) are read by the radiologist, see reports below, unless otherwise noted in MDM or here. XR SHOULDER LEFT (MIN 2 VIEWS)    (Results Pending)     LABS: All lab results were reviewed by myself, and all abnormals are listed below. Labs Reviewed - No data to display  CONSULTS:  None  FINAL IMPRESSION    No diagnosis found. PASTMEDICAL HISTORY     Past Medical History:   Diagnosis Date    Acute congestive heart failure (HCC)     Chronic bilateral low back pain with bilateral sciatica     Coronary artery disease involving native coronary artery 9/6/2022    CRF (chronic renal failure)     [facility] MWF    DDD (degenerative disc disease), lumbar 12/15/2020    Diabetes mellitus ([facility] [address]     Dialysis patient [facility])     ED (erectile dysfunction)     History of echocardiogram 2014    [facility]    HTN (hypertension)     Hyperlipidemia     LVH (left ventricular hypertrophy)     PVD (peripheral vascular disease) ([facility])     S/P bilateral BKA (below knee amputation) ([address]     Wears glasses      SURGICAL HISTORY       Past Surgical History:   Procedure Laterality Date    ARM SURGERY      CATARACT REMOVAL WITH IMPLANT      DIALYSIS FISTULA CREATION Bilateral     As of 2019, RUE AVF functioning, LUE AVF nonfunctioning.     FINGER AMPUTATION      right pointer finger    FINGER SURGERY Left     I & D    GLAUCOMA SURGERY      LEG AMPUTATION BELOW KNEE Bilateral     TUNNELED VENOUS CATHETER PLACEMENT      PC placed and removed     CURRENT MEDICATIONS       Previous Medications    ACETAMINOPHEN (TYLENOL) 650 MG EXTENDED RELEASE TABLET    Take 650 mg by mouth every 8 hours as needed for Pain    AMLODIPINE-ATORVASTATATIN (CADUET) 10-80 MG PER TABLET    Take 1 tablet by mouth daily    APIXABAN (ELIQUIS) 5 MG TABS TABLET    Take 1 tablet by mouth 2 times daily    CLONIDINE (CATAPRES) 0.3 MG TABLET    Take 1 tablet by mouth 2 times daily Do not take if HR < 60    DIPHENHYDRAMINE-APAP, SLEEP, (TYLENOL PM EXTRA STRENGTH)  MG TABLET    Take 1 tablet by mouth nightly as needed for Sleep    GLIMEPIRIDE (AMARYL) 2 MG TABLET    TAKE ONE TABLET BY MOUTH EVERY MORNING BEFORE BREAKFAST    LINAGLIPTIN (TRADJENTA) 5 MG TABLET    TAKE ONE TABLET BY MOUTH DAILY    LISINOPRIL (PRINIVIL;ZESTRIL) 20 MG TABLET    Take 1 tablet by mouth in the morning and at bedtime    MINOXIDIL (LONITEN) 2.5 MG TABLET        MULTIPLE VITAMINS-MINERALS (RENAPLEX-D PO)    Take 1 tablet by mouth daily     OXYCODONE-ACETAMINOPHEN (PERCOCET) 7.5-325 MG PER TABLET    Take 1 tablet by mouth daily as needed for Pain for up to 30 days. On HD days    PREGABALIN (LYRICA) 75 MG CAPSULE    Take 1 capsule by mouth daily for 30 days. SUCROFERRIC OXYHYDROXIDE (VELPHORO) 500 MG CHEW    Take 1,000 mg by mouth 3 times daily    TRAMADOL (ULTRAM) 50 MG TABLET    Take 1 tablet by mouth daily as needed for Pain for up to 30 days. TRAZODONE (DESYREL) 50 MG TABLET    Take 1 tablet by mouth nightly as needed for Sleep     ALLERGIES     has No Known Allergies. FAMILY HISTORY     He indicated that the status of his mother is unknown. He indicated that the status of his father is unknown. He indicated that the status of his sister is unknown. He indicated that the status of his brother is unknown.      SOCIAL HISTORY       Social History Tobacco Use    Smoking status: Never    Smokeless tobacco: Never   Vaping Use    Vaping Use: Never used   Substance Use Topics    Alcohol use: Yes     Comment: rare    Drug use: No       I personally evaluated and examined the patient in conjunction with the APC and agree with the assessment, treatment plan, and disposition of the patient as recorded by the APC.    Queenie Fernandez MD  Attending Emergency Physician          Queenie Fernandez MD  01/05/23 4875

## 2023-01-05 NOTE — ED PROVIDER NOTES
[facility] ED  eMERGENCY dEPARTMENT eNCOUnter      Pt Name: Breanna Murphy  MRN: [id number]  Birthdate 1960  Date of evaluation: 1/5/2023  Provider: AURORA Summers CNP    CHIEF COMPLAINT       Chief Complaint   Patient presents with    Shoulder Injury     Pt states about 4 months ago he fell down some stairs and hurt his Lt shoulder.          HISTORY OF PRESENT ILLNESS  (Location/Symptom, Timing/Onset, Context/Setting, Quality, Duration, Modifying Factors, Severity.)   Breanna Murphy is a 62 y.o. male who presents to the emergency department complaint of left-sided shoulder pain after a fall that occurred 2 months ago.  Patient states he did not have his shoulder evaluated after the fall.  Patient denies hitting his head or any LOC.  The patient takes Percocet at home for his pain related to his bilateral lower limb amputations.  The patient has a dialysis catheter to the left side of his chest, states there was no involvement with the catheter.  The patient receives dialysis Monday Wednesday Friday, he had a full treatment yesterday and will have a full treatment tomorrow.  The patient denies any chest pain, shortness of breath, abdominal pain, headache or vision changes.    Nursing Notes were reviewed.    ALLERGIES     Patient has no known allergies.    CURRENT MEDICATIONS       Discharge Medication List as of 1/5/2023  4:01 PM        CONTINUE these medications which have NOT CHANGED    Details   linagliptin (TRADJENTA) 5 MG tablet TAKE ONE TABLET BY MOUTH DAILY, Disp-90 tablet, R-0Normal      oxyCODONE-acetaminophen (PERCOCET) 7.5-325 MG per tablet Take 1 tablet by mouth daily as needed for Pain for up to 30 days. On HD days, Disp-20 tablet, R-0Normal      traMADol (ULTRAM) 50 MG tablet Take 1 tablet by mouth daily as needed for Pain for up to 30 days., Disp-30 tablet, R-1Normal      minoxidil (LONITEN) 2.5 MG tablet Historical Med      pregabalin (LYRICA) 75 MG capsule Take 1 capsule  by mouth daily for 30 days. , Disp-30 capsule, R-1Normal      traZODone (DESYREL) 50 MG tablet Take 1 tablet by mouth nightly as needed for Sleep, Disp-90 tablet, R-0Normal      amLODIPine-atorvastatatin (CADUET) 10-80 MG per tablet Take 1 tablet by mouth daily, Disp-90 tablet, R-1Normal      diphenhydrAMINE-APAP, sleep, (TYLENOL PM EXTRA STRENGTH)  MG tablet Take 1 tablet by mouth nightly as needed for Sleep, Disp-200 tablet, R-0Normal      lisinopril (PRINIVIL;ZESTRIL) 20 MG tablet Take 1 tablet by mouth in the morning and at bedtime, Disp-30 tablet, R-3Normal      glimepiride (AMARYL) 2 MG tablet TAKE ONE TABLET BY MOUTH EVERY MORNING BEFORE BREAKFAST, Disp-90 tablet, R-1Normal      cloNIDine (CATAPRES) 0.3 MG tablet Take 1 tablet by mouth 2 times daily Do not take if HR < 60, Disp-180 tablet, R-3Normal      acetaminophen (TYLENOL) 650 MG extended release tablet Take 650 mg by mouth every 8 hours as needed for PainHistorical Med      Sucroferric Oxyhydroxide (VELPHORO) 500 MG CHEW Take 1,000 mg by mouth 3 times dailyHistorical Med      apixaban (ELIQUIS) 5 MG TABS tablet Take 1 tablet by mouth 2 times daily, Disp-180 tablet, R-1Normal      Multiple Vitamins-Minerals (RENAPLEX-D PO) Take 1 tablet by mouth daily Historical Med             PAST MEDICAL HISTORY         Diagnosis Date    Acute congestive heart failure (HCC)     Chronic bilateral low back pain with bilateral sciatica     Coronary artery disease involving native coronary artery 9/6/2022    CRF (chronic renal failure)     [facility] MWF    DDD (degenerative disc disease), lumbar 12/15/2020    [facility]     Dialysis patient [facility])     ED (erectile dysfunction)     History of echocardiogram 2014    [facility]    HTN (hypertension)     Hyperlipidemia     LVH (left ventricular hypertrophy)     PVD (peripheral vascular disease) ([facility])     S/P bilateral BKA (below knee amputation) ([facility])     Wears glasses        SURGICAL HISTORY           Procedure Laterality Date    ARM SURGERY      CATARACT REMOVAL WITH IMPLANT      DIALYSIS FISTULA CREATION Bilateral     As of 2019, RUE AVF functioning, LUE AVF nonfunctioning. FINGER AMPUTATION      right pointer finger    FINGER SURGERY Left     I & D    GLAUCOMA SURGERY      LEG AMPUTATION BELOW KNEE Bilateral     TUNNELED VENOUS CATHETER PLACEMENT      PC placed and removed         FAMILY HISTORY           Problem Relation Age of Onset    Diabetes Mother     Coronary Art Dis Mother     Hypertension Mother     Diabetes Father     Hypertension Father     Stroke Father     Cancer Sister     Hypertension Brother      Family Status   Relation Name Status    Mother  (Not Specified)    Father  (Not Specified)    Sister  (Not Specified)    Brother  (Not Specified)        SOCIAL HISTORY      reports that he has never smoked. He has never used smokeless tobacco. He reports current alcohol use. He reports that he does not use drugs. REVIEW OF SYSTEMS    (2-9 systems for level 4, 10 or more for level 5)   Review of Systems   Constitutional:  Negative for appetite change, chills, diaphoresis, fatigue and fever. HENT:  Negative for congestion, ear pain, sinus pressure, sinus pain, sore throat and voice change. Respiratory:  Negative for cough, chest tightness and shortness of breath. Cardiovascular:  Negative for chest pain, palpitations and leg swelling. Gastrointestinal:  Negative for abdominal pain, blood in stool, constipation, diarrhea, nausea and vomiting. Genitourinary:  Negative for dysuria, flank pain, frequency, hematuria and urgency. Musculoskeletal:  Positive for gait problem (bilateral below knee amputee, uses crutches for ambulation) and myalgias (left shoulder pain). Negative for arthralgias, back pain and neck pain. Skin:  Negative for rash. Neurological:  Negative for dizziness, speech difficulty, weakness, light-headedness, numbness and headaches.      Except as noted above the remainder of the review of systems was reviewed and negative. PHYSICAL EXAM    (up to 7 for level 4, 8 or more for level 5)     ED Triage Vitals [01/05/23 1412]   BP Temp Temp Source Heart Rate Resp SpO2 Height Weight   (!) 163/61 97.8 °F (36.6 °C) Oral 72 16 96 % 5' 11\" (1.803 m) 230 lb (104.3 kg)     Physical Exam  Vitals and nursing note reviewed. Constitutional:       General: He is not in acute distress. Appearance: Normal appearance. He is normal weight. He is not ill-appearing, toxic-appearing or diaphoretic. HENT:      Head: Normocephalic and atraumatic. Right Ear: External ear normal.      Left Ear: External ear normal.      Nose: Nose normal. No congestion or rhinorrhea. Mouth/Throat:      Mouth: Mucous membranes are moist.      Pharynx: Oropharynx is clear. Eyes:      General: No scleral icterus. Right eye: No discharge. Left eye: No discharge. Extraocular Movements: Extraocular movements intact. Pupils: Pupils are equal, round, and reactive to light. Cardiovascular:      Rate and Rhythm: Normal rate and regular rhythm. Pulses: Normal pulses. Heart sounds: Normal heart sounds. No murmur heard. Pulmonary:      Effort: Pulmonary effort is normal. No respiratory distress. Breath sounds: Normal breath sounds. No stridor. No wheezing, rhonchi or rales. Chest:      Chest wall: No tenderness. Abdominal:      General: Abdomen is flat. There is no distension. Palpations: Abdomen is soft. Tenderness: There is no abdominal tenderness. There is no right CVA tenderness, left CVA tenderness, guarding or rebound. Musculoskeletal:         General: Tenderness (left shoulder pain. normal ROM, no obvious injury or deformity to L shoulder. PMS intact) and deformity (bilateral BKA) present. No swelling or signs of injury. Normal range of motion. Cervical back: Normal range of motion and neck supple. Right lower leg: No edema.       Left lower leg: No edema. Lymphadenopathy:      Cervical: No cervical adenopathy. Skin:     General: Skin is warm and dry. Capillary Refill: Capillary refill takes less than 2 seconds. Coloration: Skin is not jaundiced or pale. Findings: No rash. Neurological:      General: No focal deficit present. Mental Status: He is alert and oriented to person, place, and time. Cranial Nerves: No cranial nerve deficit. Sensory: No sensory deficit. Motor: No weakness. Gait: Gait normal.   Psychiatric:         Mood and Affect: Mood normal.         Behavior: Behavior normal.         Thought Content: Thought content normal.         DIAGNOSTIC RESULTS       RADIOLOGY:   Non-plain film images such as CT, Ultrasound and MRI are read by the radiologist. Plain radiographic images are visualized and preliminarily interpreted by the emergency physician with the below findings:    Interpretation per the Radiologist below, if available at the time of this note:    XR SHOULDER LEFT (MIN 2 VIEWS)    Result Date: 1/5/2023  EXAMINATION: 3 XRAY VIEWS OF THE LEFT SHOULDER 1/5/2023 3:39 pm COMPARISON: 04/09/2022 HISTORY: ORDERING SYSTEM PROVIDED HISTORY: fall, lt shoulder pain TECHNOLOGIST PROVIDED HISTORY: fall, lt shoulder pain Reason for Exam: Fall, pain FINDINGS: Glenohumeral joint is normally aligned. No evidence of acute fracture or dislocation. No abnormal periarticular calcifications. AC joint and glenohumeral joint degenerative changes Visualized lung is unremarkable. No acute abnormality. Severe AC joint and glenohumeral joint degenerative changes        LABS:  Labs Reviewed - No data to display    All other labs were within normal range or not returned as of this dictation.     EMERGENCY DEPARTMENT COURSE and DIFFERENTIAL DIAGNOSIS/MDM:   Vitals:    Vitals:    01/05/23 1412   BP: (!) 163/61   Pulse: 72   Resp: 16   Temp: 97.8 °F (36.6 °C)   TempSrc: Oral   SpO2: 96%   Weight: 230 lb (104.3 kg) Height: 5' 11\" (1.803 m)       MEDICATIONS GIVEN IN THE ED:  Medications   acetaminophen (TYLENOL) tablet 500 mg (500 mg Oral Given 1/5/23 3962)       CLINICAL DECISION MAKING:  The patient presented alert with a nontoxic appearance and was seen in conjunction with Dr. Isreal Lopez. Patient presents to the ED with complaint of left-sided shoulder pain following a fall down 13 stairs approximately 2 months ago. The patient denies having the shoulder evaluated by any medical professional.  Patient denies hitting his head, denies LOC. The patient does have a dialysis catheter to his left chest, states he had a full treatment yesterday and will have a full treatment tomorrow as his treatments are Monday, Wednesday, Friday. He denies any chest pain, shortness of breath, headache, vision changes, numbness or tingling to his left arm. The patient maintains normal range of motion to the left shoulder, complains of posterior pain to left shoulder. There is no obvious injury or deformity. PMS remains intact. The patient does ambulate using crutches due to his bilateral below the knee amputations. DDx include muscle strain, contusion, ligament or tendon damage of left shoulder    [facility] report reviewed    Plan to order x-ray of the left shoulder, pain control. Patient was offered a dose of Percocet in ED as this is a home medication, however patient declines at this time. Patient requesting 500 mg of Tylenol. Patient's x-ray revealed no acute abnormality. It did show degenerative changes to the [facility] joint. Plan for discharge and follow-up with orthopedic surgery as needed for any new or worsening symptoms. The patient was involved in his/her plan of care. The tests that were ordered were discussed with the patient. Any medications that may have been ordered were discussed with the patient. I have reviewed the patient's previous medical records. Labs and imaging were reviewed.    Evaluation and treatment course in the ED, and plan of care upon discharge was discussed in length with the patient. Patient had no further questions prior to being discharged and was instructed to return to the ED for new or worsening symptoms. Care was provided during an unprecedented national emergency due to the novel coronavirus, Covid-19. FINAL IMPRESSION      1.  Acute pain of left shoulder            Problem List  Patient Active Problem List   Diagnosis Code    Hyperlipidemia E78.5    Essential hypertension I10    ESRD on hemodialysis ([facility] [address] MWF N18.6, Z99.2    Insomnia G47.00    ED (erectile dysfunction) N52.9    Chronic bilateral low back pain with bilateral sciatica M54.42, M54.41, G89.29    Controlled diabetes mellitus type 2 with complications (HCC) P58.2    S/P bilateral below knee amputation (HCC) Z89.512, Z89.511    Long term (current) use of antithrombotics/antiplatelets E40.82    Chronic use of opiate drugs therapeutic purposes Z79.891    Spinal stenosis of lumbar region with neurogenic claudication M48.062    Coronary artery disease involving native coronary artery I25.10    Anemia D64.9    Emesis R11.10         DISPOSITION/PLAN   DISPOSITION Decision To Discharge 01/05/2023 04:01:27 PM      PATIENT REFERRED TO:   Kalen Vidal MD  [address] [address] [facility]  [phone]    Schedule an appointment as soon as possible for a visit   As needed, If symptoms worsen    [facility] ED  [address]  [phone]  Go to   As needed, If symptoms worsen    Yonas Bhakta MD  [facility] [address] [facility]  [phone]    Schedule an appointment as soon as possible for a visit   As needed, If symptoms worsen    DISCHARGE MEDICATIONS:     Discharge Medication List as of 1/5/2023  4:01 PM              (Please note that portions of this note were completed with a voice recognition program.  Efforts were made to edit the dictations but occasionally words are mis-transcribed.)    AURORA Tomas - CNP      AURORA Tomas - [facility]  01/05/23 1942

## 2023-01-12 ENCOUNTER — OFFICE VISIT (OUTPATIENT)
Dept: INTERNAL MEDICINE CLINIC | Age: 63
End: 2023-01-12
Payer: COMMERCIAL

## 2023-01-12 DIAGNOSIS — Z79.02 LONG TERM (CURRENT) USE OF ANTITHROMBOTICS/ANTIPLATELETS: ICD-10-CM

## 2023-01-12 DIAGNOSIS — N18.6 ESRD ON HEMODIALYSIS (HCC): ICD-10-CM

## 2023-01-12 DIAGNOSIS — Z89.512 S/P BILATERAL BELOW KNEE AMPUTATION (HCC): ICD-10-CM

## 2023-01-12 DIAGNOSIS — D50.8 OTHER IRON DEFICIENCY ANEMIA: ICD-10-CM

## 2023-01-12 DIAGNOSIS — E11.8 CONTROLLED TYPE 2 DIABETES MELLITUS WITH COMPLICATION, WITHOUT LONG-TERM CURRENT USE OF INSULIN (HCC): ICD-10-CM

## 2023-01-12 DIAGNOSIS — I25.10 CORONARY ARTERY DISEASE INVOLVING NATIVE CORONARY ARTERY OF NATIVE HEART WITHOUT ANGINA PECTORIS: Primary | ICD-10-CM

## 2023-01-12 DIAGNOSIS — M54.42 CHRONIC BILATERAL LOW BACK PAIN WITH BILATERAL SCIATICA: ICD-10-CM

## 2023-01-12 DIAGNOSIS — G47.09 OTHER INSOMNIA: ICD-10-CM

## 2023-01-12 DIAGNOSIS — I10 ESSENTIAL HYPERTENSION: ICD-10-CM

## 2023-01-12 DIAGNOSIS — G89.29 CHRONIC BILATERAL LOW BACK PAIN WITH BILATERAL SCIATICA: ICD-10-CM

## 2023-01-12 DIAGNOSIS — Z99.2 ESRD ON HEMODIALYSIS (HCC): ICD-10-CM

## 2023-01-12 DIAGNOSIS — Z79.891 CHRONIC USE OF OPIATE DRUG FOR THERAPEUTIC PURPOSE: ICD-10-CM

## 2023-01-12 DIAGNOSIS — E78.2 MIXED HYPERLIPIDEMIA: ICD-10-CM

## 2023-01-12 DIAGNOSIS — Z89.511 S/P BILATERAL BELOW KNEE AMPUTATION (HCC): ICD-10-CM

## 2023-01-12 DIAGNOSIS — M54.41 CHRONIC BILATERAL LOW BACK PAIN WITH BILATERAL SCIATICA: ICD-10-CM

## 2023-01-12 PROBLEM — M48.062 SPINAL STENOSIS OF LUMBAR REGION WITH NEUROGENIC CLAUDICATION: Status: RESOLVED | Noted: 2021-04-13 | Resolved: 2023-01-12

## 2023-01-12 PROBLEM — R11.10 EMESIS: Status: RESOLVED | Noted: 2022-11-13 | Resolved: 2023-01-12

## 2023-01-12 PROCEDURE — G8417 CALC BMI ABV UP PARAM F/U: HCPCS | Performed by: FAMILY MEDICINE

## 2023-01-12 PROCEDURE — 3046F HEMOGLOBIN A1C LEVEL >9.0%: CPT | Performed by: FAMILY MEDICINE

## 2023-01-12 PROCEDURE — 1036F TOBACCO NON-USER: CPT | Performed by: FAMILY MEDICINE

## 2023-01-12 PROCEDURE — 99214 OFFICE O/P EST MOD 30 MIN: CPT | Performed by: FAMILY MEDICINE

## 2023-01-12 PROCEDURE — G8484 FLU IMMUNIZE NO ADMIN: HCPCS | Performed by: FAMILY MEDICINE

## 2023-01-12 PROCEDURE — 3017F COLORECTAL CA SCREEN DOC REV: CPT | Performed by: FAMILY MEDICINE

## 2023-01-12 PROCEDURE — 2022F DILAT RTA XM EVC RTNOPTHY: CPT | Performed by: FAMILY MEDICINE

## 2023-01-12 PROCEDURE — G8428 CUR MEDS NOT DOCUMENT: HCPCS | Performed by: FAMILY MEDICINE

## 2023-01-12 NOTE — PROGRESS NOTES
Subjective:      Patient ID: Gavin Zhong is a 60 y.o. male. 2023    TELEHEALTH EVALUATION -- Audio/Visual (During LIWQG-52 public health emergency)    HPI:    Gavin Zhong (:  1960) has requested an audio/video evaluation for the following concern(s):    Diabetes mellitus  Hypertension  Hyperlipidemia  Chronic anticoagulation  ESRD-hemodialysis dependent  Chronic pain syndrome  Anxiety/depression    Review of Systems    Prior to Visit Medications    Medication Sig Taking? Authorizing Provider   linagliptin (TRADJENTA) 5 MG tablet TAKE ONE TABLET BY MOUTH DAILY  Osiris Sidhu MD   minoxidil (LONITEN) 2.5 MG tablet   Historical Provider, MD   pregabalin (LYRICA) 75 MG capsule Take 1 capsule by mouth daily for 30 days.   Kevin Carmona MD   traZODone (DESYREL) 50 MG tablet Take 1 tablet by mouth nightly as needed for Sleep  Osiris Sidhu MD   amLODIPine-atorvastatatin (CADUET) 10-80 MG per tablet Take 1 tablet by mouth daily  Osiris Sidhu MD   diphenhydrAMINE-APAP, sleep, (TYLENOL PM EXTRA STRENGTH)  MG tablet Take 1 tablet by mouth nightly as needed for Sleep  Osiris Sidhu MD   lisinopril (PRINIVIL;ZESTRIL) 20 MG tablet Take 1 tablet by mouth in the morning and at bedtime  Osiris Sidhu MD   glimepiride (AMARYL) 2 MG tablet TAKE ONE TABLET BY MOUTH EVERY MORNING BEFORE BREAKFAST  Osiris Sidhu MD   cloNIDine (CATAPRES) 0.3 MG tablet Take 1 tablet by mouth 2 times daily Do not take if HR < 60  Osiris Sidhu MD   atorvastatin (LIPITOR) 80 MG tablet Take 1 tablet by mouth daily  Osiris Sidhu MD   acetaminophen (TYLENOL) 650 MG extended release tablet Take 650 mg by mouth every 8 hours as needed for Pain  Historical Provider, MD   Sucroferric Oxyhydroxide (VELPHORO) 500 MG CHEW Take 1,000 mg by mouth 3 times daily  Historical Provider, MD   apixaban (ELIQUIS) 5 MG TABS tablet Take 1 tablet by mouth 2 times daily  Huyen Valadez MD   Multiple Vitamins-Minerals (RENAPLEX-D PO) Take 1 tablet by mouth daily   Historical Provider, MD   aspirin 81 MG chewable tablet Take 81 mg by mouth every morning  Historical Provider, MD       Social History     Tobacco Use    Smoking status: Never    Smokeless tobacco: Never   Vaping Use    Vaping Use: Never used   Substance Use Topics    Alcohol use: Yes     Comment: rare    Drug use: No        No Known Allergies    PHYSICAL EXAMINATION:  [ INSTRUCTIONS:  \"[x]\" Indicates a positive item  \"[]\" Indicates a negative item  -- DELETE ALL ITEMS NOT EXAMINED]  Vital Signs: (As obtained by patient/caregiver or practitioner observation)    Blood pressure-  Heart rate-    Respiratory rate-    Temperature-  Pulse oximetry-     Constitutional: [x] Appears well-developed and well-nourished [x] No apparent distress      [] Abnormal-   Mental status  [x] Alert and awake  [x] Oriented to person/place/time [x]Able to follow commands      Eyes:  EOM    [x]  Normal  [] Abnormal-  Sclera  [x]  Normal  [] Abnormal -         Discharge [x]  None visible  [] Abnormal -    HENT:   [x] Normocephalic, atraumatic.   [] Abnormal   [x] Mouth/Throat: Mucous membranes are moist.     External Ears [x] Normal  [] Abnormal-     Neck: [x] No visualized mass     Pulmonary/Chest: [x] Respiratory effort normal.  [x] No visualized signs of difficulty breathing or respiratory distress        [] Abnormal-      Musculoskeletal:   [] Normal gait with no signs of ataxia         [] Normal range of motion of neck        [x] Abnormal-chronic pain syndrome  Above-knee bilateral amputee      Neurological:        [x] No Facial Asymmetry (Cranial nerve 7 motor function) (limited exam to video visit)          [x] No gaze palsy        [] Abnormal-         Skin:        [x] No significant exanthematous lesions or discoloration noted on facial skin         [] Abnormal-            Psychiatric:       [] Normal Affect [] No Hallucinations        [x] Abnormal-anxiety/depression    Other pertinent observable physical exam findings- ASSESSMENT/PLAN:  43-year-old amputee bilateral above-knee as complication of diabetes, peripheral vascular disease. He was recently seen in the emergency room because he sustained a fall without any obvious fracture. He is established with pain management on Lyrica,  Fall precaution is advised  ESRD-hemodialysis dependent on 3/week that he is tolerating well  Anemia of chronic disease H&H is stable at baseline. Continue Velphoro  Secondary hyperparathyroidism  Anxiety/depression on trazodone. He denies suicidality, delusion or hallucination  Hypertension on lisinopril, amlodipine, clonidine titrated by nephrology  Chronic anticoagulation on Eliquis 5 mg p.o. twice daily  Diabetes mellitus well controlled. Continue Amaryl and Zygmunt Quinn  Is advised to update influenza, pneumococcal vaccine, COVID-vaccine in his pharmacy    He does not have any further question or concern      No follow-ups on file. Carollynn Schirmer, was evaluated through a synchronous (real-time) audio-video encounter. The patient (or guardian if applicable) is aware that this is a billable service, which includes applicable co-pays. This Virtual Visit was conducted with patient's (and/or legal guardian's) consent. The visit was conducted pursuant to the emergency declaration under the [address] [place] [address] authority and the [place] [employer] [place] General Act. Patient identification was verified, and a caregiver was present when appropriate. The patient was located at Home: [place] [id number]. Provider was located at [facility] (Appt Dept): [address]  [address]  [place],  [address] [place] [address]. Total time spent on this encounter:  25 minutes    --Elaine Liriano MD on 1/12/2023 at 4:16 PM    An electronic signature was used to authenticate this note.   This note is created with a voice recognition program and while intend to generate a document that accurately reflects the content of the visit, no guarantee can be provided that every mistake has been identified and corrected by editing.           Igor Schwab MD

## 2023-01-13 ENCOUNTER — TELEPHONE (OUTPATIENT)
Dept: ORTHOPEDIC SURGERY | Age: 63
End: 2023-01-13

## 2023-01-16 RX ORDER — TRAZODONE HYDROCHLORIDE 50 MG/1
TABLET ORAL
Qty: 90 TABLET | Refills: 0 | Status: SHIPPED | OUTPATIENT
Start: 2023-01-16

## 2023-01-16 NOTE — TELEPHONE ENCOUNTER
Banda Georgia is calling to request a refill on the following medication(s):    Medication Request:  Requested Prescriptions     Pending Prescriptions Disp Refills    traZODone (DESYREL) 50 MG tablet [Pharmacy Med Name: traZODone 50 MG TABLET] 90 tablet 0     Sig: TAKE ONE TABLET BY MOUTH ONCE NIGHTLY AS NEEDED FOR SLEEP     Last filled 10/18/22    Last Visit Date (If Applicable):  2/02/9193    Next Visit Date:    1/26/2023

## 2023-01-30 ENCOUNTER — TELEPHONE (OUTPATIENT)
Dept: PAIN MANAGEMENT | Age: 63
End: 2023-01-30

## 2023-01-30 ENCOUNTER — APPOINTMENT (OUTPATIENT)
Dept: GENERAL RADIOLOGY | Age: 63
DRG: 917 | End: 2023-01-30
Payer: COMMERCIAL

## 2023-01-30 ENCOUNTER — HOSPITAL ENCOUNTER (INPATIENT)
Age: 63
LOS: 2 days | Discharge: HOME HEALTH | DRG: 917 | End: 2023-02-01
Attending: EMERGENCY MEDICINE | Admitting: FAMILY MEDICINE
Payer: COMMERCIAL

## 2023-01-30 ENCOUNTER — APPOINTMENT (OUTPATIENT)
Dept: CT IMAGING | Age: 63
DRG: 917 | End: 2023-01-30
Payer: COMMERCIAL

## 2023-01-30 DIAGNOSIS — E16.2 HYPOGLYCEMIA: Primary | ICD-10-CM

## 2023-01-30 LAB
ABSOLUTE EOS #: 0 K/UL (ref 0–0.4)
ABSOLUTE IMMATURE GRANULOCYTE: 0 K/UL (ref 0–0.3)
ABSOLUTE LYMPH #: 0.73 K/UL (ref 1–4.8)
ABSOLUTE MONO #: 0.43 K/UL (ref 0.2–0.8)
ALBUMIN SERPL-MCNC: 4 G/DL (ref 3.5–5.2)
ALP BLD-CCNC: 133 U/L (ref 40–129)
ALT SERPL-CCNC: 32 U/L (ref 5–41)
ANION GAP SERPL CALCULATED.3IONS-SCNC: 13 MMOL/L (ref 9–17)
AST SERPL-CCNC: 51 U/L
BASOPHILS # BLD: 0 %
BASOPHILS ABSOLUTE: 0 K/UL (ref 0–0.2)
BILIRUB SERPL-MCNC: 1.2 MG/DL (ref 0.3–1.2)
BUN BLDV-MCNC: 37 MG/DL (ref 8–23)
BUN/CREAT BLD: 5 (ref 9–20)
CALCIUM SERPL-MCNC: 9.9 MG/DL (ref 8.6–10.4)
CHLORIDE BLD-SCNC: 97 MMOL/L (ref 98–107)
CHP ED QC CHECK: NORMAL
CO2: 26 MMOL/L (ref 20–31)
CREAT SERPL-MCNC: 7.62 MG/DL (ref 0.7–1.2)
EOSINOPHILS RELATIVE PERCENT: 0 % (ref 1–4)
GFR SERPL CREATININE-BSD FRML MDRD: 7 ML/MIN/1.73M2
GLUCOSE BLD-MCNC: 113 MG/DL (ref 75–110)
GLUCOSE BLD-MCNC: 30 MG/DL (ref 75–110)
GLUCOSE BLD-MCNC: 70 MG/DL (ref 70–99)
GLUCOSE BLD-MCNC: 70 MG/DL (ref 75–110)
GLUCOSE BLD-MCNC: 73 MG/DL (ref 75–110)
GLUCOSE BLD-MCNC: 74 MG/DL (ref 75–110)
GLUCOSE BLD-MCNC: 91 MG/DL
GLUCOSE BLD-MCNC: 91 MG/DL (ref 75–110)
HCT VFR BLD CALC: 35.4 % (ref 40.7–50.3)
HEMOGLOBIN: 11.2 G/DL (ref 13–17)
IMMATURE GRANULOCYTES: 0 %
LYMPHOCYTES # BLD: 12 % (ref 24–44)
MCH RBC QN AUTO: 23.7 PG (ref 25.2–33.5)
MCHC RBC AUTO-ENTMCNC: 31.6 G/DL (ref 28.4–34.8)
MCV RBC AUTO: 74.8 FL (ref 82.6–102.9)
MONOCYTES # BLD: 7 % (ref 1–7)
MORPHOLOGY: ABNORMAL
MYOGLOBIN: 2373 NG/ML (ref 28–72)
NRBC AUTOMATED: 0 PER 100 WBC
PDW BLD-RTO: 19.9 % (ref 11.8–14.4)
PLATELET # BLD: 132 K/UL (ref 138–453)
PMV BLD AUTO: ABNORMAL FL (ref 8.1–13.5)
POTASSIUM SERPL-SCNC: 4.7 MMOL/L (ref 3.7–5.3)
RBC # BLD: 4.73 M/UL (ref 4.21–5.77)
REASON FOR REJECTION: NORMAL
SEG NEUTROPHILS: 81 % (ref 36–66)
SEGMENTED NEUTROPHILS ABSOLUTE COUNT: 4.94 K/UL (ref 1.8–7.7)
SODIUM BLD-SCNC: 136 MMOL/L (ref 135–144)
TOTAL CK: 1727 U/L (ref 39–308)
TOTAL PROTEIN: 7.7 G/DL (ref 6.4–8.3)
TROPONIN, HIGH SENSITIVITY: 444 NG/L (ref 0–22)
WBC # BLD: 6.1 K/UL (ref 3.5–11.3)
ZZ NTE CLEAN UP: ORDERED TEST: NORMAL
ZZ NTE WITH NAME CLEAN UP: SPECIMEN SOURCE: NORMAL

## 2023-01-30 PROCEDURE — 70450 CT HEAD/BRAIN W/O DYE: CPT

## 2023-01-30 PROCEDURE — 99285 EMERGENCY DEPT VISIT HI MDM: CPT

## 2023-01-30 PROCEDURE — 84484 ASSAY OF TROPONIN QUANT: CPT

## 2023-01-30 PROCEDURE — 93005 ELECTROCARDIOGRAM TRACING: CPT | Performed by: PHYSICIAN ASSISTANT

## 2023-01-30 PROCEDURE — 6360000002 HC RX W HCPCS: Performed by: PHYSICIAN ASSISTANT

## 2023-01-30 PROCEDURE — 2500000003 HC RX 250 WO HCPCS

## 2023-01-30 PROCEDURE — 82947 ASSAY GLUCOSE BLOOD QUANT: CPT

## 2023-01-30 PROCEDURE — 85025 COMPLETE CBC W/AUTO DIFF WBC: CPT

## 2023-01-30 PROCEDURE — 82550 ASSAY OF CK (CPK): CPT

## 2023-01-30 PROCEDURE — 83874 ASSAY OF MYOGLOBIN: CPT

## 2023-01-30 PROCEDURE — 71045 X-RAY EXAM CHEST 1 VIEW: CPT

## 2023-01-30 PROCEDURE — 96374 THER/PROPH/DIAG INJ IV PUSH: CPT

## 2023-01-30 PROCEDURE — 80053 COMPREHEN METABOLIC PANEL: CPT

## 2023-01-30 PROCEDURE — 2500000003 HC RX 250 WO HCPCS: Performed by: PHYSICIAN ASSISTANT

## 2023-01-30 PROCEDURE — 2060000000 HC ICU INTERMEDIATE R&B

## 2023-01-30 PROCEDURE — 2580000003 HC RX 258: Performed by: PHYSICIAN ASSISTANT

## 2023-01-30 RX ORDER — ONDANSETRON 4 MG/1
4 TABLET, ORALLY DISINTEGRATING ORAL EVERY 8 HOURS PRN
Status: DISCONTINUED | OUTPATIENT
Start: 2023-01-30 | End: 2023-02-02 | Stop reason: HOSPADM

## 2023-01-30 RX ORDER — OCTREOTIDE ACETATE 50 UG/ML
50 INJECTION, SOLUTION INTRAVENOUS; SUBCUTANEOUS EVERY 6 HOURS
Status: DISCONTINUED | OUTPATIENT
Start: 2023-01-30 | End: 2023-01-31

## 2023-01-30 RX ORDER — DEXTROSE AND SODIUM CHLORIDE 5; .45 G/100ML; G/100ML
INJECTION, SOLUTION INTRAVENOUS CONTINUOUS
Status: DISCONTINUED | OUTPATIENT
Start: 2023-01-30 | End: 2023-01-30

## 2023-01-30 RX ORDER — DEXTROSE AND SODIUM CHLORIDE 5; .45 G/100ML; G/100ML
INJECTION, SOLUTION INTRAVENOUS ONCE
Status: COMPLETED | OUTPATIENT
Start: 2023-01-30 | End: 2023-01-31

## 2023-01-30 RX ORDER — DEXTROSE MONOHYDRATE 25 G/50ML
25 INJECTION, SOLUTION INTRAVENOUS ONCE
Status: COMPLETED | OUTPATIENT
Start: 2023-01-30 | End: 2023-01-30

## 2023-01-30 RX ORDER — ONDANSETRON 2 MG/ML
4 INJECTION INTRAMUSCULAR; INTRAVENOUS EVERY 6 HOURS PRN
Status: DISCONTINUED | OUTPATIENT
Start: 2023-01-30 | End: 2023-02-02 | Stop reason: HOSPADM

## 2023-01-30 RX ORDER — SODIUM CHLORIDE 0.9 % (FLUSH) 0.9 %
5-40 SYRINGE (ML) INJECTION EVERY 12 HOURS SCHEDULED
Status: DISCONTINUED | OUTPATIENT
Start: 2023-01-30 | End: 2023-02-02 | Stop reason: HOSPADM

## 2023-01-30 RX ORDER — ACETAMINOPHEN 325 MG/1
650 TABLET ORAL EVERY 6 HOURS PRN
Status: DISCONTINUED | OUTPATIENT
Start: 2023-01-30 | End: 2023-02-02 | Stop reason: HOSPADM

## 2023-01-30 RX ORDER — SODIUM CHLORIDE 0.9 % (FLUSH) 0.9 %
5-40 SYRINGE (ML) INJECTION PRN
Status: DISCONTINUED | OUTPATIENT
Start: 2023-01-30 | End: 2023-02-02 | Stop reason: HOSPADM

## 2023-01-30 RX ORDER — SODIUM CHLORIDE 9 MG/ML
INJECTION, SOLUTION INTRAVENOUS PRN
Status: DISCONTINUED | OUTPATIENT
Start: 2023-01-30 | End: 2023-02-02 | Stop reason: HOSPADM

## 2023-01-30 RX ADMIN — DEXTROSE AND SODIUM CHLORIDE: 5; 450 INJECTION, SOLUTION INTRAVENOUS at 19:34

## 2023-01-30 RX ADMIN — DEXTROSE MONOHYDRATE 25 G: 25 INJECTION, SOLUTION INTRAVENOUS at 19:47

## 2023-01-30 RX ADMIN — GLUCAGON HYDROCHLORIDE 1 MG: 1 INJECTION, POWDER, FOR SOLUTION INTRAMUSCULAR; INTRAVENOUS; SUBCUTANEOUS at 18:19

## 2023-01-30 RX ADMIN — OCTREOTIDE ACETATE 50 MCG: 50 INJECTION, SOLUTION INTRAVENOUS; SUBCUTANEOUS at 21:54

## 2023-01-30 ASSESSMENT — PAIN - FUNCTIONAL ASSESSMENT: PAIN_FUNCTIONAL_ASSESSMENT: 0-10

## 2023-01-30 NOTE — ED PROVIDER NOTES
EMERGENCY DEPARTMENT ENCOUNTER    Pt Name: Trenda Boeck  MRN: [id number]  [place] 1960  Date of evaluation: 1/30/23  CHIEF COMPLAINT       Chief Complaint   Patient presents with    Hypoglycemia     HISTORY OF PRESENT ILLNESS   Is a 50-year-old male who presents via EMS with hypoglycemia. According to first responders were called out earlier today on a wellness check by the patient's son after the patient did not show up for dialysis. He was found down on the ground, his blood sugar was low at that point, he received medication and blood sugar was up to 81 and he was eating when they left. They were then called out again by the son as the patient's blood sugar dropped again. They stated that the blood sugar was 45, gave medication and then when they rechecked it was 50 however they did not have a line established and so they were unable to give him more medication.            REVIEW OF SYSTEMS     Review of Systems   Unable to perform ROS: Mental status change   PASTMEDICAL HISTORY     Past Medical History:   Diagnosis Date    Acute congestive heart failure (HCC)     Chronic bilateral low back pain with bilateral sciatica     Coronary artery disease involving native coronary artery 9/6/2022    CRF (chronic renal failure)     [facility] MWF    DDD (degenerative disc disease), lumbar 12/15/2020    Diabetes mellitus ([facility] [address] [facility]     Dialysis patient [facility])     ED (erectile dysfunction)     History of echocardiogram 2014    [facility]    HTN (hypertension)     Hyperlipidemia     LVH (left ventricular hypertrophy)     PVD (peripheral vascular disease) (HCC)     S/P bilateral BKA (below knee amputation) ([facility]     Wears glasses      Past Problem List  Patient Active Problem List   Diagnosis Code    Hyperlipidemia E78.5    Essential hypertension I10    ESRD on hemodialysis ([facility] MWF N18.6, Z99.2    Insomnia G47.00    ED (erectile dysfunction) N52.9    Chronic bilateral low back pain with bilateral sciatica M54.42, M54.41, G89.29 Controlled diabetes mellitus type 2 with complications ([facility]) U12.9    S/P bilateral below knee amputation ([facility] [address] Z89.512, Z89.511    Long term (current) use of antithrombotics/antiplatelets T05.35    Chronic use of opiate drugs therapeutic purposes Z79.891    Coronary artery disease involving native coronary artery I25.10    Anemia D64.9    Hypoglycemia E16.2     SURGICAL HISTORY       Past Surgical History:   Procedure Laterality Date    ARM SURGERY      CATARACT REMOVAL WITH IMPLANT      DIALYSIS FISTULA CREATION Bilateral     As of 2019, RUE AVF functioning, LUE AVF nonfunctioning. FINGER AMPUTATION      right pointer finger    FINGER SURGERY Left     I & D    GLAUCOMA SURGERY      LEG AMPUTATION BELOW KNEE Bilateral     TUNNELED VENOUS CATHETER PLACEMENT      PC placed and removed     CURRENT MEDICATIONS       Previous Medications    ACETAMINOPHEN (TYLENOL) 650 MG EXTENDED RELEASE TABLET    Take 650 mg by mouth every 8 hours as needed for Pain    AMLODIPINE-ATORVASTATATIN (CADUET) 10-80 MG PER TABLET    Take 1 tablet by mouth daily    APIXABAN (ELIQUIS) 5 MG TABS TABLET    Take 1 tablet by mouth 2 times daily    CLONIDINE (CATAPRES) 0.3 MG TABLET    Take 1 tablet by mouth 2 times daily Do not take if HR < 60    DIPHENHYDRAMINE-APAP, SLEEP, (TYLENOL PM EXTRA STRENGTH)  MG TABLET    Take 1 tablet by mouth nightly as needed for Sleep    GLIMEPIRIDE (AMARYL) 2 MG TABLET    TAKE ONE TABLET BY MOUTH EVERY MORNING BEFORE BREAKFAST    LINAGLIPTIN (TRADJENTA) 5 MG TABLET    TAKE ONE TABLET BY MOUTH DAILY    LISINOPRIL (PRINIVIL;ZESTRIL) 20 MG TABLET    Take 1 tablet by mouth in the morning and at bedtime    MINOXIDIL (LONITEN) 2.5 MG TABLET        MULTIPLE VITAMINS-MINERALS (RENAPLEX-D PO)    Take 1 tablet by mouth daily     PREGABALIN (LYRICA) 75 MG CAPSULE    Take 1 capsule by mouth daily for 30 days.     SUCROFERRIC OXYHYDROXIDE (VELPHORO) 500 MG CHEW    Take 1,000 mg by mouth 3 times daily    TRAZODONE (DESYREL) 50 MG TABLET    TAKE ONE TABLET BY MOUTH ONCE NIGHTLY AS NEEDED FOR SLEEP     ALLERGIES     has No Known Allergies. FAMILY HISTORY     He indicated that the status of his mother is unknown. He indicated that the status of his father is unknown. He indicated that the status of his sister is unknown. He indicated that the status of his brother is unknown. SOCIAL HISTORY       Social History     Tobacco Use    Smoking status: Never    Smokeless tobacco: Never   Vaping Use    Vaping Use: Never used   Substance Use Topics    Alcohol use: Yes     Comment: rare    Drug use: No     PHYSICAL EXAM     INITIAL VITALS: BP (!) 158/105   Pulse 70   Temp 98.1 °F (36.7 °C) (Oral)   Resp 20   Ht 5' 11\" (1.803 m)   Wt 230 lb (104.3 kg)   SpO2 100%   BMI 32.08 kg/m²    Physical Exam  Constitutional:       Appearance: He is well-developed. He is not diaphoretic. HENT:      Head: Normocephalic and atraumatic. Right Ear: External ear normal.      Left Ear: External ear normal.   Eyes:      General: No scleral icterus. Left eye: No discharge. Neck:      Trachea: No tracheal deviation. Cardiovascular:      Rate and Rhythm: Normal rate and regular rhythm. Heart sounds: Normal heart sounds. No murmur heard. No gallop. Pulmonary:      Effort: Pulmonary effort is normal. No respiratory distress. Breath sounds: Normal breath sounds. No stridor. Abdominal:      Tenderness: There is no abdominal tenderness. There is no guarding or rebound. Musculoskeletal:      Cervical back: Normal range of motion. Comments: There is an abrasion on the left stump. No tenderness to palpation   Skin:     General: Skin is warm and dry. Coloration: Skin is not pale. Findings: No rash (on exposed surfaces). Neurological:      Mental Status: He is alert and oriented to person, place, and time.       Coordination: Coordination normal.   Psychiatric:         Behavior: Behavior normal. MEDICAL DECISION MAKING / ED COURSE:   Summary of Patient Presentation:    Patient is a 58-year-old male who presents via EMS after hypoglycemia. Patient's glucose was 30 upon his arrival in the ED, he was given IM glucagon at that time as we were unable to quickly establish IV access. IV access was then established and he was given D50, he is currently on a drip of half-normal saline and D50 and blood sugar has continued to drop. Patient did miss dialysis today. He is alert and oriented now, denies any physical complaints. Patient denies being on insulin. He denies any illegal drug use. He does take a sulfonylurea and DDP. We did start t Emelyn Laos tried as he did miss GI dialysis in case this was secondary to the sulfonylurea. Discussed with Dr. Rebecca Rivera who is his nephrologist who is okay with the fluids continuing. Discussed with hospitalist for admission CT was performed of the head which did not show any acute abnormality, blood work did show elevated creatinine which is consistent with missing dialysis, potassium was within normal limits and was a bit hemolyzed.   Troponin was slightly elevated but consistent with an elevated creatinine  1)  Number and Complexity of Problems  Problem List This Visit: Hypoglycemia    Differential Diagnosis: Hypoglycemia, rhabdo    Diagnoses Considered but Do Not Suspect: ACS    Pertinent Comorbid Conditions: End-stage renal disease, diabetes    2)  Data Reviewed  My EKG interpretation: Sinus         Score 0-3: 2.5% MACE over next 6 weeks = Discharge Home  Score 4-6: 20.3% MACE over next 6 weeks = Admit for Clinical Observation  Score 7-10: 72.7% MACE over next 6 weeks = Early Invasive Strategies       NIH STROKE SCALE         3)  Treatment and Disposition    Patient repeat assessment: Patient has become alert and oriented        Case discussed with consulting clinician: Discussed with Dr. Jelly Mantilla who is the nephrologist, will dialyze tomorrow, okay with continuing half-normal saline with D50    MIPS: None    Social determinants of health impacting treatment or disposition: None    Shared Decision Making: None    Code Status Discussion: None    \"ED Course\" Notes From Epic Narrator:  ED Course as of 01/30/23 2233 Mon Jan 30, 2023 1919 Patient is eating [GINO]      ED Course User Index  [GINO] Saad Zamora PA-C         CRITICAL CARE:       PROCEDURES:  None  Procedures      DATA FOR LAB AND RADIOLOGY TESTS ORDERED BELOW ARE REVIEWED BY THE ED CLINICIAN:    RADIOLOGY: All x-rays, CT, MRI, and formal ultrasound images (except ED bedside ultrasound) are read by the radiologist, see reports below, unless otherwise noted in MDM or here. Reports below are reviewed by myself. CT HEAD WO CONTRAST   Final Result      No evidence for acute intracranial hemorrhage, territorial infarction or   intracranial mass lesion. Mild chronic microangiopathic ischemic disease. Mild generalized volume loss. XR CHEST 1 VIEW   Final Result   1. Left dual lumen catheter is unchanged in position with the tip in the   region of the brachiocephalic vein/SVC junction. 2. Persistently enlarged cardiac silhouette with prominence of the central   pulmonary vasculature. 3. There may be small bilateral pleural effusions with bibasilar   opacification. LABS: Lab orders shown below, the results are reviewed by myself, and all abnormals are listed below. Labs Reviewed   CBC WITH AUTO DIFFERENTIAL - Abnormal; Notable for the following components:       Result Value    Hemoglobin 11.2 (*)     Hematocrit 35.4 (*)     MCV 74.8 (*)     MCH 23.7 (*)     RDW 19.9 (*)     Platelets 027 (*)     Seg Neutrophils 81 (*)     Lymphocytes 12 (*)     Eosinophils % 0 (*)     Absolute Lymph # 0.73 (*)     All other components within normal limits   CK - Abnormal; Notable for the following components:     Total CK 1,727 (*)     All other components within normal limits   COMPREHENSIVE METABOLIC PANEL W/ REFLEX TO MG FOR LOW K - Abnormal; Notable for the following components:    BUN 37 (*)     Creatinine 7.62 (*)     Est, Glom Filt Rate 7 (*)     Bun/Cre Ratio 5 (*)     Chloride 97 (*)     Alkaline Phosphatase 133 (*)     AST 51 (*)     All other components within normal limits   MYOGLOBIN, BLOOD - Abnormal; Notable for the following components:    Myoglobin 2,373 (*)     All other components within normal limits   TROPONIN - Abnormal; Notable for the following components:    Troponin, High Sensitivity 444 (*)     All other components within normal limits   POC GLUCOSE FINGERSTICK - Abnormal; Notable for the following components:    POC Glucose 30 (*)     All other components within normal limits   POC GLUCOSE FINGERSTICK - Abnormal; Notable for the following components:    POC Glucose 113 (*)     All other components within normal limits   POC GLUCOSE FINGERSTICK - Abnormal; Notable for the following components:    POC Glucose 74 (*)     All other components within normal limits   POC GLUCOSE FINGERSTICK - Abnormal; Notable for the following components:    POC Glucose 73 (*)     All other components within normal limits   POCT GLUCOSE - Normal   SPECIMEN REJECTION   POC GLUCOSE FINGERSTICK   POCT GLUCOSE   POCT GLUCOSE   POCT GLUCOSE   POCT GLUCOSE   POCT GLUCOSE   POCT GLUCOSE   POCT GLUCOSE   POCT GLUCOSE   POCT GLUCOSE   POCT GLUCOSE   POCT GLUCOSE   POCT GLUCOSE   POCT GLUCOSE   POCT GLUCOSE   POCT GLUCOSE   POCT GLUCOSE   POCT GLUCOSE   POCT GLUCOSE   POCT GLUCOSE       Vitals Reviewed:    Vitals:    01/30/23 1844 01/30/23 1851 01/30/23 2009 01/30/23 2201   BP:    (!) 158/105   Pulse:   70    Resp:   20    Temp:  98.1 °F (36.7 °C)     TempSrc:  Oral     SpO2:   100%    Weight: 230 lb (104.3 kg)      Height: 5' 11\" (1.803 m)        MEDICATIONS GIVEN TO PATIENT THIS ENCOUNTER:  Orders Placed This Encounter   Medications    glucagon (rDNA) 1 MG injection     Valeria Mar: cabinet override glucagon (rDNA) 1 MG injection     Roena Bottom: cabinet override    glucagon (rDNA) injection 1 mg    DISCONTD: dextrose 5 % and 0.45 % sodium chloride infusion    dextrose 5 % and 0.45 % sodium chloride infusion    octreotide (SANDOSTATIN) injection 50 mcg    dextrose 50 % IV solution    sodium chloride flush 0.9 % injection 5-40 mL    sodium chloride flush 0.9 % injection 5-40 mL    0.9 % sodium chloride infusion    acetaminophen (TYLENOL) tablet 650 mg    OR Linked Order Group     ondansetron (ZOFRAN-ODT) disintegrating tablet 4 mg     ondansetron (ZOFRAN) injection 4 mg     DISCHARGE PRESCRIPTIONS:  New Prescriptions    No medications on file     PHYSICIAN CONSULTS ORDERED THIS ENCOUNTER:  IP CONSULT TO NEPHROLOGY  IP CONSULT TO HOSPITALIST  FINAL IMPRESSION      1. Hypoglycemia          DISPOSITION/PLAN   DISPOSITION Admitted 01/30/2023 10:23:12 PM      OUTPATIENT FOLLOW UP THE PATIENT:  No follow-up provider specified.     CONSTANCE Nielsen, [place]  01/30/23 0808

## 2023-01-30 NOTE — TELEPHONE ENCOUNTER
Pt states he only has one more Percocet pill left. He would like to have a refill ordered at [facility] on [address] [facility].

## 2023-01-31 DIAGNOSIS — M48.062 SPINAL STENOSIS OF LUMBAR REGION WITH NEUROGENIC CLAUDICATION: ICD-10-CM

## 2023-01-31 DIAGNOSIS — Z99.2 ESRD ON HEMODIALYSIS (HCC): ICD-10-CM

## 2023-01-31 DIAGNOSIS — N18.6 ESRD ON HEMODIALYSIS (HCC): ICD-10-CM

## 2023-01-31 DIAGNOSIS — Z79.891 CHRONIC USE OF OPIATE DRUG FOR THERAPEUTIC PURPOSE: ICD-10-CM

## 2023-01-31 LAB
ANION GAP SERPL CALCULATED.3IONS-SCNC: 15 MMOL/L (ref 9–17)
BUN BLDV-MCNC: 40 MG/DL (ref 8–23)
BUN/CREAT BLD: 5 (ref 9–20)
CALCIUM SERPL-MCNC: 9.3 MG/DL (ref 8.6–10.4)
CHLORIDE BLD-SCNC: 98 MMOL/L (ref 98–107)
CO2: 24 MMOL/L (ref 20–31)
CREAT SERPL-MCNC: 7.75 MG/DL (ref 0.7–1.2)
EKG ATRIAL RATE: 416 BPM
EKG Q-T INTERVAL: 398 MS
EKG QRS DURATION: 92 MS
EKG QTC CALCULATION (BAZETT): 467 MS
EKG R AXIS: -60 DEGREES
EKG T AXIS: 156 DEGREES
EKG VENTRICULAR RATE: 83 BPM
GFR SERPL CREATININE-BSD FRML MDRD: 7 ML/MIN/1.73M2
GLUCOSE BLD-MCNC: 101 MG/DL (ref 70–99)
GLUCOSE BLD-MCNC: 105 MG/DL (ref 75–110)
GLUCOSE BLD-MCNC: 111 MG/DL (ref 75–110)
GLUCOSE BLD-MCNC: 111 MG/DL (ref 75–110)
GLUCOSE BLD-MCNC: 137 MG/DL (ref 75–110)
GLUCOSE BLD-MCNC: 215 MG/DL (ref 75–110)
GLUCOSE BLD-MCNC: 229 MG/DL (ref 75–110)
GLUCOSE BLD-MCNC: 237 MG/DL (ref 75–110)
GLUCOSE BLD-MCNC: 239 MG/DL (ref 75–110)
GLUCOSE BLD-MCNC: 258 MG/DL (ref 75–110)
GLUCOSE BLD-MCNC: 280 MG/DL (ref 75–110)
GLUCOSE BLD-MCNC: 281 MG/DL (ref 75–110)
GLUCOSE BLD-MCNC: 324 MG/DL (ref 75–110)
GLUCOSE BLD-MCNC: 386 MG/DL (ref 75–110)
GLUCOSE BLD-MCNC: 62 MG/DL (ref 75–110)
GLUCOSE BLD-MCNC: 79 MG/DL (ref 75–110)
GLUCOSE BLD-MCNC: 91 MG/DL (ref 75–110)
GLUCOSE BLD-MCNC: 98 MG/DL (ref 75–110)
HCT VFR BLD CALC: 34 % (ref 40.7–50.3)
HEMOGLOBIN: 10.7 G/DL (ref 13–17)
MCH RBC QN AUTO: 23.5 PG (ref 25.2–33.5)
MCHC RBC AUTO-ENTMCNC: 31.5 G/DL (ref 28.4–34.8)
MCV RBC AUTO: 74.7 FL (ref 82.6–102.9)
NRBC AUTOMATED: 0 PER 100 WBC
PDW BLD-RTO: 19.4 % (ref 11.8–14.4)
PLATELET # BLD: 126 K/UL (ref 138–453)
PMV BLD AUTO: ABNORMAL FL (ref 8.1–13.5)
POTASSIUM SERPL-SCNC: 5.2 MMOL/L (ref 3.7–5.3)
RBC # BLD: 4.55 M/UL (ref 4.21–5.77)
SODIUM BLD-SCNC: 137 MMOL/L (ref 135–144)
WBC # BLD: 4.2 K/UL (ref 3.5–11.3)

## 2023-01-31 PROCEDURE — 5A1D70Z PERFORMANCE OF URINARY FILTRATION, INTERMITTENT, LESS THAN 6 HOURS PER DAY: ICD-10-PCS | Performed by: FAMILY MEDICINE

## 2023-01-31 PROCEDURE — 2700000000 HC OXYGEN THERAPY PER DAY

## 2023-01-31 PROCEDURE — 6370000000 HC RX 637 (ALT 250 FOR IP): Performed by: FAMILY MEDICINE

## 2023-01-31 PROCEDURE — 80048 BASIC METABOLIC PNL TOTAL CA: CPT

## 2023-01-31 PROCEDURE — 6360000002 HC RX W HCPCS: Performed by: PHYSICIAN ASSISTANT

## 2023-01-31 PROCEDURE — 36415 COLL VENOUS BLD VENIPUNCTURE: CPT

## 2023-01-31 PROCEDURE — 85027 COMPLETE CBC AUTOMATED: CPT

## 2023-01-31 PROCEDURE — 82947 ASSAY GLUCOSE BLOOD QUANT: CPT

## 2023-01-31 PROCEDURE — 94761 N-INVAS EAR/PLS OXIMETRY MLT: CPT

## 2023-01-31 PROCEDURE — 6360000002 HC RX W HCPCS: Performed by: FAMILY MEDICINE

## 2023-01-31 PROCEDURE — 2580000003 HC RX 258: Performed by: FAMILY MEDICINE

## 2023-01-31 PROCEDURE — 2060000000 HC ICU INTERMEDIATE R&B

## 2023-01-31 PROCEDURE — 90935 HEMODIALYSIS ONE EVALUATION: CPT

## 2023-01-31 PROCEDURE — 99291 CRITICAL CARE FIRST HOUR: CPT | Performed by: FAMILY MEDICINE

## 2023-01-31 PROCEDURE — 83036 HEMOGLOBIN GLYCOSYLATED A1C: CPT

## 2023-01-31 PROCEDURE — 2500000003 HC RX 250 WO HCPCS: Performed by: INTERNAL MEDICINE

## 2023-01-31 RX ORDER — DEXTROSE MONOHYDRATE 100 MG/ML
INJECTION, SOLUTION INTRAVENOUS CONTINUOUS PRN
Status: DISCONTINUED | OUTPATIENT
Start: 2023-01-31 | End: 2023-02-02 | Stop reason: HOSPADM

## 2023-01-31 RX ORDER — DEXAMETHASONE SODIUM PHOSPHATE 10 MG/ML
8 INJECTION, SOLUTION INTRAMUSCULAR; INTRAVENOUS ONCE
Status: COMPLETED | OUTPATIENT
Start: 2023-01-31 | End: 2023-01-31

## 2023-01-31 RX ORDER — SODIUM CITRATE 4 % (5 ML)
1.7 SYRINGE (ML) MISCELLANEOUS PRN
Status: DISCONTINUED | OUTPATIENT
Start: 2023-01-31 | End: 2023-02-02 | Stop reason: HOSPADM

## 2023-01-31 RX ORDER — SODIUM CITRATE 4 % (5 ML)
1.8 SYRINGE (ML) MISCELLANEOUS PRN
Status: DISCONTINUED | OUTPATIENT
Start: 2023-01-31 | End: 2023-02-02 | Stop reason: HOSPADM

## 2023-01-31 RX ORDER — ASPIRIN 81 MG/1
81 TABLET ORAL DAILY
Status: ON HOLD | COMMUNITY

## 2023-01-31 RX ORDER — CLONIDINE HYDROCHLORIDE 0.3 MG/1
0.3 TABLET ORAL 2 TIMES DAILY
Status: DISCONTINUED | OUTPATIENT
Start: 2023-01-31 | End: 2023-01-31

## 2023-01-31 RX ORDER — CLONIDINE HYDROCHLORIDE 0.3 MG/1
0.3 TABLET ORAL 2 TIMES DAILY
Status: DISCONTINUED | OUTPATIENT
Start: 2023-01-31 | End: 2023-02-02 | Stop reason: HOSPADM

## 2023-01-31 RX ORDER — LISINOPRIL 5 MG/1
5 TABLET ORAL DAILY
Status: DISCONTINUED | OUTPATIENT
Start: 2023-02-01 | End: 2023-02-02 | Stop reason: HOSPADM

## 2023-01-31 RX ORDER — OXYCODONE AND ACETAMINOPHEN 7.5; 325 MG/1; MG/1
1 TABLET ORAL DAILY PRN
Qty: 20 TABLET | Refills: 0 | Status: SHIPPED
Start: 2023-01-31 | End: 2023-02-01 | Stop reason: HOSPADM

## 2023-01-31 RX ORDER — INSULIN LISPRO 100 [IU]/ML
0-4 INJECTION, SOLUTION INTRAVENOUS; SUBCUTANEOUS NIGHTLY
Status: DISCONTINUED | OUTPATIENT
Start: 2023-01-31 | End: 2023-02-02 | Stop reason: HOSPADM

## 2023-01-31 RX ORDER — ACETAMINOPHEN 500 MG
500 TABLET ORAL EVERY 6 HOURS PRN
Status: ON HOLD | COMMUNITY

## 2023-01-31 RX ORDER — TRAMADOL HYDROCHLORIDE 50 MG/1
50 TABLET ORAL DAILY PRN
Qty: 30 TABLET | Refills: 1 | Status: SHIPPED
Start: 2023-01-31 | End: 2023-02-01 | Stop reason: HOSPADM

## 2023-01-31 RX ORDER — DEXTROSE MONOHYDRATE 50 MG/ML
INJECTION, SOLUTION INTRAVENOUS CONTINUOUS
Status: DISCONTINUED | OUTPATIENT
Start: 2023-01-31 | End: 2023-02-02 | Stop reason: HOSPADM

## 2023-01-31 RX ORDER — LISINOPRIL 20 MG/1
20 TABLET ORAL 2 TIMES DAILY
Status: DISCONTINUED | OUTPATIENT
Start: 2023-01-31 | End: 2023-01-31

## 2023-01-31 RX ORDER — MINOXIDIL 2.5 MG/1
2.5 TABLET ORAL 2 TIMES DAILY
Status: DISCONTINUED | OUTPATIENT
Start: 2023-01-31 | End: 2023-02-02 | Stop reason: HOSPADM

## 2023-01-31 RX ORDER — INSULIN LISPRO 100 [IU]/ML
0-8 INJECTION, SOLUTION INTRAVENOUS; SUBCUTANEOUS
Status: DISCONTINUED | OUTPATIENT
Start: 2023-01-31 | End: 2023-02-02 | Stop reason: HOSPADM

## 2023-01-31 RX ORDER — MINOXIDIL 2.5 MG/1
2.5 TABLET ORAL DAILY
Status: DISCONTINUED | OUTPATIENT
Start: 2023-01-31 | End: 2023-01-31

## 2023-01-31 RX ADMIN — SODIUM CHLORIDE, PRESERVATIVE FREE 10 ML: 5 INJECTION INTRAVENOUS at 21:05

## 2023-01-31 RX ADMIN — DEXTROSE MONOHYDRATE: 50 INJECTION, SOLUTION INTRAVENOUS at 21:07

## 2023-01-31 RX ADMIN — MINOXIDIL 2.5 MG: 2.5 TABLET ORAL at 09:40

## 2023-01-31 RX ADMIN — OCTREOTIDE ACETATE 50 MCG: 50 INJECTION, SOLUTION INTRAVENOUS; SUBCUTANEOUS at 01:52

## 2023-01-31 RX ADMIN — INSULIN LISPRO 4 UNITS: 100 INJECTION, SOLUTION INTRAVENOUS; SUBCUTANEOUS at 18:35

## 2023-01-31 RX ADMIN — OCTREOTIDE ACETATE 50 MCG: 50 INJECTION, SOLUTION INTRAVENOUS; SUBCUTANEOUS at 07:46

## 2023-01-31 RX ADMIN — CLONIDINE HYDROCHLORIDE 0.3 MG: 0.3 TABLET ORAL at 07:43

## 2023-01-31 RX ADMIN — LISINOPRIL 20 MG: 20 TABLET ORAL at 07:43

## 2023-01-31 RX ADMIN — SODIUM CHLORIDE, PRESERVATIVE FREE 10 ML: 5 INJECTION INTRAVENOUS at 00:48

## 2023-01-31 RX ADMIN — DEXAMETHASONE SODIUM PHOSPHATE 8 MG: 10 INJECTION, SOLUTION INTRAMUSCULAR; INTRAVENOUS at 01:00

## 2023-01-31 RX ADMIN — Medication 1.8 ML: at 17:27

## 2023-01-31 RX ADMIN — APIXABAN 5 MG: 5 TABLET, FILM COATED ORAL at 21:03

## 2023-01-31 RX ADMIN — Medication 1.7 ML: at 17:27

## 2023-01-31 RX ADMIN — APIXABAN 5 MG: 5 TABLET, FILM COATED ORAL at 07:42

## 2023-01-31 RX ADMIN — SODIUM CHLORIDE, PRESERVATIVE FREE 10 ML: 5 INJECTION INTRAVENOUS at 07:49

## 2023-01-31 RX ADMIN — DEXTROSE MONOHYDRATE: 50 INJECTION, SOLUTION INTRAVENOUS at 01:05

## 2023-01-31 ASSESSMENT — PAIN SCALES - GENERAL
PAINLEVEL_OUTOF10: 0

## 2023-01-31 NOTE — CARE COORDINATION
01/31/23 0915   Service Assessment   Patient Orientation Alert and Oriented   Cognition Alert   History Provided By Patient   Primary [address]   Patient's Healthcare Decision Maker is: Legal Next of Kin   PCP Verified by CM Yes   Last Visit to PCP Within last 3 months   Prior Functional Level Independent in ADLs/IADLs   Current Functional Level Assistance with the following:;Cooking;Housework   Can patient return to prior living arrangement Unknown at present   Ability to make needs known: Fair   Family able to assist with home care needs: Yes  (somewhat. his son does help)   Would you like for me to discuss the discharge plan with any other family members/significant others, and if so, who? No   Community Resources   (AOA/ needs HHA in home)   Social/Functional History   Lives With Alone   Type of [address] One level   Home Access Stairs to enter with rails   Entrance Stairs - Number of Steps 4   Bathroom Shower/Tub Shower chair with back   [address] in [address] [facility] Crutches;Cane   [address] [facility]   Homemaking Responsibilities Yes   Ambulation Assistance Independent   Transfer Assistance Independent   Active  No   Patient's  Info medical cab or son   Occupation On disability   Discharge [address] [facility] Prior To Admission [address] [facility]   Current DME Prior to [employer]; [address]   DME Ordered? No   Potential Assistance Purchasing Medications Yes   Type of [address] [facility]   Patient expects to be discharged to: House   Follow Up Appointment: Best Day/Time  Tuesday AM   One/Two Story Residence One story   History of falls?  0   Services At/After Discharge   Transition of Care Consult (CM Consult) Home Health   Condition of Participation: Discharge Planning   The Plan for Transition of Care is related to the following treatment goals: home with potential home care   The Patient and/or Patient Representative was provided with a Choice of Provider? Patient   The Patient and/Or Patient Representative agree with the Discharge Plan? Yes   Freedom of Choice list was provided with basic dialogue that supports the patient's individualized plan of care/goals, treatment preferences, and shares the quality data associated with the providers? Yes     PCP is Stu PARIKH MWF at [facility] under dr Ladonna Bumpers. Cabs there for 1000 chair  DME b/l prosthetics for BKA's. Crutches, canes and sc   Ph[facility]acy [facility] [place]     Patient lives at home alone. Able to walk with b/l prosthetics. Son will bring in on discharge. His son transports at times or uses medicaid cabs. He has had [facility] in past. Asked if interested in again and said yes for RN only. Sent referral to Chaitanya Mcintosh for nursing and  for waiver program.  Brennon Caro notified of the referral.     Patient admitted with hypoglycemia. Will follow.

## 2023-01-31 NOTE — CONSULTS
Renal Consult Note    Patient :  Gavin Zhong; 60 y.o. MRN# [id number]  Location:  1101/1101-01  Attending:  Osiris Sidhu MD  Admit Date:  1/30/2023   Hospital Day: 1    Reason for Consult:     Asked by Dr Osiris Sidhu MD to see for ESRD on HD. History Obtained From:     Patient, electronic medical record, patient's nurse. HD Access:     previous [facility]    HD Unit:     [facility] hemodialysis unit    Nephrologist:     Dr. Fran Brown    History of Present Illness:     Gavin Zhong; 60 y.o. male with past medical history as mentioned below presented to the hospital due to being found unresponsive with hypoglycemia. According the history first responders were called out to patient's house by his son for wellness check as he did not show up for dialysis. They did find him down on the ground with low blood glucose level and he received acute hypoglycemia management initially. Patient was apparently eating and they left and were called out again as his blood sugar dropped 1 more time. As per the notes blood glucose level was as low as 45 and hence he was sent to the hospital for further evaluation. In the hospital lab work from today showed sodium 137, potassium 5.2, chloride 98, bicarb 24, calcium 9.3, BUN 40, creatinine 7.75 mg/dl, blood glucose level 280. Hemoglobin 10.7. Patient normally gets dialysis as per [facility] schedule and mentions that he did miss his dialysis yesterday and his last dialysis was on Friday, 1/27/2023. Nephrology is consulted due to ESRD on HD. Past History/Allergies? Social History:     Past Medical History:   Diagnosis Date    Acute congestive heart failure (HCC)     Chronic bilateral low back pain with bilateral sciatica     Coronary artery disease involving native coronary artery 9/6/2022    CRF (chronic renal failure)     [facility] MWF    DDD (degenerative disc disease), lumbar 12/15/2020    Diabetes mellitus ([facility] [address]     Dialysis patient [facility])     ED (erectile dysfunction)    History of echocardiogram 2014    [facility]    HTN (hypertension)     Hyperlipidemia     LVH (left ventricular hypertrophy)     PVD (peripheral vascular disease) ([facility])     S/P bilateral BKA (below knee amputation) ([facility])     Wears glasses        Past Surgical History:   Procedure Laterality Date    ARM SURGERY      CATARACT REMOVAL WITH IMPLANT      DIALYSIS FISTULA CREATION Bilateral     As of 2019, RUE AVF functioning, LUE AVF nonfunctioning.    FINGER AMPUTATION      right pointer finger    FINGER SURGERY Left     I & D    GLAUCOMA SURGERY      LEG AMPUTATION BELOW KNEE Bilateral     TUNNELED VENOUS CATHETER PLACEMENT      PC placed and removed       No Known Allergies    Social History     Socioeconomic History    Marital status:      Spouse name: Not on file    Number of children: Not on file    Years of education: Not on file    Highest education level: Not on file   Occupational History    Not on file   Tobacco Use    Smoking status: Never    Smokeless tobacco: Never   Vaping Use    Vaping Use: Never used   Substance and Sexual Activity    Alcohol use: Yes     Comment: rare    Drug use: No    Sexual activity: Not on file   Other Topics Concern    Not on file   Social History Narrative    Not on file     Social Determinants of Health     Financial Resource Strain: Low Risk     Difficulty of Paying Living Expenses: Not hard at all   Food Insecurity: No Food Insecurity    Worried About Running Out of Food in the Last Year: Never true    Ran Out of Food in the Last Year: Never true   Transportation Needs: No Transportation Needs    Lack of Transportation (Medical): No    Lack of Transportation (Non-Medical): No   Physical Activity: Inactive    Days of Exercise per Week: 0 days    Minutes of Exercise per Session: 0 min   Stress: No Stress Concern Present    Feeling of Stress : Not at all   Social Connections: Moderately Integrated    Frequency of Communication with Friends and Family: More than three times a  week    Frequency of Social Gatherings with Friends and Family: More than three times a week    Attends Sabianist Services: More than 4 times per year    Active Member of [employer] or Organizations: Yes    Attends Club or Organization Meetings: Never    Marital Status:    Intimate Partner Violence: Not At Risk    Fear of Current or Ex-Partner: No    Emotionally Abused: No    Physically Abused: No    Sexually Abused: No   Housing Stability: Unknown    Unable to Pay for Housing in the Last Year: No    Number of Places Lived in the Last Year: Not on file    Unstable Housing in the Last Year: No       Family History:        Family History   Problem Relation Age of Onset    Diabetes Mother     Coronary Art Dis Mother     Hypertension Mother     Diabetes Father     Hypertension Father     Stroke Father     Cancer Sister     Hypertension Brother        Outpatient Medications:     Medications Prior to Admission: apixaban (ELIQUIS) 5 MG TABS tablet, Take 5 mg by mouth 2 times daily  traZODone (DESYREL) 50 MG tablet, TAKE ONE TABLET BY MOUTH ONCE NIGHTLY AS NEEDED FOR SLEEP  linagliptin (TRADJENTA) 5 MG tablet, TAKE ONE TABLET BY MOUTH DAILY  minoxidil (LONITEN) 2.5 MG tablet,   pregabalin (LYRICA) 75 MG capsule, Take 1 capsule by mouth daily for 30 days.   amLODIPine-atorvastatatin (CADUET) 10-80 MG per tablet, Take 1 tablet by mouth daily  diphenhydrAMINE-APAP, sleep, (TYLENOL PM EXTRA STRENGTH)  MG tablet, Take 1 tablet by mouth nightly as needed for Sleep  lisinopril (PRINIVIL;ZESTRIL) 20 MG tablet, Take 1 tablet by mouth in the morning and at bedtime  glimepiride (AMARYL) 2 MG tablet, TAKE ONE TABLET BY MOUTH EVERY MORNING BEFORE BREAKFAST  cloNIDine (CATAPRES) 0.3 MG tablet, Take 1 tablet by mouth 2 times daily Do not take if HR < 60  acetaminophen (TYLENOL) 650 MG extended release tablet, Take 650 mg by mouth every 8 hours as needed for Pain  Sucroferric Oxyhydroxide (VELPHORO) 500 MG CHEW, Take 1,000 mg by mouth 3 times daily  apixaban (ELIQUIS) 5 MG TABS tablet, Take 1 tablet by mouth 2 times daily  Multiple Vitamins-Minerals (RENAPLEX-D PO), Take 1 tablet by mouth daily     Current Medications:     Scheduled Meds:    apixaban  5 mg Oral BID    lisinopril  20 mg Oral BID    minoxidil  2.5 mg Oral Daily    cloNIDine  0.3 mg Oral BID    sodium chloride flush  5-40 mL IntraVENous 2 times per day     Continuous Infusions:    dextrose 50 mL/hr at 23 0610    dextrose      sodium chloride       PRN Meds:  glucose, dextrose bolus **OR** dextrose bolus, glucagon (rDNA), dextrose, sodium chloride flush, sodium chloride, acetaminophen, ondansetron **OR** ondansetron    Review of Systems:     Constitutional: No fever, no chills, no lethargy, no weakness. Positive hypoglycemic episode. HEENT:  No headache, otalgia, itchy eyes, nasal discharge or sore throat. Cardiac:  No chest pain, dyspnea, orthopnea or PND. Chest:   No cough, phlegm or wheezing. Abdomen:  No abdominal pain, nausea or vomiting. Neuro:  No focal weakness, abnormal movements or seizure like activity. Positive being found down. Skin:   No rashes, no itching. :   No hematuria, no pyuria, no dysuria, no flank pain. Extremities:  No swelling or joint pains. ROS was otherwise negative except as mentioned in the [address] Input/Output:     I/O last 3 completed shifts: In: 712 [I.V.:712]  Out: -       Vital Signs:   Temperature:  Temp: 99.7 °F (37.6 °C)  TMax:   Temp (24hrs), Av.2 °F (36.8 °C), Min:97.3 °F (36.3 °C), Max:99.7 °F (37.6 °C)    Respirations:  Resp: 23  Pulse:   Heart Rate: 73  BP:    BP: (!) 141/78  BP Range: Systolic (94RVP), QXC:150 , Min:109 , CQC:950       Diastolic (15JZW), SZF:52, Min:71, Max:105      Physical Examination:     General:  AAO x 3, speaking in full sentences, no accessory muscle use. HEENT: Atraumatic, normocephalic, no throat congestion, moist mucosa.   Eyes:   Pupils equal, round and reactive to light, EOMI.  Neck:   Supple  Chest:   Bilateral coarse breath sounds, no rales or wheezes.  Cardiac:  S1 S2 RR, no murmurs, gallops or rubs.  Abdomen: Soft, non-tender, no masses or organomegaly, BS audible.  :   No suprapubic or flank tenderness.  Neuro:  AAO x 3, No FND.  SKIN:  No rashes, good skin turgor.  Extremities:  Bilateral lower extremity amputation.      Labs:       Recent Labs     01/30/23  1825 01/31/23  0629   WBC 6.1 4.2   RBC 4.73 4.55   HGB 11.2* 10.7*   HCT 35.4* 34.0*   MCV 74.8* 74.7*   MCH 23.7* 23.5*   MCHC 31.6 31.5   RDW 19.9* 19.4*   * 126*   MPV Abnormal Abnormal      BMP:   Recent Labs     01/30/23  1943 01/30/23  2042 01/31/23  0629     --  137   K 4.7  --  5.2   CL 97*  --  98   CO2 26  --  24   BUN 37*  --  40*   CREATININE 7.62*  --  7.75*   GLUCOSE 70 91 101*   CALCIUM 9.9  --  9.3      Phosphorus:   No results for input(s): PHOS in the last 72 hours.  Magnesium:  No results for input(s): MG in the last 72 hours.  Albumin:    Recent Labs     01/30/23 1943   LABALBU 4.0     BNP:      Lab Results   Component Value Date/Time    BNP 3,088 06/13/2013 06:32 AM     PTH:   No results found for: IPTH  Blood cultures:  No results found for: BC    Urinalysis/Chemistries:      Lab Results   Component Value Date/Time    NITRU NEGATIVE 06/13/2013 10:30 AM    COLORU YELLOW 06/13/2013 10:30 AM    PHUR 7.5 06/13/2013 10:30 AM    WBCUA 0 TO 2 06/13/2013 10:30 AM    RBCUA 10 TO 20 06/13/2013 10:30 AM    MUCUS NOT REPORTED 06/13/2013 10:30 AM    TRICHOMONAS NOT REPORTED 06/13/2013 10:30 AM    YEAST NOT REPORTED 06/13/2013 10:30 AM    BACTERIA FEW 06/13/2013 10:30 AM    SPECGRAV 1.015 06/13/2013 10:30 AM    LEUKOCYTESUR NEGATIVE 06/13/2013 10:30 AM    UROBILINOGEN Normal 06/13/2013 10:30 AM    BILIRUBINUR NEGATIVE 06/13/2013 10:30 AM    GLUCOSEU 3+ 06/13/2013 10:30 AM    KETUA NEGATIVE 06/13/2013 10:30 AM    AMORPHOUS NOT REPORTED 06/13/2013 10:30 AM       Radiology:     Reviewed.   Assessment:       ESRD on Hemodialysis. His regular HD days are MWF at [facility] hemod[facility] facility using tunnel catheter under Dr. King Puentes. Hypoglycemic episode. Anemia of chronic disease  Secondary hyperparathyroidism  Hypertension  Bilateral lower extremity amputations. Plan:     Patient to get dialysis today as he did miss his last dialysis yesterday. And again dialysis in a.m. to continue MWF schedule. Strict Input and Output, Daily weigh and document in the chart. Low Potassium, Low phosphorus and low salt diet. Fluids to be restricted to 1500ml/day. IV Aranesp/Epogen for anemia of chronic disease with HD weekly. IV Zemplar per protocol for secondary hyperparathyroidism with HD thrice a week. BMP in AM.  Will follow. Nutrition   Please ensure that patient is on a renal diet/TF. Thank you for the consultation. Please do not hesitate to contact us for any further questions/concerns. Robe Tom MD  [facility]     This note is created with the assistance of a speech-recognition program. While intending to generate a document that actually reflects the content of the visit, no guarantees can be provided that every mistake has been identified and corrected by editing.

## 2023-01-31 NOTE — PROGRESS NOTES
Transitions of Care Pharmacy Service   Medication Review    The patient's list of current home medications has been reviewed. Source(s) of information: spoke to patient and sure scripts     Based on information provided by the above source(s), I have updated the patient's home med list as described below. I changed or updated the following medications on the patient's home medication list:  Discontinued acetaminophen (TYLENOL) 650 MG tablet  diphenhydrAMINE-APAP, sleep, (TYLENOL PM EXTRA STRENGTH)  MG tablet     Added acetaminophen (TYLENOL) 500 MG tablet  aspirin 81 MG EC tablet  sodium zirconium cyclosilicate (LOKELMA) 5 g PACK oral suspension     Adjusted   minoxidil (LONITEN) 2.5 MG tablet     Other Notes None            Please feel free to call me with any questions about this encounter. Thank you. This note will be reviewed and co-signed by the Transitions of Care Pharmacist. The pharmacist will review inpatient orders and contact the physician about any discrepancies. Talat Rivas, pharmacy technician  [facility]  Phone:  [phone]  Fax: [phone]      Electronically signed by Talat Rivas on 1/31/2023 at 1:30 PM       Prior to Admission medications    Medication Sig   acetaminophen (TYLENOL) 500 MG tablet Take 500 mg by mouth every 6 hours as needed for Pain   sodium zirconium cyclosilicate (LOKELMA) 5 g PACK oral suspension Take 5 g by mouth every other day Indications: Don't take  MWF (Diaylsis days)   aspirin 81 MG EC tablet   Take 81 mg by mouth daily   traZODone (DESYREL) 50 MG tablet TAKE ONE TABLET BY MOUTH ONCE NIGHTLY AS NEEDED FOR SLEEP   oxyCODONE-acetaminophen (PERCOCET) 7.5-325 MG per tablet Take 1 tablet by mouth daily as needed for Pain for up to 30 days. On HD days   traMADol (ULTRAM) 50 MG tablet Take 1 tablet by mouth daily as needed for Pain for up to 30 days.    apixaban (ELIQUIS) 5 MG TABS tablet Take 5 mg by mouth 2 times daily linagliptin (TRADJENTA) 5 MG tablet TAKE ONE TABLET BY MOUTH DAILY   minoxidil (LONITEN) 2.5 MG tablet Take 5 mg by mouth 2 times daily   pregabalin (LYRICA) 75 MG capsule Take 1 capsule by mouth daily for 30 days.    amLODIPine-atorvastatatin (CADUET) 10-80 MG per tablet Take 1 tablet by mouth daily   lisinopril (PRINIVIL;ZESTRIL) 20 MG tablet Take 1 tablet by mouth in the morning and at bedtime   glimepiride (AMARYL) 2 MG tablet TAKE ONE TABLET BY MOUTH EVERY MORNING BEFORE BREAKFAST   cloNIDine (CATAPRES) 0.3 MG tablet Take 1 tablet by mouth 2 times daily Do not take if HR < 60   atorvastatin (LIPITOR) 80 MG tablet Take 1 tablet by mouth daily   Sucroferric Oxyhydroxide (VELPHORO) 500 MG CHEW Take 1,000 mg by mouth 3 times daily   Multiple Vitamins-Minerals (RENAPLEX-D PO) Take 1 tablet by mouth daily

## 2023-01-31 NOTE — ED NOTES
NP notified about pt's blood sugar dropping. No additional orders at this time.       Mariaa Cobb RN  01/30/23 [id number]

## 2023-01-31 NOTE — H&P
Altered mental status secondary to critical hypoglycemia with underlying history of diabetes mellitus  Missed dialysis  History of present illness  65-year-old -American male with underlying history significant for diabetes mellitus with visual impairment was brought into the emergency room with hypoglycemia of 30. He was treated with glucagon, D50. Subsequently he was also provided with dexamethasone and D5 saline. His mental status returned to baseline normal.  No apparent seizure-like activity. No history of trauma  Past medical history  Diabetes mellitus  Hypertension  Hyperlipidemia  Coronary artery disease  Peripheral vascular disease  Chronic A. fib  Chronic anticoagulation  Past surgical history  Bilateral lower lobe amputation, AV fistula  Medication per list with  Allergies per list reviewed  Social history negative for tobacco significant alcohol or illicit drug use  Family history  Review of system all 12 systems reviewed per  History of present illness  Vitals afebrile hemodynamically stable  Systemic exam  HEENT normocephalic atraumatic, PERRLA, no conjunctival injection, bilateral pupils 3 mm each no lid lag lid retraction or nystagmus. No facial asymmetry  Neck anatomical curvature, full active range of motion without restriction, trachea central no JVD or carotid bruit  Lungs bilateral CTA  CVS S1-S2 regular rate and rhythm  Abdomen soft discomfort benign to palpation normoactive bowel sound  CNS cranial nerves II through III grossly intact. Reflexes brisk and symmetrical bilateral.  Lower extremity bilateral below-knee amputee.   Stumps unremarkable  Labs     Latest Reference Range & Units 1/31/23 06:29   WBC 3.5 - 11.3 k/uL 4.2   RBC 4.21 - 5.77 m/uL 4.55   Hemoglobin Quant 13.0 - 17.0 g/dL 10.7 (L)   Hematocrit 40.7 - 50.3 % 34.0 (L)   MCV 82.6 - 102.9 fL 74.7 (L)   MCH 25.2 - 33.5 pg 23.5 (L)   MCHC 28.4 - 34.8 g/dL 31.5   MPV 8.1 - 13.5 fL Abnormal   RDW 11.8 - 14.4 % 19.4 (H) Platelet Count 673 - 453 k/uL 126 (L)   (L): Data is abnormally low  (H): Data is abnormally high  Impression       No evidence for acute intracranial hemorrhage, territorial infarction or   intracranial mass lesion. Mild chronic microangiopathic ischemic disease. Mild generalized volume loss. 65-year-old -American male with history of diabetes mellitus with visual impairment was presented with intractable hypoglycemia and 30 with associated altered mental status. His mental status returned to baseline normal after normalization of hypoglycemia in response to glucagon, D50, dexamethasone and D5 water. Care was coordinated with nephrology. Currently relative asymptomatic hyperglycemia that will be treated with sliding scale. Possibility of accidental overdose of oral hypoglycemic secondary to visual disturbance. We will have  on consult  Patient missed home dialysis. Currently he is going through dialysis and coordination with nephrology  Hypertension. Continue Zestril, minoxidil, clonidine  Chronic anticoagulation continue Eliquis. History of A. fib. Cardiology on board  Will follow along  This note is created with a voice recognition program and while intend to generate a document that accurately reflects the content of the visit, no guarantee can be provided that every mistake has been identified and corrected by editing.

## 2023-01-31 NOTE — PLAN OF CARE
Problem: Discharge Planning  Goal: Discharge to home or other facility with appropriate resources  Outcome: Progressing     Problem: Pain  Goal: Verbalizes/displays adequate comfort level or baseline comfort level  Outcome: Progressing  Flowsheets (Taken 1/31/2023 0002)  Verbalizes/displays adequate comfort level or baseline comfort level:   Encourage patient to monitor pain and request assistance   Assess pain using appropriate pain scale     Problem: ABCDS Injury Assessment  Goal: Absence of physical injury  Outcome: Progressing     Problem: Safety - Adult  Goal: Free from fall injury  Outcome: Progressing     Problem: Skin/Tissue Integrity  Goal: Absence of new skin breakdown  Description: 1.  Monitor for areas of redness and/or skin breakdown  2.  Assess vascular access sites hourly  3.  Every 4-6 hours minimum:  Change oxygen saturation probe site  4.  Every 4-6 hours:  If on nasal continuous positive airway pressure, respiratory therapy assess nares and determine need for appliance change or resting period.  Outcome: Progressing     Problem: Chronic Conditions and Co-morbidities  Goal: Patient's chronic conditions and co-morbidity symptoms are monitored and maintained or improved  Outcome: Progressing

## 2023-01-31 NOTE — PROGRESS NOTES
Dr Yeimi Angeles requested social service be contacted. To notify them home setting on own for pt not ideal setting due to safety.

## 2023-01-31 NOTE — ED PROVIDER NOTES
EMERGENCY DEPARTMENT ENCOUNTER   ATTENDING ATTESTATION     Pt Name: Juan Dang  MRN: [id number]  [place] 1960  Date of evaluation: 1/30/23   Juan Dang is a 60 y.o. male with CC: Hypoglycemia    MDM:     Patient has history of diabetes, end-stage renal disease, found hypoglycemic, given glucagon twice, unable to attain IV access so I placed 20-gauge EJ in the right neck. Good blood draw, good flow, patient given dextrose. Will get metabolic work-up, serial glucose readings    7:35 PM EST  Patient is on glimepiride, he did miss dialysis, he was down on the ground for 24 hours. He is interactive and eating at this time, his sugars continue to drop despite intravenous dextrose and glucagon. We will initiate octreotide every 6, D50 infusion, will admit the patient and consult nephrology for dialysis inpatient. CRITICAL CARE:       EKG: All EKG's are interpreted by the Emergency Department Physician who either signs or Co-signs this chart in the absence of a cardiologist.      RADIOLOGY:All plain film, CT, MRI, and formal ultrasound images (except ED bedside ultrasound) are read by the radiologist, see reports below, unless otherwise noted in MDM or here. XR CHEST 1 VIEW   Final Result   1. Left dual lumen catheter is unchanged in position with the tip in the   region of the brachiocephalic vein/SVC junction. 2. Persistently enlarged cardiac silhouette with prominence of the central   pulmonary vasculature. 3. There may be small bilateral pleural effusions with bibasilar   opacification. CT HEAD WO CONTRAST    (Results Pending)     LABS: All lab results were reviewed by myself, and all abnormals are listed below.   Labs Reviewed   CBC WITH AUTO DIFFERENTIAL - Abnormal; Notable for the following components:       Result Value    Hemoglobin 11.2 (*)     Hematocrit 35.4 (*)     MCV 74.8 (*)     MCH 23.7 (*)     RDW 19.9 (*)     Platelets 001 (*)     All other components within normal limits POC GLUCOSE FINGERSTICK - Abnormal; Notable for the following components:    POC Glucose 30 (*)     All other components within normal limits   POC GLUCOSE FINGERSTICK - Abnormal; Notable for the following components:    POC Glucose 113 (*)     All other components within normal limits   POC GLUCOSE FINGERSTICK - Abnormal; Notable for the following components:    POC Glucose 74 (*)     All other components within normal limits   SPECIMEN REJECTION   CK   COMPREHENSIVE METABOLIC PANEL W/ REFLEX TO MG FOR LOW K   MYOGLOBIN, BLOOD   TROPONIN   POCT GLUCOSE     CONSULTS:  None  FINAL IMPRESSION    No diagnosis found. PASTMEDICAL HISTORY     Past Medical History:   Diagnosis Date    Acute congestive heart failure (HCC)     Chronic bilateral low back pain with bilateral sciatica     Coronary artery disease involving native coronary artery 9/6/2022    CRF (chronic renal failure)     [facility] MWF    DDD (degenerative disc disease), lumbar 12/15/2020    Diabetes mellitus ([facility] [address]     Dialysis patient [facility])     ED (erectile dysfunction)     History of echocardiogram 2014    [facility]    HTN (hypertension)     Hyperlipidemia     LVH (left ventricular hypertrophy)     PVD (peripheral vascular disease) ([facility])     S/P bilateral BKA (below knee amputation) ([facility] [address]     Wears glasses      SURGICAL HISTORY       Past Surgical History:   Procedure Laterality Date    ARM SURGERY      CATARACT REMOVAL WITH IMPLANT      DIALYSIS FISTULA CREATION Bilateral     As of 2019, RUE AVF functioning, LUE AVF nonfunctioning.     FINGER AMPUTATION      right pointer finger    FINGER SURGERY Left     I & D    GLAUCOMA SURGERY      LEG AMPUTATION BELOW KNEE Bilateral     TUNNELED VENOUS CATHETER PLACEMENT      PC placed and removed     CURRENT MEDICATIONS       Previous Medications    ACETAMINOPHEN (TYLENOL) 650 MG EXTENDED RELEASE TABLET    Take 650 mg by mouth every 8 hours as needed for Pain    AMLODIPINE-ATORVASTATATIN (CADUET) 10-80 MG PER TABLET   Take 1 tablet by mouth daily    APIXABAN (ELIQUIS) 5 MG TABS TABLET    Take 1 tablet by mouth 2 times daily    CLONIDINE (CATAPRES) 0.3 MG TABLET    Take 1 tablet by mouth 2 times daily Do not take if HR < 60    DIPHENHYDRAMINE-APAP, SLEEP, (TYLENOL PM EXTRA STRENGTH)  MG TABLET    Take 1 tablet by mouth nightly as needed for Sleep    GLIMEPIRIDE (AMARYL) 2 MG TABLET    TAKE ONE TABLET BY MOUTH EVERY MORNING BEFORE BREAKFAST    LINAGLIPTIN (TRADJENTA) 5 MG TABLET    TAKE ONE TABLET BY MOUTH DAILY    LISINOPRIL (PRINIVIL;ZESTRIL) 20 MG TABLET    Take 1 tablet by mouth in the morning and at bedtime    MINOXIDIL (LONITEN) 2.5 MG TABLET        MULTIPLE VITAMINS-MINERALS (RENAPLEX-D PO)    Take 1 tablet by mouth daily     PREGABALIN (LYRICA) 75 MG CAPSULE    Take 1 capsule by mouth daily for 30 days.    SUCROFERRIC OXYHYDROXIDE (VELPHORO) 500 MG CHEW    Take 1,000 mg by mouth 3 times daily    TRAZODONE (DESYREL) 50 MG TABLET    TAKE ONE TABLET BY MOUTH ONCE NIGHTLY AS NEEDED FOR SLEEP     ALLERGIES     has No Known Allergies.  FAMILY HISTORY     He indicated that the status of his mother is unknown. He indicated that the status of his father is unknown. He indicated that the status of his sister is unknown. He indicated that the status of his brother is unknown.     SOCIAL HISTORY       Social History     Tobacco Use    Smoking status: Never    Smokeless tobacco: Never   Vaping Use    Vaping Use: Never used   Substance Use Topics    Alcohol use: Yes     Comment: rare    Drug use: No       I personally evaluated and examined the patient in conjunction with the APC and agree with the assessment, treatment plan, and disposition of the patient as recorded by the APC.   Adeel Castellon MD  Attending Emergency Physician          Adeel Castellon MD  01/30/23 6203     no

## 2023-01-31 NOTE — PLAN OF CARE
Problem: Discharge Planning  Goal: Discharge to home or other facility with appropriate resources  1/31/2023 1544 by Luiz Sykes RN  Outcome: Progressing  1/31/2023 0350 by Khalif Olivas RN  Outcome: Progressing  Flowsheets (Taken 1/31/2023 0100)  Discharge to home or other facility with appropriate resources:   Identify barriers to discharge with patient and caregiver   Arrange for needed discharge resources and transportation as appropriate   Identify discharge learning needs (meds, wound care, etc)   Refer to discharge planning if patient needs post-hospital services based on physician order or complex needs related to functional status, cognitive ability or social support system     Problem: Pain  Goal: Verbalizes/displays adequate comfort level or baseline comfort level  1/31/2023 1544 by Luiz Sykes RN  Outcome: Progressing  1/31/2023 0350 by Khalif Olivas RN  Outcome: Progressing  Flowsheets (Taken 1/31/2023 0002)  Verbalizes/displays adequate comfort level or baseline comfort level:   Encourage patient to monitor pain and request assistance   Assess pain using appropriate pain scale     Problem: ABCDS Injury Assessment  Goal: Absence of physical injury  1/31/2023 1544 by Luiz Sykes RN  Outcome: Progressing  1/31/2023 0350 by Khalif Olivas RN  Outcome: Progressing     Problem: Safety - Adult  Goal: Free from fall injury  1/31/2023 1544 by Luiz Sykes RN  Outcome: Progressing  1/31/2023 0350 by Khalif Olivas RN  Outcome: Progressing     Problem: Skin/Tissue Integrity  Goal: Absence of new skin breakdown  Description: 1. Monitor for areas of redness and/or skin breakdown  2. Assess vascular access sites hourly  3. Every 4-6 hours minimum:  Change oxygen saturation probe site  4. Every 4-6 hours:  If on nasal continuous positive airway pressure, respiratory therapy assess nares and determine need for appliance change or resting period.   1/31/2023 1544 by Luiz Sykes RN  Outcome: Progressing  1/31/2023 0350 by Demetrio Olivas RN  Outcome: Progressing     Problem: Chronic Conditions and Co-morbidities  Goal: Patient's chronic conditions and co-morbidity symptoms are monitored and maintained or improved  1/31/2023 1544 by Rafi Yepez RN  Outcome: Progressing  1/31/2023 0350 by Demetrio Olivas RN  Outcome: Progressing  Flowsheets (Taken 1/31/2023 0100)  Care Plan - Patient's Chronic Conditions and Co-Morbidity Symptoms are Monitored and Maintained or Improved:   Monitor and assess patient's chronic conditions and comorbid symptoms for stability, deterioration, or improvement   Collaborate with multidisciplinary team to address chronic and comorbid conditions and prevent exacerbation or deterioration   Update acute care plan with appropriate goals if chronic or comorbid symptoms are exacerbated and prevent overall improvement and discharge

## 2023-02-01 VITALS
TEMPERATURE: 98.2 F | HEART RATE: 82 BPM | OXYGEN SATURATION: 94 % | DIASTOLIC BLOOD PRESSURE: 71 MMHG | SYSTOLIC BLOOD PRESSURE: 132 MMHG | BODY MASS INDEX: 30.31 KG/M2 | HEIGHT: 71 IN | RESPIRATION RATE: 20 BRPM | WEIGHT: 216.49 LBS

## 2023-02-01 LAB
ALBUMIN SERPL-MCNC: 3.8 G/DL (ref 3.5–5.2)
ALP SERPL-CCNC: 133 U/L (ref 40–129)
ALT SERPL-CCNC: 26 U/L (ref 5–41)
ANION GAP SERPL CALCULATED.3IONS-SCNC: 11 MMOL/L (ref 9–17)
AST SERPL-CCNC: 23 U/L
BILIRUB SERPL-MCNC: 0.8 MG/DL (ref 0.3–1.2)
BUN SERPL-MCNC: 32 MG/DL (ref 8–23)
BUN/CREAT BLD: 5 (ref 9–20)
CALCIUM SERPL-MCNC: 9 MG/DL (ref 8.6–10.4)
CHLORIDE SERPL-SCNC: 94 MMOL/L (ref 98–107)
CO2 SERPL-SCNC: 25 MMOL/L (ref 20–31)
CREAT SERPL-MCNC: 6.63 MG/DL (ref 0.7–1.2)
EST. AVERAGE GLUCOSE BLD GHB EST-MCNC: 128 MG/DL
GFR SERPL CREATININE-BSD FRML MDRD: 9 ML/MIN/1.73M2
GLUCOSE BLD-MCNC: 105 MG/DL (ref 75–110)
GLUCOSE BLD-MCNC: 149 MG/DL (ref 75–110)
GLUCOSE BLD-MCNC: 184 MG/DL (ref 75–110)
GLUCOSE BLD-MCNC: 232 MG/DL (ref 75–110)
GLUCOSE BLD-MCNC: 237 MG/DL (ref 75–110)
GLUCOSE BLD-MCNC: 266 MG/DL (ref 75–110)
GLUCOSE SERPL-MCNC: 253 MG/DL (ref 70–99)
HBA1C MFR BLD: 6.1 % (ref 4–6)
HCT VFR BLD AUTO: 35.1 % (ref 40.7–50.3)
HGB BLD-MCNC: 10.9 G/DL (ref 13–17)
MCH RBC QN AUTO: 23.1 PG (ref 25.2–33.5)
MCHC RBC AUTO-ENTMCNC: 31.1 G/DL (ref 28.4–34.8)
MCV RBC AUTO: 74.4 FL (ref 82.6–102.9)
NRBC AUTOMATED: 0 PER 100 WBC
PDW BLD-RTO: 19 % (ref 11.8–14.4)
PLATELET # BLD AUTO: 157 K/UL (ref 138–453)
PMV BLD AUTO: ABNORMAL FL (ref 8.1–13.5)
POTASSIUM SERPL-SCNC: 4.6 MMOL/L (ref 3.7–5.3)
PROT SERPL-MCNC: 7.4 G/DL (ref 6.4–8.3)
RBC # BLD: 4.72 M/UL (ref 4.21–5.77)
SODIUM SERPL-SCNC: 130 MMOL/L (ref 135–144)
WBC # BLD AUTO: 5.6 K/UL (ref 3.5–11.3)

## 2023-02-01 PROCEDURE — 94761 N-INVAS EAR/PLS OXIMETRY MLT: CPT

## 2023-02-01 PROCEDURE — 2060000000 HC ICU INTERMEDIATE R&B

## 2023-02-01 PROCEDURE — 83036 HEMOGLOBIN GLYCOSYLATED A1C: CPT

## 2023-02-01 PROCEDURE — 2580000003 HC RX 258: Performed by: FAMILY MEDICINE

## 2023-02-01 PROCEDURE — 6370000000 HC RX 637 (ALT 250 FOR IP): Performed by: FAMILY MEDICINE

## 2023-02-01 PROCEDURE — 82947 ASSAY GLUCOSE BLOOD QUANT: CPT

## 2023-02-01 PROCEDURE — 85027 COMPLETE CBC AUTOMATED: CPT

## 2023-02-01 PROCEDURE — 2500000003 HC RX 250 WO HCPCS: Performed by: INTERNAL MEDICINE

## 2023-02-01 PROCEDURE — 80053 COMPREHEN METABOLIC PANEL: CPT

## 2023-02-01 PROCEDURE — 99239 HOSP IP/OBS DSCHRG MGMT >30: CPT | Performed by: FAMILY MEDICINE

## 2023-02-01 PROCEDURE — 90935 HEMODIALYSIS ONE EVALUATION: CPT

## 2023-02-01 PROCEDURE — 36415 COLL VENOUS BLD VENIPUNCTURE: CPT

## 2023-02-01 PROCEDURE — 2700000000 HC OXYGEN THERAPY PER DAY

## 2023-02-01 RX ORDER — ALOGLIPTIN 6.25 MG/1
6.25 TABLET, FILM COATED ORAL DAILY
Status: CANCELLED | OUTPATIENT
Start: 2023-02-01

## 2023-02-01 RX ADMIN — SODIUM CHLORIDE, PRESERVATIVE FREE 10 ML: 5 INJECTION INTRAVENOUS at 09:35

## 2023-02-01 RX ADMIN — INSULIN LISPRO 4 UNITS: 100 INJECTION, SOLUTION INTRAVENOUS; SUBCUTANEOUS at 14:14

## 2023-02-01 RX ADMIN — LISINOPRIL 5 MG: 5 TABLET ORAL at 09:35

## 2023-02-01 RX ADMIN — APIXABAN 5 MG: 5 TABLET, FILM COATED ORAL at 20:36

## 2023-02-01 RX ADMIN — MINOXIDIL 2.5 MG: 2.5 TABLET ORAL at 22:34

## 2023-02-01 RX ADMIN — DEXTROSE MONOHYDRATE: 50 INJECTION, SOLUTION INTRAVENOUS at 04:53

## 2023-02-01 RX ADMIN — Medication 1.7 ML: at 19:06

## 2023-02-01 RX ADMIN — MINOXIDIL 2.5 MG: 2.5 TABLET ORAL at 09:35

## 2023-02-01 RX ADMIN — CLONIDINE HYDROCHLORIDE 0.3 MG: 0.3 TABLET ORAL at 22:34

## 2023-02-01 RX ADMIN — ACETAMINOPHEN 650 MG: 325 TABLET ORAL at 03:09

## 2023-02-01 RX ADMIN — INSULIN LISPRO 2 UNITS: 100 INJECTION, SOLUTION INTRAVENOUS; SUBCUTANEOUS at 09:36

## 2023-02-01 RX ADMIN — CLONIDINE HYDROCHLORIDE 0.3 MG: 0.3 TABLET ORAL at 09:34

## 2023-02-01 RX ADMIN — APIXABAN 5 MG: 5 TABLET, FILM COATED ORAL at 09:35

## 2023-02-01 RX ADMIN — Medication 1.8 ML: at 19:07

## 2023-02-01 ASSESSMENT — PAIN SCALES - GENERAL
PAINLEVEL_OUTOF10: 0
PAINLEVEL_OUTOF10: 3
PAINLEVEL_OUTOF10: 0
PAINLEVEL_OUTOF10: 7

## 2023-02-01 ASSESSMENT — PAIN DESCRIPTION - LOCATION
LOCATION: LEG
LOCATION: LEG

## 2023-02-01 ASSESSMENT — PAIN DESCRIPTION - DESCRIPTORS
DESCRIPTORS: ACHING
DESCRIPTORS: ACHING

## 2023-02-01 ASSESSMENT — PAIN DESCRIPTION - ORIENTATION
ORIENTATION: RIGHT;LEFT
ORIENTATION: RIGHT;LEFT

## 2023-02-01 NOTE — PROGRESS NOTES
HEMODIALYSIS POST TREATMENT NOTE    Treatment time ordered: 210 minutes    Actual treatment time: 210 minutes    UltraFiltration Goal: 2500 ml  UltraFiltration Removed: 1800 Net fluid removal      Pre Treatment weight: floor to weigh , bed not zeroed  Post Treatment weight: floor to weigh, bed not zeroed , Night nurse Minoo Bird RN asked to zero bed and weigh patient in AM 23, recorded weights in the past are estimated weights  Estimated Dry Weight: 98 KG     Access used:     Central Venous Catheter:          Tunneled            Site: Lt. Chest          Access Flow: good      Internal Access:       AV Fistula or AV Graft: N/A         Site: N/A       Access Flow: N/A       Sign and symptoms of infection: No       If YES: N/A    Medications or blood products given: No    Chronic outpatient schedule: [facility]    Chronic outpatient unit: [facility]    Summary of response to treatment: N/A    Explain if orders NOT met, was physician notified:N/A      ACES flowsheet faxed to patient unit/ placed in patient chart: N/A, Epic    Post assessment completed: Yes    Report given to: Km Stringer RN      * Intra-treatment documented Safety Checks include the followin) Access and face visible at all times. 2) All connections and blood lines are secure with no kinks. 3) NVL alarm engaged. 4) Hemosafe device applied (if applicable). 5) No collapse of Arterial or Venous blood chambers. 6) All blood lines / pump segments in the air detectors.

## 2023-02-01 NOTE — CONSULTS
[facility]  Inpatient Cardiology Consult             Date:   2/1/2023  Patient name: Hany Moctezuma  Date of admission:  1/30/2023  6:11 PM  MRN:   [id number]  YOB: 1960      Reason for Consultation: Atrial fibrillation    CHIEF COMPLAINT:  Weakness    History Obtained From:  Patient and medical record    HISTORY OF PRESENT ILLNESS:    The patient is a 60 y.o gentleman with h/o DM, HTN, ESRD, CAD, CM, PCI 2021 and chronic AF, admitted for hypoglycemia, having been found down at home with blood glucose 45 mg/dl. He has also been non-compliant with HD. He has had improving mental status, and has been stable from a cardiac standpoint, with HR 60-70 bpm and having no recent angina or SOB. Past Medical History:   has a past medical history of Acute congestive heart failure (HCC), Chronic bilateral low back pain with bilateral sciatica, Coronary artery disease involving native coronary artery, CRF (chronic renal failure), DDD (degenerative disc disease), lumbar, Diabetes mellitus ([facility] [address] Dialysis patient [facility]), ED (erectile dysfunction), History of echocardiogram, HTN (hypertension), Hyperlipidemia, LVH (left ventricular hypertrophy), PVD (peripheral vascular disease) ([facility] [address] S/P bilateral [facility] and Wears glasses. Past Surgical History:   has a past surgical history that includes Glaucoma surgery; Cataract removal with implant; Leg amputation below knee (Bilateral); Tunneled venous catheter placement; Dialysis fistula creation (Bilateral); Finger surgery (Left); Finger amputation; and Arm Surgery. Home Medications:    Prior to Admission medications    Medication Sig Start Date End Date Taking?  Authorizing Provider   acetaminophen (TYLENOL) 500 MG tablet Take 500 mg by mouth every 6 hours as needed for Pain   Yes Historical Provider, MD   sodium zirconium cyclosilicate (LOKELMA) 5 g PACK oral suspension Take 5 g by mouth every other day Indications: Don't take  MWF (Diaylsis days)   Yes Historical Provider, MD   aspirin 81 MG EC tablet Take 81 mg by mouth daily   Yes Historical Provider, MD   traZODone (DESYREL) 50 MG tablet TAKE ONE TABLET BY MOUTH ONCE NIGHTLY AS NEEDED FOR SLEEP 1/16/23   Brian Wall MD   linagliptin (TRADJENTA) 5 MG tablet TAKE ONE TABLET BY MOUTH DAILY 12/28/22   Brian Wall MD   minoxidil (LONITEN) 2.5 MG tablet Take 5 mg by mouth 2 times daily 10/21/22   Historical Provider, MD   pregabalin (LYRICA) 75 MG capsule Take 1 capsule by mouth daily for 30 days. 12/6/22 1/5/23  Dev Ramos MD   amLODIPine-atorvastatatin (CADUET) 10-80 MG per tablet Take 1 tablet by mouth daily 10/7/22   Brian Wall MD   lisinopril (PRINIVIL;ZESTRIL) 20 MG tablet Take 1 tablet by mouth in the morning and at bedtime 9/20/22   Brian Wall MD   cloNIDine (CATAPRES) 0.3 MG tablet Take 1 tablet by mouth 2 times daily Do not take if HR < 60 9/20/22   Brian Wall MD   atorvastatin (LIPITOR) 80 MG tablet Take 1 tablet by mouth daily 9/21/22 9/20/22  Brian Wall MD   Sucroferric Oxyhydroxide (VELPHORO) 500 MG CHEW Take 1,000 mg by mouth 3 times daily    Historical Provider, MD   apixaban (ELIQUIS) 5 MG TABS tablet Take 1 tablet by mouth 2 times daily 5/28/22   Silas Castro MD   Multiple Vitamins-Minerals (RENAPLEX-D PO) Take 1 tablet by mouth daily     Historical Provider, MD       Allergies:  Patient has no known allergies. Social History:   reports that he has never smoked. He has never used smokeless tobacco. He reports current alcohol use. He reports that he does not use drugs. Family History:   Positive for early CAD    REVIEW OF SYSTEMS:    Constitutional: there has been no unanticipated weight loss. There's been No change in energy level, No change in activity level. Eyes: No visual changes or diplopia. No scleral icterus. ENT: No Headaches, hearing loss or vertigo. No mouth sores or sore throat.   Cardiovascular: No problem  Respiratory: No previous reported problems  Gastrointestinal: No abdominal pain, appetite loss, blood in stools. No change in bowel or bladder habits. Genitourinary: No dysuria, trouble voiding, or hematuria. Musculoskeletal:  No gait disturbance, No weakness or joint complaints. Integumentary: No rash or pruritis. Neurological: No headache, diplopia, change in muscle strength, numbness or tingling. No change in gait, balance, coordination, mood, affect, memory, mentation, behavior. Psychiatric: No anxiety, or depression. Endocrine: No temperature intolerance. No excessive thirst, fluid intake, or urination. No tremor. Hematologic/Lymphatic: No abnormal bruising or bleeding, blood clots or swollen lymph nodes. Allergic/Immunologic: No nasal congestion or hives. PHYSICAL EXAM:    Physical Examination:    /85   Pulse 70   Temp 97.9 °F (36.6 °C) (Oral)   Resp 22   Ht 5' 11\" (1.803 m)   Wt 216 lb 7.9 oz (98.2 kg)   SpO2 97%   BMI 30.19 kg/m²    Constitutional and General Appearance: alert, cooperative, no distress and appears stated age  HEENT: PERRL, no cervical lymphadenopathy. No masses palpable. Normal oral mucosa  Respiratory:  Normal excursion and expansion without use of accessory muscles  Resp Auscultation: Good respiratory effort. No for increased work of breathing. On auscultation: clear to auscultation bilaterally  Cardiovascular: The apical impulse is not displaced  Heart tones are crisp and normal. regular S1 and S2. Murmurs:  None  Jugular venous pulsation Normal  The carotid upstroke is normal in amplitude and contour without delay or bruit  Peripheral pulses are symmetrical and full   Abdomen:  No masses or tenderness  Bowel sounds present  Extremities:   No Cyanosis or Clubbing   Lower extremity edema: Trace-1+ at stump sites   Skin: Warm and dry  Neurological:  Alert and oriented.   Moves all extremities well  No abnormalities of mood, affect, memory, mentation, or behavior are noted    DATA:    Diagnostics:      EKG: AF, 83 bpm; LAFB and non-specific ST and T wave abnormality. 2D ECHO ( 11/13/22)  Summary  Mild to moderate left ventricular hypertrophy. Left ventricle is normal in size Global left ventricular systolic function is mild to moderately reduced Estimated ejection fraction is 35-40 % Left atrium is mildly dilated. Right atrium is moderately dilated . Mildly dilated right ventricular cavity. Mildly hypokinetic right ventricular free wall motion. No obvious valvular abnormality. Mild tricuspid regurgitation. Moderate pulmonary hypertension. Estimated right ventricular systolic pressure is 10-27CEKU. Small to moderate pericardial effusion. CARDIAC CATHETERIZATION ( 1/4/21)  LMCA: Normal 0% stenosis. LAD: Mild irregularities 20-30%. Apical - distal 80% stenosis 9Small  territory     LCx: Mild irregularities 10-20%. OM1: Ostial 90% stenosis (Small)  OM2: Minimal disease     RCA: Ostial / proximal calcified 90% stenosis       Lesion on Prox RCA: Proximal subsection. 99% stenosis 8 mm length reduced    to 20%. Pre procedure MARIA GUADALUPE II flow was noted. Post Procedure MARIA GUADALUPE III    flow was present. Good runoff was present. The lesion was diagnosed as    High Risk (C). Devices used       - Luge Wire 182 cm. Number of passes: 1.       - Trek Balloon 3.0mm x 12mm. 8 inflation(s) to a max pressure of: 22    mishel. - Xience Latisha 3.5 x 18 KAMILAH. 1 inflation(s) to a max pressure of: 18    mishel. - NC Trek 4.0x20mm Balloon. 1 inflation(s) to a max pressure of: 20    mishel. Coronary Tree      Dominance: Right     LV Analysis  LV function assessed as:Abnormal.  Ejection Fraction  +----------------------------------------------------------------------+---+  ! Method                                                                ! EF%! +----------------------------------------------------------------------+---+  ! LV gram                      !45 !  +----------------------------------------------------------------------+---+    Labs:     CBC:   Recent Labs     01/31/23 0629 02/01/23  0649   WBC 4.2 5.6   HGB 10.7* 10.9*   HCT 34.0* 35.1*   * 157     BMP:   Recent Labs     01/31/23 0629 02/01/23  0649    130*   K 5.2 4.6   CO2 24 25   BUN 40* 32*   CREATININE 7.75* 6.63*   LABGLOM 7* 9*   GLUCOSE 101* 253*     BNP: No results for input(s): BNP in the last 72 hours.  PT/INR: No results for input(s): PROTIME, INR in the last 72 hours.  APTT:No results for input(s): APTT in the last 72 hours.  CARDIAC ENZYMES:  Recent Labs     01/30/23 1943   CKTOTAL 1,727*     FASTING LIPID PANEL:  Lab Results   Component Value Date/Time    HDL 55 02/01/2022 03:08 PM    TRIG 52 09/28/2021 03:15 PM     LIVER PROFILE:  Recent Labs     01/30/23 1943 02/01/23  0649   AST 51* 23   ALT 32 26   LABALBU 4.0 3.8         IMPRESSION:    Chronic AF, currently asymptomatic and maintaining good rate control  Stable CAD, s/p PCI to proximal RCA 1/4/21  Cardiomyopathy; stable LVEF near 40% with mild volume overload improving with HD  ESRD; non-compliance with HD  Type II DM with recent hypoglycemia  Essential HTN, controlled  S/p bilateral BKA    Patient Active Problem List   Diagnosis    Hyperlipidemia    Essential hypertension    ESRD on hemodialysis (HCC) MWF    Insomnia    Chronic bilateral low back pain with bilateral sciatica    Controlled diabetes mellitus type 2 with complications ([facility])    S/P bilateral below knee amputation (HCC)    Long term (current) use of antithrombotics/antiplatelets    Chronic use of opiate drugs therapeutic purposes    Coronary artery disease involving native coronary artery    Anemia    Hypoglycemia       RECOMMENDATIONS:  Continue clonidine, lisinopril and minoxidil  Continue Eliquis 5 mg bid  Volume management with HD per Renal service   4.   Plan for f/u at [facility] with outpatient stress  test.    Discussed with patient and nursing.     Electronically signed by Bc Bowie MD on 2/1/2023 at 2:22 PM.  [facility] Consult[facility]

## 2023-02-01 NOTE — CARE COORDINATION
Social work: Spoke to patient to discuss home vs SNF. Patient declines snf, states he can do everything for himself. He only fell because his prosthetic lock broke; he has an appointment at [facility] to repair on 2/2. Plan is home with [facility] for nursing.

## 2023-02-01 NOTE — PROGRESS NOTES
Nephrology Progress Note        Subjective: Patient is seen and examined. He is scheduled for dialysis later today. We will get the patient back on his usual Monday and Wednesday and Friday hemodialysis schedule. He did have an dialysis yesterday because of a missed treatment earlier in the week. He came in with hypoglycemia. He is much more awake and alert than at admission. Blood sugars are improved. Jean-Pierre Sue He continues to be in atrial fibrillation with a controlled response. Other labs from today showed sodium 130 with potassium 4.6 chloride 94 and CO2 25 with BUN 32 and creatinine 6.63 with glucose 253 calcium 9 albumin 3.8. Placed the dialysis orders into the record. I have reviewed the dialysis with the hemodialysis nurse. The patient is scheduled for dialysis later today. He does not have any chest pain or shortness of breath. He has mild edema. No nausea or vomiting.   He ate his entire breakfast.        Objective:  CURRENT TEMPERATURE:  Temp: 99.1 °F (37.3 °C)  MAXIMUM TEMPERATURE OVER 24HRS:  Temp (24hrs), Av.7 °F (37.1 °C), Min:97.7 °F (36.5 °C), Max:99.7 °F (37.6 °C)    CURRENT RESPIRATORY RATE:  Resp: 26  CURRENT PULSE:  Heart Rate: 68  CURRENT BLOOD PRESSURE:  BP: (!) 168/101  24HR BLOOD PRESSURE RANGE:  Systolic (18ZZM), ZQE:048 , Min:78 , PBN:136   ; Diastolic (26AJV), WOJ:31, Min:50, Max:101    24HR INTAKE/OUTPUT:    Intake/Output Summary (Last 24 hours) at 2023 1055  Last data filed at 2023 0934  Gross per 24 hour   Intake 2274.65 ml   Output 2400 ml   Net -125.35 ml     Patient Vitals for the past 96 hrs (Last 3 readings):   Weight   23 0448 216 lb 7.9 oz (98.2 kg)   23 1844 230 lb (104.3 kg)         Physical Exam:  General appearance:  Awake, alert, in no acute distress, on supplemental oxygen  Skin: warm and dry, no rash or erythema  Eyes: conjunctivae normal and sclera anicteric  ENT: Moist mucous membranes  Neck:  No JVD, midline trachea no accessory muscle use  Pulmonary: Bilateral air entry with diminished breath sounds at the bases  Cardiovascular: Irregularly irregular rhythm Abdomen: soft nontender, bowel sounds present, no ascites   Extremities: Bilateral BKA, 1+ peripheral edema    Access:  previous permacath    Current Medications:    dextrose 5 % solution, Continuous  glucose chewable tablet 16 g, PRN  dextrose bolus 10% 125 mL, PRN   Or  dextrose bolus 10% 250 mL, PRN  glucagon (rDNA) injection 1 mg, PRN  dextrose 10 % infusion, Continuous PRN  apixaban (ELIQUIS) tablet 5 mg, BID  cloNIDine (CATAPRES) tablet 0.3 mg, BID  insulin lispro (HUMALOG) injection vial 0-8 Units, TID WC  insulin lispro (HUMALOG) injection vial 0-4 Units, Nightly  sodium citrate 4 % injection 1.8 mL, PRN  sodium citrate 4 % injection 1.7 mL, PRN  minoxidil (LONITEN) tablet 2.5 mg, BID  lisinopril (PRINIVIL;ZESTRIL) tablet 5 mg, Daily  sodium chloride flush 0.9 % injection 5-40 mL, 2 times per day  sodium chloride flush 0.9 % injection 5-40 mL, PRN  0.9 % sodium chloride infusion, PRN  acetaminophen (TYLENOL) tablet 650 mg, Q6H PRN  ondansetron (ZOFRAN-ODT) disintegrating tablet 4 mg, Q8H PRN   Or  ondansetron (ZOFRAN) injection 4 mg, Q6H PRN      Labs:   CBC:   Recent Labs     01/30/23  1825 01/31/23  0629 02/01/23  0649   WBC 6.1 4.2 5.6   RBC 4.73 4.55 4.72   HGB 11.2* 10.7* 10.9*   HCT 35.4* 34.0* 35.1*   * 126* 157   MPV Abnormal Abnormal Abnormal      BMP:   Recent Labs     01/30/23  1943 01/30/23  2042 01/31/23  0629 02/01/23  0649     --  137 130*   K 4.7  --  5.2 4.6   CL 97*  --  98 94*   CO2 26  --  24 25   BUN 37*  --  40* 32*   CREATININE 7.62*  --  7.75* 6.63*   GLUCOSE 70 91 101* 253*   CALCIUM 9.9  --  9.3 9.0        Phosphorus:  No results for input(s): PHOS in the last 72 hours. Magnesium: No results for input(s): MG in the last 72 hours.   Albumin:   Recent Labs     01/30/23  1943 02/01/23  0649   LABALBU 4.0 3.8       Dialysis bath: Dialysis Bath  K+ (Potassium): 2  Ca+ (Calcium): 2.5  Na+ (Sodium): 137  HCO3 (Bicarb): 35    Radiology:  Reviewed as available. Assessment:  1 ESRD:dialysis bath reviewed with nurse. Patient is on a Monday and Wednesday and Friday hemodialysis schedule at [facility] via a tunneled hemodialysis catheter  2. Essential hypertension  3 . Hypoglycemic episode in a patient with type 2 diabetes mellitus, resolved  4 . Body volume overload with positive edema and pulmonary vascular congestion and bilateral effusions  5. ANEMIA OF CHRONIC DISEASE:   6. SECONDARY HYPERPARATHYROIDISM:     Plan:  1. Weight removal and dialysis orders reviewed. 2.  Personally placed the dialysis orders into the record  3. Monitor hemodynamics with dialysis   4. Goal is to discharge the patient soon      Please do not hesitate to call with questions.     Electronically signed by Jose C Downing MD on 2/1/2023 at 10:55 AM

## 2023-02-01 NOTE — DISCHARGE INSTR - COC
Continuity of Care Form    Patient Name: Gavin Zhong   :  1960  MRN:  [id number]    Admit date:  2023  Discharge date:  ***    Code Status Order: Full Code   Advance Directives:     Admitting Physician:  Osiris Sidhu MD  PCP: Osiris Sidhu MD    Discharging Nurse: [facility] Unit/Room#: 1101/1101-01  Discharging Unit Phone Number: ***    Emergency Contact:   Extended Emergency Contact Information  Primary Emergency Contact: JeffreyMelvin  Address: Sriram Yousif [address] Phone: [phone]  Relation: Child  Secondary Emergency Contact: [address] Phone: [phone]  Relation: Other    Past Surgical History:  Past Surgical History:   Procedure Laterality Date    ARM SURGERY      CATARACT REMOVAL WITH IMPLANT      DIALYSIS FISTULA CREATION Bilateral     As of , RUE AVF functioning, LUE AVF nonfunctioning.     FINGER AMPUTATION      right pointer finger    FINGER SURGERY Left     I & D    GLAUCOMA SURGERY      LEG AMPUTATION BELOW KNEE Bilateral     TUNNELED VENOUS CATHETER PLACEMENT      PC placed and removed       Immunization History:   Immunization History   Administered Date(s) Administered    COVID-19, MODERNA [place] border, Primary or Immunocompromised, (age 12y+), IM, 100 mcg/0.5mL 03/10/2021, 2021    COVID-19, MODERNA Booster BLUE border, (age 18y+), IM, 50mcg/0.25mL 2021    Influenza Vaccine, unspecified formulation 10/08/2014, 10/02/2015    Influenza Virus Vaccine 10/02/2019, 2020, 10/01/2021, 2022    Pneumococcal Conjugate 13-valent (Rryzjgn79) 2015, 2018    Pneumococcal Polysaccharide (Hgssakwht87) 2015    Tdap (Boostrix, Adacel) 2020       Active Problems:  Patient Active Problem List   Diagnosis Code    Hyperlipidemia E78.5    Essential hypertension I10    ESRD on hemodialysis ([facility] [address] MWF N18.6, Z99.2    Insomnia G47.00    Chronic bilateral low back pain with bilateral sciatica M54.42, M54.41, G89.29    Controlled diabetes mellitus type 2 with complications ([facility] [address] T32.1    S/P bilateral below knee [facility] Z89.512, Z89.511    Long term (current) use of antithrombotics/antiplatelets U69.95    Chronic use of opiate drugs therapeutic purposes Z79.891    Coronary artery disease involving native coronary artery I25.10    Anemia D64.9    Hypoglycemia E16.2       Isolation/Infection:   Isolation            Contact          Patient Infection Status       Infection Onset Added Last Indicated Last Indicated By Review Planned Expiration Resolved Resolved By    MRSA  09/06/17 09/06/17 Jeny Caldwell RN        Arm - 9/2017    Resolved    COVID-19 (Rule Out) 11/13/22 11/13/22 11/13/22 COVID-19, Rapid (Ordered)   11/13/22 Rule-Out Test Resulted    COVID-19 (Rule Out) 09/02/22 09/02/22 09/02/22 COVID-19, Rapid (Ordered)   09/02/22 Rule-Out Test Resulted            Nurse Assessment:  Last Vital Signs: /65   Pulse 70   Temp 99.1 °F (37.3 °C) (Oral)   Resp 16   Ht 5' 11\" (1.803 m)   Wt 216 lb 7.9 oz (98.2 kg)   SpO2 100%   BMI 30.19 kg/m²     Last documented pain score (0-10 scale): Pain Level: 3  Last Weight:   Wt Readings from Last 1 Encounters:   02/01/23 216 lb 7.9 oz (98.2 kg)     Mental Status:  {IP PT MENTAL STATUS:88599}    IV Access:  { ROSALINE IV ACCESS:095304379}    Nursing Mobility/ADLs:  Walking   {[facility] DME DBQX:644395981}  Transfer  {[facility] DME YIOA:654992367}  Bathing  {[facility] DME JFBH:720101422}  Dressing  {[facility] DME YXMK:679880018}  Toileting  {[facility] DME XREV:929790593}  Feeding  {[facility] DME PBJX:444867112}  Med Admin  {[facility] DME JFEW:428231129}  Med Delivery   { ROSALINE MED Delivery:146577501}    Wound Care Documentation and Therapy:  Incision 09/05/13 Arm Left (Active)   Number of days: 3436       Incision 10/29/13 Arm Left (Active)   Number of days: 3381       Incision 02/18/14 Leg Lower;Right (Active)   Number of days: 3269       Incision 09/18/22 Buttocks Right (Active)   Number of days: 135        Elimination:  Continence: Bowel: {YES / XH:07062}  Bladder: {YES / ZJ:59602}  Urinary Catheter: {Urinary Catheter:384532223}   Colostomy/Ileostomy/Ileal Conduit: {YES / XT:33106}       Date of Last BM: ***    Intake/Output Summary (Last 24 hours) at 2023 0814  Last data filed at 2023 0649  Gross per 24 hour   Intake 2264.65 ml   Output 2400 ml   Net -135.35 ml     I/O last 3 completed shifts: In: 2976.7 [P.O.:960; I.V.:1416.7]  Out: 2400     Safety Concerns:     [address] [employer] Safety Concerns:770666239}    Impairments/Disabilities:      [address] Impairments/Disabilities:395913562}    Nutrition Therapy:  Current Nutrition Therapy:   [address] [employer] Diet List:971328198}    Routes of Feeding: {CHP DME Other Feedings:294699948}  Liquids: {Slp liquid thickness:85678}  Daily Fluid Restriction: {CHP DME Yes amt example:802653280}  Last Modified Barium Swallow with Video (Video Swallowing Test): {Done Not Done FPEN:799950396}    Treatments at the Time of Hospital Discharge:   Respiratory Treatments: ***  Oxygen Therapy:  {Therapy; copd oxygen:69735}  Ventilator:    {[facility] Vent TYKS:608176657}    Rehab Therapies: {THERAPEUTIC INTERVENTION:3669874569}  Weight Bearing Status/Restrictions: [address]  Weight Bearin}  Other Medical Equipment (for information only, NOT a DME order):  {EQUIPMENT:074294307}  Other Treatments: Skilled RN assessment                                   Set up medications.      Patient's personal belongings (please select all that are sent with patient):  {P DME Belongings:257964042}    RN SIGNATURE:  {Esignature:946396822}    CASE MANAGEMENT/SOCIAL WORK SECTION    Inpatient Status Date: 2023    Readmission Risk Assessment Score:  Readmission Risk              Risk of Unplanned Readmission:  26           Discharging to Facility/ Agency   Name: [address] [place] [address]        Address   [address] [facility] [address] [place] [address] [address] [facility]    [phone]         Dialysis Facility (if applicable)   Name:[facility]  Address: [address] D[address] Schedule: MWF  Phone:  Fax:    / signature: {Esignature:687002761}    PHYSICIAN SECTION    Prognosis: Good    Condition at Discharge: Stable    Rehab Potential (if transferring to Rehab): Good    Recommended Labs or Other Treatments After Discharge:     Physician Certification: I certify the above information and transfer of Zoya Estrada  is necessary for the continuing treatment of the diagnosis listed and that he requires Home Care for less 30 days.      Update Admission H&P: No change in H&P    PHYSICIAN SIGNATURE:  Electronically signed by Manjeet Adrian MD on 2/1/23 at 8:15 AM EST

## 2023-02-01 NOTE — DISCHARGE SUMMARY
Discharge diagnosis  Intractable hypoglycemia  Suspected unintentional overdose on oral hypoglycemic  Visual impairment  End-stage renal disease  Hypertension  Hyperlipidemia  Hyponatremia  Diabetes mellitus  Anemia of chronic disease  Secondary hyperparathyroidism  Bilateral lower extremity amputee  Consult  Nephrology,   Detail  76-year-old -American male with history of diabetes mellitus who is legally blind was brought into the emergency room with blood sugar of 30. He does not recall taking extra dose of medication. However, in the office significant visual impairment, he states that he takes his cell phone picture of the medication label and visualize it with a magnifying lens. He was provided with glucagon, D50, steroid and D5. He remained hemodynamically stable. Hypoglycemia resolved uneventfully. We resume sliding scale and Tradjenta  End-stage renal disease. Nephrology on board. He has been tolerating dialysis well  History of chronic A. fib. He is on [place]  Social issues. Patient lives alone however reluctant to have any placement.  on board. Patient states relies on his son. He understand that because of his significant visual impairment, he has the potential to overdose or underdose with scheduled medications  Today on the date of discharge patient is afebrile hemodynamically stable tolerating p.o. well.   He is chronically anuric, dialysis dependent  Discharge condition improved  Disposition Home  Medications per reconciliation, discussed with nursing staff  ROSALINE signed, medications reconciled, discharge order placed  More than 30 minutes spent organizing this discharge  Discharge plan also discussed with patient and agrees to proceed  This note is created with a voice recognition program and while intend to generate a document that accurately reflects the content of the visit, no guarantee can be provided that every mistake has been identified and corrected by editing.

## 2023-02-01 NOTE — PROGRESS NOTES
Dialysis Safety Checks:    Patient ID Verified (Y/N) Y     -Hepatitis Test                   Date      Result  Hepatitis B Surface Antigen   22 Negative     Hepatitis B Surface Antibody 22 Positive = 70     Hepatitis B Core Antibody        -Treatment Initiation  Blood Vol Processed Goal (Liters)  Pump Speed x Treatment Hours x 60 Minutes    Target Fluid Removal 3000 ml     * Intra-treatment documented Safety Checks include the followin) Access and face visible at all times. 2) All connections and blood lines are secure with no kinks. 3) NVL alarm engaged. 4) Hemosafe device applied (if applicable). 5) No collapse of Arterial or Venous blood chambers. 6) All blood lines / pump segments in the air detectors.   --------------------------------------------------------------------------------   Report received from COURTNEY Levine

## 2023-02-01 NOTE — PROGRESS NOTES
Dialysis Safety Checks:    Patient ID Verified (Y/N) Y     -Hepatitis Test                   Date      Result  Hepatitis B Surface Antigen   8/15/22 Negative     Hepatitis B Surface Antibody 22 Positive = 70     Hepatitis B Core Antibody        -Treatment Initiation  Blood Vol Processed Goal (Liters)  Pump Speed x Treatment Hours x 60 Minutes    Target Fluid Removal 2000 ml     * Intra-treatment documented Safety Checks include the followin) Access and face visible at all times. 2) All connections and blood lines are secure with no kinks. 3) NVL alarm engaged. 4) Hemosafe device applied (if applicable). 5) No collapse of Arterial or Venous blood chambers. 6) All blood lines / pump segments in the air detectors.   --------------------------------------------------------------------------------   Report received from [address] [place] [address] Pky

## 2023-02-01 NOTE — DIALYSIS
1. 25 HR delay charged due to slow transport from hospital staff. Writer called for patient at [phone], [phone], and [phone]. He is the only patient left to dialyze. Primary RN reports she is unavailable to transport to dialysis. States she has other matters to settle. Arvin Vela RN is on her way in to run the patient. Floor staff informed.

## 2023-02-02 ENCOUNTER — HOSPITAL ENCOUNTER (INPATIENT)
Age: 63
LOS: 5 days | Discharge: NURSING, REHAB OR LONG-TERM CARE | DRG: 559 | End: 2023-02-07
Attending: EMERGENCY MEDICINE | Admitting: FAMILY MEDICINE
Payer: COMMERCIAL

## 2023-02-02 ENCOUNTER — APPOINTMENT (OUTPATIENT)
Dept: GENERAL RADIOLOGY | Age: 63
DRG: 559 | End: 2023-02-02
Payer: COMMERCIAL

## 2023-02-02 DIAGNOSIS — E11.8 CONTROLLED TYPE 2 DIABETES MELLITUS WITH COMPLICATION, WITH LONG-TERM CURRENT USE OF INSULIN (HCC): ICD-10-CM

## 2023-02-02 DIAGNOSIS — Z89.511 S/P BILATERAL BELOW KNEE AMPUTATION (HCC): ICD-10-CM

## 2023-02-02 DIAGNOSIS — R22.43 LOCALIZED SWELLING OF BOTH LOWER LEGS: Primary | ICD-10-CM

## 2023-02-02 DIAGNOSIS — R26.2 UNABLE TO AMBULATE: ICD-10-CM

## 2023-02-02 DIAGNOSIS — Z89.512 S/P BILATERAL BELOW KNEE AMPUTATION (HCC): ICD-10-CM

## 2023-02-02 DIAGNOSIS — Z79.4 CONTROLLED TYPE 2 DIABETES MELLITUS WITH COMPLICATION, WITH LONG-TERM CURRENT USE OF INSULIN (HCC): ICD-10-CM

## 2023-02-02 LAB
ABSOLUTE EOS #: 0.05 K/UL (ref 0–0.44)
ABSOLUTE IMMATURE GRANULOCYTE: 0.02 K/UL (ref 0–0.3)
ABSOLUTE LYMPH #: 1.43 K/UL (ref 1.1–3.7)
ABSOLUTE MONO #: 1.08 K/UL (ref 0.1–1.2)
ANION GAP SERPL CALCULATED.3IONS-SCNC: 13 MMOL/L (ref 9–17)
BASOPHILS # BLD: 0 % (ref 0–2)
BASOPHILS ABSOLUTE: <0.03 K/UL (ref 0–0.2)
BNP SERPL-MCNC: ABNORMAL PG/ML
BUN SERPL-MCNC: 33 MG/DL (ref 8–23)
BUN/CREAT BLD: 6 (ref 9–20)
CALCIUM SERPL-MCNC: 9.1 MG/DL (ref 8.6–10.4)
CHLORIDE SERPL-SCNC: 97 MMOL/L (ref 98–107)
CO2 SERPL-SCNC: 24 MMOL/L (ref 20–31)
CREAT SERPL-MCNC: 5.32 MG/DL (ref 0.7–1.2)
EOSINOPHILS RELATIVE PERCENT: 1 % (ref 1–4)
EST. AVERAGE GLUCOSE BLD GHB EST-MCNC: 123 MG/DL
GFR SERPL CREATININE-BSD FRML MDRD: 11 ML/MIN/1.73M2
GLUCOSE SERPL-MCNC: 137 MG/DL (ref 70–99)
HBA1C MFR BLD: 5.9 % (ref 4–6)
HCT VFR BLD AUTO: 38.1 % (ref 40.7–50.3)
HGB BLD-MCNC: 12.1 G/DL (ref 13–17)
IMMATURE GRANULOCYTES: 0 %
INR PPP: 1.3
LYMPHOCYTES # BLD: 21 % (ref 24–43)
MAGNESIUM SERPL-MCNC: 2.1 MG/DL (ref 1.6–2.6)
MCH RBC QN AUTO: 23.5 PG (ref 25.2–33.5)
MCHC RBC AUTO-ENTMCNC: 31.8 G/DL (ref 28.4–34.8)
MCV RBC AUTO: 74.1 FL (ref 82.6–102.9)
MONOCYTES # BLD: 16 % (ref 3–12)
NRBC AUTOMATED: 0 PER 100 WBC
PARTIAL THROMBOPLASTIN TIME: 37.9 SEC (ref 23.9–33.8)
PDW BLD-RTO: 19.7 % (ref 11.8–14.4)
PHOSPHATE SERPL-MCNC: 3.9 MG/DL (ref 2.5–4.5)
PLATELET # BLD AUTO: 166 K/UL (ref 138–453)
PMV BLD AUTO: ABNORMAL FL (ref 8.1–13.5)
POTASSIUM SERPL-SCNC: 4 MMOL/L (ref 3.7–5.3)
PROTHROMBIN TIME: 16.3 SEC (ref 11.5–14.2)
RBC # BLD: 5.14 M/UL (ref 4.21–5.77)
RBC # BLD: ABNORMAL 10*6/UL
SEG NEUTROPHILS: 62 % (ref 36–65)
SEGMENTED NEUTROPHILS ABSOLUTE COUNT: 4.24 K/UL (ref 1.5–8.1)
SODIUM SERPL-SCNC: 134 MMOL/L (ref 135–144)
TROPONIN I SERPL DL<=0.01 NG/ML-MCNC: 499 NG/L (ref 0–22)
WBC # BLD AUTO: 6.8 K/UL (ref 3.5–11.3)

## 2023-02-02 PROCEDURE — 93005 ELECTROCARDIOGRAM TRACING: CPT | Performed by: EMERGENCY MEDICINE

## 2023-02-02 PROCEDURE — 85025 COMPLETE CBC W/AUTO DIFF WBC: CPT

## 2023-02-02 PROCEDURE — 36415 COLL VENOUS BLD VENIPUNCTURE: CPT

## 2023-02-02 PROCEDURE — 84100 ASSAY OF PHOSPHORUS: CPT

## 2023-02-02 PROCEDURE — 71045 X-RAY EXAM CHEST 1 VIEW: CPT

## 2023-02-02 PROCEDURE — 1200000000 HC SEMI PRIVATE

## 2023-02-02 PROCEDURE — 84484 ASSAY OF TROPONIN QUANT: CPT

## 2023-02-02 PROCEDURE — 83880 ASSAY OF NATRIURETIC PEPTIDE: CPT

## 2023-02-02 PROCEDURE — 99285 EMERGENCY DEPT VISIT HI MDM: CPT

## 2023-02-02 PROCEDURE — 6370000000 HC RX 637 (ALT 250 FOR IP): Performed by: FAMILY MEDICINE

## 2023-02-02 PROCEDURE — 80048 BASIC METABOLIC PNL TOTAL CA: CPT

## 2023-02-02 PROCEDURE — 85610 PROTHROMBIN TIME: CPT

## 2023-02-02 PROCEDURE — 85730 THROMBOPLASTIN TIME PARTIAL: CPT

## 2023-02-02 PROCEDURE — 83735 ASSAY OF MAGNESIUM: CPT

## 2023-02-02 RX ORDER — LISINOPRIL 20 MG/1
20 TABLET ORAL 2 TIMES DAILY
Status: DISCONTINUED | OUTPATIENT
Start: 2023-02-02 | End: 2023-02-07 | Stop reason: HOSPADM

## 2023-02-02 RX ORDER — PREGABALIN 75 MG/1
75 CAPSULE ORAL DAILY
Status: DISCONTINUED | OUTPATIENT
Start: 2023-02-03 | End: 2023-02-07 | Stop reason: HOSPADM

## 2023-02-02 RX ADMIN — APIXABAN 5 MG: 5 TABLET, FILM COATED ORAL at 22:49

## 2023-02-02 RX ADMIN — LISINOPRIL 20 MG: 20 TABLET ORAL at 22:48

## 2023-02-02 ASSESSMENT — ENCOUNTER SYMPTOMS
NAUSEA: 0
DIARRHEA: 0
PHOTOPHOBIA: 0
VOMITING: 0
ABDOMINAL PAIN: 0
TROUBLE SWALLOWING: 0
SHORTNESS OF BREATH: 0
COUGH: 0
COLOR CHANGE: 0

## 2023-02-02 ASSESSMENT — LIFESTYLE VARIABLES
HOW MANY STANDARD DRINKS CONTAINING ALCOHOL DO YOU HAVE ON A TYPICAL DAY: PATIENT DOES NOT DRINK
HOW OFTEN DO YOU HAVE A DRINK CONTAINING ALCOHOL: NEVER

## 2023-02-02 ASSESSMENT — PAIN - FUNCTIONAL ASSESSMENT: PAIN_FUNCTIONAL_ASSESSMENT: NONE - DENIES PAIN

## 2023-02-02 NOTE — PROGRESS NOTES
HEMODIALYSIS POST TREATMENT NOTE    Treatment time ordered: 210 minutes    Actual treatment time: 210 minutes    UltraFiltration Goal: 3500 ml  UltraFiltration Removed: 3000 ml net fluid removal      Pre Treatment weight: 98.2 Kg  Post Treatment weight: 95.3 KG  Estimated Dry Weight: 98 KG     Access used:     Central Venous Catheter:          Tunneled            Site: Lt. Chest          Access Flow: good      Internal Access:       AV Fistula or AV Graft: N/A         Site: N/A       Access Flow: N/A       Sign and symptoms of infection: No       If YES: N/A    Medications or blood products given: No    Chronic outpatient schedule: [facility]    Chronic outpatient unit: [facility]    Summary of response to treatment: Tolerated well    Explain if orders NOT met, was physician notified:N/A      ACES flowsheet faxed to patient unit/ placed in patient chart: N/A , Epic    Post assessment completed: Yes    Report given to: Cynthia stewart RN      * Intra-treatment documented Safety Checks include the followin) Access and face visible at all times. 2) All connections and blood lines are secure with no kinks. 3) NVL alarm engaged. 4) Hemosafe device applied (if applicable). 5) No collapse of Arterial or Venous blood chambers. 6) All blood lines / pump segments in the air detectors.

## 2023-02-02 NOTE — ED NOTES
House Supervisor notified of [address] IV needs @ [phone].      Ryan Muro  02/02/23 [phone]

## 2023-02-02 NOTE — ED PROVIDER NOTES
EMERGENCY DEPARTMENT ENCOUNTER    Pt Name: Lenny Anderson  MRN: [id number]  [place] 1960  Date of evaluation: 2/2/23  CHIEF COMPLAINT       Chief Complaint   Patient presents with    Leg Swelling     Recent admission, discharged last night. Pt noticed swelling last night in the hospital.      HISTORY OF PRESENT ILLNESS   28-year-old male presenting to the ER complaining of bilateral lower extremity swelling that is more so on the left side than the right. Patient does have an underlying history of below the knee amputation on the left side secondary to diabetes. He is a dialysis patient. Patient tried to have his prosthesis placed on an outpatient basis but they could not have it fit because of increased swelling in the left lower extremity. Patient lives alone and has no way of ambulating at the current time. Patient states the crutches are not working for him. The history is provided by the patient. Leg Injury  Location:  Leg  Injury: no    Leg location:  L upper leg, R upper leg and R lower leg  Pain details:     Quality: swelling. Associated symptoms: no fatigue and no fever          REVIEW OF SYSTEMS     Review of Systems   Constitutional:  Negative for activity change, fatigue and fever. HENT:  Negative for congestion, ear pain and trouble swallowing. Eyes:  Negative for photophobia and visual disturbance. Respiratory:  Negative for cough and shortness of breath. Cardiovascular:  Positive for leg swelling (bilateral). Negative for chest pain and palpitations. Gastrointestinal:  Negative for abdominal pain, diarrhea, nausea and vomiting. Genitourinary:  Negative for dysuria, flank pain and urgency. Musculoskeletal:  Negative for arthralgias and myalgias. Skin:  Negative for color change and rash. Neurological:  Negative for dizziness and facial asymmetry. Psychiatric/Behavioral:  Negative for agitation and behavioral problems.     PASTMEDICAL HISTORY     Past Medical History:   Diagnosis Date    Acute congestive heart failure (HCC)     Chronic bilateral low back pain with bilateral sciatica     Coronary artery disease involving native coronary artery 9/6/2022    CRF (chronic renal failure)     [facility] MWF    DDD (degenerative disc disease), lumbar 12/15/2020    Diabetes mellitus ([facility]     Dialysis patient [facility])     ED (erectile dysfunction)     History of echocardiogram 2014    [facility]    HTN (hypertension)     Hyperlipidemia     LVH (left ventricular hypertrophy)     PVD (peripheral vascular disease) ([facility])     S/P bilateral BKA (below knee amputation) ([facility]     Wears glasses      Past Problem List  Patient Active Problem List   Diagnosis Code    Hyperlipidemia E78.5    Essential hypertension I10    ESRD on hemodialysis ([facility] MWF N18.6, Z99.2    Insomnia G47.00    Chronic bilateral low back pain with bilateral sciatica M54.42, M54.41, G89.29    Controlled diabetes mellitus type 2 with complications ([facility] R06.2    S/P bilateral below knee amputation ([facility] Z89.512, Z89.511    Long term (current) use of antithrombotics/antiplatelets T87.68    Chronic use of opiate drugs therapeutic purposes Z79.891    Coronary artery disease involving native coronary artery I25.10    Anemia D64.9    Hypoglycemia E16.2    Inability to walk R26.2     SURGICAL HISTORY       Past Surgical History:   Procedure Laterality Date    ARM SURGERY      CATARACT REMOVAL WITH IMPLANT      DIALYSIS FISTULA CREATION Bilateral     As of 2019, RUE AVF functioning, LUE AVF nonfunctioning.     FINGER AMPUTATION      right pointer finger    FINGER SURGERY Left     I & D    GLAUCOMA SURGERY      LEG AMPUTATION BELOW KNEE Bilateral     TUNNELED VENOUS CATHETER PLACEMENT      PC placed and removed     CURRENT MEDICATIONS       Previous Medications    ACETAMINOPHEN (TYLENOL) 500 MG TABLET    Take 500 mg by mouth every 6 hours as needed for Pain    AMLODIPINE-ATORVASTATATIN (CADUET) 10-80 MG PER TABLET    Take 1 tablet by mouth daily    APIXABAN (ELIQUIS) 5 MG TABS TABLET    Take 1 tablet by mouth 2 times daily    ASPIRIN 81 MG EC TABLET    Take 81 mg by mouth daily    CLONIDINE (CATAPRES) 0.3 MG TABLET    Take 1 tablet by mouth 2 times daily Do not take if HR < 60    LINAGLIPTIN (TRADJENTA) 5 MG TABLET    TAKE ONE TABLET BY MOUTH DAILY    LISINOPRIL (PRINIVIL;ZESTRIL) 20 MG TABLET    Take 1 tablet by mouth in the morning and at bedtime    MINOXIDIL (LONITEN) 2.5 MG TABLET    Take 5 mg by mouth 2 times daily    MULTIPLE VITAMINS-MINERALS (RENAPLEX-D PO)    Take 1 tablet by mouth daily     PREGABALIN (LYRICA) 75 MG CAPSULE    Take 1 capsule by mouth daily for 30 days. SODIUM ZIRCONIUM CYCLOSILICATE (LOKELMA) 5 G PACK ORAL SUSPENSION    Take 5 g by mouth every other day Indications: Don't take  MWF (Diaylsis days)    SUCROFERRIC OXYHYDROXIDE (VELPHORO) 500 MG CHEW    Take 1,000 mg by mouth 3 times daily    TRAZODONE (DESYREL) 50 MG TABLET    TAKE ONE TABLET BY MOUTH ONCE NIGHTLY AS NEEDED FOR SLEEP     ALLERGIES     has No Known Allergies. FAMILY HISTORY     He indicated that the status of his mother is unknown. He indicated that the status of his father is unknown. He indicated that the status of his sister is unknown. He indicated that the status of his brother is unknown. SOCIAL HISTORY       Social History     Tobacco Use    Smoking status: Never    Smokeless tobacco: Never   Vaping Use    Vaping Use: Never used   Substance Use Topics    Alcohol use: Yes     Comment: rare    Drug use: No     PHYSICAL EXAM     INITIAL VITALS: BP (!) 141/81   Pulse 76   Temp 98.1 °F (36.7 °C)   Resp 15   Ht 5' 11\" (1.803 m)   Wt 220 lb (99.8 kg)   SpO2 100%   BMI 30.68 kg/m²    Physical Exam  Constitutional:       General: He is not in acute distress. Appearance: Normal appearance. HENT:      Head: Normocephalic and atraumatic.       Right Ear: External ear normal.      Left Ear: External ear normal.      Nose: Nose normal. No congestion or rhinorrhea. Eyes:      Extraocular Movements: Extraocular movements intact. Pupils: Pupils are equal, round, and reactive to light. Cardiovascular:      Rate and Rhythm: Normal rate and regular rhythm. Pulses: Normal pulses. Heart sounds: Normal heart sounds. Pulmonary:      Effort: Pulmonary effort is normal. No respiratory distress. Breath sounds: Normal breath sounds. Abdominal:      General: Bowel sounds are normal.      Palpations: Abdomen is soft. Musculoskeletal:         General: No deformity. Normal range of motion. Cervical back: Normal range of motion. No rigidity. Skin:     General: Skin is warm and dry. Neurological:      General: No focal deficit present. Mental Status: He is alert and oriented to person, place, and time. Mental status is at baseline. Psychiatric:         Mood and Affect: Mood normal.         Behavior: Behavior normal.       MEDICAL DECISION MAKING / ED COURSE:         1)  Number and Complexity of Problems Addressed at this Encounter  Problem List This Visit: Bilateral lower extremity swelling, inability to ambulate due to history of amputation and prosthesis not fitting. Pertinent Comorbid Conditions: End-stage renal disease, hypertension, hyperlipidemia    2)  Data Reviewed and Analyzed  (Lab and radiology tests/orders below in next section)        3)  Treatment and Disposition         Cardiac work-up in the ER did not display positive signs of acute cardiac ischemia. EKG was reviewed and interpreted by myself, ED physician. The patient is a dialysis patient. The patient was attempted to be discharged but the patient does not have anybody living with him at home and has nobody to care for him and help him at home. The patient has previously refused going to a facility that will help him with his daily living activities. The patient however states that this time around he will accept.   The patient was admitted after speaking with the admitting team.  Patient understands and agrees with the plan. CRITICAL CARE:       PROCEDURES:    Procedures      DATA FOR LAB AND RADIOLOGY TESTS ORDERED BELOW ARE REVIEWED BY THE ED CLINICIAN:    RADIOLOGY: All x-rays, CT, MRI, and formal ultrasound images (except ED bedside ultrasound) are read by the radiologist, see reports below, unless otherwise noted in MDM or here. Reports below are reviewed by myself. XR CHEST PORTABLE   Final Result   Basilar opacities, left greater than right. Possible small left effusion. LABS: Lab orders shown below, the results are reviewed by myself, and all abnormals are listed below.   Labs Reviewed   TROPONIN - Abnormal; Notable for the following components:       Result Value    Troponin, High Sensitivity 499 (*)     All other components within normal limits   APTT - Abnormal; Notable for the following components:    PTT 37.9 (*)     All other components within normal limits   PROTIME-INR - Abnormal; Notable for the following components:    Protime 16.3 (*)     All other components within normal limits   BASIC METABOLIC PANEL - Abnormal; Notable for the following components:    Glucose 137 (*)     BUN 33 (*)     Creatinine 5.32 (*)     Est, Glom Filt Rate 11 (*)     Bun/Cre Ratio 6 (*)     Sodium 134 (*)     Chloride 97 (*)     All other components within normal limits   CBC WITH AUTO DIFFERENTIAL - Abnormal; Notable for the following components:    Hemoglobin 12.1 (*)     Hematocrit 38.1 (*)     MCV 74.1 (*)     MCH 23.5 (*)     RDW 19.7 (*)     Lymphocytes 21 (*)     Monocytes 16 (*)     All other components within normal limits   BRAIN NATRIURETIC PEPTIDE - Abnormal; Notable for the following components:    Pro-BNP 40,719 (*)     All other components within normal limits   PHOSPHORUS   MAGNESIUM       Vitals Reviewed:    Vitals:    02/02/23 1540   BP: (!) 141/81   Pulse: 76   Resp: 15   Temp: 98.1 °F (36.7 °C)   SpO2: 100%   Weight: 220 lb (99.8 kg)   Height: 5' 11\" (1.803 m)     MEDICATIONS GIVEN TO PATIENT THIS ENCOUNTER:  No orders of the defined types were placed in this encounter. DISCHARGE PRESCRIPTIONS:  New Prescriptions    No medications on file     PHYSICIAN CONSULTS ORDERED THIS ENCOUNTER:  IP CONSULT TO INTERNAL MEDICINE  FINAL IMPRESSION      1. Localized swelling of both lower legs    2.  Unable to ambulate          DISPOSITION/PLAN   DISPOSITION Admitted 02/02/2023 06:20:11 PM      OUTPATIENT FOLLOW UP THE PATIENT:  Stevie Cotton MD  [address] [address] [place] [id number]    Schedule an appointment as soon as possible for a visit in 2 days      [facility] ED  [address] [facility]  [phone]  Go to   As needed, If symptoms worsen    DO Pj Montoya DO  02/02/23 2000

## 2023-02-02 NOTE — ED NOTES
Pt to er with c/o leg swelling. Pt states he was discharged for hospital yesterday. Pt states he is dialysis patient and had full dialysis treatment yesterday prior to discharge. Pt denies chest pain. Pt denies sob or difficulty breathing. Pt a&ox3. Skin warm and dry. Respirations even and non-labored.       Anthony Sorensen RN  02/02/23 0538

## 2023-02-02 NOTE — PROGRESS NOTES
Patient left with son assisted with wheelchair and crutches. VSS. BS at 2234 was 232. Was only able to get one prosthetic leg on, patient and son stated he has an appointment tomorrow to get refitted for his left leg.

## 2023-02-02 NOTE — PLAN OF CARE
Problem: Discharge Planning  Goal: Discharge to home or other facility with appropriate resources  Outcome: Progressing  Flowsheets (Taken 2/1/2023 0830)  Discharge to home or other facility with appropriate resources:   Identify barriers to discharge with patient and caregiver   Arrange for needed discharge resources and transportation as appropriate   Identify discharge learning needs (meds, wound care, etc)   Refer to discharge planning if patient needs post-hospital services based on physician order or complex needs related to functional status, cognitive ability or social support system     Problem: Pain  Goal: Verbalizes/displays adequate comfort level or baseline comfort level  Outcome: Progressing  Flowsheets  Taken 2/1/2023 1200  Verbalizes/displays adequate comfort level or baseline comfort level:   Encourage patient to monitor pain and request assistance   Assess pain using appropriate pain scale   Administer analgesics based on type and severity of pain and evaluate response   Implement non-pharmacological measures as appropriate and evaluate response   Consider cultural and social influences on pain and pain management   Notify Licensed Independent Practitioner if interventions unsuccessful or patient reports new pain  Taken 2/1/2023 0930  Verbalizes/displays adequate comfort level or baseline comfort level:   Encourage patient to monitor pain and request assistance   Assess pain using appropriate pain scale   Administer analgesics based on type and severity of pain and evaluate response   Consider cultural and social influences on pain and pain management   Implement non-pharmacological measures as appropriate and evaluate response   Notify Licensed Independent Practitioner if interventions unsuccessful or patient reports new pain     Problem: ABCDS Injury Assessment  Goal: Absence of physical injury  Outcome: Progressing  Flowsheets (Taken 2/1/2023 0900)  Absence of Physical Injury: Implement safety measures based on patient assessment     Problem: Safety - Adult  Goal: Free from fall injury  Outcome: Progressing  Flowsheets (Taken 2/1/2023 0900)  Free From Fall Injury: Instruct family/caregiver on patient safety     Problem: Skin/Tissue Integrity  Goal: Absence of new skin breakdown  Description: 1. Monitor for areas of redness and/or skin breakdown  2. Assess vascular access sites hourly  3. Every 4-6 hours minimum:  Change oxygen saturation probe site  4. Every 4-6 hours:  If on nasal continuous positive airway pressure, respiratory therapy assess nares and determine need for appliance change or resting period.   Outcome: Progressing     Problem: Chronic Conditions and Co-morbidities  Goal: Patient's chronic conditions and co-morbidity symptoms are monitored and maintained or improved  Outcome: Progressing  Flowsheets (Taken 2/1/2023 0830)  Care Plan - Patient's Chronic Conditions and Co-Morbidity Symptoms are Monitored and Maintained or Improved:   Monitor and assess patient's chronic conditions and comorbid symptoms for stability, deterioration, or improvement   Collaborate with multidisciplinary team to address chronic and comorbid conditions and prevent exacerbation or deterioration   Update acute care plan with appropriate goals if chronic or comorbid symptoms are exacerbated and prevent overall improvement and discharge

## 2023-02-02 NOTE — PLAN OF CARE
Problem: Discharge Planning  Goal: Discharge to home or other facility with appropriate resources  2/2/2023 0029 by Carroll Vee RN  Outcome: Completed     Problem: Pain  Goal: Verbalizes/displays adequate comfort level or baseline comfort level  2/2/2023 0029 by Carroll Vee RN  Outcome: Completed     Problem: ABCDS Injury Assessment  Goal: Absence of physical injury  2/2/2023 0029 by Carroll Vee RN  Outcome: Completed     Problem: Safety - Adult  Goal: Free from fall injury  2/2/2023 0029 by Carroll Vee RN  Outcome: Completed     Problem: Skin/Tissue Integrity  Goal: Absence of new skin breakdown  Description: 1. Monitor for areas of redness and/or skin breakdown  2. Assess vascular access sites hourly  3. Every 4-6 hours minimum:  Change oxygen saturation probe site  4. Every 4-6 hours:  If on nasal continuous positive airway pressure, respiratory therapy assess nares and determine need for appliance change or resting period.   2/2/2023 0029 by Carroll Vee RN  Outcome: Completed     Problem: Chronic Conditions and Co-morbidities  Goal: Patient's chronic conditions and co-morbidity symptoms are monitored and maintained or improved  2/2/2023 0029 by Carroll Vee RN  Outcome: Completed

## 2023-02-03 LAB
ABSOLUTE EOS #: 0.08 K/UL (ref 0–0.44)
ABSOLUTE IMMATURE GRANULOCYTE: 0.02 K/UL (ref 0–0.3)
ABSOLUTE LYMPH #: 1.45 K/UL (ref 1.1–3.7)
ABSOLUTE MONO #: 0.92 K/UL (ref 0.1–1.2)
ANION GAP SERPL CALCULATED.3IONS-SCNC: 13 MMOL/L (ref 9–17)
BASOPHILS # BLD: 0 % (ref 0–2)
BASOPHILS ABSOLUTE: <0.03 K/UL (ref 0–0.2)
BUN SERPL-MCNC: 40 MG/DL (ref 8–23)
BUN/CREAT BLD: 7 (ref 9–20)
CALCIUM SERPL-MCNC: 9.2 MG/DL (ref 8.6–10.4)
CHLORIDE SERPL-SCNC: 96 MMOL/L (ref 98–107)
CO2 SERPL-SCNC: 25 MMOL/L (ref 20–31)
CREAT SERPL-MCNC: 6.05 MG/DL (ref 0.7–1.2)
EKG ATRIAL RATE: 111 BPM
EKG Q-T INTERVAL: 384 MS
EKG QRS DURATION: 92 MS
EKG QTC CALCULATION (BAZETT): 446 MS
EKG R AXIS: 150 DEGREES
EKG T AXIS: -92 DEGREES
EKG VENTRICULAR RATE: 81 BPM
EOSINOPHILS RELATIVE PERCENT: 1 % (ref 1–4)
GFR SERPL CREATININE-BSD FRML MDRD: 10 ML/MIN/1.73M2
GLUCOSE BLD-MCNC: 219 MG/DL (ref 75–110)
GLUCOSE SERPL-MCNC: 168 MG/DL (ref 70–99)
HCT VFR BLD AUTO: 36.1 % (ref 40.7–50.3)
HGB BLD-MCNC: 11.5 G/DL (ref 13–17)
IMMATURE GRANULOCYTES: 0 %
LYMPHOCYTES # BLD: 25 % (ref 24–43)
MCH RBC QN AUTO: 23.4 PG (ref 25.2–33.5)
MCHC RBC AUTO-ENTMCNC: 31.9 G/DL (ref 28.4–34.8)
MCV RBC AUTO: 73.5 FL (ref 82.6–102.9)
MONOCYTES # BLD: 16 % (ref 3–12)
NRBC AUTOMATED: 0 PER 100 WBC
PDW BLD-RTO: 19.4 % (ref 11.8–14.4)
PLATELET # BLD AUTO: 190 K/UL (ref 138–453)
PMV BLD AUTO: ABNORMAL FL (ref 8.1–13.5)
POTASSIUM SERPL-SCNC: 4.2 MMOL/L (ref 3.7–5.3)
RBC # BLD: 4.91 M/UL (ref 4.21–5.77)
RBC # BLD: ABNORMAL 10*6/UL
SEG NEUTROPHILS: 57 % (ref 36–65)
SEGMENTED NEUTROPHILS ABSOLUTE COUNT: 3.3 K/UL (ref 1.5–8.1)
SODIUM SERPL-SCNC: 134 MMOL/L (ref 135–144)
WBC # BLD AUTO: 5.8 K/UL (ref 3.5–11.3)

## 2023-02-03 PROCEDURE — 2500000003 HC RX 250 WO HCPCS: Performed by: INTERNAL MEDICINE

## 2023-02-03 PROCEDURE — 80048 BASIC METABOLIC PNL TOTAL CA: CPT

## 2023-02-03 PROCEDURE — 82947 ASSAY GLUCOSE BLOOD QUANT: CPT

## 2023-02-03 PROCEDURE — 36415 COLL VENOUS BLD VENIPUNCTURE: CPT

## 2023-02-03 PROCEDURE — 1200000000 HC SEMI PRIVATE

## 2023-02-03 PROCEDURE — 99223 1ST HOSP IP/OBS HIGH 75: CPT | Performed by: FAMILY MEDICINE

## 2023-02-03 PROCEDURE — 90935 HEMODIALYSIS ONE EVALUATION: CPT

## 2023-02-03 PROCEDURE — 5A1D70Z PERFORMANCE OF URINARY FILTRATION, INTERMITTENT, LESS THAN 6 HOURS PER DAY: ICD-10-PCS | Performed by: INTERNAL MEDICINE

## 2023-02-03 PROCEDURE — 85025 COMPLETE CBC W/AUTO DIFF WBC: CPT

## 2023-02-03 PROCEDURE — 93010 ELECTROCARDIOGRAM REPORT: CPT | Performed by: INTERNAL MEDICINE

## 2023-02-03 PROCEDURE — 6370000000 HC RX 637 (ALT 250 FOR IP): Performed by: FAMILY MEDICINE

## 2023-02-03 RX ORDER — SODIUM CITRATE 4 % (5 ML)
1.8 SYRINGE (ML) MISCELLANEOUS PRN
Status: DISCONTINUED | OUTPATIENT
Start: 2023-02-03 | End: 2023-02-07 | Stop reason: HOSPADM

## 2023-02-03 RX ORDER — POLYETHYLENE GLYCOL 3350 17 G/17G
17 POWDER, FOR SOLUTION ORAL DAILY
Status: DISCONTINUED | OUTPATIENT
Start: 2023-02-03 | End: 2023-02-07 | Stop reason: HOSPADM

## 2023-02-03 RX ORDER — CLONIDINE HYDROCHLORIDE 0.3 MG/1
0.3 TABLET ORAL 2 TIMES DAILY
Status: CANCELLED | OUTPATIENT
Start: 2023-02-03

## 2023-02-03 RX ORDER — SODIUM CITRATE 4 % (5 ML)
1.7 SYRINGE (ML) MISCELLANEOUS PRN
Status: DISCONTINUED | OUTPATIENT
Start: 2023-02-03 | End: 2023-02-07 | Stop reason: HOSPADM

## 2023-02-03 RX ORDER — DOCUSATE SODIUM 100 MG/1
100 CAPSULE, LIQUID FILLED ORAL 2 TIMES DAILY
Status: DISCONTINUED | OUTPATIENT
Start: 2023-02-03 | End: 2023-02-07 | Stop reason: HOSPADM

## 2023-02-03 RX ORDER — AMLODIPINE BESYLATE AND ATORVASTATIN CALCIUM 10; 80 MG/1; MG/1
1 TABLET, FILM COATED ORAL DAILY
Status: CANCELLED | OUTPATIENT
Start: 2023-02-03

## 2023-02-03 RX ORDER — TRAZODONE HYDROCHLORIDE 50 MG/1
50 TABLET ORAL NIGHTLY
Status: CANCELLED | OUTPATIENT
Start: 2023-02-03

## 2023-02-03 RX ORDER — ZOLPIDEM TARTRATE 5 MG/1
5 TABLET ORAL NIGHTLY PRN
Status: DISCONTINUED | OUTPATIENT
Start: 2023-02-03 | End: 2023-02-07 | Stop reason: HOSPADM

## 2023-02-03 RX ORDER — LISINOPRIL 20 MG/1
20 TABLET ORAL 2 TIMES DAILY
Status: CANCELLED | OUTPATIENT
Start: 2023-02-03

## 2023-02-03 RX ORDER — OXYCODONE HYDROCHLORIDE AND ACETAMINOPHEN 5; 325 MG/1; MG/1
1 TABLET ORAL EVERY 8 HOURS PRN
Status: DISCONTINUED | OUTPATIENT
Start: 2023-02-03 | End: 2023-02-05

## 2023-02-03 RX ORDER — MINOXIDIL 2.5 MG/1
5 TABLET ORAL 2 TIMES DAILY
Status: CANCELLED | OUTPATIENT
Start: 2023-02-03

## 2023-02-03 RX ORDER — ALOGLIPTIN 6.25 MG/1
6.25 TABLET, FILM COATED ORAL DAILY
Status: CANCELLED | OUTPATIENT
Start: 2023-02-03

## 2023-02-03 RX ADMIN — OXYCODONE AND ACETAMINOPHEN 1 TABLET: 5; 325 TABLET ORAL at 18:20

## 2023-02-03 RX ADMIN — ZOLPIDEM TARTRATE 5 MG: 5 TABLET ORAL at 01:44

## 2023-02-03 RX ADMIN — PREGABALIN 75 MG: 75 CAPSULE ORAL at 09:33

## 2023-02-03 RX ADMIN — Medication 1.7 ML: at 16:30

## 2023-02-03 RX ADMIN — APIXABAN 5 MG: 5 TABLET, FILM COATED ORAL at 20:14

## 2023-02-03 RX ADMIN — ZOLPIDEM TARTRATE 5 MG: 5 TABLET ORAL at 23:32

## 2023-02-03 RX ADMIN — APIXABAN 5 MG: 5 TABLET, FILM COATED ORAL at 09:34

## 2023-02-03 RX ADMIN — Medication 1.8 ML: at 16:30

## 2023-02-03 RX ADMIN — LISINOPRIL 20 MG: 20 TABLET ORAL at 09:33

## 2023-02-03 RX ADMIN — LISINOPRIL 20 MG: 20 TABLET ORAL at 20:14

## 2023-02-03 RX ADMIN — DOCUSATE SODIUM 100 MG: 100 CAPSULE, LIQUID FILLED ORAL at 20:14

## 2023-02-03 RX ADMIN — POLYETHYLENE GLYCOL 3350 17 G: 17 POWDER, FOR SOLUTION ORAL at 20:14

## 2023-02-03 ASSESSMENT — PAIN SCALES - GENERAL: PAINLEVEL_OUTOF10: 9

## 2023-02-03 ASSESSMENT — PAIN DESCRIPTION - LOCATION: LOCATION: KNEE

## 2023-02-03 ASSESSMENT — PAIN DESCRIPTION - DESCRIPTORS: DESCRIPTORS: ACHING;TINGLING;THROBBING

## 2023-02-03 ASSESSMENT — PAIN DESCRIPTION - ORIENTATION: ORIENTATION: RIGHT;LEFT

## 2023-02-03 NOTE — PROGRESS NOTES
HEMODIALYSIS PRE-TREATMENT NOTE    Patient Identifiers prior to treatment: Name, , MRN    Isolation Required:  Yes                  Isolation Type: Contact Isolation    Hepatitis status:                           Date Drawn                             Result  Hepatitis B Surface Antigen 22     Negative                    Hepatitis B Surface Antibody 22 70 (patient immune)        Hepatitis B Core Antibody            How was Hepatitis Status verified: Results found in [facility] EMR and in \"care everywhere\" [facility] outpatient labs in [facility] EMR     Hemodialysis orders verified: Yes    Access Within normal limits : Yes    Pre-Assessment completed: Yes    Pre-dialysis report received from: [employer], RN            Time: 11:20

## 2023-02-03 NOTE — PLAN OF CARE
Problem: Safety - Adult  Goal: Free from fall injury  Outcome: Not Progressing  Flowsheets (Taken 2/3/2023 1131)  Free From Fall Injury: Instruct family/caregiver on patient safety  Note: Patient forgets to use call light        Problem: Discharge Planning  Goal: Discharge to home or other facility with appropriate resources  Outcome: Progressing  Flowsheets (Taken 2/3/2023 0940)  Discharge to home or other facility with appropriate resources:   Identify barriers to discharge with patient and caregiver   Arrange for needed discharge resources and transportation as appropriate   Identify discharge learning needs (meds, wound care, etc)   Arrange for interpreters to assist at discharge as needed   Refer to discharge planning if patient needs post-hospital services based on physician order or complex needs related to functional status, cognitive ability or social support system     Problem: ABCDS Injury Assessment  Goal: Absence of physical injury  Outcome: Progressing  Flowsheets (Taken 2/2/2023 2110 by Jameson Sarmiento RN)  Absence of Physical Injury: Implement safety measures based on patient assessment     Problem: Skin/Tissue Integrity  Goal: Absence of new skin breakdown  Description: 1. Monitor for areas of redness and/or skin breakdown  2. Assess vascular access sites hourly  3. Every 4-6 hours minimum:  Change oxygen saturation probe site  4. Every 4-6 hours:  If on nasal continuous positive airway pressure, respiratory therapy assess nares and determine need for appliance change or resting period.   2/3/2023 1131 by Kasi Pete RN  Outcome: Progressing  Note: No new break down noted on exam  2/2/2023 2257 by Jameson Sarmiento RN  Outcome: Progressing     Problem: Chronic Conditions and Co-morbidities  Goal: Patient's chronic conditions and co-morbidity symptoms are monitored and maintained or improved  Outcome: Progressing  Flowsheets (Taken 2/3/2023 0940)  Care Plan - Patient's Chronic Conditions and Co-Morbidity Symptoms are Monitored and Maintained or Improved:   Monitor and assess patient's chronic conditions and comorbid symptoms for stability, deterioration, or improvement   Collaborate with multidisciplinary team to address chronic and comorbid conditions and prevent exacerbation or deterioration   Update acute care plan with appropriate goals if chronic or comorbid symptoms are exacerbated and prevent overall improvement and discharge

## 2023-02-03 NOTE — ED NOTES
ED to inpatient nurses report     Chief Complaint   Patient presents with    Leg Swelling     Recent admission, discharged last night. Pt noticed swelling last night in the hospital.       Present to ED from home complaining of BLE swelling that is worse on the left side than the right. Bilateral BKA, swelling on left worsened and unable to wear his prosthesis. Patient dialysis patient. States that he is currently unable to take care of himself at home. LOC: alert and orientated to name, place, date  Vital signs   Vitals:    02/02/23 1540   BP: (!) 141/81   Pulse: 76   Resp: 15   Temp: 98.1 °F (36.7 °C)   SpO2: 100%   Weight: 220 lb (99.8 kg)   Height: 5' 11\" (1.803 m)      Oxygen Baseline room air. Current needs required none. LDAs:   Peripheral IV 02/02/23 Left Forearm (Active)   Site Assessment Clean, dry & intact 02/02/23 1908   Line Status Brisk blood return;Blood return noted 02/02/23 [address] changed 02/02/23 1908   Phlebitis Assessment No symptoms 02/02/23 1908   Infiltration Assessment 0 02/02/23 1908   Dressing Status New dressing applied 02/02/23 1908     Mobility: Requires assistance * 2  Fall Risk: [place] 1 Fall Risk  Presents to emergency department  because of falls (Syncope, seizure, or loss of consciousness): No (02/02/23 1543)  Age > 79: No (02/02/23 1543)  Altered Mental Status, Intoxication with alcohol or substance confusion (Disorientation, impaired judgment, poor safety awaremess, or inability to follow instructions): No (02/02/23 1543)  Impaired Mobility: Ambulates or transfers with assistive devices or assistance; Unable to ambulate or transer.: Yes (02/02/23 1543)  Nursing Judgement: Yes (02/02/23 1543)  Pending ED orders: none. Present condition: stable  Code Status: full.    Consults: IP CONSULT TO INTERNAL MEDICINE  [x]  Hospitalist    Completed  [x] yes [] no Who: Dr Pal Benito   []  Medicine  Completed  [] yes [] No Who:   []  Cardiology  Completed  [] yes [] No Who:   []  GI   Completed  [] yes [] No Who:   []  Neurology  Completed  [] yes [] No Who:   []  Nephrology Completed  [] yes [] No Who:    []  Vascular  Completed  [] yes [] No Who:   []  Ortho  Completed  [] yes [] No Who:     []  Surgery  Completed  [] yes [] No Who:    []  Urology  Completed  [] yes [] No Who:    []  CT Surgery Completed  [] yes [] No Who:   []  Podiatry  Completed  [] yes [] No Who:    []  Other    Completed  [] yes [] No Who:  Interventions: admit for placement.    Important Events:        Electronically signed by Alisson Bledsoe RN on 2/2/2023 at 7:28 PM     Alisson Bledsoe RN  02/02/23 8049

## 2023-02-03 NOTE — H&P
Ambulatory difficulties within prosthetics bilateral lower extremity  CKD hemodialysis dependent  Stump pain  History of present illness  27-year-old -American male with underlying history significant for bilateral below-knee amputee. Patient states that it is difficult for him to ambulate in his prosthetics. However he denies any swelling or skin breakdown. Patient is known to have a chronic pain syndrome. Patient states that he lives alone. His son states that he is unable to take care of him  He was recently discharged from the hospital with intractable hypoglycemia with suspected unintentional overdose on hypoglycemic. He was reluctant to SNF however currently he wished to proceed. He is euglycemic. Currently he is going through dialysis and does not voice any distress. Past medical history  Diabetes mellitus  Hypertension  Hyperlipidemia  Significant visual impairment  Chronic pain syndrome  ESRD hemodialysis dependent  Anemia of chronic disease  Secondary hyperparathyroidism  Past surgical history  AV fistula  Bilateral lower extremity amputation  Medications per list reviewed  Allergies per list reviewed  Social history patient lives alone. He denies tobacco, excessive alcohol or illicit drug use  Review of system all 12 systems reviewed per  History of present illness  Vitals  Afebrile and hemodynamically stable  Systemic exam  HEENT unremarkable  Neck unremarkable  Lungs bilateral CTA  A. fib rate controlled  Abdomen soft discomfort benign to palpation normoactive bowel sound  CNS no acute focal sensorimotor deficit  Lower extremities. Bilateral below-knee amputee. Stumps are unremarkable. No inflammatory changes.   No skin breakdown  Labs     Latest Reference Range & Units 2/2/23 16:27 2/3/23 10:46   Sodium 135 - 144 mmol/L 134 (L) 134 (L)   Potassium 3.7 - 5.3 mmol/L 4.0 4.2   Chloride 98 - 107 mmol/L 97 (L) 96 (L)   CO2 20 - 31 mmol/L 24 25   BUN,BUNPL 8 - 23 mg/dL 33 (H) 40 (H) Creatinine 0.70 - 1.20 mg/dL 5.32 (HH) 6.05 (HH)   Bun/Cre Ratio 9 - 20  6 (L) 7 (L)   Anion Gap 9 - 17 mmol/L 13 13   Est, Glom Filt Rate >60 mL/min/1.73m2 11 (L) 10 (L)   Magnesium 1.6 - 2.6 mg/dL 2.1    Glucose, Random 70 - 99 mg/dL 137 (H) 168 (H)   CALCIUM, SERUM, [id number] 8.6 - 10.4 mg/dL 9.1 9.2   Phosphorus 2.5 - 4.5 mg/dL 3.9    Pro-BNP <300 pg/mL 40,719 (H)    Troponin, High Sensitivity 0 - 22 ng/L 499 (HH)    (HH): Data is critically high  (L): Data is abnormally low  (H): Data is abnormally high   Latest Reference Range & Units 2/2/23 16:27 2/3/23 10:46   WBC 3.5 - 11.3 k/uL 6.8 5.8   RBC 4.21 - 5.77 m/uL 5.14 4.91   Hemoglobin Quant 13.0 - 17.0 g/dL 12.1 (L) 11.5 (L)   Hematocrit 40.7 - 50.3 % 38.1 (L) 36.1 (L)   MCV 82.6 - 102.9 fL 74.1 (L) 73.5 (L)   MCH 25.2 - 33.5 pg 23.5 (L) 23.4 (L)   MCHC 28.4 - 34.8 g/dL 31.8 31.9   MPV 8.1 - 13.5 fL Abnormal Abnormal   RDW 11.8 - 14.4 % 19.7 (H) 19.4 (H)   Platelet Count 259 - 453 k/uL 166 190   Absolute Mono # 0.10 - 1.20 k/uL 1.08 0.92   Eosinophils % 1 - 4 % 1 1   Basophils Absolute 0.00 - 0.20 k/uL <0.03 <0.03   Seg Neutrophils 36 - 65 % 62 57   Segs Absolute 1.50 - 8.10 k/uL 4.24 3.30   Lymphocytes 24 - 43 % 21 (L) 25   Absolute Lymph # 1.10 - 3.70 k/uL 1.43 1.45   Monocytes 3 - 12 % 16 (H) 16 (H)   Absolute Eos # 0.00 - 0.44 k/uL 0.05 0.08   Basophils 0 - 2 % 0 0   Immature Granulocytes 0 % 0 0   RBC Morphology  ANISOCYTOSIS PRESENT ANISOCYTOSIS PRESENT   Absolute Immature Granulocyte 0.00 - 0.30 k/uL 0.02 0.02   NRBC Automated 0.0 per 100 WBC 0.0 0.0   Prothrombin Time 11.5 - 14.2 sec 16.3 (H)    INR  1.3    PTT 23.9 - 33.8 sec 37.9 (H)    (L): Data is abnormally low  (H): Data is abnormally high  Assessment and plan. 50-year-old bilateral below-knee amputee and prosthetic. Patient states that since prosthetics are painful\" he is worried about possibility of falls.   Patient states that he is not comfortable going home and his son does not have time to take care of him. We will have  on board. Continue pain control  Diabetes mellitus. He is euglycemic. Recent history of hypoglycemia that appears to be unintentional overdose. Patient is known to have a significant visual impairment. Patient states that he takes a snapshot of the labels on his cell phone and then visualize it with magnifying lens  Hemodynamically stable  Hyperlipidemia on statin  ESRD anemia of of chronic disease H&H is stable  History of A. fib on Eliquis  GI prophylaxis  Plan of care discussed with patient, nursing staff. Discharge planner on board pending pre-CERT for SNF  Plan of care discussed with patient. Nephrology on board  This note is created with a voice recognition program and while intend to generate a document that accurately reflects the content of the visit, no guarantee can be provided that every mistake has been identified and corrected by editing.

## 2023-02-03 NOTE — FLOWSHEET NOTE
02/03/23 1633   Vital Signs   BP (!) 159/87   Heart Rate 81   Resp 16   Post-Hemodialysis Assessment   Post-Treatment Procedures Blood returned;Catheter capped, clamped with Citrate x 2 ports   Machine Disinfection Process Acid/Vinegar Clean;Heat Disinfect; Exterior Machine Disinfection   Rinseback Volume (ml) 250 ml   Blood Volume Processed (Liters) 75.2 l/min   Dialyzer Clearance Moderately streaked   Duration of Treatment (minutes) 210 minutes   Heparin Amount Administered During Treatment (mL) 0 mL   Hemodialysis Intake (ml) 500 ml   Hemodialysis Output (ml) 4000 ml   NET Removed (ml) 3500   Tolerated Treatment Good   Patient Response to Treatment Patient completed 3.5 hour hemodialysis treatment, patient had a net fluid removal of 3500 ml, pt did not require any medications for BP support during treatment, patient's HD CVC maintained good blood flow throughout entire tx, HD CVC sodium citrate locked, post tx vitals stable, post tx report given to patient's primary nurse, MIKE Wolff.    Time Off 1630   Patient Disposition Return to room

## 2023-02-03 NOTE — DISCHARGE INSTR - COC
Continuity of Care Form    Patient Name: Gallo Simmons   :  1960  MRN:  [id number]    Admit date:  2023  Discharge date:  ***    Code Status Order: Prior   Advance Directives:     Admitting Physician:  Jovana Verde MD  PCP: Jovana Verde MD    Discharging Nurse: [facility] Unit/Room#:   Discharging Unit Phone Number: [phone]    Emergency Contact:   Extended Emergency Contact Information  Primary Emergency Contact: Melvin Murphy  Address: [place] [address] Phone: [phone]  Relation: Child  Secondary Emergency Contact: Renée Reyes [address] Phone: [phone]  Relation: Other    Past Surgical History:  Past Surgical History:   Procedure Laterality Date    ARM SURGERY      CATARACT REMOVAL WITH IMPLANT      DIALYSIS FISTULA CREATION Bilateral     As of , RUE AVF functioning, LUE AVF nonfunctioning.     FINGER AMPUTATION      right pointer finger    FINGER SURGERY Left     I & D    GLAUCOMA SURGERY      LEG AMPUTATION BELOW KNEE Bilateral     TUNNELED VENOUS CATHETER PLACEMENT      PC placed and removed       Immunization History:   Immunization History   Administered Date(s) Administered    COVID-19, MODERNA BLUE border, Primary or Immunocompromised, (age 12y+), IM, 100 mcg/0.5mL 03/10/2021, 2021    COVID-19, MODERNA Booster BLUE border, (age 18y+), IM, 50mcg/0.25mL 2021    Influenza Vaccine, unspecified formulation 10/08/2014, 10/02/2015    Influenza Virus Vaccine 10/02/2019, 2020, 10/01/2021, 2022    Pneumococcal Conjugate 13-valent ([id number]) 2015, 2018    Pneumococcal Polysaccharide ([id number]) 2015    Tdap (Boostrix, Adacel) 2020       Active Problems:  Patient Active Problem List   Diagnosis Code    Hyperlipidemia E78.5    Essential hypertension I10    ESRD on hemodialysis ([facility] [address] MWF N18.6, Z99.2    Insomnia G47.00    Chronic bilateral low back pain with bilateral sciatica M54.42, M54.41, G89.29    Controlled diabetes mellitus type 2 with complications ([facility] D43.6    S/P bilateral below knee amputation ([facility] Z89.512, Z89.511    Long term (current) use of antithrombotics/antiplatelets R65.21    Chronic use of opiate drugs therapeutic purposes Z79.891    Coronary artery disease involving native coronary artery I25.10    Anemia D64.9    Hypoglycemia E16.2    Inability to walk R26.2       Isolation/Infection:   Isolation            Contact          Patient Infection Status       Infection Onset Added Last Indicated Last Indicated By Review Planned Expiration Resolved Resolved By    MRSA  09/06/17 09/06/17 Andie Gee RN        Arm - 9/2017    Resolved    COVID-19 (Rule Out) 11/13/22 11/13/22 11/13/22 COVID-19, Rapid (Ordered)   11/13/22 Rule-Out Test Resulted    COVID-19 (Rule Out) 09/02/22 09/02/22 09/02/22 COVID-19, Rapid (Ordered)   09/02/22 Rule-Out Test Resulted            Nurse Assessment:  Last Vital Signs: BP (!) 148/85   Pulse 57   Temp 98.6 °F (37 °C) (Oral)   Resp 17   Ht 5' 11\" (1.803 m)   Wt 217 lb 12.8 oz (98.8 kg)   SpO2 96%   BMI 30.38 kg/m²     Last documented pain score (0-10 scale):    Last Weight:   Wt Readings from Last 1 Encounters:   02/03/23 217 lb 12.8 oz (98.8 kg)     Mental Status:  oriented and alert    IV Access:  - Dialysis Catheter  - site  left and subclavian, insertion date: ***    Nursing Mobility/ADLs:  Walking   Dependent  Transfer  Assisted  Bathing  Assisted  Dressing  Assisted  Toileting  Assisted  Feeding  Assisted  Med Admin  Assisted  Med Delivery   whole    Wound Care Documentation and Therapy:  Incision 09/05/13 Arm Left (Active)   Number of days: 3438       Incision 10/29/13 Arm Left (Active)   Number of days: 3384       Incision 02/18/14 Leg Lower;Right (Active)   Number of days: 3271       Incision 09/18/22 Buttocks Right (Active)   Number of days: 137        Elimination:  Continence:    Bowel: Yes  Bladder: Yes  Urinary Catheter: None   Colostomy/Ileostomy/Ileal Conduit: No       Date of Last BM: ***    Intake/Output Summary (Last 24 hours) at 2/3/2023 1252  Last data filed at 2/3/2023 1128  Gross per 24 hour   Intake 980 ml   Output --   Net 980 ml     No intake/output data recorded. Safety Concerns: At Risk for Falls    Impairments/Disabilities:      Amputation - Bilat BKA    Nutrition Therapy:  Current Nutrition Therapy:   - Oral Diet:  Carb Control 4 carbs/meal (1800kcals/day)    Routes of Feeding: Oral  Liquids: No Restrictions  Daily Fluid Restriction: yes - amount 1500  Last Modified Barium Swallow with Video (Video Swallowing Test): not done    Treatments at the Time of Hospital Discharge:   Respiratory Treatments: ***  Oxygen Therapy:  is not on home oxygen therapy.   Ventilator:    - No ventilator support    Rehab Therapies: Physical Therapy and Occupational Therapy  Weight Bearing Status/Restrictions: No weight bearing restrictions  Other Medical Equipment (for information only, NOT a DME order):  crutches and prosthetics  Other Treatments: nursing assessments, med compliance    Patient's personal belongings (please select all that are sent with patient):  None    RN SIGNATURE:  Electronically signed by Agatha Parry RN on 2/6/23 at 5:01 PM EST    CASE MANAGEMENT/SOCIAL WORK SECTION    Inpatient Status Date: ***    Readmission Risk Assessment Score:  Readmission Risk              Risk of Unplanned Readmission:  27           Discharging to Facility/ Agency   Name: Name: [facility]  Address: [address] [place] [address]  Phone: [phone]  Fax:  [phone]  Address:  Phone:  Fax:    Dialysis Facility (if applicable)   Name:[facility]  Address:  Dialysis Schedule:M/W/F 10:30 AM -3PM  ZHJYU:[phone]  Fax:[phone]    / signature: Electronically signed by ALYSON Tobar on 2/3/23 at 12:53 PM EST    PHYSICIAN SECTION    Prognosis: Fair    Condition at Discharge: Stable    Rehab Potential (if transferring to Rehab): Fair    Recommended Labs or Other Treatments After Discharge:     Physician Certification: I certify the above information and transfer of Ysabel Gibbs  is necessary for the continuing treatment of the diagnosis listed and that he requires [facility] for less 30 days.      Update Admission H&P: No change in H&P    PHYSICIAN SIGNATURE:  Electronically signed by Ghada John MD on 2/3/23 at 1:39 PM EST

## 2023-02-03 NOTE — CARE COORDINATION
Case Management Initial Discharge Plan  Tyrant TOSHA Jeffrey,         Readmission Risk              Risk of Unplanned Readmission:  27             Met with:patient to discuss discharge plans. Information verified: address, contacts, phone number, , insurance Yes  PCP: Nitin Luis MD  Date of last visit:  St. Provider: Harinder De La O     Discharge Planning  Current Residence:     Living Arrangements:  [address] has one stories/4 stairs to climb  Support Systems:  Children  Current Services PTA:    Supplier: [facility]  Patient able to perform ADL's:Assisted  DME used to aid ambulation prior to admission: crutches. wheelchair/during admissionTBD    Potential Assistance Needed:  [address] [facility]: [facility]   Potential Assistance Purchasing Medications:  Yes  Does patient want to participate in local refill/ meds to beds program?  No    Patient agreeable to home care: No  [place] of choice provided:  n/a      Type of Home Care Services:  PT, OT, Nursing Services, Skilled Therapy, [address]  Patient expects to be discharged to:       Prior SNF/Rehab Placement and Facility: Yes, can't remember where  Agreeable to SNF/Rehab: Yes  [place] of choice provided: yes   Evaluation: yes    Expected Discharge date: Follow Up Appointment: Best Day/ Time:      Transportation provider: [facility]  Transportation arrangements needed for discharge: Yes    Discharge Plan: Met with pt to discuss dc options. Pt lives alone in one level home. He is [facility] with [facility]. He receives dialysis M/W/F at [facility]. He has crutches,cane, wheelchair. Discussed plan at dc. He would like rehab. Offered choice of [facility]. [facility] is first choice.  Sent referral. [facility]        Electronically signed by ALYSON Bolden on 2/3/23 at 9:46 AM EST

## 2023-02-03 NOTE — CARE COORDINATION
[facility] AND [facility] approved pt for admission and began precert.  Daniela Porter

## 2023-02-03 NOTE — CONSULTS
Renal Consult Note    Patient :  Linda Martinez; 60 y.o. MRN# [id number]  Location:  2001/2001-02  Attending:  Edward Abernathy MD  Admit Date:  2/2/2023   Hospital Day: 1    Reason for Consult:     Asked by Dr Edward Abernathy MD to see for EVA/Elevated Creatinine. History Obtained From:     patient    HD Access:     previous permacath    HD Unit:     [facility]    Nephrologist:     Dr Melinda Veloz    Dry Weight:     98    Admission Weight:     98.8    History of Present Illness:     Linda Martinez; 60 y.o. male with past medical history as mentioned below presented to the hospital with the chief complaint of swelling in both lower extremities left more than right. Was unable to put his prosthesis on. Known history of ESRD on hemodialysis Monday Wednesday Friday at [facility] unit via tunnel catheter under Dr. Melinda Veloz. Known history of type 2 diabetes, peripheral vascular disease, status post bilateral below-knee amputations, atherosclerotic heart disease history of stent placement, ischemic cardiomyopathy ejection fraction of 45%. He is also had amputation of his right hand index finger distal phalanx likely from steal syndrome. He was recently in hospital for an episode of hypoglycemia with blood sugar was in the 30s. It was recommended that he go to an ECF facility however he declined and went home. Subsequently tried putting his prosthesis on and could not get it on his left leg. He felt that his leg was more swollen. He then came into the hospital as he could not ambulate and is requesting ECF placement now. His last dialysis was on Wednesday here and his weight was down to 95 kg which is about 3 kg lower than his dry weight. He denies any noncompliance. No shortness of breath orthopnea. Blood sugars well controlled at present.   Labs yesterday showed a sodium of 134 potassium 4.2 chloride 96 bicarb 25, glucose 168, hemoglobin 11.5 white count 5.8  Chest x-ray reviewed showed bibasilar opacities with some vascular congestion  Past History/Allergies? Social History:     Past Medical History:   Diagnosis Date    Acute congestive heart failure (HCC)     Chronic bilateral low back pain with bilateral sciatica     Coronary artery disease involving native coronary artery 9/6/2022    CRF (chronic renal failure)     [facility] MWF    DDD (degenerative disc disease), lumbar 12/15/2020    Diabetes mellitus ([facility]     Dialysis patient [facility])     ED (erectile dysfunction)     History of echocardiogram 2014    [facility]    HTN (hypertension)     Hyperlipidemia     LVH (left ventricular hypertrophy)     PVD (peripheral vascular disease) ([facility])     S/P bilateral BKA (below knee amputation) ([facility])     Wears glasses        No Known Allergies    Social History     Socioeconomic History    Marital status:      Spouse name: Not on file    Number of children: Not on file    Years of education: Not on file    Highest education level: Not on file   Occupational History    Not on file   Tobacco Use    Smoking status: Never    Smokeless tobacco: Never   Vaping Use    Vaping Use: Never used   Substance and Sexual Activity    Alcohol use: Yes     Comment: rare    Drug use: No    Sexual activity: Not on file   Other Topics Concern    Not on file   Social History Narrative    Not on file     Social Determinants of Health     Financial Resource Strain: Low Risk     Difficulty of Paying Living Expenses: Not hard at all   Food Insecurity: No Food Insecurity    Worried About Running Out of Food in the Last Year: Never true    Ran Out of Food in the Last Year: Never true   Transportation Needs: No Transportation Needs    Lack of Transportation (Medical): No    Lack of Transportation (Non-Medical): No   Physical Activity: Inactive    Days of Exercise per Week: 0 days    Minutes of Exercise per Session: 0 min   Stress: No Stress Concern Present    Feeling of Stress : Not at all   Social Connections:  Moderately Integrated    Frequency of Communication with Friends and Family: More than three times a week    Frequency of Social Gatherings with Friends and Family: More than three times a week    Attends [employer] Services: More than 4 times per year    Active Member of [address] [place] [address] [employer] or Organizations: Yes    Attends Club or Organization Meetings: Never    Marital Status:    Intimate Partner Violence: Not At Risk    Fear of Current or Ex-Partner: No    Emotionally Abused: No    Physically Abused: No    Sexually Abused: No   Housing Stability: Unknown    Unable to Pay for Housing in the Last Year: No    Number of Places Lived in the Last Year: Not on file    Unstable Housing in the Last Year: No       Family History:        Family History   Problem Relation Age of Onset    Diabetes Mother     Coronary Art Dis Mother     Hypertension Mother     Diabetes Father     Hypertension Father     Stroke Father     Cancer Sister     Hypertension Brother        Outpatient Medications:     Medications Prior to Admission: traZODone (DESYREL) 50 MG tablet, TAKE ONE TABLET BY MOUTH ONCE NIGHTLY AS NEEDED FOR SLEEP  acetaminophen (TYLENOL) 500 MG tablet, Take 500 mg by mouth every 6 hours as needed for Pain  sodium zirconium cyclosilicate (LOKELMA) 5 g PACK oral suspension, Take 5 g by mouth every other day Indications: Don't take  MWF (Diaylsis days)  aspirin 81 MG EC tablet, Take 81 mg by mouth daily  linagliptin (TRADJENTA) 5 MG tablet, TAKE ONE TABLET BY MOUTH DAILY  minoxidil (LONITEN) 2.5 MG tablet, Take 5 mg by mouth 2 times daily  pregabalin (LYRICA) 75 MG capsule, Take 1 capsule by mouth daily for 30 days.   amLODIPine-atorvastatatin (CADUET) 10-80 MG per tablet, Take 1 tablet by mouth daily (Patient not taking: Reported on 2/2/2023)  lisinopril (PRINIVIL;ZESTRIL) 20 MG tablet, Take 1 tablet by mouth in the morning and at bedtime  cloNIDine (CATAPRES) 0.3 MG tablet, Take 1 tablet by mouth 2 times daily Do not take if HR < 60  Sucroferric Oxyhydroxide (VELPHORO) 500 MG CHEW, Take 1,000 mg by mouth 3 times daily  apixaban (ELIQUIS) 5 MG TABS tablet, Take 1 tablet by mouth 2 times daily  Multiple Vitamins-Minerals (RENAPLEX-D PO), Take 1 tablet by mouth daily  (Patient not taking: Reported on 2023)    Current Medications:     Scheduled Meds:    lisinopril  20 mg Oral BID    pregabalin  75 mg Oral Daily    apixaban  5 mg Oral BID     Continuous Infusions:   PRN Meds:  zolpidem    Review of Systems:     Constitutional: No fever, no chills, no lethargy, no weakness. HEENT:  No headache, otalgia, itchy eyes, nasal discharge or sore throat. Cardiac:  No chest pain, dyspnea, orthopnea or PND. Chest:  No cough, phlegm or wheezing. Abdomen:  No abdominal pain, nausea or vomiting. Neuro:  No focal weakness, abnormal movements orseizure like activity. Skin:   No rashes, no itching. :   No hematuria, no pyuria, no dysuria, no flank pain. Extremities:  Bilateral below-knee amputations, 1+ edema bilaterally  ROS was otherwise negative except as mentioned in the [address] Input/Output:     No intake/output data recorded. Vital Signs:   Temperature:  Temp: 98.6 °F (37 °C)  TMax:   Temp (24hrs), Av.6 °F (37 °C), Min:98.1 °F (36.7 °C), Max:99 °F (37.2 °C)    Respirations:  Resp: 17  Pulse:   Heart Rate: 57  BP:    BP: (!) 148/85  BP Range: Systolic (96AHZ), WUX:238 , Min:131 , CRU:001       Diastolic (79SKT), MP, Min:68, Max:85      Physical Examination:     General:  AAO x 3, speaking in full sentences, no accessory muscle use. HEENT: Atraumatic, normocephalic, no throat congestion, moist mucosa. Eyes:   No ptosis, conjunctival normal bilaterally neck:   No JVD, no thyromegaly, no lymphadenopathy. Chest:  Bilateral vesicular breath sounds, no rales or wheezes. Cardiac:  S1 S2 RR, no murmurs, gallops or rubs, JVP not raised. Abdomen: Soft, non-tender, no masses or organomegaly, BS audible. :   No suprapubic or flank tenderness. Neuro:  AAO x 3, No FND.   SKIN:  No rashes, good skin turgor. Extremities:  Bilateral below-knee amputations, 1+ edema bilaterally  Labs:       Recent Labs     02/01/23  0649 02/02/23  1627 02/03/23  1046   WBC 5.6 6.8 5.8   RBC 4.72 5.14 4.91   HGB 10.9* 12.1* 11.5*   HCT 35.1* 38.1* 36.1*   MCV 74.4* 74.1* 73.5*   MCH 23.1* 23.5* 23.4*   MCHC 31.1 31.8 31.9   RDW 19.0* 19.7* 19.4*    166 190   MPV Abnormal Abnormal Abnormal      BMP:   Recent Labs     02/01/23  0649 02/02/23  1627 02/03/23  1046   * 134* 134*   K 4.6 4.0 4.2   CL 94* 97* 96*   CO2 25 24 25   BUN 32* 33* 40*   CREATININE 6.63* 5.32* 6.05*   GLUCOSE 253* 137* 168*   CALCIUM 9.0 9.1 9.2      Phosphorus:     Recent Labs     02/02/23  1627   PHOS 3.9     Magnesium:    Recent Labs     02/02/23  1627   MG 2.1     Albumin:    Recent Labs     02/01/23  0649   LABALBU 3.8     BNP:      Lab Results   Component Value Date/Time    BNP 3,088 06/13/2013 06:32 AM     PTH:   No results found for: IPTH  Blood cultures:  No results found for: [facility]    Urinalysis/Chemistries:      Lab Results   Component Value Date/Time    NITRU NEGATIVE 06/13/2013 10:30 AM    COLORU YELLOW 06/13/2013 10:30 AM    PHUR 7.5 06/13/2013 10:30 AM    WBCUA 0 TO 2 06/13/2013 10:30 AM    RBCUA 10 TO 20 06/13/2013 10:30 AM    MUCUS NOT REPORTED 06/13/2013 10:30 AM    TRICHOMONAS NOT REPORTED 06/13/2013 10:30 AM    YEAST NOT REPORTED 06/13/2013 10:30 AM    BACTERIA FEW 06/13/2013 10:30 AM    SPECGRAV 1.015 06/13/2013 10:30 AM    LEUKOCYTESUR NEGATIVE 06/13/2013 10:30 AM    UROBILINOGEN Normal 06/13/2013 10:30 AM    BILIRUBINUR NEGATIVE 06/13/2013 10:30 AM    GLUCOSEU 3+ 06/13/2013 10:30 AM    KETUA NEGATIVE 06/13/2013 10:30 AM    AMORPHOUS NOT REPORTED 06/13/2013 10:30 AM       Radiology:     CXR:     Assessment:     1. ESRD on Hemodialysis. His regular HD days are Monday Wednesday Friday at [facility] hemodialysis facility using tunnel catheter under Dr. Mona Delcid. His dry weight is 98 kg. Admitted with 98.8 kg  2.  Anemia of chronic disease, stable  3. Secondary hyperparathyroidism  4. Hypertension, stable blood pressure  5. Bilateral below-knee amputations  6. Bilateral lower extremity edema left more than right  7. Mild volume overload    Plan:   1. HD today as per schedule, aim for 3-4 L of fluid removal.  2. Strict Input and Output, Daily weigh and document in the chart. 3. Low Potassium, Low phosphorus and low salt diet. Fluids to be restricted to 1500ml/day. 4. IV Aranesp/Epogen for anemia of chronic disease with HD weekly. 5. IV Zemplar per protocol for secondary hyperparathyroidism with HD thrice a week. 6.  Stable for discharge from nephrology standpoint  7. Fluid restriction as written above     Nutrition   Please ensure that patient is on a renal diet/TF. Thank you for the consultation. Please do not hesitate to contact us for any further questions/concerns. We will continue to follow along with you.

## 2023-02-03 NOTE — PROGRESS NOTES
Pt requested sleep aid, notified Dr. Gale Enriquez, n.o. noted to start ambien 5 mg po nightly for sleep.

## 2023-02-03 NOTE — PLAN OF CARE
Problem: ABCDS Injury Assessment  Goal: Absence of physical injury  Recent Flowsheet Documentation  Taken 2/2/2023 2110 by Imani Rivas RN  Absence of Physical Injury: Implement safety measures based on patient assessment     Problem: Skin/Tissue Integrity  Goal: Absence of new skin breakdown  Description: 1. Monitor for areas of redness and/or skin breakdown  2. Assess vascular access sites hourly  3. Every 4-6 hours minimum:  Change oxygen saturation probe site  4. Every 4-6 hours:  If on nasal continuous positive airway pressure, respiratory therapy assess nares and determine need for appliance change or resting period.   Outcome: Progressing

## 2023-02-04 LAB
ALBUMIN SERPL-MCNC: 3.4 G/DL (ref 3.5–5.2)
ALP SERPL-CCNC: 146 U/L (ref 40–129)
ALT SERPL-CCNC: 33 U/L (ref 5–41)
ANION GAP SERPL CALCULATED.3IONS-SCNC: 13 MMOL/L (ref 9–17)
AST SERPL-CCNC: 23 U/L
BILIRUB SERPL-MCNC: 0.8 MG/DL (ref 0.3–1.2)
BUN SERPL-MCNC: 30 MG/DL (ref 8–23)
BUN/CREAT BLD: 5 (ref 9–20)
CALCIUM SERPL-MCNC: 9 MG/DL (ref 8.6–10.4)
CHLORIDE SERPL-SCNC: 95 MMOL/L (ref 98–107)
CO2 SERPL-SCNC: 24 MMOL/L (ref 20–31)
CREAT SERPL-MCNC: 5.77 MG/DL (ref 0.7–1.2)
GFR SERPL CREATININE-BSD FRML MDRD: 10 ML/MIN/1.73M2
GLUCOSE BLD-MCNC: 174 MG/DL (ref 75–110)
GLUCOSE BLD-MCNC: 182 MG/DL (ref 75–110)
GLUCOSE BLD-MCNC: 198 MG/DL (ref 75–110)
GLUCOSE BLD-MCNC: 241 MG/DL (ref 75–110)
GLUCOSE SERPL-MCNC: 223 MG/DL (ref 70–99)
HCT VFR BLD AUTO: 36.7 % (ref 40.7–50.3)
HGB BLD-MCNC: 11.5 G/DL (ref 13–17)
MCH RBC QN AUTO: 23.3 PG (ref 25.2–33.5)
MCHC RBC AUTO-ENTMCNC: 31.3 G/DL (ref 28.4–34.8)
MCV RBC AUTO: 74.4 FL (ref 82.6–102.9)
NRBC AUTOMATED: 0 PER 100 WBC
PDW BLD-RTO: 19.7 % (ref 11.8–14.4)
PLATELET # BLD AUTO: 209 K/UL (ref 138–453)
PMV BLD AUTO: 10 FL (ref 8.1–13.5)
POTASSIUM SERPL-SCNC: 4.2 MMOL/L (ref 3.7–5.3)
PROT SERPL-MCNC: 6.9 G/DL (ref 6.4–8.3)
RBC # BLD: 4.93 M/UL (ref 4.21–5.77)
SODIUM SERPL-SCNC: 132 MMOL/L (ref 135–144)
WBC # BLD AUTO: 6.8 K/UL (ref 3.5–11.3)

## 2023-02-04 PROCEDURE — 97162 PT EVAL MOD COMPLEX 30 MIN: CPT

## 2023-02-04 PROCEDURE — 6370000000 HC RX 637 (ALT 250 FOR IP): Performed by: INTERNAL MEDICINE

## 2023-02-04 PROCEDURE — 85027 COMPLETE CBC AUTOMATED: CPT

## 2023-02-04 PROCEDURE — 97530 THERAPEUTIC ACTIVITIES: CPT

## 2023-02-04 PROCEDURE — 97166 OT EVAL MOD COMPLEX 45 MIN: CPT

## 2023-02-04 PROCEDURE — 36415 COLL VENOUS BLD VENIPUNCTURE: CPT

## 2023-02-04 PROCEDURE — 80053 COMPREHEN METABOLIC PANEL: CPT

## 2023-02-04 PROCEDURE — 99233 SBSQ HOSP IP/OBS HIGH 50: CPT | Performed by: FAMILY MEDICINE

## 2023-02-04 PROCEDURE — 1200000000 HC SEMI PRIVATE

## 2023-02-04 PROCEDURE — 82947 ASSAY GLUCOSE BLOOD QUANT: CPT

## 2023-02-04 PROCEDURE — 6370000000 HC RX 637 (ALT 250 FOR IP): Performed by: FAMILY MEDICINE

## 2023-02-04 RX ORDER — AMLODIPINE BESYLATE 5 MG/1
5 TABLET ORAL DAILY
Status: DISCONTINUED | OUTPATIENT
Start: 2023-02-04 | End: 2023-02-07 | Stop reason: HOSPADM

## 2023-02-04 RX ADMIN — POLYETHYLENE GLYCOL, PROPYLENE GLYCOL 1 DROP: .4; .3 LIQUID OPHTHALMIC at 13:04

## 2023-02-04 RX ADMIN — AMLODIPINE BESYLATE 5 MG: 5 TABLET ORAL at 13:04

## 2023-02-04 RX ADMIN — POLYETHYLENE GLYCOL 3350 17 G: 17 POWDER, FOR SOLUTION ORAL at 09:28

## 2023-02-04 RX ADMIN — ZOLPIDEM TARTRATE 5 MG: 5 TABLET ORAL at 23:06

## 2023-02-04 RX ADMIN — APIXABAN 5 MG: 5 TABLET, FILM COATED ORAL at 09:28

## 2023-02-04 RX ADMIN — DOCUSATE SODIUM 100 MG: 100 CAPSULE, LIQUID FILLED ORAL at 20:36

## 2023-02-04 RX ADMIN — LISINOPRIL 20 MG: 20 TABLET ORAL at 20:36

## 2023-02-04 RX ADMIN — OXYCODONE AND ACETAMINOPHEN 1 TABLET: 5; 325 TABLET ORAL at 05:07

## 2023-02-04 RX ADMIN — DOCUSATE SODIUM 100 MG: 100 CAPSULE, LIQUID FILLED ORAL at 09:28

## 2023-02-04 RX ADMIN — APIXABAN 5 MG: 5 TABLET, FILM COATED ORAL at 20:36

## 2023-02-04 RX ADMIN — OXYCODONE AND ACETAMINOPHEN 1 TABLET: 5; 325 TABLET ORAL at 16:33

## 2023-02-04 RX ADMIN — PREGABALIN 75 MG: 75 CAPSULE ORAL at 09:28

## 2023-02-04 RX ADMIN — LISINOPRIL 20 MG: 20 TABLET ORAL at 09:28

## 2023-02-04 ASSESSMENT — PAIN SCALES - GENERAL
PAINLEVEL_OUTOF10: 7
PAINLEVEL_OUTOF10: 9

## 2023-02-04 ASSESSMENT — PAIN DESCRIPTION - DESCRIPTORS: DESCRIPTORS: ACHING;DISCOMFORT;TENDER;SHOOTING

## 2023-02-04 ASSESSMENT — PAIN DESCRIPTION - LOCATION: LOCATION: GENERALIZED

## 2023-02-04 NOTE — PLAN OF CARE
Problem: Discharge Planning  Goal: Discharge to home or other facility with appropriate resources  Outcome: Progressing  Flowsheets (Taken 2/4/2023 0720)  Discharge to home or other facility with appropriate resources:   Identify barriers to discharge with patient and caregiver   Arrange for needed discharge resources and transportation as appropriate   Identify discharge learning needs (meds, wound care, etc)   Arrange for interpreters to assist at discharge as needed   Refer to discharge planning if patient needs post-hospital services based on physician order or complex needs related to functional status, cognitive ability or social support system     Problem: Safety - Adult  Goal: Free from fall injury  2/4/2023 0802 by Coco Morales RN  Outcome: Progressing  Flowsheets (Taken 2/4/2023 0521 by Cyndie Rodas RN)  Free From Fall Injury: Instruct family/caregiver on patient safety  2/4/2023 0521 by Cyndie Rodas RN  Outcome: Progressing  Flowsheets (Taken 2/4/2023 0521)  Free From Fall Injury: Instruct family/caregiver on patient safety     Problem: ABCDS Injury Assessment  Goal: Absence of physical injury  2/4/2023 0802 by Coco Morales RN  Outcome: Progressing  Flowsheets (Taken 2/4/2023 0521 by Cyndie Rodas RN)  Absence of Physical Injury: Implement safety measures based on patient assessment  2/4/2023 0521 by Cyndie Rodas RN  Outcome: Progressing  Flowsheets (Taken 2/4/2023 0521)  Absence of Physical Injury: Implement safety measures based on patient assessment     Problem: Skin/Tissue Integrity  Goal: Absence of new skin breakdown  Description: 1. Monitor for areas of redness and/or skin breakdown  2. Assess vascular access sites hourly  3. Every 4-6 hours minimum:  Change oxygen saturation probe site  4.   Every 4-6 hours:  If on nasal continuous positive airway pressure, respiratory therapy assess nares and determine need for appliance change or resting period.   2/4/2023 0802 by Blaine Elmore RN  Outcome: Progressing  Note: No new issues noted   2/4/2023 0521 by Bia Shah RN  Outcome: Progressing     Problem: Chronic Conditions and Co-morbidities  Goal: Patient's chronic conditions and co-morbidity symptoms are monitored and maintained or improved  2/4/2023 0802 by Blaine Elmore RN  Outcome: Progressing  Flowsheets (Taken 2/4/2023 0720)  Care Plan - Patient's Chronic Conditions and Co-Morbidity Symptoms are Monitored and Maintained or Improved:   Monitor and assess patient's chronic conditions and comorbid symptoms for stability, deterioration, or improvement   Collaborate with multidisciplinary team to address chronic and comorbid conditions and prevent exacerbation or deterioration   Update acute care plan with appropriate goals if chronic or comorbid symptoms are exacerbated and prevent overall improvement and discharge  2/4/2023 0521 by Bia Shah RN  Outcome: Progressing  Flowsheets (Taken 2/4/2023 0521)  Care Plan - Patient's Chronic Conditions and Co-Morbidity Symptoms are Monitored and Maintained or Improved:   Monitor and assess patient's chronic conditions and comorbid symptoms for stability, deterioration, or improvement   Collaborate with multidisciplinary team to address chronic and comorbid conditions and prevent exacerbation or deterioration   Update acute care plan with appropriate goals if chronic or comorbid symptoms are exacerbated and prevent overall improvement and discharge

## 2023-02-04 NOTE — PROGRESS NOTES
NEPHROLOGY PROGRESS NOTE      ASSESSMENT     #1 ESRD on hemodialysis Monday Wednesday Friday at [facility] unit using tunnel catheter. Dry weight 98 kg  #2 bilateral lower extremity edema left more than the right status post bilateral below-knee amputations  #3 essential hypertension  #4 secondary hyperparathyroidism    PLAN     #1 fluid restriction/salt restriction  #2 okay to discharge from nephrology standpoint      SUBJECTIVE   No acute hemodynamic issues overnight. Underwent dialysis yesterday. 4 kg taken out. Swelling improved. Overall he feels fine. No chest pain shortness of breath. Appetite decent. OBJECTIVE     Vitals:    02/03/23 1820 02/1959 02/04/23 0808 02/04/23 1112   BP:  (!) 143/79 (!) 183/106 (!) 182/100   Pulse:  82 80 81   Resp: 18 17 18 18   Temp:  99.3 °F (37.4 °C) 98.4 °F (36.9 °C) 98.2 °F (36.8 °C)   TempSrc:  Oral Oral Oral   SpO2:  98% 99% 96%   Weight:       Height:         24HR INTAKE/OUTPUT:    Intake/Output Summary (Last 24 hours) at 2/4/2023 1120  Last data filed at 2/3/2023 1633  Gross per 24 hour   Intake 980 ml   Output 4000 ml   Net -3020 ml       General appearance:Awake, alert, in no acute distress  HEENT: PERRLA  Respiratory::vesicular breath sounds,no wheeze/crackles  Cardiovascular:S1 S2 normal,no gallop or organic murmur. Abdomen:Non tender/non distended. Bowel sounds present  Extremities: No Cyanosis or Clubbing,Lower extremity edema  Neurological:Alert and oriented. No abnormalities of mood, affect, memory, mentation, or behavior are noted      MEDICATIONS     Scheduled Meds:    polyethylene glycol  17 g Oral Daily    docusate sodium  100 mg Oral BID    lisinopril  20 mg Oral BID    pregabalin  75 mg Oral Daily    apixaban  5 mg Oral BID     Continuous Infusions:   PRN Meds:  zolpidem, sodium citrate, sodium citrate, oxyCODONE-acetaminophen  Home Meds:                Medications Prior to Admission: traZODone (DESYREL) 50 MG tablet, TAKE ONE TABLET BY MOUTH ONCE NIGHTLY AS NEEDED FOR SLEEP  acetaminophen (TYLENOL) 500 MG tablet, Take 500 mg by mouth every 6 hours as needed for Pain  sodium zirconium cyclosilicate (LOKELMA) 5 g PACK oral suspension, Take 5 g by mouth every other day Indications: Don't take  MWF (Diaylsis days)  aspirin 81 MG EC tablet, Take 81 mg by mouth daily  linagliptin (TRADJENTA) 5 MG tablet, TAKE ONE TABLET BY MOUTH DAILY  minoxidil (LONITEN) 2.5 MG tablet, Take 5 mg by mouth 2 times daily  pregabalin (LYRICA) 75 MG capsule, Take 1 capsule by mouth daily for 30 days.   amLODIPine-atorvastatatin (CADUET) 10-80 MG per tablet, Take 1 tablet by mouth daily (Patient not taking: Reported on 2/2/2023)  lisinopril (PRINIVIL;ZESTRIL) 20 MG tablet, Take 1 tablet by mouth in the morning and at bedtime  cloNIDine (CATAPRES) 0.3 MG tablet, Take 1 tablet by mouth 2 times daily Do not take if HR < 60  Sucroferric Oxyhydroxide (VELPHORO) 500 MG CHEW, Take 1,000 mg by mouth 3 times daily  apixaban (ELIQUIS) 5 MG TABS tablet, Take 1 tablet by mouth 2 times daily  Multiple Vitamins-Minerals (RENAPLEX-D PO), Take 1 tablet by mouth daily  (Patient not taking: Reported on 2/2/2023)    INVESTIGATIONS     Last 3 CMP:    Recent Labs     02/02/23  1627 02/03/23  1046   * 134*   K 4.0 4.2   CL 97* 96*   CO2 24 25   BUN 33* 40*   CREATININE 5.32* 6.05*   CALCIUM 9.1 9.2       Last 3 CBC:  Recent Labs     02/02/23  1627 02/03/23  1046   WBC 6.8 5.8   RBC 5.14 4.91   HGB 12.1* 11.5*   HCT 38.1* 36.1*   MCV 74.1* 73.5*   MCH 23.5* 23.4*   MCHC 31.8 31.9   RDW 19.7* 19.4*    190   MPV Abnormal Abnormal       Please do not hesitate to call with questions    This note is created with the assistance of a speech-recognition program. While intending to generate a document that actually reflects the content of the visit, no guarantees can be provided that every mistake has been identified and corrected by editing    Jennifer Saldana MD MD, [facility] Song Peoples, Aracelis Rodriguez   2/4/2023 11:20 AM  [facility]

## 2023-02-04 NOTE — PROGRESS NOTES
Progress Note    Subjective: Follow-up on ambulatory difficulties  CKD  Anemia of chronic disease  The patient is awake and alert. No problems overnight. Denies chest pain, angina, and dyspnea. Denies abdominal pain. Tolerating diet. No nausea or vomiting. Admit Date:  2/2/2023    Patient Seen, Chart, Labs, Radiology studies, and Consults reviewed. Objective:   BP (!) 180/95   Pulse 81   Temp 98.2 °F (36.8 °C) (Oral)   Resp 18   Ht 5' 11\" (1.803 m)   Wt 217 lb 12.8 oz (98.8 kg)   SpO2 96%   BMI 30.38 kg/m²     Intake/Output Summary (Last 24 hours) at 2/4/2023 1310  Last data filed at 2/3/2023 1633  Gross per 24 hour   Intake 500 ml   Output 4000 ml   Net -3500 ml     General appearance - alert, well appearing, and in no distress and oriented to person, place, and time  Heart:  RRR, no murmurs, gallops, or rubs, extrasystoles. Lungs:  CTA bilaterally, no wheeze, rales or rhonchi, good air entry, good respiratory effort  Abd: bowel sounds present, nontender, nondistended, no masses, no rigidity, no guarding, no peritoneal signs.   Integumentary: Skin good color, good turgor, no rashes, no acute lesions  Upper Extrem:  No clubbing bilateral hands, no cyanosis or edema  Lower Extrem:no cyanosis or edema, no calf tenderness to lower extremities  Neurological - alert, oriented, normal speech, no focal findings or movement disorder noted, neck supple without rigidity, motor and sensory grossly normal bilaterally      Medications:    amLODIPine  5 mg Oral Daily    polyethylene glycol  17 g Oral Daily    docusate sodium  100 mg Oral BID    lisinopril  20 mg Oral BID    pregabalin  75 mg Oral Daily    apixaban  5 mg Oral BID       Data:  CBC:   Recent Labs     02/02/23  1627 02/03/23  1046   WBC 6.8 5.8   RBC 5.14 4.91   HGB 12.1* 11.5*   HCT 38.1* 36.1*   MCV 74.1* 73.5*   RDW 19.7* 19.4*    190     BMP:   Recent Labs     02/02/23  1627 02/03/23  1046   * 134*   K 4.0 4.2   CL 97* 96*   CO2 24 25   PHOS 3.9  --    BUN 33* 40*   CREATININE 5.32* 6.05*     BNP: No results for input(s): BNP in the last 72 hours. PT/INR:   Recent Labs     02/02/23  1627   PROTIME 16.3*   INR 1.3     APTT:   Recent Labs     02/02/23  1627   APTT 37.9*     CARDIAC ENZYMES: No results for input(s): CKMB, CKMBINDEX, TROPONINI in the last 72 hours. Invalid input(s): CKTOTAL;3  FASTING LIPID PANEL:  Lab Results   Component Value Date    CHOL 96 09/28/2021    HDL 55 02/01/2022    TRIG 52 09/28/2021     LIVER PROFILE: No results for input(s): AST, ALT, ALB, BILIDIR, BILITOT, ALKPHOS in the last 72 hours. Assessment/Plan:  Principal Problem:    Inability to walk  Resolved Problems:    * No resolved hospital problems. *  Continue PT/OT. Patient has bilateral lower below-knee amputee. He feels uncomfortable with orthotics. Medically stable for SNF, awaiting pre-CERT  ESRD hemodialysis dependent x3/week. Nephrology on board  Chronic anemia. Hemoglobin is 12.1  History of A. fib on Eliquis  Plan of care discussed with patient  Discharge planning  Discussion with RN  This note is created with a voice recognition program and while intend to generate a document that accurately reflects the content of the visit, no guarantee can be provided that every mistake has been identified and corrected by editing.           Romie Osgood, MD, MD 2/4/2023 1:10 PM

## 2023-02-04 NOTE — PROGRESS NOTES
Physical Therapy  Facility/Department: [facility] MED SURG  Physical Therapy Initial Assessment    Name: Jovanny Angel  : 1960  MRN: [id number]  Date of Service: 2023    Discharge Recommendations:  Patient would benefit from continued therapy after discharge          Patient Diagnosis(es): The primary encounter diagnosis was Localized swelling of both lower legs. A diagnosis of Unable to ambulate was also pertinent to this visit. Past Medical History:  has a past medical history of Acute congestive heart failure (HCC), Chronic bilateral low back pain with bilateral sciatica, Coronary artery disease involving native coronary artery, CRF (chronic renal failure), DDD (degenerative disc disease), lumbar, Diabetes mellitus ([facility] [address] Dialysis patient [facility]), ED (erectile dysfunction), History of echocardiogram, HTN (hypertension), Hyperlipidemia, LVH (left ventricular hypertrophy), PVD (peripheral vascular disease) ([facility] [address] S/P bilateral BKA (below knee amputation) ([facility] and Wears glasses. Past Surgical History:  has a past surgical history that includes Glaucoma surgery; Cataract removal with implant; Leg amputation below knee (Bilateral); Tunneled venous catheter placement; Dialysis fistula creation (Bilateral); Finger surgery (Left); Finger amputation; and Arm Surgery. Assessment   Body Structures, Functions, Activity Limitations Requiring Skilled Therapeutic Intervention: Decreased functional mobility ; Decreased strength;Decreased safe awareness;Decreased endurance;Decreased balance  Assessment: Pt presents on day 2 hospitalization with swelling in albina LE causing him not to be able to get his prosthetic on -> working towards increased tolerance to mobilty once prosethtics able to get put on. Will progress while hospitalized.   Specific Instructions for Next Treatment: gait and mobitliy  Therapy Prognosis: Good  Decision Making: Medium Complexity  Clinical Presentation: evolving  Requires PT Follow-Up: Yes  Activity Tolerance  Activity Tolerance: Patient tolerated evaluation without incident     Plan   Physcial Therapy Plan  General Plan: 3-5 times per week  Specific Instructions for Next Treatment: gait and mobitliy  Current Treatment Recommendations: Strengthening, ROM, Balance training, Functional mobility training, Transfer training, Gait training, Positioning, Home exercise program, Therapeutic activities  Safety Devices  Type of Devices: All fall risk precautions in place     Restrictions  Restrictions/Precautions  Restrictions/Precautions: Up as Tolerated, General Precautions  Required Braces or Orthoses?: Yes  Implants present? :  (albina bka prosthetics)  Position Activity Restriction  Other position/activity restrictions: bilateral BKA, up with assist, LUE IV; pt fiercly independent     Subjective   General  Chart Reviewed: Yes  Patient assessed for rehabilitation services?: Yes  Response To Previous Treatment: Not applicable  Family / Caregiver Present: No  Follows Commands: Within Functional Limits  Subjective  Subjective: Pt reports normally he is completely independent with his prosthetics on - being unable to put them on makes him feel helpless.          Social/Functional History  Social/Functional History  Lives With: Son  Type of Home: House  Home Layout: One level  Home Access: Stairs to enter with rails  Entrance Stairs - Number of Steps: 4  Entrance Stairs - Rails: Both  Bathroom Shower/Tub: Tub/Shower unit  Bathroom Toilet: Standard  Bathroom Equipment: Tub transfer bench  Home Equipment: Citizen of [place]  Has the patient had two or more falls in the past year or any fall with injury in the past year?: Yes (pt reports he fell down 13 steps a few months ago)  Ambulation Assistance: Independent (pt uses a cane in the house and crutches in the community)  Vision/Hearing       Cognition   Orientation  Overall Orientation Status: Within Functional Limits     Objective   Heart Rate: 81  Heart Rate Source: Monitor  BP: (!) 182/100  BP Location: Left upper arm  BP Method: Automatic  Patient Position: Sitting  MAP (Calculated): 127  Resp: 18  SpO2: 96 %  O2 Device: None (Room air)     Observation/Palpation  Observation: albina BKA        AROM RLE (degrees)  RLE AROM: Exceptions  RLE General AROM: bka - no ankle  AROM LLE (degrees)  LLE AROM : Exceptions  LLE General AROM: bka - no ankle  Strength RLE  Comment: 3+/5 hip / knee  Strength LLE  Comment: 3+/5 hip /knee       Worked on lateral transfers since prosthetics unable to get on at this time.        Bed mobility  Rolling to Left: Stand by assistance  Rolling to Right: Stand by assistance  Supine to Sit: Minimal assistance  Sit to Supine: Minimal assistance  Transfers  Comment: min assist for chair to bed lateral transfer - attempted flory prosethics x 20 minutes - pt unable to get on at this time (swelling)  Ambulation  Distance: n/a - unable to get on prosthetics     AM-PAC Score 11/24     Goals  Short Term Goals  Time Frame for Short Term Goals: 10  Short Term Goal 1: sliding board transfers ind  Short Term Goal 2: w/c mob x 200 ft ind  Short Term Goal 3: don ff prosethtics ind  Short Term Goal 4: progress to amb  Patient Goals   Patient Goals : pt goal is to get his ability to walk back     Therapy Time   Individual   Time In 1054   Time Out 1136   Minutes 42      JEANNE SALGADO, PT

## 2023-02-04 NOTE — PROGRESS NOTES
Occupational Therapy  Facility/Department: [facility] MED SURG  Occupational Therapy Initial Assessment    Name: Seth Fry  : 1960  MRN: [id number]  Date of Service: 2023    Discharge Recommendations:  Patient would benefit from continued therapy after discharge       Pt currently functioning below baseline. Recommend daily inpatient skilled therapy at time of discharge to maximize long term outcomes and prevent re-admission. Please refer to AM-PAC score for current level of function. RN reports patient is medically stable for therapy treatment this date. Chart reviewed prior to treatment and patient is agreeable for therapy. All lines intact and patient positioned comfortably at end of treatment. All patient needs addressed prior to ending therapy session. Patient Diagnosis(es): The primary encounter diagnosis was Localized swelling of both lower legs. A diagnosis of Unable to ambulate was also pertinent to this visit. Past Medical History:  has a past medical history of Acute congestive heart failure (HCC), Chronic bilateral low back pain with bilateral sciatica, Coronary artery disease involving native coronary artery, CRF (chronic renal failure), DDD (degenerative disc disease), lumbar, Diabetes mellitus ([facility] [address] Dialysis patient [facility]), ED (erectile dysfunction), History of echocardiogram, HTN (hypertension), Hyperlipidemia, LVH (left ventricular hypertrophy), PVD (peripheral vascular disease) ([facility] [address] S/P bilateral BKA (below knee amputation) ([facility] and Wears glasses. Past Surgical History:  has a past surgical history that includes Glaucoma surgery; Cataract removal with implant; Leg amputation below knee (Bilateral); Tunneled venous catheter placement; Dialysis fistula creation (Bilateral); Finger surgery (Left); Finger amputation; and Arm Surgery. Per H&P: 59-year-old -American male with underlying history significant for bilateral below-knee amputee.   Patient states that it is difficult for him to ambulate in his prosthetics. However he denies any swelling or skin breakdown. Patient is known to have a chronic pain syndrome. Patient states that he lives alone. His son states that he is unable to take care of him  He was recently discharged from the hospital with intractable hypoglycemia with suspected unintentional overdose on hypoglycemic. He was reluctant to SNF however currently he wished to proceed. He is euglycemic. Currently he is going through dialysis and does not voice any distress. Assessment   Performance deficits / Impairments: Decreased functional mobility ; Decreased strength;Decreased endurance;Decreased ADL status; Decreased safe awareness;Decreased cognition;Decreased balance  Assessment: Skilled OT is indicated to increase overall IND and safety with self-care and functional tasks to return to PLOF  Prognosis: Good  Decision Making: Medium Complexity  REQUIRES OT FOLLOW-UP: Yes  Activity Tolerance  Activity Tolerance: Patient Tolerated treatment well        Plan   Occupational Therapy Plan  Times Per Week: 4-5x per week, 1x per day as andrews  Current Treatment Recommendations: Strengthening, Balance training, Functional mobility training, Neuromuscular re-education, Safety education & training, Self-Care / ADL, Equipment evaluation, education, & procurement, Patient/Caregiver education & training, Positioning     Restrictions  Restrictions/Precautions  Restrictions/Precautions: Up as Tolerated, General Precautions  Required Braces or Orthoses?: Yes  Implants present? :  (albina bka prosthetics)  Position Activity Restriction  Other position/activity restrictions: bilateral BKA, up with assist, LUE IV; pt fiercly independent    Subjective   General  Chart Reviewed: Yes  Patient assessed for rehabilitation services?: Yes  Family / Caregiver Present: No  Subjective  Subjective: pt agreeable to OT eval     Social/Functional History  Social/Functional History  Lives With: Son  Type of Home: House  Home Layout: One level  Home Access: Stairs to enter with rails  Entrance Stairs - Number of Steps: 4  Entrance Stairs - Rails: Both  Bathroom Shower/Tub: Tub/Shower unit  Bathroom Toilet: Standard  Bathroom Equipment: Tub transfer bench  Home Equipment: [place]  Has the patient had two or more falls in the past year or any fall with injury in the past year?: Yes (pt reports he fell down 13 steps a few months ago)  ADL Assistance: [address] [place] [address] Avenue: Independent  Homemaking Responsibilities: Yes  Ambulation Assistance: Independent (pt uses a cane in the house and crutches in the community)  Transfer Assistance: Independent  Active : No  Patient's  Info: medical cab or son  Occupation: On disability       Objective             Observation/Palpation  Observation: albina SHINE    Safety Devices  Type of Devices: Call light within reach;Nurse notified; Left in chair    Bed Mobility Training  Bed Mobility Training: Yes  Overall Level of Assistance: Stand-by assistance  Interventions: Verbal cues; Safety awareness training  Rolling: Stand-by assistance  Supine to Sit: Stand-by assistance  Sit to Supine: Stand-by assistance  Scooting: Stand-by assistance    Transfer Training  Transfer Training: Yes  Overall Level of Assistance: Minimum assistance; Additional time (pt attempted to fang B BKA prothestics - pt unable d/t edema. Pt declined to attempt standing without prothestics.)  Interventions: Verbal cues; Safety awareness training  Bed to Chair: Minimum assistance (Min A for lateral transfer from recliner to bed, from bed to recliner.  Pt declined to use slide board.)         AROM: Generally decreased, functional (L shoulder ~70 deg, rest WFL)  Strength: Generally decreased, functional (B shoulders ~4-/5, rest 4/5)  Coordination: Within functional limits  Tone: Normal  Sensation: Intact      ADL  Feeding: Setup  Grooming: Stand by assistance (seated)  UE Bathing: Stand by assistance  LE Bathing: Moderate assistance  UE Dressing: Stand by assistance  LE Dressing: Maximum assistance  Toileting: Maximum assistance  Additional Comments: pt attempted to fang B BKA prothestics for ~20 minutes - pt unable d/t edema. Activity Tolerance  Activity Tolerance: Patient tolerated evaluation without incident        Vision  Vision: Impaired (pt reports he is visually impaired in B eyes, but not legally blind)  Vision Exceptions: Wears glasses at all times  Hearing  Hearing: Within functional limits    Cognition  Overall Cognitive Status: Exceptions  Arousal/Alertness: Appropriate responses to stimuli  Following Commands: Follows one step commands consistently  Attention Span: Attends with cues to redirect  Memory: Appears intact  Safety Judgement: Decreased awareness of need for assistance;Decreased awareness of need for safety  Problem Solving: Assistance required to generate solutions;Assistance required to correct errors made;Assistance required to implement solutions;Assistance required to identify errors made  Insights: Decreased awareness of deficits  Initiation: Requires cues for some  Sequencing: Requires cues for some  Orientation  Overall Orientation Status: Within Functional Limits                    Education Given To: Patient  Education Provided: Role of Therapy; ADL Adaptive Strategies; Fall Prevention Strategies;Transfer Training;Plan of Care;Energy Conservation  Education Provided Comments: OT POC, purpose of OT in acute care, safety in function, call light/fall prevention, benefits of OOB activity  Education Method: Demonstration;Verbal  Education Outcome: Continued education needed                                 AM-PAC Score        AM-PAC Inpatient Daily Activity Raw Score: 16 (02/04/23 1340)  AM-PAC Inpatient ADL T-Scale Score : 35.96 (02/04/23 1340)  ADL Inpatient CMS 0-100% Score: 53.32 (02/04/23 1340)  ADL Inpatient CMS G-Code Modifier : CK (02/04/23 1340) Goals  Short Term Goals  Time Frame for Short Term Goals: by discharge, pt to demo  Short Term Goal 1: I/MI for bed mob tech without bed rails/controls  Short Term Goal 2: I/MI for UB ADLs, CGA for LB ADLs with AE as needed and Good safety  Short Term Goal 3: CGA for ADL transfers with AD as needed and Good safety  Short Term Goal 4: Min A for toileting tasks with AD/BSC as needed and Good safety  Short Term Goal 5: pt/family to be IND with EC/WS, fall prevention tech, pressure relief education, discharge recommendations with use of handouts as needed  Long Term Goals  Long Term Goal 1: pt to tolerate re-assessment of functional mob to add goal to POC  Patient Goals   Patient goals : to go home       Therapy Time   Individual Concurrent Group Co-treatment   Time In 1103         Time Out 1141 (+10 chart review)         Minutes 48          Tx time: 38 min    Upon writer exit, call light within reach, pt retired to chair. All lines intact and patient positioned comfortably. All patient needs addressed prior to ending therapy session. Chart reviewed prior to treatment and patient is agreeable for therapy. RN reports patient is medically stable for therapy treatment this date.     Layne Vegas OTR/L

## 2023-02-04 NOTE — PLAN OF CARE
Problem: Safety - Adult  Goal: Free from fall injury  Outcome: Progressing  Flowsheets (Taken 2/4/2023 0521)  Free From Fall Injury: Instruct family/caregiver on patient safety     Problem: ABCDS Injury Assessment  Goal: Absence of physical injury  Outcome: Progressing  Flowsheets (Taken 2/4/2023 0521)  Absence of Physical Injury: Implement safety measures based on patient assessment     Problem: Skin/Tissue Integrity  Goal: Absence of new skin breakdown  Description: 1. Monitor for areas of redness and/or skin breakdown  2. Assess vascular access sites hourly  3. Every 4-6 hours minimum:  Change oxygen saturation probe site  4. Every 4-6 hours:  If on nasal continuous positive airway pressure, respiratory therapy assess nares and determine need for appliance change or resting period.   Outcome: Progressing     Problem: Chronic Conditions and Co-morbidities  Goal: Patient's chronic conditions and co-morbidity symptoms are monitored and maintained or improved  Outcome: Progressing  Flowsheets (Taken 2/4/2023 0521)  Care Plan - Patient's Chronic Conditions and Co-Morbidity Symptoms are Monitored and Maintained or Improved:   Monitor and assess patient's chronic conditions and comorbid symptoms for stability, deterioration, or improvement   Collaborate with multidisciplinary team to address chronic and comorbid conditions and prevent exacerbation or deterioration   Update acute care plan with appropriate goals if chronic or comorbid symptoms are exacerbated and prevent overall improvement and discharge

## 2023-02-05 LAB
GLUCOSE BLD-MCNC: 158 MG/DL (ref 75–110)
GLUCOSE BLD-MCNC: 168 MG/DL (ref 75–110)
GLUCOSE BLD-MCNC: 169 MG/DL (ref 75–110)
GLUCOSE BLD-MCNC: 191 MG/DL (ref 75–110)
GLUCOSE BLD-MCNC: 200 MG/DL (ref 75–110)

## 2023-02-05 PROCEDURE — 6370000000 HC RX 637 (ALT 250 FOR IP): Performed by: INTERNAL MEDICINE

## 2023-02-05 PROCEDURE — 82947 ASSAY GLUCOSE BLOOD QUANT: CPT

## 2023-02-05 PROCEDURE — 99233 SBSQ HOSP IP/OBS HIGH 50: CPT | Performed by: FAMILY MEDICINE

## 2023-02-05 PROCEDURE — 6370000000 HC RX 637 (ALT 250 FOR IP): Performed by: FAMILY MEDICINE

## 2023-02-05 PROCEDURE — 6360000002 HC RX W HCPCS: Performed by: FAMILY MEDICINE

## 2023-02-05 PROCEDURE — 1200000000 HC SEMI PRIVATE

## 2023-02-05 RX ORDER — INSULIN LISPRO 100 [IU]/ML
0-8 INJECTION, SOLUTION INTRAVENOUS; SUBCUTANEOUS
Status: DISCONTINUED | OUTPATIENT
Start: 2023-02-05 | End: 2023-02-07 | Stop reason: HOSPADM

## 2023-02-05 RX ORDER — DEXTROSE MONOHYDRATE 100 MG/ML
INJECTION, SOLUTION INTRAVENOUS CONTINUOUS PRN
Status: DISCONTINUED | OUTPATIENT
Start: 2023-02-05 | End: 2023-02-05 | Stop reason: SDUPTHER

## 2023-02-05 RX ORDER — HYDRALAZINE HYDROCHLORIDE 20 MG/ML
10 INJECTION INTRAMUSCULAR; INTRAVENOUS EVERY 6 HOURS PRN
Status: DISCONTINUED | OUTPATIENT
Start: 2023-02-05 | End: 2023-02-07 | Stop reason: HOSPADM

## 2023-02-05 RX ORDER — DEXTROSE MONOHYDRATE 100 MG/ML
INJECTION, SOLUTION INTRAVENOUS CONTINUOUS PRN
Status: DISCONTINUED | OUTPATIENT
Start: 2023-02-05 | End: 2023-02-07 | Stop reason: HOSPADM

## 2023-02-05 RX ORDER — INSULIN LISPRO 100 [IU]/ML
0-4 INJECTION, SOLUTION INTRAVENOUS; SUBCUTANEOUS NIGHTLY
Status: DISCONTINUED | OUTPATIENT
Start: 2023-02-05 | End: 2023-02-07 | Stop reason: HOSPADM

## 2023-02-05 RX ORDER — OXYCODONE HYDROCHLORIDE AND ACETAMINOPHEN 5; 325 MG/1; MG/1
1 TABLET ORAL EVERY 4 HOURS PRN
Status: DISCONTINUED | OUTPATIENT
Start: 2023-02-05 | End: 2023-02-07 | Stop reason: HOSPADM

## 2023-02-05 RX ADMIN — HYDRALAZINE HYDROCHLORIDE 10 MG: 20 INJECTION INTRAMUSCULAR; INTRAVENOUS at 04:30

## 2023-02-05 RX ADMIN — LISINOPRIL 20 MG: 20 TABLET ORAL at 20:58

## 2023-02-05 RX ADMIN — OXYCODONE AND ACETAMINOPHEN 1 TABLET: 5; 325 TABLET ORAL at 00:47

## 2023-02-05 RX ADMIN — POLYETHYLENE GLYCOL 3350 17 G: 17 POWDER, FOR SOLUTION ORAL at 08:57

## 2023-02-05 RX ADMIN — OXYCODONE AND ACETAMINOPHEN 1 TABLET: 5; 325 TABLET ORAL at 20:58

## 2023-02-05 RX ADMIN — DOCUSATE SODIUM 100 MG: 100 CAPSULE, LIQUID FILLED ORAL at 08:57

## 2023-02-05 RX ADMIN — APIXABAN 5 MG: 5 TABLET, FILM COATED ORAL at 08:57

## 2023-02-05 RX ADMIN — APIXABAN 5 MG: 5 TABLET, FILM COATED ORAL at 20:58

## 2023-02-05 RX ADMIN — HYDRALAZINE HYDROCHLORIDE 10 MG: 20 INJECTION INTRAMUSCULAR; INTRAVENOUS at 23:22

## 2023-02-05 RX ADMIN — PREGABALIN 75 MG: 75 CAPSULE ORAL at 08:57

## 2023-02-05 RX ADMIN — DOCUSATE SODIUM 100 MG: 100 CAPSULE, LIQUID FILLED ORAL at 20:58

## 2023-02-05 RX ADMIN — LISINOPRIL 20 MG: 20 TABLET ORAL at 08:57

## 2023-02-05 RX ADMIN — AMLODIPINE BESYLATE 5 MG: 5 TABLET ORAL at 08:57

## 2023-02-05 RX ADMIN — OXYCODONE AND ACETAMINOPHEN 1 TABLET: 5; 325 TABLET ORAL at 11:13

## 2023-02-05 ASSESSMENT — PAIN SCALES - GENERAL
PAINLEVEL_OUTOF10: 10
PAINLEVEL_OUTOF10: 9
PAINLEVEL_OUTOF10: 6
PAINLEVEL_OUTOF10: 6

## 2023-02-05 ASSESSMENT — PAIN DESCRIPTION - LOCATION
LOCATION: GENERALIZED
LOCATION: GENERALIZED;LEG

## 2023-02-05 ASSESSMENT — PAIN DESCRIPTION - DESCRIPTORS
DESCRIPTORS: DISCOMFORT
DESCRIPTORS: ACHING;DISCOMFORT;SORE

## 2023-02-05 NOTE — PLAN OF CARE
Problem: Discharge Planning  Goal: Discharge to home or other facility with appropriate resources  2/5/2023 0800 by Dafne Angel RN  Outcome: Progressing  Flowsheets (Taken 2/5/2023 0715)  Discharge to home or other facility with appropriate resources:   Identify barriers to discharge with patient and caregiver   Arrange for needed discharge resources and transportation as appropriate   Identify discharge learning needs (meds, wound care, etc)   Arrange for interpreters to assist at discharge as needed   Refer to discharge planning if patient needs post-hospital services based on physician order or complex needs related to functional status, cognitive ability or social support system     Problem: Discharge Planning  Goal: Discharge to home or other facility with appropriate resources  2/5/2023 0800 by Dafne Angel RN  Outcome: Progressing  Flowsheets (Taken 2/5/2023 0715)  Discharge to home or other facility with appropriate resources:   Identify barriers to discharge with patient and caregiver   Arrange for needed discharge resources and transportation as appropriate   Identify discharge learning needs (meds, wound care, etc)   Arrange for interpreters to assist at discharge as needed   Refer to discharge planning if patient needs post-hospital services based on physician order or complex needs related to functional status, cognitive ability or social support system  2/5/2023 0438 by Susanna Faulkner RN  Outcome: Progressing     Problem: Safety - Adult  Goal: Free from fall injury  2/5/2023 0800 by Danfe Angel RN  Outcome: Progressing  [address] (Taken 2/4/2023 0521 by Cristal Chung, RN)  Free From Fall Injury: Instruct family/caregiver on patient safety  2/5/2023 0438 by Susanna Faulkner RN  Outcome: Progressing     Problem: ABCDS Injury Assessment  Goal: Absence of physical injury  2/5/2023 0800 by Dafne Angel RN  Outcome: Progressing  Flowsheets (Taken 2/4/2023 0521 by Mynor Ricks Sanjuana Junior RN)  Absence of Physical Injury: Implement safety measures based on patient assessment  2/5/2023 0438 by Stef Lucio RN  Outcome: Progressing     Problem: Skin/Tissue Integrity  Goal: Absence of new skin breakdown  Description: 1. Monitor for areas of redness and/or skin breakdown  2. Assess vascular access sites hourly  3. Every 4-6 hours minimum:  Change oxygen saturation probe site  4. Every 4-6 hours:  If on nasal continuous positive airway pressure, respiratory therapy assess nares and determine need for appliance change or resting period.   2/5/2023 0800 by Sinai Rand RN  Outcome: Progressing  2/5/2023 0438 by Stef Lucio RN  Outcome: Progressing     Problem: Chronic Conditions and Co-morbidities  Goal: Patient's chronic conditions and co-morbidity symptoms are monitored and maintained or improved  2/5/2023 0800 by Sinai Rand RN  Outcome: Progressing  Flowsheets (Taken 2/5/2023 0715)  Care Plan - Patient's Chronic Conditions and Co-Morbidity Symptoms are Monitored and Maintained or Improved:   Monitor and assess patient's chronic conditions and comorbid symptoms for stability, deterioration, or improvement   Collaborate with multidisciplinary team to address chronic and comorbid conditions and prevent exacerbation or deterioration   Update acute care plan with appropriate goals if chronic or comorbid symptoms are exacerbated and prevent overall improvement and discharge  2/5/2023 0438 by Stef Lucio RN  Outcome: Progressing

## 2023-02-05 NOTE — PROGRESS NOTES
Progress Note    Subjective: Follow-up on mobility issues  Phantom pain  ESRD hemodialysis dependent  The patient is awake and alert. No problems overnight. Denies chest pain, angina, and dyspnea. Denies abdominal pain. Tolerating diet. No nausea or vomiting. Admit Date:  2/2/2023    Patient Seen, Chart, Labs, Radiology studies, and Consults reviewed. Objective:   BP (!) 156/84   Pulse 79   Temp 98.1 °F (36.7 °C) (Oral)   Resp 17   Ht 5' 11\" (1.803 m)   Wt 217 lb 12.8 oz (98.8 kg)   SpO2 98%   BMI 30.38 kg/m²   No intake or output data in the 24 hours ending 02/05/23 1244  General appearance - alert, well appearing, and in no distress and oriented to person, place, and time  Heart:  RRR, no murmurs, gallops, or rubs, extrasystoles. Lungs:  CTA bilaterally, no wheeze, rales or rhonchi, good air entry, good respiratory effort  Abd: bowel sounds present, nontender, nondistended, no masses, no rigidity, no guarding, no peritoneal signs.   Integumentary: Skin good color, good turgor, no rashes, no acute lesions  Upper Extrem:  No clubbing bilateral hands, no cyanosis or edema  Lower Extrem:no cyanosis or edema, no calf tenderness to lower extremities  Neurological - alert, oriented, normal speech, no focal findings or movement disorder noted, neck supple without rigidity, motor and sensory grossly normal bilaterally      Medications:    insulin lispro  0-8 Units SubCUTAneous TID WC    insulin lispro  0-4 Units SubCUTAneous Nightly    amLODIPine  5 mg Oral Daily    polyethylene glycol  17 g Oral Daily    docusate sodium  100 mg Oral BID    lisinopril  20 mg Oral BID    pregabalin  75 mg Oral Daily    apixaban  5 mg Oral BID       Data:  CBC:   Recent Labs     02/02/23  1627 02/03/23  1046 02/04/23  1348   WBC 6.8 5.8 6.8   RBC 5.14 4.91 4.93   HGB 12.1* 11.5* 11.5*   HCT 38.1* 36.1* 36.7*   MCV 74.1* 73.5* 74.4*   RDW 19.7* 19.4* 19.7*    190 209     BMP:   Recent Labs     02/02/23  1627 02/03/23  1046 02/04/23  1348   * 134* 132*   K 4.0 4.2 4.2   CL 97* 96* 95*   CO2 24 25 24   PHOS 3.9  --   --    BUN 33* 40* 30*   CREATININE 5.32* 6.05* 5.77*     BNP: No results for input(s): BNP in the last 72 hours. PT/INR:   Recent Labs     02/02/23  1627   PROTIME 16.3*   INR 1.3     APTT:   Recent Labs     02/02/23  1627   APTT 37.9*     CARDIAC ENZYMES: No results for input(s): CKMB, CKMBINDEX, TROPONINI in the last 72 hours. Invalid input(s): CKTOTAL;3  FASTING LIPID PANEL:  Lab Results   Component Value Date    CHOL 96 09/28/2021    HDL 55 02/01/2022    TRIG 52 09/28/2021     LIVER PROFILE:   Recent Labs     02/04/23  1348   AST 23   ALT 33   BILITOT 0.8   ALKPHOS 146*        Assessment/Plan:  Principal Problem:    Inability to walk  Resolved Problems:    * No resolved hospital problems. *  Bilateral below-knee amputee not feeling comfortable with wearing prosthetics. PT/OT on board  Phantom pains. Decrease the frequency of Percocet. We will have palliative care on board  ESRD hemodialysis dependent. Nephrology on board  Anemia of chronic disease H&H is stable at baseline  Hemodynamically stable. Antihypertensives adjusted by nephrology  Secondary hyperparathyroidism  Plan of care discussed with patient and the nursing staff  ROSLAINE sign  Okay to transfer to SNF once available  This note is created with a voice recognition program and while intend to generate a document that accurately reflects the content of the visit, no guarantee can be provided that every mistake has been identified and corrected by editing.           Ghada John MD, MD 2/5/2023 12:44 PM

## 2023-02-05 NOTE — PROGRESS NOTES
NEPHROLOGY PROGRESS NOTE      ASSESSMENT     #1 ESRD on hemodialysis Monday Wednesday Friday at [facility] unit using tunnel catheter. Dry weight 98 kg  #2 bilateral lower extremity edema left more than the right status post bilateral below-knee amputations  #3 essential hypertension  #4 secondary hyperparathyroidism    PLAN     #1 fluid restriction/salt restriction and continue hemodialysis scheduled  #2 okay to discharge from nephrology standpoint      SUBJECTIVE   No acute hemodynamic issues overnight. Underwent dialysis Friday. 4 kg taken out. Swelling improved. Overall he feels fine. No chest pain shortness of breath. Appetite decent. OBJECTIVE     Vitals:    02/05/23 0454 02/05/23 0714 02/05/23 0857 02/05/23 1113   BP: (!) 153/80 (!) 148/75 (!) 145/80    Pulse: 87 86     Resp:  18  18   Temp:  98.6 °F (37 °C)     TempSrc:  Oral     SpO2:  100%     Weight:       Height:         24HR INTAKE/OUTPUT:  No intake or output data in the 24 hours ending 02/05/23 1136      General appearance:Awake, alert, in no acute distress  HEENT: PERRLA  Respiratory::vesicular breath sounds,no wheeze/crackles  Cardiovascular:S1 S2 normal,no gallop or organic murmur. Abdomen:Non tender/non distended. Bowel sounds present  Extremities: No Cyanosis or Clubbing,Lower extremity edema  Neurological:Alert and oriented. No abnormalities of mood, affect, memory, mentation, or behavior are noted      MEDICATIONS     Scheduled Meds:    insulin lispro  0-8 Units SubCUTAneous TID WC    insulin lispro  0-4 Units SubCUTAneous Nightly    amLODIPine  5 mg Oral Daily    polyethylene glycol  17 g Oral Daily    docusate sodium  100 mg Oral BID    lisinopril  20 mg Oral BID    pregabalin  75 mg Oral Daily    apixaban  5 mg Oral BID     Continuous Infusions:    dextrose       PRN Meds:  hydrALAZINE, glucose, dextrose bolus **OR** dextrose bolus, glucagon (rDNA), dextrose, oxyCODONE-acetaminophen, polyethyl glycol-propyl glycol 0.4-0.3 %, zolpidem, sodium citrate, sodium citrate  Home Meds:                Medications Prior to Admission: traZODone (DESYREL) 50 MG tablet, TAKE ONE TABLET BY MOUTH ONCE NIGHTLY AS NEEDED FOR SLEEP  acetaminophen (TYLENOL) 500 MG tablet, Take 500 mg by mouth every 6 hours as needed for Pain  sodium zirconium cyclosilicate (LOKELMA) 5 g PACK oral suspension, Take 5 g by mouth every other day Indications: Don't take  MWF (Diaylsis days)  aspirin 81 MG EC tablet, Take 81 mg by mouth daily  linagliptin (TRADJENTA) 5 MG tablet, TAKE ONE TABLET BY MOUTH DAILY  minoxidil (LONITEN) 2.5 MG tablet, Take 5 mg by mouth 2 times daily  pregabalin (LYRICA) 75 MG capsule, Take 1 capsule by mouth daily for 30 days.   amLODIPine-atorvastatatin (CADUET) 10-80 MG per tablet, Take 1 tablet by mouth daily (Patient not taking: Reported on 2/2/2023)  lisinopril (PRINIVIL;ZESTRIL) 20 MG tablet, Take 1 tablet by mouth in the morning and at bedtime  cloNIDine (CATAPRES) 0.3 MG tablet, Take 1 tablet by mouth 2 times daily Do not take if HR < 60  Sucroferric Oxyhydroxide (VELPHORO) 500 MG CHEW, Take 1,000 mg by mouth 3 times daily  apixaban (ELIQUIS) 5 MG TABS tablet, Take 1 tablet by mouth 2 times daily  Multiple Vitamins-Minerals (RENAPLEX-D PO), Take 1 tablet by mouth daily  (Patient not taking: Reported on 2/2/2023)    INVESTIGATIONS     Last 3 CMP:    Recent Labs     02/02/23  1627 02/03/23  1046 02/04/23  1348   * 134* 132*   K 4.0 4.2 4.2   CL 97* 96* 95*   CO2 24 25 24   BUN 33* 40* 30*   CREATININE 5.32* 6.05* 5.77*   CALCIUM 9.1 9.2 9.0   PROT  --   --  6.9   LABALBU  --   --  3.4*   BILITOT  --   --  0.8   ALKPHOS  --   --  146*   AST  --   --  23   ALT  --   --  33       Last 3 CBC:  Recent Labs     02/02/23  1627 02/03/23  1046 02/04/23  1348   WBC 6.8 5.8 6.8   RBC 5.14 4.91 4.93   HGB 12.1* 11.5* 11.5*   HCT 38.1* 36.1* 36.7*   MCV 74.1* 73.5* 74.4*   MCH 23.5* 23.4* 23.3*   MCHC 31.8 31.9 31.3   RDW 19.7* 19.4* 19.7*    190 209 MPV Abnormal Abnormal 10.0       Please do not hesitate to call with questions    This note is created with the assistance of a speech-recognition program. While intending to generate a document that actually reflects the content of the visit, no guarantees can be provided that every mistake has been identified and corrected by editing    Sarmad Hooks MD MD, [facility] Leia [facility]barca), [address]   2/5/2023 11:36 AM  [facility]

## 2023-02-05 NOTE — PROGRESS NOTES
[facility] NOTE    Room # 2001/2001-02   Name: Penelope Headley              Reason for visit: Routine    I visited the patient. Admit Date & Time: 2/2/2023  3:46 PM    Assessment:  Penelope Headley is a 60 y.o. male. Upon entering the room patient was sleeping. Intervention:   provided a ministry presence and brief prayer. Outcome:  Patient did not respond. Plan:  Chaplains will remain available to offer spiritual and emotional support as needed. Electronically signed by Cristela Santa.  Chaplain Minor, on 2/5/2023 at 3:03 PM.  Natalie      02/05/23 1458   Encounter Summary   Service Provided For: Patient   Referral/Consult From: [facility]   Support System Unknown   Last Encounter  02/05/23   Complexity of Encounter Low   Begin Time 0235   End Time  0237   Total Time Calculated 2 min   Encounter    Type Initial Screen/Assessment   Assessment/Intervention/Outcome   Assessment Unable to assess   Intervention Prayer (assurance of)/[place];Sustaining Presence/Ministry of presence

## 2023-02-06 LAB
ABSOLUTE EOS #: 0.15 K/UL (ref 0–0.44)
ABSOLUTE IMMATURE GRANULOCYTE: 0.01 K/UL (ref 0–0.3)
ABSOLUTE LYMPH #: 2.15 K/UL (ref 1.1–3.7)
ABSOLUTE MONO #: 1.04 K/UL (ref 0.1–1.2)
ANION GAP SERPL CALCULATED.3IONS-SCNC: 14 MMOL/L (ref 9–17)
BASOPHILS # BLD: 0 % (ref 0–2)
BASOPHILS ABSOLUTE: <0.03 K/UL (ref 0–0.2)
BUN SERPL-MCNC: 47 MG/DL (ref 8–23)
BUN/CREAT BLD: 6 (ref 9–20)
CALCIUM SERPL-MCNC: 9.7 MG/DL (ref 8.6–10.4)
CHLORIDE SERPL-SCNC: 95 MMOL/L (ref 98–107)
CO2 SERPL-SCNC: 23 MMOL/L (ref 20–31)
CREAT SERPL-MCNC: 7.93 MG/DL (ref 0.7–1.2)
EOSINOPHILS RELATIVE PERCENT: 2 % (ref 1–4)
GFR SERPL CREATININE-BSD FRML MDRD: 7 ML/MIN/1.73M2
GLUCOSE BLD-MCNC: 149 MG/DL (ref 75–110)
GLUCOSE BLD-MCNC: 174 MG/DL (ref 75–110)
GLUCOSE BLD-MCNC: 219 MG/DL (ref 75–110)
GLUCOSE SERPL-MCNC: 161 MG/DL (ref 70–99)
HCT VFR BLD AUTO: 39.7 % (ref 40.7–50.3)
HGB BLD-MCNC: 12.9 G/DL (ref 13–17)
IMMATURE GRANULOCYTES: 0 %
LYMPHOCYTES # BLD: 26 % (ref 24–43)
MCH RBC QN AUTO: 23.3 PG (ref 25.2–33.5)
MCHC RBC AUTO-ENTMCNC: 32.5 G/DL (ref 28.4–34.8)
MCV RBC AUTO: 71.8 FL (ref 82.6–102.9)
MONOCYTES # BLD: 13 % (ref 3–12)
NRBC AUTOMATED: 0 PER 100 WBC
PDW BLD-RTO: 19.9 % (ref 11.8–14.4)
PLATELET # BLD AUTO: 288 K/UL (ref 138–453)
PMV BLD AUTO: ABNORMAL FL (ref 8.1–13.5)
POTASSIUM SERPL-SCNC: 5.2 MMOL/L (ref 3.7–5.3)
RBC # BLD: 5.53 M/UL (ref 4.21–5.77)
RBC # BLD: ABNORMAL 10*6/UL
SEG NEUTROPHILS: 59 % (ref 36–65)
SEGMENTED NEUTROPHILS ABSOLUTE COUNT: 4.79 K/UL (ref 1.5–8.1)
SODIUM SERPL-SCNC: 132 MMOL/L (ref 135–144)
WBC # BLD AUTO: 8.2 K/UL (ref 3.5–11.3)

## 2023-02-06 PROCEDURE — 6370000000 HC RX 637 (ALT 250 FOR IP): Performed by: FAMILY MEDICINE

## 2023-02-06 PROCEDURE — 90935 HEMODIALYSIS ONE EVALUATION: CPT

## 2023-02-06 PROCEDURE — 85025 COMPLETE CBC W/AUTO DIFF WBC: CPT

## 2023-02-06 PROCEDURE — 80048 BASIC METABOLIC PNL TOTAL CA: CPT

## 2023-02-06 PROCEDURE — 82947 ASSAY GLUCOSE BLOOD QUANT: CPT

## 2023-02-06 PROCEDURE — 1200000000 HC SEMI PRIVATE

## 2023-02-06 PROCEDURE — 36415 COLL VENOUS BLD VENIPUNCTURE: CPT

## 2023-02-06 PROCEDURE — 99239 HOSP IP/OBS DSCHRG MGMT >30: CPT | Performed by: FAMILY MEDICINE

## 2023-02-06 PROCEDURE — 6370000000 HC RX 637 (ALT 250 FOR IP): Performed by: INTERNAL MEDICINE

## 2023-02-06 RX ORDER — TRAZODONE HYDROCHLORIDE 50 MG/1
50 TABLET ORAL NIGHTLY PRN
Qty: 90 TABLET | Refills: 1 | Status: SHIPPED | OUTPATIENT
Start: 2023-02-06

## 2023-02-06 RX ORDER — ASPIRIN 81 MG/1
81 TABLET, CHEWABLE ORAL DAILY
Qty: 30 TABLET | Refills: 3 | Status: SHIPPED | OUTPATIENT
Start: 2023-02-06

## 2023-02-06 RX ORDER — PREGABALIN 75 MG/1
75 CAPSULE ORAL DAILY
Qty: 60 CAPSULE | Refills: 3 | Status: SHIPPED | OUTPATIENT
Start: 2023-02-06 | End: 2023-03-08

## 2023-02-06 RX ORDER — CLONIDINE HYDROCHLORIDE 0.1 MG/1
0.3 TABLET ORAL 2 TIMES DAILY
Qty: 60 TABLET | Refills: 3 | DISCHARGE
Start: 2023-02-06

## 2023-02-06 RX ORDER — AMLODIPINE BESYLATE 5 MG/1
5 TABLET ORAL DAILY
Qty: 30 TABLET | Refills: 3 | Status: SHIPPED | OUTPATIENT
Start: 2023-02-07

## 2023-02-06 RX ORDER — B,C/FOLIC/ZINC/SELENOMETH/D3/E 0.8-12.5MG
1 TABLET ORAL DAILY
DISCHARGE
Start: 2023-02-06 | End: 2023-02-06 | Stop reason: HOSPADM

## 2023-02-06 RX ORDER — ATORVASTATIN CALCIUM 80 MG/1
80 TABLET, FILM COATED ORAL DAILY
Qty: 30 TABLET | Refills: 3 | Status: SHIPPED | OUTPATIENT
Start: 2023-02-06

## 2023-02-06 RX ORDER — MINOXIDIL 2.5 MG/1
2.5 TABLET ORAL 2 TIMES DAILY
Qty: 30 TABLET | Refills: 3 | DISCHARGE
Start: 2023-02-06 | End: 2024-02-06

## 2023-02-06 RX ORDER — LISINOPRIL 20 MG/1
20 TABLET ORAL 2 TIMES DAILY
Qty: 60 TABLET | Refills: 5 | Status: SHIPPED | OUTPATIENT
Start: 2023-02-06

## 2023-02-06 RX ORDER — VIT B COMPLX C/FOLIC ACID/ZINC 0.8-12.5MG
1 TABLET ORAL DAILY
Qty: 30 TABLET | Refills: 0 | DISCHARGE
Start: 2023-02-06 | End: 2023-03-08

## 2023-02-06 RX ADMIN — DOCUSATE SODIUM 100 MG: 100 CAPSULE, LIQUID FILLED ORAL at 08:37

## 2023-02-06 RX ADMIN — OXYCODONE AND ACETAMINOPHEN 1 TABLET: 5; 325 TABLET ORAL at 15:05

## 2023-02-06 RX ADMIN — PREGABALIN 75 MG: 75 CAPSULE ORAL at 08:37

## 2023-02-06 RX ADMIN — APIXABAN 5 MG: 5 TABLET, FILM COATED ORAL at 20:53

## 2023-02-06 RX ADMIN — APIXABAN 5 MG: 5 TABLET, FILM COATED ORAL at 08:37

## 2023-02-06 RX ADMIN — OXYCODONE AND ACETAMINOPHEN 1 TABLET: 5; 325 TABLET ORAL at 20:53

## 2023-02-06 RX ADMIN — ZOLPIDEM TARTRATE 5 MG: 5 TABLET ORAL at 01:17

## 2023-02-06 RX ADMIN — AMLODIPINE BESYLATE 5 MG: 5 TABLET ORAL at 08:37

## 2023-02-06 RX ADMIN — OXYCODONE AND ACETAMINOPHEN 1 TABLET: 5; 325 TABLET ORAL at 01:17

## 2023-02-06 RX ADMIN — POLYETHYLENE GLYCOL 3350 17 G: 17 POWDER, FOR SOLUTION ORAL at 08:37

## 2023-02-06 RX ADMIN — DOCUSATE SODIUM 100 MG: 100 CAPSULE, LIQUID FILLED ORAL at 20:53

## 2023-02-06 RX ADMIN — LISINOPRIL 20 MG: 20 TABLET ORAL at 08:37

## 2023-02-06 RX ADMIN — LISINOPRIL 20 MG: 20 TABLET ORAL at 20:53

## 2023-02-06 ASSESSMENT — PAIN - FUNCTIONAL ASSESSMENT: PAIN_FUNCTIONAL_ASSESSMENT: ACTIVITIES ARE NOT PREVENTED

## 2023-02-06 ASSESSMENT — PAIN DESCRIPTION - ORIENTATION: ORIENTATION: RIGHT;LEFT

## 2023-02-06 ASSESSMENT — PAIN DESCRIPTION - PAIN TYPE: TYPE: OTHER (COMMENT)

## 2023-02-06 ASSESSMENT — PAIN DESCRIPTION - LOCATION
LOCATION: LEG
LOCATION: GENERALIZED

## 2023-02-06 ASSESSMENT — PAIN SCALES - GENERAL
PAINLEVEL_OUTOF10: 6
PAINLEVEL_OUTOF10: 9
PAINLEVEL_OUTOF10: 4
PAINLEVEL_OUTOF10: 0

## 2023-02-06 ASSESSMENT — PAIN DESCRIPTION - DESCRIPTORS
DESCRIPTORS: ACHING
DESCRIPTORS: ACHING

## 2023-02-06 ASSESSMENT — PAIN DESCRIPTION - FREQUENCY: FREQUENCY: CONTINUOUS

## 2023-02-06 ASSESSMENT — PAIN DESCRIPTION - ONSET: ONSET: ON-GOING

## 2023-02-06 NOTE — PLAN OF CARE
Problem: Discharge Planning  Goal: Discharge to home or other facility with appropriate resources  Outcome: Adequate for Discharge     Problem: Safety - Adult  Goal: Free from fall injury  Outcome: Adequate for Discharge     Problem: ABCDS Injury Assessment  Goal: Absence of physical injury  Outcome: Adequate for Discharge     Problem: Skin/Tissue Integrity  Goal: Absence of new skin breakdown  Description: 1. Monitor for areas of redness and/or skin breakdown  2. Assess vascular access sites hourly  3. Every 4-6 hours minimum:  Change oxygen saturation probe site  4. Every 4-6 hours:  If on nasal continuous positive airway pressure, respiratory therapy assess nares and determine need for appliance change or resting period.   Outcome: Adequate for Discharge     Problem: Chronic Conditions and Co-morbidities  Goal: Patient's chronic conditions and co-morbidity symptoms are monitored and maintained or improved  Outcome: Adequate for Discharge

## 2023-02-06 NOTE — CARE COORDINATION
Social [facility] received [facility], but they are unable to admit until tomorrow [address] [facility] will transport tomorrow at [facility] 5 completed.  Fredderick Spatz

## 2023-02-06 NOTE — PROGRESS NOTES
HEMODIALYSIS PRE-TREATMENT NOTE    Patient Identifiers prior to treatment: name, birthdate and band    Isolation Required: MRSA                      Isolation Type: contact       (please document if patient is being managed as a PUI/COVID-19 patient)        Hepatitis status:                           Date Drawn                             Result  Hepatitis B Surface Antigen 08/15/2022 neg        Hepatitis B Surface Antibody 11/13/2022 pos     87.34   Hepatitis B Core Antibody            How was Hepatitis Status verified: labs     Was a copy of the labs you documented provided to facility for the patient's chart: yes    Hemodialysis orders verified: yes    Access Within normal limits ( I.e. s/s of infection,...): yes     Pre-Assessment completed: yes By Sabas HOFFMAN    Pre-dialysis report received from: Scot Prado                  Time: 1000

## 2023-02-06 NOTE — PROGRESS NOTES
Writer notified Dr. Latha Bean that patient came off of dialysis treatment 20 minutes early due to patient refusing the last 20 minutes. Patient was educated on importance of finishing complete treatment, patient continued to refuse. No new orders received at this time.

## 2023-02-06 NOTE — DISCHARGE SUMMARY
Mobility impairment  Phantom pain  Chronic pain syndrome  ESRD hemodialysis dependent  Anemia of chronic disease  Secondary hyperparathyroidism  Diabetes  Coronary artery disease  Consult  PT/OT, , nephrology  Details  58-year-old -American male with underlying history significant for bilateral lower knee amputee with phantom pain was presented with subjective complaint of difficulty ambulation and not being able to fit into his prosthetics because of pain. He was provided with pain control to his satisfaction. Palliative care has been consulted for long-term pain control  ESRD hemodialysis dependent x3/week that he has been tolerating well  Anemia of chronic disease H&H stable at baseline  Secondary hyperparathyroidism on Zemplar  Diabetes mellitus. She is euglycemic  Today on the date of discharge, patient was seen to be afebrile hemodynamically stable tolerating p.o. well with good urine output. Pain is well controlled. Discharge condition improved  Disposition to SNF with palliative care on board  We will continue hemodialysis per plan  More than 30 minutes were spent organizing the discharge  Medications reconciled, ROSALINE signed, discharge orders placed  This note is created with a voice recognition program and while intend to generate a document that accurately reflects the content of the visit, no guarantee can be provided that every mistake has been identified and corrected by editing.

## 2023-02-06 NOTE — PROGRESS NOTES
Physical Therapy  DATE: 2023    NAME: Bessie Mancilla  MRN: [id number]   : 1960    Patient not seen this date for Physical Therapy due to:      [] Cancel by RN or physician due to:    [] Hemodialysis    [] Critical Lab Value Level     [] Blood transfusion in progress    [] Acute or unstable cardiovascular status   _MAP < 55 or more than >115  _HR < 40 or > 130    [] Acute or unstable pulmonary status   -FiO2 > 60%   _RR < 5 or >40    _O2 sats < 85%    [] Strict Bedrest    [] Off Unit for surgery or procedure    [] Off Unit for testing       [] Pending imaging to R/O fracture    [x] Refusal by Patient:  Writer attempted to see pt this morning however pt adamantly refusing. Pt stating he has been feeling nauseous & doesn't want to get up. Writer educated on importance of continued mobility however pt continuing to refused. Discussed w/ RN. PT will continue to follow and ck back as able. [] Other      [] PT being discontinued at this time. Patient independent. No further needs. [] PT being discontinued at this time as the patient has been transferred to hospice care. No further needs.       Shannan Garcia, PT

## 2023-02-06 NOTE — PROGRESS NOTES
HEMODIALYSIS POST TREATMENT NOTE    Treatment time ordered: 210    Actual treatment time: 190    UltraFiltration Goal: 3100  UltraFiltration Removed: 3100      Pre Treatment weight: 97.3  Post Treatment weight: 94.7  Estimated Dry Weight: 98.0    Access used:     Central Venous Catheter:          Tunneled or Non-tunneled: tunneled           Site: left chest          Access Flow: good      Internal Access:       AV Fistula or AV Graft: na         Site: na       Access Flow: na       Sign and symptoms of infection: no       If YES: na    Medications or blood products given: no    Chronic outpatient schedule: MW    Chronic outpatient unit: [facility]    Summary of response to treatment: the pt came off treatment 20 mins early because he was hungry. I tried to keep him on treatment, I gave him anna crackers to eat and he still refused to stay on treatment    Explain if orders NOT met, was physician notified:yes      ACES flowsheet faxed to patient unit/ placed in patient chart: yes    Post assessment completed: yes    Report given to: Malika Cortes documented Safety Checks include the followin) Access and face visible at all times. 2) All connections and blood lines are secure with no kinks. 3) NVL alarm engaged. 4) Hemosafe device applied (if applicable). 5) No collapse of Arterial or Venous blood chambers. 6) All blood lines / pump segments in the air detectors.

## 2023-02-06 NOTE — PROGRESS NOTES
Nephrology Progress Note        Subjective: patient evaluated on dialysis. Pt is stable on dialysis. Access cannulated without problems. No new issues overnite. Stable hemodynamics. Tolerating treatment well. Awaiting placement. Tunneled catheter works well. Patient is below his dry weight although still a significant edema blood pressures running in the 873 systolic range. Labs today showed a sodium of 132 potassium 5.2 chloride 95 bicarb 23 BUN 47 creatinine 7.9 glucose 161, hemoglobin 12.9    Objective:  CURRENT TEMPERATURE:  Temp: 96.8 °F (36 °C)  MAXIMUM TEMPERATURE OVER 24HRS:  Temp (24hrs), Av °F (36.7 °C), Min:96.8 °F (36 °C), Max:98.4 °F (36.9 °C)    CURRENT RESPIRATORY RATE:  Resp: 18  CURRENT PULSE:  Heart Rate: 80  CURRENT BLOOD PRESSURE:  BP: (!) 152/85  24HR BLOOD PRESSURE RANGE:  Systolic (93XGX), GBX:268 , Min:149 , UAN:763   ; Diastolic (92ZQW), CPZ:76, Min:75, Max:96    24HR INTAKE/OUTPUT:  No intake or output data in the 24 hours ending 23 1058  Patient Vitals for the past 96 hrs (Last 3 readings):   Weight   23 1012 214 lb 8.1 oz (97.3 kg)   23 0933 217 lb 12.8 oz (98.8 kg)   23 1540 220 lb (99.8 kg)         Physical Exam:  General appearance:Awake, alert, in no acute distress  Skin: warm and dry, no rash or erythema  Eyes: conjunctivae normal and sclera anicteric  ENT:no thrush no pharyngeal congestion   Neck:  No JVD, No Thyromegaly  Pulmonary: clear to auscultation and no wheezing or rhonchi   Cardiovascular: normal S1 and S2. NO rubs and NO murmur.  No S3 OR S4   Abdomen: soft nontender, bowel sounds present, no organomegaly,  no ascites   Extremities: no cyanosis, clubbing or edema     Access:  previous permacath    Current Medications:    hydrALAZINE (APRESOLINE) injection 10 mg, Q6H PRN  insulin lispro (HUMALOG) injection vial 0-8 Units, TID WC  insulin lispro (HUMALOG) injection vial 0-4 Units, Nightly  oxyCODONE-acetaminophen (PERCOCET) 5-325 MG per tablet 1 tablet, Q4H PRN  glucose chewable tablet 16 g, PRN  dextrose bolus 10% 125 mL, PRN   Or  dextrose bolus 10% 250 mL, PRN  glucagon (rDNA) injection 1 mg, PRN  dextrose 10 % infusion, Continuous PRN  amLODIPine (NORVASC) tablet 5 mg, Daily  polyethyl glycol-propyl glycol 0.4-0.3 % (SYSTANE) ophthalmic solution 1 drop, PRN  zolpidem (AMBIEN) tablet 5 mg, Nightly PRN  sodium citrate 4 % injection 1.7 mL, PRN  sodium citrate 4 % injection 1.8 mL, PRN  polyethylene glycol (GLYCOLAX) packet 17 g, Daily  docusate sodium (COLACE) capsule 100 mg, BID  lisinopril (PRINIVIL;ZESTRIL) tablet 20 mg, BID  pregabalin (LYRICA) capsule 75 mg, Daily  apixaban (ELIQUIS) tablet 5 mg, BID      Labs:   CBC:   Recent Labs     02/04/23  1348 02/06/23  0552   WBC 6.8 8.2   RBC 4.93 5.53   HGB 11.5* 12.9*   HCT 36.7* 39.7*    288   MPV 10.0 Abnormal      BMP:   Recent Labs     02/04/23  1348 02/06/23  0552   * 132*   K 4.2 5.2   CL 95* 95*   CO2 24 23   BUN 30* 47*   CREATININE 5.77* 7.93*   GLUCOSE 223* 161*   CALCIUM 9.0 9.7        Phosphorus:  No results for input(s): PHOS in the last 72 hours. Magnesium: No results for input(s): MG in the last 72 hours. Albumin:   Recent Labs     02/04/23  1348   LABALBU 3.4*       Dialysis bath: Dialysis Bath  K+ (Potassium): 2  Ca+ (Calcium): 2  Na+ (Sodium): 138  HCO3 (Bicarb): 35    Radiology:  Reviewed as available. Assessment:  1 ESRD on hemodialysis Monday Wednesday Friday, at the [facility] unit using tunnel catheter under Dr. Robert Contreras: dialysis bath reviewed with nurse. 2.HTN: Blood pressure well controlled  3 lower extremity edema from fluid mention and third spacing, had 4 L removed 2/3/2022 will have another 3 L removed today  4 peripheral vascular disease status post bilateral below-knee amputations  5. ANEMIA OF CHRONIC DISEASE: Stable hemoglobin  6. SECONDARY HYPERPARATHYROIDISM: Stable    Plan:  1. Wt removal and dialysis orders reviewed.     2.  Stable for discharge from nephrology standpoint  3. Cont pt on zemplar per protocol. 4. Follow up labs ordered. 5.  Weight placement      Please do not hesitate to call with questions.     Electronically signed by Belinda Peters MD on 2/6/2023 at 10:58 AM

## 2023-02-06 NOTE — PROGRESS NOTES
Occupational [address]  Occupational Therapy Not Seen Note    Patient not available for Occupational Therapy due to:    [] Testing:    [] Hemodialysis    [] Cancelled by RN:    [x] Refusal by Patient: 2/6: Writer attempted to see pt this morning however pt adamantly refusing. Pt stating he has been feeling nauseous & doesn't want to get up. Writer educated on importance of continued mobility however pt continuing to refused. Discussed w/ RN.   OT will continue to follow and ck back as able.     [] Surgery:     [] Intubation:     [] Pain Medication:    [] Sedation:     [] Spine Precautions :    [] Medical Instability:    [] Other:      Evangelina Schofield, OT

## 2023-02-07 VITALS
RESPIRATION RATE: 18 BRPM | HEIGHT: 71 IN | BODY MASS INDEX: 29.23 KG/M2 | WEIGHT: 208.78 LBS | HEART RATE: 76 BPM | TEMPERATURE: 98.4 F | DIASTOLIC BLOOD PRESSURE: 71 MMHG | SYSTOLIC BLOOD PRESSURE: 137 MMHG | OXYGEN SATURATION: 97 %

## 2023-02-07 DIAGNOSIS — Z89.512 S/P BILATERAL BELOW KNEE AMPUTATION (HCC): ICD-10-CM

## 2023-02-07 DIAGNOSIS — E11.8 CONTROLLED TYPE 2 DIABETES MELLITUS WITH COMPLICATION, WITH LONG-TERM CURRENT USE OF INSULIN (HCC): ICD-10-CM

## 2023-02-07 DIAGNOSIS — Z89.511 S/P BILATERAL BELOW KNEE AMPUTATION (HCC): ICD-10-CM

## 2023-02-07 DIAGNOSIS — Z79.4 CONTROLLED TYPE 2 DIABETES MELLITUS WITH COMPLICATION, WITH LONG-TERM CURRENT USE OF INSULIN (HCC): ICD-10-CM

## 2023-02-07 LAB
GLUCOSE BLD-MCNC: 195 MG/DL (ref 75–110)
GLUCOSE BLD-MCNC: 205 MG/DL (ref 75–110)

## 2023-02-07 PROCEDURE — 6370000000 HC RX 637 (ALT 250 FOR IP): Performed by: INTERNAL MEDICINE

## 2023-02-07 PROCEDURE — 6370000000 HC RX 637 (ALT 250 FOR IP): Performed by: FAMILY MEDICINE

## 2023-02-07 PROCEDURE — 82947 ASSAY GLUCOSE BLOOD QUANT: CPT

## 2023-02-07 RX ORDER — PREGABALIN 75 MG/1
CAPSULE ORAL
Qty: 30 CAPSULE | OUTPATIENT
Start: 2023-02-07

## 2023-02-07 RX ADMIN — DOCUSATE SODIUM 100 MG: 100 CAPSULE, LIQUID FILLED ORAL at 07:40

## 2023-02-07 RX ADMIN — PREGABALIN 75 MG: 75 CAPSULE ORAL at 07:40

## 2023-02-07 RX ADMIN — AMLODIPINE BESYLATE 5 MG: 5 TABLET ORAL at 07:40

## 2023-02-07 RX ADMIN — APIXABAN 5 MG: 5 TABLET, FILM COATED ORAL at 07:40

## 2023-02-07 RX ADMIN — OXYCODONE AND ACETAMINOPHEN 1 TABLET: 5; 325 TABLET ORAL at 07:46

## 2023-02-07 RX ADMIN — POLYETHYLENE GLYCOL 3350 17 G: 17 POWDER, FOR SOLUTION ORAL at 07:39

## 2023-02-07 RX ADMIN — LISINOPRIL 20 MG: 20 TABLET ORAL at 07:40

## 2023-02-07 RX ADMIN — ZOLPIDEM TARTRATE 5 MG: 5 TABLET ORAL at 01:05

## 2023-02-07 RX ADMIN — OXYCODONE AND ACETAMINOPHEN 1 TABLET: 5; 325 TABLET ORAL at 01:05

## 2023-02-07 ASSESSMENT — PAIN - FUNCTIONAL ASSESSMENT: PAIN_FUNCTIONAL_ASSESSMENT: ACTIVITIES ARE NOT PREVENTED

## 2023-02-07 ASSESSMENT — PAIN DESCRIPTION - FREQUENCY: FREQUENCY: CONTINUOUS

## 2023-02-07 ASSESSMENT — PAIN DESCRIPTION - ONSET: ONSET: ON-GOING

## 2023-02-07 ASSESSMENT — PAIN SCALES - GENERAL
PAINLEVEL_OUTOF10: 9
PAINLEVEL_OUTOF10: 0
PAINLEVEL_OUTOF10: 6

## 2023-02-07 ASSESSMENT — PAIN DESCRIPTION - LOCATION
LOCATION: LEG
LOCATION: GENERALIZED

## 2023-02-07 ASSESSMENT — PAIN DESCRIPTION - PAIN TYPE: TYPE: OTHER (COMMENT)

## 2023-02-07 ASSESSMENT — PAIN DESCRIPTION - ORIENTATION: ORIENTATION: RIGHT;LEFT

## 2023-02-07 ASSESSMENT — PAIN DESCRIPTION - DESCRIPTORS: DESCRIPTORS: ACHING

## 2023-02-07 NOTE — PROGRESS NOTES
Attempted to transport patient by wheelchair. Patient was unable to stand and pivot into wheelchair. Will get stretcher transport.

## 2023-02-07 NOTE — PROGRESS NOTES
Patient discharged off unit with transport. All discharge paperwork sent with patient. All patient belongings sent with patient including prosthetics and crutches.

## 2023-02-07 NOTE — PLAN OF CARE
Problem: Discharge Planning  Goal: Discharge to home or other facility with appropriate resources  2/7/2023 0325 by Tyron Coates RN  Outcome: Progressing  2/6/2023 1658 by Anastasiya Fischer RN  Outcome: Adequate for Discharge     Problem: Safety - Adult  Goal: Free from fall injury  2/7/2023 0325 by Tyron Coates RN  Outcome: Progressing  2/6/2023 1658 by Anastasiya Fischer RN  Outcome: Adequate for Discharge     Problem: ABCDS Injury Assessment  Goal: Absence of physical injury  2/7/2023 0325 by Tyron Coates RN  Outcome: Progressing  2/6/2023 1658 by Anastasiya Fischer RN  Outcome: Adequate for Discharge     Problem: Skin/Tissue Integrity  Goal: Absence of new skin breakdown  Description: 1. Monitor for areas of redness and/or skin breakdown  2. Assess vascular access sites hourly  3. Every 4-6 hours minimum:  Change oxygen saturation probe site  4. Every 4-6 hours:  If on nasal continuous positive airway pressure, respiratory therapy assess nares and determine need for appliance change or resting period.   2/7/2023 0325 by Tyron Coates RN  Outcome: Progressing  2/6/2023 1658 by Anastasiya Fischer RN  Outcome: Adequate for Discharge     Problem: Chronic Conditions and Co-morbidities  Goal: Patient's chronic conditions and co-morbidity symptoms are monitored and maintained or improved  2/7/2023 0325 by Tyron Coates RN  Outcome: Progressing  2/6/2023 1658 by Anastasiya Fischer RN  Outcome: Adequate for Discharge     Problem: Pain  Goal: Verbalizes/displays adequate comfort level or baseline comfort level  Outcome: Progressing

## 2023-02-07 NOTE — CARE COORDINATION
Social Work-Patient was unable to tolerate transferring to wheelchair. Rescheduled stretcher for 130. Notified [facility] and Jose that pt will need lift for dx and that pad will need to be sent with him. Orders faxed to [facility] also.  Isis Zepeda

## 2023-02-07 NOTE — PROGRESS NOTES
Renal Progress Note    Patient :  Thuan Spears; 60 y.o. MRN# [id number]  Location:  2001/2001-02  Attending:  Bertha Louie MD  Admit Date:  2/2/2023   Hospital Day: 5    Subjective:     Patient was seen and examined. No new issues reported overnight. Patient had dialysis yesterday ran for 190 minutes, 20 minutes early and got about 3.1 L removed. Has a tunneled catheter in place. Patient will be getting discharged to [facility] today afternoon. Outpatient Medications:     Medications Prior to Admission: [DISCONTINUED] acetaminophen (TYLENOL) 500 MG tablet, Take 500 mg by mouth every 6 hours as needed for Pain  [DISCONTINUED] sodium zirconium cyclosilicate (LOKELMA) 5 g PACK oral suspension, Take 5 g by mouth every other day Indications: Don't take  MWF (Diaylsis days)  [DISCONTINUED] aspirin 81 MG EC tablet, Take 81 mg by mouth daily  [DISCONTINUED] traZODone (DESYREL) 50 MG tablet, TAKE ONE TABLET BY MOUTH ONCE NIGHTLY AS NEEDED FOR SLEEP  [DISCONTINUED] linagliptin (TRADJENTA) 5 MG tablet, TAKE ONE TABLET BY MOUTH DAILY  [DISCONTINUED] minoxidil (LONITEN) 2.5 MG tablet, Take 5 mg by mouth 2 times daily  [DISCONTINUED] pregabalin (LYRICA) 75 MG capsule, Take 1 capsule by mouth daily for 30 days.   [DISCONTINUED] amLODIPine-atorvastatatin (CADUET) 10-80 MG per tablet, Take 1 tablet by mouth daily (Patient not taking: Reported on 2/2/2023)  [DISCONTINUED] lisinopril (PRINIVIL;ZESTRIL) 20 MG tablet, Take 1 tablet by mouth in the morning and at bedtime  [DISCONTINUED] cloNIDine (CATAPRES) 0.3 MG tablet, Take 1 tablet by mouth 2 times daily Do not take if HR < 60  [DISCONTINUED] Sucroferric Oxyhydroxide (VELPHORO) 500 MG CHEW, Take 1,000 mg by mouth 3 times daily  [DISCONTINUED] apixaban (ELIQUIS) 5 MG TABS tablet, Take 1 tablet by mouth 2 times daily  [DISCONTINUED] Multiple Vitamins-Minerals (RENAPLEX-D PO), Take 1 tablet by mouth daily  (Patient not taking: Reported on 2/2/2023)    Current Medications:     Scheduled Meds:    insulin lispro  0-8 Units SubCUTAneous TID WC    insulin lispro  0-4 Units SubCUTAneous Nightly    amLODIPine  5 mg Oral Daily    polyethylene glycol  17 g Oral Daily    docusate sodium  100 mg Oral BID    lisinopril  20 mg Oral BID    pregabalin  75 mg Oral Daily    apixaban  5 mg Oral BID     Continuous Infusions:    dextrose       PRN Meds:  hydrALAZINE, oxyCODONE-acetaminophen, glucose, dextrose bolus **OR** dextrose bolus, glucagon (rDNA), dextrose, polyethyl glycol-propyl glycol 0.4-0.3 %, zolpidem, sodium citrate, sodium citrate    Input/Output:       I/O last 3 completed shifts: In: 900 [P.O.:400]  Out: 3133 . Patient Vitals for the past 96 hrs (Last 3 readings):   Weight   23 1334 208 lb 12.4 oz (94.7 kg)   23 1012 214 lb 8.1 oz (97.3 kg)       Vital Signs:   Temperature:  Temp: 98.4 °F (36.9 °C)  TMax:   Temp (24hrs), Av.1 °F (36.7 °C), Min:97.7 °F (36.5 °C), Max:98.4 °F (36.9 °C)    Respirations:  Resp: 18  Pulse:   Heart Rate: 76  BP:    BP: 137/71  BP Range: Systolic (30XGX), HPO:354 , Min:128 , EIQ:484       Diastolic (34HWL), HID:61, Min:71, Max:100      Physical Examination:     General:  AAO x 3, speaking in full sentences, no accessory muscle use. HEENT: Atraumatic, normocephalic, no throat congestion, moist mucosa. Eyes:   Pupils equal, round and reactive to light, EOMI. Neck:   Supple  Chest:   Bilateral vesicular breath sounds, no rales or wheezes. Cardiac:  S1 S2 RR, no murmurs, gallops or rubs. Abdomen: Soft, non-tender, no masses or organomegaly, BS audible. :   No suprapubic or flank tenderness. Neuro:  AAO x 3, No FND. SKIN:  No rashes, good skin turgor. Extremities:  Bilateral lower extremity amputation.       Labs:       Recent Labs     23  1348 23  0552   WBC 6.8 8.2   RBC 4.93 5.53   HGB 11.5* 12.9*   HCT 36.7* 39.7*   MCV 74.4* 71.8*   MCH 23.3* 23.3*   MCHC 31.3 32.5   RDW 19.7* 19.9*    288   MPV 10.0 Abnormal      BMP: Recent Labs     02/04/23  1348 02/06/23  0552   * 132*   K 4.2 5.2   CL 95* 95*   CO2 24 23   BUN 30* 47*   CREATININE 5.77* 7.93*   GLUCOSE 223* 161*   CALCIUM 9.0 9.7     Albumin:    Recent Labs     02/04/23  1348   LABALBU 3.4*     BNP:      Lab Results   Component Value Date/Time    BNP 3,088 06/13/2013 06:32 AM     SPEP:  Lab Results   Component Value Date/Time    PROT 6.9 02/04/2023 01:48 PM     Hep BsAg:         Lab Results   Component Value Date/Time    HEPBSAG NONREACTIVE 11/13/2022 12:55 PM     Hep C AB:          Lab Results   Component Value Date/Time    HEPCAB NONREACTIVE 09/28/2021 03:15 PM       Urinalysis/Chemistries:      Lab Results   Component Value Date/Time    NITRU NEGATIVE 06/13/2013 10:30 AM    COLORU YELLOW 06/13/2013 10:30 AM    PHUR 7.5 06/13/2013 10:30 AM    WBCUA 0 TO 2 06/13/2013 10:30 AM    RBCUA 10 TO 20 06/13/2013 10:30 AM    MUCUS NOT REPORTED 06/13/2013 10:30 AM    TRICHOMONAS NOT REPORTED 06/13/2013 10:30 AM    YEAST NOT REPORTED 06/13/2013 10:30 AM    BACTERIA FEW 06/13/2013 10:30 AM    SPECGRAV 1.015 06/13/2013 10:30 AM    LEUKOCYTESUR NEGATIVE 06/13/2013 10:30 AM    UROBILINOGEN Normal 06/13/2013 10:30 AM    BILIRUBINUR NEGATIVE 06/13/2013 10:30 AM    GLUCOSEU 3+ 06/13/2013 10:30 AM    KETUA NEGATIVE 06/13/2013 10:30 AM    AMORPHOUS NOT REPORTED 06/13/2013 10:30 AM     Radiology:     Reviewed. Assessment:     ESRD on HD on MWF schedule at [facility] hemodialysis unit via tunnel catheter and Dr. Mona Delcid. Bilateral lower extremity edema in the setting of bilateral BKA. Anemia of chronic disease. Hypertension. Second hyperparathyroidism. Bilateral lower extremity amputation. Plan:   No acute need for dialysis today. To get regular dialysis in a.m. as per MWF schedule. Keep renal diet including fluid restrictions less than 1500 cc/day. Okay to discharge from nephrology standpoint. Nutrition   Please ensure that patient is on a renal diet/TF.  Avoid nephrotoxic drugs/contrast exposure. Robe Miller MD  [facility]     This note is created with the assistance of a speech-recognition program. While intending to generate a document that actually reflects the content of the visit, no guarantees can be provided that every mistake has been identified and corrected by editing.

## 2023-02-07 NOTE — CARE COORDINATION
[facility] will transport to [place] today at 10 AM. Orders faxed. Nurse to mary ann report to [phone]. Patient is agreeable with dc plans. ZULY completed.  Everton Banerjee

## 2023-02-12 NOTE — PROGRESS NOTES
Physician Progress Note      PATIENT:               Richard Stone  CSN #:                  [id number]  :                       1960  ADMIT DATE:       2023 3:46 PM  [address] [place] DATE:        2023 1:45 PM  RESPONDING  PROVIDER #:        Ghada John MD          QUERY TEXT:    Pt admitted with leg pain. Pt noted to have bilateral BKA. If possible, please   document in progress notes and discharge summary the relationship, if any,   between leg pain and amputation. The medical record reflects the following:  Risk Factors: BKA  Clinical Indicators: in MD progress notes sates that Bilateral lower extremity   edema in the setting of bilateral BKA on 2023 by Дмитрий Mendenhall MD and in   discharge summary complaint of difficulty ambulation and not being able to   fit into his prosthetics because of pain, note of phantom pain  Treatment: SNF placement, Pain control    Thank you,  Kiana Paz RN  Options provided:  -- amputation pain d/t prosthesis malfunction/fit  -- amputation pain d/t phantom limb syndrome with pain  -- Other - I will add my own diagnosis  -- Disagree - Not applicable / Not valid  -- Disagree - Clinically unable to determine / Unknown  -- Refer to Clinical Documentation Reviewer    PROVIDER RESPONSE TEXT:    this patient has amputation pain d/t prosthesis malfunction/fit. Query created by:  Nanda Finney on 2/10/2023 1:53 PM      Electronically signed by:  Ghada John MD 2023 1:05 PM

## 2023-02-22 ENCOUNTER — ANESTHESIA EVENT (OUTPATIENT)
Dept: OPERATING ROOM | Age: 63
End: 2023-02-22
Payer: COMMERCIAL

## 2023-02-22 ENCOUNTER — APPOINTMENT (OUTPATIENT)
Dept: GENERAL RADIOLOGY | Age: 63
End: 2023-02-22
Payer: COMMERCIAL

## 2023-02-22 ENCOUNTER — ANESTHESIA (OUTPATIENT)
Dept: OPERATING ROOM | Age: 63
End: 2023-02-22
Payer: COMMERCIAL

## 2023-02-22 ENCOUNTER — HOSPITAL ENCOUNTER (OUTPATIENT)
Age: 63
Setting detail: OBSERVATION
Discharge: OTHER INSTITUTION | End: 2023-02-23
Attending: EMERGENCY MEDICINE | Admitting: FAMILY MEDICINE
Payer: COMMERCIAL

## 2023-02-22 DIAGNOSIS — N47.1 PHIMOSIS: Primary | ICD-10-CM

## 2023-02-22 LAB
ABSOLUTE EOS #: 0.17 K/UL (ref 0–0.44)
ABSOLUTE IMMATURE GRANULOCYTE: 0.02 K/UL (ref 0–0.3)
ABSOLUTE LYMPH #: 1.53 K/UL (ref 1.1–3.7)
ABSOLUTE MONO #: 0.83 K/UL (ref 0.1–1.2)
ALBUMIN SERPL-MCNC: 3.8 G/DL (ref 3.5–5.2)
ALP SERPL-CCNC: 186 U/L (ref 40–129)
ALT SERPL-CCNC: 12 U/L (ref 5–41)
ANION GAP SERPL CALCULATED.3IONS-SCNC: 13 MMOL/L (ref 9–17)
AST SERPL-CCNC: 14 U/L
BASOPHILS # BLD: 0 % (ref 0–2)
BASOPHILS ABSOLUTE: <0.03 K/UL (ref 0–0.2)
BILIRUB SERPL-MCNC: 0.7 MG/DL (ref 0.3–1.2)
BUN SERPL-MCNC: 37 MG/DL (ref 8–23)
BUN/CREAT BLD: 5 (ref 9–20)
CALCIUM SERPL-MCNC: 9.7 MG/DL (ref 8.6–10.4)
CHLORIDE SERPL-SCNC: 94 MMOL/L (ref 98–107)
CO2 SERPL-SCNC: 26 MMOL/L (ref 20–31)
CREAT SERPL-MCNC: 7.47 MG/DL (ref 0.7–1.2)
EOSINOPHILS RELATIVE PERCENT: 3 % (ref 1–4)
GFR SERPL CREATININE-BSD FRML MDRD: 8 ML/MIN/1.73M2
GLUCOSE BLD-MCNC: 139 MG/DL (ref 75–110)
GLUCOSE BLD-MCNC: 148 MG/DL (ref 75–110)
GLUCOSE SERPL-MCNC: 186 MG/DL (ref 70–99)
HCT VFR BLD AUTO: 36.7 % (ref 40.7–50.3)
HGB BLD-MCNC: 11.2 G/DL (ref 13–17)
IMMATURE GRANULOCYTES: 0 %
LYMPHOCYTES # BLD: 24 % (ref 24–43)
MAGNESIUM SERPL-MCNC: 2.4 MG/DL (ref 1.6–2.6)
MCH RBC QN AUTO: 22.6 PG (ref 25.2–33.5)
MCHC RBC AUTO-ENTMCNC: 30.5 G/DL (ref 28.4–34.8)
MCV RBC AUTO: 74.1 FL (ref 82.6–102.9)
MONOCYTES # BLD: 13 % (ref 3–12)
NRBC AUTOMATED: 0 PER 100 WBC
PDW BLD-RTO: 19.9 % (ref 11.8–14.4)
PHOSPHATE SERPL-MCNC: 5.3 MG/DL (ref 2.5–4.5)
PLATELET # BLD AUTO: 152 K/UL (ref 138–453)
PMV BLD AUTO: ABNORMAL FL (ref 8.1–13.5)
POTASSIUM SERPL-SCNC: 4.9 MMOL/L (ref 3.7–5.3)
PROT SERPL-MCNC: 7.8 G/DL (ref 6.4–8.3)
RBC # BLD: 4.95 M/UL (ref 4.21–5.77)
RBC # BLD: ABNORMAL 10*6/UL
SEG NEUTROPHILS: 60 % (ref 36–65)
SEGMENTED NEUTROPHILS ABSOLUTE COUNT: 3.83 K/UL (ref 1.5–8.1)
SODIUM SERPL-SCNC: 133 MMOL/L (ref 135–144)
WBC # BLD AUTO: 6.4 K/UL (ref 3.5–11.3)

## 2023-02-22 PROCEDURE — 6370000000 HC RX 637 (ALT 250 FOR IP): Performed by: UROLOGY

## 2023-02-22 PROCEDURE — 3600000002 HC SURGERY LEVEL 2 BASE: Performed by: UROLOGY

## 2023-02-22 PROCEDURE — 6360000002 HC RX W HCPCS: Performed by: ANESTHESIOLOGY

## 2023-02-22 PROCEDURE — 83735 ASSAY OF MAGNESIUM: CPT

## 2023-02-22 PROCEDURE — 71045 X-RAY EXAM CHEST 1 VIEW: CPT

## 2023-02-22 PROCEDURE — G0378 HOSPITAL OBSERVATION PER HR: HCPCS

## 2023-02-22 PROCEDURE — 85025 COMPLETE CBC W/AUTO DIFF WBC: CPT

## 2023-02-22 PROCEDURE — 3700000001 HC ADD 15 MINUTES (ANESTHESIA): Performed by: UROLOGY

## 2023-02-22 PROCEDURE — 7100000000 HC PACU RECOVERY - FIRST 15 MIN: Performed by: UROLOGY

## 2023-02-22 PROCEDURE — 2500000003 HC RX 250 WO HCPCS: Performed by: UROLOGY

## 2023-02-22 PROCEDURE — 88305 TISSUE EXAM BY PATHOLOGIST: CPT

## 2023-02-22 PROCEDURE — 3700000000 HC ANESTHESIA ATTENDED CARE: Performed by: UROLOGY

## 2023-02-22 PROCEDURE — 3600000012 HC SURGERY LEVEL 2 ADDTL 15MIN: Performed by: UROLOGY

## 2023-02-22 PROCEDURE — 82947 ASSAY GLUCOSE BLOOD QUANT: CPT

## 2023-02-22 PROCEDURE — 2580000003 HC RX 258: Performed by: NURSE PRACTITIONER

## 2023-02-22 PROCEDURE — 2500000003 HC RX 250 WO HCPCS: Performed by: ANESTHESIOLOGY

## 2023-02-22 PROCEDURE — 84100 ASSAY OF PHOSPHORUS: CPT

## 2023-02-22 PROCEDURE — 99285 EMERGENCY DEPT VISIT HI MDM: CPT

## 2023-02-22 PROCEDURE — 80053 COMPREHEN METABOLIC PANEL: CPT

## 2023-02-22 PROCEDURE — 2709999900 HC NON-CHARGEABLE SUPPLY: Performed by: UROLOGY

## 2023-02-22 PROCEDURE — 7100000001 HC PACU RECOVERY - ADDTL 15 MIN: Performed by: UROLOGY

## 2023-02-22 RX ORDER — PHENYLEPHRINE HCL IN 0.9% NACL 1 MG/10 ML
SYRINGE (ML) INTRAVENOUS PRN
Status: DISCONTINUED | OUTPATIENT
Start: 2023-02-22 | End: 2023-02-22 | Stop reason: SDUPTHER

## 2023-02-22 RX ORDER — ACETAMINOPHEN 650 MG/1
650 SUPPOSITORY RECTAL EVERY 6 HOURS PRN
Status: DISCONTINUED | OUTPATIENT
Start: 2023-02-22 | End: 2023-02-23 | Stop reason: HOSPADM

## 2023-02-22 RX ORDER — SODIUM CHLORIDE 9 MG/ML
INJECTION, SOLUTION INTRAVENOUS PRN
Status: DISCONTINUED | OUTPATIENT
Start: 2023-02-22 | End: 2023-02-22 | Stop reason: HOSPADM

## 2023-02-22 RX ORDER — SODIUM CHLORIDE 0.9 % (FLUSH) 0.9 %
5-40 SYRINGE (ML) INJECTION EVERY 12 HOURS SCHEDULED
Status: DISCONTINUED | OUTPATIENT
Start: 2023-02-22 | End: 2023-02-23 | Stop reason: HOSPADM

## 2023-02-22 RX ORDER — SODIUM CHLORIDE 0.9 % (FLUSH) 0.9 %
5-40 SYRINGE (ML) INJECTION EVERY 12 HOURS SCHEDULED
Status: DISCONTINUED | OUTPATIENT
Start: 2023-02-22 | End: 2023-02-22 | Stop reason: HOSPADM

## 2023-02-22 RX ORDER — SODIUM CHLORIDE 9 MG/ML
INJECTION, SOLUTION INTRAVENOUS PRN
Status: DISCONTINUED | OUTPATIENT
Start: 2023-02-22 | End: 2023-02-23 | Stop reason: HOSPADM

## 2023-02-22 RX ORDER — ACETAMINOPHEN 325 MG/1
650 TABLET ORAL EVERY 6 HOURS PRN
Status: DISCONTINUED | OUTPATIENT
Start: 2023-02-22 | End: 2023-02-23 | Stop reason: HOSPADM

## 2023-02-22 RX ORDER — NALOXONE HYDROCHLORIDE 0.4 MG/ML
INJECTION, SOLUTION INTRAMUSCULAR; INTRAVENOUS; SUBCUTANEOUS PRN
Status: DISCONTINUED | OUTPATIENT
Start: 2023-02-22 | End: 2023-02-22 | Stop reason: SDUPTHER

## 2023-02-22 RX ORDER — DIPHENHYDRAMINE HCL 25 MG
25 TABLET ORAL NIGHTLY PRN
COMMUNITY

## 2023-02-22 RX ORDER — SODIUM CHLORIDE 0.9 % (FLUSH) 0.9 %
5-40 SYRINGE (ML) INJECTION PRN
Status: DISCONTINUED | OUTPATIENT
Start: 2023-02-22 | End: 2023-02-22 | Stop reason: HOSPADM

## 2023-02-22 RX ORDER — FENTANYL CITRATE 50 UG/ML
INJECTION, SOLUTION INTRAMUSCULAR; INTRAVENOUS PRN
Status: DISCONTINUED | OUTPATIENT
Start: 2023-02-22 | End: 2023-02-22 | Stop reason: SDUPTHER

## 2023-02-22 RX ORDER — LIDOCAINE HYDROCHLORIDE 20 MG/ML
INJECTION, SOLUTION INFILTRATION; PERINEURAL PRN
Status: DISCONTINUED | OUTPATIENT
Start: 2023-02-22 | End: 2023-02-22 | Stop reason: SDUPTHER

## 2023-02-22 RX ORDER — DEXAMETHASONE SODIUM PHOSPHATE 10 MG/ML
INJECTION, SOLUTION INTRAMUSCULAR; INTRAVENOUS PRN
Status: DISCONTINUED | OUTPATIENT
Start: 2023-02-22 | End: 2023-02-22 | Stop reason: SDUPTHER

## 2023-02-22 RX ORDER — CEFAZOLIN SODIUM 1 G/3ML
INJECTION, POWDER, FOR SOLUTION INTRAMUSCULAR; INTRAVENOUS PRN
Status: DISCONTINUED | OUTPATIENT
Start: 2023-02-22 | End: 2023-02-22 | Stop reason: SDUPTHER

## 2023-02-22 RX ORDER — ONDANSETRON 2 MG/ML
INJECTION INTRAMUSCULAR; INTRAVENOUS PRN
Status: DISCONTINUED | OUTPATIENT
Start: 2023-02-22 | End: 2023-02-22 | Stop reason: SDUPTHER

## 2023-02-22 RX ORDER — BUPIVACAINE HYDROCHLORIDE 5 MG/ML
INJECTION, SOLUTION EPIDURAL; INTRACAUDAL PRN
Status: DISCONTINUED | OUTPATIENT
Start: 2023-02-22 | End: 2023-02-22 | Stop reason: ALTCHOICE

## 2023-02-22 RX ORDER — GLIMEPIRIDE 2 MG/1
2 TABLET ORAL
COMMUNITY

## 2023-02-22 RX ORDER — SODIUM CHLORIDE 0.9 % (FLUSH) 0.9 %
10 SYRINGE (ML) INJECTION PRN
Status: DISCONTINUED | OUTPATIENT
Start: 2023-02-22 | End: 2023-02-23 | Stop reason: HOSPADM

## 2023-02-22 RX ORDER — PROPOFOL 10 MG/ML
INJECTION, EMULSION INTRAVENOUS PRN
Status: DISCONTINUED | OUTPATIENT
Start: 2023-02-22 | End: 2023-02-22 | Stop reason: SDUPTHER

## 2023-02-22 RX ADMIN — PROPOFOL 120 MG: 10 INJECTION, EMULSION INTRAVENOUS at 17:27

## 2023-02-22 RX ADMIN — ONDANSETRON 4 MG: 2 INJECTION INTRAMUSCULAR; INTRAVENOUS at 18:15

## 2023-02-22 RX ADMIN — Medication 100 MCG: at 17:51

## 2023-02-22 RX ADMIN — DEXAMETHASONE SODIUM PHOSPHATE 10 MG: 10 INJECTION, SOLUTION INTRAMUSCULAR; INTRAVENOUS at 17:34

## 2023-02-22 RX ADMIN — FENTANYL CITRATE 50 MCG: 50 INJECTION INTRAMUSCULAR; INTRAVENOUS at 18:02

## 2023-02-22 RX ADMIN — NALOXONE HYDROCHLORIDE 0.2 MG: 0.4 INJECTION, SOLUTION INTRAMUSCULAR; INTRAVENOUS; SUBCUTANEOUS at 18:22

## 2023-02-22 RX ADMIN — LIDOCAINE HYDROCHLORIDE 80 MG: 20 INJECTION, SOLUTION INFILTRATION; PERINEURAL at 17:27

## 2023-02-22 RX ADMIN — CEFAZOLIN 2 G: 1 INJECTION, POWDER, FOR SOLUTION INTRAMUSCULAR; INTRAVENOUS at 17:37

## 2023-02-22 RX ADMIN — SODIUM CHLORIDE, PRESERVATIVE FREE 10 ML: 5 INJECTION INTRAVENOUS at 20:45

## 2023-02-22 RX ADMIN — SODIUM CHLORIDE: 9 INJECTION, SOLUTION INTRAVENOUS at 17:10

## 2023-02-22 ASSESSMENT — ENCOUNTER SYMPTOMS
COLOR CHANGE: 0
COUGH: 0
ABDOMINAL PAIN: 0
SHORTNESS OF BREATH: 0

## 2023-02-22 ASSESSMENT — VISUAL ACUITY: OU: 1

## 2023-02-22 NOTE — CONSULTS
[facility]  Urology Consultation    Patient:  Mónica Vaughan  MRN: [id number]  YOB: 1960    CHIEF COMPLAINT: Penile pain phimosis    HISTORY OF PRESENT ILLNESS:   The patient is a 60 y.o. male who presents with severe penile pain, I received a call from the emergency room, Dr. Brianda Smith regarding this patient, emergency room evaluation expressed concern about phimosis. Discussed with the patient dorsal slit, reduction of paraphimosis consent form signed. The penile examination reveals significant tenderness at the glans penis near the coronal sulcus dorsal and left lateral        The patient is extremely sensitive there is penile swelling he would not allow a physical examination    Patient's old records, notes and chart reviewed and summarized above. Past Medical History:    Past Medical History:   Diagnosis Date    Acute congestive heart failure (HCC)     Chronic bilateral low back pain with bilateral sciatica     Coronary artery disease involving native coronary artery 9/6/2022    CRF (chronic renal failure)     [facility] MWF    DDD (degenerative disc disease), lumbar 12/15/2020    Diabetes mellitus ([facility] [address]     Dialysis patient [facility])     ED (erectile dysfunction)     History of echocardiogram 2014    [facility]    HTN (hypertension)     Hyperlipidemia     LVH (left ventricular hypertrophy)     PVD (peripheral vascular disease) ([facility])     S/P bilateral BKA (below knee amputation) ([address]     Wears glasses        Past Surgical History:    Past Surgical History:   Procedure Laterality Date    ARM SURGERY      CATARACT REMOVAL WITH IMPLANT      DIALYSIS FISTULA CREATION Bilateral     As of 2019, RUE AVF functioning, LUE AVF nonfunctioning.     FINGER AMPUTATION      right pointer finger    FINGER SURGERY Left     I & D    GLAUCOMA SURGERY      LEG AMPUTATION BELOW KNEE Bilateral     TUNNELED VENOUS CATHETER PLACEMENT      PC placed and removed     Previous  surgery: none     Medications:    Scheduled Meds:   sodium chloride flush  5-40 mL IntraVENous 2 times per day     Continuous Infusions:   sodium chloride       PRN Meds:.sodium chloride flush, sodium chloride, acetaminophen **OR** acetaminophen    Allergies:  Patient has no known allergies. Social History:    Social History     Socioeconomic History    Marital status:      Spouse name: Not on file    Number of children: Not on file    Years of education: Not on file    Highest education level: Not on file   Occupational History    Not on file   Tobacco Use    Smoking status: Never    Smokeless tobacco: Never   Vaping Use    Vaping Use: Never used   Substance and Sexual Activity    Alcohol use: Yes     Comment: rare    Drug use: No    Sexual activity: Not on file   Other Topics Concern    Not on file   Social History Narrative    Not on file     Social Determinants of Health     Financial Resource Strain: Low Risk     Difficulty of Paying Living Expenses: Not hard at all   Food Insecurity: No Food Insecurity    Worried About Running Out of Food in the Last Year: Never true    [address] in the Last Year: Never true   Transportation Needs: No Transportation Needs    Lack of Transportation (Medical): No    Lack of Transportation (Non-Medical): No   Physical Activity: Inactive    Days of Exercise per Week: 0 days    Minutes of Exercise per Session: 0 min   Stress: No Stress Concern Present    Feeling of Stress : Not at all   Social Connections: Moderately Integrated    Frequency of Communication with Friends and Family: More than three times a week    Frequency of Social Gatherings with Friends and Family: More than three times a week    Attends Sikhism Services: More than 4 times per year    Active Member of [employer]aint[employer] Group or Organizations: Yes    Attends Club or Organization Meetings: Never    Marital Status:    Intimate Partner Violence: Not At Risk    Fear of Current or Ex-Partner: No    Emotionally Abused: No    Physically Abused:  No Sexually Abused: No   Housing Stability: Unknown    Unable to Pay for Housing in the Last Year: No    Number of Places Lived in the Last Year: Not on file    Unstable Housing in the Last Year: No       Family History:    Family History   Problem Relation Age of Onset    Diabetes Mother     Coronary Art Dis Mother     Hypertension Mother     Diabetes Father     Hypertension Father     Stroke Father     Cancer Sister     Hypertension Brother      Previous Urologic Family history: none    REVIEW OF SYSTEMS:  Constitutional: negative  Eyes: negative  Respiratory: negative  Cardiovascular: negative  Gastrointestinal: negative  Genitourinary: see HPI  Musculoskeletal: negative  Skin: negative   Neurological: negative  Hematological/Lymphatic: negative  Psychological: negative    Physical Exam:    This a 60 y.o. male   Patient Vitals for the past 24 hrs:   BP Temp Temp src Pulse Resp SpO2 Height Weight   02/22/23 1609 (!) 142/76 98.6 °F (37 °C) Oral 75 16 92 % -- 211 lb 11.2 oz (96 kg)   02/22/23 1044 132/68 98.7 °F (37.1 °C) -- 77 18 99 % 5' 11\" (1.803 m) 215 lb (97.5 kg)     Constitutional: Patient in  acute distress;, complaining of severe penile pain and swelling. Neuro: alert and oriented to person place and time. Psych: Mood and affect normal.  Skin: Normal  Lungs: Respiratory effort normal  Cardiovascular:  Normal peripheral pulses  Abdomen: Soft, non-tender, non-distended with no CVA, flank pain, hepatosplenomegaly or hernia. Kidneys normal.  Bladder non-tender and not distended.   Lymphatics: no palpable lymphadenopathy  Penis swollen, patient has severe phimosis, he has significant tenderness to the penile shaft and swelling of the foreskin, glans penis is firm to palpation patient does not allow deep examination because of the severe pain and tenderness, this will be addressed after dorsal slit  Urethral meatus normal  Scrotal exam normal  Testicles normal bilaterally  Epididymis normal bilaterally  LABS:  Recent Labs     02/22/23  1120   WBC 6.4   HGB 11.2*   HCT 36.7*   MCV 74.1*        Recent Labs     02/22/23  1120   *   K 4.9   CL 94*   CO2 26   PHOS 5.3*   BUN 37*   CREATININE 7.47*     Lab Results   Component Value Date    PSA 0.57 09/28/2021    PSA 1.68 12/10/2020    PSA 0.53 01/16/2020       Additional Lab/culture results:    Urinalysis: No results for input(s): COLORU, PHUR, LABCAST, WBCUA, RBCUA, MUCUS, TRICHOMONAS, YEAST, BACTERIA, CLARITYU, SPECGRAV, LEUKOCYTESUR, UROBILINOGEN, Gerold Finely in the last 72 hours.     Invalid input(s): NITRATE, GLUCOSEUKETONESUAMORPHOUS     -----------------------------------------------------------------  Imaging Results:    Assessment and Plan   Impression:    Patient Active Problem List   Diagnosis    Hyperlipidemia    Essential hypertension    [facility] M[facility]    Insomnia    Chronic bilateral low back pain with bilateral sciatica    Controlled diabetes mellitus type 2 with complications ([facility])    S/P bilateral below knee amputation ([facility]    Long term (current) use of antithrombotics/antiplatelets    Chronic use of opiate drugs therapeutic purposes    Coronary artery disease involving native coronary artery    Anemia    Hypoglycemia    Inability to walk    Phimosis       Plan: Proceed with dorsal slit, examination under anesthesia, we will address the firmness on the glans penis with consideration for biopsy    Anitra Walsh MD  5:30 PM 2/22/2023

## 2023-02-22 NOTE — ANESTHESIA POSTPROCEDURE EVALUATION
Department of Anesthesiology  Postprocedure Note    Patient: Ema Valenzuela  MRN: [id number]  YOB: 1960  Date of evaluation: 2/22/2023      Procedure Summary     Date: 02/22/23 [facility] / [facility] [place] [address] [facility]    Anesthesia Start: 1710 Anesthesia Stop: Catalina Childress    Procedure: EXAM UNDER ANESTHESIA, DORSAL SLIT , BIOPSY GLANDS PENIS (Penis) Diagnosis:       Phimosis      (Phimosis [N47.1])    Surgeons: Agatha Harris MD Responsible Provider: Keshav Hernandez DO    Anesthesia Type: general ASA Status: 4 - Emergent          Anesthesia Type: No value filed.     Josefa Phase I:      Josefa Phase II:        Anesthesia Post Evaluation    Patient location during evaluation: PACU  Patient participation: complete - patient participated  Level of consciousness: awake and alert  Airway patency: patent  Nausea & Vomiting: no nausea and no vomiting  Complications: no  Cardiovascular status: hemodynamically stable  Respiratory status: acceptable  Hydration status: stable

## 2023-02-22 NOTE — PROGRESS NOTES
Spoke to Dr Shayne Medina - pt going to go to surgery at 4:30 today, and then plan for HD afterwards. Dr Sheela Portillo aware, orders made for HD after pt returns from surgery.

## 2023-02-22 NOTE — OP NOTE
Operative Note      Patient: Juan Dang  YOB: 1960  MRN: [id number]    Date of Procedure: 2/22/2023    Pre-Op Diagnosis: Phimosis [N47.1]                                     Penile pain                                     Mass on glans penis  Post-Op Diagnosis: Same and mass on the glans penis near the coronal sulcus left of midline , dorsal aspect       Procedure(s):  EXAM UNDER ANESTHESIA, DORSAL SLIT , BIOPSY GLANDS PENIS    Surgeon(s):  Murry Nissen, MD    Assistant:   First Assistant: Sheryle Dadds, RN    Anesthesia: General    Estimated Blood Loss (mL): Minimal    Complications: None    Specimens:   ID Type Source Tests Collected by Time Destination   A : BIOPSY GLANDS PENIS AT [facility] Tissue Penis SURGICAL PATHOLOGY Murry Nissen, MD 2/22/2023 7091        Implants:  * No implants in log *      Drains: * No LDAs found *    Findings: Indications: We received a phone call from the emergency room regarding this patient, patient presented with severe penile pain, penile swelling, patient has severe phimosis, the glans penis is firm with the area of tenderness on the dorsal aspect left of midline near the coronal sulcus patient scheduled for dorsal slit examination under anesthesia and biopsy    Detailed Description of Procedure:   He was brought to the operating room, positioned in supine, proper patient identification procedure identification prepping and draping. We proceeded first with full examination, under anesthesia, we identified the firm spot on the glans penis as previously reported, the foreskin is edematous, patient has severe phimosis also swelling of the penoscrotal junction noted. We then proceeded with a dorsal slit. Straight hemostat was applied on the foreskin at the 12 o'clock position and the an incision was made from the tip of the foreskin down to the coronal sulcus.     We then proceeded with suturing both edges cutaneous and mucosal aspect of the foreskin using 3-0 and 4-0 chromic. At the completion we had the opportunity to perform a better examination of the glans penis. The area of induration reported above on the dorsal aspect of the glans penis left of midline around 10 o'clock position measures about 1 cm the area is firm, discolored, using, a wedge biopsy was obtained, a specimen was sent to pathology, the base of the lesion was cauterized. The edges of the elliptical incision were approximated with 4-0 chromic, there was no residual bleeding. Clinically there is also evidence of an area of ischemia that involves the same area of concern reported at the site of the biopsy. At the completion a sterile dressing was applied bacitracin ointment was applied and the patient returned to recovery room in stable condition.     Recommendations, we will control the patient's discomfort and wound care will be applied and we will await pathology report    Electronically signed by Helen Figueroa MD on 2/22/2023 at 6:26 PM

## 2023-02-22 NOTE — PROGRESS NOTES
Patient admitted to 21  by stretcher . Belongings and medications with the patient. Family aware of the transfer. Condition satisfactory upon transfer.

## 2023-02-22 NOTE — ED PROVIDER NOTES
This visit was performed by both a physician and an APC. I personally evaluated and examined the patient. I performed all aspects of the MDM as documented. Patient has penile pain. Upon arrival I attempted reduction of his phimosis but was not successful. Ice pack was applied for an adequate amount of time and another attempt was made at reduction but this also was not successful. I have spoken to Dr. Delfino Parr and the patient will be admitted and the plan is for the patient to be seen by him.      Fidencio Borrego MD  02/22/23 5274

## 2023-02-22 NOTE — ED PROVIDER NOTES
[address] ED  eMERGENCY dEPARTMENT eNCOUnter      Pt Name: Diaz Tao  MRN: [id number]  [place] 1960  Date of evaluation: 2/22/2023  Provider: Washington Almanzar       Chief Complaint   Patient presents with    Penis Pain    Groin Swelling         HISTORY OF PRESENT ILLNESS  (Location/Symptom, Timing/Onset, Context/Setting, Quality, Duration, Modifying Factors, Severity.)   Diaz Tao is a 60 y.o. male who presents to the emergency department for evaluation of penis pain and swelling for the past several days. Patient resides at a rehab facility. He denies injury. Denies testicular pain. History of ESRD on dialysis Monday Wednesday Friday at [facility]. Sees Dr. Samreen Chang  with nephrology. He went to dialysis 2 days ago. He did go to dialysis center today but left before treatment because of his penile pain. Pain is moderate with certain movement. He does not make urine. Patient states he is uncircumcised. He denies chest pain or shortness of breath. Nursing Notes were reviewed. ALLERGIES     Patient has no known allergies.     CURRENT MEDICATIONS       Previous Medications    AMLODIPINE (NORVASC) 5 MG TABLET    Take 1 tablet by mouth daily    APIXABAN (ELIQUIS) 5 MG TABS TABLET    Take 1 tablet by mouth 2 times daily    ASPIRIN (ASPIRIN CHILDRENS) 81 MG CHEWABLE TABLET    Take 1 tablet by mouth daily    ATORVASTATIN (LIPITOR) 80 MG TABLET    Take 1 tablet by mouth daily    CLONIDINE (CATAPRES) 0.1 MG TABLET    Take 3 tablets by mouth 2 times daily    DIPHENHYDRAMINE (BENADRYL) 25 MG TABLET    Take 25 mg by mouth nightly as needed for Itching    GLIMEPIRIDE (AMARYL) 2 MG TABLET    Take 2 mg by mouth every morning (before breakfast)    LINAGLIPTIN (TRADJENTA) 5 MG TABLET    Take 1 tablet by mouth daily    LISINOPRIL (PRINIVIL;ZESTRIL) 20 MG TABLET    Take 1 tablet by mouth 2 times daily    MINOXIDIL (LONITEN) 2.5 MG TABLET    Take 1 tablet by mouth 2 times daily    MULTIPLE VITAMINS-MINERALS (RENAPLEX) TABS    Take 1 tablet by mouth daily    POLYETHYL GLYCOL-PROPYL GLYCOL 0.4-0.3 % (SYSTANE) 0.4-0.3 % OPHTHALMIC SOLUTION    Place 1 drop into both eyes as needed for Dry Eyes    PREGABALIN (LYRICA) 75 MG CAPSULE    Take 1 capsule by mouth daily for 30 days. Max Daily Amount: 75 mg    SUCROFERRIC OXYHYDROXIDE (VELPHORO) 500 MG CHEW CHEWABLE TABLET    Take 2 tablets by mouth 3 times daily (with meals)    TRAZODONE (DESYREL) 50 MG TABLET    Take 1 tablet by mouth nightly as needed for Sleep       PAST MEDICAL HISTORY         Diagnosis Date    Acute congestive heart failure (HCC)     Chronic bilateral low back pain with bilateral sciatica     Coronary artery disease involving native coronary artery 9/6/2022    CRF (chronic renal failure)     [facility] MWF    DDD (degenerative disc disease), lumbar 12/15/2020    Diabetes mellitus ([facility]     Dialysis patient [facility])     ED (erectile dysfunction)     History of echocardiogram 2014    [facility]    HTN (hypertension)     Hyperlipidemia     LVH (left ventricular hypertrophy)     PVD (peripheral vascular disease) ([facility]     S/P bilateral BKA (below knee amputation) ([facility]     Wears glasses        SURGICAL HISTORY           Procedure Laterality Date    ARM SURGERY      CATARACT REMOVAL WITH IMPLANT      DIALYSIS FISTULA CREATION Bilateral     As of 2019, RUE AVF functioning, LUE AVF nonfunctioning.     FINGER AMPUTATION      right pointer finger    FINGER SURGERY Left     I & D    GLAUCOMA SURGERY      LEG AMPUTATION BELOW KNEE Bilateral     TUNNELED VENOUS CATHETER PLACEMENT      PC placed and removed         FAMILY HISTORY           Problem Relation Age of Onset    Diabetes Mother     Coronary Art Dis Mother     Hypertension Mother     Diabetes Father     Hypertension Father     Stroke Father     Cancer Sister     Hypertension Brother      Family Status   Relation Name Status    Mother  (Not Specified)    Father  (Not Specified) Sister  (Not Specified)    Brother  (Not Specified)        SOCIAL HISTORY      reports that he has never smoked. He has never used smokeless tobacco. He reports current alcohol use. He reports that he does not use drugs. REVIEW OF SYSTEMS    (2-9 systems for level 4, 10 or more for level 5)   Review of Systems   Respiratory:  Negative for cough and shortness of breath. Cardiovascular:  Negative for chest pain. Gastrointestinal:  Negative for abdominal pain. Genitourinary:  Positive for penile swelling. Negative for scrotal swelling and testicular pain. Skin:  Negative for color change and rash. All other systems reviewed and are negative. Except as noted above the remainder of the review of systems was reviewed and negative. PHYSICAL EXAM    (up to 7 for level 4, 8 or more for level 5)     ED Triage Vitals [02/22/23 1044]   BP Temp Temp src Heart Rate Resp SpO2 Height Weight   132/68 98.7 °F (37.1 °C) -- 77 18 99 % 5' 11\" (1.803 m) 215 lb (97.5 kg)     Physical Exam  Constitutional:       Appearance: Normal appearance. He is well-developed, well-groomed and overweight. HENT:      Head: Normocephalic. Right Ear: External ear normal.      Left Ear: External ear normal.      Nose: Nose normal.   Eyes:      General: Lids are normal. Vision grossly intact. Extraocular Movements: Extraocular movements intact. Conjunctiva/sclera: Conjunctivae normal.   Cardiovascular:      Rate and Rhythm: Normal rate and regular rhythm. Heart sounds: Normal heart sounds, S1 normal and S2 normal.   Pulmonary:      Effort: Pulmonary effort is normal.      Breath sounds: Normal breath sounds. Abdominal:      General: Bowel sounds are normal.      Palpations: Abdomen is soft. Tenderness: There is no abdominal tenderness. Genitourinary:     Penis: Uncircumcised. Phimosis, tenderness and swelling present. No erythema or discharge. Testes: Normal. Cremasteric reflex is present. Right: Tenderness or swelling not present. Left: Tenderness or swelling not present. Comments: Patient has swelling to his penis. Unable to roll back skin around the penis head. No testicular pain or swelling. No rash erythema to the scrotum. Musculoskeletal:         General: Normal range of motion. Cervical back: Normal range of motion and neck supple. Skin:     General: Skin is warm and dry. Findings: No erythema. Neurological:      Mental Status: He is alert and oriented to person, place, and time. DIAGNOSTIC RESULTS     EKG: All EKG's are interpreted by the Emergency Department Physician who either signs or Co-signs this chart in the absence of a cardiologist.      RADIOLOGY:   Non-plain film images such as CT, Ultrasound and MRI are read by the radiologist. Plain radiographic images are visualized and preliminarily interpreted by the emergency physician with the below findings:    Interpretation per the Radiologist below, if available at the time of this note:       XR CHEST PORTABLE    Result Date: 2/22/2023  EXAMINATION: ONE XRAY VIEW OF THE CHEST 2/22/2023 11:30 am COMPARISON: Chest radiograph performed 02/02/2023. HISTORY: ORDERING SYSTEM PROVIDED HISTORY: swelling TECHNOLOGIST PROVIDED HISTORY: swelling Reason for Exam: swelling FINDINGS: There is pulmonary congestion. There are trace effusions. There is no pneumothorax. The heart is enlarged. The upper abdomen is unremarkable. The extrathoracic soft tissues are unremarkable. There is a left internal jugular line. Cardiomegaly with trace effusions and pulmonary congestion.        LABS:  Labs Reviewed   PHOSPHORUS - Abnormal; Notable for the following components:       Result Value    Phosphorus 5.3 (*)     All other components within normal limits   COMPREHENSIVE METABOLIC PANEL W/ REFLEX TO MG FOR LOW K - Abnormal; Notable for the following components:    Glucose 186 (*)     BUN 37 (*)     Creatinine 7.47 (*)     Est, Glom Filt Rate 8 (*)     Bun/Cre Ratio 5 (*)     Sodium 133 (*)     Chloride 94 (*)     Alkaline Phosphatase 186 (*)     All other components within normal limits   CBC WITH AUTO DIFFERENTIAL - Abnormal; Notable for the following components:    Hemoglobin 11.2 (*)     Hematocrit 36.7 (*)     MCV 74.1 (*)     MCH 22.6 (*)     RDW 19.9 (*)     Monocytes 13 (*)     All other components within normal limits   MAGNESIUM       All other labs were within normal range or not returned as of this dictation. EMERGENCY DEPARTMENT COURSE and DIFFERENTIAL DIAGNOSIS/MDM:   Vitals:    Vitals:    02/22/23 1044   BP: 132/68   Pulse: 77   Resp: 18   Temp: 98.7 °F (37.1 °C)   SpO2: 99%   Weight: 215 lb (97.5 kg)   Height: 5' 11\" (1.803 m)         MEDICATIONS GIVEN IN THE ED:  Medications - No data to display    CLINICAL DECISION MAKING:  The patient presented alert with a nontoxic appearance and was seen in conjunction with Dr. Washburn Postal    DDx include phimosis, cellulitis, hyperkalemia      I will order labs including   Orders Placed This Encounter   Procedures    XR CHEST PORTABLE     Standing Status:   Standing     Number of Occurrences:   1     Order Specific Question:   Reason for exam:     Answer:   swelling    Magnesium     Standing Status:   Standing     Number of Occurrences:   1    Phosphorus     Standing Status:   Standing     Number of Occurrences:   1    Comprehensive Metabolic Panel w/ Reflex to MG     Standing Status:   Standing     Number of Occurrences:   1    CBC with Auto Differential     Standing Status:   Standing     Number of Occurrences:   1    Ice to affected area     Standing Status:   Standing     Number of Occurrences:   1    Telemetry monitoring - 72 hour duration     Standing Status:   Standing     Number of Occurrences:   1     Order Specific Question:   Patient may go off unit without monitor     Answer:   Yes     Order Specific Question:   Type:      Answer:   Bedside    Inpatient consult to Urology     Standing Status:   Standing     Number of Occurrences:   1     Order Specific Question:   Reason for Consult? Answer:   phimosis    Inpatient consult to Nephrology     Standing Status:   Standing     Number of Occurrences:   1     Order Specific Question:   Reason for Consult? Answer:   Missed dialysis treatment today    Inpatient consult to Internal Medicine     Standing Status:   Standing     Number of Occurrences:   1     Order Specific Question:   Reason for Consult? Answer:   Phimosis, missed dialysis treatment    Insert peripheral IV     Standing Status:   Standing     Number of Occurrences:   1    Place in Observation Service     Standing Status:   Standing     Number of Occurrences:   1     Order Specific Question:   Discharge Plan:     Answer:   Home with Office Follow-up     Order Specific Question:   Telemetry/Cardiac Monitoring Required? Answer:   Yes     complete CXR and monitor closely. The patient was involved in his/her plan of care through shared decision making. The testing that was ordered was discussed with the patient. Any medications that may have been ordered were discussed with the patient. I have reviewed the patient's previous medical records using the electronic health record that we have available. Labs and imaging were reviewed. Imaging was reviewed and reported by the radiologist. Results showed chest x-ray shows Cardiomegaly with trace effusions and pulmonary congestion. Phos 5.3. Magnesium 2.4. Potassium 4.9. Creatinine 7.47. BUN 37. Patient was evaluated at bedside by Dr. Aiden Bustamante who attempted on 2 occasions to reduce patient's phimosis but was unsuccessful. Dr. Aiden Bustamante spoke with Dr. Alfonso Malave with urology who recommended patient be admitted he will evaluate the patient. Nephrologist recommended patient receive dialysis treatment today. Will notify dialysis nurse to call Dr. Duane Chase for orders.   I discussed test results admission hospital the patient. I have discusssed recommendation for admission with the patient and/or family. We have discussed benefits of admission and the risks of discharge home. The patient agrees with the plan of care and admission. The patient will be admitted for further evaluation and treatment. Urology consult- Dr. Heath Salazar spoke with Dr. Yang Hernandez recommended patient be admitted and he will evaluate the patient. Nephrology consult-I spoke with Dr. Nichol Flower who recommended patient be set up for dialysis today. Will notify dialysis nurse. Internal medicine consult-I spoke with Dr. Jillian Ulloa who accepts admission but if patient is able to have dialysis and procedure done today patient can be discharged home. Care was provided during an unprecedented national emergency due to the novel coronavirus, Covid-19. CONSULTS:  IP CONSULT TO UROLOGY  IP CONSULT TO NEPHROLOGY  IP CONSULT TO INTERNAL MEDICINE    PROCEDURES:  Procedures    FINAL IMPRESSION      1. Phimosis            Problem List  Patient Active Problem List   Diagnosis Code    Hyperlipidemia E78.5    Essential hypertension I10    ESRD on hemodialysis ([facility] MWF N18.6, Z99.2    Insomnia G47.00    Chronic bilateral low back pain with bilateral sciatica M54.42, M54.41, G89.29    Controlled diabetes mellitus type 2 with complications (HCC) H54.8    S/P bilateral below knee amputation ([facility] Z89.512, Z89.511    Long term (current) use of antithrombotics/antiplatelets D52.60    Chronic use of opiate drugs therapeutic purposes Z79.891    Coronary artery disease involving native coronary artery I25.10    Anemia D64.9    Hypoglycemia E16.2    Inability to walk R26.2    Phimosis N47.1         DISPOSITION/PLAN   DISPOSITION Admitted 02/22/2023 02:53:36 PM      PATIENT REFERRED TO:   No follow-up provider specified.     DISCHARGE MEDICATIONS:     New Prescriptions    No medications on file           (Please note that portions of this note were completed with a voice recognition program.  Efforts were made to edit the dictations but occasionally words are mis-transcribed.)    Tricia Man, [facility]      Tricia Man, [facility]  02/22/23 3004

## 2023-02-22 NOTE — ANESTHESIA PRE PROCEDURE
Department of Anesthesiology  Preprocedure Note       Name:  Hany Moctezuma   Age:  60 y.o.  :  1960                                          MRN:  [id number]         Date:  2023      Surgeon: Thomas Marie):  Verónica Lopez MD    Procedure: Procedure(s):  EXAM UNDER ANESTHESIA, DORSAL SLIT , BIOPSY GLANDS PENIS    Medications prior to admission:   Prior to Admission medications    Medication Sig Start Date End Date Taking? Authorizing Provider   diphenhydrAMINE (BENADRYL) 25 MG tablet Take 25 mg by mouth nightly as needed for Itching   Yes Historical Provider, MD   glimepiride (AMARYL) 2 MG tablet Take 2 mg by mouth every morning (before breakfast)   Yes Historical Provider, MD   atorvastatin (LIPITOR) 80 MG tablet Take 1 tablet by mouth daily 23   Candance Katos, MD   aspirin (ASPIRIN CHILDRENS) 81 MG chewable tablet Take 1 tablet by mouth daily 23   Candance Katos, MD   apixaban (ELIQUIS) 5 MG TABS tablet Take 1 tablet by mouth 2 times daily 23   Candance Katos, MD   pregabalin (LYRICA) 75 MG capsule Take 1 capsule by mouth daily for 30 days.  Max Daily Amount: 75 mg 2/6/23 3/8/23  Candance Katos, MD   linagliptin (TRADJENTA) 5 MG tablet Take 1 tablet by mouth daily 23   Candance Katos, MD   amLODIPine (NORVASC) 5 MG tablet Take 1 tablet by mouth daily  Patient taking differently: Take 10 mg by mouth daily 23   Candance Katos, MD   sucroferric oxyhydroxide (VELPHORO) 500 MG CHEW chewable tablet Take 2 tablets by mouth 3 times daily (with meals) 23   Candance Katos, MD   Multiple Vitamins-Minerals [facility]) TABS Take 1 tablet by mouth daily 2/6/23 3/8/23  Candance Katos, MD   polyethyl glycol-propyl glycol 0.4-0.3 % (SYSTANE) 0.4-0.3 % ophthalmic solution Place 1 drop into both eyes as needed for Dry Eyes 23   Candance Katos, MD   traZODone (DESYREL) 50 MG tablet Take 1 tablet by mouth nightly as needed for Sleep 23   Candance Katos, MD   cloNIDine (CATAPRES) 0.1 MG tablet Take 3 tablets by mouth 2 times daily 2/6/23   Maninder Puente MD   lisinopril (PRINIVIL;ZESTRIL) 20 MG tablet Take 1 tablet by mouth 2 times daily 2/6/23   Maninder Puente MD   minoxidil (LONITEN) 2.5 MG tablet Take 1 tablet by mouth 2 times daily 2/6/23 2/6/24  Maninder Puente MD       Current medications:    Current Facility-Administered Medications   Medication Dose Route Frequency Provider Last Rate Last Admin    sodium chloride flush 0.9 % injection 5-40 mL  5-40 mL IntraVENous 2 times per day Buelah Mitts, APRN - CNP        sodium chloride flush 0.9 % injection 10 mL  10 mL IntraVENous PRN Buelah Mitts, APRN - CNP        0.9 % sodium chloride infusion   IntraVENous PRN Buelah Mitts, APRN - CNP   New Bag at 02/22/23 1710    acetaminophen (TYLENOL) tablet 650 mg  650 mg Oral Q6H PRN Buelah Mitts, APRN - CNP        Or    acetaminophen (TYLENOL) suppository 650 mg  650 mg Rectal Q6H PRN Buelah Mitts, APRN - CNP           Allergies:  No Known Allergies    Problem List:    Patient Active Problem List   Diagnosis Code    Hyperlipidemia E78.5    Essential hypertension I10    ESRD [facility] ([facility] MWF N18.6, Z99.2    Insomnia G47.00    Chronic bilateral low back pain with bilateral sciatica M54.42, M54.41, G89.29    Controlled diabetes mellitus type 2 with complications ([facility] T66.0    S/P bilateral below knee amputation ([facility] Z89.512, Z89.511    Long term (current) use of antithrombotics/antiplatelets O28.40    Chronic use of opiate drugs therapeutic purposes Z79.891    Coronary artery disease involving native coronary artery I25.10    Anemia D64.9    Hypoglycemia E16.2    Inability to walk R26.2    Phimosis N47.1       Past Medical History:        Diagnosis Date    Acute congestive heart failure (HCC)     Chronic bilateral low back pain with bilateral sciatica     Coronary artery disease involving native coronary artery 9/6/2022    CRF (chronic renal failure)     [facility]    DDD (degenerative disc disease), lumbar 12/15/2020    Diabetes mellitus ([facility]     Dialysis patient [facility])     ED (erectile dysfunction)     [facility] 2014    [facility]    HTN (hypertension)     Hyperlipidemia     LVH (left ventricular hypertrophy)     PVD (peripheral vascular disease) ([facility]     [facility]     Wears glasses        Past Surgical History:        Procedure Laterality Date    ARM SURGERY      CATARACT REMOVAL WITH IMPLANT      DIALYSIS FISTULA CREATION Bilateral     As of 2019, RUE AVF functioning, LUE AVF nonfunctioning.  FINGER AMPUTATION      right pointer finger    FINGER SURGERY Left     I & D    GLAUCOMA SURGERY      LEG AMPUTATION BELOW KNEE Bilateral     TUNNELED VENOUS CATHETER PLACEMENT      PC placed and removed       Social History:    Social History     Tobacco Use    Smoking status: Never    Smokeless tobacco: Never   Substance Use Topics    Alcohol use: Yes     Comment: rare                                Counseling given: Not Answered      Vital Signs (Current):   Vitals:    02/22/23 1044 02/22/23 1609   BP: 132/68 (!) 142/76   Pulse: 77 75   Resp: 18 16   Temp: 98.7 °F (37.1 °C) 98.6 °F (37 °C)   TempSrc:  Oral   SpO2: 99% 92%   Weight: 215 lb (97.5 kg) 211 lb 11.2 oz (96 kg)   Height: 5' 11\" (1.803 m)                                               BP Readings from Last 3 Encounters:   02/22/23 (!) 142/76   02/07/23 137/71   02/01/23 132/71       NPO Status:                                                                                 BMI:   Wt Readings from Last 3 Encounters:   02/22/23 211 lb 11.2 oz (96 kg)   02/06/23 208 lb 12.4 oz (94.7 kg)   02/01/23 216 lb 7.9 oz (98.2 kg)     Body mass index is 29.53 kg/m².     CBC:   Lab Results   Component Value Date/Time    WBC 6.4 02/22/2023 11:20 AM    RBC 4.95 02/22/2023 11:20 AM    HGB 11.2 02/22/2023 11:20 AM    HCT 36.7 02/22/2023 11:20 AM    MCV 74.1 02/22/2023 11:20 AM    RDW 19.9 02/22/2023 11:20 AM  02/22/2023 11:20 AM       CMP:   Lab Results   Component Value Date/Time     02/22/2023 11:20 AM    K 4.9 02/22/2023 11:20 AM    CL 94 02/22/2023 11:20 AM    CO2 26 02/22/2023 11:20 AM    BUN 37 02/22/2023 11:20 AM    CREATININE 7.47 02/22/2023 11:20 AM    GFRAA 12 09/20/2022 05:37 AM    LABGLOM 8 02/22/2023 11:20 AM    GLUCOSE 186 02/22/2023 11:20 AM    PROT 7.8 02/22/2023 11:20 AM    CALCIUM 9.7 02/22/2023 11:20 AM    BILITOT 0.7 02/22/2023 11:20 AM    ALKPHOS 186 02/22/2023 11:20 AM    AST 14 02/22/2023 11:20 AM    ALT 12 02/22/2023 11:20 AM       POC Tests:   Recent Labs     02/22/23  1617   POCGLU 139*       Coags:   Lab Results   Component Value Date/Time    PROTIME 16.3 02/02/2023 04:27 PM    PROTIME 10.3 05/31/2012 09:29 AM    INR 1.3 02/02/2023 04:27 PM    APTT 37.9 02/02/2023 04:27 PM       HCG (If Applicable): No results found for: PREGTESTUR, PREGSERUM, HCG, HCGQUANT     ABGs: No results found for: PHART, PO2ART, PBI5CEZ, VMI8VVZ, BEART, N9CXZTNE     Type & Screen (If Applicable):  No results found for: LABABO, LABRH    Drug/Infectious Status (If Applicable):  Lab Results   Component Value Date/Time    HEPCAB NONREACTIVE 09/28/2021 03:15 PM       COVID-19 Screening (If Applicable):   Lab Results   Component Value Date/Time    COVID19 Not Detected 11/13/2022 02:01 AM           Anesthesia Evaluation  Patient summary reviewed and Nursing notes reviewed no history of anesthetic complications:   Airway: Mallampati: II  TM distance: >3 FB   Neck ROM: full  Mouth opening: > = 3 FB   Dental:    (+) edentulous      Pulmonary:                              Cardiovascular:  Exercise tolerance: poor (<4 METS),   (+) hypertension:, CAD:, CHF:,       ECG reviewed    Rate: normal                    Neuro/Psych:               GI/Hepatic/Renal:   (+) renal disease: ESRD and dialysis,           Endo/Other:    (+) Diabetes, . Abdominal:             Vascular:           Other Findings: Anesthesia Plan      general     ASA 4 - emergent       Induction: intravenous. MIPS: Prophylactic antiemetics administered. Anesthetic plan and risks discussed with patient.         Attending anesthesiologist reviewed and agrees with Preprocedure content                Javad Gutierrez DO   2/22/2023

## 2023-02-23 VITALS
TEMPERATURE: 97.7 F | DIASTOLIC BLOOD PRESSURE: 59 MMHG | SYSTOLIC BLOOD PRESSURE: 92 MMHG | RESPIRATION RATE: 20 BRPM | HEART RATE: 62 BPM | BODY MASS INDEX: 30.28 KG/M2 | WEIGHT: 216.27 LBS | HEIGHT: 71 IN | OXYGEN SATURATION: 95 %

## 2023-02-23 PROCEDURE — 97167 OT EVAL HIGH COMPLEX 60 MIN: CPT

## 2023-02-23 PROCEDURE — 6370000000 HC RX 637 (ALT 250 FOR IP): Performed by: UROLOGY

## 2023-02-23 PROCEDURE — G0378 HOSPITAL OBSERVATION PER HR: HCPCS

## 2023-02-23 PROCEDURE — 97530 THERAPEUTIC ACTIVITIES: CPT

## 2023-02-23 PROCEDURE — 2580000003 HC RX 258: Performed by: NURSE PRACTITIONER

## 2023-02-23 PROCEDURE — 97163 PT EVAL HIGH COMPLEX 45 MIN: CPT

## 2023-02-23 PROCEDURE — 6370000000 HC RX 637 (ALT 250 FOR IP): Performed by: FAMILY MEDICINE

## 2023-02-23 PROCEDURE — 2500000003 HC RX 250 WO HCPCS: Performed by: INTERNAL MEDICINE

## 2023-02-23 PROCEDURE — 97535 SELF CARE MNGMENT TRAINING: CPT

## 2023-02-23 PROCEDURE — 6370000000 HC RX 637 (ALT 250 FOR IP): Performed by: NURSE PRACTITIONER

## 2023-02-23 RX ORDER — TRAZODONE HYDROCHLORIDE 50 MG/1
50 TABLET ORAL NIGHTLY PRN
Status: DISCONTINUED | OUTPATIENT
Start: 2023-02-23 | End: 2023-02-23 | Stop reason: HOSPADM

## 2023-02-23 RX ORDER — ASPIRIN 81 MG/1
81 TABLET, CHEWABLE ORAL DAILY
Status: DISCONTINUED | OUTPATIENT
Start: 2023-02-23 | End: 2023-02-23 | Stop reason: HOSPADM

## 2023-02-23 RX ORDER — OXYCODONE HYDROCHLORIDE AND ACETAMINOPHEN 5; 325 MG/1; MG/1
1 TABLET ORAL EVERY 6 HOURS PRN
COMMUNITY

## 2023-02-23 RX ORDER — GLIPIZIDE 5 MG/1
5 TABLET ORAL
Status: DISCONTINUED | OUTPATIENT
Start: 2023-02-23 | End: 2023-02-23 | Stop reason: HOSPADM

## 2023-02-23 RX ORDER — FOLIC ACID/VIT B COMPLEX AND C 0.8 MG
1 TABLET ORAL DAILY
Status: DISCONTINUED | OUTPATIENT
Start: 2023-02-23 | End: 2023-02-23 | Stop reason: HOSPADM

## 2023-02-23 RX ORDER — LISINOPRIL 20 MG/1
20 TABLET ORAL DAILY
Status: DISCONTINUED | OUTPATIENT
Start: 2023-02-23 | End: 2023-02-23 | Stop reason: HOSPADM

## 2023-02-23 RX ORDER — OXYCODONE HYDROCHLORIDE AND ACETAMINOPHEN 5; 325 MG/1; MG/1
1 TABLET ORAL EVERY 4 HOURS PRN
Status: DISCONTINUED | OUTPATIENT
Start: 2023-02-23 | End: 2023-02-23 | Stop reason: HOSPADM

## 2023-02-23 RX ORDER — PREGABALIN 75 MG/1
75 CAPSULE ORAL DAILY
Status: DISCONTINUED | OUTPATIENT
Start: 2023-02-23 | End: 2023-02-23 | Stop reason: HOSPADM

## 2023-02-23 RX ORDER — CLONIDINE HYDROCHLORIDE 0.3 MG/1
0.3 TABLET ORAL 3 TIMES DAILY
Status: DISCONTINUED | OUTPATIENT
Start: 2023-02-23 | End: 2023-02-23 | Stop reason: HOSPADM

## 2023-02-23 RX ORDER — ATORVASTATIN CALCIUM 80 MG/1
80 TABLET, FILM COATED ORAL NIGHTLY
Status: DISCONTINUED | OUTPATIENT
Start: 2023-02-23 | End: 2023-02-23 | Stop reason: HOSPADM

## 2023-02-23 RX ORDER — ALOGLIPTIN 6.25 MG/1
6.25 TABLET, FILM COATED ORAL DAILY
Status: DISCONTINUED | OUTPATIENT
Start: 2023-02-23 | End: 2023-02-23 | Stop reason: HOSPADM

## 2023-02-23 RX ORDER — AMLODIPINE BESYLATE 10 MG/1
10 TABLET ORAL DAILY
Status: DISCONTINUED | OUTPATIENT
Start: 2023-02-23 | End: 2023-02-23 | Stop reason: HOSPADM

## 2023-02-23 RX ORDER — DEXTROSE MONOHYDRATE 100 MG/ML
INJECTION, SOLUTION INTRAVENOUS CONTINUOUS PRN
Status: DISCONTINUED | OUTPATIENT
Start: 2023-02-23 | End: 2023-02-23 | Stop reason: HOSPADM

## 2023-02-23 RX ORDER — MINOXIDIL 2.5 MG/1
2.5 TABLET ORAL 2 TIMES DAILY
Status: DISCONTINUED | OUTPATIENT
Start: 2023-02-23 | End: 2023-02-23 | Stop reason: HOSPADM

## 2023-02-23 RX ADMIN — Medication 1 TABLET: at 09:16

## 2023-02-23 RX ADMIN — OXYCODONE AND ACETAMINOPHEN 1 TABLET: 5; 325 TABLET ORAL at 04:28

## 2023-02-23 RX ADMIN — AMLODIPINE BESYLATE 10 MG: 10 TABLET ORAL at 09:17

## 2023-02-23 RX ADMIN — ALOGLIPTIN 6.25 MG: 6.25 TABLET, FILM COATED ORAL at 10:04

## 2023-02-23 RX ADMIN — SODIUM CHLORIDE, PRESERVATIVE FREE 10 ML: 5 INJECTION INTRAVENOUS at 09:16

## 2023-02-23 RX ADMIN — ASPIRIN 81 MG: 81 TABLET, CHEWABLE ORAL at 09:16

## 2023-02-23 RX ADMIN — APIXABAN 5 MG: 5 TABLET, FILM COATED ORAL at 10:27

## 2023-02-23 RX ADMIN — GLIPIZIDE 5 MG: 5 TABLET ORAL at 09:16

## 2023-02-23 RX ADMIN — Medication 2 ML: at 01:17

## 2023-02-23 RX ADMIN — OXYCODONE AND ACETAMINOPHEN 1 TABLET: 5; 325 TABLET ORAL at 15:00

## 2023-02-23 RX ADMIN — MINOXIDIL 2.5 MG: 2.5 TABLET ORAL at 10:04

## 2023-02-23 RX ADMIN — ACETAMINOPHEN 650 MG: 325 TABLET ORAL at 01:44

## 2023-02-23 RX ADMIN — CLONIDINE HYDROCHLORIDE 0.3 MG: 0.3 TABLET ORAL at 15:00

## 2023-02-23 RX ADMIN — LISINOPRIL 20 MG: 20 TABLET ORAL at 09:16

## 2023-02-23 RX ADMIN — Medication 2 ML: at 01:18

## 2023-02-23 RX ADMIN — CLONIDINE HYDROCHLORIDE 0.3 MG: 0.3 TABLET ORAL at 09:16

## 2023-02-23 RX ADMIN — PREGABALIN 75 MG: 75 CAPSULE ORAL at 09:16

## 2023-02-23 ASSESSMENT — PAIN SCALES - GENERAL
PAINLEVEL_OUTOF10: 8
PAINLEVEL_OUTOF10: 3

## 2023-02-23 ASSESSMENT — PAIN DESCRIPTION - LOCATION
LOCATION: PENIS
LOCATION: PENIS

## 2023-02-23 NOTE — CARE COORDINATION
Case Management Assessment  Initial Evaluation    Date/Time of Evaluation: 2/23/2023 12:56 PM  Assessment Completed by: Venkatesh Bennett RN    If patient is discharged prior to next notation, then this note serves as note for discharge by case management. Patient Name: Lane Suresh                   YOB: 1960  Diagnosis: Phimosis [N47.1]                   Date / Time: 2/22/2023 10:45 AM    Patient Admission Status: Observation   Readmission Risk (Low < 19, Mod (19-27), High > 27): Readmission Risk Score: 22.8    Current PCP: Nic Hunt MD  PCP verified by CM? Yes    Chart Reviewed: Yes      History Provided by: Patient  Patient Orientation: Alert and Oriented    Patient Cognition: Alert    Hospitalization in the last 30 days (Readmission):  Yes    If yes, Readmission Assessment in  Navigator will be completed. Advance Directives:      Code Status: Full Code   Patient's Primary Decision Maker is: Legal Next of Kin    Primary Decision MakerCalista Nevarez  Za - [phone]    Discharge Planning:    Patient lives with:   Type of Home: [place]  Primary Care Giver: Other (Comment) (from [facility])  Patient Support Systems include: Children   Current Financial resources:    Current community resources:    Current services prior to admission:              Current DME:              Type of Home Care services:       ADLS  Prior functional level: Assistance with the following:, Bathing, Dressing, Toileting, Cooking, Housework, Shopping, Mobility  Current functional level: Assistance with the following:, Dressing, Bathing, Toileting, Mobility, Shopping, Housework, Cooking    PT AM-PAC:   /24  OT AM-PAC: 15 /24    Family can provide assistance at DC: No  Would you like Case Management to discuss the discharge plan with any other family members/significant others, and if so, who?  No  Plans to Return to Present Housing: Yes  Other Identified Issues/Barriers to RETURNING to current housing:    Potential Assistance needed at discharge:              Potential DME:    Patient expects to discharge to:    Plan for transportation at discharge:      Financial    Payor: [address] / Plan: Karen Koltonlori / Product Type: *No Product type* /     Does insurance require precert for SNF: No    Potential assistance Purchasing Medications:    Meds-to-Beds request: No      [facility] [id number] - [address] [facility], [facility] [address] [facility] [phone] - F [phone]  [address]  [facility] [address]  Phone: [phone] Fax: [phone]    [facility] ([address] [facility]) - Jovanny Beck [address] [facility] [phone] - f [phone]  [address] [address]  Phone: [phone] Fax: [phone]    [facility] [id number] - Bryon Schirmer - [facility] [address] [phone] Sonia Cobb [phone]  [address] [facility] [address]  Phone: [phone] Fax: [phone]      Notes:    Factors facilitating achievement of predicted outcomes: Caregiver support and Pleasant    Barriers to discharge: Impulsivity and Limited safety awareness    Additional Case Management Notes:   [facility]  patient is agreeable. Ss aware    The Plan for Transition of Care is related to the following treatment goals of Phimosis [Q78.6]    IF APPLICABLE: The Patient and/or patient representative Tyrant and his family were provided with a choice of provider and agrees with the discharge plan. Freedom of choice list with basic dialogue that supports the patient's individualized plan of care/goals and shares the quality data associated with the providers was provided to: Patient   Patient Representative Name:       The Patient and/or Patient Representative Agree with the Discharge Plan?  Yes    Kathy Estrada RN  Case Management Department  Ph: [phone] Fax: [phone]

## 2023-02-23 NOTE — PROGRESS NOTES
HEMODIALYSIS POST TREATMENT NOTE    Treatment time ordered: 210    Actual treatment time: 210    UltraFiltration Goal: 1500  UltraFiltration Removed: 1500      Pre Treatment weight: 99.0  Post Treatment weight: 98.1  Estimated Dry Weight: 98.0    Access used:     Central Venous Catheter:          Tunneled or Non-tunneled: Tunneled           Site: Lt chest          Access Flow: Good      Internal Access:       AV Fistula or AV Graft: na         Site: na       Access Flow: na       Sign and symptoms of infection: na       If YES: na    Medications or blood products given: none    Chronic outpatient schedule: [facility]    Chronic outpatient unit: [facility]    Summary of response to treatment: Tolerated Tx well and 1000 ml removed    Explain if orders NOT met, was physician notified:melvin      ACES flowsheet faxed to patient unit/ placed in patient chart: yes    Post assessment completed: yes    Report given to: Lito Lester documented Safety Checks include the followin) Access and face visible at all times. 2) All connections and blood lines are secure with no kinks. 3) NVL alarm engaged. 4) Hemosafe device applied (if applicable). 5) No collapse of Arterial or Venous blood chambers. 6) All blood lines / pump segments in the air detectors.

## 2023-02-23 NOTE — PROGRESS NOTES
Pt back to room 1015 from dialysis. 1L removed. Assessed surgical site and removed blue surgical drape. Applied ABD pad.

## 2023-02-23 NOTE — CARE COORDINATION
Social Work- Patient will [employer] today to [employer]. [facility] will transport at 330. Orders faxed. Nurse to call report to [phone]. [phone] Cochran faxed to Shireen Tee.  [place]

## 2023-02-23 NOTE — PROGRESS NOTES
HEMODIALYSIS PRE-TREATMENT NOTE    Patient Identifiers prior to treatment: name and     Isolation Required: Standard                       Isolation Type: standard       (please document if patient is being managed as a PUI/COVID-19 patient)        Hepatitis status:                           Date Drawn                             Result  Hepatitis B Surface Antigen 08-15-22 neg        Hepatitis B Surface Antibody 22 87.34        Hepatitis B Core Antibody na na          How was Hepatitis Status verified: epic     Was a copy of the labs you documented provided to facility for the patient's chart: na    Hemodialysis orders verified: yes    Access Within normal limits ( I.e. s/s of infection,...): yes     Pre-Assessment completed: yes    Pre-dialysis report received from: Deni Andres                      Time: 2100     Pt staff if Pt is at his dry weight of 98.0kg no fluid is to be removed.

## 2023-02-23 NOTE — H&P
Painful phimosis with discoloration of glans penis  History of present illness  23-year-old -American male who is a bilateral above-knee amputee was presented to the emergency room with painful phimosis with discoloration of glans penis. Patient was seen by urology and scheduled emergent dorsal slit of foreskin. 2 attempts to reduce the emergency room failed  Past medical history  ESRD hemodialysis dependent on 3/week  Hypertension  Hyperlipidemia  Emphysema  Chronic pain syndrome opiate dependent  Coronary artery disease  Diabetes mellitus  Past surgical history AV fistula  Medications per list reviewed  Allergies per list reviewed  Social history negative for tobacco alcohol or illicit drug use  Family history  Review of system all 12 systems reviewed per  In the history of present illness  Vitals  Afebrile hemodynamically stable  Systemic exam  HEENT unremarkable  Neck unremarkable  Lungs bilateral CTA a few rhonchi's otherwise unremarkable  CVS S1-S2 regular rate and rhythm  Abdomen soft discomfort benign to palpation normoactive bowel sound  CNS no acute focal sensorimotor deficit  Genitals  Status post dorsal slit foreskin. Modest bright red blood. Bilateral above-knee amputee.   Stumps unremarkable  Labs     Latest Reference Range & Units 2/22/23 11:20   Sodium 135 - 144 mmol/L 133 (L)   Potassium 3.7 - 5.3 mmol/L 4.9   Chloride 98 - 107 mmol/L 94 (L)   CO2 20 - 31 mmol/L 26   BUN,BUNPL 8 - 23 mg/dL 37 (H)   Creatinine 0.70 - 1.20 mg/dL 7.47 (HH)   Bun/Cre Ratio 9 - 20  5 (L)   Anion Gap 9 - 17 mmol/L 13   Est, Glom Filt Rate >60 mL/min/1.73m2 8 (L)   Magnesium 1.6 - 2.6 mg/dL 2.4   Glucose, Random 70 - 99 mg/dL 186 (H)   CALCIUM, SERUM, [id number] 8.6 - 10.4 mg/dL 9.7   Phosphorus 2.5 - 4.5 mg/dL 5.3 (H)   Total Protein 6.4 - 8.3 g/dL 7.8   Albumin 3.5 - 5.2 g/dL 3.8   Alk Phos 40 - 129 U/L 186 (H)   ALT 5 - 41 U/L 12   AST <40 U/L 14   BILIRUBIN TOTAL 0.3 - 1.2 mg/dL 0.7   WBC 3.5 - 11.3 k/uL 6.4 RBC 4.21 - 5.77 m/uL 4.95   Hemoglobin Quant 13.0 - 17.0 g/dL 11.2 (L)   Hematocrit 40.7 - 50.3 % 36.7 (L)   MCV 82.6 - 102.9 fL 74.1 (L)   MCH 25.2 - 33.5 pg 22.6 (L)   MCHC 28.4 - 34.8 g/dL 30.5   (HH): Data is critically high  (L): Data is abnormally low  (H): Data is abnormally high   Latest Reference Range & Units Most Recent   WBC 3.5 - 11.3 k/uL 6.4  2/22/23 11:20   RBC 4.21 - 5.77 m/uL 4.95  2/22/23 11:20   Hemoglobin Quant 13.0 - 17.0 g/dL 11.2 (L)  2/22/23 11:20   Hematocrit 40.7 - 50.3 % 36.7 (L)  2/22/23 11:20   MCV 82.6 - 102.9 fL 74.1 (L)  2/22/23 11:20   MCH 25.2 - 33.5 pg 22.6 (L)  2/22/23 11:20   MCHC 28.4 - 34.8 g/dL 30.5  2/22/23 11:20   MPV 8.1 - 13.5 fL Abnormal  2/22/23 11:20   RDW 11.8 - 14.4 % 19.9 (H)  2/22/23 11:20   Platelet Count 051 - 453 k/uL 152  2/22/23 11:20   Platelet Estimate  NOT REPORTED  7/4/21 21:28   Absolute Mono # 0.10 - 1.20 k/uL 0.83  2/22/23 11:20   Eosinophils % 1 - 4 % 3  2/22/23 11:20   Basophils Absolute 0.00 - 0.20 k/uL <0.03  2/22/23 11:20   Differential Type  NOT REPORTED  7/4/21 21:28   Seg Neutrophils 36 - 65 % 60  2/22/23 11:20   Absolute Neut # 1.8 - 7.7 k/uL 11.98 (H)  1/27/13 08:09   Segs Absolute 1.50 - 8.10 k/uL 3.83  2/22/23 11:20   Lymphocytes 24 - 43 % 24  2/22/23 11:20   Absolute Lymph # 1.10 - 3.70 k/uL 1.53  2/22/23 11:20   Monocytes 3 - 12 % 13 (H)  2/22/23 11:20   Absolute Eos # 0.00 - 0.44 k/uL 0.17  2/22/23 11:20   Basophils 0 - 2 % 0  2/22/23 11:20   Immature Granulocytes 0 % 0  2/22/23 11:20   (L): Data is abnormally low  (H): Data is abnormally high  Assessment and plan  Patient presented with painful phimosis with discoloration of glans penis. 2 attempts by emergency room to reduce it was on successful. Urology is consulted  Patient be provided with pain control  History of chronic pain syndrome. Opiate dependent. Constipation. He is placed on MiraLAX and Colace  ESRD hemodialysis dependent on 3/week.   Nephrology on board  Anemia of chronic disease. Hemoglobin is stable at 11.2  Inpatient course  Patient was seen by urology in the emergency room. Patient was taken to the operating room and underwent dorsal slit with suturing and biopsy of glans. He tolerated the procedure well. He denies retention of urine  Discharge condition  Improved  Disposition Home  Follow-up with urology per plan. Wound care per urology  Plan of care discussed with patient, discharge planner, ROSALINE sign  This note is created with a voice recognition program and while intend to generate a document that accurately reflects the content of the visit, no guarantee can be provided that every mistake has been identified and corrected by editing.

## 2023-02-23 NOTE — CARE COORDINATION
Discharge planning/    Patient here 2/7-2/9 and [facility]. He is requesting to return on discharge.  Notified SS>

## 2023-02-23 NOTE — PROGRESS NOTES
Physical Therapy  Facility/Department: [facility] PROGRESSIVE CARE  Physical Therapy Initial Assessment    Name: Hany Moctezuma  : 1960  MRN: [id number]  Date of Service: 2023    Discharge Recommendations:  Patient would benefit from continued therapy after discharge          Patient Diagnosis(es): The encounter diagnosis was Phimosis. Past Medical History:  has a past medical history of Acute congestive heart failure (HCC), Chronic bilateral low back pain with bilateral sciatica, Coronary artery disease involving native coronary artery, CRF (chronic renal failure), DDD (degenerative disc disease), lumbar, Diabetes mellitus ([facility] [address] Dialysis patient [facility]), ED (erectile dysfunction), History of echocardiogram, HTN (hypertension), Hyperlipidemia, LVH (left ventricular hypertrophy), PVD (peripheral vascular disease) ([facility] [address] S/P bilateral BKA (below knee amputation) ([facility] and Wears glasses. Past Surgical History:  has a past surgical history that includes Glaucoma surgery; Cataract removal with implant; Leg amputation below knee (Bilateral); Tunneled venous catheter placement; Dialysis fistula creation (Bilateral); Finger surgery (Left); Finger amputation; Arm Surgery; and Circumcision (N/A, 2023).     Assessment   Body Structures, Functions, Activity Limitations Requiring Skilled Therapeutic Intervention: Decreased posture  Assessment: Pt very strong, requires prostheses for transfers or determine alternative techniques  Therapy Prognosis: Good  Decision Making: High Complexity  Requires PT Follow-Up: Yes  Activity Tolerance  Activity Tolerance: Patient tolerated treatment well     Plan   Physcial Therapy Plan  General Plan: 5-7 times per week  Current Treatment Recommendations: Transfer training (Posture)  Safety Devices  Type of Devices: Nurse notified, Patient at risk for falls, Left in bed, Bed alarm in place, Call light within reach, All fall risk precautions in place  Restraints  Restraints Initially in Place:  No     Restrictions  Restrictions/Precautions  Restrictions/Precautions: Fall Risk, General Precautions  Required Braces or Orthoses?: No (Pt does have B BKA prostheses)  Position Activity Restriction  Other position/activity restrictions: Up w/ assist, contact precautions, telemetry, continuous pulse oximeter, LUE IV, RUE fistula, dialysis port L chest, 1800 mL fluid restriction, hx Bilateral BKAs (prosthetics for BLEs, not present in room at time of evaluation)     Subjective   Pain: Pain 6/10 groin area  General  Chart Reviewed: Yes  Patient assessed for rehabilitation services?: Yes  Response To Previous Treatment: Not applicable  Family / Caregiver Present: No  Follows Commands: Within Functional Limits  General Comment  Comments: OK for PT per Taras Avalos RN         Social/Functional History  Social/Functional History  Lives With: Alone  Type of Home: House  Home Layout: One level, Laundry in basement (13 steps down to basement w/ 1 HR; pt reports he regularly uses steps)  Home Access: Stairs to enter with rails  Entrance Stairs - Number of Steps: 4  Entrance Stairs - Rails: Right  Bathroom Shower/Tub: Walk-in shower  Bathroom Toilet: Standard  Bathroom Equipment: Tub transfer bench  Home Equipment: Haven Angles  Has the patient had two or more falls in the past year or any fall with injury in the past year?: Yes (Pt initially stated he has not had any recent falls; later in session in conversation, pt reports he did have a fall ~2 months ago, in which he fell down the stairs and injured his shoulder.)  ADL Assistance: Independent  Homemaking Assistance: Independent  Ambulation Assistance: Independent (Pt reports he typically walks w/ prosthetic legs and a cane; recently, pt has had increased swelling in residual limbs was unable to use prosthetics)  Transfer Assistance: Independent  Active : No  Patient's  Info: son  Occupation: On disability  Type of Occupation: Truck   Leisure & Hobbies: Fishing  Additional Comments: Pt reports coming to hospital from rehabilitation facility. Pt w/ hx of B LE amputations, has 2 prosthetic legs however pt reports they are at the rehabilitation facility. Has a manual w/c, unsure of its current location. Vision/Hearing       Cognition   Orientation  Overall Orientation Status: Within Normal Limits  Orientation Level: Oriented X4  Cognition  Overall Cognitive Status: WNL     Objective   Heart Rate: 69  Heart Rate Source: Monitor  BP: 101/61  BP Location: Left upper arm  BP Method: Automatic  Patient Position: Sitting  MAP (Calculated): 74  Resp: 16  SpO2: 99 %  O2 Device: Nasal cannula     Observation/Palpation  Posture: Fair  Gross Assessment  AROM: Within functional limits  Strength: Within functional limits  Coordination: Within functional limits  Tone: Normal  Sensation: Intact (Denies paresthesia)        Strength RLE  Strength RLE: WFL  Comment: B hip/knee 5/5  Strength LLE  Strength LLE: WFL  Strength RUE  Comment: See OT eval for B UE MMT        Bed Mobility Training  Bed Mobility Training: Yes  Overall Level of Assistance: Stand-by assistance; Additional time  Interventions: Verbal cues; Safety awareness training (Mod cues for upright posture, pursed lip breathing tech, and safety awareness)  Rolling: Stand-by assistance  Supine to Sit: Stand-by assistance  Sit to Supine:  (Pt requesting to remain seated upright in bed upon writer exit)  Scooting: Stand-by assistance  Balance  Sitting: Intact  Standing:  (NA)  Transfer Training  Transfer Training: No (Not attempted this date; slideboard transfer simulated in bed - see ADL comment section for details.)  Gait  Overall Level of Assistance:  (N/T pt's prosthetic legs not present during time of eval.)  Bed mobility  Rolling to Left: Independent  Rolling to Right: Independent  Supine to Sit: Independent  Sit to Supine: Independent  Scooting: Independent  Transfers  Comment: Pt unable to transfer without prosthses which are at rehab facility        Balance  Posture: Fair  Sitting - Static: Good  Sitting - Dynamic: Good           OutComes Score                                                  AM-PAC Score             Tinneti Score       Goals  Short Term Goals  Time Frame for Short Term Goals: 12 treatments  Short Term Goal 1: Initiate transfers  Short Term Goal 2: Good posture  Patient Goals   Patient Goals : Rehab then home       Education  Patient Education  Education Given To: Patient  Education Provided: Role of Therapy;Plan of Care  Education Method: Verbal  Barriers to Learning: None  Education Outcome: Verbalized understanding      Therapy Time   Individual Concurrent Group Co-treatment   Time In [phone] (Treatment time 24 minutes)         Time Out 1104         Minutes [address] [place], [place]

## 2023-02-23 NOTE — PROGRESS NOTES
Occupational Therapy  Facility/Department: [facility]  Occupational Therapy Initial Assessment    Name: Vernon Galo  : 1960  MRN: [id number]  Date of Service: 2023    RN reports patient is medically stable for therapy treatment this date. Chart reviewed prior to treatment and patient is agreeable for therapy. All lines intact and patient positioned comfortably at end of treatment. All patient needs addressed prior to ending therapy session. Pt currently functioning below baseline. Recommend daily inpatient skilled therapy at time of discharge to maximize long term outcomes and prevent re-admission. Please refer to AM-PAC score for current level of function. Discharge Recommendations:  Patient would benefit from continued therapy after discharge  OT Equipment Recommendations  Equipment Needed:  (CTA)       Per HPI: Vernon Galo is a 60 y.o. male who presents to the emergency department for evaluation of penis pain and swelling for the past several days. Patient resides at a rehab facility. He denies injury. Denies testicular pain. History of ESRD on dialysis  at [facility]. Sees Dr. Phil Burns  with nephrology. He went to dialysis 2 days ago. He did go to dialysis center today but left before treatment because of his penile pain. Pain is moderate with certain movement. He does not make urine. Patient states he is uncircumcised. He denies chest pain or shortness of breath. Patient Diagnosis(es): The encounter diagnosis was Phimosis.   Past Medical History:  has a past medical history of Acute congestive heart failure (HCC), Chronic bilateral low back pain with bilateral sciatica, Coronary artery disease involving native coronary artery, CRF (chronic renal failure), DDD (degenerative disc disease), lumbar, Diabetes mellitus ([facility] [address] Dialysis patient [facility]), ED (erectile dysfunction), History of echocardiogram, HTN (hypertension), Hyperlipidemia, LVH (left ventricular hypertrophy), PVD (peripheral vascular disease) ([facility] [address] S/P bilateral BKA (below knee amputation) ([facility] [address] and Wears glasses. Past Surgical History:  has a past surgical history that includes Glaucoma surgery; Cataract removal with implant; Leg amputation below knee (Bilateral); Tunneled venous catheter placement; Dialysis fistula creation (Bilateral); Finger surgery (Left); Finger amputation; Arm Surgery; and Circumcision (N/A, 2/22/2023). Assessment   Performance deficits / Impairments: Decreased functional mobility ; Decreased safe awareness;Decreased balance;Decreased ADL status; Decreased cognition;Decreased vision/visual deficit; Decreased endurance;Decreased ROM; Decreased high-level IADLs;Decreased strength;Decreased fine motor control  Assessment: Pt presents w/ deficits in functional mobility and ADL status secondary to hx Bilateral BKAs, decreased functional activity tolerance, fatigue, decreased static/dynamic balance, and decreased safety awareness. Pt able to complete bed mobility and simulated slideboard transfers in bed w/ SBA this date. Pt typically walks w/ prosthetic legs, however prosthetics not present in hospital at time of eval, and pt's w/c present in room is too low in comparison to bed surface for safe completion of slideboard transfers. Pt would need a marlee lift transfer to safely sit OOB at this time. Skilled OT services are indicated at this time to maximize this pt's safety and IND with self care and to facilitate safe return to PLOF as able.   Prognosis: Fair;Good  Decision Making: High Complexity  REQUIRES OT FOLLOW-UP: Yes  Activity Tolerance  Activity Tolerance: Patient limited by fatigue        Plan   Occupational Therapy Plan  Times Per Week: 4-5x/week 1x/day as tolerated  Current Treatment Recommendations: Strengthening, ROM, Balance training, Functional mobility training, Endurance training, Safety education & training, Patient/Caregiver education & training, Neuromuscular re-education, Self-Care / ADL, Equipment evaluation, education, & procurement, Modalities, Positioning, Cognitive/Perceptual training, Coordination training     Restrictions  Restrictions/Precautions  Restrictions/Precautions: Fall Risk, General Precautions  Required Braces or Orthoses?: No (Pt does have B BKA prostheses)  Position Activity Restriction  Other position/activity restrictions: Up w/ assist, contact precautions, telemetry, continuous pulse oximeter, LUE IV, RUE fistula, dialysis port L chest, 1800 mL fluid restriction, hx Bilateral BKAs (prosthetics for BLEs, not present in room at time of evaluation)    Subjective   General  Chart Reviewed: Yes  Patient assessed for rehabilitation services?: Yes  Family / Caregiver Present: No  Subjective  Subjective: Pt longsitting in bed upon entry to room, cooperative w/ therapy evaluation. Pt c/o pain in \"private area\" rated 7/10 - states it has been feeling much better since his surgery yesterday.   Pain 6/10 groin area  Social/Functional History  Social/Functional History  Lives With: Alone  Type of Home: House  Home Layout: One level, Laundry in basement (13 steps down to basement w/ 1 HR; pt reports he regularly uses steps)  Home Access: Stairs to enter with rails  Entrance Stairs - Number of Steps: 4  Entrance Stairs - Rails: Right  Bathroom Shower/Tub: Walk-in shower  Bathroom Toilet: Standard  Bathroom Equipment: Tub transfer bench  Home Equipment: Shiva Nunez  Has the patient had two or more falls in the past year or any fall with injury in the past year?: Yes (Pt initially stated he has not had any recent falls; later in session in conversation, pt reports he did have a fall ~2 months ago, in which he fell down the stairs and injured his shoulder.)  ADL Assistance: Independent  Homemaking Assistance: Independent  Ambulation Assistance: Independent (Pt reports he typically walks w/ prosthetic legs and a cane; recently, pt has had increased swelling in residual limbs was unable to use prosthetics)  Transfer Assistance: Independent  Active : No  Patient's  Info: son  Occupation: On disability  Type of Occupation:   Leisure & Hobbies: Fishing  Additional Comments: Pt reports coming to hospital from rehabilitation facility. Pt w/ hx of B LE amputations, has 2 prosthetic legs however pt reports they are at the rehabilitation facility. Has a manual w/c, unsure of its current location. Objective           Observation/Palpation  Posture: Fair  Observation: Pt reporting pain in \"private area\" rated 7/10; R index finger amputation, hx Bilateral BKAs  Safety Devices  Type of Devices: Nurse notified; Patient at risk for falls; Left in bed;Bed alarm in place;Call light within reach; All fall risk precautions in place (Left seated upright in bed per pt request upon writer exit; bed alarm armed at session end.)  Bed Mobility Training  Bed Mobility Training: Yes  Overall Level of Assistance: Stand-by assistance; Additional time  Interventions: Verbal cues; Safety awareness training (Mod cues for upright posture, pursed lip breathing tech, and safety awareness)  Rolling: Stand-by assistance  Supine to Sit: Stand-by assistance  Sit to Supine:  (Pt requesting to remain seated upright in bed upon writer exit)  Scooting: Stand-by assistance  Balance  Sitting: Intact  Standing:  (NA)  Transfer Training  Transfer Training: No (Not attempted this date; slideboard transfer simulated in bed - see ADL comment section for details.)  Functional Mobility  Overall Level of Assistance:  (N/T pt's prosthetic legs not present during time of eval.)     AROM: Generally decreased, functional (L shoulder AROM functionally decreased d/t pain from fall; only able to achieve 0-45 deg of flexion and requires assist from RUE to move L shoulder any higher)  Strength: Generally decreased, functional (L shoulder not tested; R UE 5/5)  Coordination: Generally decreased, functional  Tone: Normal  Sensation: Impaired    ADL  Feeding: Setup;Minimal assistance  Feeding Skilled Clinical Factors: Staff assist to open packaging of food items during session, as pt had R index finger amputation of distal phalanx and FMC deficits  Grooming: Minimal assistance;Setup  UE Bathing: Minimal assistance;Setup  LE Bathing: Maximum assistance  UE Dressing: Moderate assistance  LE Dressing: Maximum assistance  Toileting: Maximum assistance  Additional Comments: Pt's participation in ADLs limited d/t decreased functional activity tolerance, fatigue, decreased static/dynamic balance, decreased cognition/safety awareness, and hx of B BKAs. Pt able to complete all bed mobility tasks this session w/ SBA, including simulation of a slideboard transfer at the bed level. STS transfers not attempted, as pt's prosthetic legs are not present in hospital at time of evaluation. Slide board transfer not attempted to w/c or recliner this date, as pt's w/c present in room is much lower than surface of bed and pt would be unable to safely transfer back to bed. Pt will need a marlee lift transfer to get OOB at this time or will need prosthetic legs present in hospital.     Activity Tolerance  Activity Tolerance: Patient tolerated treatment well        Vision  Vision: Impaired (Hx diabetic retinopathy)  Vision Exceptions: Wears glasses for reading  Hearing  Hearing: Within functional limits    Cognition  Overall Cognitive Status: WNL  Arousal/Alertness: Delayed responses to stimuli  Following Commands: Follows one step commands with repetition; Follows one step commands with increased time  Attention Span: Attends with cues to redirect  Memory: Decreased recall of recent events  Safety Judgement: Decreased awareness of need for assistance;Decreased awareness of need for safety  Problem Solving: Decreased awareness of errors;Assistance required to identify errors made;Assistance required to correct errors made;Assistance required to implement solutions;Assistance required to generate solutions  Insights: Decreased awareness of deficits  Initiation: Requires cues for some  Sequencing: Requires cues for some  Cognition Comment: Pt w/ slow/delayed processing in conversation and when performing tasks; appears to be a questionable historian at times when discussing social/functional history. Pt w/ decreased safety awareness, endorses transferring backwards into his manual w/c from the bed level, and then to return to bed, \"falls forward into the bed\" in a non-controlled manner. Orientation  Overall Orientation Status: Within Normal Limits  Orientation Level: Oriented X4  Perception  Overall Perceptual Status: WFL               Education Given To: Patient  Education Provided: Role of Therapy;Transfer Training;Plan of Care;Energy Conservation; Fall Prevention Strategies;Precautions; ADL Adaptive Strategies  Education Method: Verbal  Barriers to Learning: Cognition  Education Outcome: Continued education needed                  AM-PAC Score        AM-[facility] Inpatient Daily Activity Raw Score: 15 (02/23/23 1251)  AM-PAC Inpatient ADL T-Scale Score : 34.69 (02/23/23 1251)  ADL Inpatient CMS 0-100% Score: 56.46 (02/23/23 1251)  ADL Inpatient CMS G-Code Modifier : CK (02/23/23 1251)      Goals  Short Term Goals  Time Frame for Short Term Goals: By discharge, pt to demo:  Short Term Goal 1: bed mobility tasks w/ MOD I/IND and Good safety with use of bedrails as needed. Short Term Goal 2: tolerance for re-assessment of ADL transfers in order add goal to OT POC as appropriate. Short Term Goal 3: UB ADLs to set up assist and LB ADLs to MinAx1 with Good safety and use of AD/AE as needed. Short Term Goal 4: IND with a BUE HEP, with use of handouts, in order to maintain current strength in BUEs and improve ROM to LUE, as appropriate, which is required for increased safety/IND with self care tasks.   Short Term Goal 5: active participation in > 25 mins of therapeutic activity/therapeutic exercise/functional tasks of choice in order to promote functional activity tolerance required for safety and IND with self care tasks. Long Term Goals  Long Term Goal 1: Pt to be IND with fall prevention strategies, EC/WS tech, discharge/equipment recommendations, and ADL compensatory strategies with use of handouts as needed. Patient Goals   Patient goals : To get back home!        Therapy Time   Individual Concurrent Group Co-treatment   Time In 8033         Time Out 1136 (+10 mins for chart review & RN coordination)         Minutes 47+10=57         Tx Time: 23 minutes       Natalie Ochoa OT

## 2023-02-23 NOTE — PROGRESS NOTES
Discharge information given and explained to pt. Report called to [facility] all questions answered at this time. Pt verbalized understanding. Pt discharged alert and oriented on 2L NC via ems with cell phone and belongings.

## 2023-02-23 NOTE — PROGRESS NOTES
[facility]   Urology Progress Note            Subjective:  Follow-up phimosis penile mass    Patient Vitals for the past 24 hrs:   BP Temp Temp src Pulse Resp SpO2 Weight   02/23/23 1128 101/61 97.9 °F (36.6 °C) Oral 69 16 99 % --   02/23/23 0800 123/65 98.1 °F (36.7 °C) Oral 72 16 97 % --   02/23/23 0428 -- -- -- -- 17 -- --   02/23/23 0340 122/66 98.2 °F (36.8 °C) Oral 83 20 (!) 89 % --   02/23/23 0130 131/78 97.6 °F (36.4 °C) -- 80 18 -- 216 lb 4.3 oz (98.1 kg)   02/23/23 0115 133/74 -- -- 80 -- -- --   02/23/23 0100 137/67 -- -- 82 -- -- --   02/23/23 0030 134/70 -- -- 89 -- -- --   02/23/23 0000 115/60 -- -- 83 -- -- --   02/22/23 2330 117/64 -- -- -- -- -- --   02/22/23 2300 (!) 111/59 -- -- 85 -- -- --   02/22/23 2230 122/71 -- -- 89 -- -- --   02/22/23 2200 115/64 -- -- 88 -- -- --   02/22/23 2145 123/70 -- -- 80 -- -- --   02/22/23 2130 135/75 98.3 °F (36.8 °C) -- 90 18 -- 218 lb 4.1 oz (99 kg)   02/22/23 2030 127/73 98 °F (36.7 °C) Oral 88 20 -- --   02/22/23 2000 117/67 97.3 °F (36.3 °C) Temporal 91 23 95 % --   02/22/23 1945 117/76 -- -- 80 21 95 % --   02/22/23 1930 122/77 -- -- 77 19 95 % --   02/22/23 1915 (!) 140/81 -- -- 81 12 97 % --   02/22/23 1900 (!) 153/93 -- -- 77 26 97 % --   02/22/23 1845 (!) 154/92 -- -- 70 19 98 % --   02/22/23 1831 132/79 97.5 °F (36.4 °C) Temporal 80 20 98 % --   02/22/23 1609 (!) 142/76 98.6 °F (37 °C) Oral 75 16 92 % 211 lb 11.2 oz (96 kg)       Intake/Output Summary (Last 24 hours) at 2/23/2023 1458  Last data filed at 2/23/2023 0130  Gross per 24 hour   Intake 1214 ml   Output 1500 ml   Net -286 ml       Recent Labs     02/22/23  1120   WBC 6.4   HGB 11.2*   HCT 36.7*   MCV 74.1*        Recent Labs     02/22/23  1120   *   K 4.9   CL 94*   CO2 26   PHOS 5.3*   BUN 37*   CREATININE 7.47*       No results for input(s): COLORU, PHUR, LABCAST, WBCUA, RBCUA, MUCUS, TRICHOMONAS, YEAST, BACTERIA, CLARITYU, SPECGRAV, LEUKOCYTESUR, UROBILINOGEN, Jaqueline Colby in the last 72 hours. Invalid input(s): NITRATE, GLUCOSEUKETONESUAMORPHOUS    Additional Lab/culture results:    Physical Exam: Patient alert not in acute distress he states he feels better the penile pain has improved.     The patient had solid mass at the coronal sulcus dorsal aspect of the penis left of midline this was biopsied pathology report pending  Penile bleeding continue with pain management    Interval Imaging Findings:    Impression:    Patient Active Problem List   Diagnosis    Hyperlipidemia    Essential hypertension    ESRD on hemodialysis ([facility] [address] MWF    Insomnia    Chronic bilateral low back pain with bilateral sciatica    Controlled diabetes mellitus type 2 with complications (HCC)    S/P bilateral below knee amputation ([facility] [address]    Long term (current) use of antithrombotics/antiplatelets    Chronic use of opiate drugs therapeutic purposes    Coronary artery disease involving native coronary artery    Anemia    Hypoglycemia    Inability to walk    Phimosis       Plan: Await pathology report    Ronal Robbins MD  2:58 PM 2/23/2023

## 2023-02-23 NOTE — PROGRESS NOTES
[x] Medication Reconciliation was completed and the patient's home medication list was verified. The Med List Status is \"Complete\". The following sources were used to assist with Medication Reconciliation:    [x] Patient had a list of medications which was transcribed into the EHR. [] Patient provided bottles of their medications    [] Home medications reviewed and confirmed     [] Contacted patient's pharmacy to confirm home medications    [] Contacted patient's physician office to confirm home medications    [x] Medical Records from another facility and/or Care Everywhere were reviewed= [facility] from [facility] with pt med list reviewed.

## 2023-02-23 NOTE — DISCHARGE SUMMARY
Painful phimosis with discoloration of glans penis  History of present illness  50-year-old -American male who is a bilateral above-knee amputee was presented to the emergency room with painful phimosis with discoloration of glans penis. Patient was seen by urology and scheduled emergent dorsal slit of foreskin. 2 attempts to reduce the emergency room failed  Past medical history  ESRD hemodialysis dependent on 3/week  Hypertension  Hyperlipidemia  Emphysema  Chronic pain syndrome opiate dependent  Coronary artery disease  Diabetes mellitus  Past surgical history AV fistula  Medications per list reviewed  Allergies per list reviewed  Social history negative for tobacco alcohol or illicit drug use  Family history  Review of system all 12 systems reviewed per  In the history of present illness  Vitals  Afebrile hemodynamically stable  Systemic exam  HEENT unremarkable  Neck unremarkable  Lungs bilateral CTA a few rhonchi's otherwise unremarkable  CVS S1-S2 regular rate and rhythm  Abdomen soft discomfort benign to palpation normoactive bowel sound  CNS no acute focal sensorimotor deficit  Genitals  Status post dorsal slit foreskin. Modest bright red blood. Bilateral above-knee amputee.   Stumps unremarkable  Labs       Latest Reference Range & Units 2/22/23 11:20   Sodium 135 - 144 mmol/L 133 (L)   Potassium 3.7 - 5.3 mmol/L 4.9   Chloride 98 - 107 mmol/L 94 (L)   CO2 20 - 31 mmol/L 26   BUN,BUNPL 8 - 23 mg/dL 37 (H)   Creatinine 0.70 - 1.20 mg/dL 7.47 (HH)   Bun/Cre Ratio 9 - 20  5 (L)   Anion Gap 9 - 17 mmol/L 13   Est, Glom Filt Rate >60 mL/min/1.73m2 8 (L)   Magnesium 1.6 - 2.6 mg/dL 2.4   Glucose, Random 70 - 99 mg/dL 186 (H)   CALCIUM, SERUM, 765413 8.6 - 10.4 mg/dL 9.7   Phosphorus 2.5 - 4.5 mg/dL 5.3 (H)   Total Protein 6.4 - 8.3 g/dL 7.8   Albumin 3.5 - 5.2 g/dL 3.8   Alk Phos 40 - 129 U/L 186 (H)   ALT 5 - 41 U/L 12   AST <40 U/L 14   BILIRUBIN TOTAL 0.3 - 1.2 mg/dL 0.7   WBC 3.5 - 11.3 k/uL 6.4 RBC 4.21 - 5.77 m/uL 4.95   Hemoglobin Quant 13.0 - 17.0 g/dL 11.2 (L)   Hematocrit 40.7 - 50.3 % 36.7 (L)   MCV 82.6 - 102.9 fL 74.1 (L)   MCH 25.2 - 33.5 pg 22.6 (L)   MCHC 28.4 - 34.8 g/dL 30.5   (HH): Data is critically high  (L): Data is abnormally low  (H): Data is abnormally high    Latest Reference Range & Units Most Recent   WBC 3.5 - 11.3 k/uL 6.4  2/22/23 11:20   RBC 4.21 - 5.77 m/uL 4.95  2/22/23 11:20   Hemoglobin Quant 13.0 - 17.0 g/dL 11.2 (L)  2/22/23 11:20   Hematocrit 40.7 - 50.3 % 36.7 (L)  2/22/23 11:20   MCV 82.6 - 102.9 fL 74.1 (L)  2/22/23 11:20   MCH 25.2 - 33.5 pg 22.6 (L)  2/22/23 11:20   MCHC 28.4 - 34.8 g/dL 30.5  2/22/23 11:20   MPV 8.1 - 13.5 fL Abnormal  2/22/23 11:20   RDW 11.8 - 14.4 % 19.9 (H)  2/22/23 11:20   Platelet Count 016 - 453 k/uL 152  2/22/23 11:20   Platelet Estimate   NOT REPORTED  7/4/21 21:28   Absolute Mono # 0.10 - 1.20 k/uL 0.83  2/22/23 11:20   Eosinophils % 1 - 4 % 3  2/22/23 11:20   Basophils Absolute 0.00 - 0.20 k/uL <0.03  2/22/23 11:20   Differential Type   NOT REPORTED  7/4/21 21:28   Seg Neutrophils 36 - 65 % 60  2/22/23 11:20   Absolute Neut # 1.8 - 7.7 k/uL 11.98 (H)  1/27/13 08:09   Segs Absolute 1.50 - 8.10 k/uL 3.83  2/22/23 11:20   Lymphocytes 24 - 43 % 24  2/22/23 11:20   Absolute Lymph # 1.10 - 3.70 k/uL 1.53  2/22/23 11:20   Monocytes 3 - 12 % 13 (H)  2/22/23 11:20   Absolute Eos # 0.00 - 0.44 k/uL 0.17  2/22/23 11:20   Basophils 0 - 2 % 0  2/22/23 11:20   Immature Granulocytes 0 % 0  2/22/23 11:20   (L): Data is abnormally low  (H): Data is abnormally high  Assessment and plan  Patient presented with painful phimosis with discoloration of glans penis. 2 attempts by emergency room to reduce it was on successful. Urology is consulted  Patient be provided with pain control  History of chronic pain syndrome. Opiate dependent. Constipation. He is placed on MiraLAX and Colace  ESRD hemodialysis dependent on 3/week.   Nephrology on board  Anemia of chronic disease. Hemoglobin is stable at 11.2  Inpatient course  Patient was seen by urology in the emergency room. Patient was taken to the operating room and underwent dorsal slit with suturing and biopsy of glans. He tolerated the procedure well. He denies retention of urine  Patient was evaluated for emphysema/hypoxemia. He qualifies for home O2  Discharge condition  Improved  Disposition Home  Follow-up with urology per plan. Wound care per urology  Plan of care discussed with patient, discharge planner, ROSALINE sign  This note is created with a voice recognition program and while intend to generate a document that accurately reflects the content of the visit, no guarantee can be provided that every mistake has been identified and corrected by editing.

## 2023-02-23 NOTE — DISCHARGE INSTR - COC
Patient Name: Breanna Murphy   :  1960  MRN:  [id number]    Admit date:  2023  Discharge date:      Code Status Order: Full Code   Advance Directives:     Admitting Physician:  Rober Caldwell MD  PCP: Rober Caldwell MD    Discharging Nurse: samy [facility]enez[facility]ing [facility] Unit/Room#: 1015/1015-01  Discharging Unit Phone Number: [phone]    Emergency Contact:   Extended Emergency Contact Information  Primary Emergency Contact: Melvin Murphy  Address: [place]   [facility]  Home Phone: [phone]  Relation: Child  Secondary Emergency Contact: Amelia Murphy   [facility]  Home Phone: [phone]  Relation: Other    Past Surgical History:  Past Surgical History:   Procedure Laterality Date    ARM SURGERY      CATARACT REMOVAL WITH IMPLANT      CIRCUMCISION N/A 2023    EXAM UNDER ANESTHESIA, DORSAL SLIT , BIOPSY GLANDS PENIS performed by Smith [address] MD at [facility]    DIALYSIS FISTULA CREATION Bilateral     As of , RUE AVF functioning, LUE AVF nonfunctioning.    FINGER AMPUTATION      right pointer finger    FINGER SURGERY Left     I & D    GLAUCOMA SURGERY      LEG AMPUTATION BELOW KNEE Bilateral     TUNNELED VENOUS CATHETER PLACEMENT      PC placed and removed       Immunization History:   Immunization History   Administered Date(s) Administered    COVID-19, MODERNA BLUE border, Primary or Immunocompromised, (age 12y+), IM, 100 mcg/0.5mL 03/10/2021, 2021    COVID-19, MODERNA Booster BLUE border, (age 18y+), IM, 50mcg/0.25mL 2021    Influenza Vaccine, unspecified formulation 10/08/2014, 10/02/2015    Influenza Virus Vaccine 10/02/2019, 2020, 10/01/2021, 2022    Pneumococcal Conjugate 13-valent ([id number]) 2015, 2018    Pneumococcal Polysaccharide ([id number]) 2015    Tdap (Boostrix, Adacel) 2020       Active Problems:  Patient Active Problem List   Diagnosis Code    Hyperlipidemia E78.5    Essential hypertension I10     ESRD on hemodialysis ([facility] [address] MWF N18.6, Z99.2    Insomnia G47.00    Chronic bilateral low back pain with bilateral sciatica M54.42, M54.41, G89.29    Controlled diabetes mellitus type 2 with complications (HCC) F77.3    S/P bilateral below knee amputation (HCC) Z89.512, Z89.511    Long term (current) use of antithrombotics/antiplatelets S10.42    Chronic use of opiate drugs therapeutic purposes Z79.891    Coronary artery disease involving native coronary artery I25.10    Anemia D64.9    Hypoglycemia E16.2    Inability to walk R26.2    Phimosis N47.1       Isolation/Infection:   Isolation            Contact          Patient Infection Status       Infection Onset Added Last Indicated Last Indicated By Review Planned Expiration Resolved Resolved By    MRSA  09/06/17 09/06/17 Tyrone Antonio RN        Arm - 9/2017    Resolved    COVID-19 (Rule Out) 11/13/22 11/13/22 11/13/22 COVID-19, Rapid (Ordered)   11/13/22 Rule-Out Test Resulted    COVID-19 (Rule Out) 09/02/22 09/02/22 09/02/22 COVID-19, Rapid (Ordered)   09/02/22 Rule-Out Test Resulted            Nurse Assessment:  Last Vital Signs: /65   Pulse 72   Temp 98.1 °F (36.7 °C) (Oral)   Resp 16   Ht 5' 11\" (1.803 m)   Wt 216 lb 4.3 oz (98.1 kg)   SpO2 97%   BMI 30.16 kg/m²     Last documented pain score (0-10 scale): Pain Level: 8  Last Weight:   Wt Readings from Last 1 Encounters:   02/23/23 216 lb 4.3 oz (98.1 kg)     Mental Status:  oriented, alert, coherent, and logical    IV Access:  - None    Nursing Mobility/ADLs:  Walking   Dependent  Transfer  Dependent  Bathing  Dependent  Dressing  Dependent  Toileting  Dependent  Feeding  Assisted  Med Admin  Independent  Med Delivery   whole    Wound Care Documentation and Therapy:  Incision 09/05/13 Arm Left (Active)   Number of days: 3458       Incision 10/29/13 Arm Left (Active)   Number of days: 3403       Incision 02/18/14 Leg Lower;Right (Active)   Number of days: 3291       Incision 09/18/22 Buttocks Right (Active)   Number of days: 157       Incision 02/22/23 Penis (Active)   Dressing Status New dressing applied 02/23/23 0340   Dressing/Treatment ABD pad 02/23/23 0340   Incision Assessment Bleeding 02/23/23 0340   Drainage Amount Small 02/23/23 0340   Drainage Description Thin;Sanguinous 02/23/23 0340   Odor None 02/23/23 0340   Number of days: 1        Elimination:  Continence: Bowel: Yes  Bladder: Yes  Urinary Catheter: None   Colostomy/Ileostomy/Ileal Conduit: No       Date of Last BM: 02/23    Intake/Output Summary (Last 24 hours) at 2/23/2023 1106  Last data filed at 2/23/2023 0130  Gross per 24 hour   Intake 1214 ml   Output 1500 ml   Net -286 ml     I/O last 3 completed shifts: In: 1037 [I.V.:714]  Out: 1500     Safety Concerns: At Risk for Falls    Impairments/Disabilities:      Amputation - Bilat below knee    Nutrition Therapy:  Current Nutrition Therapy:   - Oral Diet:  General and Renal    Routes of Feeding: None  Liquids: No Restrictions  Daily Fluid Restriction: yes - amount 1000  Last Modified Barium Swallow with Video (Video Swallowing Test): not done    Treatments at the Time of Hospital Discharge:   Respiratory Treatments: na   Oxygen Therapy:  is not on home oxygen therapy.   Ventilator:    - No ventilator support    Rehab Therapies: Physical Therapy and Occupational Therapy  Weight Bearing Status/Restrictions: No weight bearing restrictions if have prosthetics on   Other Medical Equipment (for information only, NOT a DME order):  wheelchair  Other Treatments:   Skilled RN assessment     Patient's personal belongings (please select all that are sent with patient):       RN SIGNATURE:  Electronically signed by Garfield Contreras RN on 2/23/23 at 11:11 AM EST    CASE MANAGEMENT/SOCIAL WORK SECTION    Inpatient Status Date: OBS     Readmission Risk Assessment Score:  Readmission Risk              Risk of Unplanned Readmission:  0           Discharging to Facility/ Agency   Name: [facility]   Address:  HNNND:[phone]  [address] (if applicable)   Name:Edson Rothman  Address:  Dialysis Schedule:M/W/F 1030 arrival 66 06 76    / signature: Electronically signed by Wayne Silva RN on 2/23/23 at 12:59 PM EST    PHYSICIAN SECTION    Prognosis: Fair    Condition at Discharge: Stable    Rehab Potential (if transferring to Rehab): Fair    Recommended Labs or Other Treatments After Discharge:      Physician Certification: I certify the above information and transfer of Kanwal Yu  is necessary for the continuing treatment of the diagnosis listed and that he requires [facility] for greater 30 days.      Update Admission H&P: No change in H&P    PHYSICIAN SIGNATURE:  Electronically signed by Vargas Del Rio MD on 2/23/23 at 2:10 PM EST

## 2023-02-23 NOTE — PROGRESS NOTES
Pt arrived back from PACU. Telemetry intimated, pulse ox applied, vitals obtained- stable, BS checked- 148, A&O x4. Assessed pt surgical site and has bloody drainage which writer told is expected. Bacitracin cream applied in surgery and wrapped in a blue surgery drape. No other abnormal findings of surgical site at this time. Provided pt dinner at this time. Will continue plan of care.

## 2023-02-23 NOTE — CONSULTS
Renal Consult Note    Patient :  Mady Trinidad; 60 y.o. MRN# [id number]  Location:  4398/8129-02  Attending:  Igor Schwab MD  Admit Date:  2/22/2023   Hospital Day: 0    Reason for Consult:     Asked by Dr Igor Schwab MD to see for ESRD on HD. History Obtained From:     patient, electronic medical record    HD Access:     previous [facility]    HD Unit:     [facility] hemodialysis unit    Nephrologist:     Dr. Gogo Vanegas. History of Present Illness:     Breanna Murphy; 60 y.o. male with past medical history as mentioned below presented to the hospital with the chief complaint of severe penile pain. He was evaluated by urology and found to have phimosis and also mass on glans penis status post OR under anesthesia with dorsal slit and biopsy sent on 2/22/2023. Patient has history of ESRD on dialysis on MWF schedule. He did get regular dialysis yesterday ran for 200 minutes and got about 1.5 L removed. Nephrology is consulted due to ESRD on HD. Past History/Allergies? Social History:     Past Medical History:   Diagnosis Date    Acute congestive heart failure (HCC)     Chronic bilateral low back pain with bilateral sciatica     Coronary artery disease involving native coronary artery 9/6/2022    CRF (chronic renal failure)     [facility] MWF    DDD (degenerative disc disease), lumbar 12/15/2020    Diabetes mellitus ([facility] [address]     Dialysis patient [facility])     ED (erectile dysfunction)     History of echocardiogram 2014    [facility]    HTN (hypertension)     Hyperlipidemia     LVH (left ventricular hypertrophy)     PVD (peripheral vascular disease) ([facility])     S/P bilateral BKA (below knee amputation) ([facility] [address]     Wears glasses        Past Surgical History:   Procedure Laterality Date    ARM SURGERY      CATARACT REMOVAL WITH IMPLANT      CIRCUMCISION N/A 2/22/2023    EXAM UNDER ANESTHESIA, DORSAL SLIT , BIOPSY GLANDS PENIS performed by Twana Angelucci, MD at [facility] Bilateral     As of 2019, JAGUAR AVF functioning, LUE AVF nonfunctioning. FINGER AMPUTATION      right pointer finger    FINGER SURGERY Left     I & D    GLAUCOMA SURGERY      LEG AMPUTATION BELOW KNEE Bilateral     TUNNELED VENOUS CATHETER PLACEMENT      PC placed and removed       No Known Allergies    Social History     Socioeconomic History    Marital status:      Spouse name: Not on file    Number of children: Not on file    Years of education: Not on file    Highest education level: Not on file   Occupational History    Not on file   Tobacco Use    Smoking status: Never    Smokeless tobacco: Never   Vaping Use    Vaping Use: Never used   Substance and Sexual Activity    Alcohol use: Yes     Comment: rare    Drug use: No    Sexual activity: Not on file   Other Topics Concern    Not on file   Social History Narrative    Not on file     Social Determinants of Health     Financial Resource Strain: Low Risk     Difficulty of Paying Living Expenses: Not hard at all   Food Insecurity: No Food Insecurity    Worried About Running Out of Food in the Last Year: Never true    Ran Out of Food in the Last Year: Never true   Transportation Needs: No Transportation Needs    Lack of Transportation (Medical): No    Lack of Transportation (Non-Medical): No   Physical Activity: Inactive    Days of Exercise per Week: 0 days    Minutes of Exercise per Session: 0 min   Stress: No Stress Concern Present    Feeling of Stress : Not at all   Social Connections: Moderately Integrated    Frequency of Communication with Friends and Family: More than three times a week    Frequency of Social Gatherings with Friends and Family: More than three times a week    Attends Mandaeism Services: More than 4 times per year    Active Member of [employer] or Organizations: Yes    Attends Club or Organization Meetings: Never    Marital Status:    Intimate Partner Violence: Not At Risk    Fear of Current or Ex-Partner: No    Emotionally Abused: No    Physically Abused:  No Sexually Abused: No   Housing Stability: Unknown    Unable to Pay for Housing in the Last Year: No    Number of Places Lived in the Last Year: Not on file    Unstable Housing in the Last Year: No       Family History:        Family History   Problem Relation Age of Onset    Diabetes Mother     Coronary Art Dis Mother     Hypertension Mother     Diabetes Father     Hypertension Father     Stroke Father     Cancer Sister     Hypertension Brother        Outpatient Medications:     Medications Prior to Admission: oxyCODONE-acetaminophen (PERCOCET) 5-325 MG per tablet, Take 1 tablet by mouth every 6 hours as needed for Pain. diphenhydrAMINE (BENADRYL) 25 MG tablet, Take 25 mg by mouth nightly as needed for Itching  glimepiride (AMARYL) 2 MG tablet, Take 2 mg by mouth every morning (before breakfast)  atorvastatin (LIPITOR) 80 MG tablet, Take 1 tablet by mouth daily  aspirin (ASPIRIN CHILDRENS) 81 MG chewable tablet, Take 1 tablet by mouth daily  apixaban (ELIQUIS) 5 MG TABS tablet, Take 1 tablet by mouth 2 times daily  pregabalin (LYRICA) 75 MG capsule, Take 1 capsule by mouth daily for 30 days.  Max Daily Amount: 75 mg  linagliptin (TRADJENTA) 5 MG tablet, Take 1 tablet by mouth daily  amLODIPine (NORVASC) 5 MG tablet, Take 1 tablet by mouth daily  sucroferric oxyhydroxide (VELPHORO) 500 MG CHEW chewable tablet, Take 2 tablets by mouth 3 times daily (with meals)  Multiple Vitamins-Minerals (RENAPLEX) TABS, Take 1 tablet by mouth daily  polyethyl glycol-propyl glycol 0.4-0.3 % (SYSTANE) 0.4-0.3 % ophthalmic solution, Place 1 drop into both eyes as needed for Dry Eyes  traZODone (DESYREL) 50 MG tablet, Take 1 tablet by mouth nightly as needed for Sleep  cloNIDine (CATAPRES) 0.1 MG tablet, Take 3 tablets by mouth 2 times daily  lisinopril (PRINIVIL;ZESTRIL) 20 MG tablet, Take 1 tablet by mouth 2 times daily  minoxidil (LONITEN) 2.5 MG tablet, Take 1 tablet by mouth 2 times daily    Current Medications:     Scheduled Meds:    aspirin  81 mg Oral Daily    pregabalin  75 mg Oral Daily    glipiZIDE  5 mg Oral QAM AC    atorvastatin  80 mg Oral Nightly    lisinopril  20 mg Oral Daily    minoxidil  2.5 mg Oral BID    alogliptin  6.25 mg Oral Daily    cloNIDine  0.3 mg Oral TID    amLODIPine  10 mg Oral Daily    jennifer-grace  1 tablet Oral Daily    apixaban  5 mg Oral BID    sodium chloride flush  5-40 mL IntraVENous 2 times per day     Continuous Infusions:    dextrose      sodium chloride       PRN Meds:  oxyCODONE-acetaminophen, traZODone, glucose, dextrose bolus **OR** dextrose bolus, glucagon (rDNA), dextrose, sodium chloride flush, sodium chloride, acetaminophen **OR** acetaminophen, anticoagulant sodium citrate, anticoagulant sodium citrate    Review of Systems:     Constitutional: No fever, no chills, no lethargy, no weakness. HEENT:  No headache, otalgia, itchy eyes, nasal discharge or sore throat. Cardiac:  No chest pain, dyspnea, orthopnea or PND. Chest:   No cough, phlegm or wheezing. Abdomen:  No abdominal pain, nausea or vomiting. Neuro:  No focal weakness, abnormal movements orseizure like activity. Skin:   No rashes, no itching. :   No hematuria, no pyuria, no dysuria, no flank pain. Positive penile pain. Extremities:  No swelling or joint pains. ROS was otherwise negative except as mentioned in the [address] Input/Output:     I/O last 3 completed shifts: In: 1214 [I.V.:714]  Out: 1500       Vital Signs:   Temperature:  Temp: 98.1 °F (36.7 °C)  TMax:   Temp (24hrs), Av °F (36.7 °C), Min:97.3 °F (36.3 °C), Max:98.6 °F (37 °C)    Respirations:  Resp: 16  Pulse:   Heart Rate: 72  BP:    BP: 123/65  BP Range: Systolic (79NKL), J , Min:111 , QLQ:596       Diastolic (31PPU), B, Min:59, Max:93      Physical Examination:     General:  AAO x 3, speaking in full sentences, no accessory muscle use. HEENT: Atraumatic, normocephalic, no throat congestion, moist mucosa.   Eyes:   Pupils equal, round and reactive to light, EOMI. Neck:   Supple  Chest:   Bilateral vesicular breath sounds, no rales or wheezes. Cardiac:  S1 S2 RR, no murmurs, gallops or rubs. Abdomen: Soft, non-tender, no masses or organomegaly, BS audible. :   No suprapubic or flank tenderness. Neuro:  AAO x 3, No FND. SKIN:  No rashes, good skin turgor. Extremities:  Bilateral lower extremity amputations. Labs:       Recent Labs     02/22/23  1120   WBC 6.4   RBC 4.95   HGB 11.2*   HCT 36.7*   MCV 74.1*   MCH 22.6*   MCHC 30.5   RDW 19.9*      MPV Abnormal      BMP:   Recent Labs     02/22/23  1120   *   K 4.9   CL 94*   CO2 26   BUN 37*   CREATININE 7.47*   GLUCOSE 186*   CALCIUM 9.7      Phosphorus:     Recent Labs     02/22/23  1120   PHOS 5.3*     Magnesium:    Recent Labs     02/22/23  1120   MG 2.4     Albumin:    Recent Labs     02/22/23  1120   LABALBU 3.8     BNP:      Lab Results   Component Value Date/Time    BNP 3,088 06/13/2013 06:32 AM     Urinalysis/Chemistries:      Lab Results   Component Value Date/Time    NITRU NEGATIVE 06/13/2013 10:30 AM    COLORU YELLOW 06/13/2013 10:30 AM    PHUR 7.5 06/13/2013 10:30 AM    WBCUA 0 TO 2 06/13/2013 10:30 AM    RBCUA 10 TO 20 06/13/2013 10:30 AM    MUCUS NOT REPORTED 06/13/2013 10:30 AM    TRICHOMONAS NOT REPORTED 06/13/2013 10:30 AM    YEAST NOT REPORTED 06/13/2013 10:30 AM    BACTERIA FEW 06/13/2013 10:30 AM    SPECGRAV 1.015 06/13/2013 10:30 AM    LEUKOCYTESUR NEGATIVE 06/13/2013 10:30 AM    UROBILINOGEN Normal 06/13/2013 10:30 AM    BILIRUBINUR NEGATIVE 06/13/2013 10:30 AM    GLUCOSEU 3+ 06/13/2013 10:30 AM    KETUA NEGATIVE 06/13/2013 10:30 AM    AMORPHOUS NOT REPORTED 06/13/2013 10:30 AM       Radiology:     Reviewed. Assessment:       ESRD on Hemodialysis. His regular HD days are MWF at [facility] hemodialysis facility using tunneled catheter under Dr. Reyes Sauer. Severe penile pain.   He was evaluated by urology and found to have phimosis and also mass on glans penis status post OR under anesthesia with dorsal slit and biopsy sent on 2/22/2023. Bilateral lower extremity amputation. Anemia of chronic disease  Secondary hyperparathyroidism  Hypertension. Plan:     No acute need for dialysis today. To get regular dialysis in a.m. as per [facility] schedule. Strict Input and Output, Daily weigh and document in the chart. Low Potassium, Low phosphorus and low salt diet. Fluids to be restricted to 1500ml/day. IV Aranesp/Epogen for anemia of chronic disease with HD weekly. IV Zemplar per protocol for secondary hyperparathyroidism with HD thrice a week. Postop management as per urology. Okay to discharge from nephrology standpoint. Nutrition   Please ensure that patient is on a renal diet/TF. Thank you for the consultation. Please do not hesitate to contact us for any further questions/concerns. Robe Celis MD  [facility]     This note is created with the assistance of a speech-recognition program. While intending to generate a document that actually reflects the content of the visit, no guarantees can be provided that every mistake has been identified and corrected by editing.

## 2023-02-24 LAB — SURGICAL PATHOLOGY REPORT: NORMAL

## 2023-02-27 RX ORDER — LISINOPRIL 20 MG/1
TABLET ORAL
Qty: 180 TABLET | Refills: 0 | Status: SHIPPED | OUTPATIENT
Start: 2023-02-27

## 2023-02-27 NOTE — TELEPHONE ENCOUNTER
Linda Villarreal is calling to request a refill on the following medication(s):    Medication Request:  Requested Prescriptions     Pending Prescriptions Disp Refills    lisinopril (PRINIVIL;ZESTRIL) 20 MG tablet [Pharmacy Med Name: LISINOPRIL 20 MG TABLET] 60 tablet 0     Sig: TAKE ONE TABLET BY MOUTH TWICE A DAY IN THE MORNING AND AT BEDTIME       Last Visit Date (If Applicable):  8/46/9712    Next Visit Date:    Visit date not found

## 2023-02-28 ENCOUNTER — OFFICE VISIT (OUTPATIENT)
Dept: PAIN MANAGEMENT | Age: 63
End: 2023-02-28
Payer: COMMERCIAL

## 2023-02-28 VITALS
SYSTOLIC BLOOD PRESSURE: 101 MMHG | HEIGHT: 71 IN | BODY MASS INDEX: 30.28 KG/M2 | OXYGEN SATURATION: 99 % | WEIGHT: 216.27 LBS | DIASTOLIC BLOOD PRESSURE: 63 MMHG | HEART RATE: 62 BPM

## 2023-02-28 DIAGNOSIS — Z99.2 ESRD ON HEMODIALYSIS (HCC): ICD-10-CM

## 2023-02-28 DIAGNOSIS — G89.29 CHRONIC BILATERAL LOW BACK PAIN WITH BILATERAL SCIATICA: Primary | ICD-10-CM

## 2023-02-28 DIAGNOSIS — N18.6 ESRD ON HEMODIALYSIS (HCC): ICD-10-CM

## 2023-02-28 DIAGNOSIS — M54.41 CHRONIC BILATERAL LOW BACK PAIN WITH BILATERAL SCIATICA: Primary | ICD-10-CM

## 2023-02-28 DIAGNOSIS — Z79.02 LONG TERM (CURRENT) USE OF ANTITHROMBOTICS/ANTIPLATELETS: ICD-10-CM

## 2023-02-28 DIAGNOSIS — M54.42 CHRONIC BILATERAL LOW BACK PAIN WITH BILATERAL SCIATICA: Primary | ICD-10-CM

## 2023-02-28 DIAGNOSIS — Z79.891 CHRONIC USE OF OPIATE DRUG FOR THERAPEUTIC PURPOSE: ICD-10-CM

## 2023-02-28 PROCEDURE — 3017F COLORECTAL CA SCREEN DOC REV: CPT | Performed by: ANESTHESIOLOGY

## 2023-02-28 PROCEDURE — 1036F TOBACCO NON-USER: CPT | Performed by: ANESTHESIOLOGY

## 2023-02-28 PROCEDURE — 99213 OFFICE O/P EST LOW 20 MIN: CPT | Performed by: ANESTHESIOLOGY

## 2023-02-28 PROCEDURE — 1111F DSCHRG MED/CURRENT MED MERGE: CPT | Performed by: ANESTHESIOLOGY

## 2023-02-28 PROCEDURE — G8484 FLU IMMUNIZE NO ADMIN: HCPCS | Performed by: ANESTHESIOLOGY

## 2023-02-28 PROCEDURE — 3078F DIAST BP <80 MM HG: CPT | Performed by: ANESTHESIOLOGY

## 2023-02-28 PROCEDURE — 3074F SYST BP LT 130 MM HG: CPT | Performed by: ANESTHESIOLOGY

## 2023-02-28 PROCEDURE — G8417 CALC BMI ABV UP PARAM F/U: HCPCS | Performed by: ANESTHESIOLOGY

## 2023-02-28 PROCEDURE — G8427 DOCREV CUR MEDS BY ELIG CLIN: HCPCS | Performed by: ANESTHESIOLOGY

## 2023-02-28 ASSESSMENT — PATIENT HEALTH QUESTIONNAIRE - PHQ9
1. LITTLE INTEREST OR PLEASURE IN DOING THINGS: 1
SUM OF ALL RESPONSES TO PHQ QUESTIONS 1-9: 2
2. FEELING DOWN, DEPRESSED OR HOPELESS: 1
SUM OF ALL RESPONSES TO PHQ QUESTIONS 1-9: 2
SUM OF ALL RESPONSES TO PHQ QUESTIONS 1-9: 2
SUM OF ALL RESPONSES TO PHQ9 QUESTIONS 1 & 2: 2
SUM OF ALL RESPONSES TO PHQ QUESTIONS 1-9: 2

## 2023-02-28 ASSESSMENT — ENCOUNTER SYMPTOMS
SHORTNESS OF BREATH: 0
CHEST TIGHTNESS: 0
DIARRHEA: 0
CONSTIPATION: 0
WHEEZING: 0
BACK PAIN: 1
VOMITING: 0
NAUSEA: 0

## 2023-02-28 NOTE — PROGRESS NOTES
The patient is a 60 y. o. Non- / non  male. Chief Complaint   Patient presents with    Back Pain    Leg Pain      -This is a 78-year-old man with multiple major comorbidities including end-stage chronic kidney disease diabetic peripheral neuropathy and hypertension  He is on chronic hemodialysis  Patient is on chronic anticoagulation therapy with Eliquis  He has chronic multisite body pain  Status post amputation also  He is on chronic opioid therapy low-dose for chronic pain issues    Had recent surgery currently in rehab  Being given Percocet to 5 mg tablet  He did not find it effective pain is not adequately controlled  Patient is requesting us to make recommendation for dose adjustment to help improve pain control    I will advised to increase Percocet to 7.5 mg 1-2 times a day for pain to help manage pain from recent surgery and symptoms flareup      Pain score Today:  3  Adverse effects (Constipation / Nausea / Sedation / sexual Dysfunction / others) : no  Mood: good  Sleep pattern and quality: poor  Activity level: poor    Pill count Today:12 tabs  Last dose taken  2/28/2023  OARRS report reviewed today: yes  ER/Hospitalizations/PCP visit related to pain since last visit:yes   Any legal problems e.g. DUI etc.:No  Satisfied with current management: Yes    Opioid Contract:12/6/2022  Last Urine Dug screen dated: unknown    Hemoglobin A1C   Date Value Ref Range Status   02/01/2023 6.1 (H) 4.0 - 6.0 % Final       Past Medical History, Past Surgical History, Social History, Allergies and Medications reviewed and updated in EPIC as indicated    Family History reviewed and is noncontributory.             Past Medical History:   Diagnosis Date    Acute congestive heart failure (HCC)     Chronic bilateral low back pain with bilateral sciatica     Coronary artery disease involving native coronary artery 9/6/2022    [facility]    DDD (degenerative disc disease), lumbar 12/15/2020    Diabetes mellitus ([facility]     Dialysis patient [facility])     ED (erectile dysfunction)     History of echocardiogram 2014    [facility]    HTN (hypertension)     Hyperlipidemia     LVH (left ventricular hypertrophy)     PVD (peripheral vascular disease) ([facility])     S/P bilateral BKA (below knee amputation) ([facility]     Wears glasses         Past Surgical History:   Procedure Laterality Date    ARM SURGERY      CATARACT REMOVAL WITH IMPLANT      CIRCUMCISION N/A 2/22/2023    EXAM UNDER ANESTHESIA, DORSAL SLIT , BIOPSY GLANDS PENIS performed by Bruno Yanez MD at [place] [id number] Bilateral     As of 2019, RUE AVF functioning, LUE AVF nonfunctioning. FINGER AMPUTATION      right pointer finger    FINGER SURGERY Left     I & D    GLAUCOMA SURGERY      LEG AMPUTATION BELOW KNEE Bilateral     TUNNELED VENOUS CATHETER PLACEMENT      PC placed and removed       Social History     Socioeconomic History    Marital status:      Spouse name: None    Number of children: None    Years of education: None    Highest education level: None   Tobacco Use    Smoking status: Never    Smokeless tobacco: Never   Vaping Use    Vaping Use: Never used   Substance and Sexual Activity    Alcohol use: Yes     Comment: rare    Drug use: No     Social Determinants of Health     Financial Resource Strain: Low Risk     Difficulty of Paying Living Expenses: Not hard at all   Food Insecurity: No Food Insecurity    Worried About Running Out of Food in the Last Year: Never true    Ran Out of Food in the Last Year: Never true   Transportation Needs: No Transportation Needs    Lack of Transportation (Medical): No    Lack of Transportation (Non-Medical): No   Physical Activity: Inactive    Days of Exercise per Week: 0 days    Minutes of Exercise per Session: 0 min   Stress: No Stress Concern Present    Feeling of Stress : Not at all   Social Connections:  Moderately Integrated    Frequency of Communication with Friends and Family: More than three times a week    Frequency of Social Gatherings with Friends and Family: More than three times a week    Attends Rastafari Services: More than 4 times per year    Active Member of [employer] Group or Organizations: Yes    Attends Club or Organization Meetings: Never    Marital Status:    Intimate Partner Violence: Not At Risk    Fear of Current or Ex-Partner: No    Emotionally Abused: No    Physically Abused: No    Sexually Abused: No   Housing Stability: Unknown    Unable to Pay for Housing in the Last Year: No    Unstable Housing in the Last Year: No       Family History   Problem Relation Age of Onset    Diabetes Mother     Coronary Art Dis Mother     Hypertension Mother     Diabetes Father     Hypertension Father     Stroke Father     Cancer Sister     Hypertension Brother        No Known Allergies    Vitals:    02/28/23 1221   BP: 101/63   Pulse: 62   SpO2: 99%       Current Outpatient Medications   Medication Sig Dispense Refill    lisinopril (PRINIVIL;ZESTRIL) 20 MG tablet TAKE ONE TABLET BY MOUTH TWICE A DAY IN THE MORNING AND AT BEDTIME 180 tablet 0    oxyCODONE-acetaminophen (PERCOCET) 5-325 MG per tablet Take 1 tablet by mouth every 6 hours as needed for Pain. diphenhydrAMINE (BENADRYL) 25 MG tablet Take 25 mg by mouth nightly as needed for Itching      glimepiride (AMARYL) 2 MG tablet Take 2 mg by mouth every morning (before breakfast)      atorvastatin (LIPITOR) 80 MG tablet Take 1 tablet by mouth daily 30 tablet 3    aspirin (ASPIRIN CHILDRENS) 81 MG chewable tablet Take 1 tablet by mouth daily 30 tablet 3    apixaban (ELIQUIS) 5 MG TABS tablet Take 1 tablet by mouth 2 times daily 180 tablet 1    pregabalin (LYRICA) 75 MG capsule Take 1 capsule by mouth daily for 30 days.  Max Daily Amount: 75 mg 60 capsule 3    linagliptin (TRADJENTA) 5 MG tablet Take 1 tablet by mouth daily 90 tablet 1    amLODIPine (NORVASC) 5 MG tablet Take 1 tablet by mouth daily 30 tablet 3    sucroferric oxyhydroxide (VELPHORO) 500 MG CHEW chewable tablet Take 2 tablets by mouth 3 times daily (with meals) 90 tablet     Multiple Vitamins-Minerals (RENAPLEX) TABS Take 1 tablet by mouth daily 30 tablet 0    polyethyl glycol-propyl glycol 0.4-0.3 % (SYSTANE) 0.4-0.3 % ophthalmic solution Place 1 drop into both eyes as needed for Dry Eyes  0    traZODone (DESYREL) 50 MG tablet Take 1 tablet by mouth nightly as needed for Sleep 90 tablet 1    cloNIDine (CATAPRES) 0.1 MG tablet Take 3 tablets by mouth 2 times daily 60 tablet 3    minoxidil (LONITEN) 2.5 MG tablet Take 1 tablet by mouth 2 times daily 30 tablet 3     No current facility-administered medications for this visit. Review of Systems   Constitutional:  Negative for chills, fatigue and fever. Respiratory:  Negative for chest tightness, shortness of breath and wheezing. Cardiovascular:  Negative for leg swelling. Gastrointestinal:  Negative for constipation, diarrhea, nausea and vomiting. Musculoskeletal:  Positive for back pain. Neurological:  Negative for dizziness, weakness and numbness. Objective:  Vital signs: (most recent): Blood pressure 101/63, pulse 62, height 5' 11\" (1.803 m), weight 216 lb 4.3 oz (98.1 kg), SpO2 99 %. No fever.     Assessment & Plan  -This is a 67-year-old man with multiple major comorbidities including end-stage chronic kidney disease diabetic peripheral neuropathy and hypertension  He is on chronic hemodialysis  Patient is on chronic anticoagulation therapy with Eliquis  He has chronic multisite body pain  Status post amputation also  He is on chronic opioid therapy low-dose for chronic pain issues    Had recent surgery currently in rehab  Being given Percocet to 5 mg tablet  He did not find it effective pain is not adequately controlled  Patient is requesting us to make recommendation for dose adjustment to help improve pain control    I will advised to increase Percocet to 7.5 mg 1-2 times a day for pain to help manage pain from recent surgery and symptoms flareup    1. Chronic bilateral low back pain with bilateral sciatica    2. Long term (current) use of antithrombotics/antiplatelets    3. ESRD on hemodialysis ([address] MWF    4. Chronic use of opiate drugs therapeutic purposes        No orders of the defined types were placed in this encounter. No orders of the defined types were placed in this encounter.            Electronically signed by Jacinta Ordaz MD on 2/28/2023 at 1:47 PM

## 2023-02-28 NOTE — PROGRESS NOTES
The patient is a 60 y. o. Non- / non  male. Chief Complaint   Patient presents with    Back Pain    Leg Pain        Pain score Today:  3  Adverse effects (Constipation / Nausea / Sedation / sexual Dysfunction / others) : no  Mood: good  Sleep pattern and quality: poor  Activity level: poor    Pill count Today:12 tabs  Last dose taken  2/28/2023  OARRS report reviewed today: yes  ER/Hospitalizations/PCP visit related to pain since last visit:yes   Any legal problems e.g. DUI etc.:No  Satisfied with current management: Yes    Opioid Contract:12/6/2022  Last Urine Dug screen dated: unknown    Hemoglobin A1C   Date Value Ref Range Status   02/01/2023 6.1 (H) 4.0 - 6.0 % Final       Past Medical History, Past Surgical History, Social History, Allergies and Medications reviewed and updated in EPIC as indicated    Family History reviewed and is noncontributory. Past Medical History:   Diagnosis Date    Acute congestive heart failure (HCC)     Chronic bilateral low back pain with bilateral sciatica     Coronary artery disease involving native coronary artery 9/6/2022    CRF (chronic renal failure)     [facility] MWF    DDD (degenerative disc disease), lumbar 12/15/2020    Diabetes mellitus ([facility]     Dialysis patient [facility])     ED (erectile dysfunction)     History of echocardiogram 2014    [facility]    HTN (hypertension)     Hyperlipidemia     LVH (left ventricular hypertrophy)     PVD (peripheral vascular disease) ([facility])     S/P bilateral BKA (below knee amputation) ([facility]     Wears glasses         Past Surgical History:   Procedure Laterality Date    ARM SURGERY      CATARACT REMOVAL WITH IMPLANT      CIRCUMCISION N/A 2/22/2023    EXAM UNDER ANESTHESIA, DORSAL SLIT , BIOPSY GLANDS PENIS performed by Malini Dias MD at [facility]     As of 2019, RUE AVF functioning, LUE AVF nonfunctioning.     FINGER AMPUTATION      right pointer finger    FINGER SURGERY Left     I & D    GLAUCOMA SURGERY      LEG AMPUTATION BELOW KNEE Bilateral     TUNNELED VENOUS CATHETER PLACEMENT      PC placed and removed       Social History     Socioeconomic History    Marital status:      Spouse name: None    Number of children: None    Years of education: None    Highest education level: None   Tobacco Use    Smoking status: Never    Smokeless tobacco: Never   Vaping Use    Vaping Use: Never used   Substance and Sexual Activity    Alcohol use: Yes     Comment: rare    Drug use: No     Social Determinants of Health     Financial Resource Strain: Low Risk     Difficulty of Paying Living Expenses: Not hard at all   Food Insecurity: No Food Insecurity    Worried About Running Out of Food in the Last Year: Never true    [address] in the Last Year: Never true   Transportation Needs: No Transportation Needs    Lack of Transportation (Medical): No    Lack of Transportation (Non-Medical): No   Physical Activity: Inactive    Days of Exercise per Week: 0 days    Minutes of Exercise per Session: 0 min   Stress: No Stress Concern Present    Feeling of Stress : Not at all   Social Connections:  Moderately Integrated    Frequency of Communication with Friends and Family: More than three times a week    Frequency of Social Gatherings with Friends and Family: More than three times a week    Attends Scientology Services: More than 4 times per year    Active Member of [employer] Group or Organizations: Yes    Attends Club or Organization Meetings: Never    Marital Status:    Intimate Partner Violence: Not At Risk    Fear of Current or Ex-Partner: No    Emotionally Abused: No    Physically Abused: No    Sexually Abused: No   Housing Stability: Unknown    Unable to Pay for Housing in the Last Year: No    Unstable Housing in the Last Year: No       Family History   Problem Relation Age of Onset    Diabetes Mother     Coronary Art Dis Mother     Hypertension Mother     Diabetes Father     Hypertension Father Stroke Father     Cancer Sister     Hypertension Brother        No Known Allergies    There were no vitals filed for this visit. Current Outpatient Medications   Medication Sig Dispense Refill    lisinopril (PRINIVIL;ZESTRIL) 20 MG tablet TAKE ONE TABLET BY MOUTH TWICE A DAY IN THE MORNING AND AT BEDTIME 180 tablet 0    oxyCODONE-acetaminophen (PERCOCET) 5-325 MG per tablet Take 1 tablet by mouth every 6 hours as needed for Pain. diphenhydrAMINE (BENADRYL) 25 MG tablet Take 25 mg by mouth nightly as needed for Itching      glimepiride (AMARYL) 2 MG tablet Take 2 mg by mouth every morning (before breakfast)      atorvastatin (LIPITOR) 80 MG tablet Take 1 tablet by mouth daily 30 tablet 3    aspirin (ASPIRIN CHILDRENS) 81 MG chewable tablet Take 1 tablet by mouth daily 30 tablet 3    apixaban (ELIQUIS) 5 MG TABS tablet Take 1 tablet by mouth 2 times daily 180 tablet 1    pregabalin (LYRICA) 75 MG capsule Take 1 capsule by mouth daily for 30 days. Max Daily Amount: 75 mg 60 capsule 3    linagliptin (TRADJENTA) 5 MG tablet Take 1 tablet by mouth daily 90 tablet 1    amLODIPine (NORVASC) 5 MG tablet Take 1 tablet by mouth daily 30 tablet 3    sucroferric oxyhydroxide (VELPHORO) 500 MG CHEW chewable tablet Take 2 tablets by mouth 3 times daily (with meals) 90 tablet     Multiple Vitamins-Minerals (RENAPLEX) TABS Take 1 tablet by mouth daily 30 tablet 0    polyethyl glycol-propyl glycol 0.4-0.3 % (SYSTANE) 0.4-0.3 % ophthalmic solution Place 1 drop into both eyes as needed for Dry Eyes  0    traZODone (DESYREL) 50 MG tablet Take 1 tablet by mouth nightly as needed for Sleep 90 tablet 1    cloNIDine (CATAPRES) 0.1 MG tablet Take 3 tablets by mouth 2 times daily 60 tablet 3    minoxidil (LONITEN) 2.5 MG tablet Take 1 tablet by mouth 2 times daily 30 tablet 3     No current facility-administered medications for this visit. Review of Systems   Constitutional:  Negative for chills, fatigue and fever. Respiratory:  Negative for chest tightness, shortness of breath and wheezing. Cardiovascular:  Negative for leg swelling. Gastrointestinal:  Negative for constipation, diarrhea, nausea and vomiting. Musculoskeletal:  Positive for back pain. Neurological:  Negative for dizziness, weakness and numbness. Objective:  Vital signs: (most recent): Height 5' 11\" (1.803 m), weight 216 lb 4.3 oz (98.1 kg). No fever. Assessment & Plan  No diagnosis found. No orders of the defined types were placed in this encounter. No orders of the defined types were placed in this encounter.            Electronically signed by Aaliyah Reed on 2/28/2023 at 12:22 PM

## 2023-03-03 ENCOUNTER — TELEPHONE (OUTPATIENT)
Dept: PAIN MANAGEMENT | Age: 63
End: 2023-03-03

## 2023-03-03 ENCOUNTER — HOSPITAL ENCOUNTER (EMERGENCY)
Age: 63
Discharge: HOME | End: 2023-03-03
Attending: EMERGENCY MEDICINE
Payer: COMMERCIAL

## 2023-03-03 VITALS
RESPIRATION RATE: 18 BRPM | SYSTOLIC BLOOD PRESSURE: 105 MMHG | OXYGEN SATURATION: 96 % | DIASTOLIC BLOOD PRESSURE: 54 MMHG | TEMPERATURE: 98.9 F | HEART RATE: 78 BPM

## 2023-03-03 DIAGNOSIS — G89.29 OTHER CHRONIC PAIN: Primary | ICD-10-CM

## 2023-03-03 PROCEDURE — 99283 EMERGENCY DEPT VISIT LOW MDM: CPT

## 2023-03-03 PROCEDURE — 6370000000 HC RX 637 (ALT 250 FOR IP): Performed by: PHYSICIAN ASSISTANT

## 2023-03-03 RX ORDER — OXYCODONE HYDROCHLORIDE AND ACETAMINOPHEN 5; 325 MG/1; MG/1
1 TABLET ORAL ONCE
Status: COMPLETED | OUTPATIENT
Start: 2023-03-03 | End: 2023-03-03

## 2023-03-03 RX ADMIN — OXYCODONE AND ACETAMINOPHEN 1 TABLET: 5; 325 TABLET ORAL at 13:47

## 2023-03-03 ASSESSMENT — PAIN DESCRIPTION - LOCATION: LOCATION: PENIS

## 2023-03-03 ASSESSMENT — PAIN DESCRIPTION - DESCRIPTORS: DESCRIPTORS: SHARP;THROBBING

## 2023-03-03 ASSESSMENT — PAIN DESCRIPTION - FREQUENCY: FREQUENCY: CONTINUOUS

## 2023-03-03 ASSESSMENT — PAIN - FUNCTIONAL ASSESSMENT: PAIN_FUNCTIONAL_ASSESSMENT: 0-10

## 2023-03-03 ASSESSMENT — PAIN SCALES - GENERAL: PAINLEVEL_OUTOF10: 10

## 2023-03-03 NOTE — TELEPHONE ENCOUNTER
Pt called wanting me to call his nurse rosa maria @ [facility] [phone]  for her to call back to give her directions on his pain meds     @ 1251 pm Nikhil Putnam called back from [facility] and she stated pt is seeing pain management in the rehab d pain doc there is giving him 7.75 mg of Percocet 1 daily.  Nikhil Putnam stated after he came here to see us he got pian meds from us seeking them and was taking there pain meds too and didn't tell them you gave him some please advise next steps

## 2023-03-03 NOTE — ED PROVIDER NOTES
eMERGENCY dEPARTMENT eNCOUnter   Independent Attestation     Pt Name: Lizz Benson  MRN: [id number]  [place] 1960  Date of evaluation: 3/3/23     Lizz Benson is a 60 y.o. male with CC: Medication Refill (percocet) and Penis Pain (States surgery over a week ago)      Based on the medical record the care appears appropriate. I was personally available for consultation in the Emergency Department.     Pj Harmon DO  Attending Emergency Physician                 Pj Harmon DO  03/03/23 1532

## 2023-03-03 NOTE — DISCHARGE INSTRUCTIONS
Continue to follow up with pain management at your facility and also your private pain management physician.

## 2023-03-03 NOTE — ED PROVIDER NOTES
[facility] ED  eMERGENCY dEPARTMENT eNCOUnter      Pt Name: Yvonne Zamudio  MRN: [id number]  [place] 1960  Date of evaluation: 3/3/2023  Provider: Saad De Leon PA-C    CHIEF COMPLAINT       Chief Complaint   Patient presents with    Medication Refill     percocet    Penis Pain     States surgery over a week ago       HISTORY OF PRESENT ILLNESS  (Location/Symptom, Timing/Onset, Context/Setting, Quality, Duration, Modifying Factors, Severity.)   Yvonne Zamudio is a 60 y.o. male who presents to the emergency department. Patient is a poor historian but states that he is not getting any pain medication from his rehab facility and that he is here because he had surgery last week and he has having pain. Patient states he does not make urine he is a dialysis patient. He has no other concerns at this time. Called and spoke to aid in of [place]. Apparently the patient was originally seeing pain management at the facility and receiving Percocet every 6 hours as needed for pain. The patient went to see another pain management physician and had a prescription filled for Percocet 7.5 mg once daily. Patient had his son fill the prescription for the extra Percocet and was taking that secretly on top of the medication dosage that was being given at the facility. The facility [address] his facility dosage and has only been giving him the 7.5 mg once daily. Patient is here because he is upset that he is not getting all of those medications. Nursing Notes were reviewed. ALLERGIES     Patient has no known allergies. CURRENT MEDICATIONS       Current Discharge Medication List        CONTINUE these medications which have NOT CHANGED    Details   lisinopril (PRINIVIL;ZESTRIL) 20 MG tablet TAKE ONE TABLET BY MOUTH TWICE A DAY IN THE MORNING AND AT BEDTIME  Qty: 180 tablet, Refills: 0      oxyCODONE-acetaminophen (PERCOCET) 5-325 MG per tablet Take 1 tablet by mouth every 6 hours as needed for Pain. diphenhydrAMINE (BENADRYL) 25 MG tablet Take 25 mg by mouth nightly as needed for Itching      glimepiride (AMARYL) 2 MG tablet Take 2 mg by mouth every morning (before breakfast)      atorvastatin (LIPITOR) 80 MG tablet Take 1 tablet by mouth daily  Qty: 30 tablet, Refills: 3      aspirin (ASPIRIN CHILDRENS) 81 MG chewable tablet Take 1 tablet by mouth daily  Qty: 30 tablet, Refills: 3      apixaban (ELIQUIS) 5 MG TABS tablet Take 1 tablet by mouth 2 times daily  Qty: 180 tablet, Refills: 1      pregabalin (LYRICA) 75 MG capsule Take 1 capsule by mouth daily for 30 days. Max Daily Amount: 75 mg  Qty: 60 capsule, Refills: 3    Associated Diagnoses: S/P bilateral below knee amputation ([facility] [address]  Controlled type 2 diabetes mellitus with complication, with long-term current use of insulin ([facility])      linagliptin (TRADJENTA) 5 MG tablet Take 1 tablet by mouth daily  Qty: 90 tablet, Refills: 1      amLODIPine (NORVASC) 5 MG tablet Take 1 tablet by mouth daily  Qty: 30 tablet, Refills: 3      sucroferric oxyhydroxide (VELPHORO) 500 MG CHEW chewable tablet Take 2 tablets by mouth 3 times daily (with meals)  Qty: 90 tablet      Multiple Vitamins-Minerals (RENAPLEX) TABS Take 1 tablet by mouth daily  Qty: 30 tablet, Refills: 0      polyethyl glycol-propyl glycol 0.4-0.3 % (SYSTANE) 0.4-0.3 % ophthalmic solution Place 1 drop into both eyes as needed for Dry Eyes  Refills: 0      traZODone (DESYREL) 50 MG tablet Take 1 tablet by mouth nightly as needed for Sleep  Qty: 90 tablet, Refills: 1      cloNIDine (CATAPRES) 0.1 MG tablet Take 3 tablets by mouth 2 times daily  Qty: 60 tablet, Refills: 3      minoxidil (LONITEN) 2.5 MG tablet Take 1 tablet by mouth 2 times daily  Qty: 30 tablet, Refills: 3             PAST MEDICAL HISTORY         Diagnosis Date    Acute congestive heart failure (HCC)     Chronic bilateral low back pain with bilateral sciatica     Coronary artery disease involving native coronary artery 9/6/2022    CRF (chronic renal failure)     [facility] MWF    DDD (degenerative disc disease), lumbar 12/15/2020    Diabetes mellitus ([facility] [address]     Dialysis patient [facility])     ED (erectile dysfunction)     History of echocardiogram 2014    [facility]    HTN (hypertension)     Hyperlipidemia     LVH (left ventricular hypertrophy)     PVD (peripheral vascular disease) ([facility])     S/P bilateral BKA (below knee amputation) ([facility] [address]     Wears glasses        SURGICAL HISTORY           Procedure Laterality Date    ARM SURGERY      CATARACT REMOVAL WITH IMPLANT      CIRCUMCISION N/A 2/22/2023    EXAM UNDER ANESTHESIA, DORSAL SLIT , BIOPSY GLANDS PENIS performed by Sneha Foster MD at [facility] Bilateral     As of 2019, RUE AVF functioning, LUE AVF nonfunctioning. FINGER AMPUTATION      right pointer finger    FINGER SURGERY Left     I & D    GLAUCOMA SURGERY      LEG AMPUTATION BELOW KNEE Bilateral     TUNNELED VENOUS CATHETER PLACEMENT      PC placed and removed         FAMILY HISTORY           Problem Relation Age of Onset    Diabetes Mother     Coronary Art Dis Mother     Hypertension Mother     Diabetes Father     Hypertension Father     Stroke Father     Cancer Sister     Hypertension Brother      Family Status   Relation Name Status    Mother  (Not Specified)    Father  (Not Specified)    Sister  (Not Specified)    Brother  (Not Specified)        SOCIAL HISTORY      reports that he has never smoked. He has never used smokeless tobacco. He reports current alcohol use. He reports that he does not use drugs. REVIEW OF SYSTEMS    (2-9 systems for level 4, 10 or more for level 5)     Constitutional: Denies recent fever, chills, weakness. Visual: Denies recent change in vision, discharge or pain. ENT: Denies recent sore throat, nasal congestion, ear pain. Respiratory: Denies recent shortness of breath,  cough , wheezing or pleuritic chest pain.   Cardiac:  Denies recent chest pain palpitations dyspnea on exertion or swelling in the lower extremities. GI: denies any recent abdominal pain nausea vomiting or diarrhea. : + penile pain  Musculoskeletal :  Denies neck pain back pain joint pain or recent trauma. Neurologic: denies any seizure activity headaches stroke like symptoms or syncope. Skin:  Denies any recent new rash itching or change in skin color. Psychiatric:  Denies any thoughts of suicide homicide. Patient does not voice any problems with anxiety or depression     Except as noted above the remainder of the review of systems was reviewed and negative. PHYSICAL EXAM    (up to 7 for level 4, 8 or more for level 5)     ED Triage Vitals [03/03/23 1300]   BP Temp Temp Source Heart Rate Resp SpO2 Height Weight   (!) 105/54 98.9 °F (37.2 °C) Oral 78 18 96 % -- --       Physical Exam  Vitals and nursing note reviewed. Constitutional:       Appearance: Normal appearance. He is normal weight. HENT:      Head: Normocephalic. Nose: Nose normal.      Mouth/Throat:      Mouth: Mucous membranes are moist.      Pharynx: Oropharynx is clear. Eyes:      Conjunctiva/sclera: Conjunctivae normal.   Pulmonary:      Effort: Pulmonary effort is normal.   Musculoskeletal:         General: Normal range of motion. Comments: Bilateral below the knee amputations   Neurological:      General: No focal deficit present. Mental Status: He is alert and oriented to person, place, and time. Mental status is at baseline.    Psychiatric:         Mood and Affect: Mood normal.       DIAGNOSTIC RESULTS     EKG: Not clinically indicated    RADIOLOGY: Not clinically indicated    LABS: Not clinically indicated      EMERGENCY DEPARTMENT COURSE and DIFFERENTIAL DIAGNOSIS/MDM:   Vitals:    Vitals:    03/03/23 1300   BP: (!) 105/54   Pulse: 78   Resp: 18   Temp: 98.9 °F (37.2 °C)   TempSrc: Oral   SpO2: 96%       MEDICATIONS GIVEN IN THE ED:  Medications   oxyCODONE-acetaminophen (PERCOCET) 5-325 MG per tablet 1 tablet (1 tablet Oral Given 3/3/23 9747)       CLINICAL DECISION MAKING:  The patient presented alert with a nontoxic appearance and was seen in conjunction with Dr. Blanca Gonsalves. DDx include chronic pain, postsurgical pain, pain medication seeking behavior    I will not order labs due to the nature of the complaint    Test considered, but not ordered: None    The patient was involved in his/her plan of care through shared decision making. The testing that was ordered was discussed with the patient. Any medications that may have been ordered were discussed with the patient. We will go ahead and give the patient a single dose of Percocet here in the emergency department but he will have to discuss other medication changes with his facility and pain management physician. I have reviewed the patient's previous medical records using the electronic health record that we have available that were pertinent to today's visit. Evaluation and treatment course in the ED, and plan of care upon discharge was discussed in length with the patient. Patient had no further questions prior to being discharged and was instructed to return to the ED for new or worsening symptoms. Care was provided during an unprecedented national emergency due to the novel coronavirus, Covid-19. MIPS  None    CONSULTS:  None    PROCEDURES:  None    FINAL IMPRESSION      1.  Other chronic pain        Problem List  Patient Active Problem List   Diagnosis Code    Hyperlipidemia E78.5    Essential hypertension I10    ESRD on hemodialysis ([facility] [address] MWF N18.6, Z99.2    Insomnia G47.00    Chronic bilateral low back pain with bilateral sciatica M54.42, M54.41, G89.29    Controlled diabetes mellitus type 2 with complications (HCC) Y59.8    S/P bilateral below knee amputation ([facility] [address] Z89.512, Z89.511    Long term (current) use of antithrombotics/antiplatelets N52.16    Chronic use of opiate drugs therapeutic purposes Z79.891    Coronary artery disease involving native coronary artery I25.10    Anemia D64.9    Hypoglycemia E16.2    Inability to walk R26.2    Phimosis N47.1       DISPOSITION/PLAN   DISPOSITION Decision To Discharge 03/03/2023 01:46:44 PM    PATIENT REFERRED TO:   Cindy Valles MD  [address] [address] [place] [phone]    Schedule an appointment as soon as possible for a visit   If symptoms worsen      (Please note that portions of this note were completed with a voice recognition program.  Efforts were made to edit the dictations but occasionally words are mis-transcribed.)    CONSTANCE King PA-C  03/03/23 3445

## 2023-03-03 NOTE — ED NOTES
Rn spoke with [facility]. [facility] reports that they were unaware that pt was here in the ED because pt was supposed to be at dialysis. Pt reports that he ended up ending dialysis early because he was in too much pain. [facility] reports that pt was receiving medications from another pain doctor without [place] beng aware. Pt was given percocet 7.5 mg but it was discontinues due to pt taking medications without [place] knowing.        Regina Forde RN  03/03/23 8860

## 2023-03-03 NOTE — TELEPHONE ENCOUNTER
He didn't get a script form us on this visit  Since he is rehab    I did advise to give him percocet 7.5 mg 1 tab a day    Once discharge form rehab we can takeover medication management again

## 2023-03-09 ENCOUNTER — HOSPITAL ENCOUNTER (INPATIENT)
Age: 63
LOS: 6 days | Discharge: NURSING, REHAB OR LONG-TERM CARE | DRG: 698 | End: 2023-03-15
Attending: EMERGENCY MEDICINE | Admitting: FAMILY MEDICINE
Payer: COMMERCIAL

## 2023-03-09 DIAGNOSIS — L08.9 WOUND INFECTION: ICD-10-CM

## 2023-03-09 DIAGNOSIS — N48.89 PENILE PAIN: Primary | ICD-10-CM

## 2023-03-09 DIAGNOSIS — T14.8XXA WOUND INFECTION: ICD-10-CM

## 2023-03-09 LAB
ABSOLUTE EOS #: 0.24 K/UL (ref 0–0.44)
ABSOLUTE IMMATURE GRANULOCYTE: 0.04 K/UL (ref 0–0.3)
ABSOLUTE LYMPH #: 1.6 K/UL (ref 1.1–3.7)
ABSOLUTE MONO #: 1.38 K/UL (ref 0.1–1.2)
ANION GAP SERPL CALCULATED.3IONS-SCNC: 17 MMOL/L (ref 9–17)
BASOPHILS # BLD: 0 % (ref 0–2)
BASOPHILS ABSOLUTE: <0.03 K/UL (ref 0–0.2)
BUN SERPL-MCNC: 55 MG/DL (ref 8–23)
BUN/CREAT BLD: 6 (ref 9–20)
CALCIUM SERPL-MCNC: 9.7 MG/DL (ref 8.6–10.4)
CHLORIDE SERPL-SCNC: 86 MMOL/L (ref 98–107)
CO2 SERPL-SCNC: 24 MMOL/L (ref 20–31)
CREAT SERPL-MCNC: 9.21 MG/DL (ref 0.7–1.2)
EOSINOPHILS RELATIVE PERCENT: 2 % (ref 1–4)
GFR SERPL CREATININE-BSD FRML MDRD: 6 ML/MIN/1.73M2
GLUCOSE SERPL-MCNC: 110 MG/DL (ref 70–99)
HCT VFR BLD AUTO: 32.5 % (ref 40.7–50.3)
HGB BLD-MCNC: 10 G/DL (ref 13–17)
IMMATURE GRANULOCYTES: 0 %
INR PPP: 1.6
LACTATE PLASV-SCNC: 1.1 MMOL/L (ref 0.5–2.2)
LACTATE PLASV-SCNC: 1.2 MMOL/L (ref 0.5–2.2)
LYMPHOCYTES # BLD: 15 % (ref 24–43)
MAGNESIUM SERPL-MCNC: 2.4 MG/DL (ref 1.6–2.6)
MCH RBC QN AUTO: 22.6 PG (ref 25.2–33.5)
MCHC RBC AUTO-ENTMCNC: 30.8 G/DL (ref 28.4–34.8)
MCV RBC AUTO: 73.5 FL (ref 82.6–102.9)
MONOCYTES # BLD: 13 % (ref 3–12)
NRBC AUTOMATED: 0 PER 100 WBC
PARTIAL THROMBOPLASTIN TIME: 50.3 SEC (ref 23.9–33.8)
PDW BLD-RTO: 19.6 % (ref 11.8–14.4)
PHOSPHATE SERPL-MCNC: 6.1 MG/DL (ref 2.5–4.5)
PLATELET # BLD AUTO: 166 K/UL (ref 138–453)
PMV BLD AUTO: ABNORMAL FL (ref 8.1–13.5)
POTASSIUM SERPL-SCNC: 4.9 MMOL/L (ref 3.7–5.3)
PROTHROMBIN TIME: 18.9 SEC (ref 11.5–14.2)
RBC # BLD: 4.42 M/UL (ref 4.21–5.77)
RBC # BLD: ABNORMAL 10*6/UL
SEG NEUTROPHILS: 69 % (ref 36–65)
SEGMENTED NEUTROPHILS ABSOLUTE COUNT: 7.25 K/UL (ref 1.5–8.1)
SODIUM SERPL-SCNC: 127 MMOL/L (ref 135–144)
WBC # BLD AUTO: 10.5 K/UL (ref 3.5–11.3)

## 2023-03-09 PROCEDURE — 6360000002 HC RX W HCPCS: Performed by: NURSE PRACTITIONER

## 2023-03-09 PROCEDURE — 87040 BLOOD CULTURE FOR BACTERIA: CPT

## 2023-03-09 PROCEDURE — 85730 THROMBOPLASTIN TIME PARTIAL: CPT

## 2023-03-09 PROCEDURE — 36415 COLL VENOUS BLD VENIPUNCTURE: CPT

## 2023-03-09 PROCEDURE — 90935 HEMODIALYSIS ONE EVALUATION: CPT

## 2023-03-09 PROCEDURE — 83735 ASSAY OF MAGNESIUM: CPT

## 2023-03-09 PROCEDURE — 85610 PROTHROMBIN TIME: CPT

## 2023-03-09 PROCEDURE — 96374 THER/PROPH/DIAG INJ IV PUSH: CPT

## 2023-03-09 PROCEDURE — 87186 SC STD MICRODIL/AGAR DIL: CPT

## 2023-03-09 PROCEDURE — 6370000000 HC RX 637 (ALT 250 FOR IP): Performed by: FAMILY MEDICINE

## 2023-03-09 PROCEDURE — 83605 ASSAY OF LACTIC ACID: CPT

## 2023-03-09 PROCEDURE — 5A1D70Z PERFORMANCE OF URINARY FILTRATION, INTERMITTENT, LESS THAN 6 HOURS PER DAY: ICD-10-PCS | Performed by: INTERNAL MEDICINE

## 2023-03-09 PROCEDURE — 80048 BASIC METABOLIC PNL TOTAL CA: CPT

## 2023-03-09 PROCEDURE — 2500000003 HC RX 250 WO HCPCS: Performed by: FAMILY MEDICINE

## 2023-03-09 PROCEDURE — 99285 EMERGENCY DEPT VISIT HI MDM: CPT

## 2023-03-09 PROCEDURE — 2580000003 HC RX 258: Performed by: NURSE PRACTITIONER

## 2023-03-09 PROCEDURE — 87070 CULTURE OTHR SPECIMN AEROBIC: CPT

## 2023-03-09 PROCEDURE — 84100 ASSAY OF PHOSPHORUS: CPT

## 2023-03-09 PROCEDURE — 87185 SC STD ENZYME DETCJ PER NZM: CPT

## 2023-03-09 PROCEDURE — 6360000002 HC RX W HCPCS: Performed by: FAMILY MEDICINE

## 2023-03-09 PROCEDURE — 87077 CULTURE AEROBIC IDENTIFY: CPT

## 2023-03-09 PROCEDURE — 85025 COMPLETE CBC W/AUTO DIFF WBC: CPT

## 2023-03-09 PROCEDURE — 2500000003 HC RX 250 WO HCPCS: Performed by: INTERNAL MEDICINE

## 2023-03-09 PROCEDURE — 87205 SMEAR GRAM STAIN: CPT

## 2023-03-09 PROCEDURE — 2060000000 HC ICU INTERMEDIATE R&B

## 2023-03-09 PROCEDURE — 87075 CULTR BACTERIA EXCEPT BLOOD: CPT

## 2023-03-09 PROCEDURE — 87076 CULTURE ANAEROBE IDENT EACH: CPT

## 2023-03-09 RX ORDER — ASPIRIN 81 MG/1
81 TABLET, CHEWABLE ORAL DAILY
Status: DISCONTINUED | OUTPATIENT
Start: 2023-03-09 | End: 2023-03-15 | Stop reason: HOSPADM

## 2023-03-09 RX ORDER — AMLODIPINE BESYLATE 5 MG/1
5 TABLET ORAL DAILY
Status: DISCONTINUED | OUTPATIENT
Start: 2023-03-09 | End: 2023-03-13

## 2023-03-09 RX ORDER — DEXTROSE MONOHYDRATE 100 MG/ML
INJECTION, SOLUTION INTRAVENOUS CONTINUOUS PRN
Status: DISCONTINUED | OUTPATIENT
Start: 2023-03-09 | End: 2023-03-15 | Stop reason: HOSPADM

## 2023-03-09 RX ORDER — DIPHENHYDRAMINE HCL 25 MG
25 TABLET ORAL NIGHTLY PRN
Status: DISCONTINUED | OUTPATIENT
Start: 2023-03-09 | End: 2023-03-15 | Stop reason: HOSPADM

## 2023-03-09 RX ORDER — MINOXIDIL 2.5 MG/1
2.5 TABLET ORAL 2 TIMES DAILY
Status: DISCONTINUED | OUTPATIENT
Start: 2023-03-09 | End: 2023-03-13

## 2023-03-09 RX ORDER — CLONIDINE HYDROCHLORIDE 0.3 MG/1
0.3 TABLET ORAL 2 TIMES DAILY
Status: DISCONTINUED | OUTPATIENT
Start: 2023-03-09 | End: 2023-03-15 | Stop reason: HOSPADM

## 2023-03-09 RX ORDER — OXYCODONE HYDROCHLORIDE AND ACETAMINOPHEN 5; 325 MG/1; MG/1
1 TABLET ORAL EVERY 6 HOURS PRN
Status: DISCONTINUED | OUTPATIENT
Start: 2023-03-09 | End: 2023-03-15 | Stop reason: HOSPADM

## 2023-03-09 RX ORDER — PREGABALIN 75 MG/1
75 CAPSULE ORAL DAILY
Status: DISCONTINUED | OUTPATIENT
Start: 2023-03-09 | End: 2023-03-15 | Stop reason: HOSPADM

## 2023-03-09 RX ORDER — CALCIUM ACETATE 667 MG/1
667 CAPSULE ORAL
Status: DISCONTINUED | OUTPATIENT
Start: 2023-03-09 | End: 2023-03-13

## 2023-03-09 RX ORDER — HYDROMORPHONE HYDROCHLORIDE 1 MG/ML
1 INJECTION, SOLUTION INTRAMUSCULAR; INTRAVENOUS; SUBCUTANEOUS EVERY 6 HOURS
Status: DISCONTINUED | OUTPATIENT
Start: 2023-03-09 | End: 2023-03-09

## 2023-03-09 RX ORDER — ALOGLIPTIN 6.25 MG/1
6.25 TABLET, FILM COATED ORAL DAILY
Status: DISCONTINUED | OUTPATIENT
Start: 2023-03-10 | End: 2023-03-10

## 2023-03-09 RX ORDER — TRAZODONE HYDROCHLORIDE 50 MG/1
50 TABLET ORAL NIGHTLY PRN
Status: DISCONTINUED | OUTPATIENT
Start: 2023-03-09 | End: 2023-03-15 | Stop reason: HOSPADM

## 2023-03-09 RX ORDER — SODIUM CHLORIDE 0.9 % (FLUSH) 0.9 %
10 SYRINGE (ML) INJECTION PRN
Status: DISCONTINUED | OUTPATIENT
Start: 2023-03-09 | End: 2023-03-15 | Stop reason: HOSPADM

## 2023-03-09 RX ORDER — HYDROMORPHONE HYDROCHLORIDE 1 MG/ML
1 INJECTION, SOLUTION INTRAMUSCULAR; INTRAVENOUS; SUBCUTANEOUS EVERY 6 HOURS PRN
Status: DISCONTINUED | OUTPATIENT
Start: 2023-03-09 | End: 2023-03-15 | Stop reason: HOSPADM

## 2023-03-09 RX ORDER — FENTANYL CITRATE 0.05 MG/ML
50 INJECTION, SOLUTION INTRAMUSCULAR; INTRAVENOUS ONCE
Status: COMPLETED | OUTPATIENT
Start: 2023-03-09 | End: 2023-03-09

## 2023-03-09 RX ORDER — GLIPIZIDE 5 MG/1
2.5 TABLET ORAL
Status: DISCONTINUED | OUTPATIENT
Start: 2023-03-10 | End: 2023-03-15 | Stop reason: HOSPADM

## 2023-03-09 RX ORDER — FENTANYL CITRATE 0.05 MG/ML
50 INJECTION, SOLUTION INTRAMUSCULAR; INTRAVENOUS ONCE
Status: DISCONTINUED | OUTPATIENT
Start: 2023-03-09 | End: 2023-03-15 | Stop reason: HOSPADM

## 2023-03-09 RX ORDER — ATORVASTATIN CALCIUM 80 MG/1
80 TABLET, FILM COATED ORAL DAILY
Status: DISCONTINUED | OUTPATIENT
Start: 2023-03-09 | End: 2023-03-15 | Stop reason: HOSPADM

## 2023-03-09 RX ORDER — SODIUM CHLORIDE 0.9 % (FLUSH) 0.9 %
5-40 SYRINGE (ML) INJECTION EVERY 12 HOURS SCHEDULED
Status: DISCONTINUED | OUTPATIENT
Start: 2023-03-09 | End: 2023-03-15 | Stop reason: HOSPADM

## 2023-03-09 RX ORDER — LISINOPRIL 5 MG/1
5 TABLET ORAL DAILY
Status: DISCONTINUED | OUTPATIENT
Start: 2023-03-09 | End: 2023-03-13

## 2023-03-09 RX ORDER — MAGNESIUM SULFATE 1 G/100ML
1000 INJECTION INTRAVENOUS PRN
Status: DISCONTINUED | OUTPATIENT
Start: 2023-03-09 | End: 2023-03-09

## 2023-03-09 RX ORDER — SODIUM CHLORIDE 9 MG/ML
INJECTION, SOLUTION INTRAVENOUS PRN
Status: DISCONTINUED | OUTPATIENT
Start: 2023-03-09 | End: 2023-03-15 | Stop reason: HOSPADM

## 2023-03-09 RX ADMIN — APIXABAN 2.5 MG: 2.5 TABLET, FILM COATED ORAL at 21:09

## 2023-03-09 RX ADMIN — ASPIRIN 81 MG CHEWABLE TABLET 81 MG: 81 TABLET CHEWABLE at 17:12

## 2023-03-09 RX ADMIN — FENTANYL CITRATE 50 MCG: 0.05 INJECTION, SOLUTION INTRAMUSCULAR; INTRAVENOUS at 10:19

## 2023-03-09 RX ADMIN — SODIUM CHLORIDE, PRESERVATIVE FREE 10 ML: 5 INJECTION INTRAVENOUS at 22:27

## 2023-03-09 RX ADMIN — CALCIUM ACETATE 667 MG: 667 CAPSULE ORAL at 17:12

## 2023-03-09 RX ADMIN — ATORVASTATIN CALCIUM 80 MG: 80 TABLET, FILM COATED ORAL at 21:09

## 2023-03-09 RX ADMIN — VANCOMYCIN HYDROCHLORIDE 2500 MG: 5 INJECTION, POWDER, LYOPHILIZED, FOR SOLUTION INTRAVENOUS at 16:08

## 2023-03-09 RX ADMIN — Medication 2 ML: at 19:20

## 2023-03-09 RX ADMIN — CLONIDINE HYDROCHLORIDE 0.3 MG: 0.3 TABLET ORAL at 21:09

## 2023-03-09 RX ADMIN — OXYCODONE AND ACETAMINOPHEN 1 TABLET: 5; 325 TABLET ORAL at 17:12

## 2023-03-09 RX ADMIN — Medication 1 MG: at 19:03

## 2023-03-09 RX ADMIN — MINOXIDIL 2.5 MG: 2.5 TABLET ORAL at 21:08

## 2023-03-09 RX ADMIN — VANCOMYCIN HYDROCHLORIDE 2500 MG: 5 INJECTION, POWDER, LYOPHILIZED, FOR SOLUTION INTRAVENOUS at 21:37

## 2023-03-09 RX ADMIN — CEFEPIME 2000 MG: 2 INJECTION, POWDER, FOR SOLUTION INTRAVENOUS at 13:37

## 2023-03-09 RX ADMIN — PREGABALIN 75 MG: 75 CAPSULE ORAL at 17:12

## 2023-03-09 ASSESSMENT — PAIN SCALES - GENERAL
PAINLEVEL_OUTOF10: 10
PAINLEVEL_OUTOF10: 7
PAINLEVEL_OUTOF10: 8
PAINLEVEL_OUTOF10: 8

## 2023-03-09 ASSESSMENT — PAIN DESCRIPTION - LOCATION
LOCATION: PENIS
LOCATION: PENIS
LOCATION: GROIN
LOCATION: PENIS

## 2023-03-09 ASSESSMENT — PAIN - FUNCTIONAL ASSESSMENT: PAIN_FUNCTIONAL_ASSESSMENT: 0-10

## 2023-03-09 ASSESSMENT — PAIN DESCRIPTION - PAIN TYPE: TYPE: ACUTE PAIN

## 2023-03-09 ASSESSMENT — ENCOUNTER SYMPTOMS
ABDOMINAL PAIN: 0
COLOR CHANGE: 1
SHORTNESS OF BREATH: 0

## 2023-03-09 ASSESSMENT — PAIN DESCRIPTION - DESCRIPTORS: DESCRIPTORS: THROBBING

## 2023-03-09 NOTE — ED PROVIDER NOTES
Team [address] ED  eMERGENCY dEPARTMENT eNCOUnter      Pt Name: Mary Pierce  MRN: [id number]  [place] 1960  Date of evaluation: 3/9/2023  Provider: Washington Baca       Chief Complaint   Patient presents with    Penis Pain         HISTORY OF PRESENT ILLNESS  (Location/Symptom, Timing/Onset, Context/Setting, Quality, Duration, Modifying Factors, Severity.)   Mary Pierce is a 60 y.o. male who presents to the emergency department by EMS for evaluation of penis pain. Patient was at dialysis center today for treatment but did not receive treatment due to penis pain. Typical dialysis days Monday Wednesday and Friday. States his last dialysis treatment was last Monday at [facility], sees  Dr. Angi Mancilla with nephrology. Denies chest pain or shortness of breath. Pain has been going on for the past several weeks. He was evaluated here on 2/22/2023 admitted for phimosis had a dorsal slit of the penis glans by Dr. Bladimir Rodríguez. Denies testicular pain, fever or chills. Pain is a 10 out of 10. States has had some drainage from the penis as well. Patient states he is not currently on antibiotics. Nursing Notes were reviewed. ALLERGIES     Patient has no known allergies.     CURRENT MEDICATIONS       Previous Medications    AMLODIPINE (NORVASC) 5 MG TABLET    Take 1 tablet by mouth daily    APIXABAN (ELIQUIS) 5 MG TABS TABLET    Take 1 tablet by mouth 2 times daily    ASPIRIN (ASPIRIN CHILDRENS) 81 MG CHEWABLE TABLET    Take 1 tablet by mouth daily    ATORVASTATIN (LIPITOR) 80 MG TABLET    Take 1 tablet by mouth daily    CLONIDINE (CATAPRES) 0.1 MG TABLET    Take 3 tablets by mouth 2 times daily    DIPHENHYDRAMINE (BENADRYL) 25 MG TABLET    Take 25 mg by mouth nightly as needed for Itching    GLIMEPIRIDE (AMARYL) 2 MG TABLET    Take 2 mg by mouth every morning (before breakfast)    LINAGLIPTIN (TRADJENTA) 5 MG TABLET    Take 1 tablet by mouth daily    LISINOPRIL (PRINIVIL;ZESTRIL) 20 MG TABLET    TAKE ONE TABLET BY MOUTH TWICE A DAY IN THE MORNING AND AT BEDTIME    MINOXIDIL (LONITEN) 2.5 MG TABLET    Take 1 tablet by mouth 2 times daily    MULTIPLE VITAMINS-MINERALS (RENAPLEX) TABS    Take 1 tablet by mouth daily    OXYCODONE-ACETAMINOPHEN (PERCOCET) 5-325 MG PER TABLET    Take 1 tablet by mouth every 6 hours as needed for Pain. POLYETHYL GLYCOL-PROPYL GLYCOL 0.4-0.3 % (SYSTANE) 0.4-0.3 % OPHTHALMIC SOLUTION    Place 1 drop into both eyes as needed for Dry Eyes    PREGABALIN (LYRICA) 75 MG CAPSULE    Take 1 capsule by mouth daily for 30 days. Max Daily Amount: 75 mg    SUCROFERRIC OXYHYDROXIDE (VELPHORO) 500 MG CHEW CHEWABLE TABLET    Take 2 tablets by mouth 3 times daily (with meals)    TRAZODONE (DESYREL) 50 MG TABLET    Take 1 tablet by mouth nightly as needed for Sleep       PAST MEDICAL HISTORY         Diagnosis Date    Acute congestive heart failure (HCC)     Chronic bilateral low back pain with bilateral sciatica     Coronary artery disease involving native coronary artery 9/6/2022    CRF (chronic renal failure)     [facility] MWF    DDD (degenerative disc disease), lumbar 12/15/2020    Diabetes mellitus ([facility] [address]     Dialysis patient [facility])     ED (erectile dysfunction)     History of echocardiogram 2014    [facility]    HTN (hypertension)     Hyperlipidemia     LVH (left ventricular hypertrophy)     PVD (peripheral vascular disease) ([facility] [address]     S/P bilateral BKA (below knee amputation) ([facility] [address]     Wears glasses        SURGICAL HISTORY           Procedure Laterality Date    ARM SURGERY      CATARACT REMOVAL WITH IMPLANT      CIRCUMCISION N/A 2/22/2023    EXAM UNDER ANESTHESIA, DORSAL SLIT , BIOPSY GLANDS PENIS performed by Kadie Scott MD at [facility] Bilateral     As of 2019, RUE AVF functioning, LUE AVF nonfunctioning.     FINGER AMPUTATION      right pointer finger    FINGER SURGERY Left     I & D    GLAUCOMA SURGERY      LEG AMPUTATION BELOW KNEE Bilateral     TUNNELED VENOUS CATHETER PLACEMENT      PC placed and removed         FAMILY HISTORY           Problem Relation Age of Onset    Diabetes Mother     Coronary Art Dis Mother     Hypertension Mother     Diabetes Father     Hypertension Father     Stroke Father     Cancer Sister     Hypertension Brother      Family Status   Relation Name Status    Mother  (Not Specified)    Father  (Not Specified)    Sister  (Not Specified)    Brother  (Not Specified)        SOCIAL HISTORY      reports that he has never smoked. He has never used smokeless tobacco. He reports current alcohol use. He reports that he does not use drugs. REVIEW OF SYSTEMS    (2-9 systems for level 4, 10 or more for level 5)   Review of Systems   Constitutional:  Negative for fever. Respiratory:  Negative for shortness of breath. Cardiovascular:  Negative for chest pain. Gastrointestinal:  Negative for abdominal pain. Genitourinary:  Positive for penile discharge, penile pain and penile swelling. Negative for scrotal swelling and testicular pain. Skin:  Positive for color change and wound. All other systems reviewed and are negative. Except as noted above the remainder of the review of systems was reviewed and negative. PHYSICAL EXAM    (up to 7 for level 4, 8 or more for level 5)     ED Triage Vitals [03/09/23 0939]   BP Temp Temp Source Heart Rate Resp SpO2 Height Weight   121/74 98.8 °F (37.1 °C) Oral 94 20 95 % 5' 11\" (1.803 m) 210 lb (95.3 kg)     Physical Exam  Exam conducted with a chaperone present. Constitutional:       Appearance: Normal appearance. He is normal weight. HENT:      Head: Normocephalic. Right Ear: External ear normal.      Left Ear: External ear normal.      Nose: Nose normal.   Eyes:      Conjunctiva/sclera: Conjunctivae normal.   Cardiovascular:      Rate and Rhythm: Normal rate and regular rhythm. Heart sounds: Normal heart sounds.    Pulmonary:      Effort: Pulmonary effort is normal.      Breath sounds: Normal breath sounds. Abdominal:      General: Bowel sounds are normal.      Palpations: Abdomen is soft. Tenderness: There is no abdominal tenderness. Genitourinary:     Penis: Erythema, tenderness, discharge and swelling present. Testes: Normal. Cremasteric reflex is present. Right: Tenderness or swelling not present. Left: Tenderness or swelling not present. Comments: Penis is tender to palpation. There is some erythema and purulent drainage from the penis. No rash erythema of the scrotum. No testicular pain. Musculoskeletal:         General: Normal range of motion. Cervical back: Normal range of motion and neck supple. Skin:     General: Skin is warm and dry. Findings: Erythema present. Neurological:      Mental Status: He is alert and oriented to person, place, and time.            DIAGNOSTIC RESULTS     EKG: All EKG's are interpreted by the Emergency Department Physician who either signs or Co-signs this chart in the absence of a cardiologist    RADIOLOGY:   Non-plain film images such as CT, Ultrasound and MRI are read by the radiologist. Plain radiographic images are visualized and preliminarily interpreted by the emergency physician with the below findings:    Interpretation per the Radiologist below, if available at the time of this note:          LABS:  Labs Reviewed   BASIC METABOLIC PANEL - Abnormal; Notable for the following components:       Result Value    Glucose 110 (*)     BUN 55 (*)     Creatinine 9.21 (*)     Est, Glom Filt Rate 6 (*)     Bun/Cre Ratio 6 (*)     Sodium 127 (*)     Chloride 86 (*)     All other components within normal limits   CBC WITH AUTO DIFFERENTIAL - Abnormal; Notable for the following components:    Hemoglobin 10.0 (*)     Hematocrit 32.5 (*)     MCV 73.5 (*)     MCH 22.6 (*)     RDW 19.6 (*)     Seg Neutrophils 69 (*)     Lymphocytes 15 (*)     Monocytes 13 (*)     Absolute Mono # 1.38 (*) All other components within normal limits   PHOSPHORUS - Abnormal; Notable for the following components:    Phosphorus 6.1 (*)     All other components within normal limits   APTT - Abnormal; Notable for the following components:    PTT 50.3 (*)     All other components within normal limits   PROTIME-INR - Abnormal; Notable for the following components:    Protime 18.9 (*)     All other components within normal limits   CULTURE, BLOOD 1   CULTURE, BLOOD 2   CULTURE, ANAEROBIC AND AEROBIC   LACTIC ACID   LACTIC ACID   MAGNESIUM       All other labs were within normal range or not returned as of this dictation. EMERGENCY DEPARTMENT COURSE and DIFFERENTIAL DIAGNOSIS/MDM:   Vitals:    Vitals:    03/09/23 1045 03/09/23 1100 03/09/23 1115 03/09/23 1130   BP: (!) 107/58 100/71 97/61 109/77   Pulse: 85 83 84 79   Resp: 20 25  17   Temp:       TempSrc:       SpO2: 94% 95% 95%    Weight:       Height:             MEDICATIONS GIVEN IN THE ED:  Medications   vancomycin 1000 mg IVPB in 250 mL NS addavial (has no administration in time range)   cefepime (MAXIPIME) 2,000 mg in sodium chloride 0.9 % 50 mL IVPB (mini-bag) (has no administration in time range)   fentaNYL (SUBLIMAZE) injection 50 mcg (has no administration in time range)   fentaNYL (SUBLIMAZE) injection 50 mcg (50 mcg IntraVENous Given 3/9/23 1019)       CLINICAL DECISION MAKING:  The patient presented alert with a nontoxic appearance and was seen in conjunction with Dr. Noah Arias.      DDx include cellulitis of penis, penile abscess      I will order   Orders Placed This Encounter   Procedures    Blood Culture 1     Standing Status:   Standing     Number of Occurrences:   1    Culture, Blood 2     Standing Status:   Standing     Number of Occurrences:   1    Culture, Anaerobic and Aerobic     Penile     Standing Status:   Standing     Number of Occurrences:   1    BMP     Standing Status:   Standing     Number of Occurrences:   1    CBC with Auto Differential Standing Status:   Standing     Number of Occurrences:   1    Lactic Acid     Standing Status:   Standing     Number of Occurrences:   2    Magnesium     Standing Status:   Standing     Number of Occurrences:   1    Phosphorus     Standing Status:   Standing     Number of Occurrences:   1    APTT     Standing Status:   Standing     Number of Occurrences:   1    Protime-INR     Standing Status:   Standing     Number of Occurrences:   1    Telemetry monitoring - 72 hour duration     Standing Status:   Standing     Number of Occurrences:   1     Order Specific Question:   Patient may go off unit without monitor     Answer:   Yes     Order Specific Question:   Type: Answer:   Bedside    Inpatient consult to Urology     Standing Status:   Standing     Number of Occurrences:   1     Order Specific Question:   Reason for Consult? Answer:   Penile pain    Inpatient consult to Internal Medicine     Standing Status:   Standing     Number of Occurrences:   1     Order Specific Question:   Reason for Consult? Answer:   Penile infection    Inpatient consult to Infectious Diseases     Standing Status:   Standing     Number of Occurrences:   1     Order Specific Question:   Reason for Consult? Answer:   Penile infection    Inpatient consult to Nephrology     Standing Status:   Standing     Number of Occurrences:   1     Order Specific Question:   Reason for Consult? Answer:   Missed dialysis treatment    Insert peripheral IV     Standing Status:   Standing     Number of Occurrences:   1    ADMIT TO INPATIENT     Standing Status:   Standing     Number of Occurrences:   1     Order Specific Question:   Discharge Plan:     Answer:   Extended Care Facility (e.g. Adult Home, Nursing Home, etc.)     Order Specific Question:   Telemetry/Cardiac Monitoring Required? Answer:   Yes        The patient was involved in his/her plan of care through shared decision making.  The testing that was ordered was discussed with the patient. Any medications that may have been ordered were discussed with the patient. I have reviewed the patient's previous medical records using the electronic health record that we have available. Labs were reviewed. Blood cultures obtained. Wound culture was also obtained. Patient is afebrile. Vital signs stable. Patient started on cefepime and vancomycin in the ED per ID. Dialysis nurse notified by the [facility] to speak with nephrology for dialysis orders. I Discussed test results and admission house with the patient. I have discusssed recommendation for admission with the patient and/or family. We have discussed benefits of admission and the risks of discharge home. The patient agrees with the plan of care and admission. The patient will be admitted for further evaluation and treatment. Urology consult-I spoke with Dr. Lon Kearney who recommends patient be admitted he will evaluate the patient and recommends ID consult for antibiotic choice. Nephrology consult-I spoke with Dr. Tete Navarro, who would like dialysis nurse notified can call him for dialysis orders for today. Internal medicine consult-I spoke with Dr. Latha Jefferson who accepts admission. Infectious disease consult-I spoke with Dr. Orin Kahn who recommends one-time dose of Vanco 1 g and cefepime 2 g    Care was provided during an unprecedented national emergency due to the novel coronavirus, Covid-19. CONSULTS:  IP CONSULT TO UROLOGY  IP CONSULT TO INTERNAL MEDICINE  IP CONSULT TO INFECTIOUS DISEASES  IP CONSULT TO NEPHROLOGY    PROCEDURES:  Procedures    FINAL IMPRESSION      1. Penile pain    2.  Wound infection            Problem List  Patient Active Problem List   Diagnosis Code    Hyperlipidemia E78.5    Essential hypertension I10    ESRD on hemodialysis ([facility] [address] MWF N18.6, Z99.2    Insomnia G47.00    Chronic bilateral low back pain with bilateral sciatica M54.42, M54.41, G89.29    Controlled diabetes mellitus type 2 with complications (HCC) L73.8    S/P bilateral below knee amputation ([address] Z89.512, Z89.511    Long term (current) use of antithrombotics/antiplatelets S04.43    Chronic use of opiate drugs therapeutic purposes Z79.891    Coronary artery disease involving native coronary artery I25.10    Anemia D64.9    Hypoglycemia E16.2    Inability to walk R26.2    Phimosis N47.1    Wound infection T14. 8XXA, L08.9         DISPOSITION/PLAN   DISPOSITION Admitted 03/09/2023 12:43:06 PM      PATIENT REFERRED TO:   No follow-up provider specified.     DISCHARGE MEDICATIONS:     New Prescriptions    No medications on file           (Please note that portions of this note were completed with a voice recognition program.  Efforts were made to edit the dictations but occasionally words are mis-transcribed.)    Ana Paula Kinney APRN - CNP  03/09/23 2663

## 2023-03-09 NOTE — ED NOTES
ED to inpatient nurses report     Chief Complaint   Patient presents with    Penis Pain      Present to ED from Dialysis. Pt lives at [facility]. Pt reports that   LOC: alert and orientated to name, place, date  Vital signs   Vitals:    03/09/23 1045 03/09/23 1100 03/09/23 1115 03/09/23 1130   BP: (!) 107/58 100/71 97/61 109/77   Pulse: 85 83 84 79   Resp: 20 25  17   Temp:    98.8 °F (37.1 °C)   TempSrc:    Oral   SpO2: 94% 95% 95% 96%   Weight:       Height:          Oxygen Baseline room air    Current needs required n/a   SEPSIS: no  [no] Lactate X 2 ordered (Yes or No)  [no] Antibiotics given (Yes or No)  [no] IV Fluids ordered (Yes or No)             [no] 2nd IV completed (Yes or No)  [no] Hourly Vital Signs (Validated)  [no] Outstanding Orders:     LDAs:   Peripheral IV 03/09/23 Left Forearm (Active)   Site Assessment Clean, dry & intact 03/09/23 1012   Line Status Blood return noted; Flushed;Normal saline locked 03/09/23 1012   Phlebitis Assessment No symptoms 03/09/23 1012   Infiltration Assessment 0 03/09/23 1012   Dressing Status New dressing applied 03/09/23 1012   Dressing Type Transparent 03/09/23 1012     Mobility: Fully dependent  Fall Risk: Brilliant 1 Fall Risk  Presents to emergency department  because of falls (Syncope, seizure, or loss of consciousness): No (03/09/23 0941)  Age > 79: No (03/09/23 0941)  Altered Mental Status, Intoxication with alcohol or substance confusion (Disorientation, impaired judgment, poor safety awaremess, or inability to follow instructions): No (03/09/23 0941)  Impaired Mobility: Ambulates or transfers with assistive devices or assistance; Unable to ambulate or transer.: Yes (03/09/23 0941)  Nursing Judgement: Yes (03/09/23 0941)  Standard Fall Risk Interventions : [place] the patient to the environment, especially the location of the bathroom; Assess need for use of bedside Commode, Urinal, or Elevated Toilet Seat, Intentional Toileting; Bed in lowest position and wheels locked, as applicable to the type of bed, when a patient is resting in bed, raise bed to a comfortable height when the patient is transferring out of bed, as appropriate;Call light and frequently used items within patient reach;Provide a safe environment by clearing a pathway free of plugs, IV poles, and other obstructing items; Intentional Rounding (03/09/23 0941)  Pending ED orders: none  Present condition: stable  Code Status: full  Consults: IP CONSULT TO UROLOGY  IP CONSULT TO INTERNAL MEDICINE  IP CONSULT TO INFECTIOUS DISEASES  IP CONSULT TO NEPHROLOGY  [x]  Hospitalist  Completed  [x] yes [] no Who: intermed  []  Medicine  Completed  [] yes [] No Who:   []  Cardiology  Completed  [] yes [] No Who:   []  GI   Completed  [] yes [] No Who:   []  Neurology  Completed  [] yes [] No Who:   []  Nephrology Completed  [] yes [] No Who:    []  Vascular  Completed  [] yes [] No Who:   []  Ortho  Completed  [] yes [] No Who:     []  Surgery  Completed  [] yes [] No Who:    []  Urology  Completed  [] yes [] No Who:    []  CT Surgery Completed  [] yes [] No Who:   []  Podiatry  Completed  [] yes [] No Who:    []  Other    Completed  [] yes [] No Who:  Interventions: IV, labs, CT  Important Events: none        Electronically signed by Lee Sparks RN on 3/9/2023 at 1:57 PM       Lee Sparks, [address] [place] [address]  03/09/23 [phone]

## 2023-03-09 NOTE — CARE COORDINATION
Case Management Assessment  Initial Evaluation    Date/Time of Evaluation: 3/9/2023 3:04 PM  Assessment Completed by: ALYSON Rogers    If patient is discharged prior to next notation, then this note serves as note for discharge by case management. Patient Name: Juan Dang                   YOB: 1960  Diagnosis: Wound infection [T14. 8XXA, L08.9]                   Date / Time: 3/9/2023  9:37 AM    Patient Admission Status: Inpatient   Readmission Risk (Low < 19, Mod (19-27), High > 27): Readmission Risk Score: 22.8    Current PCP: Jeancarlos Barron MD  PCP verified by CM? Yes    Chart Reviewed: Yes      History Provided by: Patient  Patient Orientation: Alert and Oriented    Patient Cognition: Alert    Hospitalization in the last 30 days (Readmission):  Yes    If yes, Readmission Assessment in  Navigator will be completed. Advance Directives:      Code Status: Prior   Patient's Primary Decision Maker is: Legal Next of Kin    Primary Decision MakerMarian Heladio  Za - [phone]    Discharge Planning:    Patient lives with: Alone Type of Home: [place]  Primary Care Giver: Other (Comment) ([facility] staff)  Patient Support Systems include: Children, Family Members   Current Financial resources: Medicaid  Current community resources:    Current services prior to admission: [address] [place] [address] ([address] [facility]            Current DME: Wheelchair, Shower Chair            Type of Home Care services:  None    ADLS  Prior functional level: Assistance with the following:, Mobility, Bathing  Current functional level: Assistance with the following:, Mobility, Bathing    PT AM-PAC:   /24  OT AM-PAC:   /24    Family can provide assistance at DC: No  Would you like Case Management to discuss the discharge plan with any other family members/significant others, and if so, who?  No  Plans to Return to Present Housing:    Other Identified Issues/Barriers to RETURNING to current housing: wheelchair was left at [place] needed at discharge: Mani Morales            Potential DME:    Patient expects to discharge to: [address] for transportation at discharge:      Financial    Payor: [address] / Plan: Kaiden Barrios / Product Type: *No Product type* /     Does insurance require precert for SNF: Yes    Potential assistance Purchasing Medications: No  Meds-to-Beds request:        [facility] [phone] Tan[address]ovanna Mae [address] [phone] - F [phone]  [address]  [address] [facility] [address]  Phone: [phone] Fax: [phone]    [facility] ([facility]) - [address] [facility] [phone] - F [phone]  [address] [facility]  [address] [facility] [address]  Phone: [phone] Fax: Carolynn Rojo [phone] - [facility] - [address] [phone] Topher Menonman [phone]  [facility] [place] [address]  Phone: [phone] Fax: [phone]      Notes:    Factors facilitating achievement of predicted outcomes: Has needed Durable Medical Equipment at home and Home is wheelchair accessible    Barriers to discharge: Limited family support    Additional Case Management Notes: Patient is long term at [facility]. He is primarily in wheelchair for ambulation. He gets dialysis MWF at [facility] on [address]. His wheelchair is still at dialysis as that is where he came from today. Patient has been at [facility] for skilled care and wishes to return. Jose running patients insurance to see if they can utilize Fabiana Frederick at [facility] to follow up. The Plan for Transition of Care is related to the following treatment goals of Wound infection [T14. 8XXA, V64.9]    IF APPLICABLE: The Patient and/or patient representative Tyrant and his family were provided with a choice of provider and agrees with the discharge plan.  Freedom of choice list with basic dialogue that supports the patient's individualized plan of care/goals and shares the quality data associated with the providers was provided to:     Patient Representative Name:       The Patient and/or Patient Representative Agree with the Discharge Plan?       Jerome Rosa, [place]  Case Management Department  Ph: [phone] Fax: [phone]

## 2023-03-09 NOTE — ED PROVIDER NOTES
eMERGENCY dEPARTMENT eNCOUnter   Independent Attestation     Pt Name: Yin Johnson  MRN: [id number]  [place] 1960  Date of evaluation: 3/9/23     Yin Johnson is a 60 y.o. male with CC: Penis Pain        This visit was performed by both a physician and an APC. I performed all aspects of the MDM as documented.       The care is provided during an unprecedented national emergency due to the novel coronavirus, Susan Seymour MD  Attending Emergency Physician            Manjula Lyons MD  03/09/23 9201

## 2023-03-09 NOTE — PROGRESS NOTES
Patient was admitted to room 1017 from ED. A&O x4. Non-ambulatory, on room air, vitals stable, pain is 8/10 in the groin area with some drainage. Brief was changed, noted pressure sores on buttocks and left leg area under buttocks, zinc paste applied. New gown and telemetry applied. Dressing to CVC was soiled and loose, was cleaned up and new dressing applied. Admission database completed          [x] Medication Reconciliation was completed and the patient's home medication list was verified. The Med List Status is \"Complete\".  The following sources were used to assist with Medication Reconciliation:      [x] Medical Records from another facility and/or Care Everywhere were reviewed

## 2023-03-09 NOTE — ED NOTES
Mode of arrival (squad #, walk in, police, etc) : [facility], from Dialysis       Arrival Note (brief scenario, treatment PTA, etc).: Staff at Dialysis called EMS because patient was complaining of severe penis pain, patient did not receive his dialysis treatment. He reports that he last received it on Monday. According to chart from [facility], normal dialysis days are Mon-Wed-Fri.          Samreen Patel RN  03/09/23 1046

## 2023-03-10 LAB
ALBUMIN SERPL-MCNC: 3.5 G/DL (ref 3.5–5.2)
ALP SERPL-CCNC: 165 U/L (ref 40–129)
ALT SERPL-CCNC: 13 U/L (ref 5–41)
ANION GAP SERPL CALCULATED.3IONS-SCNC: 15 MMOL/L (ref 9–17)
AST SERPL-CCNC: 21 U/L
BILIRUB SERPL-MCNC: 0.7 MG/DL (ref 0.3–1.2)
BUN SERPL-MCNC: 58 MG/DL (ref 8–23)
BUN/CREAT BLD: 6 (ref 9–20)
CALCIUM SERPL-MCNC: 10 MG/DL (ref 8.6–10.4)
CHLORIDE SERPL-SCNC: 98 MMOL/L (ref 98–107)
CO2 SERPL-SCNC: 22 MMOL/L (ref 20–31)
CREAT SERPL-MCNC: 9.47 MG/DL (ref 0.7–1.2)
GFR SERPL CREATININE-BSD FRML MDRD: 6 ML/MIN/1.73M2
GLUCOSE BLD-MCNC: 24 MG/DL (ref 75–110)
GLUCOSE BLD-MCNC: 27 MG/DL (ref 75–110)
GLUCOSE BLD-MCNC: 33 MG/DL (ref 75–110)
GLUCOSE BLD-MCNC: 46 MG/DL (ref 75–110)
GLUCOSE BLD-MCNC: 49 MG/DL (ref 75–110)
GLUCOSE BLD-MCNC: 64 MG/DL (ref 75–110)
GLUCOSE BLD-MCNC: 66 MG/DL (ref 75–110)
GLUCOSE BLD-MCNC: 74 MG/DL (ref 75–110)
GLUCOSE BLD-MCNC: 80 MG/DL (ref 75–110)
GLUCOSE SERPL-MCNC: 33 MG/DL (ref 70–99)
GLUCOSE SERPL-MCNC: 37 MG/DL (ref 70–99)
HCT VFR BLD AUTO: 32.7 % (ref 40.7–50.3)
HGB BLD-MCNC: 9.9 G/DL (ref 13–17)
MCH RBC QN AUTO: 22.3 PG (ref 25.2–33.5)
MCHC RBC AUTO-ENTMCNC: 30.3 G/DL (ref 28.4–34.8)
MCV RBC AUTO: 73.8 FL (ref 82.6–102.9)
NRBC AUTOMATED: 0 PER 100 WBC
PDW BLD-RTO: 19.5 % (ref 11.8–14.4)
PLATELET # BLD AUTO: 163 K/UL (ref 138–453)
PMV BLD AUTO: 10.2 FL (ref 8.1–13.5)
POTASSIUM SERPL-SCNC: 5.6 MMOL/L (ref 3.7–5.3)
PROT SERPL-MCNC: 7.5 G/DL (ref 6.4–8.3)
RBC # BLD: 4.43 M/UL (ref 4.21–5.77)
SODIUM SERPL-SCNC: 135 MMOL/L (ref 135–144)
WBC # BLD AUTO: 10.6 K/UL (ref 3.5–11.3)

## 2023-03-10 PROCEDURE — 6360000002 HC RX W HCPCS: Performed by: INTERNAL MEDICINE

## 2023-03-10 PROCEDURE — 6370000000 HC RX 637 (ALT 250 FOR IP): Performed by: FAMILY MEDICINE

## 2023-03-10 PROCEDURE — 2580000003 HC RX 258: Performed by: INTERNAL MEDICINE

## 2023-03-10 PROCEDURE — 6360000002 HC RX W HCPCS: Performed by: FAMILY MEDICINE

## 2023-03-10 PROCEDURE — 82947 ASSAY GLUCOSE BLOOD QUANT: CPT

## 2023-03-10 PROCEDURE — 2580000003 HC RX 258: Performed by: FAMILY MEDICINE

## 2023-03-10 PROCEDURE — 2500000003 HC RX 250 WO HCPCS: Performed by: FAMILY MEDICINE

## 2023-03-10 PROCEDURE — 85027 COMPLETE CBC AUTOMATED: CPT

## 2023-03-10 PROCEDURE — 97163 PT EVAL HIGH COMPLEX 45 MIN: CPT

## 2023-03-10 PROCEDURE — 2500000003 HC RX 250 WO HCPCS: Performed by: INTERNAL MEDICINE

## 2023-03-10 PROCEDURE — 36415 COLL VENOUS BLD VENIPUNCTURE: CPT

## 2023-03-10 PROCEDURE — 80053 COMPREHEN METABOLIC PANEL: CPT

## 2023-03-10 PROCEDURE — 97530 THERAPEUTIC ACTIVITIES: CPT

## 2023-03-10 PROCEDURE — 99222 1ST HOSP IP/OBS MODERATE 55: CPT | Performed by: INTERNAL MEDICINE

## 2023-03-10 PROCEDURE — 2060000000 HC ICU INTERMEDIATE R&B

## 2023-03-10 PROCEDURE — 87040 BLOOD CULTURE FOR BACTERIA: CPT

## 2023-03-10 RX ORDER — DEXTROSE AND SODIUM CHLORIDE 5; .9 G/100ML; G/100ML
INJECTION, SOLUTION INTRAVENOUS CONTINUOUS
Status: DISCONTINUED | OUTPATIENT
Start: 2023-03-10 | End: 2023-03-11

## 2023-03-10 RX ORDER — INSULIN LISPRO 100 [IU]/ML
0-4 INJECTION, SOLUTION INTRAVENOUS; SUBCUTANEOUS
Status: DISCONTINUED | OUTPATIENT
Start: 2023-03-10 | End: 2023-03-15 | Stop reason: HOSPADM

## 2023-03-10 RX ORDER — INSULIN LISPRO 100 [IU]/ML
0-4 INJECTION, SOLUTION INTRAVENOUS; SUBCUTANEOUS NIGHTLY
Status: DISCONTINUED | OUTPATIENT
Start: 2023-03-10 | End: 2023-03-15 | Stop reason: HOSPADM

## 2023-03-10 RX ADMIN — Medication 2 ML: at 19:35

## 2023-03-10 RX ADMIN — HYDROCORTISONE SODIUM SUCCINATE 100 MG: 100 INJECTION, POWDER, FOR SOLUTION INTRAMUSCULAR; INTRAVENOUS at 22:54

## 2023-03-10 RX ADMIN — APIXABAN 2.5 MG: 2.5 TABLET, FILM COATED ORAL at 09:40

## 2023-03-10 RX ADMIN — PREGABALIN 75 MG: 75 CAPSULE ORAL at 09:40

## 2023-03-10 RX ADMIN — MINOXIDIL 2.5 MG: 2.5 TABLET ORAL at 09:40

## 2023-03-10 RX ADMIN — GLUCAGON HYDROCHLORIDE 1 MG: KIT at 14:32

## 2023-03-10 RX ADMIN — ASPIRIN 81 MG CHEWABLE TABLET 81 MG: 81 TABLET CHEWABLE at 09:40

## 2023-03-10 RX ADMIN — CEFEPIME 1000 MG: 1 INJECTION, POWDER, FOR SOLUTION INTRAMUSCULAR; INTRAVENOUS at 13:02

## 2023-03-10 RX ADMIN — CALCIUM ACETATE 667 MG: 667 CAPSULE ORAL at 12:33

## 2023-03-10 RX ADMIN — DEXTROSE AND SODIUM CHLORIDE: 5; 900 INJECTION, SOLUTION INTRAVENOUS at 22:52

## 2023-03-10 RX ADMIN — GLUCAGON HYDROCHLORIDE 1 MG: KIT at 23:47

## 2023-03-10 RX ADMIN — VANCOMYCIN HYDROCHLORIDE 1000 MG: 1 INJECTION, POWDER, LYOPHILIZED, FOR SOLUTION INTRAVENOUS at 20:52

## 2023-03-10 RX ADMIN — DEXTROSE MONOHYDRATE 125 ML: 100 INJECTION, SOLUTION INTRAVENOUS at 20:57

## 2023-03-10 RX ADMIN — CLONIDINE HYDROCHLORIDE 0.3 MG: 0.3 TABLET ORAL at 20:41

## 2023-03-10 RX ADMIN — Medication 2 ML: at 19:34

## 2023-03-10 RX ADMIN — MINOXIDIL 2.5 MG: 2.5 TABLET ORAL at 20:42

## 2023-03-10 RX ADMIN — APIXABAN 5 MG: 5 TABLET, FILM COATED ORAL at 20:42

## 2023-03-10 RX ADMIN — ATORVASTATIN CALCIUM 80 MG: 80 TABLET, FILM COATED ORAL at 20:41

## 2023-03-10 RX ADMIN — Medication 1 MG: at 15:33

## 2023-03-10 RX ADMIN — DEXTROSE MONOHYDRATE 250 ML: 100 INJECTION, SOLUTION INTRAVENOUS at 07:15

## 2023-03-10 ASSESSMENT — PAIN SCALES - GENERAL
PAINLEVEL_OUTOF10: 10
PAINLEVEL_OUTOF10: 7

## 2023-03-10 ASSESSMENT — PAIN DESCRIPTION - LOCATION
LOCATION: GROIN
LOCATION: PENIS

## 2023-03-10 ASSESSMENT — PAIN DESCRIPTION - DESCRIPTORS: DESCRIPTORS: THROBBING

## 2023-03-10 NOTE — PROGRESS NOTES
Physical Therapy  Facility/Department: [facility] PROGRESSIVE CARE  Physical Therapy Initial Assessment    Name: Shaggy Forbes  : 1960  MRN: [id number]  Date of Service: 3/10/2023    Discharge Recommendations:  Patient would benefit from continued therapy after discharge. Pt currently functioning below baseline. Recommend daily inpatient skilled therapy at time of discharge to maximize long term outcomes and prevent re-admission. Please refer to AM-PAC score for current level of function. HPI (per chart): Shaggy Forbes is a 60 y.o. male who presents to the emergency department by EMS for evaluation of penis pain. Patient was at dialysis center today for treatment but did not receive treatment due to penis pain. Typical dialysis days  and Friday. States his last dialysis treatment was last Monday at [facility], sees  Dr. Mukesh Radford with nephrology. Denies chest pain or shortness of breath. Pain has been going on for the past several weeks. He was evaluated here on 2023 admitted for phimosis had a dorsal slit of the penis glans by Dr. Vicenta Pittman. Denies testicular pain, fever or chills. Pain is a 10 out of 10. States has had some drainage from the penis as well. Patient states he is not currently on antibiotics. Patient Diagnosis(es): The primary encounter diagnosis was Penile pain. A diagnosis of Wound infection was also pertinent to this visit.   Past Medical History:  has a past medical history of Acute congestive heart failure (HCC), Chronic bilateral low back pain with bilateral sciatica, Coronary artery disease involving native coronary artery, CRF (chronic renal failure), DDD (degenerative disc disease), lumbar, Diabetes mellitus ([facility] [address] Dialysis patient [facility]), ED (erectile dysfunction), History of echocardiogram, HTN (hypertension), Hyperlipidemia, LVH (left ventricular hypertrophy), PVD (peripheral vascular disease) ([facility] [address] S/P bilateral BKA (below knee amputation) ([facility] [address] and Wears glasses. Past Surgical History:  has a past surgical history that includes Glaucoma surgery; Cataract removal with implant; Leg amputation below knee (Bilateral); Tunneled venous catheter placement; Dialysis fistula creation (Bilateral); Finger surgery (Left); Finger amputation; Arm Surgery; and Circumcision (N/A, 2/22/2023). Assessment   Body Structures, Functions, Activity Limitations Requiring Skilled Therapeutic Intervention: Decreased functional mobility ; Decreased ADL status; Decreased strength;Decreased safe awareness;Decreased cognition;Decreased posture  Assessment: Patient demo decreased strength and decreased bed mobility and poor resting posture in bed. Patient will benefit from skilled PT to improve bed mobility, positioning, and strength. Transfers will be assessed as appropriate. Therapy Prognosis: Fair  Decision Making: High Complexity  Requires PT Follow-Up: Yes  Activity Tolerance  Activity Tolerance: Treatment limited secondary to decreased cognition  Activity Tolerance Comments: limited by by patient needing to go to HD     Plan   Physcial Therapy Plan  General Plan: 5-7 times per week  Current Treatment Recommendations: Strengthening, Balance training, Transfer training, Endurance training, Neuromuscular re-education, Safety education & training, Equipment evaluation, education, & procurement, Therapeutic activities, Positioning  Safety Devices  Type of Devices: All fall risk precautions in place, Bed alarm in place, Call light within reach, Left in bed, Nurse notified     Restrictions  Restrictions/Precautions  Restrictions/Precautions: General Precautions  Position Activity Restriction  Other position/activity restrictions: up with assist, left chest port.  RUE AV fistula, left UE IV     Subjective   General  Chart Reviewed: Yes  Patient assessed for rehabilitation services?: Yes  Additional Pertinent Hx: HD, CHF, DM, Bilateral BKA  Response To Previous Treatment: Not applicable  Family / Caregiver Present: No  Diagnosis: Penile wound infection  Follows Commands: Impaired  Other (Comment): Delayed directions following  General Comment  Comments: Jarocho Cristina RN reports patient medically appropriate for PT. PT eval was limited by patient needing to go to HD. Subjective  Subjective: Patient's speech somewhat difficult to understand, some difficulty staying on task for answering questions. Patient agreeable to PT, but does not want to try and sit on EOB. Patient states \"Get one of the beds from down stairs. They are comfortable. You can sit up in them for dialysis\" Finally able to clarify patient is referring to chairs at out patient HD. Social/Functional History  Social/Functional History  Lives With: Other (comment)  Type of Home: Facility  Home Layout: One level  Transfer Assistance: Independent  Active : No  Patient's  Info: son  Occupation: On disability  Type of Occupation:   Leisure & Hobbies: Fishing  Additional Comments: On recent admission, patient reported he lived alone in a house and up until recently was using prosthesis to amb but is no longer able due to LE edema. Admission 9/2022: indep bed mobility, max x 2 to stand with prosthesis, admission 2/2023 indep bed mobility, min lateral seated transfer. Not able to determin last time patient was living at home. Poor historian, reports he was indep getting from bed to w/c at facility. Cognition   Orientation  Orientation Level: Oriented to person  Cognition  Overall Cognitive Status: Exceptions  Arousal/Alertness: Delayed responses to stimuli  Following Commands: Follows one step commands consistently  Attention Span: Attends with cues to redirect; Difficulty attending to directions  Memory: Decreased recall of biographical Information;Decreased recall of recent events  Safety Judgement: Decreased awareness of need for assistance;Decreased awareness of need for safety  Problem Solving: Decreased awareness of errors;Assistance required to generate solutions;Assistance required to correct errors made  Insights: Decreased awareness of deficits  Initiation: Requires cues for some  Sequencing: Requires cues for some     Objective   Heart Rate: 86  Heart Rate Source: Monitor  BP: 135/70  BP Location: Left upper arm  Patient Position: Sitting  MAP (Calculated): 92  Resp: 16  SpO2: 94 %  O2 Device: None (Room air)     Observation/Palpation  Posture: Poor  Observation: Upon arriving room patient in bed leaning to the left with head resting on bed rail. PCT and RN reports patient repeatedly positioning himself to the left despite staff attempts to reposition. Patient reports his bottom gets sore. Patient repositioned with pillows for neutral alignment. AROM RLE (degrees)  RLE General AROM: Hip and knee WFL  AROM LLE (degrees)  LLE General AROM: Hip and knee WFL  AROM RUE (degrees)  RUE AROM : WFL  AROM LUE (degrees)  LUE General AROM: shoulder flexion limited to 40 degrees, elbow and hand WFL  Strength RLE  Comment: hip and knee 4-/5  Strength LLE  Comment: hip and knee 4-/5  Strength RUE  Comment: shoulder 3+/5, elbow 4-/5,  fair+  Strength LUE  Comment: shoulder 2+/5, elbow 4-/5,  fair+           Bed mobility  Rolling to Left: Minimal assistance  Rolling to Right: Minimal assistance  Bed Mobility Comments: Patient required multiple attempts for rolling and uses momentum, verbal cues to use bed rail for assist, needed assist to complete rolling, patient declined supine to sit at this time.   Transfers  Comment: Not appropriate at this time, declining to sit EOB       OutComes Score    AM-PAC Score  AM-PAC Inpatient Mobility Raw Score : 9 (03/10/23 1617)  AM-PAC Inpatient T-Scale Score : 30.55 (03/10/23 1617)  Mobility Inpatient CMS 0-100% Score: 81.38 (03/10/23 1617)  Mobility Inpatient CMS G-Code Modifier : CM (03/10/23 1617)     Goals  Short Term Goals  Time Frame for Short Term Goals: 12 visits  Short Term Goal 1: Patient will be indep with bed mobility. Short Term Goal 2: Patient will tolerate 30 minutes of ther-ex and ther-act. Short Term Goal 3: Patient will be assessed for transfers as appropriate. Short Term Goal 4: Patient will be positioned to prevent skin breakdown.   Patient Goals   Patient Goals : None stated       Education  Patient Education  Education Given To: Patient  Education Provided: Role of Therapy;Plan of Care  Education Provided Comments: importance of good positioning  Education Method: Verbal  Barriers to Learning: Cognition  Education Outcome: Continued education needed      Therapy Time   Individual Concurrent Group Co-treatment   Time In 1511         Time Out 1535         Minutes 24         Timed Code Treatment Minutes: 800 Mae Morocho, PT

## 2023-03-10 NOTE — PROGRESS NOTES
Pt demonstrated signs of lessened agitation. Pt stated his pain has somewhat improved. Ice was applied to area of penile pain. Will continue with care plan.

## 2023-03-10 NOTE — PROGRESS NOTES
HEMODIALYSIS PRE-TREATMENT NOTE    Patient Identifiers prior to treatment: Name  MRN    Isolation Required:  Yes                      Isolation Type: Contact       (please document if patient is being managed as a PUI/COVID-19 patient)        Hepatitis status:                           Date Drawn                             Result  Hepatitis B Surface Antigen 22     NEG                     Hepatitis B Surface Antibody 22 POS     70   Hepatitis B Core Antibody N/A N/A          How was Hepatitis Status verified: [address] [place] Chart     Was a copy of the labs you documented provided to facility for the patient's chart: Yes    Hemodialysis orders verified: Nadine Bolaños    Access Within normal limits ( I.e. s/s of infection,...): WNL     Pre-Assessment completed: Nadine Bolaños    Pre-dialysis report received from: Jovi Lopez                      Time:

## 2023-03-10 NOTE — PROGRESS NOTES
Pt returned from dialysis. Pt received Dilaudid during dialysis 1 mg injection at 1903. Pt stated he could not tolerate dialysis d/t pain. Pt was in dialysis for 30 minutes and then returned back to room. Writer notified physician. Physician also contacted regarding additional pain management.  Awaiting response

## 2023-03-10 NOTE — PLAN OF CARE
Problem: Safety - Adult  Goal: Free from fall injury  Outcome: Progressing     Problem: Discharge Planning  Goal: Discharge to home or other facility with appropriate resources  Outcome: Progressing  Flowsheets (Taken 3/9/2023 2000)  Discharge to home or other facility with appropriate resources:   Identify barriers to discharge with patient and caregiver   Arrange for needed discharge resources and transportation as appropriate   Identify discharge learning needs (meds, wound care, etc)     Problem: Chronic Conditions and Co-morbidities  Goal: Patient's chronic conditions and co-morbidity symptoms are monitored and maintained or improved  Outcome: Progressing  Flowsheets (Taken 3/9/2023 2000)  Care Plan - Patient's Chronic Conditions and Co-Morbidity Symptoms are Monitored and Maintained or Improved:   Monitor and assess patient's chronic conditions and comorbid symptoms for stability, deterioration, or improvement   Collaborate with multidisciplinary team to address chronic and comorbid conditions and prevent exacerbation or deterioration   Update acute care plan with appropriate goals if chronic or comorbid symptoms are exacerbated and prevent overall improvement and discharge     Problem: Pain  Goal: Verbalizes/displays adequate comfort level or baseline comfort level  Outcome: Progressing     Problem: Skin/Tissue Integrity  Goal: Absence of new skin breakdown  Description: 1. Monitor for areas of redness and/or skin breakdown  2. Assess vascular access sites hourly  3. Every 4-6 hours minimum:  Change oxygen saturation probe site  4. Every 4-6 hours:  If on nasal continuous positive airway pressure, respiratory therapy assess nares and determine need for appliance change or resting period.   Outcome: Progressing

## 2023-03-10 NOTE — DISCHARGE INSTR - COC
Continuity of Care Form    Patient Name: Mary Pierce   :  1960  MRN:  [id number]    Admit date:  3/9/2023  Discharge date:  3/15/23    Code Status Order: Full Code   Advance Directives:     Admitting Physician:  Esdras Drake MD  PCP: Esdras Drake MD    Discharging Nurse: [facility] Unit/Room#: 1017/1017-01  Discharging Unit Phone Number: [phone]    Emergency Contact:   Extended Emergency Contact Information  Primary Emergency Contact: Melvin Murphy  Address: [place]iz[place] [address] Phone: [phone]  Relation: Child  Secondary Emergency Contact: Erik [address] Phone: [phone]  Relation: Other    Past Surgical History:  Past Surgical History:   Procedure Laterality Date    ARM SURGERY      CATARACT REMOVAL WITH IMPLANT      CIRCUMCISION N/A 2023    EXAM UNDER ANESTHESIA, DORSAL SLIT , BIOPSY GLANDS PENIS performed by Ana Shepard MD at [address] Bilateral     As of , RUE AVF functioning, LUE AVF nonfunctioning.     FINGER AMPUTATION      right pointer finger    FINGER SURGERY Left     I & D    GLAUCOMA SURGERY      LEG AMPUTATION BELOW KNEE Bilateral     TUNNELED VENOUS CATHETER PLACEMENT      PC placed and removed       Immunization History:   Immunization History   Administered Date(s) Administered    COVID-19, MODERNA BLUE border, Primary or Immunocompromised, (age 12y+), IM, 100 mcg/0.5mL 03/10/2021, 2021    COVID-19, MODERNA Booster BLUE border, (age 18y+), IM, 50mcg/0.25mL 2021    Influenza Vaccine, unspecified formulation 10/08/2014, 10/02/2015    Influenza Virus Vaccine 10/02/2019, 2020, 10/01/2021, 2022    Pneumococcal Conjugate 13-valent (Olxmcuy08) 2015, 2018    Pneumococcal Polysaccharide (Eeiseufxb25) 2015    Tdap (Boostrix, Adacel) 2020       Active Problems:  Patient Active Problem List   Diagnosis Code    Hyperlipidemia E78.5 Essential hypertension I10    ESRD on hemodialysis ([facility] [address] MWF N18.6, Z99.2    Insomnia G47.00    Chronic bilateral low back pain with bilateral sciatica M54.42, M54.41, G89.29    Controlled diabetes mellitus type 2 with complications (HCC) W69.9    S/P bilateral below knee amputation (HCC) Z89.512, Z89.511    Long term (current) use of antithrombotics/antiplatelets E65.92    Chronic use of opiate drugs therapeutic purposes Z79.891    Coronary artery disease involving native coronary artery I25.10    Anemia D64.9    Hypoglycemia E16.2    Inability to walk R26.2    Phimosis N47.1    Wound infection T14. 8XXA, L08.9       Isolation/Infection:   Isolation            Contact          Patient Infection Status       Infection Onset Added Last Indicated Last Indicated By Review Planned Expiration Resolved Resolved By    MRSA  09/06/17 09/06/17 Efren Del Rio RN        Arm - 9/2017    Resolved    COVID-19 (Rule Out) 11/13/22 11/13/22 11/13/22 COVID-19, Rapid (Ordered)   11/13/22 Rule-Out Test Resulted    COVID-19 (Rule Out) 09/02/22 09/02/22 09/02/22 COVID-19, Rapid (Ordered)   09/02/22 Rule-Out Test Resulted            Nurse Assessment:  Last Vital Signs: BP (!) 148/83   Pulse 80   Temp 99.1 °F (37.3 °C) (Oral)   Resp 16   Ht 5' 11\" (1.803 m)   Wt 210 lb (95.3 kg)   SpO2 94%   BMI 29.29 kg/m²     Last documented pain score (0-10 scale): Pain Level: 10  Last Weight:   Wt Readings from Last 1 Encounters:   03/10/23 210 lb (95.3 kg)     Mental Status:  oriented and alert    IV Access:  - Dialysis Catheter  - site  left and subclavian, insertion date: 5/1/22    Nursing Mobility/ADLs:  Walking   Dependent  Transfer  Dependent  Bathing  Assisted  Dressing  Assisted  [address]  Med Delivery   whole    Wound Care Documentation and Therapy:  Incision 09/05/13 Arm Left (Active)   Number of days: 3473       Incision 10/29/13 Arm Left (Active)   Number of days: 1667 Incision 02/18/14 Leg Lower;Right (Active)   Number of days: 3306       Incision 09/18/22 Buttocks Right (Active)   Number of days: 172       Incision 02/22/23 Penis (Active)   Dressing/Treatment Open to air 03/09/23 1725   Drainage Amount Moderate 03/09/23 1725   Drainage Description Thin;Sanguinous 03/09/23 1725   Number of days: 16      Penis incision. Coccyx wound. Elimination:  Continence: Bowel: No  Bladder: Yes  Urinary Catheter: None   Colostomy/Ileostomy/Ileal Conduit: No       Date of Last BM: 3/14/23    Intake/Output Summary (Last 24 hours) at 3/10/2023 1405  Last data filed at 3/9/2023 1916  Gross per 24 hour   Intake 500 ml   Output 472 ml   Net 28 ml     I/O last 3 completed shifts: In: 500   Out: 472     Safety Concerns: At Risk for Falls    Impairments/Disabilities:      Vision and bilateral BKA. Nutrition Therapy:  Current Nutrition Therapy:   - Oral Diet:  Carb Control 4 carbs/meal (1800kcals/day) and Renal    Routes of Feeding: Oral  Liquids: No Restrictions  Daily Fluid Restriction: no  Last Modified Barium Swallow with Video (Video Swallowing Test): not done    Treatments at the Time of Hospital Discharge:   Respiratory Treatments: none  Oxygen Therapy:  is not on home oxygen therapy. Ventilator:    - No ventilator support    Rehab Therapies: Physical Therapy and Occupational Therapy  Weight Bearing Status/Restrictions: No weight bearing restrictions  Other Medical Equipment (for information only, NOT a DME order):  wheelchair  Other Treatments: Skilled nursing assessment and education. Wound care to penile site.   Rinse penis with saline and apply lotrisone cream.  Patient's personal belongings (please select all that are sent with patient):  None    RN SIGNATURE:  Electronically signed by Renuka Navarro RN on 3/15/23 at 3:44 PM EDT    CASE MANAGEMENT/SOCIAL WORK SECTION    Inpatient Status Date: ***    Readmission Risk Assessment Score:  Readmission Risk              Risk of Unplanned Readmission:  41           Discharging to Facility/ Agency   Name: [facility]  Address: [address] [place] [address]  Phone: [phone]  Fax:  [phone]   Dialysis:  MWF [facility]o [facility]. Phone: [phone]  Fax: [phone]  Address: [address]     / signature: Electronically signed by ALYSON Torres on 3/10/23 at 2:06 PM EST    PHYSICIAN SECTION    Prognosis: Good    Condition at Discharge: Stable    Rehab Potential (if transferring to Rehab): Fair    Recommended Labs or Other Treatments After Discharge:  follow up with urology Dr. Jah Shine. Physician Certification: I certify the above information and transfer of Imani Berry  is necessary for the continuing treatment of the diagnosis listed and that he requires [facility] for less 30 days.      Update Admission H&P: No change in H&P    PHYSICIAN SIGNATURE:  Electronically signed by Ariana Casillas MD on 3/13/23 at 4:33 PM EDT

## 2023-03-10 NOTE — PROGRESS NOTES
Writer was notified of morning BS of 33 from lab. When walking into room patient was awake and alert, complaining of pain. He was able to drink some apple juice but could not chew the glucose tablets so D10 infusion was given. Blood sugar recheck was 90.    Dr. Ashok Wilkerson adjusted patients meds

## 2023-03-10 NOTE — PROGRESS NOTES
Patient taken down to dialysis. Blood sugar 74.    Rechecked at [facility] 81. - 80 will continue to monitor

## 2023-03-10 NOTE — H&P
Pain redness and swelling and drainage of left penis  History of present illness  62-year-old -American male with history of phimosis status post recent dorsal slit is presented to the emergency room with subjective complaint of increasing pain drainage and swelling of the foreskin.  However he denies fevers chills expectoration.  Patient is noted to have a chronic pain syndrome and desire to have continuous and frequent pain medications  He has bilateral amputee  Past medical history  Phimosis  Diabetes  Hypertension  Hyperlipidemia  Chronic pain syndrome opiate dependent  Anxiety/depression  Phantom pain  ESRD  Anemia of chronic disease  Hyperlipidemia  Past surgical history  Bilateral above-knee amputee, AV fistula  Medications per list reviewed  Allergies per list reviewed  Social history negative for tobacco alcohol or illicit drug use  Family history  Review of system all 12 systems reviewed per  History of present illness  Vitals  Afebrile hemodynamically stable  Systemic exam  HEENT unremarkable  Neck unremarkable  Lungs bilateral CTA  CVS S1-S2 regular rate and rhythm  Abdomen obese soft discomfort benign to palpation normoactive bowel sound  CNS.  Patient is anxious however does not express any obvious acute focal sensorimotor deficit  Lower extremity, bilateral above-knee amputee stumps are unremarkable  Genitals  Status post recent dorsal slit.  Foreskin is edematous.  In view of pain, difficulty evaluate the glans.  Regional lymph nodes unremarkable  Assessment and plan  Patient with recent history of phimosis who underwent dorsal slit by urologist presented with ischemic necrosis.  Infectious disease on board who recommended complete excision of the foreskin.  Continue cefepime and vancomycin  History of chronic pain syndrome.  Bilateral amputee with phantom pain.  Patient is on oral opiates by palliative care.  Patient is complaining of substantial pain.  He is placed on IV Dilaudid.  We will  provide her with Percocet every 4 hours scheduled. ESRD. Patient has been seen by nephrology and is scheduled for dialysis today  Anemia of chronic disease H&H is stable at baseline  Diabetes mellitus with hypoglycemia. He is asymptomatic. We will hold his oral hypoglycemic and continue with sliding scale  Discussed with urology  Plan of care discussed with patient  We will follow along  This note is created with a voice recognition program and while intend to generate a document that accurately reflects the content of the visit, no guarantee can be provided that every mistake has been identified and corrected by editing.

## 2023-03-10 NOTE — PLAN OF CARE
Patients groin pain is controlled with PRN diludid, was able to make through dialysis today. Started on cepefime and vanco per ID. Another surgery will be scheduled with Dr. Charbel Duque, date TBD. PT/OT worked with pt.    Problem: Safety - Adult  Goal: Free from fall injury  Outcome: Progressing     Problem: Chronic Conditions and Co-morbidities  Goal: Patient's chronic conditions and co-morbidity symptoms are monitored and maintained or improved  Outcome: Progressing     Problem: Pain  Goal: Verbalizes/displays adequate comfort level or baseline comfort level  Outcome: Progressing

## 2023-03-10 NOTE — CONSULTS
Haylee Mullen  Urology Consultation    Patient:  Delmy Baker  MRN: [id number]  YOB: 1960    CHIEF COMPLAINT:  penile pain    HISTORY OF PRESENT ILLNESS:   The patient is a 60 y.o. male who presents with recurrent penile pain, this patient was previously seen with the same symptoms he had severe phimosis and foreskin edema we could not axis the glans penis he was emergently scheduled for dorsal slit and we identified at that time induration and discoloration of the glans penis a wedge biopsy was obtained the pathology report showed coagulative necrosis of fibrovascular tissue, with acute inflammation. I have reviewed and discussed the status with infectious disease services Dr. Elvia Parra and reviewed the recommendation for a full circumcision to expose the glans penis for better assessment, the concern is that with the vascular necrosis and the poor blood supply the risk of a nonhealing wound at this time    Patient's old records, notes and chart reviewed and summarized above.     Past Medical History:    Past Medical History:   Diagnosis Date    Acute congestive heart failure (HCC)     Chronic bilateral low back pain with bilateral sciatica     Coronary artery disease involving native coronary artery 9/6/2022    CRF (chronic renal failure)     [facility] MWF    DDD (degenerative disc disease), lumbar 12/15/2020    Diabetes mellitus ([facility] [address]     Dialysis patient [facility])     ED (erectile dysfunction)     History of echocardiogram 2014    [facility]    HTN (hypertension)     Hyperlipidemia     LVH (left ventricular hypertrophy)     PVD (peripheral vascular disease) ([facility])     S/P bilateral BKA (below knee amputation) ([facility] [address]     Wears glasses        Past Surgical History:    Past Surgical History:   Procedure Laterality Date    ARM SURGERY      CATARACT REMOVAL WITH IMPLANT      CIRCUMCISION N/A 2/22/2023    EXAM UNDER ANESTHESIA, DORSAL SLIT , BIOPSY GLANDS PENIS performed by Jaun Canales MD at [address] [place] [address] DIALYSIS FISTULA CREATION Bilateral     As of 2019, RUE AVF functioning, LUE AVF nonfunctioning. FINGER AMPUTATION      right pointer finger    FINGER SURGERY Left     I & D    GLAUCOMA SURGERY      LEG AMPUTATION BELOW KNEE Bilateral     TUNNELED VENOUS CATHETER PLACEMENT      PC placed and removed     Previous  surgery:  biopsy of the glans penis, dorsal slit for severe phimosis      Medications:    Scheduled Meds:   insulin lispro  0-4 Units SubCUTAneous TID WC    insulin lispro  0-4 Units SubCUTAneous Nightly    cefepime  1,000 mg IntraVENous Q24H    [START ON 3/11/2023] vancomycin (VANCOCIN) intermittent dosing (placeholder)   Other RX Placeholder    vancomycin  1,000 mg IntraVENous Once    apixaban  5 mg Oral BID    sodium chloride flush  5-40 mL IntraVENous 2 times per day    fentanNYL  50 mcg IntraVENous Once    amLODIPine  5 mg Oral Daily    aspirin  81 mg Oral Daily    atorvastatin  80 mg Oral Daily    cloNIDine  0.3 mg Oral BID    [Held by provider] glipiZIDE  2.5 mg Oral QAM AC    lisinopril  5 mg Oral Daily    minoxidil  2.5 mg Oral BID    pregabalin  75 mg Oral Daily    calcium acetate  667 mg Oral TID WC     Continuous Infusions:   sodium chloride      dextrose       PRN Meds:.sodium chloride flush, sodium chloride, diphenhydrAMINE, oxyCODONE-acetaminophen, polyethyl glycol-propyl glycol 0.4-0.3 %, traZODone, glucose, dextrose bolus **OR** dextrose bolus, glucagon (rDNA), dextrose, HYDROmorphone, anticoagulant sodium citrate, anticoagulant sodium citrate    Allergies:  Patient has no known allergies.     Social History:    Social History     Socioeconomic History    Marital status:      Spouse name: Not on file    Number of children: Not on file    Years of education: Not on file    Highest education level: Not on file   Occupational History    Not on file   Tobacco Use    Smoking status: Never    Smokeless tobacco: Never   Vaping Use    Vaping Use: Never used   Substance and Sexual Activity    Alcohol use: Yes     Comment: rare    Drug use: No    Sexual activity: Not on file   Other Topics Concern    Not on file   Social History Narrative    Not on file     Social Determinants of Health     Financial Resource Strain: Low Risk     Difficulty of Paying Living Expenses: Not hard at all   Food Insecurity: No Food Insecurity    Worried About Running Out of Food in the Last Year: Never true    [address] in the Last Year: Never true   Transportation Needs: No Transportation Needs    Lack of Transportation (Medical): No    Lack of Transportation (Non-Medical): No   Physical Activity: Inactive    Days of Exercise per Week: 0 days    Minutes of Exercise per Session: 0 min   Stress: No Stress Concern Present    Feeling of Stress : Not at all   Social Connections:  Moderately Integrated    Frequency of Communication with Friends and Family: More than three times a week    Frequency of Social Gatherings with Friends and Family: More than three times a week    Attends Confucianism Services: More than 4 times per year    Active Member of [employer] or Organizations: Yes    Attends Club or Organization Meetings: Never    Marital Status:    Intimate Partner Violence: Not At Risk    Fear of Current or Ex-Partner: No    Emotionally Abused: No    Physically Abused: No    Sexually Abused: No   Housing Stability: Unknown    Unable to Pay for Housing in the Last Year: No    Number of Places Lived in the Last Year: Not on file    Unstable Housing in the Last Year: No       Family History:    Family History   Problem Relation Age of Onset    Diabetes Mother     Coronary Art Dis Mother     Hypertension Mother     Diabetes Father     Hypertension Father     Stroke Father     Cancer Sister     Hypertension Brother      Previous Urologic Family history: none    REVIEW OF SYSTEMS:  Constitutional: negative  Eyes: negative  Respiratory: negative  Cardiovascular: negative  Gastrointestinal: negative  Genitourinary: see HPI  Musculoskeletal: negative  Skin: negative   Neurological: negative  Hematological/Lymphatic: negative  Psychological: negative    Physical Exam:    This a 60 y.o. male   Patient Vitals for the past 24 hrs:   BP Temp Temp src Pulse Resp SpO2 Weight   03/10/23 1700 118/61 -- -- 65 -- -- --   03/10/23 1630 122/67 -- -- 92 -- -- --   03/10/23 1603 -- -- -- -- 16 -- --   03/10/23 1600 128/61 -- -- 83 -- -- --   03/10/23 1556 105/65 -- -- 87 -- -- --   03/10/23 1547 (!) 116/58 99.6 °F (37.6 °C) -- 91 16 -- 209 lb 14.1 oz (95.2 kg)   03/10/23 1527 135/70 99.1 °F (37.3 °C) Oral 86 16 -- --   03/10/23 1246 (!) 148/83 99.1 °F (37.3 °C) Oral 80 16 94 % --   03/10/23 0918 134/74 97.6 °F (36.4 °C) Temporal 82 16 92 % --   03/10/23 0705 -- -- -- -- 16 -- --   03/10/23 0654 -- -- -- -- -- -- 210 lb (95.3 kg)   03/10/23 0420 128/72 97.7 °F (36.5 °C) Axillary 70 16 94 % --   03/09/23 2329 115/69 97.9 °F (36.6 °C) Axillary 71 16 93 % --   03/09/23 2108 120/86 -- -- -- -- -- --   03/09/23 1916 120/86 98 °F (36.7 °C) -- 83 16 -- 210 lb 1.6 oz (95.3 kg)   03/09/23 1915 120/86 98 °F (36.7 °C) -- 83 -- -- --   03/09/23 1900 127/70 -- -- 70 -- -- --   03/09/23 1844 118/78 -- -- 72 -- -- --   03/09/23 1841 122/78 98 °F (36.7 °C) -- 67 18 -- 210 lb 1.6 oz (95.3 kg)     Constitutional: Patient in no acute distress; Patient is diabetic and bilateral amputee with severe peripheral vascular disease  Psych: severe anxiety  Skin: swelling and interstitial edema affecting the penile shaft as well as drainage  Lungs: Respiratory effort normal  Cardiovascular:  Normal peripheral pulses  Abdomen: Soft, non-tender, non-distended with no CVA, flank pain, hepatosplenomegaly or hernia. Kidneys normal.  Bladder non-tender and not distended.   Lymphatics: no palpable lymphadenopathy  Penis tender to palpation, biopsy was done to the glans penis pathology report discussed with the patient, circumcision was recommended for better exposure of the glans penis this was discussed with the patient he is very reluctant  Urethral meatus normal  Scrotal exam normal  Testicles normal bilaterally   LABS:  Recent Labs     03/09/23  1006 03/10/23  0529   WBC 10.5 10.6   HGB 10.0* 9.9*   HCT 32.5* 32.7*   MCV 73.5* 73.8*    163     Recent Labs     03/09/23  1006 03/10/23  0529   * 135   K 4.9 5.6*   CL 86* 98   CO2 24 22   PHOS 6.1*  --    BUN 55* 58*   CREATININE 9.21* 9.47*     Lab Results   Component Value Date    PSA 0.57 09/28/2021    PSA 1.68 12/10/2020    PSA 0.53 01/16/2020       Additional Lab/culture results:    Urinalysis: No results for input(s): COLORU, PHUR, LABCAST, WBCUA, RBCUA, MUCUS, TRICHOMONAS, YEAST, BACTERIA, CLARITYU, SPECGRAV, LEUKOCYTESUR, UROBILINOGEN, Ajay Garb in the last 72 hours.     Invalid input(s): NITRATE, GLUCOSEUKETONESUAMORPHOUS     -----------------------------------------------------------------  Imaging Results:    Assessment and Plan   Impression: Ischemic changes of glans penis as noted on prior biopsy phimosis with dorsal slit, interstitial edema involving foreskin and the head of the penis  Patient Active Problem List   Diagnosis    Hyperlipidemia    Essential hypertension    ESRD on hemodialysis ([facility] [address] MWF    Insomnia    Chronic bilateral low back pain with bilateral sciatica    Controlled diabetes mellitus type 2 with complications ([facility] [address]    S/P bilateral below knee amputation ([facility] [address]    Long term (current) use of antithrombotics/antiplatelets    Chronic use of opiate drugs therapeutic purposes    Coronary artery disease involving native coronary artery    Anemia    Hypoglycemia    Inability to walk    Phimosis    Wound infection       Plan: discussed with the patient a full circumcision, the present time he is very reluctant, the surgical concern is with the interstitial edema and the poor vascular supply we may still end up with a nonhealing wound at the coronal sulcus  The purpose of the circumcision would be just for better exposure of the glans penis but unfortunately, thiswould not necessarily improve the pain due to the ischemia.   I will discuss further with primary service and infectious disease services    Mu Moreira MD  5:30 PM 3/10/2023

## 2023-03-10 NOTE — PROGRESS NOTES
HEMODIALYSIS PRE-TREATMENT NOTE    Patient Identifiers prior to treatment: Name, , MRN    Isolation Required: Yes                      Isolation Type: MRSA, Contact       (please document if patient is being managed as a PUI/COVID-19 patient)        Hepatitis status:                           Date Drawn                             Result  Hepatitis B Surface Antigen 8/15/22     Negative                     Hepatitis B Surface Antibody 22 70        Hepatitis B Core Antibody 22 Negative          How was Hepatitis Status verified: Immune per Epic     Was a copy of the labs you documented provided to facility for the patient's chart:     Hemodialysis orders verified: Yes, Dr. Karine Oropeza Within normal limits ( I.e. s/s of infection,...): Tunneled CVC to left chest wall. Both pigtails threads noted to have dry, crusted blood. Dressing last changed yesterday, however has dry blood noted around insertion site under dressing. Arterial lumen clam broken and unable to be closed properly. Plastic hemo clamps securely noted in place of clamp. Dressing changed and pigtails cleaned. Patient tolerated well.      Pre-Assessment completed: Yes    Pre-dialysis report received from: Shawnee Wade RN                      Time: 3447

## 2023-03-10 NOTE — CONSULTS
Nephrology ESRD Consult Note    Reason for Consult:  End stage renal disease, hyperkalemia, a bit of fluid overload and significant under dialysis with asterixis and clonus  Requesting Physician:  Maximo Gabriel    Chief Complaint: Penile pain, worse on dialysis  History Obtained From:  patient, electronic medical record    History of Present Illness: This is a 60 y.o. male with end stage renal disease on hemodialysis [id number] under our group with nephrologist Dr. Carolina Maciel and who presents with under dialysis, hyperkalemia and inability to tolerate dialysis secondary to significant penile pain. He was only able to tolerate 30 minutes of dialysis yesterday here in the hospital despite pain medication. He was recently hospitalized just over 2 weeks ago after he came in with penile pain. At that time he was evaluated by urology and found to have phimosis and a mass on the glans penis. Under anesthesia and in the OR the patient had a dorsal slit and biopsy. The patient states that since he was discharged he cannot tolerate dialysis for very long. He now has asterixis and clonus because of under dialysis. His potassium is 5.6 today secondary to under dialysis. We are going to try to give him his intravenous pain medication prior to dialysis and see if he can tolerate more treatment today. He was seen by infectious disease who did not see acute infection. Infectious disease has asked urology to reevaluate the patient as the patient may have some penile necrosis causing the pain. He is on cefepime and vancomycin with dialysis scheduled. Patient has a tunneled hemodialysis catheter in place for dialysis access.   He has a history of type 2 diabetes mellitus, essential hypertension, hyperlipidemia, LVH, CHF, erectile dysfunction, surgery involving the glans penis as documented above, peripheral vascular occlusive disease, bilateral below the knee amputations and wears corrective lenses. In addition, he has anemia of chronic disease, secondary hyperparathyroidism and hyperphosphatemia. He also has history of atrial fibrillation and failed AV fistula creation's previously. Medications are reviewed. No known drug allergies documented. Lab work today shows sodium 135 with potassium 5.6 chloride 98 CO2 22 with BUN 58 creatinine 9.5 with total protein 7.5, albumin 3.5, alkaline phosphatase high at 165 and normal liver function tests otherwise. He did have low blood sugar of 33 this morning with last blood glucose check of 80. Family history of diabetes mellitus, hypertension, stroke, CAD, and malignancy. He does have significant tremors. No chest pain or shortness of breath today. Mild swelling of the extremities. His inability to tolerate dialysis is significant secondary to penile pain. Past Medical History:        Diagnosis Date    Acute congestive heart failure (HCC)     Chronic bilateral low back pain with bilateral sciatica     Coronary artery disease involving native coronary artery 9/6/2022    CRF (chronic renal failure)     [facility] MWF    DDD (degenerative disc disease), lumbar 12/15/2020    Diabetes mellitus ([facility] [address]     Dialysis patient [facility])     ED (erectile dysfunction)     History of echocardiogram 2014    [facility]    HTN (hypertension)     Hyperlipidemia     LVH (left ventricular hypertrophy)     PVD (peripheral vascular disease) ([facility])     S/P bilateral BKA (below knee amputation) ([facility] [address]     Wears glasses        Access:  previous permacath    Past Surgical History:        Procedure Laterality Date    ARM SURGERY      CATARACT REMOVAL WITH IMPLANT      CIRCUMCISION N/A 2/22/2023    EXAM UNDER ANESTHESIA, DORSAL SLIT , BIOPSY GLANDS PENIS performed by Marichuy Rea MD at [facility]     As of 2019, RAISAE AVF functioning, KEMI AVF nonfunctioning.     FINGER AMPUTATION      right pointer finger    FINGER SURGERY Left     I & D    GLAUCOMA SURGERY      LEG AMPUTATION BELOW KNEE Bilateral     TUNNELED VENOUS CATHETER PLACEMENT      PC placed and removed       Current Medications:    insulin lispro (HUMALOG) injection vial 0-4 Units, TID WC  insulin lispro (HUMALOG) injection vial 0-4 Units, Nightly  cefepime (MAXIPIME) 1,000 mg in sodium chloride 0.9 % 50 mL IVPB (mini-bag), Q24H  [START ON 3/11/2023] vancomycin (VANCOCIN) intermittent dosing (placeholder), RX Placeholder  sodium chloride flush 0.9 % injection 5-40 mL, 2 times per day  sodium chloride flush 0.9 % injection 10 mL, PRN  0.9 % sodium chloride infusion, PRN  fentaNYL (SUBLIMAZE) injection 50 mcg, Once  amLODIPine (NORVASC) tablet 5 mg, Daily  apixaban (ELIQUIS) tablet 2.5 mg, BID  aspirin chewable tablet 81 mg, Daily  atorvastatin (LIPITOR) tablet 80 mg, Daily  cloNIDine (CATAPRES) tablet 0.3 mg, BID  diphenhydrAMINE (BENADRYL) tablet 25 mg, Nightly PRN  [Held by provider] glipiZIDE (GLUCOTROL) tablet 2.5 mg, QAM AC  lisinopril (PRINIVIL;ZESTRIL) tablet 5 mg, Daily  minoxidil (LONITEN) tablet 2.5 mg, BID  oxyCODONE-acetaminophen (PERCOCET) 5-325 MG per tablet 1 tablet, Q6H PRN  polyethyl glycol-propyl glycol 0.4-0.3 % (SYSTANE) ophthalmic solution 1 drop, PRN  pregabalin (LYRICA) capsule 75 mg, Daily  calcium acetate (PHOSLO) capsule 667 mg, TID WC  traZODone (DESYREL) tablet 50 mg, Nightly PRN  glucose chewable tablet 16 g, PRN  dextrose bolus 10% 125 mL, PRN   Or  dextrose bolus 10% 250 mL, PRN  glucagon (rDNA) injection 1 mg, PRN  dextrose 10 % infusion, Continuous PRN  HYDROmorphone HCl PF (DILAUDID) injection 1 mg, Q6H PRN  anticoagulant sodium citrate 4 % injection 2 mL, PRN  anticoagulant sodium citrate 4 % injection 2 mL, PRN        Allergies:  Patient has no known allergies.     Social History:    Social History     Socioeconomic History    Marital status:      Spouse name: Not on file    Number of children: Not on file    Years of education: Not on file Highest education level: Not on file   Occupational History    Not on file   Tobacco Use    Smoking status: Never    Smokeless tobacco: Never   Vaping Use    Vaping Use: Never used   Substance and Sexual Activity    Alcohol use: Yes     Comment: rare    Drug use: No    Sexual activity: Not on file   Other Topics Concern    Not on file   Social History Narrative    Not on file     Social Determinants of Health     Financial Resource Strain: Low Risk     Difficulty of Paying Living Expenses: Not hard at all   Food Insecurity: No Food Insecurity    Worried About [address] in the Last Year: Never true    [facility] in the Last Year: Never true   Transportation Needs: No Transportation Needs    Lack of Transportation (Medical): No    Lack of Transportation (Non-Medical): No   Physical Activity: Inactive    Days of Exercise per Week: 0 days    Minutes of Exercise per Session: 0 min   Stress: No Stress Concern Present    Feeling of Stress : Not at all   Social Connections:  Moderately Integrated    Frequency of Communication with Friends and Family: More than three times a week    Frequency of Social Gatherings with Friends and Family: More than three times a week    Attends Jain Services: More than 4 times per year    Active Member of [employer] or Organizations: Yes    Attends Club or Organization Meetings: Never    Marital Status:    Intimate Partner Violence: Not At Risk    Fear of Current or Ex-Partner: No    Emotionally Abused: No    Physically Abused: No    Sexually Abused: No   Housing Stability: Unknown    Unable to Pay for Housing in the Last Year: No    Number of Places Lived in the Last Year: Not on file    Unstable Housing in the Last Year: No       Family History:   Family History   Problem Relation Age of Onset    Diabetes Mother     Coronary Art Dis Mother     Hypertension Mother     Diabetes Father     Hypertension Father     Stroke Father     Cancer Sister     Hypertension Brother       Review of Systems:    Pertinent positives stated above in HPI. All other systems were reviewed and were negative.    Objective:  Constitutional:    CURRENT TEMPERATURE:  Temp: 99.1 °F (37.3 °C)  MAXIMUM TEMPERATURE OVER 24HRS:  Temp (24hrs), Av °F (36.7 °C), Min:97.6 °F (36.4 °C), Max:99.1 °F (37.3 °C)    CURRENT RESPIRATORY RATE:  Resp: 16  CURRENT PULSE:  Heart Rate: 80  CURRENT BLOOD PRESSURE:  BP: (!) 148/83  24HR BLOOD PRESSURE RANGE:  Systolic (24hrs), Av , Min:115 , Max:148   ; Diastolic (24hrs), Av, Min:69, Max:86    24HR INTAKE/OUTPUT:    Intake/Output Summary (Last 24 hours) at 3/10/2023 1313  Last data filed at 3/9/2023 1916  Gross per 24 hour   Intake 500 ml   Output 472 ml   Net 28 ml         Physical Exam:  In general, the patient is a little bit sleepy and tired with clonus and asterixis with the patient having recently poor dialysis is unable to stand treatment secondary to penile pain  Skin: warm and dry   Eyes: conjunctivae normal and sclera anicteric  ENT: No obvious thrush, relatively moist mucous membranes   Neck: No JVD, midline trachea no accessory muscle use  Pulmonary: clear to auscultation bilaterally- no wheezes, rales or rhonchi, normal air movement, no respiratory distress  Cardiovascular: Irregularly irregular rhythm   Abdomen: soft nontender, bowel sounds present, no ascites  Extremities: Bilateral below the knee amputation      Labs:  PTH:  No results found for: PTH  abs:   CBC:   Recent Labs     23  1006 03/10/23  0529   WBC 10.5 10.6   RBC 4.42 4.43   HGB 10.0* 9.9*   HCT 32.5* 32.7*   MCV 73.5* 73.8*   MCH 22.6* 22.3*   MCHC 30.8 30.3   RDW 19.6* 19.5*    163   MPV Abnormal 10.2      BMP:   Recent Labs     23  1006 03/10/23  0529   * 135   K 4.9 5.6*   CL 86* 98   CO2 24 22   BUN 55* 58*   CREATININE 9.21* 9.47*   GLUCOSE 110* 33*   CALCIUM 9.7 10.0        Phosphorus:    Recent Labs     23  1006   PHOS 6.1*     Magnesium:   Recent  Labs     03/09/23  1006   MG 2.4     Albumin:   Recent Labs     03/10/23  0529   LABALBU 3.5     Assessment:  1. ESRD and typically on a Monday and Wednesday and Friday hemodialysis schedule at [facility], although not able to tolerate complete treatments recently secondary to severe penile pain  2. HTN  3. Penile pain, likely ischemic in nature, with recent dorsal slit and biopsy of glans penis about penile mass a couple of weeks ago  4. Patient not tolerating adequate time on dialysis likely secondary to ischemic pain of the penis. The patient has clonus and asterixis along with hyperkalemia and hyperphosphatemia secondary to inability to tolerate his usual complete dialysis treatments resulting in under dialysis and a degree of uremia  5. Hyperkalemia secondary to poor dialysis  6. Hyperphosphatemia  7. Anemia of chronic disease  8. Secondary hyperparathyroidism      Plan:  1. Dialysis [facility]  2. Antihypertensives per usual dosing  3. Follow lab data  4. Patient is to be administered pain medication just prior to dialysis today to hopefully allow the patient to stand dialysis longer  5. Evaluate daily the need for dialysis as the patient has uremic currently and the hyperkalemia    Thank you for the consultation. Please do not hesitate to call with questions.     Electronically signed by Earlene Wylie MD on 3/10/2023 at 1:13 PM

## 2023-03-10 NOTE — CONSULTS
[address] [facility] Pharmacokinetic Monitoring Service - Vancomycin    Inocencio Conteh is a 60 y.o. male starting on vancomycin therapy for SSTI. Pharmacy consulted by Dr. Tabby Abdi for monitoring and adjustment. Target Concentration: Pre-Dialysis Concentration 15-20 mg/L  Additional Antimicrobials: cefepime     Pertinent Laboratory Values: Wt Readings from Last 1 Encounters:   03/10/23 210 lb (95.3 kg)     Temp Readings from Last 1 Encounters:   03/10/23 99.1 °F (37.3 °C) (Oral)   2  Recent Labs     03/09/23  1006 03/10/23  0529   CREATININE 9.21* 9.47*   WBC 10.5 10.6     Procalcitonin: none    Pertinent Cultures:  Culture Date Source Results   3/9 wound Pending    MRSA Nasal Swab: N/A. Non-respiratory infection. Plan:  Concentration-guided dosing due to renal impairment and intermittent hemodialysis   Start vancomycin 2500mg x1, then vancomycin 1000mg after dialysis on Friday.   Vancomycin concentration ordered for 3/13/23 @ 0600, prior to HD session   Pharmacy will continue to monitor patient and adjust therapy as indicated    Thank you for the consult,  Jovanna Fulton, [address] [facility]  3/10/2023 2:44 PM

## 2023-03-10 NOTE — CONSULTS
[facility]  Initial Consult Note  Date: 3/10/2023    Hospital day :1     Impression:   Ischemic penile tissue with significant penile pain-recent biopsy of the glans penis with coagulation necrosis of fibrovascular tissue with acute inflammation  Phimosis with recent dorsal split of the foreskin 2/23/2022 there is some drainage noted postoperatively  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  Peripheral arterial disease status post bilateral below the knee amputation    Recommendations   While there is some drainage along the foreskin there is no overt findings to suggest acute infection. There is dusky/gray tissue of the glans of the penis and at this point in time it is difficult to actually evaluate the penile shaft  The care was discussed with the patient as well as with Dr. Morro Lobo from urology and my recommendation at this point in time would be for excision of the foreskin so that the glans penis and penile shaft can be better evaluated  The concern here is that the patient has penile necrosis as the cause of his pain rather than acute infection  The patient will continue with cefepime and vancomycin with hemodialysis for now    Chief complaint/reason for consultation:   Penile pain-concern for infection    History of Present Illness:   Shaggy Forbes is a 60y.o.-year-old male who was initially admitted on 3/9/2023. Keith Chavez has multiple medical problems including diabetes mellitus type 2, hypertension, hyperlipidemia, coronary artery disease, chronic renal failure on hemodialysis Monday Wednesday Friday, degenerative disc disease, erectile dysfunction, peripheral arterial disease status post bilateral below the knee amputations.     The patient was previously admitted with severe penile pain was seen by urology for phimosis and also noted to have a mass on the glans penis for which he was taken to the operating room and had a dorsal slit of the foreskin and biopsy of the glans penis lesion on 2/22/2023. The surgical biopsy did show coagulation necrosis of fibrovascular tissue with acute inflammation. The patient was at hemodialysis as was sent into the emergency department by EMS from the dialysis center and he did not receive his dialysis treatment secondary to significant penile pain. Patient reports 10 out of 10 pain does not report any associated fevers or chills. The patient was evaluated and noted to have some penile discharge, penile swelling and concern was for penile abscess/cellulitis and was admitted started on antimicrobial therapy and I was asked to evaluate and help with antibiotic choice. I have personally reviewed the past medical history, past surgical history, medications, social history, and family history, and I have updated the database accordingly. Past Medical History:     Past Medical History:   Diagnosis Date    Acute congestive heart failure (HCC)     Chronic bilateral low back pain with bilateral sciatica     Coronary artery disease involving native coronary artery 9/6/2022    CRF (chronic renal failure)     [facility] MWF    DDD (degenerative disc disease), lumbar 12/15/2020    Diabetes mellitus ([facility]     Dialysis patient [facility])     ED (erectile dysfunction)     [facility]ogram [facility]    HTN (hypertension)     Hyperlipidemia     LVH (left ventricular hypertrophy)     PVD (peripheral vascular disease) ([facility])     S/P bilateral BKA (below knee amputation) ([facility]     Wears glasses      Past Surgical  History:     Past Surgical History:   Procedure Laterality Date    ARM SURGERY      CATARACT REMOVAL WITH IMPLANT      CIRCUMCISION N/A 2/22/2023    EXAM UNDER ANESTHESIA, DORSAL SLIT , BIOPSY GLANDS PENIS performed by Tolbert Landau, MD at [facility] Bilateral     As of 2019, RUE AVF functioning, LUE AVF nonfunctioning.     FINGER AMPUTATION      right pointer finger    FINGER SURGERY Left     I & D    GLAUCOMA SURGERY      LEG AMPUTATION BELOW KNEE Bilateral     TUNNELED VENOUS CATHETER PLACEMENT      PC placed and removed     Medications:      insulin lispro  0-4 Units SubCUTAneous TID WC    insulin lispro  0-4 Units SubCUTAneous Nightly    sodium chloride flush  5-40 mL IntraVENous 2 times per day    fentanNYL  50 mcg IntraVENous Once    amLODIPine  5 mg Oral Daily    apixaban  2.5 mg Oral BID    aspirin  81 mg Oral Daily    atorvastatin  80 mg Oral Daily    cloNIDine  0.3 mg Oral BID    [Held by provider] glipiZIDE  2.5 mg Oral QAM AC    lisinopril  5 mg Oral Daily    minoxidil  2.5 mg Oral BID    pregabalin  75 mg Oral Daily    calcium acetate  667 mg Oral TID WC     Social History:     Social History     Socioeconomic History    Marital status:      Spouse name: Not on file    Number of children: Not on file    Years of education: Not on file    Highest education level: Not on file   Occupational History    Not on file   Tobacco Use    Smoking status: Never    Smokeless tobacco: Never   Vaping Use    Vaping Use: Never used   Substance and Sexual Activity    Alcohol use: Yes     Comment: rare    Drug use: No    Sexual activity: Not on file   Other Topics Concern    Not on file   Social History Narrative    Not on file     Social Determinants of Health     Financial Resource Strain: Low Risk     Difficulty of Paying Living Expenses: Not hard at all   Food Insecurity: No Food Insecurity    Worried About Running Out of Food in the Last Year: Never true    Ran Out of Food in the Last Year: Never true   Transportation Needs: No Transportation Needs    Lack of Transportation (Medical): No    Lack of Transportation (Non-Medical): No   Physical Activity: Inactive    Days of Exercise per Week: 0 days    Minutes of Exercise per Session: 0 min   Stress: No Stress Concern Present    Feeling of Stress : Not at all   Social Connections:  Moderately Integrated    Frequency of Communication with Friends and Family: More than three times a week    Frequency of Social Gatherings with Friends and Family: More than three times a week    Attends Jainism Services: More than 4 times per year    Active Member of [employer] or Organizations: Yes    Attends Club or Organization Meetings: Never    Marital Status:    Intimate Partner Violence: Not At Risk    Fear of Current or Ex-Partner: No    Emotionally Abused: No    Physically Abused: No    Sexually Abused: No   Housing Stability: Unknown    Unable to Pay for Housing in the Last Year: No    Number of Places Lived in the Last Year: Not on file    Unstable Housing in the Last Year: No     Family History:     Family History   Problem Relation Age of Onset    Diabetes Mother     Coronary Art Dis Mother     Hypertension Mother     Diabetes Father     Hypertension Father     Stroke Father     Cancer Sister     Hypertension Brother       Allergies:   Patient has no known allergies. Review of Systems:   General: No fevers or chills. Eyes: No double vision or blurry vision. ENT: No sore throat or runny nose. Cardiovascular: No chest pain or palpitations. Lung: No shortness of breath or cough. Abdomen: No nausea, vomiting, diarrhea, or abdominal pain. Genitourinary:  The patient has penile pain  Musculoskeletal: No muscle aches or pains. Hematologic: No bleeding or bruising. Neurologic: No headache, weakness, numbness, or tingling.     Physical Examination :   /74   Pulse 82   Temp 97.6 °F (36.4 °C) (Temporal)   Resp 16   Ht 5' 11\" (1.803 m)   Wt 210 lb (95.3 kg)   SpO2 92%   BMI 29.29 kg/m²     Temperature Range: Temp: 97.6 °F (36.4 °C) Temp  Av °F (36.7 °C)  Min: 97.6 °F (36.4 °C)  Max: 98.8 °F (37.1 °C)  General Appearance: Awake, alert, and in no apparent distress  Head: Normocephalic, without obvious abnormality, atraumatic  Eyes: Pupils equal, round, reactive, to light and accommodation; extraocular movements intact; sclera anicteric; conjunctivae pink  ENT: Oropharynx clear, without erythema, exudate, or thrush. Neck: Supple, without lymphadenopathy. Pulmonary/Chest: Clear to auscultation, without wheezes, rales, or rhonchi  Cardiovascular: Regular rate and rhythm without murmurs, rubs, or gallops. Abdomen: Soft, nontender, nondistended. Extremities: Bilateral below the knee amputation stump site intact. Neurologic: No gross sensory or motor deficits. Skin: Warm and dry with no rash.   The patient does have some swelling of the foreskin and there is some healing surgical wounds along the dorsal aspect of the foreskin and what remains tender is what can be seen of the penile shaft and there is very gray dusky tissue noted at the glans penis    Medical Decision Making:   I have independently reviewed/ordered the following labs:  CBC with Differential:   Recent Labs     03/09/23  1006 03/10/23  0529   WBC 10.5 10.6   HGB 10.0* 9.9*   HCT 32.5* 32.7*    163   LYMPHOPCT 15*  --    MONOPCT 13*  --      BMP:   Recent Labs     03/09/23  1006 03/10/23  0529   * 135   K 4.9 5.6*   CL 86* 98   CO2 24 22   BUN 55* 58*   CREATININE 9.21* 9.47*   MG 2.4  --      Hepatic Function Panel:   Recent Labs     03/10/23  0529   PROT 7.5   LABALBU 3.5   BILITOT 0.7   ALKPHOS 165*   ALT 13   AST 21       Lab Results   Component Value Date/Time    PROCAL 0.91 11/13/2022 12:45 AM       Lab Results   Component Value Date/Time    .4 09/07/2017 07:18 AM    .1 09/06/2017 01:46 PM     No results found for: FERRITIN  No results found for: FIBRINOGEN  Lab Results   Component Value Date/Time    DDIMER 1.77 07/21/2020 05:50 PM     No results found for: LDH    Lab Results   Component Value Date    SEDRATE 69 (H) 05/26/2022       Lab Results   Component Value Date/Time    COVID19 Not Detected 11/13/2022 02:01 AM    COVID19 Not Detected 09/02/2022 02:25 AM    COVID19 Not Detected 01/04/2021 01:54 PM     No results found for requested labs within last 30 days. Imaging Studies:   No results found. Cultures:     Blood Culture 1 [[id number]] Collected: 03/09/23 1005   Order Status: Completed Specimen: Blood Updated: 03/09/23 2336    Specimen Description . BLOOD    Special Requests LAC 13ML    Culture NO GROWTH 12 HOURS   Culture, Blood 2 [[id number]] Collected: 03/09/23 1012   Order Status: Completed Specimen: Blood Updated: 03/09/23 2336    Specimen Description . BLOOD    Special Requests LFA 20ML    Culture NO GROWTH 12 HOURS   Culture, Anaerobic and Aerobic [[id number]] (Abnormal) Collected: 03/09/23 0955   Order Status: Completed Specimen: Wound Updated: 03/09/23 1444    Specimen Description . WOUND    Special Requests . PENIS    Direct Exam NO NEUTROPHILS SEEN     MODERATE GRAM POSITIVE COCCI IN PAIRS Abnormal      FEW GRAM POSITIVE RODS Abnormal      FEW GRAM NEGATIVE RODS Abnormal      RARE GRAM POSITIVE COCCI IN CLUSTERS Abnormal     Culture PENDING       Electronically signed by Bailey Warren MD on 3/10/2023 at 10:19 AM      [facility]  Bailey Warren MD  [employer] messaging  OFFICE: [phone]    Thank you for allowing us to participate in the care of this patient. Please call with questions. This note is created with the assistance of a speech recognition program.  While intending to generate a document that actually reflects the content of the visit, the document can still have some errors including those of syntax and sound a like substitutions which may escape proof reading. In such instances, actual meaning can be extrapolated by contextual diversion.

## 2023-03-10 NOTE — CARE COORDINATION
Social work: Per [employer], patient will need new precert to return to [employer]. RN to get PT/OT order.

## 2023-03-10 NOTE — PROGRESS NOTES
HEMODIALYSIS POST TREATMENT NOTE    Treatment time ordered: 3.0Hrs    Actual treatment time: 30Mins    UltraFiltration Goal: To Dry weight  UltraFiltration Removed: 0      Pre Treatment weight: 95.3Kgs  Post Treatment weight: 95.3Kgs  Estimated Dry Weight: 93.0Kgs    Access used:     Central Venous Catheter:          Tunneled or Non-tunneled: Tunneled           Site: Left Chest          Access Flow: Good      Internal Access:       AV Fistula or AV Graft: N/A         Site: N/A       Access Flow: N/A       Sign and symptoms of infection: No       If YES: N/A    Medications or blood products given: See MAR    Chronic outpatient schedule: MWF    Chronic outpatient unit: [facility]    Summary of response to treatment: Patient tolerated treatment poor. Patient was only on treatment 30 mins due to yelling out in pain from penis. Patient recieved a pain pill prior to dialysis and also recieved dilaudid 20 min into treatment. Patient still unable to stand the pain and demanded off treatment. Dr. Camryn Gallardo was called and informed. No fluid was removed. CVC red clamped was broken  upon arrival to dialysis hemosafe clamp applied and capped, blue clamped and capped. Explain if orders NOT met, was physician notified:Yes      ACES flowsheet faxed to patient unit/ placed in patient chart: Yes    Post assessment completed: Dane Mehta    Report given to: Melody Byrne documented Safety Checks include the followin) Access and face visible at all times. 2) All connections and blood lines are secure with no kinks. 3) NVL alarm engaged. 4) Hemosafe device applied (if applicable). 5) No collapse of Arterial or Venous blood chambers. 6) All blood lines / pump segments in the air detectors.

## 2023-03-11 PROBLEM — N48.89 PENILE PAIN: Status: ACTIVE | Noted: 2023-03-11

## 2023-03-11 LAB
ALBUMIN SERPL-MCNC: 3.6 G/DL (ref 3.5–5.2)
ALP SERPL-CCNC: 190 U/L (ref 40–129)
ALT SERPL-CCNC: 17 U/L (ref 5–41)
ANION GAP SERPL CALCULATED.3IONS-SCNC: 14 MMOL/L (ref 9–17)
AST SERPL-CCNC: 30 U/L
BILIRUB SERPL-MCNC: 0.8 MG/DL (ref 0.3–1.2)
BUN SERPL-MCNC: 35 MG/DL (ref 8–23)
BUN/CREAT BLD: 5 (ref 9–20)
CALCIUM SERPL-MCNC: 9.8 MG/DL (ref 8.6–10.4)
CHLORIDE SERPL-SCNC: 94 MMOL/L (ref 98–107)
CO2 SERPL-SCNC: 23 MMOL/L (ref 20–31)
CREAT SERPL-MCNC: 6.52 MG/DL (ref 0.7–1.2)
GFR SERPL CREATININE-BSD FRML MDRD: 9 ML/MIN/1.73M2
GLUCOSE BLD-MCNC: 113 MG/DL (ref 75–110)
GLUCOSE BLD-MCNC: 128 MG/DL (ref 75–110)
GLUCOSE BLD-MCNC: 194 MG/DL (ref 75–110)
GLUCOSE BLD-MCNC: 263 MG/DL (ref 75–110)
GLUCOSE BLD-MCNC: 50 MG/DL (ref 75–110)
GLUCOSE BLD-MCNC: 66 MG/DL (ref 75–110)
GLUCOSE SERPL-MCNC: 80 MG/DL (ref 70–99)
HCT VFR BLD AUTO: 34.8 % (ref 40.7–50.3)
HGB BLD-MCNC: 10.7 G/DL (ref 13–17)
MCH RBC QN AUTO: 22.6 PG (ref 25.2–33.5)
MCHC RBC AUTO-ENTMCNC: 30.7 G/DL (ref 28.4–34.8)
MCV RBC AUTO: 73.6 FL (ref 82.6–102.9)
NRBC AUTOMATED: 0 PER 100 WBC
PDW BLD-RTO: 19.5 % (ref 11.8–14.4)
PLATELET # BLD AUTO: 170 K/UL (ref 138–453)
PMV BLD AUTO: ABNORMAL FL (ref 8.1–13.5)
POTASSIUM SERPL-SCNC: 5.3 MMOL/L (ref 3.7–5.3)
PROT SERPL-MCNC: 7.7 G/DL (ref 6.4–8.3)
RBC # BLD: 4.73 M/UL (ref 4.21–5.77)
SODIUM SERPL-SCNC: 131 MMOL/L (ref 135–144)
WBC # BLD AUTO: 11.6 K/UL (ref 3.5–11.3)

## 2023-03-11 PROCEDURE — 80053 COMPREHEN METABOLIC PANEL: CPT

## 2023-03-11 PROCEDURE — 6370000000 HC RX 637 (ALT 250 FOR IP): Performed by: UROLOGY

## 2023-03-11 PROCEDURE — 2580000003 HC RX 258: Performed by: INTERNAL MEDICINE

## 2023-03-11 PROCEDURE — 6360000002 HC RX W HCPCS: Performed by: INTERNAL MEDICINE

## 2023-03-11 PROCEDURE — 2580000003 HC RX 258: Performed by: NURSE PRACTITIONER

## 2023-03-11 PROCEDURE — 99233 SBSQ HOSP IP/OBS HIGH 50: CPT | Performed by: FAMILY MEDICINE

## 2023-03-11 PROCEDURE — 2500000003 HC RX 250 WO HCPCS: Performed by: FAMILY MEDICINE

## 2023-03-11 PROCEDURE — 82947 ASSAY GLUCOSE BLOOD QUANT: CPT

## 2023-03-11 PROCEDURE — 97167 OT EVAL HIGH COMPLEX 60 MIN: CPT

## 2023-03-11 PROCEDURE — 97535 SELF CARE MNGMENT TRAINING: CPT

## 2023-03-11 PROCEDURE — 97530 THERAPEUTIC ACTIVITIES: CPT

## 2023-03-11 PROCEDURE — 85027 COMPLETE CBC AUTOMATED: CPT

## 2023-03-11 PROCEDURE — 2060000000 HC ICU INTERMEDIATE R&B

## 2023-03-11 PROCEDURE — 36415 COLL VENOUS BLD VENIPUNCTURE: CPT

## 2023-03-11 PROCEDURE — 6370000000 HC RX 637 (ALT 250 FOR IP): Performed by: NURSE PRACTITIONER

## 2023-03-11 PROCEDURE — 6370000000 HC RX 637 (ALT 250 FOR IP): Performed by: FAMILY MEDICINE

## 2023-03-11 PROCEDURE — 99232 SBSQ HOSP IP/OBS MODERATE 35: CPT | Performed by: INTERNAL MEDICINE

## 2023-03-11 RX ORDER — LIDOCAINE 4 G/G
1 PATCH TOPICAL DAILY
COMMUNITY

## 2023-03-11 RX ORDER — CLOTRIMAZOLE AND BETAMETHASONE DIPROPIONATE 10; .64 MG/G; MG/G
CREAM TOPICAL 2 TIMES DAILY
Status: DISCONTINUED | OUTPATIENT
Start: 2023-03-11 | End: 2023-03-14

## 2023-03-11 RX ORDER — DOCUSATE SODIUM 100 MG/1
100 CAPSULE, LIQUID FILLED ORAL DAILY
Status: ON HOLD | COMMUNITY
End: 2023-03-14 | Stop reason: HOSPADM

## 2023-03-11 RX ADMIN — CEFEPIME 1000 MG: 1 INJECTION, POWDER, FOR SOLUTION INTRAMUSCULAR; INTRAVENOUS at 12:20

## 2023-03-11 RX ADMIN — AMLODIPINE BESYLATE 5 MG: 5 TABLET ORAL at 09:08

## 2023-03-11 RX ADMIN — ATORVASTATIN CALCIUM 80 MG: 80 TABLET, FILM COATED ORAL at 22:45

## 2023-03-11 RX ADMIN — INSULIN LISPRO 2 UNITS: 100 INJECTION, SOLUTION INTRAVENOUS; SUBCUTANEOUS at 17:16

## 2023-03-11 RX ADMIN — PREGABALIN 75 MG: 75 CAPSULE ORAL at 09:08

## 2023-03-11 RX ADMIN — CLONIDINE HYDROCHLORIDE 0.3 MG: 0.3 TABLET ORAL at 22:45

## 2023-03-11 RX ADMIN — CALCIUM ACETATE 667 MG: 667 CAPSULE ORAL at 09:08

## 2023-03-11 RX ADMIN — MINOXIDIL 2.5 MG: 2.5 TABLET ORAL at 22:45

## 2023-03-11 RX ADMIN — MINOXIDIL 2.5 MG: 2.5 TABLET ORAL at 09:08

## 2023-03-11 RX ADMIN — APIXABAN 5 MG: 5 TABLET, FILM COATED ORAL at 22:45

## 2023-03-11 RX ADMIN — CALCIUM ACETATE 667 MG: 667 CAPSULE ORAL at 12:17

## 2023-03-11 RX ADMIN — CLONIDINE HYDROCHLORIDE 0.3 MG: 0.3 TABLET ORAL at 09:08

## 2023-03-11 RX ADMIN — CLOTRIMAZOLE AND BETAMETHASONE DIPROPIONATE: 10; .5 CREAM TOPICAL at 22:54

## 2023-03-11 RX ADMIN — SODIUM ZIRCONIUM CYCLOSILICATE 10 G: 10 POWDER, FOR SUSPENSION ORAL at 13:27

## 2023-03-11 RX ADMIN — ASPIRIN 81 MG CHEWABLE TABLET 81 MG: 81 TABLET CHEWABLE at 09:08

## 2023-03-11 RX ADMIN — SODIUM CHLORIDE, PRESERVATIVE FREE 10 ML: 5 INJECTION INTRAVENOUS at 22:59

## 2023-03-11 RX ADMIN — CALCIUM ACETATE 667 MG: 667 CAPSULE ORAL at 17:16

## 2023-03-11 RX ADMIN — SODIUM CHLORIDE: 9 INJECTION, SOLUTION INTRAVENOUS at 12:19

## 2023-03-11 RX ADMIN — LISINOPRIL 5 MG: 5 TABLET ORAL at 09:08

## 2023-03-11 RX ADMIN — APIXABAN 5 MG: 5 TABLET, FILM COATED ORAL at 09:08

## 2023-03-11 ASSESSMENT — ENCOUNTER SYMPTOMS
RESPIRATORY NEGATIVE: 1
GASTROINTESTINAL NEGATIVE: 1

## 2023-03-11 NOTE — PROGRESS NOTES
Renal Progress Note    Patient :  Kasi Arriola; 60 y.o. MRN# [id number]  Location:  [facility]/1870-95  Attending:  Taqueria Sanabria MD  Admit Date:  3/9/2023   Hospital Day: 2      Subjective: Following for ESRD Njrypf-Cldixsncc-Yyxpbn at [facility] via tunnel cath, under our group with nephrologist Dr. Jim Holt admitted with penile pain secondary to phimosis and mass on penis, s/p recent dorsal slit and biopsy. Due to the pain he was not tolerating dialysis and was cutting treatments short, and he developed asterixis and clonus because of under dialysis, and potassium was 5.6 yesterday. He had dialysis yesterday and 2 liters removed. Patient tolerated well. He was started on Dextrose with NS at 50/hr over night due to hypoglycemia which has improved. He is sitting up on edge of bed, alert and oriented. He denies any pain  Labs today show Na 131, K5.3, Cr 6.52. Blood pressures stable    Outpatient Medications:     Medications Prior to Admission: lisinopril (PRINIVIL;ZESTRIL) 20 MG tablet, TAKE ONE TABLET BY MOUTH TWICE A DAY IN THE MORNING AND AT BEDTIME  oxyCODONE-acetaminophen (PERCOCET) 5-325 MG per tablet, Take 1 tablet by mouth every 6 hours as needed for Pain. diphenhydrAMINE (BENADRYL) 25 MG tablet, Take 25 mg by mouth nightly as needed for Itching  glimepiride (AMARYL) 2 MG tablet, Take 2 mg by mouth every morning (before breakfast)  atorvastatin (LIPITOR) 80 MG tablet, Take 1 tablet by mouth daily  aspirin (ASPIRIN CHILDRENS) 81 MG chewable tablet, Take 1 tablet by mouth daily  apixaban (ELIQUIS) 5 MG TABS tablet, Take 1 tablet by mouth 2 times daily  pregabalin (LYRICA) 75 MG capsule, Take 1 capsule by mouth daily for 30 days.  Max Daily Amount: 75 mg  linagliptin (TRADJENTA) 5 MG tablet, Take 1 tablet by mouth daily  amLODIPine (NORVASC) 5 MG tablet, Take 1 tablet by mouth daily  sucroferric oxyhydroxide (VELPHORO) 500 MG CHEW chewable tablet, Take 2 tablets by mouth 3 times daily (with meals) (Patient not taking: Reported on 3/9/2023)  Multiple Vitamins-Minerals (RENAPLEX) TABS, Take 1 tablet by mouth daily  polyethyl glycol-propyl glycol 0.4-0.3 % (SYSTANE) 0.4-0.3 % ophthalmic solution, Place 1 drop into both eyes as needed for Dry Eyes  traZODone (DESYREL) 50 MG tablet, Take 1 tablet by mouth nightly as needed for Sleep  cloNIDine (CATAPRES) 0.1 MG tablet, Take 3 tablets by mouth 2 times daily  minoxidil (LONITEN) 2.5 MG tablet, Take 1 tablet by mouth 2 times daily    Current Medications:     Scheduled Meds:    insulin lispro  0-4 Units SubCUTAneous TID WC    insulin lispro  0-4 Units SubCUTAneous Nightly    cefepime  1,000 mg IntraVENous Q24H    vancomycin (VANCOCIN) intermittent dosing (placeholder)   Other RX Placeholder    apixaban  5 mg Oral BID    sodium chloride flush  5-40 mL IntraVENous 2 times per day    fentanNYL  50 mcg IntraVENous Once    amLODIPine  5 mg Oral Daily    aspirin  81 mg Oral Daily    atorvastatin  80 mg Oral Daily    cloNIDine  0.3 mg Oral BID    [Held by provider] glipiZIDE  2.5 mg Oral QAM AC    lisinopril  5 mg Oral Daily    minoxidil  2.5 mg Oral BID    pregabalin  75 mg Oral Daily    calcium acetate  667 mg Oral TID      Continuous Infusions:    dextrose 5 % and 0.9 % NaCl 50 mL/hr at 03/10/23 2252    sodium chloride      dextrose       PRN Meds:  sodium chloride flush, sodium chloride, diphenhydrAMINE, oxyCODONE-acetaminophen, polyethyl glycol-propyl glycol 0.4-0.3 %, traZODone, glucose, dextrose bolus **OR** dextrose bolus, glucagon (rDNA), dextrose, HYDROmorphone, anticoagulant sodium citrate, anticoagulant sodium citrate    Input/Output:       I/O last 3 completed shifts: In: 1000   Out: 2972 .     Patient Vitals for the past 96 hrs (Last 3 readings):   Weight   03/10/23 1939 205 lb 7.5 oz (93.2 kg)   03/10/23 1547 209 lb 14.1 oz (95.2 kg)   03/10/23 0654 210 lb (95.3 kg)       Vital Signs:   Temperature:  Temp: 99.7 °F (37.6 °C)  TMax:   Temp (24hrs), Av °F (37.2 °C), Min:98.2 °F (36.8 °C), Max:99.7 °F (37.6 °C)    Respirations:  Resp: 16  Pulse:   Heart Rate: 79  BP:    BP: 126/75  BP Range: Systolic (28QET), QGJ:800 , Min:105 , XBV:633       Diastolic (67RLS), XIX:09, Min:58, Max:99      Physical Examination:     General:  AAO x 3, speaking in full sentences, no accessory muscle use. Eyes:   Pupils equal, round and reactive to light, EOMI. Neck:   No JVD, no thyromegaly, no lymphadenopathy. Chest:              Bilateral vesicular breath sounds, no rales or wheezes. Cardiac:  S1 S2 RR, no murmurs, gallops or rubs, JVP not raised. Abdomen: Soft, non-tender, no masses or organomegaly, BS audible. :   No suprapubic or flank tenderness. Neuro:              AAO x 3, No FND. SKIN:  No rashes, good skin turgor.   Extremities:  B AKA  Tunnel cath    Labs:       Recent Labs     03/09/23  1006 03/10/23  0529 03/11/23  0506   WBC 10.5 10.6 11.6*   RBC 4.42 4.43 4.73   HGB 10.0* 9.9* 10.7*   HCT 32.5* 32.7* 34.8*   MCV 73.5* 73.8* 73.6*   MCH 22.6* 22.3* 22.6*   MCHC 30.8 30.3 30.7   RDW 19.6* 19.5* 19.5*    163 170   MPV Abnormal 10.2 Abnormal      BMP:   Recent Labs     03/09/23  1006 03/10/23  0529 03/10/23  2232 03/11/23  0506   * 135  --  131*   K 4.9 5.6*  --  5.3   CL 86* 98  --  94*   CO2 24 22  --  23   BUN 55* 58*  --  35*   CREATININE 9.21* 9.47*  --  6.52*   GLUCOSE 110* 33* 37* 80   CALCIUM 9.7 10.0  --  9.8      Phosphorus:     Recent Labs     03/09/23  1006   PHOS 6.1*     Magnesium:    Recent Labs     03/09/23  1006   MG 2.4     Albumin:    Recent Labs     03/10/23  0529 03/11/23  0506   LABALBU 3.5 3.6     BNP:      Lab Results   Component Value Date/Time    BNP 3,088 06/13/2013 06:32 AM     SPEP:  Lab Results   Component Value Date/Time    PROT 7.7 03/11/2023 05:06 AM     Hep BsAg:         Lab Results   Component Value Date/Time    HEPBSAG NONREACTIVE 11/13/2022 12:55 PM     Hep C AB:          Lab Results   Component Value Date/Time HEPCAB NONREACTIVE 09/28/2021 03:15 PM       Urinalysis/Chemistries:      Lab Results   Component Value Date/Time    NITRU NEGATIVE 06/13/2013 10:30 AM    COLORU YELLOW 06/13/2013 10:30 AM    PHUR 7.5 06/13/2013 10:30 AM    WBCUA 0 TO 2 06/13/2013 10:30 AM    RBCUA 10 TO 20 06/13/2013 10:30 AM    MUCUS NOT REPORTED 06/13/2013 10:30 AM    TRICHOMONAS NOT REPORTED 06/13/2013 10:30 AM    YEAST NOT REPORTED 06/13/2013 10:30 AM    BACTERIA FEW 06/13/2013 10:30 AM    SPECGRAV 1.015 06/13/2013 10:30 AM    LEUKOCYTESUR NEGATIVE 06/13/2013 10:30 AM    UROBILINOGEN Normal 06/13/2013 10:30 AM    BILIRUBINUR NEGATIVE 06/13/2013 10:30 AM    GLUCOSEU 3+ 06/13/2013 10:30 AM    KETUA NEGATIVE 06/13/2013 10:30 AM    AMORPHOUS NOT REPORTED 06/13/2013 10:30 AM       Radiology:     CXR: No recent    Assessment:     1. ESRD and typically on a Monday and Wednesday and Friday hemodialysis schedule at [facility] under [facility], although not able to tolerate complete treatments recently secondary to severe penile pain  2. HTN  3. Penile pain, likely ischemic in nature, with recent dorsal slit and biopsy of glans penis about penile mass a couple of weeks ago  4. Clonus and asterixis along with hyperkalemia and hyperphosphatemia secondary to inability to tolerate his usual complete dialysis treatments resulting in under dialysis and a degree of uremia  5. Hyperkalemia secondary to poor dialysis  6. Hyperphosphatemia  7. Anemia of chronic disease  8. Secondary hyperparathyroidism       Plan:   1. Will give one dose of Lokelma for hyperkalemia today. If potassium remains elevated tomorrow, then will do HD  2. Antihypertensives per usual dosing  3. Daily Labs  4. Stop IV fluids  5. Will continue to follow    Nutrition   Please ensure that patient is on a renal diet/TF. Avoid nephrotoxic drugs/contrast exposure. We will continue to follow along with you.      Juan Zepeda CNP

## 2023-03-11 NOTE — PROGRESS NOTES
[address] [facility] Pharmacokinetic Monitoring Service - Vancomycin    Lane Suresh is a 60 y.o. male starting on vancomycin therapy for SSTI. Pharmacy consulted by Dr. Gillian Rooney for monitoring and adjustment. Day of therapy: 3    Target Concentration: Pre-Dialysis Concentration 15-20 mg/L  Additional Antimicrobials: cefepime     Pertinent Laboratory Values: Wt Readings from Last 1 Encounters:   03/10/23 210 lb (95.3 kg)     Temp Readings from Last 1 Encounters:   03/10/23 99.1 °F (37.3 °C) (Oral)   2  Recent Labs     03/09/23  1006 03/10/23  0529   CREATININE 9.21* 9.47*   WBC 10.5 10.6     Procalcitonin: none    Pertinent Cultures:  Culture Date Source Results   3/9 wound Pending    MRSA Nasal Swab: N/A. Non-respiratory infection. Plan:  Concentration-guided dosing due to renal impairment and intermittent hemodialysis   Start vancomycin 2500mg x1, then vancomycin 1000mg after dialysis on Friday.  (Dialysis schedule mwf)  Vancomycin concentration ordered for 3/13/23 @ 0600, prior to HD session   Pharmacy will continue to monitor patient and adjust therapy as indicated    Thank you for the consult,  Marilyn Ulrich [facility]  3/11/2023 7:29 AM

## 2023-03-11 NOTE — PLAN OF CARE
Problem: Safety - Adult  Goal: Free from fall injury  3/11/2023 0332 by Rai Alejo RN  Outcome: Progressing  3/10/2023 1827 by Nima Nava RN  Outcome: Progressing     Problem: Discharge Planning  Goal: Discharge to home or other facility with appropriate resources  Outcome: Progressing  Flowsheets (Taken 3/10/2023 2000)  Discharge to home or other facility with appropriate resources:   Identify barriers to discharge with patient and caregiver   Arrange for needed discharge resources and transportation as appropriate   Identify discharge learning needs (meds, wound care, etc)     Problem: Chronic Conditions and Co-morbidities  Goal: Patient's chronic conditions and co-morbidity symptoms are monitored and maintained or improved  3/11/2023 0332 by Rai Alejo RN  Outcome: Progressing  Flowsheets (Taken 3/10/2023 2000)  Care Plan - Patient's Chronic Conditions and Co-Morbidity Symptoms are Monitored and Maintained or Improved:   Monitor and assess patient's chronic conditions and comorbid symptoms for stability, deterioration, or improvement   Collaborate with multidisciplinary team to address chronic and comorbid conditions and prevent exacerbation or deterioration   Update acute care plan with appropriate goals if chronic or comorbid symptoms are exacerbated and prevent overall improvement and discharge  3/10/2023 1827 by Nima Nava RN  Outcome: Progressing     Problem: Pain  Goal: Verbalizes/displays adequate comfort level or baseline comfort level  3/11/2023 0332 by Rai Alejo RN  Outcome: Progressing  3/10/2023 1827 by Nima Nava RN  Outcome: Progressing     Problem: Skin/Tissue Integrity  Goal: Absence of new skin breakdown  Description: 1. Monitor for areas of redness and/or skin breakdown  2. Assess vascular access sites hourly  3. Every 4-6 hours minimum:  Change oxygen saturation probe site  4.   Every 4-6 hours:  If on nasal continuous positive airway pressure, respiratory therapy assess nares and determine need for appliance change or resting period.   Outcome: Progressing

## 2023-03-11 NOTE — PROGRESS NOTES
HEMODIALYSIS POST TREATMENT NOTE    Treatment time ordered: 3.5 Hours    Actual treatment time: 3.5 Hours    UltraFiltration Goal: 2000 ml  UltraFiltration Removed: 2000 ml      Pre Treatment weight: 95.2 kg  Post Treatment weight: 93.2 kg  Estimated Dry Weight: 93 kg    Access used:     Central Venous Catheter:          Tunneled or Non-tunneled: Tunneled           Site: Left chest wall          Access Flow: Good  Arterial lumen clamp broken. Plastic blue hemo clip in place to secure pig tail. Primary RN Jaqueline informed. Internal Access:       AV Fistula or AV Graft:          Site:        Access Flow:        Sign and symptoms of infection:        If YES:     Medications or blood products given: None    Chronic outpatient schedule: [facility]    Chronic outpatient unit: [facility]    Summary of response to treatment: Treatment ran uneventful. Blood returned without complications. Patient tolerated well. Target goal met without need for BP support interventions. Patient awake with vitals stable post blood return. Access flow ran well. No concerns to report. Explain if orders NOT met, was physician notified:      Manuel Flores faxed to patient unit/ placed in patient chart: Yes    Post assessment completed: Yes    Report given to: Neetu Piedra RN      * Intra-treatment documented Safety Checks include the followin) Access and face visible at all times. 2) All connections and blood lines are secure with no kinks. 3) NVL alarm engaged. 4) Hemosafe device applied (if applicable). 5) No collapse of Arterial or Venous blood chambers. 6) All blood lines / pump segments in the air detectors.

## 2023-03-11 NOTE — PROGRESS NOTES
Pt's BS 46. Pt is alert, but responses are delayed. Writer currently administering dextrose bolus 10% 125 mL/hr, rate of 937.5 mL/hr. Will continue to monitor pt.  Physician notified

## 2023-03-11 NOTE — PROGRESS NOTES
Pt back in room from HD. 2L off, as reported by dialysis nurse. Call light within reach. Pt states he is comfortable.  Writer to monitor pt and continue with care plan

## 2023-03-11 NOTE — PROGRESS NOTES
Progress Note    Subjective: Infection/necrosis of foreskin and tip of the penis  Hypoglycemia with underlying history of diabetes mellitus  ESRD  The patient is awake and alert. No problems overnight. Denies chest pain, angina, and dyspnea. Denies abdominal pain. Tolerating diet. No nausea or vomiting. Admit Date:  3/9/2023    Patient Seen, Chart, Labs, Radiology studies, and Consults reviewed. Objective:   /75   Pulse 79   Temp 99.7 °F (37.6 °C) (Oral)   Resp 16   Ht 5' 11\" (1.803 m)   Wt 205 lb 7.5 oz (93.2 kg)   SpO2 97%   BMI 28.66 kg/m²     Intake/Output Summary (Last 24 hours) at 3/11/2023 1304  Last data filed at 3/10/2023 1939  Gross per 24 hour   Intake 500 ml   Output 2500 ml   Net -2000 ml     General appearance - alert, well appearing, and in no distress and oriented to person, place, and time  Heart:  RRR, no murmurs, gallops, or rubs, extrasystoles. Lungs:  CTA bilaterally, no wheeze, rales or rhonchi, good air entry, good respiratory effort  Abd: bowel sounds present, nontender, nondistended, no masses, no rigidity, no guarding, no peritoneal signs.   Integumentary: Skin good color, good turgor, no rashes, no acute lesions  Upper Extrem:  No clubbing bilateral hands, no cyanosis or edema  Lower Extrem:no cyanosis or edema, no calf tenderness to lower extremities  Neurological - alert, oriented, normal speech, no focal findings or movement disorder noted, neck supple without rigidity, motor and sensory grossly normal bilaterally      Medications:    insulin lispro  0-4 Units SubCUTAneous TID     insulin lispro  0-4 Units SubCUTAneous Nightly    cefepime  1,000 mg IntraVENous Q24H    vancomycin (VANCOCIN) intermittent dosing (placeholder)   Other RX Placeholder    apixaban  5 mg Oral BID    sodium chloride flush  5-40 mL IntraVENous 2 times per day    fentanNYL  50 mcg IntraVENous Once    amLODIPine  5 mg Oral Daily    aspirin  81 mg Oral Daily    atorvastatin  80 mg Oral Daily    cloNIDine  0.3 mg Oral BID    [Held by provider] glipiZIDE  2.5 mg Oral QAM AC    lisinopril  5 mg Oral Daily    minoxidil  2.5 mg Oral BID    pregabalin  75 mg Oral Daily    calcium acetate  667 mg Oral TID WC       Data:  CBC:   Recent Labs     03/09/23  1006 03/10/23  0529 03/11/23  0506   WBC 10.5 10.6 11.6*   RBC 4.42 4.43 4.73   HGB 10.0* 9.9* 10.7*   HCT 32.5* 32.7* 34.8*   MCV 73.5* 73.8* 73.6*   RDW 19.6* 19.5* 19.5*    163 170     BMP:   Recent Labs     03/09/23  1006 03/10/23  0529 03/11/23  0506   * 135 131*   K 4.9 5.6* 5.3   CL 86* 98 94*   CO2 24 22 23   PHOS 6.1*  --   --    BUN 55* 58* 35*   CREATININE 9.21* 9.47* 6.52*     BNP: No results for input(s): BNP in the last 72 hours.  PT/INR:   Recent Labs     03/09/23  1006   PROTIME 18.9*   INR 1.6     APTT:   Recent Labs     03/09/23  1006   APTT 50.3*     CARDIAC ENZYMES: No results for input(s): CKMB, CKMBINDEX, TROPONINI in the last 72 hours.    Invalid input(s): CKTOTAL;3  FASTING LIPID PANEL:  Lab Results   Component Value Date    CHOL 96 09/28/2021    HDL 55 02/01/2022    TRIG 52 09/28/2021     LIVER PROFILE:   Recent Labs     03/10/23  0529 03/11/23  0506   AST 21 30   ALT 13 17   BILITOT 0.7 0.8   ALKPHOS 165* 190*        Assessment/Plan:  Principal Problem:    Wound infection  Resolved Problems:    * No resolved hospital problems. *  Patient with history of phimosis status post recent dorsal slit is presented with infection and necrosis of foreskin and tip of the penis.  He was reluctant to full circumcision yesterday however wished to proceed this morning.  This was discussed with RN that will be further be notified to .  She states that his pain has significantly improved.  He is on cefepime and vancomycin per infectious disease.  History of chronic pain syndrome.  Palliative care on board.  He is on morphine p.o.  Chronic mental health issue on antipsychotics  Significant however uneventful hypoglycemia overnight  that necessitated hypoglycemia protocol including D50, D5 water, Solu-Cortef. However he is responding appropriately to glucagon. Currently he is euglycemic. He was reluctant to eat yesterday however resume his regular feeds this morning. His oral hypoglycemics are on hold. ESRD hemodialysis dependent on 3/week. Nephrology on board  Anemia of chronic disease H&H is stable  Bilateral above-knee amputee  Plan of care discussed with nursing staff and the patient. Will follow along  This note is created with a voice recognition program and while intend to generate a document that accurately reflects the content of the visit, no guarantee can be provided that every mistake has been identified and corrected by editing.           Stu Sigala MD, MD 3/11/2023 1:04 PM

## 2023-03-11 NOTE — PROGRESS NOTES
[facility]   Urology Progress Note            Subjective: follow-up balanitis and phimosis    Patient Vitals for the past 24 hrs:   BP Temp Temp src Pulse Resp SpO2 Weight   03/10/23 2353 (!) 159/83 99.1 °F (37.3 °C) Axillary 83 16 (!) 89 % --   03/10/23 2016 (!) 174/97 98.4 °F (36.9 °C) Oral 93 16 93 % --   03/10/23 1939 (!) 178/99 98.2 °F (36.8 °C) -- 84 16 -- 205 lb 7.5 oz (93.2 kg)   03/10/23 1930 (!) 167/90 -- -- 80 -- -- --   03/10/23 1900 (!) 141/85 -- -- 88 -- -- --   03/10/23 1830 137/77 -- -- 80 -- -- --   03/10/23 1800 136/72 -- -- 76 -- -- --   03/10/23 1730 (!) 147/73 -- -- 82 -- -- --   03/10/23 1700 118/61 -- -- 65 -- -- --   03/10/23 1630 122/67 -- -- 92 -- -- --   03/10/23 1603 -- -- -- -- 16 -- --   03/10/23 1600 128/61 -- -- 83 -- -- --   03/10/23 1556 105/65 -- -- 87 -- -- --   03/10/23 1547 (!) 116/58 99.6 °F (37.6 °C) -- 91 16 -- 209 lb 14.1 oz (95.2 kg)   03/10/23 1527 135/70 99.1 °F (37.3 °C) Oral 86 16 -- --   03/10/23 1246 (!) 148/83 99.1 °F (37.3 °C) Oral 80 16 94 % --   03/10/23 0918 134/74 97.6 °F (36.4 °C) Temporal 82 16 92 % --   03/10/23 0705 -- -- -- -- 16 -- --   03/10/23 0654 -- -- -- -- -- -- 210 lb (95.3 kg)   03/10/23 0420 128/72 97.7 °F (36.5 °C) Axillary 70 16 94 % --       Intake/Output Summary (Last 24 hours) at 3/11/2023 0400  Last data filed at 3/10/2023 1939  Gross per 24 hour   Intake 500 ml   Output 2500 ml   Net -2000 ml       Recent Labs     03/09/23  1006 03/10/23  0529   WBC 10.5 10.6   HGB 10.0* 9.9*   HCT 32.5* 32.7*   MCV 73.5* 73.8*    163     Recent Labs     03/09/23  1006 03/10/23  0529   * 135   K 4.9 5.6*   CL 86* 98   CO2 24 22   PHOS 6.1*  --    BUN 55* 58*   CREATININE 9.21* 9.47*       No results for input(s): COLORU, PHUR, LABCAST, WBCUA, RBCUA, MUCUS, TRICHOMONAS, YEAST, BACTERIA, CLARITYU, SPECGRAV, LEUKOCYTESUR, UROBILINOGEN, BILIRUBINUR, BLOODU in the last 72 hours.     Invalid input(s): NITRATE, GLUCOSEUKETONESUAMORPHOUS    Additional Lab/culture results:    Physical Exam: patient alert sitting at bedside, patient seen in conjunction with nursing staff we discussed the circumcision at the present time he is hesitant    Interval Imaging Findings:    Impression:    Patient Active Problem List   Diagnosis    Hyperlipidemia    Essential hypertension    ESRD on hemodialysis ([facility] [address] MWF    Insomnia    Chronic bilateral low back pain with bilateral sciatica    Controlled diabetes mellitus type 2 with complications (HCC)    S/P bilateral below knee amputation ([facility] [address]    Long term (current) use of antithrombotics/antiplatelets    Chronic use of opiate drugs therapeutic purposes    Coronary artery disease involving native coronary artery    Anemia    Hypoglycemia    Inability to walk    Phimosis    Wound infection       Plan: we will await resolution of the penile swelling and interstitial edema and addressed a circumcision again    Rada Boxer, MD  4:00 AM 3/11/2023

## 2023-03-11 NOTE — PROGRESS NOTES
Pt had issues with his blood sugar throughout the shift. There have been issues with the glucometer transferring data. The pt's BS was 46 at 2017, 33 at 2217, 37 at 2232, 27 at 2242, 24 at 2328, 66 at 0018, and 50 at 0430. D/t the blood sugar being so low at 2242, the writer requested a stat lab draw. Lab's draw showed the same results. Writer in contact with physician throughout this. Pt received dextrose bolus 10% 125 mL at 2057. This did not increase pt's BS. Physician was notified. Orders were put in. Physician ordered 100 mg Solu-cortef injection and it was administered at 2254, as well as D5 and 0.9% NaCl 50 mL continuous infusion. Pt's BS did not increase, so pt received 1 mg glucagon injection at 2347, per hypoglycemic protocol. Pt was still alert throughout dropping sugars, but mentation was altered and responses were delayed. Once BS went up to 66, pt mentation went back to baseline. Once pt's BS began dropping again to 50, pt had orange juice with sugar, along with anna crackers and peanut butter. Pt's brief was changed several times. Pt remained restless.  Will continue with care plan

## 2023-03-11 NOTE — PROGRESS NOTES
[facility]  Progress Note    Breanna Murphy  MRN: [id number]  Date: 3/11/2023  LOS: 2     Reason for F/U :   Penile pain, surgical wound.    Impression :   Penile pain likely secondary to ischemic penile tissue   recent biopsy of the glans penis with coagulation necrosis of fibrovascular tissue with acute inflammation  Phimosis   s/p dorsal split of the foreskin 2/23/2022   Healing foreskin surgical wound with some soft tissue swelling  Culture with Proteus mirabilis isolated  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  Peripheral arterial disease status post bilateral below the knee amputation    Recommendations:   The patient does have a wound on the foreskin related to the recent surgery and there is some drainage there though no overt/major infection  There is a polymicrobial Gram stain culture with Proteus mirabilis isolated thus far  The patient is reluctant to undergo any further surgery but I do feel that the penile pain is related to ischemic disease rather than infection  The plan is to treat conservatively for now and from an infectious disease standpoint the patient should be okay to switch to oral antibiotics with levofloxacin 500 mg every 48 hours and doxycycline 100 mg twice a day  The patient is okay for discharge with continued local wound care but ultimately I do not feel that the penile pain is going to improve as I do not feel that this is related to infection.    Infection Control Recommendations:   Universal precautions    Discharge Planning:   Patient will need Midline Catheter Insertion/ PICC line Insertion: No  Patient will need: Home IV , Infusion Center,  SNF,  LTAC: Undetermined  Patient willneed outpatient wound care: Yes    Medical Decision making / Summary of Stay:   Breanna Murphy is a 62 y.o.-year-old male who was initially admitted on 3/9/2023. Breanna has multiple medical problems including diabetes mellitus type  2, hypertension, hyperlipidemia, coronary artery disease, chronic renal failure on hemodialysis , degenerative disc disease, erectile dysfunction, peripheral arterial disease status post bilateral below the knee amputations. The patient was previously admitted with severe penile pain was seen by urology for phimosis and also noted to have a mass on the glans penis for which he was taken to the operating room and had a dorsal slit of the foreskin and biopsy of the glans penis lesion on 2023. The surgical biopsy did show coagulation necrosis of fibrovascular tissue with acute inflammation. The patient was at hemodialysis as was sent into the emergency department by EMS from the dialysis center and he did not receive his dialysis treatment secondary to significant penile pain. Patient reports 10 out of 10 pain does not report any associated fevers or chills. The patient was evaluated and noted to have some penile discharge, penile swelling and concern was for penile abscess/cellulitis and was admitted started on antimicrobial therapy and I was asked to evaluate and help with antibiotic choice. Current evaluation:3/11/2023    /84   Pulse 91   Temp 99.3 °F (37.4 °C) (Oral)   Resp 16   Ht 5' 11\" (1.803 m)   Wt 205 lb 7.5 oz (93.2 kg)   SpO2 (!) 89%   BMI 28.66 kg/m²     Temperature Range: Temp: 99.3 °F (37.4 °C) Temp  Av °F (37.2 °C)  Min: 98.2 °F (36.8 °C)  Max: 99.6 °F (37.6 °C)  The patient is seen and evaluated at bedside and is awake and alert in no acute distress. He reports that the penile pain while there is overall improved as the pain control has been working. The patient did have episodes of hypoglycemia overnight with no clear etiology. Urology input noted and the patient is point in time his reluctance to undergo any further surgery and there is also concern that there will be poor wound healing.     Review of Systems   Constitutional: Negative. HENT: Negative. Respiratory: Negative. Cardiovascular: Negative. Gastrointestinal: Negative. Genitourinary:  Positive for penile pain. Musculoskeletal: Negative. Skin:  Positive for wound. Neurological: Negative. Psychiatric/Behavioral: Negative. Physical Examination :     Physical Exam  Constitutional:       Appearance: He is well-developed. HENT:      Head: Normocephalic and atraumatic. Cardiovascular:      Rate and Rhythm: Normal rate. Heart sounds: Normal heart sounds. No friction rub. No gallop. Pulmonary:      Effort: Pulmonary effort is normal.      Breath sounds: Normal breath sounds. No wheezing. Abdominal:      General: Bowel sounds are normal.      Palpations: Abdomen is soft. There is no mass. Tenderness: There is no abdominal tenderness. Genitourinary:     Comments: The patient does have some swelling of the foreskin and there is a wound along the dorsal aspect of the foreskin  The penile tissue cannot be seen very well due to the foreskin and there is some devitalized/dusky tissue seen deep. The area is tender to palpation  Musculoskeletal:      Cervical back: Normal range of motion and neck supple. Comments: Bilateral below the knee amputations. Lymphadenopathy:      Cervical: No cervical adenopathy. Skin:     General: Skin is warm and dry. Neurological:      Mental Status: He is alert and oriented to person, place, and time.        Laboratory data:   I have independently reviewed the followinglabs:  CBC with Differential:   Recent Labs     03/09/23  1006 03/10/23  0529 03/11/23  0506   WBC 10.5 10.6 11.6*   HGB 10.0* 9.9* 10.7*   HCT 32.5* 32.7* 34.8*    163 170   LYMPHOPCT 15*  --   --    MONOPCT 13*  --   --      BMP:   Recent Labs     03/09/23  1006 03/10/23  0529 03/11/23  0506   * 135 131*   K 4.9 5.6* 5.3   CL 86* 98 94*   CO2 24 22 23   BUN 55* 58* 35*   CREATININE 9.21* 9.47* 6.52*   MG 2.4  --   -- Hepatic Function Panel:   Recent Labs     03/10/23  0529 03/11/23  0506   PROT 7.5 7.7   LABALBU 3.5 3.6   BILITOT 0.7 0.8   ALKPHOS 165* 190*   ALT 13 17   AST 21 30         Lab Results   Component Value Date/Time    PROCAL 0.91 11/13/2022 12:45 AM     Lab Results   Component Value Date/Time    .4 09/07/2017 07:18 AM    .1 09/06/2017 01:46 PM     Lab Results   Component Value Date    SEDRATE 69 (H) 05/26/2022         Lab Results   Component Value Date/Time    DDIMER 1.77 07/21/2020 05:50 PM     No results found for: FERRITIN  No results found for: LDH  No results found for: FIBRINOGEN    No results found for requested labs within last 30 days. Lab Results   Component Value Date/Time    COVID19 Not Detected 11/13/2022 02:01 AM    COVID19 Not Detected 09/02/2022 02:25 AM    COVID19 Not Detected 01/04/2021 01:54 PM       No results for input(s): VANCOTROUGH in the last 72 hours. Imaging Studies:   No new imaging    Cultures:     Culture, Blood 1 [[id number]] Collected: 03/10/23 1516   Order Status: Completed Specimen: Blood Updated: 03/11/23 0541    Specimen Description . BLOOD    Special Requests LAC 20ML    Culture NO GROWTH 12 HOURS   Culture, Blood 1 [[id number]] Collected: 03/10/23 1516   Order Status: Completed Specimen: Blood Updated: 03/11/23 0541    Specimen Description . BLOOD    Special Requests LH 20ML    Culture NO GROWTH 12 HOURS     Culture, Anaerobic and Aerobic [[id number]] (Abnormal) Collected: 03/09/23 0955   Order Status: Completed Specimen: Wound Updated: 03/10/23 1530    Specimen Description . WOUND    Special Requests . PENIS    Direct Exam NO NEUTROPHILS SEEN     MODERATE GRAM POSITIVE COCCI IN PAIRS Abnormal      FEW GRAM POSITIVE RODS Abnormal      FEW GRAM NEGATIVE RODS Abnormal      RARE GRAM POSITIVE COCCI IN CLUSTERS Abnormal     Culture PROTEUS MIRABILIS LIGHT GROWTH Abnormal      Susceptibility    Proteus mirabilis (1)    Antibiotic Interpretation Microscan Method Status    ampicillin Sensitive <=2 BACTERIAL SUSCEPTIBILITY PANEL WOJCIECH Preliminary    ceFAZolin Sensitive <=4 BACTERIAL SUSCEPTIBILITY PANEL WOJCIECH Preliminary    cefTRIAXone Sensitive <=0.25 BACTERIAL SUSCEPTIBILITY PANEL WOJCIECH Preliminary    gentamicin Sensitive <=1 BACTERIAL SUSCEPTIBILITY PANEL WOJCIECH Preliminary    levofloxacin Sensitive <=0.12 BACTERIAL SUSCEPTIBILITY PANEL WOJCIECH Preliminary    piperacillin-tazobactam Sensitive <=4 BACTERIAL SUSCEPTIBILITY PANEL WOJCIECH Preliminary    tobramycin Sensitive <=1 BACTERIAL SUSCEPTIBILITY PANEL WOJCIECH Preliminary    trimethoprim-sulfamethoxazole Sensitive <=20 BACTERIAL SUSCEPTIBILITY PANEL WOJCIECH Preliminary          Specimen Collected: 03/09/23 09:55 EST Last Resulted: 03/11/23 10:52 EST        Blood Culture 1 [[id number]] Collected: 03/09/23 1005   Order Status: Completed Specimen: Blood Updated: 03/10/23 1136    Specimen Description . BLOOD    Special Requests LAC 13ML    Culture NO GROWTH 1 DAY   Culture, Blood 2 [[id number]] Collected: 03/09/23 1012   Order Status: Completed Specimen: Blood Updated: 03/10/23 1136    Specimen Description . BLOOD    Special Requests LFA 20ML    Culture NO GROWTH 1 DAY       Medications:      insulin lispro  0-4 Units SubCUTAneous TID WC    insulin lispro  0-4 Units SubCUTAneous Nightly    cefepime  1,000 mg IntraVENous Q24H    vancomycin (VANCOCIN) intermittent dosing (placeholder)   Other RX Placeholder    apixaban  5 mg Oral BID    sodium chloride flush  5-40 mL IntraVENous 2 times per day    fentanNYL  50 mcg IntraVENous Once    amLODIPine  5 mg Oral Daily    aspirin  81 mg Oral Daily    atorvastatin  80 mg Oral Daily    cloNIDine  0.3 mg Oral BID    [Held by provider] glipiZIDE  2.5 mg Oral QAM AC    lisinopril  5 mg Oral Daily    minoxidil  2.5 mg Oral BID    pregabalin  75 mg Oral Daily    calcium acetate  667 mg Oral TID WC       Electronically signed by Farida Da Silva MD on 3/11/2023 at 10:38 AM      [facility] [address] [place] [address] Down[place] anne-marie  [employer]  OFFICE: [phone]    Thank you for allowing us to participate in the care of this patient. Please call with questions. This note is created with the assistance of a speech recognition program.  While intending to generate a document that actually reflects the content of the visit, the document can still have some errors including those of syntax and sound a like substitutions which may escape proof reading. In such instances, actual meaning can be extrapolated by contextual diversion.

## 2023-03-11 NOTE — PLAN OF CARE
Patients pain seems to be doing better today, no pain medication was needed. Vitals are stable and blood sugar stabilized today. Topical cream was ordered to reduce swelling. Dr. Lon Kearney will reassess patients willingness to proceed with procedure, as he keeps changing his mind.    Problem: Safety - Adult  Goal: Free from fall injury  3/11/2023 1528 by Yamile Sarabia RN  Outcome: Progressing     Problem: Chronic Conditions and Co-morbidities  Goal: Patient's chronic conditions and co-morbidity symptoms are monitored and maintained or improved  3/11/2023 1528 by Yamile Sarabia RN  Outcome: Progressing     Problem: Pain  Goal: Verbalizes/displays adequate comfort level or baseline comfort level  3/11/2023 1528 by Yamile Sarabia RN  Outcome: Progressing

## 2023-03-11 NOTE — PROGRESS NOTES
Physical Therapy  Facility/Department: [facility] PROGRESSIVE CARE  Physical Therapy Progress note    Name: Inocencio Conteh  : 1960  MRN: [id number]  Date of Service: 3/11/2023    Discharge Recommendations:  Patient would benefit from continued therapy after discharge          Patient Diagnosis(es): The primary encounter diagnosis was Penile pain. A diagnosis of Wound infection was also pertinent to this visit. Past Medical History:  has a past medical history of Acute congestive heart failure (HCC), Chronic bilateral low back pain with bilateral sciatica, Coronary artery disease involving native coronary artery, CRF (chronic renal failure), DDD (degenerative disc disease), lumbar, Diabetes mellitus ([facility] [address] Dialysis patient [facility]), ED (erectile dysfunction), History of echocardiogram, HTN (hypertension), Hyperlipidemia, LVH (left ventricular hypertrophy), PVD (peripheral vascular disease) ([facility] [address] S/P bilateral BKA (below knee amputation) [facility] and Wears glasses. Past Surgical History:  has a past surgical history that includes Glaucoma surgery; Cataract removal with implant; Leg amputation below knee (Bilateral); Tunneled venous catheter placement; Dialysis fistula creation (Bilateral); Finger surgery (Left); Finger amputation; Arm Surgery; and Circumcision (N/A, 2023). Assessment   Body Structures, Functions, Activity Limitations Requiring Skilled Therapeutic Intervention: Decreased functional mobility ; Decreased ADL status; Decreased strength;Decreased safe awareness;Decreased cognition;Decreased posture;Decreased balance    Assessment: Pt with increased cognitive deficits and decreased alertness this am. Pt was able to improve by end of treatment but patient unable initially to follow commands and was resistive with rolling and movements. Pt progressed from mod sitting balance to SBA but continuously needed supervised and physically assist to eat.  End of treatment patient still wanted to finish eating and JEAN Lopez came to finish with patient eating breakfast.    Specific Instructions for Next Treatment: lateral transfers if patient cognition improved and patient can follow commands. Therapy Prognosis: Fair  Activity Tolerance  Activity Tolerance: Treatment limited secondary to decreased cognition  Activity Tolerance Comments: pt with increased confusion     Plan   Physcial Therapy Plan  General Plan: 5-7 times per week  Specific Instructions for Next Treatment: lateral transfers if patient cognition improved and patient can follow commands. Current Treatment Recommendations: Strengthening, Balance training, Transfer training, Endurance training, Neuromuscular re-education, Safety education & training, Equipment evaluation, education, & procurement, Therapeutic activities, Positioning  Safety Devices  Type of Devices: All fall risk precautions in place, Bed alarm in place, Call light within reach, Left in bed, Nurse notified     Restrictions  Restrictions/Precautions  Restrictions/Precautions: General Precautions  Required Braces or Orthoses?: No  Position Activity Restriction  Other position/activity restrictions: up with assist, left chest port.  RUE AV fistula, left UE IV     Subjective   General  Chart Reviewed: Yes  Patient assessed for rehabilitation services?: Yes  Additional Pertinent Hx: HD, CHF, DM, Bilateral BKA  Response To Previous Treatment: Not applicable  Family / Caregiver Present: No  Diagnosis: Penile wound infection  Follows Commands: Impaired  Other (Comment): Delayed directions following  General Comment  Comments: Ok for reeval this date per RN  Subjective  Subjective: Pt initially unable to open his eyes; once sitting up patient alertness improved and patient able to respond     Social/Functional History  Social/Functional History  Lives With: Other (comment)  Type of Home: Facility  Home Layout: One level  Ambulation Assistance:  (pt reported he hasnt walked in a couple of years)  Transfer Assistance: Independent  Active : No  Patient's  Info: son  Occupation: On disability  Type of Occupation:   Leisure & Hobbies: Fishing  Additional Comments: On recent admission, patient reported he lived alone in a house and up until recently was using prosthesis to amb but is no longer able due to LE edema. Admission 9/2022: indep bed mobility, max x 2 to stand with prosthesis, admission 2/2023 indep bed mobility, min lateral seated transfer. Not able to determin last time patient was living at home. Poor historian, reports he was indep getting from bed to w/c at facility. Vision/Hearing  Vision  Vision: Impaired  Hearing  Hearing: Within functional limits    Cognition   Orientation  Orientation Level: Oriented to time;Oriented to person;Disoriented to place; Disoriented to situation     Objective   Heart Rate: 91  Heart Rate Source: Monitor  BP: 137/84  BP Location: Left upper arm  Patient Position: Supine  MAP (Calculated): 102  Resp: 16  SpO2: (!) 89 %  O2 Device: None (Room air)     Observation/Palpation  Posture: Fair  Observation: pt with decreased alertness initially; confused comments and responses;      AROM RLE (degrees)  RLE General AROM: Hip and knee WFL; N/a - bka  AROM LLE (degrees)  LLE General AROM: Hip and knee WFL; n/a ankle Bka  Strength RLE  Comment: hip and knee 4-/5  Strength LLE  Comment: hip and knee 4-/5     Bed mobility  Rolling to Left: Moderate assistance;2 Person assistance  Rolling to Right: Moderate assistance;2 Person assistance  Supine to Sit: Moderate assistance;2 Person assistance  Sit to Supine: Moderate assistance;2 Person assistance  Dangle bedside x ~ 15 minutes mod -> progressed to SBA +1  Verbal cues for assist; tactile cueing attempted -> progressed to max manual assist as patient unable to follow commands. Sensory stimulation and re-orientation provided.      Transfers  Sit to Stand: Dependent/Total  Ambulation  Assistance: Dependent/Total     Balance  Posture: Fair  Sitting - Static: Good  Sitting - Dynamic: Fair;+    Co-treatment with OT warranted secondary to decreased safety and independence requiring 2 skilled therapy professionals to address individual discipline's goals. PT addressing weight shifting prior to transfers and postural control in sitting. AM-PAC Score 9/24     Goals  Short Term Goals  Time Frame for Short Term Goals: 12 visits  Short Term Goal 1: Patient will be indep with bed mobility. Short Term Goal 2: Patient will tolerate 30 minutes of ther-ex and ther-act. Short Term Goal 3: Patient will be assessed for transfers as appropriate. Short Term Goal 4: Patient will be positioned to prevent skin breakdown. Patient Goals   Patient Goals : pt goal is to get back to his home.        Education - re-orienation; mobility      Therapy Time   Individual   Time In 0821   Time Out 0853   Minutes 32      JEANNE SALGADO, PT

## 2023-03-12 LAB
ALBUMIN SERPL-MCNC: 3.1 G/DL (ref 3.5–5.2)
ALP SERPL-CCNC: 169 U/L (ref 40–129)
ALT SERPL-CCNC: 16 U/L (ref 5–41)
ANION GAP SERPL CALCULATED.3IONS-SCNC: 13 MMOL/L (ref 9–17)
AST SERPL-CCNC: 24 U/L
BILIRUB SERPL-MCNC: 0.5 MG/DL (ref 0.3–1.2)
BUN SERPL-MCNC: 51 MG/DL (ref 8–23)
BUN/CREAT BLD: 7 (ref 9–20)
CALCIUM SERPL-MCNC: 9.1 MG/DL (ref 8.6–10.4)
CHLORIDE SERPL-SCNC: 93 MMOL/L (ref 98–107)
CO2 SERPL-SCNC: 23 MMOL/L (ref 20–31)
CREAT SERPL-MCNC: 7.42 MG/DL (ref 0.7–1.2)
GFR SERPL CREATININE-BSD FRML MDRD: 8 ML/MIN/1.73M2
GLUCOSE BLD-MCNC: 205 MG/DL (ref 75–110)
GLUCOSE BLD-MCNC: 209 MG/DL (ref 75–110)
GLUCOSE BLD-MCNC: 231 MG/DL (ref 75–110)
GLUCOSE BLD-MCNC: 235 MG/DL (ref 75–110)
GLUCOSE BLD-MCNC: 259 MG/DL (ref 75–110)
GLUCOSE SERPL-MCNC: 278 MG/DL (ref 70–99)
HCT VFR BLD AUTO: 29.7 % (ref 40.7–50.3)
HGB BLD-MCNC: 9.1 G/DL (ref 13–17)
MCH RBC QN AUTO: 22.6 PG (ref 25.2–33.5)
MCHC RBC AUTO-ENTMCNC: 30.6 G/DL (ref 28.4–34.8)
MCV RBC AUTO: 73.7 FL (ref 82.6–102.9)
MICROORGANISM SPEC CULT: ABNORMAL
MICROORGANISM/AGENT SPEC: ABNORMAL
NRBC AUTOMATED: 0 PER 100 WBC
PDW BLD-RTO: 19.4 % (ref 11.8–14.4)
PLATELET # BLD AUTO: 164 K/UL (ref 138–453)
PMV BLD AUTO: 11.3 FL (ref 8.1–13.5)
POTASSIUM SERPL-SCNC: 5 MMOL/L (ref 3.7–5.3)
PROT SERPL-MCNC: 6.5 G/DL (ref 6.4–8.3)
RBC # BLD: 4.03 M/UL (ref 4.21–5.77)
SERVICE CMNT-IMP: ABNORMAL
SODIUM SERPL-SCNC: 129 MMOL/L (ref 135–144)
SPECIMEN DESCRIPTION: ABNORMAL
WBC # BLD AUTO: 7.5 K/UL (ref 3.5–11.3)

## 2023-03-12 PROCEDURE — 6370000000 HC RX 637 (ALT 250 FOR IP): Performed by: FAMILY MEDICINE

## 2023-03-12 PROCEDURE — 6370000000 HC RX 637 (ALT 250 FOR IP): Performed by: NURSE PRACTITIONER

## 2023-03-12 PROCEDURE — 6370000000 HC RX 637 (ALT 250 FOR IP): Performed by: INTERNAL MEDICINE

## 2023-03-12 PROCEDURE — 99232 SBSQ HOSP IP/OBS MODERATE 35: CPT | Performed by: FAMILY MEDICINE

## 2023-03-12 PROCEDURE — 36415 COLL VENOUS BLD VENIPUNCTURE: CPT

## 2023-03-12 PROCEDURE — 2580000003 HC RX 258: Performed by: NURSE PRACTITIONER

## 2023-03-12 PROCEDURE — 80053 COMPREHEN METABOLIC PANEL: CPT

## 2023-03-12 PROCEDURE — 2500000003 HC RX 250 WO HCPCS: Performed by: FAMILY MEDICINE

## 2023-03-12 PROCEDURE — 2060000000 HC ICU INTERMEDIATE R&B

## 2023-03-12 PROCEDURE — 85027 COMPLETE CBC AUTOMATED: CPT

## 2023-03-12 RX ORDER — DOXYCYCLINE 100 MG/1
100 CAPSULE ORAL EVERY 12 HOURS SCHEDULED
Status: DISCONTINUED | OUTPATIENT
Start: 2023-03-12 | End: 2023-03-12

## 2023-03-12 RX ORDER — LEVOFLOXACIN 500 MG/1
500 TABLET, FILM COATED ORAL EVERY OTHER DAY
Status: DISCONTINUED | OUTPATIENT
Start: 2023-03-14 | End: 2023-03-15 | Stop reason: HOSPADM

## 2023-03-12 RX ORDER — LEVOFLOXACIN 500 MG/1
500 TABLET, FILM COATED ORAL EVERY OTHER DAY
Status: DISCONTINUED | OUTPATIENT
Start: 2023-03-12 | End: 2023-03-12

## 2023-03-12 RX ORDER — DOXYCYCLINE 100 MG/1
100 CAPSULE ORAL EVERY 12 HOURS SCHEDULED
Status: DISCONTINUED | OUTPATIENT
Start: 2023-03-12 | End: 2023-03-15 | Stop reason: HOSPADM

## 2023-03-12 RX ADMIN — INSULIN LISPRO 1 UNITS: 100 INJECTION, SOLUTION INTRAVENOUS; SUBCUTANEOUS at 10:12

## 2023-03-12 RX ADMIN — CALCIUM ACETATE 667 MG: 667 CAPSULE ORAL at 10:13

## 2023-03-12 RX ADMIN — SODIUM CHLORIDE, PRESERVATIVE FREE 10 ML: 5 INJECTION INTRAVENOUS at 09:55

## 2023-03-12 RX ADMIN — MINOXIDIL 2.5 MG: 2.5 TABLET ORAL at 09:47

## 2023-03-12 RX ADMIN — APIXABAN 5 MG: 5 TABLET, FILM COATED ORAL at 22:17

## 2023-03-12 RX ADMIN — CLONIDINE HYDROCHLORIDE 0.3 MG: 0.3 TABLET ORAL at 09:52

## 2023-03-12 RX ADMIN — CLONIDINE HYDROCHLORIDE 0.3 MG: 0.3 TABLET ORAL at 22:17

## 2023-03-12 RX ADMIN — APIXABAN 5 MG: 5 TABLET, FILM COATED ORAL at 09:47

## 2023-03-12 RX ADMIN — SODIUM CHLORIDE, PRESERVATIVE FREE 10 ML: 5 INJECTION INTRAVENOUS at 22:17

## 2023-03-12 RX ADMIN — CLOTRIMAZOLE AND BETAMETHASONE DIPROPIONATE: 10; .5 CREAM TOPICAL at 09:47

## 2023-03-12 RX ADMIN — DOXYCYCLINE 100 MG: 100 CAPSULE ORAL at 22:17

## 2023-03-12 RX ADMIN — CLOTRIMAZOLE AND BETAMETHASONE DIPROPIONATE: 10; .5 CREAM TOPICAL at 23:13

## 2023-03-12 RX ADMIN — LISINOPRIL 5 MG: 5 TABLET ORAL at 09:47

## 2023-03-12 RX ADMIN — TRAZODONE HYDROCHLORIDE 50 MG: 50 TABLET ORAL at 23:13

## 2023-03-12 RX ADMIN — INSULIN LISPRO 1 UNITS: 100 INJECTION, SOLUTION INTRAVENOUS; SUBCUTANEOUS at 13:01

## 2023-03-12 RX ADMIN — CALCIUM ACETATE 667 MG: 667 CAPSULE ORAL at 13:01

## 2023-03-12 RX ADMIN — ASPIRIN 81 MG CHEWABLE TABLET 81 MG: 81 TABLET CHEWABLE at 09:47

## 2023-03-12 RX ADMIN — PREGABALIN 75 MG: 75 CAPSULE ORAL at 09:47

## 2023-03-12 RX ADMIN — AMLODIPINE BESYLATE 5 MG: 5 TABLET ORAL at 09:47

## 2023-03-12 RX ADMIN — MINOXIDIL 2.5 MG: 2.5 TABLET ORAL at 22:17

## 2023-03-12 RX ADMIN — INSULIN LISPRO 1 UNITS: 100 INJECTION, SOLUTION INTRAVENOUS; SUBCUTANEOUS at 18:31

## 2023-03-12 RX ADMIN — CALCIUM ACETATE 667 MG: 667 CAPSULE ORAL at 18:30

## 2023-03-12 RX ADMIN — SODIUM ZIRCONIUM CYCLOSILICATE 10 G: 10 POWDER, FOR SUSPENSION ORAL at 14:53

## 2023-03-12 RX ADMIN — ATORVASTATIN CALCIUM 80 MG: 80 TABLET, FILM COATED ORAL at 22:17

## 2023-03-12 NOTE — PLAN OF CARE
Problem: Safety - Adult  Goal: Free from fall injury  3/12/2023 0440 by Priya Mahmood RN  Outcome: Progressing  3/11/2023 1528 by Rohan Ventura RN  Outcome: Progressing     Problem: Discharge Planning  Goal: Discharge to home or other facility with appropriate resources  Outcome: Progressing  Flowsheets (Taken 3/11/2023 2000)  Discharge to home or other facility with appropriate resources:   Identify barriers to discharge with patient and caregiver   Arrange for needed discharge resources and transportation as appropriate   Identify discharge learning needs (meds, wound care, etc)     Problem: Chronic Conditions and Co-morbidities  Goal: Patient's chronic conditions and co-morbidity symptoms are monitored and maintained or improved  3/12/2023 0440 by Priya Mahmood RN  Outcome: Progressing  Flowsheets (Taken 3/11/2023 2000)  Care Plan - Patient's Chronic Conditions and Co-Morbidity Symptoms are Monitored and Maintained or Improved:   Monitor and assess patient's chronic conditions and comorbid symptoms for stability, deterioration, or improvement   Collaborate with multidisciplinary team to address chronic and comorbid conditions and prevent exacerbation or deterioration   Update acute care plan with appropriate goals if chronic or comorbid symptoms are exacerbated and prevent overall improvement and discharge  3/11/2023 1528 by Rohan Ventura RN  Outcome: Progressing     Problem: Pain  Goal: Verbalizes/displays adequate comfort level or baseline comfort level  3/12/2023 0440 by Priya Mahmood RN  Outcome: Progressing  3/11/2023 1528 by Rohan Ventura RN  Outcome: Progressing     Problem: Skin/Tissue Integrity  Goal: Absence of new skin breakdown  Description: 1. Monitor for areas of redness and/or skin breakdown  2. Assess vascular access sites hourly  3. Every 4-6 hours minimum:  Change oxygen saturation probe site  4.   Every 4-6 hours:  If on nasal continuous positive airway pressure, respiratory therapy assess nares and determine need for appliance change or resting period.   Outcome: Progressing

## 2023-03-12 NOTE — PROGRESS NOTES
Progress Note    Subjective: Follow-up on penile infection  ESRD  Hypertension parathyroidism  The patient is awake and alert. No problems overnight. Denies chest pain, angina, and dyspnea. Denies abdominal pain. Tolerating diet. No nausea or vomiting. Admit Date:  3/9/2023    Patient Seen, Chart, Labs, Radiology studies, and Consults reviewed. Objective:   /69   Pulse 74   Temp 98.6 °F (37 °C)   Resp 16   Ht 5' 11\" (1.803 m)   Wt 205 lb 7.5 oz (93.2 kg)   SpO2 97%   BMI 28.66 kg/m²   No intake or output data in the 24 hours ending 03/12/23 1337  General appearance - alert, well appearing, and in no distress and oriented to person, place, and time  Heart:  RRR, no murmurs, gallops, or rubs, extrasystoles. Lungs:  CTA bilaterally, no wheeze, rales or rhonchi, good air entry, good respiratory effort  Abd: bowel sounds present, nontender, nondistended, no masses, no rigidity, no guarding, no peritoneal signs.   Integumentary: Skin good color, good turgor, no rashes, no acute lesions  Upper Extrem:  No clubbing bilateral hands, no cyanosis or edema  Lower Extrem:no cyanosis or edema, no calf tenderness to lower extremities  Neurological - alert, oriented, normal speech, no focal findings or movement disorder noted, neck supple without rigidity, motor and sensory grossly normal bilaterally      Medications:    sodium zirconium cyclosilicate  10 g Oral Once    doxycycline monohydrate  100 mg Oral 2 times per day    [START ON 3/14/2023] levoFLOXacin  500 mg Oral Every Other Day    clotrimazole-betamethasone   Topical BID    insulin lispro  0-4 Units SubCUTAneous TID     insulin lispro  0-4 Units SubCUTAneous Nightly    apixaban  5 mg Oral BID    sodium chloride flush  5-40 mL IntraVENous 2 times per day    fentanNYL  50 mcg IntraVENous Once    amLODIPine  5 mg Oral Daily    aspirin  81 mg Oral Daily    atorvastatin  80 mg Oral Daily    cloNIDine  0.3 mg Oral BID    [Held by provider] glipiZIDE 2.5 mg Oral QAM AC    lisinopril  5 mg Oral Daily    minoxidil  2.5 mg Oral BID    pregabalin  75 mg Oral Daily    calcium acetate  667 mg Oral TID WC       Data:  CBC:   Recent Labs     03/10/23  0529 03/11/23  0506 03/12/23  0517   WBC 10.6 11.6* 7.5   RBC 4.43 4.73 4.03*   HGB 9.9* 10.7* 9.1*   HCT 32.7* 34.8* 29.7*   MCV 73.8* 73.6* 73.7*   RDW 19.5* 19.5* 19.4*    170 164     BMP:   Recent Labs     03/10/23  0529 03/11/23  0506 03/12/23  0517    131* 129*   K 5.6* 5.3 5.0   CL 98 94* 93*   CO2 22 23 23   BUN 58* 35* 51*   CREATININE 9.47* 6.52* 7.42*     BNP: No results for input(s): BNP in the last 72 hours. PT/INR: No results for input(s): PROTIME, INR in the last 72 hours. APTT: No results for input(s): APTT in the last 72 hours. CARDIAC ENZYMES: No results for input(s): CKMB, CKMBINDEX, TROPONINI in the last 72 hours. Invalid input(s): CKTOTAL;3  FASTING LIPID PANEL:  Lab Results   Component Value Date    CHOL 96 09/28/2021    HDL 55 02/01/2022    TRIG 52 09/28/2021     LIVER PROFILE:   Recent Labs     03/10/23  0529 03/11/23  0506 03/12/23  0517   AST 21 30 24   ALT 13 17 16   BILITOT 0.7 0.8 0.5   ALKPHOS 165* 190* 169*        Assessment/Plan:  Principal Problem:    Wound infection  Active Problems:    Penile pain  Resolved Problems:    * No resolved hospital problems. *  Significant improvement to infection of foreskin. Pain is well controlled. Patient states that he does not need to have scheduled pain medications. He is on cefepime and vancomycin per infectious disease. Hopefully can be switched to oral  ESRD hemodialysis dependent on 3/week. Anemia of chronic disease H&H is stable. Hemoglobin today is 9.1  Hyperkalemia. Patient is provided with Lokelma 10 g p.o. today x1  Continue antihypertensives with holding parameters  Discharge planning.   Medically stable for discharge to SNF once available and IV antibiotic is switched to oral  Plan of care discussed with patient, questions answered  This note is created with a voice recognition program and while intend to generate a document that accurately reflects the content of the visit, no guarantee can be provided that every mistake has been identified and corrected by editing.             Alyse Dandy, MD, MD 3/12/2023 1:37 PM

## 2023-03-12 NOTE — PROGRESS NOTES
[facility] NOTE    Room # 1017/1017-01   Name: Delmy Baker              Reason for visit: Routine    I visited the patient. Admit Date & Time: 3/9/2023  9:37 AM    Assessment:  Delmy Baker is a 60 y.o. male. Upon entering the room patient states about their medical condition, states struggles with their medical situation. States worries, fears frustrations. Patient states well , treated well. Patient states good family support, shares about spiritual life, Pentecostal background, shares Pentecostal beliefs. Patient shares about outside interests. Intervention:   provided a ministry presence, listening and prayer. Outcome:  Patient open to visit. Plan:  Chaplains will remain available to offer spiritual and emotional support as needed. Electronically signed by Chaplain Melody, on 3/12/2023 at 1:26 PM.  Natalie      03/12/23 1323   Encounter Summary   Service Provided For: Patient   Referral/Consult From: [address] [facility] Family members   Last Encounter  03/12/23   Complexity of Encounter Moderate   Begin Time 1220   End Time  1229   Total Time Calculated 9 min   Encounter    Type Initial Screen/Assessment   Grief, Loss, and Adjustments   Type New Diagnosis; Life Adjustments; Adjustment to illness   Assessment/Intervention/Outcome   Assessment Passive   Intervention Active listening;Prayer (assurance of)/Alpha;Sustaining Presence/Ministry of presence   Outcome Receptive;Engaged in conversation;Expressed feelings, needs, and concerns;Expressed Gratitude

## 2023-03-12 NOTE — PROGRESS NOTES
Renal Progress Note    Patient :  Lovely Man; 60 y.o. MRN# [id number]  Location:  [facility]5/2841-06  Attending:  Morley Severin, MD  Admit Date:  3/9/2023   Hospital Day: 3      Subjective: Following for ESRD Ijmtuf-Mirsbuocq-Fhwcjn at [facility] via tunnel cath, under our group with nephrologist Dr. Tila Gr admitted with penile pain secondary to phimosis and mass on penis, s/p recent dorsal slit and biopsy. Due to the pain he was not tolerating dialysis and was cutting treatments short, and he developed asterixis and clonus because of under dialysis, and potassium was 5.6 on admission. He received [place] yesterday for potassium 5.3. This morning 5.0  Blood pressure stable. On room air  Denies dyspnea. Outpatient Medications:     Medications Prior to Admission: polyethyl glycol-propyl glycol 0.4-0.3 % (SYSTANE) 0.4-0.3 % ophthalmic solution, Place 1 drop into both eyes every 6 hours as needed for Dry Eyes  lidocaine (HM LIDOCAINE PATCH) 4 % external patch, Place 1 patch onto the skin daily Apply to left shoulder. docusate sodium (COLACE) 100 MG capsule, Take 100 mg by mouth daily  lisinopril (PRINIVIL;ZESTRIL) 20 MG tablet, TAKE ONE TABLET BY MOUTH TWICE A DAY IN THE MORNING AND AT BEDTIME  oxyCODONE-acetaminophen (PERCOCET) 7.5-325 MG per tablet, Take 1 tablet by mouth daily as needed for Pain. diphenhydrAMINE (BENADRYL) 25 MG tablet, Take 25 mg by mouth nightly as needed for Itching  glimepiride (AMARYL) 2 MG tablet, Take 2 mg by mouth every morning (before breakfast)  atorvastatin (LIPITOR) 80 MG tablet, Take 1 tablet by mouth daily  aspirin (ASPIRIN CHILDRENS) 81 MG chewable tablet, Take 1 tablet by mouth daily  apixaban (ELIQUIS) 5 MG TABS tablet, Take 1 tablet by mouth 2 times daily  pregabalin (LYRICA) 75 MG capsule, Take 1 capsule by mouth daily for 30 days.  Max Daily Amount: 75 mg  linagliptin (TRADJENTA) 5 MG tablet, Take 1 tablet by mouth daily  amLODIPine (NORVASC) 5 MG tablet, Take 1 tablet by mouth daily  sucroferric oxyhydroxide (VELPHORO) 500 MG CHEW chewable tablet, Take 2 tablets by mouth 3 times daily (with meals)  Multiple Vitamins-Minerals (RENAPLEX) TABS, Take 1 tablet by mouth daily  traZODone (DESYREL) 50 MG tablet, Take 1 tablet by mouth nightly as needed for Sleep  cloNIDine (CATAPRES) 0.1 MG tablet, Take 3 tablets by mouth 2 times daily  minoxidil (LONITEN) 2.5 MG tablet, Take 1 tablet by mouth 2 times daily  [DISCONTINUED] polyethyl glycol-propyl glycol 0.4-0.3 % (SYSTANE) 0.4-0.3 % ophthalmic solution, Place 1 drop into both eyes as needed for Dry Eyes (Patient taking differently: Place 1 drop into both eyes every 6 hours as needed for Dry Eyes)    Current Medications:     Scheduled Meds:    clotrimazole-betamethasone   Topical BID    insulin lispro  0-4 Units SubCUTAneous TID WC    insulin lispro  0-4 Units SubCUTAneous Nightly    cefepime  1,000 mg IntraVENous Q24H    vancomycin (VANCOCIN) intermittent dosing (placeholder)   Other RX Placeholder    apixaban  5 mg Oral BID    sodium chloride flush  5-40 mL IntraVENous 2 times per day    fentanNYL  50 mcg IntraVENous Once    amLODIPine  5 mg Oral Daily    aspirin  81 mg Oral Daily    atorvastatin  80 mg Oral Daily    cloNIDine  0.3 mg Oral BID    [Held by provider] glipiZIDE  2.5 mg Oral QAM AC    lisinopril  5 mg Oral Daily    minoxidil  2.5 mg Oral BID    pregabalin  75 mg Oral Daily    calcium acetate  667 mg Oral TID      Continuous Infusions:    sodium chloride 5 mL/hr at 03/11/23 1219    dextrose       PRN Meds:  sodium chloride flush, sodium chloride, diphenhydrAMINE, oxyCODONE-acetaminophen, polyethyl glycol-propyl glycol 0.4-0.3 %, traZODone, glucose, dextrose bolus **OR** dextrose bolus, glucagon (rDNA), dextrose, HYDROmorphone, anticoagulant sodium citrate, anticoagulant sodium citrate    Input/Output:       I/O last 3 completed shifts: In: 500   Out: 2500 .     Patient Vitals for the past 96 hrs (Last 3 readings): Weight   03/10/23 1939 205 lb 7.5 oz (93.2 kg)   03/10/23 1547 209 lb 14.1 oz (95.2 kg)   03/10/23 0654 210 lb (95.3 kg)         Vital Signs:   Temperature:  Temp: 98.6 °F (37 °C)  TMax:   Temp (24hrs), Av.5 °F (36.9 °C), Min:97.6 °F (36.4 °C), Max:99.7 °F (37.6 °C)    Respirations:  Resp: 16  Pulse:   Heart Rate: 74  BP:    BP: 110/71  BP Range: Systolic (24hrs), Av , Min:108 , Max:136       Diastolic (24hrs), Av, Min:66, Max:98      Physical Examination:     General:  AAO x 3, speaking in full sentences, no accessory muscle use.  Eyes:   Pupils equal, round and reactive to light, EOMI.  Neck:   No JVD, no thyromegaly, no lymphadenopathy.  Chest:              Bilateral vesicular breath sounds, no rales or wheezes.  Cardiac:  S1 S2 RR, no murmurs, gallops or rubs, JVP not raised.  Abdomen: Soft, non-tender, no masses or organomegaly, BS audible.  :   No suprapubic or flank tenderness.  Neuro:              AAO x 3, No FND.  SKIN:  No rashes, good skin turgor.  Extremities:  B AKA  Tunnel cath left chest    Labs:       Recent Labs     03/10/23  0529 23  0506 23  0517   WBC 10.6 11.6* 7.5   RBC 4.43 4.73 4.03*   HGB 9.9* 10.7* 9.1*   HCT 32.7* 34.8* 29.7*   MCV 73.8* 73.6* 73.7*   MCH 22.3* 22.6* 22.6*   MCHC 30.3 30.7 30.6   RDW 19.5* 19.5* 19.4*    170 164   MPV 10.2 Abnormal 11.3        BMP:   Recent Labs     03/10/23  0529 03/10/23  2232 23  0506 23  0517     --  131* 129*   K 5.6*  --  5.3 5.0   CL 98  --  94* 93*   CO2    BUN 58*  --  35* 51*   CREATININE 9.47*  --  6.52* 7.42*   GLUCOSE 33* 37* 80 278*   CALCIUM 10.0  --  9.8 9.1        Phosphorus:     Recent Labs     23  1006   PHOS 6.1*       Magnesium:    Recent Labs     23  1006   MG 2.4       Albumin:    Recent Labs     03/10/23  0529 23  0506 23  0517   LABALBU 3.5 3.6 3.1*       BNP:      Lab Results   Component Value Date/Time    BNP 3,088 2013 06:32 AM  SPEP:  Lab Results   Component Value Date/Time    PROT 6.5 03/12/2023 05:17 AM     Hep BsAg:         Lab Results   Component Value Date/Time    HEPBSAG NONREACTIVE 11/13/2022 12:55 PM     Hep C AB:          Lab Results   Component Value Date/Time    HEPCAB NONREACTIVE 09/28/2021 03:15 PM       Urinalysis/Chemistries:      Lab Results   Component Value Date/Time    NITRU NEGATIVE 06/13/2013 10:30 AM    COLORU YELLOW 06/13/2013 10:30 AM    PHUR 7.5 06/13/2013 10:30 AM    WBCUA 0 TO 2 06/13/2013 10:30 AM    RBCUA 10 TO 20 06/13/2013 10:30 AM    MUCUS NOT REPORTED 06/13/2013 10:30 AM    TRICHOMONAS NOT REPORTED 06/13/2013 10:30 AM    YEAST NOT REPORTED 06/13/2013 10:30 AM    BACTERIA FEW 06/13/2013 10:30 AM    SPECGRAV 1.015 06/13/2013 10:30 AM    LEUKOCYTESUR NEGATIVE 06/13/2013 10:30 AM    UROBILINOGEN Normal 06/13/2013 10:30 AM    BILIRUBINUR NEGATIVE 06/13/2013 10:30 AM    GLUCOSEU 3+ 06/13/2013 10:30 AM    KETUA NEGATIVE 06/13/2013 10:30 AM    AMORPHOUS NOT REPORTED 06/13/2013 10:30 AM       Radiology:     CXR: No recent    Assessment:     1. ESRD and typically on a Monday and Wednesday and Friday hemodialysis schedule at [facility] under [facility], although not able to tolerate complete treatments recently secondary to severe penile pain  2. HTN  3. Penile pain, likely ischemic in nature, with recent dorsal slit and biopsy of glans penis about penile mass a couple of weeks ago  4. Clonus and asterixis along with hyperkalemia and hyperphosphatemia secondary to inability to tolerate his usual complete dialysis treatments resulting in under dialysis and a degree of uremia. Improved  5. Hyperkalemia secondary to poor dialysis. Improving. On Lokelma  6. Hyperphosphatemia  7. Anemia of chronic disease  8. Secondary hyperparathyroidism       Plan:   1. Give Feliciano Mutter today X1  2. Antihypertensives per usual dosing  3. Daily Labs  4. Plan HD tomorrow per MWF schedule  5.  Will continue to follow    Nutrition   Please ensure that patient is on a renal diet/TF. Avoid nephrotoxic drugs/contrast exposure. We will continue to follow along with you.      Jun Muro CNP

## 2023-03-12 NOTE — PROGRESS NOTES
[facility]   Urology Progress Note            Subjective: follow-up phimosis penile edema    Patient Vitals for the past 24 hrs:   BP Temp Temp src Pulse Resp SpO2   03/12/23 0031 108/69 98.4 °F (36.9 °C) Oral 81 20 94 %   03/11/23 2245 125/66 -- -- -- -- --   03/11/23 2046 125/66 98.4 °F (36.9 °C) Oral 74 20 94 %   03/11/23 1700 (!) 136/98 97.6 °F (36.4 °C) Oral 78 16 97 %   03/11/23 1145 126/75 99.7 °F (37.6 °C) Oral 79 16 97 %   03/11/23 0908 137/84 -- -- -- -- --   03/11/23 0734 137/84 99.3 °F (37.4 °C) Oral 91 16 (!) 89 %     No intake or output data in the 24 hours ending 03/12/23 0554    Recent Labs     03/10/23  0529 03/11/23  0506 03/12/23  0517   WBC 10.6 11.6* 7.5   HGB 9.9* 10.7* 9.1*   HCT 32.7* 34.8* 29.7*   MCV 73.8* 73.6* 73.7*    170 164     Recent Labs     03/09/23  1006 03/10/23  0529 03/11/23  0506 03/12/23  0517   * 135 131* 129*   K 4.9 5.6* 5.3 5.0   CL 86* 98 94* 93*   CO2 24 22 23 23   PHOS 6.1*  --   --   --    BUN 55* 58* 35* 51*   CREATININE 9.21* 9.47* 6.52* 7.42*       No results for input(s): COLORU, PHUR, LABCAST, WBCUA, RBCUA, MUCUS, TRICHOMONAS, YEAST, BACTERIA, CLARITYU, SPECGRAV, LEUKOCYTESUR, UROBILINOGEN, BILIRUBINUR, BLOODU in the last 72 hours.     Invalid input(s): NITRATE, GLUCOSEUKETONESUAMORPHOUS    Additional Lab/culture results:    Physical Exam: patient seems to be more accepting to the option of circumcision, we're awaiting resolution of interstitial fluid     Interval Imaging Findings:    Impression:    Patient Active Problem List   Diagnosis    Hyperlipidemia    Essential hypertension    ESRD on hemodialysis ([facility] [address] MWF    Insomnia    Chronic bilateral low back pain with bilateral sciatica    Controlled diabetes mellitus type 2 with complications ([facility] [address]    [facility]    Long term (current) use of antithrombotics/antiplatelets    Chronic use of opiate drugs therapeutic purposes    Coronary artery disease involving native coronary artery    Anemia    Hypoglycemia    Inability to walk    Phimosis    Wound infection    Penile pain       Plan: we will schedule the circumcision as outpatient  after resolution of penile edema    Murry Nissen, MD  5:54 AM 3/12/2023

## 2023-03-12 NOTE — PROGRESS NOTES
Transitions of Care Pharmacy Service   Medication Review    The patient's list of current home medications has been reviewed. Source(s) of information: medication summary from [facility]    Based on information provided by the above source(s), I have updated the patient's home med list as described below. Please review the ACTION REQUESTED section of this note below for any discrepancies on current hospital orders. I changed or updated the following medications on the patient's home medication list:  Removed N/A     Added lidocaine (HM LIDOCAINE PATCH) 4 % external patch, Place 1 patch onto the skin daily Apply to left shoulder. docusate sodium (COLACE) 100 MG capsule, Take 100 mg by mouth daily     Adjusted   sucroferric oxyhydroxide (VELPHORO) 500 MG CHEW chewable tablet, Take 2 tablets by mouth 3 times daily (with meals)  oxyCODONE-acetaminophen (PERCOCET) 7.5-325 MG per tablet, Take 1 tablet by mouth daily as needed for Pain.  polyethyl glycol-propyl glycol 0.4-0.3 % (SYSTANE) 0.4-0.3 % ophthalmic solution, Place 1 drop into both eyes every 6 hours as needed for Dry Eyes   Other Notes N/A         PROVIDER ACTION REQUESTED  Medications that need to be addressed by a physician/nurse practitioner:    Medication Action Requested     Lisinopril      Lidocaine patch, docusate     Consider adjusting dose to match home medication regimen if appropriate. Consider resuming medications patient was taking prior to admission if appropriate. Please feel free to call me with any questions about this encounter. Thank you.     Nithya De La O [facility]   Transitions of Care Pharmacy Service  Phone:  [phone]  Fax: [phone]      Electronically signed by Nithya De La O [facility] on 3/11/2023 at 7:02 PM       Medications Prior to Admission:   polyethyl glycol-propyl glycol 0.4-0.3 % (SYSTANE) 0.4-0.3 % ophthalmic solution, Place 1 drop into both eyes every 6 hours as needed for Dry Eyes  lidocaine (HM LIDOCAINE PATCH) 4 % external patch, Place 1 patch onto the skin daily Apply to left shoulder. docusate sodium (COLACE) 100 MG capsule, Take 100 mg by mouth daily  lisinopril (PRINIVIL;ZESTRIL) 20 MG tablet, TAKE ONE TABLET BY MOUTH TWICE A DAY IN THE MORNING AND AT BEDTIME  oxyCODONE-acetaminophen (PERCOCET) 7.5-325 MG per tablet, Take 1 tablet by mouth daily as needed for Pain. diphenhydrAMINE (BENADRYL) 25 MG tablet, Take 25 mg by mouth nightly as needed for Itching  glimepiride (AMARYL) 2 MG tablet, Take 2 mg by mouth every morning (before breakfast)  atorvastatin (LIPITOR) 80 MG tablet, Take 1 tablet by mouth daily  aspirin (ASPIRIN CHILDRENS) 81 MG chewable tablet, Take 1 tablet by mouth daily  apixaban (ELIQUIS) 5 MG TABS tablet, Take 1 tablet by mouth 2 times daily  pregabalin (LYRICA) 75 MG capsule, Take 1 capsule by mouth daily for 30 days.  Max Daily Amount: 75 mg  linagliptin (TRADJENTA) 5 MG tablet, Take 1 tablet by mouth daily  amLODIPine (NORVASC) 5 MG tablet, Take 1 tablet by mouth daily  sucroferric oxyhydroxide (VELPHORO) 500 MG CHEW chewable tablet, Take 2 tablets by mouth 3 times daily (with meals)  Multiple Vitamins-Minerals (RENAPLEX) TABS, Take 1 tablet by mouth daily  traZODone (DESYREL) 50 MG tablet, Take 1 tablet by mouth nightly as needed for Sleep  cloNIDine (CATAPRES) 0.1 MG tablet, Take 3 tablets by mouth 2 times daily  minoxidil (LONITEN) 2.5 MG tablet, Take 1 tablet by mouth 2 times daily

## 2023-03-12 NOTE — PROGRESS NOTES
Pt remained alert and oriented throughout shift. Lotrisone cream applied to penis. It is scheduled BID and is stored in pt lock-up. No insulin coverage needed at night, BS was 259. BS has remained stable throughout the shift. Dextrose gtt no longer needed; D/C. Pt is able to sit at the side of the bed now after working with PT/OT. Bed alarm still remains on. Pt did not require/did not ask for pain meds during the shift.  Will continue with care plan

## 2023-03-13 LAB
ANION GAP SERPL CALCULATED.3IONS-SCNC: 16 MMOL/L (ref 9–17)
BUN SERPL-MCNC: 64 MG/DL (ref 8–23)
BUN/CREAT BLD: 7 (ref 9–20)
CALCIUM SERPL-MCNC: 9.3 MG/DL (ref 8.6–10.4)
CHLORIDE SERPL-SCNC: 95 MMOL/L (ref 98–107)
CO2 SERPL-SCNC: 23 MMOL/L (ref 20–31)
CREAT SERPL-MCNC: 9.28 MG/DL (ref 0.7–1.2)
GFR SERPL CREATININE-BSD FRML MDRD: 6 ML/MIN/1.73M2
GLUCOSE BLD-MCNC: 160 MG/DL (ref 75–110)
GLUCOSE BLD-MCNC: 193 MG/DL (ref 75–110)
GLUCOSE BLD-MCNC: 199 MG/DL (ref 75–110)
GLUCOSE BLD-MCNC: 227 MG/DL (ref 75–110)
GLUCOSE BLD-MCNC: 258 MG/DL (ref 75–110)
GLUCOSE BLD-MCNC: 88 MG/DL (ref 75–110)
GLUCOSE BLD-MCNC: 90 MG/DL (ref 75–110)
GLUCOSE SERPL-MCNC: 161 MG/DL (ref 70–99)
PHOSPHATE SERPL-MCNC: 6.6 MG/DL (ref 2.5–4.5)
POTASSIUM SERPL-SCNC: 5.3 MMOL/L (ref 3.7–5.3)
SODIUM SERPL-SCNC: 134 MMOL/L (ref 135–144)

## 2023-03-13 PROCEDURE — 6370000000 HC RX 637 (ALT 250 FOR IP): Performed by: INTERNAL MEDICINE

## 2023-03-13 PROCEDURE — 6370000000 HC RX 637 (ALT 250 FOR IP): Performed by: FAMILY MEDICINE

## 2023-03-13 PROCEDURE — 2060000000 HC ICU INTERMEDIATE R&B

## 2023-03-13 PROCEDURE — 2500000003 HC RX 250 WO HCPCS: Performed by: FAMILY MEDICINE

## 2023-03-13 PROCEDURE — 2580000003 HC RX 258: Performed by: NURSE PRACTITIONER

## 2023-03-13 PROCEDURE — 99232 SBSQ HOSP IP/OBS MODERATE 35: CPT | Performed by: INTERNAL MEDICINE

## 2023-03-13 PROCEDURE — 99233 SBSQ HOSP IP/OBS HIGH 50: CPT | Performed by: FAMILY MEDICINE

## 2023-03-13 PROCEDURE — 90935 HEMODIALYSIS ONE EVALUATION: CPT

## 2023-03-13 PROCEDURE — 97110 THERAPEUTIC EXERCISES: CPT

## 2023-03-13 PROCEDURE — 97535 SELF CARE MNGMENT TRAINING: CPT

## 2023-03-13 PROCEDURE — 2500000003 HC RX 250 WO HCPCS: Performed by: INTERNAL MEDICINE

## 2023-03-13 PROCEDURE — 97530 THERAPEUTIC ACTIVITIES: CPT

## 2023-03-13 PROCEDURE — 84100 ASSAY OF PHOSPHORUS: CPT

## 2023-03-13 PROCEDURE — 36415 COLL VENOUS BLD VENIPUNCTURE: CPT

## 2023-03-13 PROCEDURE — 80048 BASIC METABOLIC PNL TOTAL CA: CPT

## 2023-03-13 RX ORDER — METRONIDAZOLE 500 MG/1
500 TABLET ORAL EVERY 12 HOURS SCHEDULED
Status: DISCONTINUED | OUTPATIENT
Start: 2023-03-13 | End: 2023-03-15 | Stop reason: HOSPADM

## 2023-03-13 RX ORDER — LISINOPRIL 2.5 MG/1
2.5 TABLET ORAL DAILY
Status: DISCONTINUED | OUTPATIENT
Start: 2023-03-14 | End: 2023-03-15 | Stop reason: HOSPADM

## 2023-03-13 RX ORDER — CALCIUM ACETATE 667 MG/1
2 CAPSULE ORAL
Status: DISCONTINUED | OUTPATIENT
Start: 2023-03-13 | End: 2023-03-15 | Stop reason: HOSPADM

## 2023-03-13 RX ORDER — MIDODRINE HYDROCHLORIDE 10 MG/1
10 TABLET ORAL
Status: COMPLETED | OUTPATIENT
Start: 2023-03-13 | End: 2023-03-13

## 2023-03-13 RX ORDER — DOCUSATE SODIUM 100 MG/1
100 CAPSULE, LIQUID FILLED ORAL 2 TIMES DAILY
Status: DISCONTINUED | OUTPATIENT
Start: 2023-03-13 | End: 2023-03-15 | Stop reason: HOSPADM

## 2023-03-13 RX ADMIN — ATORVASTATIN CALCIUM 80 MG: 80 TABLET, FILM COATED ORAL at 23:55

## 2023-03-13 RX ADMIN — ASPIRIN 81 MG CHEWABLE TABLET 81 MG: 81 TABLET CHEWABLE at 09:30

## 2023-03-13 RX ADMIN — DOXYCYCLINE 100 MG: 100 CAPSULE ORAL at 09:30

## 2023-03-13 RX ADMIN — MINOXIDIL 2.5 MG: 2.5 TABLET ORAL at 10:00

## 2023-03-13 RX ADMIN — DOCUSATE SODIUM 100 MG: 100 CAPSULE, LIQUID FILLED ORAL at 23:55

## 2023-03-13 RX ADMIN — OXYCODONE AND ACETAMINOPHEN 1 TABLET: 5; 325 TABLET ORAL at 18:15

## 2023-03-13 RX ADMIN — AMLODIPINE BESYLATE 5 MG: 5 TABLET ORAL at 10:00

## 2023-03-13 RX ADMIN — CALCIUM ACETATE 667 MG: 667 CAPSULE ORAL at 12:55

## 2023-03-13 RX ADMIN — Medication 2 ML: at 22:55

## 2023-03-13 RX ADMIN — CLONIDINE HYDROCHLORIDE 0.3 MG: 0.3 TABLET ORAL at 09:33

## 2023-03-13 RX ADMIN — CALCIUM ACETATE 667 MG: 667 CAPSULE ORAL at 09:30

## 2023-03-13 RX ADMIN — MIDODRINE HYDROCHLORIDE 10 MG: 10 TABLET ORAL at 19:10

## 2023-03-13 RX ADMIN — CLOTRIMAZOLE AND BETAMETHASONE DIPROPIONATE: 10; .5 CREAM TOPICAL at 09:31

## 2023-03-13 RX ADMIN — LISINOPRIL 5 MG: 5 TABLET ORAL at 09:33

## 2023-03-13 RX ADMIN — APIXABAN 5 MG: 5 TABLET, FILM COATED ORAL at 09:30

## 2023-03-13 RX ADMIN — PREGABALIN 75 MG: 75 CAPSULE ORAL at 09:30

## 2023-03-13 RX ADMIN — SODIUM CHLORIDE, PRESERVATIVE FREE 10 ML: 5 INJECTION INTRAVENOUS at 09:33

## 2023-03-13 ASSESSMENT — PAIN DESCRIPTION - ORIENTATION
ORIENTATION: MID;ANTERIOR
ORIENTATION: ANTERIOR;MID

## 2023-03-13 ASSESSMENT — ENCOUNTER SYMPTOMS
RESPIRATORY NEGATIVE: 1
GASTROINTESTINAL NEGATIVE: 1

## 2023-03-13 ASSESSMENT — PAIN DESCRIPTION - ONSET
ONSET: ON-GOING

## 2023-03-13 ASSESSMENT — PAIN DESCRIPTION - PAIN TYPE
TYPE: ACUTE PAIN

## 2023-03-13 ASSESSMENT — PAIN DESCRIPTION - FREQUENCY
FREQUENCY: CONTINUOUS
FREQUENCY: INTERMITTENT
FREQUENCY: CONTINUOUS
FREQUENCY: CONTINUOUS

## 2023-03-13 ASSESSMENT — PAIN - FUNCTIONAL ASSESSMENT
PAIN_FUNCTIONAL_ASSESSMENT: ACTIVITIES ARE NOT PREVENTED

## 2023-03-13 ASSESSMENT — PAIN SCALES - GENERAL
PAINLEVEL_OUTOF10: 6
PAINLEVEL_OUTOF10: 5
PAINLEVEL_OUTOF10: 6
PAINLEVEL_OUTOF10: 8

## 2023-03-13 ASSESSMENT — PAIN DESCRIPTION - LOCATION
LOCATION: PENIS

## 2023-03-13 ASSESSMENT — PAIN DESCRIPTION - DESCRIPTORS
DESCRIPTORS: THROBBING

## 2023-03-13 NOTE — CARE COORDINATION
Discharge planning    Chart reviewed. Patient is a new precert to return to [facility]. SS is following. Patient admitted with penile infection. History of ESRD. Has HD. Urology recommending circumcision as outpatient.       ID notes patient should be able to transition to oral atb with levofloxacin 500 mg every 48 hours and doxycycline 100 mg twice a day

## 2023-03-13 NOTE — PROGRESS NOTES
[facility]   Urology Progress Note            Subjective: follow-up penile ischemia with penile edema    Patient Vitals for the past 24 hrs:   BP Temp Temp src Pulse Resp SpO2   03/13/23 0326 118/70 98 °F (36.7 °C) Axillary 74 18 94 %   03/12/23 2350 112/71 98.1 °F (36.7 °C) Axillary 80 18 95 %   03/12/23 1939 123/70 98.2 °F (36.8 °C) Axillary 76 16 95 %   03/12/23 1845 103/62 -- -- -- -- --   03/12/23 1605 (!) 87/50 97.9 °F (36.6 °C) Oral 77 16 94 %   03/12/23 0945 133/69 -- -- -- -- --   03/12/23 0718 110/71 98.6 °F (37 °C) -- 74 16 97 %     No intake or output data in the 24 hours ending 03/13/23 0445    Recent Labs     03/10/23  0529 03/11/23  0506 03/12/23  0517   WBC 10.6 11.6* 7.5   HGB 9.9* 10.7* 9.1*   HCT 32.7* 34.8* 29.7*   MCV 73.8* 73.6* 73.7*    170 164     Recent Labs     03/10/23  0529 03/11/23  0506 03/12/23  0517    131* 129*   K 5.6* 5.3 5.0   CL 98 94* 93*   CO2 22 23 23   BUN 58* 35* 51*   CREATININE 9.47* 6.52* 7.42*       No results for input(s): COLORU, PHUR, LABCAST, WBCUA, RBCUA, MUCUS, TRICHOMONAS, YEAST, BACTERIA, CLARITYU, SPECGRAV, LEUKOCYTESUR, UROBILINOGEN, BILIRUBINUR, BLOODU in the last 72 hours.     Invalid input(s): NITRATE, GLUCOSEUKETONESUAMORPHOUS    Additional Lab/culture results:    Physical Exam: there is slight improvement in her interstitial edema and decreasing the sensitivity of the foreskin and glans penis viability is in question    Interval Imaging Findings:    Impression:    Patient Active Problem List   Diagnosis    Hyperlipidemia    Essential hypertension    ESRD on hemodialysis ([facility] [address] MWF    Insomnia    Chronic bilateral low back pain with bilateral sciatica    Controlled diabetes mellitus type 2 with complications ([facility] [address]    S/P bilateral below knee amputation ([facility] [address]    Long term (current) use of antithrombotics/antiplatelets    Chronic use of opiate drugs therapeutic purposes    Coronary artery disease involving native coronary artery    Anemia    Hypoglycemia    Inability to walk    Phimosis    Wound infection    Penile pain       Plan: we are planning electively for circumcision once there is resolution of the penile interstitial edema.     At the present time patient is more accepting of the circumcision    Lizette Portillo MD  4:45 AM 3/13/2023

## 2023-03-13 NOTE — PROGRESS NOTES
Progress Note    Subjective: Follow-up on  penile infection, hypoglycemia, ESRD  The patient is awake and alert. No problems overnight. Denies chest pain, angina, and dyspnea. Denies abdominal pain. Tolerating diet. No nausea or vomiting. Admit Date:  3/9/2023    Patient Seen, Chart, Labs, Radiology studies, and Consults reviewed. Objective:   BP 94/63   Pulse 72   Temp 98.2 °F (36.8 °C)   Resp 16   Ht 5' 11\" (1.803 m)   Wt 222 lb 3 oz (100.8 kg)   SpO2 95%   BMI 30.99 kg/m²     Intake/Output Summary (Last 24 hours) at 3/13/2023 1622  Last data filed at 3/13/2023 0933  Gross per 24 hour   Intake 10 ml   Output --   Net 10 ml     General appearance - alert, well appearing, and in no distress and oriented to person, place, and time  Heart:  RRR, no murmurs, gallops, or rubs, extrasystoles. Lungs:  CTA bilaterally, no wheeze, rales or rhonchi, good air entry, good respiratory effort  Abd: bowel sounds present, nontender, nondistended, no masses, no rigidity, no guarding, no peritoneal signs.   Integumentary: Skin good color, good turgor, no rashes, no acute lesions  Upper Extrem:  No clubbing bilateral hands, no cyanosis or edema  Lower Extrem:no cyanosis or edema, no calf tenderness to lower extremities  Neurological - alert, oriented, normal speech, no focal findings or movement disorder noted, neck supple without rigidity, motor and sensory grossly normal bilaterally      Medications:    metroNIDAZOLE  500 mg Oral 2 times per day    docusate sodium  100 mg Oral BID    calcium acetate  2 capsule Oral TID     [START ON 3/14/2023] lisinopril  2.5 mg Oral Daily    sodium zirconium cyclosilicate  5 g Oral Once    doxycycline monohydrate  100 mg Oral 2 times per day    [START ON 3/14/2023] levoFLOXacin  500 mg Oral Every Other Day    clotrimazole-betamethasone   Topical BID    insulin lispro  0-4 Units SubCUTAneous TID WC    insulin lispro  0-4 Units SubCUTAneous Nightly    apixaban  5 mg Oral BID sodium chloride flush  5-40 mL IntraVENous 2 times per day    fentanNYL  50 mcg IntraVENous Once    aspirin  81 mg Oral Daily    atorvastatin  80 mg Oral Daily    cloNIDine  0.3 mg Oral BID    [Held by provider] glipiZIDE  2.5 mg Oral QAM AC    pregabalin  75 mg Oral Daily       Data:  CBC:   Recent Labs     03/11/23  0506 03/12/23  0517   WBC 11.6* 7.5   RBC 4.73 4.03*   HGB 10.7* 9.1*   HCT 34.8* 29.7*   MCV 73.6* 73.7*   RDW 19.5* 19.4*    164     BMP:   Recent Labs     03/11/23  0506 03/12/23  0517 03/13/23  0834   * 129* 134*   K 5.3 5.0 5.3   CL 94* 93* 95*   CO2 23 23 23   PHOS  --   --  6.6*   BUN 35* 51* 64*   CREATININE 6.52* 7.42* 9.28*     BNP: No results for input(s): BNP in the last 72 hours. PT/INR: No results for input(s): PROTIME, INR in the last 72 hours. APTT: No results for input(s): APTT in the last 72 hours. CARDIAC ENZYMES: No results for input(s): CKMB, CKMBINDEX, TROPONINI in the last 72 hours. Invalid input(s): CKTOTAL;3  FASTING LIPID PANEL:  Lab Results   Component Value Date    CHOL 96 09/28/2021    HDL 55 02/01/2022    TRIG 52 09/28/2021     LIVER PROFILE:   Recent Labs     03/11/23  0506 03/12/23  0517   AST 30 24   ALT 17 16   BILITOT 0.8 0.5   ALKPHOS 190* 169*        Assessment/Plan:  Principal Problem:    Wound infection  Active Problems:    Penile pain  Resolved Problems:    * No resolved hospital problems. *  Penile inflammation or significant improvement. Pain response is significant improved. He is not asking any scheduled pain medication. He has been seen by infectious disease and cefepime and vancomycin and switch to Levaquin and doxycycline, Flagyl  Hypoglycemia resolved  ESRD hemodialysis dependent  Hyponatremia-improved  Hyperkalemia status post Lokelma.   Potassium is 5.3  Okay to discharge to SNF once available  Plan of care discussed with nursing staff  This note is created with a voice recognition program and while intend to generate a document that accurately reflects the content of the visit, no guarantee can be provided that every mistake has been identified and corrected by editing.         Jeancarlos Barron MD, MD 3/13/2023 4:22 PM

## 2023-03-13 NOTE — PROGRESS NOTES
[facility]  Progress Note    Juan Dang  MRN: [id number]  Date: 3/13/2023  LOS: 4     Reason for F/U :   Penile pain, surgical wound. Impression :   Penile pain likely secondary to ischemic penile tissue   recent biopsy of the glans penis with coagulation necrosis of fibrovascular tissue with acute inflammation  Phimosis   s/p dorsal split of the foreskin 2/23/2022   Healing foreskin surgical wound with some soft tissue swelling  Culture with Proteus mirabilis isolated  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  Peripheral arterial disease   status post bilateral below the knee amputation    Recommendations: The patient does have a wound and soft tissue swelling on the foreskin related to the recent surgery and there is some drainage there though no overt/major infection  The wound culture has grown out Proteus mirabilis, Klebsiella pneumonia, Bacteroides species x2. He continues on oral antibiotics with levofloxacin 500 mg every 48 hours, doxycycline 100 mg twice a day and I will add metronidazole for the Bacteroides  The plan I understand is for him to have foreskin resection once the soft tissue swelling is improved. Continue local wound care    Infection Control Recommendations:   Universal precautions    Discharge Planning:   Patient will need Midline Catheter Insertion/ PICC line Insertion: No  Patient will need: Home IV , [place],  SNF,  LTAC: Undetermined  Patient willneed outpatient wound care: Yes    Medical Decision making / Summary of Stay:   Juan Dang is a 60y.o.-year-old male who was initially admitted on 3/9/2023.  Usha Vital has multiple medical problems including diabetes mellitus type 2, hypertension, hyperlipidemia, coronary artery disease, chronic renal failure on hemodialysis Monday Wednesday Friday, degenerative disc disease, erectile dysfunction, peripheral arterial disease status post bilateral below the knee amputations. The patient was previously admitted with severe penile pain was seen by urology for phimosis and also noted to have a mass on the glans penis for which he was taken to the operating room and had a dorsal slit of the foreskin and biopsy of the glans penis lesion on 2023. The surgical biopsy did show coagulation necrosis of fibrovascular tissue with acute inflammation. The patient was at hemodialysis as was sent into the emergency department by EMS from the dialysis center and he did not receive his dialysis treatment secondary to significant penile pain. Patient reports 10 out of 10 pain does not report any associated fevers or chills. The patient was evaluated and noted to have some penile discharge, penile swelling and concern was for penile abscess/cellulitis and was admitted started on antimicrobial therapy and I was asked to evaluate and help with antibiotic choice. Current evaluation:3/13/2023    /72   Pulse 74   Temp 98.4 °F (36.9 °C) (Oral)   Resp 18   Ht 5' 11\" (1.803 m)   Wt 222 lb 3 oz (100.8 kg)   SpO2 96%   BMI 30.99 kg/m²     Temperature Range: Temp: 98.4 °F (36.9 °C) Temp  Av.1 °F (36.7 °C)  Min: 97.9 °F (36.6 °C)  Max: 98.4 °F (36.9 °C)  The patient is seen and evaluated at bedside and is awake and alert in no acute distress. He reports that the penile pain while there is overall improved as the pain control has been working. Review of Systems   Constitutional: Negative. HENT: Negative. Respiratory: Negative. Cardiovascular: Negative. Gastrointestinal: Negative. Genitourinary:  Positive for penile pain. Musculoskeletal: Negative. Skin:  Positive for wound. Neurological: Negative. Psychiatric/Behavioral: Negative. Physical Examination :     Physical Exam  Constitutional:       Appearance: He is well-developed. HENT:      Head: Normocephalic and atraumatic.    Cardiovascular:      Rate and Rhythm: Normal rate.      Heart sounds: Normal heart sounds. No friction rub. No gallop. Pulmonary:      Effort: Pulmonary effort is normal.      Breath sounds: Normal breath sounds. No wheezing. Abdominal:      General: Bowel sounds are normal.      Palpations: Abdomen is soft. There is no mass. Tenderness: There is no abdominal tenderness. Genitourinary:     Comments: The patient does have some swelling of the foreskin and there is a wound along the dorsal aspect of the foreskin  The penile tissue cannot be seen very well due to the foreskin and there is some devitalized/dusky tissue seen deep. The area is tender to palpation  Musculoskeletal:      Cervical back: Normal range of motion and neck supple. Comments: Bilateral below the knee amputations. Lymphadenopathy:      Cervical: No cervical adenopathy. Skin:     General: Skin is warm and dry. Neurological:      Mental Status: He is alert and oriented to person, place, and time.              Laboratory data:   I have independently reviewed the followinglabs:  CBC with Differential:   Recent Labs     03/11/23  0506 03/12/23  0517   WBC 11.6* 7.5   HGB 10.7* 9.1*   HCT 34.8* 29.7*    164       BMP:   Recent Labs     03/11/23  0506 03/12/23  0517   * 129*   K 5.3 5.0   CL 94* 93*   CO2 23 23   BUN 35* 51*   CREATININE 6.52* 7.42*       Hepatic Function Panel:   Recent Labs     03/11/23  0506 03/12/23  0517   PROT 7.7 6.5   LABALBU 3.6 3.1*   BILITOT 0.8 0.5   ALKPHOS 190* 169*   ALT 17 16   AST 30 24           Lab Results   Component Value Date/Time    PROCAL 0.91 11/13/2022 12:45 AM     Lab Results   Component Value Date/Time    .4 09/07/2017 07:18 AM    .1 09/06/2017 01:46 PM     Lab Results   Component Value Date    SEDRATE 69 (H) 05/26/2022         Lab Results   Component Value Date/Time    DDIMER 1.77 07/21/2020 05:50 PM     No results found for: FERRITIN  No results found for: LDH  No results found for: FIBRINOGEN    No results found for requested labs within last 30 days. Lab Results   Component Value Date/Time    COVID19 Not Detected 11/13/2022 02:01 AM    COVID19 Not Detected 09/02/2022 02:25 AM    COVID19 Not Detected 01/04/2021 01:54 PM       No results for input(s): VANCHUGH in the last 72 hours. Imaging Studies:   No new imaging    Cultures:     Culture, Anaerobic and Aerobic [[id number]] (Abnormal)  Collected: 03/09/23 0955   Order Status: Completed Specimen: Wound Updated: 03/12/23 1535    Specimen Description . WOUND    Special Requests . PENIS    Direct Exam NO NEUTROPHILS SEEN     MODERATE GRAM POSITIVE COCCI IN PAIRS Abnormal      FEW GRAM POSITIVE RODS Abnormal      FEW GRAM NEGATIVE RODS Abnormal      RARE GRAM POSITIVE COCCI IN CLUSTERS Abnormal     Culture PROTEUS MIRABILIS LIGHT GROWTH Abnormal      KLEBSIELLA PNEUMONIAE LIGHT GROWTH Abnormal      Bacteroides ovatus/xylanisolvens MODERATE GROWTH BETA LACTAMASE POSITIVE Abnormal      BACTEROIDES THETAIOTAOMICRON MODERATE GROWTH BETA LACTAMASE POSITIVE Abnormal      Susceptibility    Proteus mirabilis (1)    Antibiotic Interpretation Microscan Method Status    ampicillin Sensitive <=2 BACTERIAL SUSCEPTIBILITY PANEL WOJCIECH Preliminary    ceFAZolin Sensitive <=4 BACTERIAL SUSCEPTIBILITY PANEL WOJCIECH Preliminary    cefTRIAXone Sensitive <=0.25 BACTERIAL SUSCEPTIBILITY PANEL WOJCIECH Preliminary    gentamicin Sensitive <=1 BACTERIAL SUSCEPTIBILITY PANEL WOJCIECH Preliminary    levofloxacin Sensitive <=0.12 BACTERIAL SUSCEPTIBILITY PANEL WOJCIECH Preliminary    piperacillin-tazobactam Sensitive <=4 BACTERIAL SUSCEPTIBILITY PANEL WOJCIECH Preliminary    tobramycin Sensitive <=1 BACTERIAL SUSCEPTIBILITY PANEL WOJCIECH Preliminary    trimethoprim-sulfamethoxazole Sensitive <=20 BACTERIAL SUSCEPTIBILITY PANEL WOJCIECH Preliminary      Klebsiella pneumoniae (2)    Antibiotic Interpretation Microscan Method Status    ampicillin Resistant >=32 BACTERIAL SUSCEPTIBILITY PANEL WOJCIECH Preliminary ceFAZolin Sensitive <=4 BACTERIAL SUSCEPTIBILITY PANEL WOJCIECH Preliminary    cefTRIAXone Sensitive <=0.25 BACTERIAL SUSCEPTIBILITY PANEL OWJCIECH Preliminary    Confirmatory Extended Spectrum Beta-Lactamase Sensitive NEGATIVE BACTERIAL SUSCEPTIBILITY PANEL WOJCIECH Preliminary    gentamicin Sensitive <=1 BACTERIAL SUSCEPTIBILITY PANEL WOJCIECH Preliminary    levofloxacin Sensitive <=0.12 BACTERIAL SUSCEPTIBILITY PANEL WOJCIECH Preliminary    piperacillin-tazobactam Sensitive 8 BACTERIAL SUSCEPTIBILITY PANEL WOJCIECH Preliminary    tobramycin Sensitive <=1 BACTERIAL SUSCEPTIBILITY PANEL WOJCIECH Preliminary    trimethoprim-sulfamethoxazole Sensitive <=20 BACTERIAL SUSCEPTIBILITY PANEL WOJCIECH Preliminary     Condensed View         Specimen Collected: 03/09/23 09:55 EST Last Resulted: 03/12/23 11:55 EDT        Culture, Blood 1 [[id number]] Collected: 03/10/23 1516   Order Status: Completed Specimen: Blood Updated: 03/12/23 1117    Specimen Description . BLOOD    Special Requests LAC 20ML    Culture NO GROWTH 2 DAYS   Culture, Blood 1 [[id number]] Collected: 03/10/23 1516   Order Status: Completed Specimen: Blood Updated: 03/12/23 1117    Specimen Description . BLOOD    Special Requests LH 20ML    Culture NO GROWTH 2 DAYS   Blood Culture 1 [[id number]] Collected: 03/09/23 1005   Order Status: Completed Specimen: Blood Updated: 03/12/23 1117    Specimen Description . BLOOD    Special Requests LAC 13ML    Culture NO GROWTH 3 DAYS   Culture, Blood 2 [[id number]] Collected: 03/09/23 1012   Order Status: Completed Specimen: Blood Updated: 03/12/23 1117    Specimen Description . BLOOD    Special Requests LFA 20ML    Culture NO GROWTH 3 DAYS     Medications:      doxycycline monohydrate  100 mg Oral 2 times per day    [START ON 3/14/2023] levoFLOXacin  500 mg Oral Every Other Day    clotrimazole-betamethasone   Topical BID    insulin lispro  0-4 Units SubCUTAneous TID WC    insulin lispro  0-4 Units SubCUTAneous Nightly    apixaban  5 mg Oral BID    sodium chloride flush  5-40 mL IntraVENous 2 times per day    fentanNYL  50 mcg IntraVENous Once    amLODIPine  5 mg Oral Daily    aspirin  81 mg Oral Daily    atorvastatin  80 mg Oral Daily    cloNIDine  0.3 mg Oral BID    [Held by provider] glipiZIDE  2.5 mg Oral QAM AC    lisinopril  5 mg Oral Daily    minoxidil  2.5 mg Oral BID    pregabalin  75 mg Oral Daily    calcium acetate  667 mg Oral TID WC       Electronically signed by Patrick Trujillo MD on 3/13/2023 at 9:03 AM      [facility]  Patrick Trujillo MD  [employer]  OFFICE: [phone]    Thank you for allowing us to participate in the care of this patient. Please call with questions. This note is created with the assistance of a speech recognition program.  While intending to generate a document that actually reflects the content of the visit, the document can still have some errors including those of syntax and sound a like substitutions which may escape proof reading. In such instances, actual meaning can be extrapolated by contextual diversion.

## 2023-03-13 NOTE — PROGRESS NOTES
Occupational Therapy  Facility/Department: [facility] PROGRESSIVE CARE  Daily Treatment Note  NAME: Juan Dang  : 1960  MRN: [id number]    Date of Service: 3/13/2023    MIKE Davis reports patient is medically stable for therapy treatment this date. Chart reviewed prior to treatment and patient is agreeable for therapy. All lines intact and patient positioned comfortably at end of treatment. All patient needs addressed prior to ending therapy session. Pt currently functioning below baseline. Recommend daily inpatient skilled therapy at time of discharge to maximize long term outcomes and prevent re-admission. Please refer to AM-PAC score for current level of function. Discharge Recommendations:  Patient would benefit from continued therapy after discharge  OT Equipment Recommendations  Equipment Needed:  (CTA)      Patient Diagnosis(es): The primary encounter diagnosis was Penile pain. A diagnosis of Wound infection was also pertinent to this visit. Assessment    Assessment: Pt tolerated session well this date, appears to have shown great improvement from prior session. Pt oriented x4 and alert throughout duration of treatment. Pt able to completed scooting and weightshifting  at EOB w/ SBA, to promote pressure relief, UE strengthening and in prep for transfer training. Facilitated BUE AROM exercises w/ pt while seated at EOB, w/ pt tolerating exercises well and able to demo Good return. Continued skilled OT services are indicated at this time to maximize this pt's safety and IND with self care tasks and to facilitate safe return to PLOF as able.   Activity Tolerance: Patient tolerated treatment well  Discharge Recommendations: Patient would benefit from continued therapy after discharge  Equipment Needed:  (CTA)      Plan   Occupational Therapy Plan  Times Per Week: 3-4x per week, 1x per day as andrews  Current Treatment Recommendations: Strengthening;Balance training;Functional mobility training; Endurance training;Patient/Caregiver education & training; Safety education & training;Equipment evaluation, education, & procurement;Self-Care / ADL;Positioning;Home management training;Cognitive/Perceptual training;Cognitive reorientation     Restrictions  Restrictions/Precautions  Restrictions/Precautions: General Precautions  Required Braces or Orthoses?: No  Position Activity Restriction  Other position/activity restrictions: up with assist, left chest port. RUE AV fistula, left UE IV, B BKA    Subjective   Subjective  Subjective: RN Efren Yepez reports pt is medically appropriate for participation in therapy session this afternoon, will be recieving hemodialysis later. Pt seated at EOB upon entry to room, eating lunch. Pleasant and cooperative w/ therapy treatment session. Pt c/o pain in penis, rated 8/10 at this time. Orientation  Overall Orientation Status: Within Functional Limits  Orientation Level: Oriented X4  Cognition  Overall Cognitive Status: Exceptions  Arousal/Alertness: Delayed responses to stimuli  Following Commands: Follows all commands without difficulty  Attention Span: Attends with cues to redirect  Memory: Decreased recall of precautions;Decreased recall of recent events  Safety Judgement: Decreased awareness of need for assistance;Decreased awareness of need for safety  Problem Solving: Decreased awareness of errors;Assistance required to generate solutions;Assistance required to correct errors made  Insights: Decreased awareness of deficits  Initiation: Requires cues for some  Sequencing: Requires cues for some        Objective       Bed Mobility Training  Bed Mobility Training: Yes  Overall Level of Assistance: Stand-by assistance; Additional time (For sitting at EOB and scooting towards [facility] this date;  Pt seated at EOB upon entry to room and requesting to sit at EOB at session end.)  Interventions: Safety awareness training;Verbal cues (Min cues for use of bedrails, weightshifting, and pursed lip breathing tech all to increase overall safety/assist with pain control)  Supine to Sit: Other (comment) (Not observed, pt seated at EOB upon entry to room, reporting he has been transferring to<>from supine w/o independently)  Sit to Supine: Other (comment)  Scooting: Stand-by assistance (Facilitated weightshifting and shoulder strengthening task w/ pt while seated at EOB. Pt able to use BUEs to push self up from EOB surface and scoot towards the [facility]. Tolerated ~6 trials and able to move trunk/BLEs towards upper bedrails)  Transfer Training  Transfer Training: No     ADL  Feeding: Setup  Feeding Skilled Clinical Factors: Seated at EOB; pt able to feed self independently  Additional Comments: Pt declining other ADL needs at this time. Extensive time spent educating pt this session on importance of being active/mobile during course of hospitalization, pressure relief tech, edema reduction strategies, and preparation for prosthetic use. Pt receptive to all education and able to demonstrate Fair+ return of education. OT Exercises  Exercise Treatment: Seated at EOB w/ SBA, facilitated BUE AROM exercises w/ pt to promote functional ROM and postural control required for increased safety/IND with self care tasks. Pt completed 1 set of 15 repetitions of scapular retraction/protraction, shoulder circumduction, flexion/extension, abduction/adduction, and composite finger flexion/extension. Pt L shoulder presents w/ limited AROM d/t prior injury, educated pt on importance of completing AROM in pain free ROM. Verbal instruction and visual demo provided throughout to ensure proper tech. Pt demo'd Good return of all exercises this session. Safety Devices  Type of Devices: All fall risk precautions in place; Bed alarm in place;Call light within reach; Left in bed;Nurse notified (Left seated at EOB upon writer exit; RN notified and bed alarm armed.)  Restraints  Restraints Initially in Place: No     Patient Education  Education Given To: Patient  Education Provided: Role of Therapy; ADL Adaptive Strategies;Orientation; Fall Prevention Strategies; Plan of Care;Transfer Training;Energy Conservation;Home Exercise Program;Precautions  Education Method: Demonstration;Verbal  Barriers to Learning: Cognition  Education Outcome: Continued education needed;Verbalized understanding;Demonstrated understanding    AM-PAC: 16/24    Goals  Short Term Goals  Time Frame for Short Term Goals: by discharge, pt to demo  Short Term Goal 1: CGA for bed mob tech with bed rails/controls as needed  Short Term Goal 2: pt to tolerate sitting EOB ~20+min to complete simple grooming/self-care tasks  Short Term Goal 3: CGA for feeding tasks with AE as needed  Short Term Goal 4: Min A for toileting tasks with AD/AE as needed  Short Term Goal 5: pt/family to be IND with EC/WS, fall prevention tech, pressure relief education, discharge recommendations with use of handouts as needed  Long Term Goals  Long Term Goal 1: pt will tolerate re-assessment of transfers to add goal to POC  Patient Goals   Patient goals : to go home       Therapy Time   Individual Concurrent Group Co-treatment   Time In 1230         Time Out 1309         Minutes [address] [facility],

## 2023-03-13 NOTE — PROGRESS NOTES
Renal Progress Note    Patient :  Lane Suresh; 60 y.o. MRN# [id number]  Location:  [facility]/3656-18  Attending:  Nic Hunt MD  Admit Date:  3/9/2023   Hospital Day: 4      Subjective: Following for ESRD Ltlc[facility] at [facility] via tunnel cath, under our group with nephrologist Dr. Tiffanie Richmond admitted with penile pain secondary to phimosis and mass on penis, s/p recent dorsal slit and biopsy, biopsy consistent with ischemic injury, showing coagulation necrosis of fibrovascular tissue with acute inflammation. He is now on antibiotics, local wound care continues, there is also consideration he might need circumcision to treat phimosis as the significant inflammation on the foreskin which is batting the glans penis. Due to the pain he was not tolerating dialysis and was cutting treatments short, and he developed asterixis and clonus because of under dialysis, and potassium was 5.6 on admission. Hyperkalemia persists. Blood pressure stable. On room air  Denies dyspnea. Labs today showed a sodium of 134 potassium 5.3 chloride 95 bicarb 23 BUN 64 creatinine 9.3 glucose 161 calcium 9.3 phosphorus 6.6  Outpatient Medications:     Medications Prior to Admission: polyethyl glycol-propyl glycol 0.4-0.3 % (SYSTANE) 0.4-0.3 % ophthalmic solution, Place 1 drop into both eyes every 6 hours as needed for Dry Eyes  lidocaine (HM LIDOCAINE PATCH) 4 % external patch, Place 1 patch onto the skin daily Apply to left shoulder. docusate sodium (COLACE) 100 MG capsule, Take 100 mg by mouth daily  lisinopril (PRINIVIL;ZESTRIL) 20 MG tablet, TAKE ONE TABLET BY MOUTH TWICE A DAY IN THE MORNING AND AT BEDTIME  oxyCODONE-acetaminophen (PERCOCET) 7.5-325 MG per tablet, Take 1 tablet by mouth daily as needed for Pain.   diphenhydrAMINE (BENADRYL) 25 MG tablet, Take 25 mg by mouth nightly as needed for Itching  glimepiride (AMARYL) 2 MG tablet, Take 2 mg by mouth every morning (before breakfast)  atorvastatin (LIPITOR) 80 MG tablet, Take 1 tablet by mouth daily  aspirin (ASPIRIN CHILDRENS) 81 MG chewable tablet, Take 1 tablet by mouth daily  apixaban (ELIQUIS) 5 MG TABS tablet, Take 1 tablet by mouth 2 times daily  pregabalin (LYRICA) 75 MG capsule, Take 1 capsule by mouth daily for 30 days.  Max Daily Amount: 75 mg  linagliptin (TRADJENTA) 5 MG tablet, Take 1 tablet by mouth daily  amLODIPine (NORVASC) 5 MG tablet, Take 1 tablet by mouth daily  sucroferric oxyhydroxide (VELPHORO) 500 MG CHEW chewable tablet, Take 2 tablets by mouth 3 times daily (with meals)  Multiple Vitamins-Minerals (RENAPLEX) TABS, Take 1 tablet by mouth daily  traZODone (DESYREL) 50 MG tablet, Take 1 tablet by mouth nightly as needed for Sleep  cloNIDine (CATAPRES) 0.1 MG tablet, Take 3 tablets by mouth 2 times daily  minoxidil (LONITEN) 2.5 MG tablet, Take 1 tablet by mouth 2 times daily  [DISCONTINUED] polyethyl glycol-propyl glycol 0.4-0.3 % (SYSTANE) 0.4-0.3 % ophthalmic solution, Place 1 drop into both eyes as needed for Dry Eyes (Patient taking differently: Place 1 drop into both eyes every 6 hours as needed for Dry Eyes)    Current Medications:     Scheduled Meds:    metroNIDAZOLE  500 mg Oral 2 times per day    docusate sodium  100 mg Oral BID    calcium acetate  2 capsule Oral TID     [START ON 3/14/2023] lisinopril  2.5 mg Oral Daily    doxycycline monohydrate  100 mg Oral 2 times per day    [START ON 3/14/2023] levoFLOXacin  500 mg Oral Every Other Day    clotrimazole-betamethasone   Topical BID    insulin lispro  0-4 Units SubCUTAneous TID WC    insulin lispro  0-4 Units SubCUTAneous Nightly    apixaban  5 mg Oral BID    sodium chloride flush  5-40 mL IntraVENous 2 times per day    fentanNYL  50 mcg IntraVENous Once    aspirin  81 mg Oral Daily    atorvastatin  80 mg Oral Daily    cloNIDine  0.3 mg Oral BID    [Held by provider] glipiZIDE  2.5 mg Oral QAM AC    pregabalin  75 mg Oral Daily     Continuous Infusions:    sodium chloride 5 mL/hr at 23 1219    dextrose       PRN Meds:  sodium chloride flush, sodium chloride, diphenhydrAMINE, oxyCODONE-acetaminophen, polyethyl glycol-propyl glycol 0.4-0.3 %, traZODone, glucose, dextrose bolus **OR** dextrose bolus, glucagon (rDNA), dextrose, HYDROmorphone, anticoagulant sodium citrate, anticoagulant sodium citrate    Input/Output:       No intake/output data recorded. .    Patient Vitals for the past 96 hrs (Last 3 readings):   Weight   23 0631 222 lb 3 oz (100.8 kg)   03/10/23 1939 205 lb 7.5 oz (93.2 kg)   03/10/23 1547 209 lb 14.1 oz (95.2 kg)         Vital Signs:   Temperature:  Temp: 97.7 °F (36.5 °C)  TMax:   Temp (24hrs), Av.1 °F (36.7 °C), Min:97.7 °F (36.5 °C), Max:98.4 °F (36.9 °C)    Respirations:  Resp: 16  Pulse:   Heart Rate: 83  BP:    BP: 91/64  BP Range: Systolic (05IMQ), XD , Min:87 , REK:959       Diastolic (05PJL), DUL:98, Min:50, Max:76      Physical Examination:     General:  AAO x 3, speaking in full sentences, no accessory muscle use. Eyes:   Pupils equal, round and reactive to light, EOMI. Neck:   No JVD, no thyromegaly, no lymphadenopathy. Chest:              Bilateral vesicular breath sounds, no rales or wheezes. Cardiac:  S1 S2 RR, no murmurs, gallops or rubs, JVP not raised. Abdomen: Soft, non-tender, no masses or organomegaly, BS audible. :   No suprapubic or flank tenderness. Neuro:              AAO x 3, No FND. SKIN:  No rashes, good skin turgor.   Extremities:  B AKA  Tunnel cath left chest    Labs:       Recent Labs     23  0506 23  0517   WBC 11.6* 7.5   RBC 4.73 4.03*   HGB 10.7* 9.1*   HCT 34.8* 29.7*   MCV 73.6* 73.7*   MCH 22.6* 22.6*   MCHC 30.7 30.6   RDW 19.5* 19.4*    164   MPV Abnormal 11.3        BMP:   Recent Labs     23  0506 23  0517 23  0834   * 129* 134*   K 5.3 5.0 5.3   CL 94* 93* 95*   CO2    BUN 35* 51* 64*   CREATININE 6.52* 7.42* 9.28*   GLUCOSE 80 278* 161*   CALCIUM 9.8 9.1 9.3        Phosphorus:     Recent Labs     03/13/23  0834   PHOS 6.6*       Magnesium:    No results for input(s): MG in the last 72 hours. Albumin:    Recent Labs     03/11/23  0506 03/12/23  0517   LABALBU 3.6 3.1*       BNP:      Lab Results   Component Value Date/Time    BNP 3,088 06/13/2013 06:32 AM     SPEP:  Lab Results   Component Value Date/Time    PROT 6.5 03/12/2023 05:17 AM     Hep BsAg:         Lab Results   Component Value Date/Time    HEPBSAG NONREACTIVE 11/13/2022 12:55 PM     Hep C AB:          Lab Results   Component Value Date/Time    HEPCAB NONREACTIVE 09/28/2021 03:15 PM       Urinalysis/Chemistries:      Lab Results   Component Value Date/Time    NITRU NEGATIVE 06/13/2013 10:30 AM    COLORU YELLOW 06/13/2013 10:30 AM    PHUR 7.5 06/13/2013 10:30 AM    WBCUA 0 TO 2 06/13/2013 10:30 AM    RBCUA 10 TO 20 06/13/2013 10:30 AM    MUCUS NOT REPORTED 06/13/2013 10:30 AM    TRICHOMONAS NOT REPORTED 06/13/2013 10:30 AM    YEAST NOT REPORTED 06/13/2013 10:30 AM    BACTERIA FEW 06/13/2013 10:30 AM    SPECGRAV 1.015 06/13/2013 10:30 AM    LEUKOCYTESUR NEGATIVE 06/13/2013 10:30 AM    UROBILINOGEN Normal 06/13/2013 10:30 AM    BILIRUBINUR NEGATIVE 06/13/2013 10:30 AM    GLUCOSEU 3+ 06/13/2013 10:30 AM    KETUA NEGATIVE 06/13/2013 10:30 AM    AMORPHOUS NOT REPORTED 06/13/2013 10:30 AM       Radiology:     CXR: No recent    Assessment:     1. ESRD and typically on a Monday and Wednesday and Friday hemodialysis schedule at [facility] under [facility], although not able to tolerate complete treatments recently secondary to severe penile pain  2. HTN  3. Penile pain, likely ischemic in nature, with recent dorsal slit and biopsy of glans penis about penile mass a couple of weeks ago. Biopsy showing necrosis of fibrovascular tissue in the penis, picture consistent with ischemic injury  4.   Clonus and asterixis along with hyperkalemia and hyperphosphatemia secondary to inability to tolerate his usual complete dialysis treatments resulting in under dialysis and a degree of uremia. Improved  5. Hyperkalemia secondary to poor dialysis. Improving. On Lokelma  6. Hyperphosphatemia  7. Anemia of chronic disease  8. Secondary hyperparathyroidism  9. Bilateral amputee       Plan:   1. Lokelma 5 g daily  2. Discontinue antihypertensives including minoxidil and amlodipine, decrease carvedilol decreased lisinopril   3. Daily Labs  4. Hemodialysis today  5. Will continue to follow    Nutrition   Please ensure that patient is on a renal diet/TF. Avoid nephrotoxic drugs/contrast exposure. We will continue to follow along with you.

## 2023-03-13 NOTE — PLAN OF CARE
Patient resting comfortably in bed. Dr Jasmin Griffith informed patient he will want to do circumcision once swelling in his scrotum decreases. No plans for dialysis today, will resume normal treatment tomorrow. Lotrisone applied to penis, patient states this helps with pain. No other pain meds given today. Insulin given per sliding scale. Patient had one medium sized green bm today in his brief. VSS, call light within reach, safety maintained. Problem: Safety - Adult  Goal: Free from fall injury  Outcome: Progressing     Problem: Discharge Planning  Goal: Discharge to home or other facility with appropriate resources  Outcome: Progressing     Problem: Chronic Conditions and Co-morbidities  Goal: Patient's chronic conditions and co-morbidity symptoms are monitored and maintained or improved  Outcome: Progressing     Problem: Skin/Tissue Integrity  Goal: Absence of new skin breakdown  Description: 1. Monitor for areas of redness and/or skin breakdown  2. Assess vascular access sites hourly  3. Every 4-6 hours minimum:  Change oxygen saturation probe site  4. Every 4-6 hours:  If on nasal continuous positive airway pressure, respiratory therapy assess nares and determine need for appliance change or resting period.   Outcome: Progressing

## 2023-03-13 NOTE — PLAN OF CARE
Patient c/o discomfort in bed. Waffle mattress applied. Patient appeased. Patient given chg bath and zinc paste applied to open areas on buttocks. Cortisone cream applied to infected area. Patient tolerated well. Patient did not sleep well and was continually changing position in bed. Patient called out appropriately for needs. Patient vitals WNL. Patient remained free from falls. Call light and bedside table within reach. Safety measures applied. Will continue to monitor and address patient needs as they arise. Problem: Safety - Adult  Goal: Free from fall injury  3/13/2023 0826 by Meron Madrigal RN  Outcome: Progressing     Problem: Discharge Planning  Goal: Discharge to home or other facility with appropriate resources  3/13/2023 0826 by Meron Madrigal RN  Outcome: Progressing     Problem: Chronic Conditions and Co-morbidities  Goal: Patient's chronic conditions and co-morbidity symptoms are monitored and maintained or improved  3/13/2023 0826 by Meron Madrigal RN  Outcome: Progressing     Problem: Pain  Goal: Verbalizes/displays adequate comfort level or baseline comfort level  3/13/2023 0826 by Meron Madrigal RN  Outcome: Progressing     Problem: Skin/Tissue Integrity  Goal: Absence of new skin breakdown  Description: 1. Monitor for areas of redness and/or skin breakdown  2. Assess vascular access sites hourly  3. Every 4-6 hours minimum:  Change oxygen saturation probe site  4. Every 4-6 hours:  If on nasal continuous positive airway pressure, respiratory therapy assess nares and determine need for appliance change or resting period.   3/13/2023 0826 by Meron Madrigal RN  Outcome: Progressing

## 2023-03-14 LAB
GLUCOSE BLD-MCNC: 169 MG/DL (ref 75–110)
GLUCOSE BLD-MCNC: 172 MG/DL (ref 75–110)
GLUCOSE BLD-MCNC: 202 MG/DL (ref 75–110)
GLUCOSE BLD-MCNC: 226 MG/DL (ref 75–110)
MICROORGANISM SPEC CULT: NORMAL
MICROORGANISM SPEC CULT: NORMAL
SERVICE CMNT-IMP: NORMAL
SERVICE CMNT-IMP: NORMAL
SPECIMEN DESCRIPTION: NORMAL
SPECIMEN DESCRIPTION: NORMAL

## 2023-03-14 PROCEDURE — 2580000003 HC RX 258: Performed by: NURSE PRACTITIONER

## 2023-03-14 PROCEDURE — 99239 HOSP IP/OBS DSCHRG MGMT >30: CPT | Performed by: FAMILY MEDICINE

## 2023-03-14 PROCEDURE — 87185 SC STD ENZYME DETCJ PER NZM: CPT

## 2023-03-14 PROCEDURE — 87076 CULTURE ANAEROBE IDENT EACH: CPT

## 2023-03-14 PROCEDURE — 2060000000 HC ICU INTERMEDIATE R&B

## 2023-03-14 PROCEDURE — 6370000000 HC RX 637 (ALT 250 FOR IP): Performed by: FAMILY MEDICINE

## 2023-03-14 PROCEDURE — 97530 THERAPEUTIC ACTIVITIES: CPT

## 2023-03-14 PROCEDURE — 6370000000 HC RX 637 (ALT 250 FOR IP): Performed by: INTERNAL MEDICINE

## 2023-03-14 PROCEDURE — 97535 SELF CARE MNGMENT TRAINING: CPT

## 2023-03-14 PROCEDURE — 87075 CULTR BACTERIA EXCEPT BLOOD: CPT

## 2023-03-14 PROCEDURE — 97112 NEUROMUSCULAR REEDUCATION: CPT

## 2023-03-14 PROCEDURE — 99232 SBSQ HOSP IP/OBS MODERATE 35: CPT | Performed by: INTERNAL MEDICINE

## 2023-03-14 PROCEDURE — 97129 THER IVNTJ 1ST 15 MIN: CPT

## 2023-03-14 PROCEDURE — 82947 ASSAY GLUCOSE BLOOD QUANT: CPT

## 2023-03-14 PROCEDURE — 87205 SMEAR GRAM STAIN: CPT

## 2023-03-14 PROCEDURE — 87070 CULTURE OTHR SPECIMN AEROBIC: CPT

## 2023-03-14 PROCEDURE — 2500000003 HC RX 250 WO HCPCS: Performed by: INTERNAL MEDICINE

## 2023-03-14 PROCEDURE — 87186 SC STD MICRODIL/AGAR DIL: CPT

## 2023-03-14 PROCEDURE — 87077 CULTURE AEROBIC IDENTIFY: CPT

## 2023-03-14 PROCEDURE — 86403 PARTICLE AGGLUT ANTBDY SCRN: CPT

## 2023-03-14 RX ORDER — CALCIUM ACETATE 667 MG/1
2 CAPSULE ORAL
Qty: 180 CAPSULE | Refills: 1 | Status: SHIPPED | OUTPATIENT
Start: 2023-03-14

## 2023-03-14 RX ORDER — LISINOPRIL 2.5 MG/1
2.5 TABLET ORAL DAILY
Qty: 30 TABLET | Refills: 3 | Status: SHIPPED | OUTPATIENT
Start: 2023-03-15

## 2023-03-14 RX ORDER — CLOTRIMAZOLE AND BETAMETHASONE DIPROPIONATE 10; .64 MG/G; MG/G
CREAM TOPICAL
Qty: 1 EACH | Refills: 0 | Status: SHIPPED | OUTPATIENT
Start: 2023-03-14

## 2023-03-14 RX ORDER — DOXYCYCLINE 100 MG/1
100 CAPSULE ORAL EVERY 12 HOURS SCHEDULED
Qty: 16 CAPSULE | Refills: 0 | Status: SHIPPED | OUTPATIENT
Start: 2023-03-14 | End: 2023-03-22

## 2023-03-14 RX ORDER — GLIPIZIDE 5 MG/1
2.5 TABLET ORAL
Qty: 60 TABLET | Refills: 3 | Status: SHIPPED | OUTPATIENT
Start: 2023-03-14

## 2023-03-14 RX ORDER — LEVOFLOXACIN 500 MG/1
500 TABLET, FILM COATED ORAL EVERY OTHER DAY
Qty: 9 TABLET | Refills: 0 | Status: SHIPPED | OUTPATIENT
Start: 2023-03-16 | End: 2023-04-02

## 2023-03-14 RX ORDER — CLOTRIMAZOLE AND BETAMETHASONE DIPROPIONATE 10; .64 MG/G; MG/G
CREAM TOPICAL 3 TIMES DAILY
Status: DISCONTINUED | OUTPATIENT
Start: 2023-03-14 | End: 2023-03-15 | Stop reason: HOSPADM

## 2023-03-14 RX ORDER — PSEUDOEPHEDRINE HCL 30 MG
100 TABLET ORAL 2 TIMES DAILY
Qty: 60 CAPSULE | Refills: 0 | Status: SHIPPED | OUTPATIENT
Start: 2023-03-14

## 2023-03-14 RX ORDER — METRONIDAZOLE 500 MG/1
500 TABLET ORAL EVERY 12 HOURS SCHEDULED
Qty: 12 TABLET | Refills: 0 | Status: SHIPPED | OUTPATIENT
Start: 2023-03-14 | End: 2023-03-20

## 2023-03-14 RX ADMIN — LISINOPRIL 2.5 MG: 2.5 TABLET ORAL at 09:53

## 2023-03-14 RX ADMIN — SODIUM CHLORIDE, PRESERVATIVE FREE 10 ML: 5 INJECTION INTRAVENOUS at 00:08

## 2023-03-14 RX ADMIN — DOCUSATE SODIUM 100 MG: 100 CAPSULE, LIQUID FILLED ORAL at 22:34

## 2023-03-14 RX ADMIN — METRONIDAZOLE 500 MG: 500 TABLET ORAL at 09:53

## 2023-03-14 RX ADMIN — OXYCODONE AND ACETAMINOPHEN 1 TABLET: 5; 325 TABLET ORAL at 19:11

## 2023-03-14 RX ADMIN — METRONIDAZOLE 500 MG: 500 TABLET ORAL at 00:07

## 2023-03-14 RX ADMIN — CLOTRIMAZOLE AND BETAMETHASONE DIPROPIONATE: 10; .5 CREAM TOPICAL at 00:08

## 2023-03-14 RX ADMIN — DOXYCYCLINE 100 MG: 100 CAPSULE ORAL at 22:34

## 2023-03-14 RX ADMIN — DOXYCYCLINE 100 MG: 100 CAPSULE ORAL at 09:54

## 2023-03-14 RX ADMIN — SODIUM CHLORIDE, PRESERVATIVE FREE 10 ML: 5 INJECTION INTRAVENOUS at 22:35

## 2023-03-14 RX ADMIN — ASPIRIN 81 MG CHEWABLE TABLET 81 MG: 81 TABLET CHEWABLE at 09:53

## 2023-03-14 RX ADMIN — CLONIDINE HYDROCHLORIDE 0.3 MG: 0.3 TABLET ORAL at 22:34

## 2023-03-14 RX ADMIN — PREGABALIN 75 MG: 75 CAPSULE ORAL at 09:54

## 2023-03-14 RX ADMIN — OXYCODONE AND ACETAMINOPHEN 1 TABLET: 5; 325 TABLET ORAL at 02:48

## 2023-03-14 RX ADMIN — APIXABAN 5 MG: 5 TABLET, FILM COATED ORAL at 09:54

## 2023-03-14 RX ADMIN — CLOTRIMAZOLE AND BETAMETHASONE DIPROPIONATE: 10; .5 CREAM TOPICAL at 10:08

## 2023-03-14 RX ADMIN — DOCUSATE SODIUM 100 MG: 100 CAPSULE, LIQUID FILLED ORAL at 09:53

## 2023-03-14 RX ADMIN — DOXYCYCLINE 100 MG: 100 CAPSULE ORAL at 00:07

## 2023-03-14 RX ADMIN — LEVOFLOXACIN 500 MG: 500 TABLET, FILM COATED ORAL at 09:53

## 2023-03-14 RX ADMIN — METRONIDAZOLE 500 MG: 500 TABLET ORAL at 22:34

## 2023-03-14 RX ADMIN — CALCIUM ACETATE 1334 MG: 667 CAPSULE ORAL at 13:06

## 2023-03-14 RX ADMIN — SODIUM CHLORIDE, PRESERVATIVE FREE 10 ML: 5 INJECTION INTRAVENOUS at 10:10

## 2023-03-14 RX ADMIN — OXYCODONE AND ACETAMINOPHEN 1 TABLET: 5; 325 TABLET ORAL at 10:07

## 2023-03-14 RX ADMIN — TRAZODONE HYDROCHLORIDE 50 MG: 50 TABLET ORAL at 02:56

## 2023-03-14 RX ADMIN — ATORVASTATIN CALCIUM 80 MG: 80 TABLET, FILM COATED ORAL at 22:34

## 2023-03-14 RX ADMIN — APIXABAN 5 MG: 5 TABLET, FILM COATED ORAL at 22:34

## 2023-03-14 RX ADMIN — CLOTRIMAZOLE AND BETAMETHASONE DIPROPIONATE: 10; .64 CREAM TOPICAL at 16:00

## 2023-03-14 RX ADMIN — APIXABAN 5 MG: 5 TABLET, FILM COATED ORAL at 00:07

## 2023-03-14 RX ADMIN — CLONIDINE HYDROCHLORIDE 0.3 MG: 0.3 TABLET ORAL at 09:53

## 2023-03-14 RX ADMIN — CALCIUM ACETATE 1334 MG: 667 CAPSULE ORAL at 10:07

## 2023-03-14 RX ADMIN — CALCIUM ACETATE 1334 MG: 667 CAPSULE ORAL at 18:07

## 2023-03-14 RX ADMIN — INSULIN LISPRO 1 UNITS: 100 INJECTION, SOLUTION INTRAVENOUS; SUBCUTANEOUS at 10:06

## 2023-03-14 ASSESSMENT — PAIN DESCRIPTION - DESCRIPTORS: DESCRIPTORS: ACHING

## 2023-03-14 ASSESSMENT — PAIN DESCRIPTION - LOCATION
LOCATION: GROIN
LOCATION: GROIN
LOCATION: LEG;PENIS

## 2023-03-14 ASSESSMENT — PAIN - FUNCTIONAL ASSESSMENT: PAIN_FUNCTIONAL_ASSESSMENT: PREVENTS OR INTERFERES SOME ACTIVE ACTIVITIES AND ADLS

## 2023-03-14 ASSESSMENT — PAIN DESCRIPTION - ONSET: ONSET: ON-GOING

## 2023-03-14 ASSESSMENT — PAIN DESCRIPTION - FREQUENCY: FREQUENCY: INTERMITTENT

## 2023-03-14 ASSESSMENT — PAIN SCALES - GENERAL
PAINLEVEL_OUTOF10: 10
PAINLEVEL_OUTOF10: 9
PAINLEVEL_OUTOF10: 8

## 2023-03-14 ASSESSMENT — ENCOUNTER SYMPTOMS
GASTROINTESTINAL NEGATIVE: 1
RESPIRATORY NEGATIVE: 1

## 2023-03-14 ASSESSMENT — PAIN DESCRIPTION - PAIN TYPE: TYPE: ACUTE PAIN;CHRONIC PAIN

## 2023-03-14 ASSESSMENT — PAIN DESCRIPTION - ORIENTATION: ORIENTATION: RIGHT;LEFT

## 2023-03-14 NOTE — DISCHARGE SUMMARY
Postop penile infection  Chronic pain syndrome opiate dependent  Anxiety/depression  ESRD  Diabetes mellitus  Hypertension  Hyperlipidemia  Anemia of chronic disease  Secondary hyperparathyroidism  Bilateral below-knee amputee  Consults  Infectious disease, , nephrology, , discharge planner, PT/OT  Detailed  28-year-old -American male with history of phimosis underwent dorsal slit recently returns with infection of the foreskin. Foreskin biopsy showed ischemia. Patient was comanaged with urology. He was initially placed on cefepime and vancomycin. In view of ischemic changes on the biopsy, infectious disease recommended complete circumcision however patient expressed reluctance. However subsequently on the date of discharge patient agreed to undergo complete circumcision that will be arranged on outpatient basis. On the date of discharge his inflammatory response is significant improved, pain is improved. He is noted to have a history of chronic pain syndrome he is on opiate and coordination with palliative care  He has been tolerating dialysis well  During inpatient he was seen to have significant hyperglycemia that responded to glucagon, D50, D5 water. Subsequently he remained euglycemic. His oral hypoglycemic was put on hold and covered with a sliding scale  Today on the day history of discharge to SNF, he is afebrile hemodynamically stable, tolerating p.o. well  Discharge condition improved  Disposition to SNF  Medications reconciled. Per infectious disease he will go on Levaquin, doxycycline and Flagyl.   Wound care per   Follow-up with counselor services per plan  More than 30 minutes were spent organizing this discharge  Discharge order placed, medications reconciled, ROSALINE sign  Plan of care discussed with patient and the nursing staff  This note is created with a voice recognition program and while intend to generate a document that accurately reflects the content of the visit, no guarantee can be provided that every mistake has been identified and corrected by editing.

## 2023-03-14 NOTE — PROGRESS NOTES
Renal Progress Note    Patient :  Diaz Tao; 60 y.o. MRN# [id number]  Location:  1821/9534-30  Attending:  Catarino Martinez MD  Admit Date:  3/9/2023   Hospital Day: 5      Subjective:       Patient is seen and examined. He had hemodialysis yesterday. He dialyzes Monday and Wednesday and Friday at [facility] via tunneled catheter, under our group with nephrologist Dr. Samreen Chang. Patient was admitted with penile pain secondary to phimosis and mass on penis, s/p recent dorsal slit and biopsy, biopsy consistent with ischemic injury, showing coagulation necrosis of fibrovascular tissue with acute inflammation. He is now on antibiotics, local wound care continues, there is also consideration he might need circumcision to treat phimosis as the significant inflammation on the foreskin which is batting the glans penis. Due to the pain he was not tolerating dialysis and was cutting treatments short, and he developed asterixis and clonus because of under dialysis, and potassium was 5.6 on admission. He had a more complete dialysis yesterday. Myoclonus improved to an extent. No laboratory data today. Next dialysis tomorrow, 3/15/2023. Blood pressure stable. On room air  No chest pain or shortness of breath or nausea or vomiting. No significant swelling of the extremities. Outpatient Medications:     Medications Prior to Admission: polyethyl glycol-propyl glycol 0.4-0.3 % (SYSTANE) 0.4-0.3 % ophthalmic solution, Place 1 drop into both eyes every 6 hours as needed for Dry Eyes  lidocaine (HM LIDOCAINE PATCH) 4 % external patch, Place 1 patch onto the skin daily Apply to left shoulder. docusate sodium (COLACE) 100 MG capsule, Take 100 mg by mouth daily  lisinopril (PRINIVIL;ZESTRIL) 20 MG tablet, TAKE ONE TABLET BY MOUTH TWICE A DAY IN THE MORNING AND AT BEDTIME  oxyCODONE-acetaminophen (PERCOCET) 7.5-325 MG per tablet, Take 1 tablet by mouth daily as needed for Pain.   diphenhydrAMINE (BENADRYL) 25 MG tablet, Take 25 mg by mouth nightly as needed for Itching  glimepiride (AMARYL) 2 MG tablet, Take 2 mg by mouth every morning (before breakfast)  atorvastatin (LIPITOR) 80 MG tablet, Take 1 tablet by mouth daily  aspirin (ASPIRIN CHILDRENS) 81 MG chewable tablet, Take 1 tablet by mouth daily  apixaban (ELIQUIS) 5 MG TABS tablet, Take 1 tablet by mouth 2 times daily  pregabalin (LYRICA) 75 MG capsule, Take 1 capsule by mouth daily for 30 days.  Max Daily Amount: 75 mg  linagliptin (TRADJENTA) 5 MG tablet, Take 1 tablet by mouth daily  amLODIPine (NORVASC) 5 MG tablet, Take 1 tablet by mouth daily  sucroferric oxyhydroxide (VELPHORO) 500 MG CHEW chewable tablet, Take 2 tablets by mouth 3 times daily (with meals)  Multiple Vitamins-Minerals (RENAPLEX) TABS, Take 1 tablet by mouth daily  traZODone (DESYREL) 50 MG tablet, Take 1 tablet by mouth nightly as needed for Sleep  cloNIDine (CATAPRES) 0.1 MG tablet, Take 3 tablets by mouth 2 times daily  minoxidil (LONITEN) 2.5 MG tablet, Take 1 tablet by mouth 2 times daily  [DISCONTINUED] polyethyl glycol-propyl glycol 0.4-0.3 % (SYSTANE) 0.4-0.3 % ophthalmic solution, Place 1 drop into both eyes as needed for Dry Eyes (Patient taking differently: Place 1 drop into both eyes every 6 hours as needed for Dry Eyes)    Current Medications:     Scheduled Meds:    metroNIDAZOLE  500 mg Oral 2 times per day    docusate sodium  100 mg Oral BID    calcium acetate  2 capsule Oral TID     lisinopril  2.5 mg Oral Daily    sodium zirconium cyclosilicate  5 g Oral Once    doxycycline monohydrate  100 mg Oral 2 times per day    levoFLOXacin  500 mg Oral Every Other Day    clotrimazole-betamethasone   Topical BID    insulin lispro  0-4 Units SubCUTAneous TID WC    insulin lispro  0-4 Units SubCUTAneous Nightly    apixaban  5 mg Oral BID    sodium chloride flush  5-40 mL IntraVENous 2 times per day    fentanNYL  50 mcg IntraVENous Once    aspirin  81 mg Oral Daily    atorvastatin  80 mg Oral Daily cloNIDine  0.3 mg Oral BID    [Held by provider] glipiZIDE  2.5 mg Oral QAM AC    pregabalin  75 mg Oral Daily     Continuous Infusions:    sodium chloride 5 mL/hr at 23 1219    dextrose       PRN Meds:  sodium chloride flush, sodium chloride, diphenhydrAMINE, oxyCODONE-acetaminophen, polyethyl glycol-propyl glycol 0.4-0.3 %, traZODone, glucose, dextrose bolus **OR** dextrose bolus, glucagon (rDNA), dextrose, HYDROmorphone, anticoagulant sodium citrate, anticoagulant sodium citrate    Input/Output:       I/O last 3 completed shifts: In: 510 [I.V.:10]  Out: 1500 . Patient Vitals for the past 96 hrs (Last 3 readings):   Weight   23 0420 219 lb 4.8 oz (99.5 kg)   23 0631 222 lb 3 oz (100.8 kg)   03/10/23 1939 205 lb 7.5 oz (93.2 kg)       Vital Signs:   Temperature:  Temp: 98.6 °F (37 °C)  TMax:   Temp (24hrs), Av.1 °F (36.7 °C), Min:97.5 °F (36.4 °C), Max:98.6 °F (37 °C)    Respirations:  Resp: 16  Pulse:   Heart Rate: 79  BP:    BP: 137/71  BP Range: Systolic (35VGM), EWD:821 , Min:81 , HFZ:117       Diastolic (15FFH), KVI:47, Min:48, Max:83      Physical Examination:     General:  AAO x 3, speaking in full sentences, no accessory muscle use. Eyes:   Pale conjunctivae, no icterus  Neck:   No JVD, midline trachea and no accessory muscle use  Chest:              Good bilateral air entry and clear to auscultation bilaterally without wheezes or rales or rhonchi  Cardiac:  Regular rate and rhythm with positive S1 and S2 and no rubs  Abdomen: Soft, non-tender, not distended, active bowel sounds  :   No suprapubic or flank tenderness. Neuro:              AAO x 3, No FND. SKIN:  No rashes, good skin turgor.   Extremities:  Bilateral above the knee amputation, tunneled hemodialysis catheter left chest    Labs:       Recent Labs     23  0517   WBC 7.5   RBC 4.03*   HGB 9.1*   HCT 29.7*   MCV 73.7*   MCH 22.6*   MCHC 30.6   RDW 19.4*      MPV 11.3      BMP:   Recent Labs 03/12/23  0517 03/13/23  0834   * 134*   K 5.0 5.3   CL 93* 95*   CO2 23 23   BUN 51* 64*   CREATININE 7.42* 9.28*   GLUCOSE 278* 161*   CALCIUM 9.1 9.3      Phosphorus:     Recent Labs     03/13/23  0834   PHOS 6.6*     Magnesium:    No results for input(s): MG in the last 72 hours. Albumin:    Recent Labs     03/12/23  0517   LABALBU 3.1*     BNP:      Lab Results   Component Value Date/Time    BNP 3,088 06/13/2013 06:32 AM     SPEP:  Lab Results   Component Value Date/Time    PROT 6.5 03/12/2023 05:17 AM     Hep BsAg:         Lab Results   Component Value Date/Time    HEPBSAG NONREACTIVE 11/13/2022 12:55 PM     Hep C AB:          Lab Results   Component Value Date/Time    HEPCAB NONREACTIVE 09/28/2021 03:15 PM       Urinalysis/Chemistries:      Lab Results   Component Value Date/Time    NITRU NEGATIVE 06/13/2013 10:30 AM    COLORU YELLOW 06/13/2013 10:30 AM    PHUR 7.5 06/13/2013 10:30 AM    WBCUA 0 TO 2 06/13/2013 10:30 AM    RBCUA 10 TO 20 06/13/2013 10:30 AM    MUCUS NOT REPORTED 06/13/2013 10:30 AM    TRICHOMONAS NOT REPORTED 06/13/2013 10:30 AM    YEAST NOT REPORTED 06/13/2013 10:30 AM    BACTERIA FEW 06/13/2013 10:30 AM    SPECGRAV 1.015 06/13/2013 10:30 AM    LEUKOCYTESUR NEGATIVE 06/13/2013 10:30 AM    UROBILINOGEN Normal 06/13/2013 10:30 AM    BILIRUBINUR NEGATIVE 06/13/2013 10:30 AM    GLUCOSEU 3+ 06/13/2013 10:30 AM    KETUA NEGATIVE 06/13/2013 10:30 AM    AMORPHOUS NOT REPORTED 06/13/2013 10:30 AM       Radiology:     CXR: No recent    Assessment:     1. ESRD and typically on a Monday and Wednesday and Friday hemodialysis schedule at [facility], although not able to tolerate complete treatments recently secondary to severe penile pain  2. HTN  3. Penile pain, likely ischemic in nature, with recent dorsal slit and biopsy of glans penis about penile mass a couple of weeks ago.   Biopsy showing necrosis of fibrovascular tissue in the penis, picture consistent with ischemic injury  4. Clonus and asterixis along with hyperkalemia and hyperphosphatemia secondary to inability to tolerate his usual complete dialysis treatments resulting in under dialysis and a degree of uremia. Symptoms are improving. 5.  Hyperkalemia secondary to poor dialysis. Improving. On Lokelma  6. Hyperphosphatemia  7. Anemia of chronic disease  8. Secondary hyperparathyroidism  9. Bilateral amputee       Plan:   1. Lokelma 5 g daily  2. Stable antihypertensives with the patient having stable blood pressure today  3. Labs in the a.m. as ordered  4. Hemodialysis tomorrow     Nutrition   Please ensure that patient is on a renal diet/TF. Avoid nephrotoxic drugs/contrast exposure. Mignon Wood M.D.   [facility]  3/14/2023

## 2023-03-14 NOTE — PROGRESS NOTES
[facility]   Urology Progress Note            Subjective: Follow-up balanitis penile swelling    Patient Vitals for the past 24 hrs:   BP Temp Temp src Pulse Resp SpO2 Weight   03/14/23 0420 130/83 97.5 °F (36.4 °C) Oral 83 18 97 % 219 lb 4.8 oz (99.5 kg)   03/13/23 2337 130/76 98.1 °F (36.7 °C) Oral 80 18 95 % --   03/13/23 2258 117/70 98.2 °F (36.8 °C) -- 76 16 -- --   03/13/23 2255 -- -- -- 76 -- -- --   03/13/23 2230 -- -- -- 75 -- -- --   03/13/23 2200 -- -- -- 71 -- -- --   03/13/23 2130 -- -- -- 80 -- -- --   03/13/23 2100 -- -- -- 77 -- -- --   03/13/23 2030 -- -- -- 79 -- -- --   03/13/23 2000 -- -- -- 74 -- -- --   03/13/23 1945 -- -- -- 78 -- -- --   03/13/23 1938 (!) 117/58 -- -- 76 16 -- --   03/13/23 1855 (!) 81/48 -- -- 67 -- -- --   03/13/23 1544 94/63 98.2 °F (36.8 °C) -- 72 16 95 % --   03/13/23 1145 91/64 97.7 °F (36.5 °C) Axillary 83 16 96 % --   03/13/23 0933 123/76 -- -- -- -- -- --   03/13/23 0726 130/72 98.4 °F (36.9 °C) Oral 74 18 96 % --   03/13/23 0631 -- -- -- -- -- -- 222 lb 3 oz (100.8 kg)       Intake/Output Summary (Last 24 hours) at 3/14/2023 0532  Last data filed at 3/13/2023 2258  Gross per 24 hour   Intake 510 ml   Output 1500 ml   Net -990 ml       Recent Labs     03/11/23  0506 03/12/23  0517   WBC 11.6* 7.5   HGB 10.7* 9.1*   HCT 34.8* 29.7*   MCV 73.6* 73.7*    164     Recent Labs     03/11/23  0506 03/12/23  0517 03/13/23  0834   * 129* 134*   K 5.3 5.0 5.3   CL 94* 93* 95*   CO2 23 23 23   PHOS  --   --  6.6*   BUN 35* 51* 64*   CREATININE 6.52* 7.42* 9.28*       No results for input(s): COLORU, PHUR, LABCAST, WBCUA, RBCUA, MUCUS, TRICHOMONAS, YEAST, BACTERIA, CLARITYU, SPECGRAV, LEUKOCYTESUR, UROBILINOGEN, BILIRUBINUR, BLOODU in the last 72 hours.     Invalid input(s): NITRATE, GLUCOSEUKETONESUAMORPHOUS    Additional Lab/culture results:    Physical Exam: Patient seen in conjunction with nursing staff  Repeat culture obtained from the foreskin and coronal sulcus aerobic and anaerobic  Patient alert oriented sitting at bedside, discussed with the patient circumcision at the present time he is totally agreeable he has been feeling better but still have significant penile shaft edema that need to improve otherwise there will be significant wound separation at the time of circumcision    Interval Imaging Findings:    Impression:    Patient Active Problem List   Diagnosis    Hyperlipidemia    Essential hypertension    ESRD on hemodialysis ([address] MWF    Insomnia    Chronic bilateral low back pain with bilateral sciatica    Controlled diabetes mellitus type 2 with complications ([address]    S/P bilateral below knee amputation ([facility] [address]    Long term (current) use of antithrombotics/antiplatelets    Chronic use of opiate drugs therapeutic purposes    Coronary artery disease involving native coronary artery    Anemia    Hypoglycemia    Inability to walk    Phimosis    Wound infection    Penile pain       Plan:  We will follow along with primary service    Derry Klinefelter, MD  5:32 AM 3/14/2023

## 2023-03-14 NOTE — PROGRESS NOTES
Comprehensive Nutrition Assessment    Type and Reason for Visit:  Wound    Nutrition Recommendations/Plan:   Continue ADULT DIET; Regular; 4 carb choices (60 gm/meal); Low Sodium (2 gm); Low Potassium (Less than 3000 mg/day); Low Phosphorus (Less than 1000 mg)  Add double portions of protein at meals for wound healing  Monitor p.o intakes, wound healing status and labs     Malnutrition Assessment:  Malnutrition Status: At risk for malnutrition (Comment) (03/14/23 1228)        Nutrition Assessment:    Patient assessed for wound. Patient has a stage II pressure ulcer to coccyx/ inner buttocks. Patient also is status post penile dorsal slit and biopsy (2/2/23). Patient has a medical histor for bilateral BKA and ESRD. Patient receives hemodialysis Monday, Wednesday and Friday. Patient reports he has a good appetite and is eating well. Due to hemodialysis and pressure ulcer to buttocks patient needs increased protein intakes. Patient declined Nepro oral nutrition supplement due to extra fluids in the diet. Patient states he is self- limiting fluid intake because he wants swelling in bilateral stumps to go down so he can fit into prosthetics. Patient agreed to double portions of meat at meals. Will monitor p.o intakes, wound healing status and labs. Nutrition Related Findings:    Edema: +1 non-pitting BLE, constipation. PMH:  Bilateral BKA; ESRD- HD (Mon, Wed, Fri) Wound Type: Stage II (coccyx/ inner buttocks)       Current Nutrition Intake & Therapies:    Average Meal Intake: %  Average Supplements Intake: None Ordered  ADULT DIET; Regular; 4 carb choices (60 gm/meal); Low Sodium (2 gm); Low Potassium (Less than 3000 mg/day); Low Phosphorus (Less than 1000 mg)    Anthropometric Measures:  Height: 5' 11\" (180.3 cm)  Ideal Body Weight (IBW): 172 lbs (78 kg)       Current Body Weight: 219 lb (99.3 kg), 127.3 % IBW.  Weight Source: Bed Scale  Current BMI (kg/m2): 30.6        Weight Adjustment For: Amputation  Total Adjusted Percentage (Calculated): 11.8  Adjusted Ideal Body Weight (lbs) (Calculated): 151.7 lbs  Adjusted Ideal Body Weight (kg) (Calculated): 68.95 kg  Adjusted % Ideal Body Weight (Calculated): 144.4  Adjusted BMI (kg/m2) (Calculated): 34.2  BMI Categories: Obese Class 1 (BMI 30.0-34. 9)    Estimated Daily Nutrient Needs:  Energy Requirements Based On: Kcal/kg  Weight Used for Energy Requirements: Adjusted  Energy (kcal/day): 6556-1249 kcal (25-28 kcal/kg)  Weight Used for Protein Requirements: Adjusted  Protein (g/day):  gm of protein (1.3-1.5 gm/kg)     Nutrition Diagnosis:   Increased nutrient needs related to increase demand for energy/nutrients as evidenced by wounds, dialysis (stage II pressure ulcer)    Nutrition Interventions:   Food and/or Nutrient Delivery: Continue Current Diet  Nutrition Education/Counseling: Education not indicated  Coordination of Nutrition Care: Continue to monitor while inpatient       Goals:     Goals: PO intake 75% or greater       Nutrition Monitoring and Evaluation:      Food/Nutrient Intake Outcomes: Food and Nutrient Intake  Physical Signs/Symptoms Outcomes: Fluid Status or Edema, Biochemical Data, Skin, Weight    Discharge Planning:    Continue current diet         Renuka SANDY, RDN, LDN  Lead Clinical Dietitian  RD Office Phone [phone]

## 2023-03-14 NOTE — PLAN OF CARE
Problem: Safety - Adult  Goal: Free from fall injury  3/14/2023 0335 by Xiao Rojo RN  Outcome: Progressing    Problem: Discharge Planning  Goal: Discharge to home or other facility with appropriate resources  3/14/2023 0335 by Xiao Rojo RN  Outcome: Progressing  Flowsheets (Taken 3/14/2023 0100)  Discharge to home or other facility with appropriate resources:   Identify barriers to discharge with patient and caregiver   Arrange for needed discharge resources and transportation as appropriate   Identify discharge learning needs (meds, wound care, etc)   Refer to discharge planning if patient needs post-hospital services based on physician order or complex needs related to functional status, cognitive ability or social support system    Problem: Skin/Tissue Integrity  Goal: Absence of new skin breakdown  Description: 1.  Monitor for areas of redness and/or skin breakdown  2.  Assess vascular access sites hourly  3.  Every 4-6 hours minimum:  Change oxygen saturation probe site  4.  Every 4-6 hours:  If on nasal continuous positive airway pressure, respiratory therapy assess nares and determine need for appliance change or resting period.  3/14/2023 0335 by Xiao Rojo RN  Outcome: Progressing  3/13/2023 2016 by Cynthia Hastings RN  Outcome: Progressing     Problem: Chronic Conditions and Co-morbidities  Goal: Patient's chronic conditions and co-morbidity symptoms are monitored and maintained or improved  3/14/2023 0335 by Xiao Rojo RN  Outcome: Progressing  Flowsheets (Taken 3/14/2023 0100)  Care Plan - Patient's Chronic Conditions and Co-Morbidity Symptoms are Monitored and Maintained or Improved:   Monitor and assess patient's chronic conditions and comorbid symptoms for stability, deterioration, or improvement   Collaborate with multidisciplinary team to address chronic and comorbid conditions and prevent exacerbation or deterioration   Update acute care plan with appropriate goals if chronic or comorbid  symptoms are exacerbated and prevent overall improvement and discharge    Problem: Pain  Goal: Verbalizes/displays adequate comfort level or baseline comfort level  3/14/2023 0335 by Jose Weems RN  Outcome: Progressing  Verbalizes/displays adequate comfort level or baseline comfort level:   Encourage patient to monitor pain and request assistance   Assess pain using appropriate pain scale   Administer analgesics based on type and severity of pain and evaluate response   Implement non-pharmacological measures as appropriate and evaluate response   Consider cultural and social influences on pain and pain management   Notify Licensed Independent Practitioner if interventions unsuccessful or patient reports new pain

## 2023-03-14 NOTE — FLOWSHEET NOTE
2nd unit RBC's initiated. Writer attended patient for the first 15 minutes of tx;pt tolerating well with no s/s of tx reaction noted. VSS. Will continue to monitor

## 2023-03-14 NOTE — CARE COORDINATION
Social work: Spoke to [facility], only transport available tonight is at 9:00PM, which is too late for the facility to accept admission. SW arranged for transport for Wednesday after dialysis at 3:00PM.  Facility and patient informed, agreeable to plan.

## 2023-03-14 NOTE — PROGRESS NOTES
[facility]  Progress Note    Minnette Skiff  MRN: [id number]  Date: 3/14/2023  LOS: 5     Reason for F/U :   Penile pain, surgical wound. Impression :   Penile pain likely secondary to ischemic penile tissue   recent biopsy of the glans penis with coagulation necrosis of fibrovascular tissue with acute inflammation  Phimosis   s/p dorsal split of the foreskin 2/23/2022   Healing foreskin surgical wound with some soft tissue swelling  The wound culture has grown out Proteus mirabilis, Klebsiella pneumonia, Bacteroides species x2. 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  Peripheral arterial disease   status post bilateral below the knee amputation    Recommendations: The patient does have a wound and soft tissue swelling on the foreskin related to the recent surgery and there is some drainage there though no overt/major infection  He continues on oral antibiotics with levofloxacin 500 mg every 48 hours, doxycycline 100 mg twice a day and metronidazole for the Bacteroides  Continue local wound care and the plan remains for surgery at urology's discretion    Infection Control Recommendations:   Universal precautions    Discharge Planning:   Patient will need Midline Catheter Insertion/ PICC line Insertion: No  Patient will need: Home IV , [place],  SNF,  LTAC: Undetermined  Patient willneed outpatient wound care: Yes    Medical Decision making / Summary of Stay:   Minnette Skiff is a 60y.o.-year-old male who was initially admitted on 3/9/2023. Cecilia Cogan has multiple medical problems including diabetes mellitus type 2, hypertension, hyperlipidemia, coronary artery disease, chronic renal failure on hemodialysis Monday Wednesday Friday, degenerative disc disease, erectile dysfunction, peripheral arterial disease status post bilateral below the knee amputations.      The patient was previously admitted with severe penile pain was seen by urology for phimosis and also noted to have a mass on the glans penis for which he was taken to the operating room and had a dorsal slit of the foreskin and biopsy of the glans penis lesion on 2023. The surgical biopsy did show coagulation necrosis of fibrovascular tissue with acute inflammation. The patient was at hemodialysis as was sent into the emergency department by EMS from the dialysis center and he did not receive his dialysis treatment secondary to significant penile pain. Patient reports 10 out of 10 pain does not report any associated fevers or chills. The patient was evaluated and noted to have some penile discharge, penile swelling and concern was for penile abscess/cellulitis and was admitted started on antimicrobial therapy and I was asked to evaluate and help with antibiotic choice. Current evaluation:3/14/2023    /71   Pulse 79   Temp 98.6 °F (37 °C) (Oral)   Resp 16   Ht 5' 11\" (1.803 m)   Wt 219 lb 4.8 oz (99.5 kg)   SpO2 91%   BMI 30.59 kg/m²     Temperature Range: Temp: 98.6 °F (37 °C) Temp  Av.1 °F (36.7 °C)  Min: 97.5 °F (36.4 °C)  Max: 98.6 °F (37 °C)  The patient is seen and evaluated at bedside and he is awake and alert in no acute distress. No subjective fever or chills. No abdominal pain nausea vomiting or diarrhea. Continues to have some penile pain. He has had some drainage from the penile area. He reported some pain and pressure required a lot of repositioning overnight. Review of Systems   Constitutional: Negative. HENT: Negative. Respiratory: Negative. Cardiovascular: Negative. Gastrointestinal: Negative. Genitourinary:  Positive for penile pain. Musculoskeletal: Negative. Skin:  Positive for wound. Neurological: Negative. Psychiatric/Behavioral: Negative. Physical Examination :     Physical Exam  Constitutional:       Appearance: He is well-developed. HENT:      Head: Normocephalic and atraumatic. Cardiovascular:      Rate and Rhythm: Normal rate. Heart sounds: Normal heart sounds. No friction rub. No gallop. Comments: There is a left anterior chest tunneled hemodialysis catheter in place  Pulmonary:      Effort: Pulmonary effort is normal.      Breath sounds: Normal breath sounds. No wheezing. Abdominal:      General: Bowel sounds are normal.      Palpations: Abdomen is soft. There is no mass. Tenderness: There is no abdominal tenderness. Genitourinary:     Comments: The penile area was not examined today  Musculoskeletal:      Cervical back: Normal range of motion and neck supple. Comments: Bilateral below the knee amputations. Lymphadenopathy:      Cervical: No cervical adenopathy. Skin:     General: Skin is warm and dry. Neurological:      Mental Status: He is alert and oriented to person, place, and time. Laboratory data:   I have independently reviewed the followinglabs:  CBC with Differential:   Recent Labs     03/12/23  0517   WBC 7.5   HGB 9.1*   HCT 29.7*          BMP:   Recent Labs     03/12/23  0517 03/13/23  0834   * 134*   K 5.0 5.3   CL 93* 95*   CO2 23 23   BUN 51* 64*   CREATININE 7.42* 9.28*       Hepatic Function Panel:   Recent Labs     03/12/23  0517   PROT 6.5   LABALBU 3.1*   BILITOT 0.5   ALKPHOS 169*   ALT 16   AST 24           Lab Results   Component Value Date/Time    PROCAL 0.91 11/13/2022 12:45 AM     Lab Results   Component Value Date/Time    .4 09/07/2017 07:18 AM    .1 09/06/2017 01:46 PM     Lab Results   Component Value Date    SEDRATE 69 (H) 05/26/2022         Lab Results   Component Value Date/Time    DDIMER 1.77 07/21/2020 05:50 PM     No results found for: FERRITIN  No results found for: LDH  No results found for: FIBRINOGEN    No results found for requested labs within last 30 days.      Lab Results   Component Value Date/Time    COVID19 Not Detected 11/13/2022 02:01 AM    COVID19 Not Detected 09/02/2022 02:25 AM    COVID19 Not Detected 01/04/2021 01:54 PM       No results for input(s): VANCBRIANAOUGH in the last 72 hours. Imaging Studies:   No new imaging    Cultures:     Culture, Anaerobic and Aerobic [[id number]] (Abnormal)  Collected: 03/09/23 0955   Order Status: Completed Specimen: Wound Updated: 03/12/23 6726    Specimen Description . WOUND    Special Requests . PENIS    Direct Exam NO NEUTROPHILS SEEN     MODERATE GRAM POSITIVE COCCI IN PAIRS Abnormal      FEW GRAM POSITIVE RODS Abnormal      FEW GRAM NEGATIVE RODS Abnormal      RARE GRAM POSITIVE COCCI IN CLUSTERS Abnormal     Culture PROTEUS MIRABILIS LIGHT GROWTH Abnormal      KLEBSIELLA PNEUMONIAE LIGHT GROWTH Abnormal      Bacteroides ovatus/xylanisolvens MODERATE GROWTH BETA LACTAMASE POSITIVE Abnormal      BACTEROIDES THETAIOTAOMICRON MODERATE GROWTH BETA LACTAMASE POSITIVE Abnormal      Susceptibility    Proteus mirabilis (1)    Antibiotic Interpretation Microscan Method Status    ampicillin Sensitive <=2 BACTERIAL SUSCEPTIBILITY PANEL WOJCIECH Preliminary    ceFAZolin Sensitive <=4 BACTERIAL SUSCEPTIBILITY PANEL WOJCIECH Preliminary    cefTRIAXone Sensitive <=0.25 BACTERIAL SUSCEPTIBILITY PANEL WOJCIECH Preliminary    gentamicin Sensitive <=1 BACTERIAL SUSCEPTIBILITY PANEL WOJCIECH Preliminary    levofloxacin Sensitive <=0.12 BACTERIAL SUSCEPTIBILITY PANEL WOJCIECH Preliminary    piperacillin-tazobactam Sensitive <=4 BACTERIAL SUSCEPTIBILITY PANEL WOJCIECH Preliminary    tobramycin Sensitive <=1 BACTERIAL SUSCEPTIBILITY PANEL WOJCIECH Preliminary    trimethoprim-sulfamethoxazole Sensitive <=20 BACTERIAL SUSCEPTIBILITY PANEL WOJCIECH Preliminary      Klebsiella pneumoniae (2)    Antibiotic Interpretation Microscan Method Status    ampicillin Resistant >=32 BACTERIAL SUSCEPTIBILITY PANEL WOJCIECH Preliminary    ceFAZolin Sensitive <=4 BACTERIAL SUSCEPTIBILITY PANEL WOJCIECH Preliminary    cefTRIAXone Sensitive <=0.25 BACTERIAL SUSCEPTIBILITY PANEL WOJCIECH Preliminary    Confirmatory Extended Spectrum Beta-Lactamase Sensitive NEGATIVE BACTERIAL SUSCEPTIBILITY PANEL WOJCIECH Preliminary    gentamicin Sensitive <=1 BACTERIAL SUSCEPTIBILITY PANEL WOJCIECH Preliminary    levofloxacin Sensitive <=0.12 BACTERIAL SUSCEPTIBILITY PANEL WOJCIECH Preliminary    piperacillin-tazobactam Sensitive 8 BACTERIAL SUSCEPTIBILITY PANEL WOJCIECH Preliminary    tobramycin Sensitive <=1 BACTERIAL SUSCEPTIBILITY PANEL WOJCIECH Preliminary    trimethoprim-sulfamethoxazole Sensitive <=20 BACTERIAL SUSCEPTIBILITY PANEL WOJCIECH Preliminary     Condensed View         Specimen Collected: 03/09/23 09:55 EST Last Resulted: 03/12/23 11:55 EDT        Culture, Blood 1 [[id number]] Collected: 03/10/23 1516   Order Status: Completed Specimen: Blood Updated: 03/13/23 1841    Specimen Description . BLOOD    Special Requests LAC 20ML    Culture NO GROWTH 3 DAYS   Culture, Blood 1 [[id number]] Collected: 03/10/23 1516   Order Status: Completed Specimen: Blood Updated: 03/13/23 1841    Specimen Description . BLOOD    Special Requests LH 20ML    Culture NO GROWTH 3 DAYS   Blood Culture 1 [[id number]] Collected: 03/09/23 1005   Order Status: Completed Specimen: Blood Updated: 03/13/23 1236    Specimen Description . BLOOD    Special Requests LAC 13ML    Culture NO GROWTH 4 DAYS   Culture, Blood 2 [[id number]] Collected: 03/09/23 1012   Order Status: Completed Specimen: Blood Updated: 03/13/23 1236    Specimen Description . BLOOD    Special Requests LFA 20ML    Culture NO GROWTH 4 DAYS     Medications:      metroNIDAZOLE  500 mg Oral 2 times per day    docusate sodium  100 mg Oral BID    calcium acetate  2 capsule Oral TID WC    lisinopril  2.5 mg Oral Daily    sodium zirconium cyclosilicate  5 g Oral Once    doxycycline monohydrate  100 mg Oral 2 times per day    levoFLOXacin  500 mg Oral Every Other Day    clotrimazole-betamethasone   Topical BID    insulin lispro  0-4 Units SubCUTAneous TID WC    insulin lispro  0-4 Units SubCUTAneous Nightly    apixaban  5 mg Oral BID    sodium chloride flush  5-40 mL IntraVENous 2 times per day    fentanNYL  50 mcg IntraVENous Once    aspirin  81 mg Oral Daily    atorvastatin  80 mg Oral Daily    cloNIDine  0.3 mg Oral BID    [Held by provider] glipiZIDE  2.5 mg Oral QAM AC    pregabalin  75 mg Oral Daily       Electronically signed by Aurora Babin MD on 3/14/2023 at 8:56 AM      [facility]  Aurora Babin MD  [employer] messaging  OFFICE: [phone]    Thank you for allowing us to participate in the care of this patient. Please call with questions. This note is created with the assistance of a speech recognition program.  While intending to generate a document that actually reflects the content of the visit, the document can still have some errors including those of syntax and sound a like substitutions which may escape proof reading. In such instances, actual meaning can be extrapolated by contextual diversion.

## 2023-03-14 NOTE — FLOWSHEET NOTE
Pt very demanding all night and wanting to be constantly repositioned. Writer and staff attempted to reposition pt many times and pt not satisfied with multiple attempts.  Pt requesting waffle mattress to be removed form bed

## 2023-03-14 NOTE — PROGRESS NOTES
Occupational Therapy  Facility/Department: [facility] PROGRESSIVE CARE  Daily Treatment Note  NAME: Ivan Segura  : 1960  MRN: [id number]    Date of Service: 3/14/2023    MIKE Zhao reports patient is medically stable for therapy treatment this date. Chart reviewed prior to treatment and patient is agreeable for therapy. All lines intact and patient positioned comfortably at end of treatment. All patient needs addressed prior to ending therapy session. Pt currently functioning below baseline. Recommend daily inpatient skilled therapy at time of discharge to maximize long term outcomes and prevent re-admission. Please refer to AM-PAC score for current level of function. Discharge Recommendations:  Patient would benefit from continued therapy after discharge  OT Equipment Recommendations  Equipment Needed:  (CTA)      Patient Diagnosis(es): The primary encounter diagnosis was Penile pain. A diagnosis of Wound infection was also pertinent to this visit. Assessment    Assessment: Pt had Fair+ tolerance for activity this session, vital signs WFLs throughout treatment session and pt is progressing towards goals. Pt motivated to sit OOB in recliner. Facilitated lateral transfer from bed>recliner this date, w/ pt requiring extended time d/t decreased functional activity tolerance, L shoulder dysfunction, pain, and visual deficits/confusion on orientation of chair to bed, despite Max multimodal cues. Continued skilled OT services are indicated at this time to maximize this pt's safety and IND with self care and to facilitate safe return to PLOF as able.   Activity Tolerance: Patient tolerated treatment well  Discharge Recommendations: Patient would benefit from continued therapy after discharge  Equipment Needed:  (CTA)      Plan   Occupational Therapy Plan  Times Per Week: 3-4x per week, 1x per day as andrews  Current Treatment Recommendations: Strengthening;Balance training;Functional mobility training; Endurance training;Patient/Caregiver education & training; Safety education & training;Equipment evaluation, education, & procurement;Self-Care / ADL;Positioning;Home management training;Cognitive/Perceptual training;Cognitive reorientation     Restrictions  Restrictions/Precautions  Restrictions/Precautions: General Precautions  Required Braces or Orthoses?: No  Position Activity Restriction  Other position/activity restrictions: up with assist, left chest port. RUE AV fistula, left UE IV, B BKA    Subjective   Subjective  Subjective: Pt seated upright in bed upon entry to room, agreeable to participation in therapy session and excited to sit OOB into chair. Pt stated, \"It feels marvelous! \" to be out of hospital room this session. Pt c/o intermittent pain in \"private area\", rating it 8/10 at its worse. Orientation  Overall Orientation Status: Within Functional Limits  Orientation Level: Oriented X4  Cognition  Overall Cognitive Status: Exceptions  Arousal/Alertness: Delayed responses to stimuli  Following Commands: Follows one step commands with repetition; Follows one step commands with increased time; Inconsistently follows commands  Attention Span: Attends with cues to redirect  Memory: Decreased recall of precautions;Decreased recall of recent events;Decreased short term memory  Safety Judgement: Decreased awareness of need for assistance;Decreased awareness of need for safety  Problem Solving: Decreased awareness of errors;Assistance required to generate solutions;Assistance required to correct errors made;Assistance required to identify errors made;Assistance required to implement solutions  Insights: Decreased awareness of deficits  Initiation: Requires cues for some  Sequencing: Requires cues for some  Cognition Comment: Facilitated cognitive treatment w/ pt this date while OOB in wheeled recliner chair.  Pt challenged on STM, finding objects in environment, naming objects appropriately, and way finding back to room. Pt able to recall room numbner/location w/ mod cues, able to recall 2/3 words provided after delay. With Max cues, pt able to recall final word. Pt's w/ significant visual deficits limiting object finding & naming in environment, unable to identify/name a 6\" picture of a dog at a distance of 12\". Objective       Bed Mobility Training  Bed Mobility Training: Yes  Overall Level of Assistance: Stand-by assistance; Additional time  Interventions: Safety awareness training;Verbal cues  Rolling: Stand-by assistance  Supine to Sit: Stand-by assistance  Sit to Supine: Other (comment) (Not observed, pt retired to bedside recliner at session end.)  Scooting: Stand-by assistance  Balance  Sitting: Intact  Standing:  (Not appropriate at this time.)  Transfer Training  Transfer Training: Yes  Overall Level of Assistance: Assist X2  Interventions: Verbal cues; Safety awareness training;Visual cues; Tactile cues; Weight shifting training/pressure relief  Bed to Chair: Assist X2    Transfer Comments: Facilitated lateral transfer from bed>recliner this session, w/ recliner positioned so that pt could shift weight posteriorly and laterally get to chair. Instruction on transfer tech and sequencing of steps provided to pt, pt demo'ing Poor return of instruction initially d/t visual deficits and confusion of orientation of chair to bed. Pt required extended time to complete transfer, slowly shifting and scooting one hip backwards at a time. Pt limited by L shoulder dysfunction and pain at wound in penis. Max cueing provided throughout to ensure safety, especially as pt transferred over small gap between bed and chair. Pt has a hx of B amputations and his center of gravity is elevated, making a LOB in all directions a HIGH risk.  Pt moved slowly and demo'd Fair- trunk control, however given his altered center of gravity, visual deficits and confusion/cognition at time of transfer, would have required ModAx2 at any time to correct balance and maintain safety. ModA was provided to stabilize chair during transfer. ADL  Additional Comments: No needs this session. Safety Devices  Type of Devices: All fall risk precautions in place;Call light within reach;Nurse notified; Chair alarm in place; Left in chair  Restraints  Restraints Initially in Place: No     Patient Education  Education Given To: Patient  Education Provided: Role of Therapy; ADL Adaptive Strategies;Orientation; Fall Prevention Strategies; Plan of Care;Transfer Training;Energy Conservation;Home Exercise Program;Precautions  Education Method: Demonstration;Verbal  Barriers to Learning: Cognition;Vision  Education Outcome: Continued education needed;Verbalized understanding    AM-PAC: 16/24    Goals  Short Term Goals  Time Frame for Short Term Goals: by discharge, pt to demo  Short Term Goal 1: CGA for bed mob tech with bed rails/controls as needed  Short Term Goal 2: pt to tolerate sitting EOB ~20+min to complete simple grooming/self-care tasks  Short Term Goal 3: CGA for feeding tasks with AE as needed  Short Term Goal 4: Min A for toileting tasks with AD/AE as needed  Short Term Goal 5: pt/family to be IND with EC/WS, fall prevention tech, pressure relief education, discharge recommendations with use of handouts as needed  Long Term Goals  Long Term Goal 1: Pt to demo lateral functional transfers/slide board transfers w/ MinAx2 and Good safety. (Added by BRIANA Nayak/L on 3/14/2023)  Patient Goals   Patient goals : o go home       Therapy Time   First Session Second Session Group Co-treatment   Time In [phone]       Time Out [id number]       Minutes 52 13        Total Tx Time: 62 minutes    Co-treatment with PT warranted secondary to decreased safety and independence requiring 2 skilled therapy professionals to address individual discipline's goals.  OT addressing preparation for ADL transfer, sitting balance for increased ADL performance, sitting/activity tolerance, functional reaching, environmental safety/scanning, fall prevention, ability to sequence and follow directions, bed mobility tech, and functional UE strength. Second Session  Writer returned for second session this afternoon to facilitate transfer back to bed from recliner. Pt demo'd improved mobility w/ transfer back to bed, able to laterally weight shift & scoot hips. Pt additionally utilized a sheet secured to bedrail to pull self w/ UE into bed. Pt left at session end w/ bed alarm armed & in place, call light in reach, and all needs met.      Nava Blandon, OT

## 2023-03-14 NOTE — PLAN OF CARE
Problem: Safety - Adult  Goal: Free from fall injury  3/13/2023 2016 by Matthieu Mitchell RN  Outcome: Progressing  Flowsheets (Taken 3/13/2023 1000)  Free From Fall Injury: Instruct family/caregiver on patient safety  3/13/2023 0826 by Jillian Scheuermann, RN  Outcome: Progressing     Problem: Discharge Planning  Goal: Discharge to home or other facility with appropriate resources  3/13/2023 2016 by Matthieu Mitchell RN  Outcome: Progressing  Flowsheets (Taken 3/13/2023 0900)  Discharge to home or other facility with appropriate resources:   Identify barriers to discharge with patient and caregiver   Arrange for needed discharge resources and transportation as appropriate   Identify discharge learning needs (meds, wound care, etc)   Refer to discharge planning if patient needs post-hospital services based on physician order or complex needs related to functional status, cognitive ability or social support system  3/13/2023 0826 by Jillian Scheuermann, RN  Outcome: Progressing     Problem: Chronic Conditions and Co-morbidities  Goal: Patient's chronic conditions and co-morbidity symptoms are monitored and maintained or improved  3/13/2023 2016 by Matthieu Mitchell RN  Outcome: Progressing  Flowsheets (Taken 3/13/2023 0900)  Care Plan - Patient's Chronic Conditions and Co-Morbidity Symptoms are Monitored and Maintained or Improved:   Monitor and assess patient's chronic conditions and comorbid symptoms for stability, deterioration, or improvement   Collaborate with multidisciplinary team to address chronic and comorbid conditions and prevent exacerbation or deterioration   Update acute care plan with appropriate goals if chronic or comorbid symptoms are exacerbated and prevent overall improvement and discharge  3/13/2023 0826 by Jillian Scheuermann, RN  Outcome: Progressing     Problem: Pain  Goal: Verbalizes/displays adequate comfort level or baseline comfort level  3/13/2023 2016 by Matthieu Mitchell RN  Outcome: Progressing  Flowsheets  Taken 3/13/2023 1544  Verbalizes/displays adequate comfort level or baseline comfort level:   Encourage patient to monitor pain and request assistance   Assess pain using appropriate pain scale   Implement non-pharmacological measures as appropriate and evaluate response   Administer analgesics based on type and severity of pain and evaluate response   Notify Licensed Independent Practitioner if interventions unsuccessful or patient reports new pain  Taken 3/13/2023 1145  Verbalizes/displays adequate comfort level or baseline comfort level:   Encourage patient to monitor pain and request assistance   Assess pain using appropriate pain scale   Administer analgesics based on type and severity of pain and evaluate response   Implement non-pharmacological measures as appropriate and evaluate response   Consider cultural and social influences on pain and pain management   Notify Licensed Independent Practitioner if interventions unsuccessful or patient reports new pain  Taken 3/13/2023 0933  Verbalizes/displays adequate comfort level or baseline comfort level:   Encourage patient to monitor pain and request assistance   Assess pain using appropriate pain scale   Administer analgesics based on type and severity of pain and evaluate response   Implement non-pharmacological measures as appropriate and evaluate response   Consider cultural and social influences on pain and pain management   Notify Licensed Independent Practitioner if interventions unsuccessful or patient reports new pain  3/13/2023 0826 by Liliane Wilhelm RN  Outcome: Progressing     Problem: Skin/Tissue Integrity  Goal: Absence of new skin breakdown  Description: 1. Monitor for areas of redness and/or skin breakdown  2. Assess vascular access sites hourly  3. Every 4-6 hours minimum:  Change oxygen saturation probe site  4.   Every 4-6 hours:  If on nasal continuous positive airway pressure, respiratory therapy assess nares and determine need for appliance change or resting period.   3/13/2023 2016 by Nidia Kuo RN  Outcome: Progressing  3/13/2023 0826 by Adine Closs, RN  Outcome: Progressing

## 2023-03-14 NOTE — PLAN OF CARE
Patient resting comfortably in bed. Discharge order in, plan is to leave at 3pm tomorrow to [facility]. Patient aware. Lortison cream applied to penis wound twice today, frequency increased to TID. Patient has been up to chair for several hours. Mentation is much more lucid today. Plan is for circumcision outpatient once swelling decreases. Dialysis tomorrow first thing in the morning. VSS, call light within reach, safety maintained        Problem: Safety - Adult  Goal: Free from fall injury  Outcome: Progressing     Problem: Discharge Planning  Goal: Discharge to home or other facility with appropriate resources  Outcome: Progressing     Problem: Chronic Conditions and Co-morbidities  Goal: Patient's chronic conditions and co-morbidity symptoms are monitored and maintained or improved  Outcome: Progressing     Problem: Pain  Goal: Verbalizes/displays adequate comfort level or baseline comfort level  Outcome: Progressing     Problem: Skin/Tissue Integrity  Goal: Absence of new skin breakdown  Description: 1. Monitor for areas of redness and/or skin breakdown  2. Assess vascular access sites hourly  3. Every 4-6 hours minimum:  Change oxygen saturation probe site  4. Every 4-6 hours:  If on nasal continuous positive airway pressure, respiratory therapy assess nares and determine need for appliance change or resting period.   Outcome: Progressing     Problem: Nutrition Deficit:  Goal: Optimize nutritional status  Outcome: Progressing

## 2023-03-14 NOTE — PROGRESS NOTES
Physical Therapy  Facility/Department: [facility] PROGRESSIVE CARE  Daily Treatment Note  NAME: Juan Dang  : 1960  MRN: [id number]    Date of Service: 3/14/2023    Discharge Recommendations:  Patient would benefit from continued therapy after discharge Pt currently functioning below baseline. Recommend daily inpatient skilled therapy at time of discharge to maximize long term outcomes and prevent re-admission. Please refer to AM-PAC score for current level of function. Am pac . Patient Diagnosis(es): The primary encounter diagnosis was Penile pain. A diagnosis of Wound infection was also pertinent to this visit. Assessment   Assessment: Pt presents with significant improved cognition and ability to follow commands this date; continued to be functioning below baseline as significant decreased ability to mobilize as compared to one month ago. HR monitored and was appropriate during treatmnet - 84 baseline -> 110 with activity. Will continue to progress towards goals. Activity Tolerance: Patient tolerated treatment well     Plan    Physcial Therapy Plan  General Plan: 5-7 times per week  Specific Instructions for Next Treatment: lateral transfers if patient cognition improved and patient can follow commands. Current Treatment Recommendations: Strengthening;Balance training;Transfer training; Endurance training;Neuromuscular re-education; Safety education & training;Equipment evaluation, education, & procurement; Therapeutic activities; Positioning     Restrictions  Restrictions/Precautions  Restrictions/Precautions: General Precautions  Required Braces or Orthoses?: No  Position Activity Restriction  Other position/activity restrictions: up with assist, left chest port. RUE AV fistula, left UE IV, B BKA     Subjective    Subjective  Subjective: Pt states \"It feels marvelous\" to be out of his room today.   Orientation  Overall Orientation Status: Within Functional Limits     Objective   Vitals     Bed Mobility Training  Bed Mobility Training: Yes  Overall Level of Assistance: Stand-by assistance; Additional time  Interventions: Safety awareness training;Verbal cues  Rolling: Stand-by assistance  Supine to Sit: Stand-by assistance  Sit to Supine: Stand-by assistance  Balance  Sitting: Intact    Transfer Training: lateral transfer from bed to recliner -> patient instruction on technique and sequence from bed to chair process took > 20 minutes with moving a 1/2 inch at a time. Pt also with confusion on where the chair was and patient turned 180 degrees to get in chair. Pt was confusion on where the chair was so intense tactile and verbal cueing required so patient didn't fall through space from bed to recliner. Lt shoulder dysfunction and shoulder pain limiting ability to progress. Encouraged increased rotation to hip during scoot phase to decrease pressure. Pt able to weight shift himself fully back into chair and reports he feels comfortable at end of treatment. Due to patients albina LE amputations - he is top heavy and at times has difficulty controlling his weight; this coupled with his severe vision loss and his confusion/ cognition, patient could become a 2 person mod assist at any time. Pt able to advance slow enough that he was stable for the most part throughout the transfer. Twice the chair was pushed away from the bed during patient pushing with his residual limbs to scoot to the chair. Mod assist required to stabilize. Will continue to progress. Patient with improved mobility with chair to bed lateral transfer. Verbal cueing only with patient taking ~ 13 minutes to complete. Lateral shifting and pushing w/ Rt UE and pulling w/ Lt UE to get back into the bed. Safety Devices  Type of Devices: All fall risk precautions in place;Call light within reach;Nurse notified; Chair alarm in place; Left in chair     Co-treatment with OT warranted secondary to decreased safety and independence requiring 2 skilled therapy professionals to address individual discipline's goals. PT addressing transfer training and postural control in sitting. Goals  Short Term Goals  Time Frame for Short Term Goals: 12 visits  Short Term Goal 1: Patient will be indep with bed mobility. Short Term Goal 2: Patient will tolerate 30 minutes of ther-ex and ther-act. Short Term Goal 3: Patient will be assessed for transfers as appropriate. Short Term Goal 4: Patient will be positioned to prevent skin breakdown. Patient Goals   Patient Goals : pt goal is to get back to his home.     Education  Patient Education  Education Given To: Patient  Education Provided: Role of Therapy;Plan of Care  Education Provided Comments: safety w/ transfers  Education Method: Verbal  Barriers to Learning: Cognition  Education Outcome: Continued education needed    Therapy Time   Individual   Time In 1127   Time Out 1207   Minutes 40      344 [phone] 402 pm return treatment   JEANNE SALGADO, PT

## 2023-03-14 NOTE — FLOWSHEET NOTE
03/13/23 2258   Vital Signs   /70   Temp 98.2 °F (36.8 °C)   Heart Rate 76   Resp 16   Post-Hemodialysis Assessment   Post-Treatment Procedures Blood returned;Catheter capped, clamped with Citrate x 2 ports   Machine Disinfection Process Acid/Vinegar Clean;Heat Disinfect; Exterior Machine Disinfection   Rinseback Volume (ml) 250 ml   Blood Volume Processed (Liters) 66.9 l/min   Dialyzer Clearance Moderately streaked   Duration of Treatment (minutes) 180 minutes   Heparin Amount Administered During Treatment (mL) 0 mL   Hemodialysis Intake (ml) 500 ml   Hemodialysis Output (ml) 1500 ml   NET Removed (ml) 1000   Tolerated Treatment Fair   Interventions Taken Medication;Ultrafiltration goal decreased;Ultrafiltration stopped   Patient Response to Treatment Patient completed 3 hour hemodialysis treatment, patient had 10 mg Midodrine PO prior to tx for BP support,  patient was unable to get any fluid removed due to hypotension, HD CVC maintained good blood flow throughout treatment, HD CVC sodium citrate locked, post tx vitals stable, post tx report given to patient's primary nurse, Juan Figueroa RN.    Time Off 0951   Patient Disposition Return to room

## 2023-03-14 NOTE — PROGRESS NOTES
HEMODIALYSIS PRE-TREATMENT NOTE    Patient Identifiers prior to treatment: Name, , MRN    Isolation Required:  Yes                   Isolation Type: Contact       Hepatitis status:                           Date Drawn                             Result  Hepatitis B Surface Antigen 8-15-22    Negative                   Hepatitis B Surface Antibody 22 70         Hepatitis B Core Antibody 22 Negative          How was Hepatitis Status verified: Hard copy of patient's outpatient records from [address] [place] [address]     Hemodialysis orders verified: Yes    Access Within normal limits: Yes    Pre-Assessment completed: Yes    Pre-dialysis report received from: Soraya Richardson [facility]            Time: 3214

## 2023-03-15 VITALS
DIASTOLIC BLOOD PRESSURE: 95 MMHG | WEIGHT: 228.84 LBS | TEMPERATURE: 97.5 F | SYSTOLIC BLOOD PRESSURE: 191 MMHG | BODY MASS INDEX: 32.04 KG/M2 | RESPIRATION RATE: 16 BRPM | HEART RATE: 85 BPM | OXYGEN SATURATION: 97 % | HEIGHT: 71 IN

## 2023-03-15 LAB
ABSOLUTE EOS #: 0.18 K/UL (ref 0–0.44)
ABSOLUTE IMMATURE GRANULOCYTE: 0.03 K/UL (ref 0–0.3)
ABSOLUTE LYMPH #: 1.7 K/UL (ref 1.1–3.7)
ABSOLUTE MONO #: 1.36 K/UL (ref 0.1–1.2)
ANION GAP SERPL CALCULATED.3IONS-SCNC: 13 MMOL/L (ref 9–17)
BASOPHILS # BLD: 0 % (ref 0–2)
BASOPHILS ABSOLUTE: 0.03 K/UL (ref 0–0.2)
BUN SERPL-MCNC: 50 MG/DL (ref 8–23)
BUN/CREAT BLD: 7 (ref 9–20)
CALCIUM SERPL-MCNC: 9.4 MG/DL (ref 8.6–10.4)
CHLORIDE SERPL-SCNC: 93 MMOL/L (ref 98–107)
CO2 SERPL-SCNC: 26 MMOL/L (ref 20–31)
CREAT SERPL-MCNC: 6.97 MG/DL (ref 0.7–1.2)
EOSINOPHILS RELATIVE PERCENT: 2 % (ref 1–4)
GFR SERPL CREATININE-BSD FRML MDRD: 8 ML/MIN/1.73M2
GLUCOSE BLD-MCNC: 131 MG/DL (ref 75–110)
GLUCOSE BLD-MCNC: 141 MG/DL (ref 75–110)
GLUCOSE BLD-MCNC: 181 MG/DL (ref 75–110)
GLUCOSE SERPL-MCNC: 139 MG/DL (ref 70–99)
HCT VFR BLD AUTO: 31.7 % (ref 40.7–50.3)
HGB BLD-MCNC: 9.9 G/DL (ref 13–17)
IMMATURE GRANULOCYTES: 0 %
LYMPHOCYTES # BLD: 19 % (ref 24–43)
MCH RBC QN AUTO: 22.6 PG (ref 25.2–33.5)
MCHC RBC AUTO-ENTMCNC: 31.2 G/DL (ref 28.4–34.8)
MCV RBC AUTO: 72.2 FL (ref 82.6–102.9)
MICROORGANISM SPEC CULT: NORMAL
MICROORGANISM SPEC CULT: NORMAL
MONOCYTES # BLD: 15 % (ref 3–12)
NRBC AUTOMATED: 0 PER 100 WBC
PDW BLD-RTO: 19.3 % (ref 11.8–14.4)
PLATELET # BLD AUTO: 213 K/UL (ref 138–453)
PMV BLD AUTO: ABNORMAL FL (ref 8.1–13.5)
POTASSIUM SERPL-SCNC: 5 MMOL/L (ref 3.7–5.3)
RBC # BLD: 4.39 M/UL (ref 4.21–5.77)
RBC # BLD: ABNORMAL 10*6/UL
SEG NEUTROPHILS: 63 % (ref 36–65)
SEGMENTED NEUTROPHILS ABSOLUTE COUNT: 5.51 K/UL (ref 1.5–8.1)
SERVICE CMNT-IMP: NORMAL
SERVICE CMNT-IMP: NORMAL
SODIUM SERPL-SCNC: 132 MMOL/L (ref 135–144)
SPECIMEN DESCRIPTION: NORMAL
SPECIMEN DESCRIPTION: NORMAL
WBC # BLD AUTO: 8.8 K/UL (ref 3.5–11.3)

## 2023-03-15 PROCEDURE — 2500000003 HC RX 250 WO HCPCS: Performed by: INTERNAL MEDICINE

## 2023-03-15 PROCEDURE — 6370000000 HC RX 637 (ALT 250 FOR IP): Performed by: INTERNAL MEDICINE

## 2023-03-15 PROCEDURE — 82947 ASSAY GLUCOSE BLOOD QUANT: CPT

## 2023-03-15 PROCEDURE — 36415 COLL VENOUS BLD VENIPUNCTURE: CPT

## 2023-03-15 PROCEDURE — 85025 COMPLETE CBC W/AUTO DIFF WBC: CPT

## 2023-03-15 PROCEDURE — 2580000003 HC RX 258: Performed by: NURSE PRACTITIONER

## 2023-03-15 PROCEDURE — 99232 SBSQ HOSP IP/OBS MODERATE 35: CPT | Performed by: NURSE PRACTITIONER

## 2023-03-15 PROCEDURE — 80048 BASIC METABOLIC PNL TOTAL CA: CPT

## 2023-03-15 PROCEDURE — 90935 HEMODIALYSIS ONE EVALUATION: CPT

## 2023-03-15 PROCEDURE — 6370000000 HC RX 637 (ALT 250 FOR IP): Performed by: FAMILY MEDICINE

## 2023-03-15 RX ADMIN — CLOTRIMAZOLE AND BETAMETHASONE DIPROPIONATE: 10; .64 CREAM TOPICAL at 09:46

## 2023-03-15 RX ADMIN — METRONIDAZOLE 500 MG: 500 TABLET ORAL at 09:40

## 2023-03-15 RX ADMIN — CALCIUM ACETATE 1334 MG: 667 CAPSULE ORAL at 09:41

## 2023-03-15 RX ADMIN — CLOTRIMAZOLE AND BETAMETHASONE DIPROPIONATE: 10; .64 CREAM TOPICAL at 15:48

## 2023-03-15 RX ADMIN — Medication 2 ML: at 17:08

## 2023-03-15 RX ADMIN — DOXYCYCLINE 100 MG: 100 CAPSULE ORAL at 09:40

## 2023-03-15 RX ADMIN — OXYCODONE AND ACETAMINOPHEN 1 TABLET: 5; 325 TABLET ORAL at 18:07

## 2023-03-15 RX ADMIN — ASPIRIN 81 MG CHEWABLE TABLET 81 MG: 81 TABLET CHEWABLE at 09:41

## 2023-03-15 RX ADMIN — PREGABALIN 75 MG: 75 CAPSULE ORAL at 09:41

## 2023-03-15 RX ADMIN — CALCIUM ACETATE 1334 MG: 667 CAPSULE ORAL at 12:20

## 2023-03-15 RX ADMIN — APIXABAN 5 MG: 5 TABLET, FILM COATED ORAL at 09:41

## 2023-03-15 RX ADMIN — DOCUSATE SODIUM 100 MG: 100 CAPSULE, LIQUID FILLED ORAL at 09:40

## 2023-03-15 RX ADMIN — CLONIDINE HYDROCHLORIDE 0.3 MG: 0.3 TABLET ORAL at 18:08

## 2023-03-15 RX ADMIN — OXYCODONE AND ACETAMINOPHEN 1 TABLET: 5; 325 TABLET ORAL at 12:26

## 2023-03-15 RX ADMIN — SODIUM CHLORIDE, PRESERVATIVE FREE 10 ML: 5 INJECTION INTRAVENOUS at 09:42

## 2023-03-15 ASSESSMENT — PAIN SCALES - GENERAL
PAINLEVEL_OUTOF10: 8
PAINLEVEL_OUTOF10: 9

## 2023-03-15 ASSESSMENT — PAIN DESCRIPTION - DESCRIPTORS
DESCRIPTORS: DISCOMFORT;SORE
DESCRIPTORS: DISCOMFORT;SORE

## 2023-03-15 ASSESSMENT — ENCOUNTER SYMPTOMS
GASTROINTESTINAL NEGATIVE: 1
RESPIRATORY NEGATIVE: 1

## 2023-03-15 ASSESSMENT — PAIN DESCRIPTION - LOCATION
LOCATION: PENIS
LOCATION: PENIS

## 2023-03-15 NOTE — FLOWSHEET NOTE
03/15/23 0649   Treatment Team Notification   Reason for Communication Critical results   Type of Critical Result Laboratory   Critical POC Result Type Creatinine  (6.97)   Team Member Name Jerman   Treatment Team Role Consulting Provider   Method of Communication Secure Message   Response No new orders   Notification Time [phone] spoke with Dr. Chastity Pat who stated he does not want to be notified for creatinine.

## 2023-03-15 NOTE — PROGRESS NOTES
[facility]  Progress Note    Hany Moctezuma  MRN: [id number]  Date: 3/15/2023  LOS: 6     Reason for F/U :   Penile pain, surgical wound    Impression :   Penile pain likely secondary to ischemic penile tissue  Recent biopsy of the glans penis with coagulation necrosis of fibrovascular tissue with acute inflammation  Phimosis  Status post dorsal slit of the foreskin 2/23/2023  Healing foreskin surgical wound with some soft tissue swelling  The wound culture has grown out Proteus Mirabella's, Klebsiella pneumoniae, Bacteroides species x2  End-stage renal disease on hemodialysis  Diabetes mellitus type 2 with multiple complications  Peripheral arterial disease  Status post bilateral below the knee amputation    Recommendations: The patient continues on levofloxacin every 48 hours, doxycycline twice daily as well as metronidazole for the Bacteroides isolated on the culture  Surgical plans at the discretion of the urology team  Continue local wound care    Infection Control Recommendations:   Universal precautions    Discharge Planning:   Estimated Length of IV antimicrobials:   Patient will need Midline Catheter Insertion/ PICC line Insertion: No  Patient will need: Home IV , [place],  SNF,  LTAC: Undetermined  Patient willneed outpatient wound care: No    Medical Decision making / Summary of Stay:   Hany Moctezuma is a 60y.o.-year-old male who was initially admitted on 3/9/2023. Rebecca Drake has multiple medical problems including diabetes mellitus type 2, hypertension, hyperlipidemia, coronary artery disease, chronic renal failure on hemodialysis Monday Wednesday Friday, degenerative disc disease, erectile dysfunction, peripheral arterial disease status post bilateral below the knee amputations.      The patient was previously admitted with severe penile pain was seen by urology for phimosis and also noted to have a mass on the glans penis for which he was taken to the operating room and had a dorsal slit of the foreskin and biopsy of the glans penis lesion on 2023. The surgical biopsy did show coagulation necrosis of fibrovascular tissue with acute inflammation. The patient was at hemodialysis as was sent into the emergency department by EMS from the dialysis center and he did not receive his dialysis treatment secondary to significant penile pain. Patient reports 10 out of 10 pain does not report any associated fevers or chills. The patient was evaluated and noted to have some penile discharge, penile swelling and concern was for penile abscess/cellulitis and was admitted started on antimicrobial therapy and I was asked to evaluate and help with antibiotic choice. Current evaluation:3/15/2023    /80   Pulse 72   Temp 97.6 °F (36.4 °C) (Oral)   Resp 16   Ht 5' 11\" (1.803 m)   Wt 229 lb (103.9 kg)   SpO2 97%   BMI 31.94 kg/m²     Temperature Range: Temp: 97.6 °F (36.4 °C) Temp  Av.4 °F (36.9 °C)  Min: 97.6 °F (36.4 °C)  Max: 98.6 °F (37 °C)    The patient was seen and evaluated at bedside with RN  He denies any needs at this time, states pain has improved  He is eating breakfast, denies needs    Review of Systems   Constitutional: Negative. HENT: Negative. Respiratory: Negative. Cardiovascular: Negative. Gastrointestinal: Negative. Genitourinary: Negative. Musculoskeletal: Negative. Skin: Negative. Neurological: Negative. Psychiatric/Behavioral: Negative. Physical Examination :     Physical Exam  Constitutional:       Appearance: Normal appearance. He is normal weight. HENT:      Head: Normocephalic and atraumatic. Pulmonary:      Effort: Pulmonary effort is normal. No respiratory distress. Genitourinary:     Comments: Wound along the dorsal aspect of the penile foreskin  Musculoskeletal:      Comments: Bilateral below the knee amputations   Skin:     General: Skin is warm. Neurological:      General: No focal deficit present. Mental Status: He is alert. Mental status is at baseline. Psychiatric:         Mood and Affect: Mood normal.         Behavior: Behavior normal.         Thought Content: Thought content normal.       Laboratory data:   I have independently reviewed the followinglabs:  CBC with Differential:   Recent Labs     03/15/23  0550   WBC 8.8   HGB 9.9*   HCT 31.7*      LYMPHOPCT 19*   MONOPCT 15*     BMP:   Recent Labs     03/13/23  0834 03/15/23  0550   * 132*   K 5.3 5.0   CL 95* 93*   CO2 23 26   BUN 64* 50*   CREATININE 9.28* 6.97*     Hepatic Function Panel: No results for input(s): PROT, LABALBU, BILIDIR, IBILI, BILITOT, ALKPHOS, ALT, AST in the last 72 hours. Lab Results   Component Value Date/Time    PROCAL 0.91 11/13/2022 12:45 AM     Lab Results   Component Value Date/Time    .4 09/07/2017 07:18 AM    .1 09/06/2017 01:46 PM     Lab Results   Component Value Date    SEDRATE 71 (H) 05/26/2022         Lab Results   Component Value Date/Time    DDIMER 1.77 07/21/2020 05:50 PM     No results found for: FERRITIN  No results found for: LDH  No results found for: FIBRINOGEN    No results found for requested labs within last 30 days. Lab Results   Component Value Date/Time    COVID19 Not Detected 11/13/2022 02:01 AM    COVID19 Not Detected 09/02/2022 02:25 AM    COVID19 Not Detected 01/04/2021 01:54 PM       No results for input(s): VANCOTROUGH in the last 72 hours. Imaging Studies:   No new imaging    Cultures:     Culture, Blood 1 [[id number]] Collected: 03/10/23 1516   Order Status: Completed Specimen: Blood Updated: 03/14/23 1841    Specimen Description . BLOOD    Special Requests LAC 20ML    Culture NO GROWTH 4 DAYS   Culture, Blood 1 [[id number]] Collected: 03/10/23 1516   Order Status: Completed Specimen: Blood Updated: 03/14/23 1841    Specimen Description . BLOOD    Special Requests LH 20ML    Culture NO GROWTH 4 DAYS   Culture, Anaerobic and Aerobic [[id number]] (Abnormal) Collected: 03/14/23 1230   Order Status: Completed Specimen: Perineum Updated: 03/14/23 1424    Specimen Description . PERINEUM    Direct Exam RARE NEUTROPHILS Abnormal      RARE GRAM NEGATIVE RODS Abnormal     Culture PENDING   Blood Culture 1 [[id number]] Collected: 03/09/23 1005   Order Status: Completed Specimen: Blood Updated: 03/14/23 1246    Specimen Description . BLOOD    Special Requests LAC 13ML    Culture NO GROWTH 5 DAYS   Culture, Blood 2 [[id number]] Collected: 03/09/23 1012   Order Status: Completed Specimen: Blood Updated: 03/14/23 1245    Specimen Description . BLOOD    Special Requests LFA 20ML    Culture NO GROWTH 5 DAYS   Culture, Anaerobic and Aerobic [[id number]] (Abnormal)  Collected: 03/09/23 0955   Order Status: Completed Specimen: Wound Updated: 03/14/23 0834    Specimen Description . WOUND    Special Requests . PENIS    Direct Exam NO NEUTROPHILS SEEN     MODERATE GRAM POSITIVE COCCI IN PAIRS Abnormal      FEW GRAM POSITIVE RODS Abnormal      FEW GRAM NEGATIVE RODS Abnormal      RARE GRAM POSITIVE COCCI IN CLUSTERS Abnormal     Culture PROTEUS MIRABILIS LIGHT GROWTH Abnormal      KLEBSIELLA PNEUMONIAE LIGHT GROWTH Abnormal      Bacteroides ovatus/xylanisolvens MODERATE GROWTH BETA LACTAMASE POSITIVE Abnormal      BACTEROIDES THETAIOTAOMICRON MODERATE GROWTH BETA LACTAMASE POSITIVE Abnormal        Medications:      clotrimazole-betamethasone   Topical TID    metroNIDAZOLE  500 mg Oral 2 times per day    docusate sodium  100 mg Oral BID    calcium acetate  2 capsule Oral TID WC    lisinopril  2.5 mg Oral Daily    sodium zirconium cyclosilicate  5 g Oral Once    doxycycline monohydrate  100 mg Oral 2 times per day    levoFLOXacin  500 mg Oral Every Other Day    insulin lispro  0-4 Units SubCUTAneous TID WC    insulin lispro  0-4 Units SubCUTAneous Nightly    apixaban  5 mg Oral BID    sodium chloride flush  5-40 mL IntraVENous 2 times per day    fentanNYL  50 mcg IntraVENous Once    aspirin  81 mg Oral Daily    atorvastatin  80 mg Oral Daily    cloNIDine  0.3 mg Oral BID    [Held by provider] glipiZIDE  2.5 mg Oral QAM AC    pregabalin  75 mg Oral Daily       Electronically signed by AURORA Masterson CNP on 3/15/2023 at 7:25 AM      [facility]  Lilia Paulino, [address] messaging  OFFICE: [phone]    Thank you for allowing us to participate in the care of this patient. Please call with questions. This note is created with the assistance of a speech recognition program.  While intending to generate a document that actually reflects the content of the visit, the document can still have some errors including those of syntax and sound a like substitutions which may escape proof reading. In such instances, actual meaning can be extrapolated by contextual diversion.

## 2023-03-15 NOTE — PLAN OF CARE
Problem: Safety - Adult  Goal: Free from fall injury  3/15/2023 1101 by Carina Chisholm RN  Outcome: Progressing  Pt fall risk, fall band present, falling star, safety alarm activated and in use as needed. Hourly rounding performed. Pt encouraged to use call light. See Humberto Antoine fall risk assessment.

## 2023-03-15 NOTE — PROGRESS NOTES
HEMODIALYSIS PRE-TREATMENT NOTE    Patient Identifiers prior to treatment: Name, , MRN    Isolation Required: Yes                      Isolation Type: Contact MRSA       (please document if patient is being managed as a PUI/COVID-19 patient)        Hepatitis status:                           Date Drawn                             Result  Hepatitis B Surface Antigen 22     Negative                     Hepatitis B Surface Antibody 22 87.34        Hepatitis B Core Antibody 22 Reactive          How was Hepatitis Status verified: Immune per Epic     Was a copy of the labs you documented provided to facility for the patient's chart:     Hemodialysis orders verified: Yes, Dr. Lito Pierce Within normal limits ( I.e. s/s of infection,...): Tunneled CVC left chest wall. Arterial clamp broken and secured with a plastic hemo clamp.       Pre-Assessment completed: Yes    Pre-dialysis report received from: Bj Tinsley RN                      Time: [phone]

## 2023-03-15 NOTE — PROGRESS NOTES
Patient had an outburst during dialysis treatment because HD nurse didn't take him off \"right away\" when patient wanted, due to her caring for another patient. Patient started screaming and threw his cup of water. Patient taken off dialysis by HD nurse and returned to room. Support provided and patient starting to relax and be cooperative. BP is elevated 191/95 and transportation is here to discharge patient. Call out to nephology and Dr. Arlen Keenan to ask what they would like writer to do. BP meds were held for dialysis and patient due for pain meds at 1830. Dr. Carlton Sheldon replied to give clonidine. Dr. Arlen Keenan called and states okay to give clonidine and percocet early and proceed with discharge. No need to postpone discharge per Dr. Arlen Keenan.

## 2023-03-15 NOTE — PROGRESS NOTES
Physical Therapy  DATE: 3/15/2023    NAME: Juan Dang  MRN: [id number]   : 1960    Patient not seen this date for Physical Therapy due to:      [] Cancel by RN or physician due to:    [x] Hemodialysis    [] Critical Lab Value Level     [] Blood transfusion in progress    [] Acute or unstable cardiovascular status   _MAP < 55 or more than >115  _HR < 40 or > 130    [] Acute or unstable pulmonary status   -FiO2 > 60%   _RR < 5 or >40    _O2 sats < 85%    [] Strict Bedrest    [] Off Unit for surgery or procedure    [] Off Unit for testing       [] Pending imaging to R/O fracture    [] Refusal by Patient      [] Other      [] PT being discontinued at this time. Patient independent. No further needs. [] PT being discontinued at this time as the patient has been transferred to hospice care. No further needs.       Corby Hatch, PT

## 2023-03-15 NOTE — CARE COORDINATION
Social work: Patient to [employer] to [employer] via [address]  at [employer].  # for RN report: [phone]. Completed ROSALINE faxed to [phone]. Informed RN, pt, and facility of dc time, agreeable to plan.

## 2023-03-15 NOTE — PROGRESS NOTES
Occupational Therapy  DATE: 3/15/2023    NAME: Damien Box  MRN: [id number]   : 1960    Patient not seen this date for Occupational Therapy due to:      [] Cancel by RN or physician due to:    [x] Hemodialysis. OT to continue to follow. [] Critical Lab Value Level     [] Blood transfusion in progress    [] Acute or unstable cardiovascular status   _MAP < 55 or more than >115  _HR < 40 or > 130    [] Acute or unstable pulmonary status   -FiO2 > 60%   _RR < 5 or >40    _O2 sats < 85%    [] Strict Bedrest    [] Off Unit for surgery or procedure    [] Off Unit for testing       [] Pending imaging to R/O fracture    [] Refusal by Patient      [] Other      [] OT being discontinued at this time. Patient independent. No further needs. [] OT being discontinued at this time as the patient has been transferred to hospice care. No further needs.       Kandi Holly, OT

## 2023-03-15 NOTE — PLAN OF CARE
Patient wound flushed. Patient refused Lotrisone cream application. Patient ate dinner later and finished 3/4 of the plate. Patient is worried about constipation and was reminded of the stool softener he has with nightly medications. Patient satisfied with stool softener. Patient did not request pain medication on this shift. Patient voiced his concern about the lack of care he will receive when he returns to his previous place of residence. Patient stated \"they won't take care of me the way you people do\". Patient rested in a sitting position for the majority of the night and leaned against the bed rail with a pillow for the remainder of the evening. Vitals WNL. Patient has no c/o chest pain. Patient remains free from falls. Safety measures applied. Call light and bedside table within reach. Will continue to monitor and address patient needs as they arise. Problem: Safety - Adult  Goal: Free from fall injury  3/15/2023 0614 by Aida Navas RN  Outcome: Progressing     Problem: Discharge Planning  Goal: Discharge to home or other facility with appropriate resources  3/15/2023 0614 by Aida Navas RN  Outcome: Progressing     Problem: Chronic Conditions and Co-morbidities  Goal: Patient's chronic conditions and co-morbidity symptoms are monitored and maintained or improved  3/15/2023 0614 by Aida Navas RN  Outcome: Progressing     Problem: Pain  Goal: Verbalizes/displays adequate comfort level or baseline comfort level  3/15/2023 0614 by Aida Navas RN  Outcome: Progressing     Problem: Skin/Tissue Integrity  Goal: Absence of new skin breakdown  Description: 1. Monitor for areas of redness and/or skin breakdown  2. Assess vascular access sites hourly  3. Every 4-6 hours minimum:  Change oxygen saturation probe site  4. Every 4-6 hours:  If on nasal continuous positive airway pressure, respiratory therapy assess nares and determine need for appliance change or resting period.   3/15/2023 0079 by Tomas Moulds, RN  Outcome: Progressing

## 2023-03-15 NOTE — PROGRESS NOTES
Nurse from [facility] called for report. Report given and updated patient's transportation scheduled for discharge at 5:30 p.m. after Dialysis. All questions answered.

## 2023-03-15 NOTE — PROGRESS NOTES
HEMODIALYSIS POST TREATMENT NOTE    Treatment time ordered: 3.5 Hours    Actual treatment time: Off 10 minutes early per demands and safety precautions. UltraFiltration Goal: 6422-2328 ml as tolerated  UltraFiltration Removed: 3600 ml      Pre Treatment weight: 103.8 kg  Post Treatment weight: ORALIA, patient uncooperative  Estimated Dry Weight:     Access used:     Central Venous Catheter:          Tunneled or Non-tunneled: Tunneled           Site: Left chest wall          Access Flow: Good      Internal Access:       AV Fistula or AV Graft:          Site:        Access Flow:        Sign and symptoms of infection:        If YES:     Medications or blood products given: None    Chronic outpatient schedule: MWF    Chronic outpatient unit: [facility]    Summary of response to treatment: Patient taken off treatment 10 minutes per demands. He became combative at the end of treatment and started throwing items at staff and other patient while Pamela Guerra was attempting to cannulate the other patient. Floor staff called in for assist. Patient taken off machine and returned to room. No dialysis post assessment was completed as patient was agitated and uncooperative. Explain if orders NOT met, was physician notified:      Mansoor Elise faxed to patient unit/ placed in patient chart: Yes    Post assessment completed: Yes    Report given to: Heather Ferro RN      * Intra-treatment documented Safety Checks include the followin) Access and face visible at all times. 2) All connections and blood lines are secure with no kinks. 3) NVL alarm engaged. 4) Hemosafe device applied (if applicable). 5) No collapse of Arterial or Venous blood chambers. 6) All blood lines / pump segments in the air detectors.

## 2023-03-15 NOTE — CARE COORDINATION
Social work: Patient has not gone to dialysis yet, therefore 3:00PM transport canceled and placed on \"will call\"- await dialysis then patient can be discharged back to [address] [place] [address].

## 2023-03-15 NOTE — CARE COORDINATION
Social work: spoke to [facility] [employer] dialysis [phone] and fax [phone]  to inform them pt is discharging today back to [facility] and has been getting dialysis today. They will expect him on Friday to resume his spot and floor along with Jose aware of need for transportation to dialysis.  Sully winters

## 2023-03-15 NOTE — PROGRESS NOTES
Patient discharged via stretcher with belonging's. Report given. Patient calm and cooperative at time of discharge. Medicated with clonidine and percocet prior to leaving. All questions answered. Writer did get a hold of Nurse at [facility] and updated as well.

## 2023-03-15 NOTE — DISCHARGE INSTR - DIET

## 2023-03-16 LAB
MICROORGANISM SPEC CULT: ABNORMAL
MICROORGANISM/AGENT SPEC: ABNORMAL
MICROORGANISM/AGENT SPEC: ABNORMAL
SPECIMEN DESCRIPTION: ABNORMAL

## 2023-03-23 ENCOUNTER — TELEPHONE (OUTPATIENT)
Dept: PAIN MANAGEMENT | Age: 63
End: 2023-03-23

## 2023-03-23 RX ORDER — GLIMEPIRIDE 2 MG/1
TABLET ORAL
Qty: 90 TABLET | Refills: 1 | Status: SHIPPED | OUTPATIENT
Start: 2023-03-23

## 2023-03-23 NOTE — TELEPHONE ENCOUNTER
Keith Contreras is calling to request a refill on the following medication(s):    Medication Request:  Requested Prescriptions     Pending Prescriptions Disp Refills    glimepiride (AMARYL) 2 MG tablet [Pharmacy Med Name: GLIMEPIRIDE 2 MG TABLET] 90 tablet 1     Sig: TAKE ONE TABLET BY MOUTH EVERY MORNING BEFORE BREAKFAST       Last Visit Date (If Applicable):  6/94/7272    Next Visit Date:    Visit date not found

## 2023-03-23 NOTE — TELEPHONE ENCOUNTER
Pt is calling asking for a Percocet refill. He is currently in the nursing home. Please call his cell [phone]. He states Dr. Matthews Dial told him to call when he needed the Rx.

## 2023-03-23 NOTE — TELEPHONE ENCOUNTER
He got a prescription of Percocet 7.5 mg filled on March 17 for 30 tablet by Dr. Letty Nissen  As long as he is in rehab medication is provided by the rehab doctors we can only make recommendations which we did on last visit last month

## 2023-03-24 ENCOUNTER — HOSPITAL ENCOUNTER (EMERGENCY)
Age: 63
Discharge: HOME | End: 2023-03-24
Attending: EMERGENCY MEDICINE
Payer: COMMERCIAL

## 2023-03-24 ENCOUNTER — APPOINTMENT (OUTPATIENT)
Dept: CT IMAGING | Age: 63
End: 2023-03-24
Payer: COMMERCIAL

## 2023-03-24 VITALS
HEART RATE: 86 BPM | SYSTOLIC BLOOD PRESSURE: 218 MMHG | RESPIRATION RATE: 16 BRPM | TEMPERATURE: 98.1 F | DIASTOLIC BLOOD PRESSURE: 67 MMHG | OXYGEN SATURATION: 93 %

## 2023-03-24 DIAGNOSIS — S09.90XA INJURY OF HEAD, INITIAL ENCOUNTER: Primary | ICD-10-CM

## 2023-03-24 PROCEDURE — 70450 CT HEAD/BRAIN W/O DYE: CPT

## 2023-03-24 PROCEDURE — 99284 EMERGENCY DEPT VISIT MOD MDM: CPT

## 2023-03-24 ASSESSMENT — ENCOUNTER SYMPTOMS
COUGH: 0
VOMITING: 0
EYE REDNESS: 0
EYE DISCHARGE: 0
COLOR CHANGE: 0
FACIAL SWELLING: 0
CONSTIPATION: 0
ABDOMINAL PAIN: 0
SHORTNESS OF BREATH: 0
DIARRHEA: 0

## 2023-03-24 ASSESSMENT — PAIN SCALES - GENERAL: PAINLEVEL_OUTOF10: 10

## 2023-03-24 ASSESSMENT — PAIN DESCRIPTION - LOCATION: LOCATION: HEAD

## 2023-03-24 ASSESSMENT — PAIN - FUNCTIONAL ASSESSMENT: PAIN_FUNCTIONAL_ASSESSMENT: 0-10

## 2023-03-24 ASSESSMENT — PAIN DESCRIPTION - ORIENTATION: ORIENTATION: RIGHT

## 2023-03-24 NOTE — ED NOTES
Report given to nurse at [facility]. Unit clerk arranging transportation.      Kecia Blandon RN  03/24/23 6650
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
## 2023-03-24 NOTE — ED NOTES
Awaiting wheelchair Arnol Fuchs transport back to SNF. Pt asleep with head on side rail, RN offered pillow and patient declined.         Lamont Mancia RN  03/24/23 0814

## 2023-03-27 NOTE — PROGRESS NOTES
Addended by: SELINA MCKEON on: 3/27/2023 04:22 PM     Modules accepted: Orders     Renal Progress Note    Patient :  Rupesh Garland; 60 y.o. MRN# [id number]  Location:  5985/8766-94  Attending:  Giovanni Briceño MD  Admit Date:  3/9/2023   Hospital Day: 6      Subjective:       Patient is seen and examined. He had hemodialysis yesterday. He dialyzes Monday and Wednesday and Friday at [facility] via tunneled catheter, under our group with nephrologist Dr. Carlton Sheldon. Patient was admitted with penile pain secondary to phimosis and mass on penis, s/p recent dorsal slit and biopsy, biopsy consistent with ischemic injury, showing coagulation necrosis of fibrovascular tissue with acute inflammation. He is now on antibiotics, local wound care continues, there is also consideration he might need circumcision to treat phimosis as the significant inflammation on the foreskin which is batting the glans penis. Due to the pain he was not tolerating dialysis and was cutting treatments short, and he developed asterixis and clonus because of under dialysis, and potassium was 5.6 on admission. He had a more complete dialysis yesterday. Myoclonus improved to an extent. Labs today showed a sodium of 132 potassium 5 chloride 93 bicarb 26 BUN 50 creatinine 6.9 glucose 139 hemoglobin 9.9 weight is 103.9 kg which is about 4 kg above his dry weight     Blood pressure stable. On room air  No chest pain or shortness of breath or nausea or vomiting. No significant swelling of the extremities. Antibiotics including Levaquin Flagyl and doxycycline continue  Outpatient Medications:     Medications Prior to Admission: lidocaine (HM LIDOCAINE PATCH) 4 % external patch, Place 1 patch onto the skin daily Apply to left shoulder.   [DISCONTINUED] polyethyl glycol-propyl glycol 0.4-0.3 % (SYSTANE) 0.4-0.3 % ophthalmic solution, Place 1 drop into both eyes every 6 hours as needed for Dry Eyes  [DISCONTINUED] docusate sodium (COLACE) 100 MG capsule, Take 100 mg by mouth daily  [DISCONTINUED] lisinopril (PRINIVIL;ZESTRIL) 20 MG tablet, TAKE ONE TABLET BY MOUTH TWICE A DAY IN THE MORNING AND AT BEDTIME  oxyCODONE-acetaminophen (PERCOCET) 7.5-325 MG per tablet, Take 1 tablet by mouth daily as needed for Pain. [DISCONTINUED] diphenhydrAMINE (BENADRYL) 25 MG tablet, Take 25 mg by mouth nightly as needed for Itching  [DISCONTINUED] glimepiride (AMARYL) 2 MG tablet, Take 2 mg by mouth every morning (before breakfast)  atorvastatin (LIPITOR) 80 MG tablet, Take 1 tablet by mouth daily  aspirin (ASPIRIN CHILDRENS) 81 MG chewable tablet, Take 1 tablet by mouth daily  pregabalin (LYRICA) 75 MG capsule, Take 1 capsule by mouth daily for 30 days.  Max Daily Amount: 75 mg  amLODIPine (NORVASC) 5 MG tablet, Take 1 tablet by mouth daily  Multiple Vitamins-Minerals (RENAPLEX) TABS, Take 1 tablet by mouth daily  traZODone (DESYREL) 50 MG tablet, Take 1 tablet by mouth nightly as needed for Sleep  cloNIDine (CATAPRES) 0.1 MG tablet, Take 3 tablets by mouth 2 times daily  [DISCONTINUED] apixaban (ELIQUIS) 5 MG TABS tablet, Take 1 tablet by mouth 2 times daily  [DISCONTINUED] linagliptin (TRADJENTA) 5 MG tablet, Take 1 tablet by mouth daily  [DISCONTINUED] sucroferric oxyhydroxide (VELPHORO) 500 MG CHEW chewable tablet, Take 2 tablets by mouth 3 times daily (with meals)  [DISCONTINUED] polyethyl glycol-propyl glycol 0.4-0.3 % (SYSTANE) 0.4-0.3 % ophthalmic solution, Place 1 drop into both eyes as needed for Dry Eyes (Patient taking differently: Place 1 drop into both eyes every 6 hours as needed for Dry Eyes)  [DISCONTINUED] minoxidil (LONITEN) 2.5 MG tablet, Take 1 tablet by mouth 2 times daily    Current Medications:     Scheduled Meds:    clotrimazole-betamethasone   Topical TID    metroNIDAZOLE  500 mg Oral 2 times per day    docusate sodium  100 mg Oral BID    calcium acetate  2 capsule Oral TID WC    lisinopril  2.5 mg Oral Daily    sodium zirconium cyclosilicate  5 g Oral Once    doxycycline monohydrate  100 mg Oral 2 times per day    levoFLOXacin  500 mg Oral Every Other Day    insulin lispro  0-4 Units SubCUTAneous TID WC    insulin lispro  0-4 Units SubCUTAneous Nightly    apixaban  5 mg Oral BID    sodium chloride flush  5-40 mL IntraVENous 2 times per day    fentanNYL  50 mcg IntraVENous Once    aspirin  81 mg Oral Daily    atorvastatin  80 mg Oral Daily    cloNIDine  0.3 mg Oral BID    [Held by provider] glipiZIDE  2.5 mg Oral QAM AC    pregabalin  75 mg Oral Daily     Continuous Infusions:    sodium chloride 5 mL/hr at 23 1219    dextrose       PRN Meds:  sodium chloride flush, sodium chloride, diphenhydrAMINE, oxyCODONE-acetaminophen, polyethyl glycol-propyl glycol 0.4-0.3 %, traZODone, glucose, dextrose bolus **OR** dextrose bolus, glucagon (rDNA), dextrose, HYDROmorphone, anticoagulant sodium citrate, anticoagulant sodium citrate    Input/Output:       I/O last 3 completed shifts: In: 500   Out: 1500 . Patient Vitals for the past 96 hrs (Last 3 readings):   Weight   03/15/23 0112 229 lb (103.9 kg)   23 0420 219 lb 4.8 oz (99.5 kg)   23 0631 222 lb 3 oz (100.8 kg)         Vital Signs:   Temperature:  Temp: 98.6 °F (37 °C)  TMax:   Temp (24hrs), Av.3 °F (36.8 °C), Min:97.6 °F (36.4 °C), Max:98.6 °F (37 °C)    Respirations:  Resp: 16  Pulse:   Heart Rate: 80  BP:    BP: (!) 154/85  BP Range: Systolic (81KYK), ZFJ:838 , Min:99 , HLH:805       Diastolic (01OQZ), MOP:54, Min:63, Max:93      Physical Examination:     General:  AAO x 3, speaking in full sentences, no accessory muscle use. Eyes:   Pale conjunctivae, no icterus  Neck:   No JVD, midline trachea and no accessory muscle use  Chest:              Good bilateral air entry and clear to auscultation bilaterally without wheezes or rales or rhonchi  Cardiac:  Regular rate and rhythm with positive S1 and S2 and no rubs  Abdomen: Soft, non-tender, not distended, active bowel sounds  :   No suprapubic or flank tenderness. Neuro:              AAO x 3, No FND.   SKIN:  No rashes, good skin turgor. Extremities:  Bilateral above the knee amputation, tunneled hemodialysis catheter left chest    Labs:       Recent Labs     03/15/23  0550   WBC 8.8   RBC 4.39   HGB 9.9*   HCT 31.7*   MCV 72.2*   MCH 22.6*   MCHC 31.2   RDW 19.3*      MPV Abnormal        BMP:   Recent Labs     03/13/23  0834 03/15/23  0550   * 132*   K 5.3 5.0   CL 95* 93*   CO2 23 26   BUN 64* 50*   CREATININE 9.28* 6.97*   GLUCOSE 161* 139*   CALCIUM 9.3 9.4        Phosphorus:     Recent Labs     03/13/23  0834   PHOS 6.6*       Magnesium:    No results for input(s): MG in the last 72 hours. Albumin:    No results for input(s): LABALBU in the last 72 hours. BNP:      Lab Results   Component Value Date/Time    BNP 3,088 06/13/2013 06:32 AM     SPEP:  Lab Results   Component Value Date/Time    PROT 6.5 03/12/2023 05:17 AM     Hep BsAg:         Lab Results   Component Value Date/Time    HEPBSAG NONREACTIVE 11/13/2022 12:55 PM     Hep C AB:          Lab Results   Component Value Date/Time    HEPCAB NONREACTIVE 09/28/2021 03:15 PM       Urinalysis/Chemistries:      Lab Results   Component Value Date/Time    NITRU NEGATIVE 06/13/2013 10:30 AM    COLORU YELLOW 06/13/2013 10:30 AM    PHUR 7.5 06/13/2013 10:30 AM    WBCUA 0 TO 2 06/13/2013 10:30 AM    RBCUA 10 TO 20 06/13/2013 10:30 AM    MUCUS NOT REPORTED 06/13/2013 10:30 AM    TRICHOMONAS NOT REPORTED 06/13/2013 10:30 AM    YEAST NOT REPORTED 06/13/2013 10:30 AM    BACTERIA FEW 06/13/2013 10:30 AM    SPECGRAV 1.015 06/13/2013 10:30 AM    LEUKOCYTESUR NEGATIVE 06/13/2013 10:30 AM    UROBILINOGEN Normal 06/13/2013 10:30 AM    BILIRUBINUR NEGATIVE 06/13/2013 10:30 AM    GLUCOSEU 3+ 06/13/2013 10:30 AM    KETUA NEGATIVE 06/13/2013 10:30 AM    AMORPHOUS NOT REPORTED 06/13/2013 10:30 AM       Radiology:     CXR: No recent    Assessment:     1.  ESRD and typically on a Monday and Wednesday and Friday hemodialysis schedule at [facility], although not able to tolerate complete treatments recently secondary to severe penile pain  2. HTN  3. Penile pain, likely ischemic in nature, with recent dorsal slit and biopsy of glans penis about penile mass a couple of weeks ago. Biopsy showing necrosis of fibrovascular tissue in the penis, picture consistent with ischemic injury  4. Clonus and asterixis along with hyperkalemia and hyperphosphatemia secondary to inability to tolerate his usual complete dialysis treatments resulting in under dialysis and a degree of uremia. Symptoms are improving. 5.  Hyperkalemia secondary to poor dialysis. Improving. On Lokelma  6. Hyperphosphatemia  7. Anemia of chronic disease  8. Secondary hyperparathyroidism  9. Bilateral amputee  10. Mild volume overload       Plan:   1. Hemodialysis today aim for 3 to 4 L of fluid removal  2. Hold antihypertensives till dialysis completed   3. Labs in the a.m. as ordered  4. Stable for discharge from nephrology standpoint  Nutrition   Please ensure that patient is on a renal diet/TF. Avoid nephrotoxic drugs/contrast exposure.

## 2023-04-22 ENCOUNTER — APPOINTMENT (OUTPATIENT)
Dept: GENERAL RADIOLOGY | Age: 63
End: 2023-04-22
Payer: MEDICARE

## 2023-04-22 ENCOUNTER — HOSPITAL ENCOUNTER (EMERGENCY)
Age: 63
Discharge: HOME | End: 2023-04-22
Attending: EMERGENCY MEDICINE
Payer: MEDICARE

## 2023-04-22 ENCOUNTER — APPOINTMENT (OUTPATIENT)
Dept: CT IMAGING | Age: 63
End: 2023-04-22
Payer: MEDICARE

## 2023-04-22 VITALS
TEMPERATURE: 98.6 F | BODY MASS INDEX: 30.8 KG/M2 | RESPIRATION RATE: 19 BRPM | WEIGHT: 220 LBS | SYSTOLIC BLOOD PRESSURE: 154 MMHG | HEIGHT: 71 IN | OXYGEN SATURATION: 99 % | HEART RATE: 82 BPM | DIASTOLIC BLOOD PRESSURE: 97 MMHG

## 2023-04-22 DIAGNOSIS — R41.0 DISORIENTATION: Primary | ICD-10-CM

## 2023-04-22 LAB
ABSOLUTE EOS #: 0.07 K/UL (ref 0–0.44)
ABSOLUTE IMMATURE GRANULOCYTE: 0 K/UL (ref 0–0.3)
ABSOLUTE LYMPH #: 1.35 K/UL (ref 1.1–3.7)
ABSOLUTE MONO #: 0.85 K/UL (ref 0.1–1.2)
ACETAMINOPHEN LEVEL: <10 UG/ML (ref 10–30)
ALBUMIN SERPL-MCNC: 3.6 G/DL (ref 3.5–5.2)
ALP SERPL-CCNC: 140 U/L (ref 40–129)
ALT SERPL-CCNC: 17 U/L (ref 5–41)
ANION GAP SERPL CALCULATED.3IONS-SCNC: 14 MMOL/L (ref 9–17)
AST SERPL-CCNC: 38 U/L
BASOPHILS # BLD: 0 % (ref 0–2)
BASOPHILS ABSOLUTE: 0 K/UL (ref 0–0.2)
BILIRUB DIRECT SERPL-MCNC: 0.4 MG/DL
BILIRUB INDIRECT SERPL-MCNC: 0.3 MG/DL (ref 0–1)
BILIRUB SERPL-MCNC: 0.7 MG/DL (ref 0.3–1.2)
BUN SERPL-MCNC: 14 MG/DL (ref 8–23)
BUN/CREAT BLD: 3 (ref 9–20)
CALCIUM SERPL-MCNC: 9.6 MG/DL (ref 8.6–10.4)
CHLORIDE SERPL-SCNC: 98 MMOL/L (ref 98–107)
CO2 SERPL-SCNC: 26 MMOL/L (ref 20–31)
CREAT SERPL-MCNC: 4.57 MG/DL (ref 0.7–1.2)
EOSINOPHILS RELATIVE PERCENT: 1 % (ref 1–4)
ETHANOL PERCENT: <0.01 %
ETHANOL: <10 MG/DL
GFR SERPL CREATININE-BSD FRML MDRD: 14 ML/MIN/1.73M2
GLUCOSE SERPL-MCNC: 99 MG/DL (ref 70–99)
HCT VFR BLD AUTO: 36 % (ref 40.7–50.3)
HGB BLD-MCNC: 10.7 G/DL (ref 13–17)
IMMATURE GRANULOCYTES: 0 %
LYMPHOCYTES # BLD: 19 % (ref 24–43)
MAGNESIUM SERPL-MCNC: 2.1 MG/DL (ref 1.6–2.6)
MCH RBC QN AUTO: 21.8 PG (ref 25.2–33.5)
MCHC RBC AUTO-ENTMCNC: 29.7 G/DL (ref 28.4–34.8)
MCV RBC AUTO: 73.3 FL (ref 82.6–102.9)
MONOCYTES # BLD: 12 % (ref 3–12)
MORPHOLOGY: ABNORMAL
NRBC AUTOMATED: 0 PER 100 WBC
PDW BLD-RTO: 20.2 % (ref 11.8–14.4)
PHOSPHATE SERPL-MCNC: 4 MG/DL (ref 2.5–4.5)
PLATELET # BLD AUTO: 195 K/UL (ref 138–453)
PMV BLD AUTO: 10.3 FL (ref 8.1–13.5)
POTASSIUM SERPL-SCNC: 3.6 MMOL/L (ref 3.7–5.3)
PROT SERPL-MCNC: 7.7 G/DL (ref 6.4–8.3)
RBC # BLD: 4.91 M/UL (ref 4.21–5.77)
SALICYLATE LEVEL: 1 MG/DL (ref 3–10)
SEG NEUTROPHILS: 68 % (ref 36–65)
SEGMENTED NEUTROPHILS ABSOLUTE COUNT: 4.83 K/UL (ref 1.5–8.1)
SODIUM SERPL-SCNC: 138 MMOL/L (ref 135–144)
TOXIC TRICYCLIC SC,BLOOD: NEGATIVE
WBC # BLD AUTO: 7.1 K/UL (ref 3.5–11.3)

## 2023-04-22 PROCEDURE — 99284 EMERGENCY DEPT VISIT MOD MDM: CPT

## 2023-04-22 PROCEDURE — 83735 ASSAY OF MAGNESIUM: CPT

## 2023-04-22 PROCEDURE — 80076 HEPATIC FUNCTION PANEL: CPT

## 2023-04-22 PROCEDURE — 71045 X-RAY EXAM CHEST 1 VIEW: CPT

## 2023-04-22 PROCEDURE — 80307 DRUG TEST PRSMV CHEM ANLYZR: CPT

## 2023-04-22 PROCEDURE — 80048 BASIC METABOLIC PNL TOTAL CA: CPT

## 2023-04-22 PROCEDURE — 80143 DRUG ASSAY ACETAMINOPHEN: CPT

## 2023-04-22 PROCEDURE — 70450 CT HEAD/BRAIN W/O DYE: CPT

## 2023-04-22 PROCEDURE — 85025 COMPLETE CBC W/AUTO DIFF WBC: CPT

## 2023-04-22 PROCEDURE — 80179 DRUG ASSAY SALICYLATE: CPT

## 2023-04-22 PROCEDURE — G0480 DRUG TEST DEF 1-7 CLASSES: HCPCS

## 2023-04-22 PROCEDURE — 84100 ASSAY OF PHOSPHORUS: CPT

## 2023-04-22 ASSESSMENT — ENCOUNTER SYMPTOMS
VOMITING: 0
RHINORRHEA: 0
SHORTNESS OF BREATH: 0
COLOR CHANGE: 0
EYE DISCHARGE: 0
SORE THROAT: 0
COUGH: 0
DIARRHEA: 0
NAUSEA: 0
EYE REDNESS: 0

## 2023-04-22 NOTE — ED PROVIDER NOTES
Level <43 (*)     Salicylate Lvl 1 (*)     All other components within normal limits   HEPATIC FUNCTION PANEL - Abnormal; Notable for the following components:    Alkaline Phosphatase 140 (*)     Bilirubin, Direct 0.4 (*)     All other components within normal limits   MAGNESIUM   PHOSPHORUS   URINALYSIS   URINE DRUG SCREEN       Vitals Reviewed:    Vitals:    04/22/23 0945 04/22/23 1000 04/22/23 1117 04/22/23 1230   BP: (!) 171/120 (!) 165/109 (!) 170/98 (!) 154/97   Pulse: 82 80 84 82   Resp: 26 18 19 19   Temp:       TempSrc:       SpO2: 91%   99%   Weight:       Height:         MEDICATIONS GIVEN TO PATIENT THIS ENCOUNTER:  No orders of the defined types were placed in this encounter. DISCHARGE PRESCRIPTIONS:  New Prescriptions    No medications on file     PHYSICIAN CONSULTS ORDERED THIS ENCOUNTER:  None  FINAL IMPRESSION      1.  Disorientation          DISPOSITION/PLAN   DISPOSITION Decision To Discharge 04/22/2023 01:16:41 PM      OUTPATIENT FOLLOW UP THE PATIENT:  Ronal Her MD  [address] [address] [place] [phone]    Schedule an appointment as soon as possible for a visit in 2 days        MD Dave Ghosh MD  04/22/23 9350

## 2023-04-26 RX ORDER — LINAGLIPTIN 5 MG/1
TABLET, FILM COATED ORAL
Qty: 90 TABLET | Refills: 0 | Status: SHIPPED | OUTPATIENT
Start: 2023-04-26

## 2023-04-26 NOTE — TELEPHONE ENCOUNTER
Imani Berry is calling to request a refill on the following medication(s):    Medication Request:  Requested Prescriptions     Pending Prescriptions Disp Refills    TRADJENTA 5 MG tablet [Pharmacy Med Name: TRADJENTA 5 MG TABLET] 90 tablet 0     Sig: TAKE ONE TABLET BY MOUTH DAILY       Last Visit Date (If Applicable):  1/20/2894    Next Visit Date:    Visit date not found

## 2023-05-11 ENCOUNTER — HOSPITAL ENCOUNTER (EMERGENCY)
Age: 63
Discharge: HOME | End: 2023-05-11
Attending: EMERGENCY MEDICINE
Payer: COMMERCIAL

## 2023-05-11 VITALS
WEIGHT: 216 LBS | TEMPERATURE: 97.5 F | DIASTOLIC BLOOD PRESSURE: 80 MMHG | RESPIRATION RATE: 16 BRPM | HEART RATE: 74 BPM | OXYGEN SATURATION: 97 % | SYSTOLIC BLOOD PRESSURE: 120 MMHG | BODY MASS INDEX: 30.13 KG/M2

## 2023-05-11 DIAGNOSIS — K59.00 CONSTIPATION, UNSPECIFIED CONSTIPATION TYPE: Primary | ICD-10-CM

## 2023-05-11 PROCEDURE — 99283 EMERGENCY DEPT VISIT LOW MDM: CPT

## 2023-05-11 RX ORDER — DOCUSATE SODIUM 100 MG/1
100 CAPSULE, LIQUID FILLED ORAL 2 TIMES DAILY
Qty: 60 CAPSULE | Refills: 3 | Status: SHIPPED | OUTPATIENT
Start: 2023-05-11 | End: 2023-06-10

## 2023-05-11 RX ORDER — OXYCODONE HYDROCHLORIDE AND ACETAMINOPHEN 5; 325 MG/1; MG/1
1 TABLET ORAL ONCE
Status: DISCONTINUED | OUTPATIENT
Start: 2023-05-11 | End: 2023-05-11 | Stop reason: HOSPADM

## 2023-05-11 ASSESSMENT — ENCOUNTER SYMPTOMS
DIARRHEA: 0
BLOOD IN STOOL: 0
SHORTNESS OF BREATH: 0
ABDOMINAL PAIN: 0
RECTAL PAIN: 1
ANAL BLEEDING: 0
NAUSEA: 0
VOMITING: 0
CONSTIPATION: 1
ABDOMINAL DISTENTION: 0

## 2023-05-11 ASSESSMENT — PAIN - FUNCTIONAL ASSESSMENT: PAIN_FUNCTIONAL_ASSESSMENT: 0-10

## 2023-05-11 ASSESSMENT — PAIN DESCRIPTION - ONSET: ONSET: ON-GOING

## 2023-05-11 ASSESSMENT — PAIN DESCRIPTION - DESCRIPTORS: DESCRIPTORS: SHARP;ACHING

## 2023-05-11 ASSESSMENT — PAIN DESCRIPTION - LOCATION: LOCATION: RECTUM

## 2023-05-11 ASSESSMENT — PAIN SCALES - GENERAL: PAINLEVEL_OUTOF10: 10

## 2023-05-11 ASSESSMENT — PAIN DESCRIPTION - FREQUENCY: FREQUENCY: CONTINUOUS

## 2023-05-11 NOTE — ED PROVIDER NOTES
[facility]  ED  Emergency Department Encounter  Emergency Medicine Resident     Pt Niharikaloy Winnie Rod  MRN: [id number]  [place] 1960  Date of evaluation: 5/11/23  PCP:  Carine Godoy MD  Note Started: 9:57 AM EDT      CHIEF COMPLAINT       Chief Complaint   Patient presents with    Other     Having rectal pain, for several days, refused to allow nursing home to given enema, wanting something for pain but not due for Percoet for few hours, so pt called 911 from his cell phone       HISTORY OF PRESENT ILLNESS  (Location/Symptom, Timing/Onset, Context/Setting, Quality, Duration, Modifying Factors, Severity.)      Jhonny Mcpherson is a 58 y.o. male who presents with rectal pain. Patient stated that the pain has been going on for a week, last bowel movement was on Monday and was large in volume, has not had a bowel movement since. The patient stated he has not dealt with constipation before however he is on chronic opioid therapy. The patient denies any abdominal pain, nausea, decrease in appetite or vomiting. The patient stated that he was given MiraLAX and stool softeners without improvement at his facility. Per triage, patient refused enema at his facility, patient stated that he wanted \"someone who knew what they were doing to do it\". Did state that he had missed a couple dialysis sessions, however stated that he had dialysis both on Monday and Wednesday per his regular schedule.     PAST MEDICAL / SURGICAL / SOCIAL / FAMILY HISTORY      has a past medical history of Acute congestive heart failure (HCC), Chronic bilateral low back pain with bilateral sciatica, Coronary artery disease involving native coronary artery, CRF (chronic renal failure), DDD (degenerative disc disease), lumbar, Diabetes mellitus ([facility] [address] Dialysis patient [facility]), ED (erectile dysfunction), History of echocardiogram, HTN (hypertension), Hyperlipidemia, LVH (left ventricular hypertrophy), PVD (peripheral vascular

## 2023-05-11 NOTE — ED PROVIDER NOTES
[facility] ED     Emergency Department     Faculty Attestation    I performed a history and physical examination of the patient and discussed management with the resident. I reviewed the residents note and agree with the documented findings and plan of care. Any areas of disagreement are noted on the chart. I was personally present for the key portions of any procedures. I have documented in the chart those procedures where I was not present during the key portions. I have reviewed the emergency nurses triage note. I agree with the chief complaint, past medical history, past surgical history, allergies, medications, social and family history as documented unless otherwise noted below. For Physician Assistant/ Nurse Practitioner cases/documentation I have personally evaluated this patient and have completed at least one if not all key elements of the E/M (history, physical exam, and MDM). Additional findings are as noted. Note Started: 9:45 AM EDT    Patient here with rectal pain constipation. No bowel movement in 4 days which he states is unusual for him. No abdominal pain with this no vomiting. Is now since patient states is been going to dialysis. On exam nontoxic-appearing abdomen soft no focal tenderness rebound or guarding.   Impacted stool on resident's rectal exam.  We will proceed with enema, reevaluate need for additional work-up      Critical Care     none    Ines Busch MD, Ashok Schmidt  Attending Emergency  Physician           Ines Busch MD  05/11/23 7795

## 2023-05-11 NOTE — ED NOTES
Attempting milk and molasses enema, pt with large amount of stool in rectal vault/pushing portions of it out. Pt pushing and writer disimpacting portion of hard brown stool, portions still unable to push stool, portions of enema given/unable to pass the stool impaction, portion left in/most running out. 1100  large portions of stool pushed out by pt/ large brown impaction passed along with smaller portions of stool following the impacted part. Pt given additional approx 50 ml of enema to hold   1115 called into room pt stating he is done doesn't want any more enema and \"I want all this stuff off, and out\" pt stating he feels better and relieved.   Dr. Tara Schwab aware     Aida [place], RN  05/11/23 5870

## 2023-05-11 NOTE — DISCHARGE INSTRUCTIONS
- You are prescribed colace at home, please take it twice daily, if you develop diarrhea you can skip a dose or two, but you will continue to have constipation while you are on opiate therapy so please take it regularly. - Please return to the ED if there is any worsening of your symptoms, any abdominal pain, or any bright red blood in your stool or black and tarry stools.

## 2023-05-22 ENCOUNTER — CLINICAL DOCUMENTATION (OUTPATIENT)
Dept: CARDIOVASCULAR ICU | Age: 63
End: 2023-05-22

## 2023-05-22 LAB
GLUCOSE BLD-MCNC: <10 MG/DL (ref 75–110)

## 2023-05-22 NOTE — ED PROVIDER NOTES
[address] [place] [address] ED  [facility]      Pt Name: Peter Beauchamp  MRN: [id number]  [place] 1960  Date ofevaluation: 5/21/2023  Provider: Jacob Dickens PA-C    CHIEF COMPLAINT       Chief Complaint   Patient presents with    Hypotension    Hypoglycemia         HISTORY OF PRESENT ILLNESS  (Location/Symptom, Timing/Onset, Context/Setting, Quality, Duration, Modifying Factors, Severity.)   Peter Beauchamp is a 58 y.o. male who presents to the emergency department with low sugar and low blood pressure. Patient arrives in asystole. Also reports having penis pain along with affecting his penis. Patient demanding water upon arrival.  Patient is easily agitated. Nursing Notes were reviewed. ALLERGIES     Patient has no known allergies. CURRENT MEDICATIONS       Previous Medications    AMLODIPINE (NORVASC) 5 MG TABLET    Take 1 tablet by mouth daily    AMLODIPINE-ATORVASTATATIN (CADUET) 10-80 MG PER TABLET    TAKE ONE TABLET BY MOUTH DAILY    APIXABAN (ELIQUIS) 5 MG TABS TABLET    Take 1 tablet by mouth 2 times daily    ASPIRIN (ASPIRIN CHILDRENS) 81 MG CHEWABLE TABLET    Take 1 tablet by mouth daily    ATORVASTATIN (LIPITOR) 80 MG TABLET    Take 1 tablet by mouth daily    CALCIUM ACETATE (PHOSLO) 667 MG CAPS CAPSULE    Take 2 capsules by mouth 3 times daily (with meals)    CLONIDINE (CATAPRES) 0.1 MG TABLET    Take 3 tablets by mouth 2 times daily    CLOTRIMAZOLE-BETAMETHASONE (LOTRISONE) 1-0.05 % CREAM    Apply topically 2 times daily.     DOCUSATE SODIUM (COLACE) 100 MG CAPSULE    Take 1 capsule by mouth 2 times daily    DOCUSATE SODIUM (COLACE, DULCOLAX) 100 MG CAPS    Take 100 mg by mouth 2 times daily    GLIMEPIRIDE (AMARYL) 2 MG TABLET    TAKE ONE TABLET BY MOUTH EVERY MORNING BEFORE BREAKFAST    GLIPIZIDE (GLUCOTROL) 5 MG TABLET    Take 0.5 tablets by mouth every morning (before breakfast)    LIDOCAINE ( LIDOCAINE PATCH) 4 % EXTERNAL PATCH    Place 1 patch onto the skin

## 2023-05-22 NOTE — ED PROVIDER NOTES
EMERGENCY DEPARTMENT ENCOUNTER   ATTENDING ATTESTATION     Pt Name: Yvonne Zamudio  MRN: [id number]  [place] 1960  Date of evaluation: 5/21/23   Yvonne Zamudio is a 58 y.o. male with CC: Hypotension and Hypoglycemia    MDM:   Called to room by nursing staff, patient became unresponsive. Cardiac monitor showed asystole, no palpable pulses. CPR initiated. Performed 2 rounds of CPR, and given epi, sodium bicarb and we obtained ROSC. FSBG's continue to read \"low. \"  Given 3 Amps of D50, also dextrose bolus 10% to 50 mL, glucose chewable tablet 16 g, however glucose still reading \"low\". Patient had short run of A-fib in the 150s then developed bradycardia. Blood pressure was soft 60 systolic. Patient was having difficulty protecting airway and I intubated him using glide scope with etomidate and succinylcholine. Successful on first attempt. Started on Levophed. Heart rate continued to be sinus bradycardia. Blood pressures began and patient came pulseless. CPR reinitiated. After 1 round of CPR monitor showed wide-complex rhythm and patient shocked at 200 J. CPR continued. Patient continued in asystole. Multiple rounds of epi, sodium bicarb administered. Patient continued to be asystole. Efforts ceased at 21:19. Time of death: 21:19    I spoke with patient's 2 sons in family room.  called and is with family. Discussed case with pt's PCP, Dr. Shiv Middleton, who will sign death certificate. Case with the 's office, Arlette Figueroa, who releases patient. CRITICAL CARE:   Due to the high probability of sudden and clinically significant deterioration in the patient's condition, the patient required the highest level of my preparedness to intervene urgently. I provided critical care time including documentation time, medication orders and management, reevaluation, vital sign assessment, ordering and reviewing of lab tests, ordering and reviewing of x-ray studies, and admission orders.

## 2023-05-22 NOTE — ED NOTES
Pt unresponsive, doctor notified first code started  Code:  2047 CPR started  2049 5%dextrose given  Epi given  Compression ongoing no pulse  2055 20 etomidate given  Succs 120 given  2066 intubation 23 balloon  2059 1cc eppi given  2102 levophed 10mc/min  Blood sugar reading low below 10  2103 dextrose 1 amp given   2105 levophed increased to 20mc/min  2105 epi 2cc             Dhaval Wylie RN  05/21/23 2139

## 2023-05-22 NOTE — ED NOTES
Pt came into ED by EMS for on going infection from . EMS states that pt has a history of hypertension, blood pressure was 116/72, spo2 98. No blood sugar taken by EMS. Not on oxygen at home. Pt is alert and orientedx3. No fever noted 97.5 Amputated left and right leg below the knee.       Topher Guerra RN  05/21/23 2152       Topher Guerra RN  05/21/23 2212       Topher Guerra RN  05/21/23 221

## 2023-05-22 NOTE — PROGRESS NOTES
[address]   Patient Death Note  DEATH                  Room # STA ASTON/ASTON   Name: Seth Fry            Age: 58 y.o. Gender: male          [employer]: [address] [place], [place] [address]ud of Alevism:   Admit Date & Time: 5/21/2023  7:55 PM        Actual date of death: 5/21/23   TOD: 2119       Referral:  Unit: ED  Nurse: Rosemary Macedo AT DEATH:  Pt came into ED and coded shortly after. Pt's son present after pt's death with doctor. IS THIS A 'S CASE? No    SPIRITUAL/EMOTIONAL INTERVENTION:  On call  called and provided support. DOCTOR SIGNING DEATH CERTIFICATE:  Name: Unknown   Phone number: Unknown    Copy of COMPLETED Release of Body Form Received? Yes    Patient's belongings: Unknown to [facility] [address]:  Contacted Time 2330  Name: [facility]: [place], [place]  Phone Number: [phone]    NEXT OF KIN:  Name: Nesha Oscar  Relationship: son  Street Address: [address] [place] [address] [address]: [place]  Zip code: [address]   Phone Number: [phone]    [facility]? Yes    IF SO, WHAT? Writer to follow up with who is signing death certificate    CHARTED ON BEHALF OF [address] [place] [address].  Ck Chavez    Electronically signed by Martina Love on 5/22/2023 at [facility]  [phone]

## 2023-05-22 NOTE — ED NOTES
[facility], RN  23 0114

## 2023-05-27 LAB
MICROORGANISM SPEC CULT: NORMAL
SERVICE CMNT-IMP: NORMAL
SPECIMEN DESCRIPTION: NORMAL

## 2023-08-05 NOTE — ED NOTES
Pt's son arrived. Speaking with Dr. Lucie Buckner in family consult area.       Hallie Clifton RN  05/21/23 9970 Statement Selected

## 2023-10-09 NOTE — PROGRESS NOTES
Occupational Therapy  Facility/Department: [facility] PROGRESSIVE CARE  Occupational Therapy Initial Assessment    Name: Anibal Alba  : 1960  MRN: [id number]  Date of Service: 3/11/2023    Discharge Recommendations:  Patient would benefit from continued therapy after discharge      Pt currently functioning below baseline. Recommend daily inpatient skilled therapy at time of discharge to maximize long term outcomes and prevent re-admission. Please refer to AM-PAC score for current level of function. RN reports patient is medically stable for therapy treatment this date. Chart reviewed prior to treatment and patient is agreeable for therapy. All lines intact and patient positioned comfortably at end of treatment. All patient needs addressed prior to ending therapy session. Patient Diagnosis(es): The primary encounter diagnosis was Penile pain. A diagnosis of Wound infection was also pertinent to this visit. Past Medical History:  has a past medical history of Acute congestive heart failure (HCC), Chronic bilateral low back pain with bilateral sciatica, Coronary artery disease involving native coronary artery, CRF (chronic renal failure), DDD (degenerative disc disease), lumbar, Diabetes mellitus ([facility] [address] Dialysis patient [facility]), ED (erectile dysfunction), History of echocardiogram, HTN (hypertension), Hyperlipidemia, LVH (left ventricular hypertrophy), PVD (peripheral vascular disease) ([facility] [address] S/P bilateral BKA (below knee amputation) ([facility] [address] and Wears glasses. Past Surgical History:  has a past surgical history that includes Glaucoma surgery; Cataract removal with implant; Leg amputation below knee (Bilateral); Tunneled venous catheter placement; Dialysis fistula creation (Bilateral); Finger surgery (Left); Finger amputation; Arm Surgery; and Circumcision (N/A, 2023).      Per H&P: 58-year-old -American male with history of phimosis status post recent dorsal slit is presented to the emergency room with subjective complaint of increasing pain drainage and swelling of the foreskin. However he denies fevers chills expectoration. Patient is noted to have a chronic pain syndrome and desire to have continuous and frequent pain medications      Assessment   Performance deficits / Impairments: Decreased functional mobility ; Decreased strength;Decreased endurance;Decreased vision/visual deficit; Decreased coordination;Decreased safe awareness;Decreased ADL status; Decreased balance;Decreased cognition;Decreased fine motor control  Assessment: Pt limited by cogntive deficits, poor balance, dec fine motor skills, and overall weakness. Pt requires SIGNIFICANT assist for self-care and functional tasks this date. Skilled OT is indicated to increase overall IND and safety with self-care and functional tasks to return to PLOF  Prognosis: Fair  Decision Making: Medium Complexity  REQUIRES OT FOLLOW-UP: Yes  Activity Tolerance  Activity Tolerance: Patient limited by fatigue;Treatment limited secondary to decreased cognition        Plan   Occupational Therapy Plan  Times Per Week: 3-4x per week, 1x per day as andrews  Current Treatment Recommendations: Strengthening, Balance training, Functional mobility training, Endurance training, Patient/Caregiver education & training, Safety education & training, Equipment evaluation, education, & procurement, Self-Care / ADL, Positioning, Home management training, Cognitive/Perceptual training, Cognitive reorientation     Restrictions  Restrictions/Precautions  Restrictions/Precautions: General Precautions  Required Braces or Orthoses?: No  Position Activity Restriction  Other position/activity restrictions: up with assist, left chest port.  RUE AV fistula, left UE IV, B BKA    Subjective   General  Chart Reviewed: Yes  Patient assessed for rehabilitation services?: Yes  Family / Caregiver Present: No  Subjective  Subjective: pt agreeable to OT eval     Social/Functional History  Social/Functional History  Lives With: Other (comment)  Type of Home: Facility  Home Layout: One level  Ambulation Assistance:  (pt reported he hasnt walked in a couple of years)  Transfer Assistance: Independent  Active : No  Patient's  Info: son  Occupation: On disability  Type of Occupation:   Leisure & Hobbies: Fishing  Additional Comments: On recent admission, patient reported he lived alone in a house and up until recently was using prosthesis to amb but is no longer able due to LE edema. Admission 9/2022: indep bed mobility, max x 2 to stand with prosthesis, admission 2/2023 indep bed mobility, min lateral seated transfer. Not able to determin last time patient was living at home. Poor historian, reports he was indep getting from bed to w/c at facility. Objective           Observation/Palpation  Posture: Fair  Observation: pt with decreased alertness initially; confused comments and responses; Safety Devices  Type of Devices: All fall risk precautions in place; Bed alarm in place;Call light within reach; Left in bed;Nurse notified    Bed Mobility Training  Bed Mobility Training: Yes  Overall Level of Assistance: Assist X2;Moderate assistance  Interventions: Verbal cues; Safety awareness training; Tactile cues (MAX cues for initation, sequencing, proper bed mob tech,use of bed rails. Pt sat EOB ~15 mins with Mod A to CGA sitting balance)  Rolling: Moderate assistance;Assist X2 (pt resistive to rolling this date)  Supine to Sit: Moderate assistance;Assist X2  Sit to Supine: Other (comment) (pt sitting EOB upon exit with PCT present)    Transfer Training  Transfer Training: No (pt not appropriate for transfers this date)       AROM: Grossly decreased, non-functional (L shoulder ~40-50 deg, rest [facility])  Strength: Grossly decreased, non-functional (3+/5)  Coordination: Generally decreased, functional (dec accuracy with B opposition)  Tone: Normal      ADL  Feeding:  Moderate assistance;Setup;Maximum assistance  Feeding Skilled Clinical Factors: Pt required assist to place fork in his right hand, pt able to place food on fork, but occassionaly had difficulty and pushed all the food off the plate with the fork. Pt with difficulty seeing his plate and required orientation to the plate/tray. Pt required MAX A to bring cup to his mouth  Grooming: Minimal assistance  UE Bathing: Moderate assistance  LE Bathing: Maximum assistance  UE Dressing: Maximum assistance  LE Dressing: Maximum assistance  Toileting: Maximum assistance;Dependent/Total       Activity Tolerance  Activity Tolerance: Treatment limited secondary to decreased cognition  Activity Tolerance Comments: pt with increased confusion        Vision  Vision: Impaired  Hearing  Hearing: Within functional limits    Cognition  Overall Cognitive Status: Exceptions  Arousal/Alertness: Delayed responses to stimuli  Following Commands: Inconsistently follows commands; Follows one step commands with increased time  Attention Span: Attends with cues to redirect; Difficulty attending to directions  Memory: Decreased recall of biographical Information;Decreased recall of recent events  Safety Judgement: Decreased awareness of need for assistance;Decreased awareness of need for safety  Problem Solving: Decreased awareness of errors;Assistance required to generate solutions;Assistance required to correct errors made  Insights: Decreased awareness of deficits  Initiation: Requires cues for all  Sequencing: Requires cues for all  Orientation  Orientation Level: Oriented to time;Oriented to person;Disoriented to place; Disoriented to situation                  Education Given To: Patient  Education Provided: Role of Therapy; ADL Adaptive Strategies;Orientation; Fall Prevention Strategies; Plan of Care;Transfer Training;Energy Conservation  Education Provided Comments: OT POC, purpose of OT in acute care, safety in function, benefits of OOB activity, re-orientation  Education Method: Demonstration;Verbal  Education Outcome: Continued education needed                        AM-PAC Score        AM-PAC Inpatient Daily Activity Raw Score: 12 (03/11/23 1253)  AM-PAC Inpatient ADL T-Scale Score : 30.6 (03/11/23 1253)  ADL Inpatient CMS 0-100% Score: 66.57 (03/11/23 1253)  ADL Inpatient CMS G-Code Modifier : CL (03/11/23 1253)           Goals  Short Term Goals  Time Frame for Short Term Goals: by discharge, pt to demo  Short Term Goal 1: CGA for bed mob tech with bed rails/controls as needed  Short Term Goal 2: pt to tolerate sitting EOB ~20+min to complete simple grooming/self-care tasks  Short Term Goal 3: CGA for feeding tasks with AE as needed  Short Term Goal 4: Min A for toileting tasks with AD/AE as needed  Short Term Goal 5: pt/family to be IND with EC/WS, fall prevention tech, pressure relief education, discharge recommendations with use of handouts as needed  Long Term Goals  Long Term Goal 1: pt will tolerate re-assessment of transfers to add goal to POC  Patient Goals   Patient goals : to go home       Therapy Time   Individual Concurrent Group Co-treatment   Time In 0815         Time Out 0851 (+10 chart review)         Minutes 36          Tx time: 36 min    Co-treatment with PT warranted secondary to decreased safety and independence requiring 2 skilled therapy professionals to address individual discipline's goals. OT addressing preparation for ADL transfer, sitting balance for increased ADL performance, sitting/activity tolerance, functional reaching, environmental safety/scanning, fall prevention, functional mobility for ADL transfers, ability to sequence and follow directions, bed mobility tech, and functional UE strength.     Layne Vegas OTR/L Statement Selected

## 2024-10-17 NOTE — TELEPHONE ENCOUNTER
10/17/24      Scott Monroy  [address]  [facility] [address]      Dear Scott:    We are committed to helping you live well during and after your hospital stay. Through our Care Transitions Program, we give you and your family individualized support during your transition home.     Upon leaving either the hospital or skilled facility, you will be given a Care   Transitions Nurse. Our priority is to help you with your recovery once you get   home and get back to the things you need and want to do.  Services are free of   charge.    How it Works:    Your Transitions Nurse will call you within 24 to 72 hours after discharge. If helpful to you, a family member may be there for this call. We will review the following items to make sure you have everything you need as part of your discharge plan:    Your written discharge instructions and care plan  Your meds  Your next doctor’s visit(s)  Any health or other concerns    Over the next 30 days, your Transitions Nurse will call you, most often once a week. These calls give you the chance to ask questions about your health care needs.    Your Transitions Nurse can link you with your doctor(s) and services as needed and give helpful facts to help keep you on track throughout your healing.    Your Transitions Nurse is available to you Monday thru Friday, 8am to 4:30pm at [phone].    We look forward to working with you on your health care journey.    Sincerely,    YENNIFER Bernardo RN  Care Transitions Nurse?   [facility] resources: Access to Care [place]; Virtual, Urgent or Emergency Care flyer  Back Care Tips    Relieving Back Pain   Preventing Falls: Making Changes in Your Living Space   Preventing Falls: Moving Safely Using a Cane or Walker   Shortness of Breath: Maximizing Your Energy    Why is he getting meds form others ?

## 2024-11-23 NOTE — PLAN OF CARE
Problem: Discharge Planning  Goal: Discharge to home or other facility with appropriate resources  2/1/2023 0128 by Azul Rausch RN  Outcome: Progressing  Flowsheets (Taken 1/31/2023 2000)  Discharge to home or other facility with appropriate resources:   Identify barriers to discharge with patient and caregiver   Arrange for needed discharge resources and transportation as appropriate   Identify discharge learning needs (meds, wound care, etc)   Refer to discharge planning if patient needs post-hospital services based on physician order or complex needs related to functional status, cognitive ability or social support system  Problem: Pain  Goal: Verbalizes/displays adequate comfort level or baseline comfort level  2/1/2023 0128 by Azul Rausch RN  Outcome: Progressing  Flowsheets (Taken 1/31/2023 2000)  Verbalizes/displays adequate comfort level or baseline comfort level:   Encourage patient to monitor pain and request assistance   Assess pain using appropriate pain scale     Problem: ABCDS Injury Assessment  Goal: Absence of physical injury  2/1/2023 0128 by Azul Rausch RN  Outcome: Progressing     Problem: Safety - Adult  Goal: Free from fall injury  2/1/2023 0128 by Azul Rausch RN  Outcome: Progressing     Problem: Skin/Tissue Integrity  Goal: Absence of new skin breakdown  Description: 1. Monitor for areas of redness and/or skin breakdown  2. Assess vascular access sites hourly  3. Every 4-6 hours minimum:  Change oxygen saturation probe site  4. Every 4-6 hours:  If on nasal continuous positive airway pressure, respiratory therapy assess nares and determine need for appliance change or resting period.   2/1/2023 0128 by Azul Rausch RN  Outcome: Progressing     Problem: Chronic Conditions and Co-morbidities  Goal: Patient's chronic conditions and co-morbidity symptoms are monitored and maintained or improved  2/1/2023 0128 by Azul Rausch RN  Outcome: Progressing  Flowsheets (Taken 1/31/2023 2000)  Care Plan - Patient's Chronic Conditions and Co-Morbidity Symptoms are Monitored and Maintained or Improved:   Monitor and assess patient's chronic conditions and comorbid symptoms for stability, deterioration, or improvement   Collaborate with multidisciplinary team to address chronic and comorbid conditions and prevent exacerbation or deterioration   Update acute care plan with appropriate goals if chronic or comorbid symptoms are exacerbated and prevent overall improvement and discharge  1/31/2023 1544 by Casandra Peng RN  Outcome: Progressing 8 (severe pain)

## (undated) PROCEDURE — 5A1D70Z PERFORMANCE OF URINARY FILTRATION, INTERMITTENT, LESS THAN 6 HOURS PER DAY: ICD-10-PCS

## (undated) DEVICE — DRAPE,REIN 53X77,STERILE: Brand: MEDLINE

## (undated) DEVICE — SHEET, T, LAPAROTOMY, STERILE: Brand: MEDLINE

## (undated) DEVICE — GLOVE SURG SZ 7 CRM LTX FREE POLYISOPRENE POLYMER BEAD ANTI

## (undated) DEVICE — YANKAUER,FLEXIBLE HANDLE,REGLR CAPACITY: Brand: MEDLINE INDUSTRIES, INC.

## (undated) DEVICE — BANDAGE GZ W2XL75IN ST RAYON POLY CNFRM STRTCH LTWT

## (undated) DEVICE — SKIN PREP TRAY W/CHG: Brand: MEDLINE INDUSTRIES, INC.

## (undated) DEVICE — SUTURE CHROMIC GUT SZ 3-0 L54IN ABSRB BRN LIGAPAK DISPNS L112G

## (undated) DEVICE — SYRINGE, LUER LOCK, 10ML: Brand: MEDLINE

## (undated) DEVICE — SUTURE CHROMIC GUT SZ 3-0 L27IN ABSRB BRN L26MM SH 1/2 CIR G122H

## (undated) DEVICE — DRESSING,GAUZE,PETROLATUM,CURAD,3"X9",ST: Brand: CURAD

## (undated) DEVICE — SYRINGE IRRIG 60ML SFT PLIABLE BLB EZ TO GRP 1 HND USE W/

## (undated) DEVICE — NEEDLE HYPO 27GA L1.25IN GRY POLYPR HUB S STL REG BVL STR

## (undated) DEVICE — MINOR BSIN PK

## (undated) DEVICE — GOWN,SIRUS,NONRNF,SETINSLV,XL,20/CS: Brand: MEDLINE